# Patient Record
Sex: FEMALE | Race: WHITE | NOT HISPANIC OR LATINO | Employment: OTHER | URBAN - NONMETROPOLITAN AREA
[De-identification: names, ages, dates, MRNs, and addresses within clinical notes are randomized per-mention and may not be internally consistent; named-entity substitution may affect disease eponyms.]

---

## 2017-02-04 ENCOUNTER — HOSPITAL ENCOUNTER (EMERGENCY)
Facility: HOSPITAL | Age: 62
Discharge: HOME | End: 2017-02-04
Attending: EMERGENCY MEDICINE | Admitting: EMERGENCY MEDICINE
Payer: COMMERCIAL

## 2017-02-04 ENCOUNTER — APPOINTMENT (EMERGENCY)
Dept: CT IMAGING | Facility: HOSPITAL | Age: 62
End: 2017-02-04
Payer: COMMERCIAL

## 2017-02-04 ENCOUNTER — APPOINTMENT (EMERGENCY)
Dept: RADIOLOGY | Facility: HOSPITAL | Age: 62
End: 2017-02-04
Payer: COMMERCIAL

## 2017-02-04 VITALS
HEART RATE: 100 BPM | TEMPERATURE: 98.7 F | DIASTOLIC BLOOD PRESSURE: 77 MMHG | RESPIRATION RATE: 20 BRPM | SYSTOLIC BLOOD PRESSURE: 139 MMHG | OXYGEN SATURATION: 97 %

## 2017-02-04 DIAGNOSIS — R07.89 STERNAL PAIN: Primary | ICD-10-CM

## 2017-02-04 LAB
ANION GAP SERPL CALCULATED.3IONS-SCNC: 11 MMOL/L (ref 4–13)
BASOPHILS # BLD AUTO: 0.03 THOUSANDS/ΜL (ref 0–0.1)
BASOPHILS NFR BLD AUTO: 0 % (ref 0–1)
BILIRUB UR QL STRIP: NEGATIVE
BUN SERPL-MCNC: 17 MG/DL (ref 5–25)
CALCIUM SERPL-MCNC: 8.9 MG/DL (ref 8.3–10.1)
CHLORIDE SERPL-SCNC: 103 MMOL/L (ref 100–108)
CLARITY UR: CLEAR
CO2 SERPL-SCNC: 26 MMOL/L (ref 21–32)
COLOR UR: YELLOW
CREAT SERPL-MCNC: 0.91 MG/DL (ref 0.6–1.3)
DEPRECATED D DIMER PPP: 961 NG/ML (FEU) (ref 0–424)
EOSINOPHIL # BLD AUTO: 0.46 THOUSAND/ΜL (ref 0–0.61)
EOSINOPHIL NFR BLD AUTO: 6 % (ref 0–6)
ERYTHROCYTE [DISTWIDTH] IN BLOOD BY AUTOMATED COUNT: 13.5 % (ref 11.6–15.1)
GFR SERPL CREATININE-BSD FRML MDRD: >60 ML/MIN/1.73SQ M
GLUCOSE SERPL-MCNC: 116 MG/DL (ref 65–140)
GLUCOSE UR STRIP-MCNC: NEGATIVE MG/DL
HCT VFR BLD AUTO: 37.2 % (ref 34.8–46.1)
HGB BLD-MCNC: 12.1 G/DL (ref 11.5–15.4)
HGB UR QL STRIP.AUTO: NEGATIVE
KETONES UR STRIP-MCNC: NEGATIVE MG/DL
LACTATE SERPL-SCNC: 2.3 MMOL/L (ref 0.5–2)
LEUKOCYTE ESTERASE UR QL STRIP: NEGATIVE
LYMPHOCYTES # BLD AUTO: 1.49 THOUSANDS/ΜL (ref 0.6–4.47)
LYMPHOCYTES NFR BLD AUTO: 18 % (ref 14–44)
MAGNESIUM SERPL-MCNC: 1.6 MG/DL (ref 1.6–2.6)
MCH RBC QN AUTO: 29.4 PG (ref 26.8–34.3)
MCHC RBC AUTO-ENTMCNC: 32.5 G/DL (ref 31.4–37.4)
MCV RBC AUTO: 91 FL (ref 82–98)
MONOCYTES # BLD AUTO: 0.43 THOUSAND/ΜL (ref 0.17–1.22)
MONOCYTES NFR BLD AUTO: 5 % (ref 4–12)
NEUTROPHILS # BLD AUTO: 5.99 THOUSANDS/ΜL (ref 1.85–7.62)
NEUTS SEG NFR BLD AUTO: 71 % (ref 43–75)
NITRITE UR QL STRIP: NEGATIVE
PH UR STRIP.AUTO: 6 [PH] (ref 4.5–8)
PLATELET # BLD AUTO: 322 THOUSANDS/UL (ref 149–390)
PMV BLD AUTO: 9 FL (ref 8.9–12.7)
POTASSIUM SERPL-SCNC: 4.6 MMOL/L (ref 3.5–5.3)
PROT UR STRIP-MCNC: NEGATIVE MG/DL
RBC # BLD AUTO: 4.11 MILLION/UL (ref 3.81–5.12)
SODIUM SERPL-SCNC: 140 MMOL/L (ref 136–145)
SP GR UR STRIP.AUTO: 1.01 (ref 1–1.03)
TROPONIN I SERPL-MCNC: <0.02 NG/ML
UROBILINOGEN UR QL STRIP.AUTO: 0.2 E.U./DL
WBC # BLD AUTO: 8.4 THOUSAND/UL (ref 4.31–10.16)

## 2017-02-04 PROCEDURE — 85025 COMPLETE CBC W/AUTO DIFF WBC: CPT | Performed by: EMERGENCY MEDICINE

## 2017-02-04 PROCEDURE — 81003 URINALYSIS AUTO W/O SCOPE: CPT | Performed by: EMERGENCY MEDICINE

## 2017-02-04 PROCEDURE — 96361 HYDRATE IV INFUSION ADD-ON: CPT

## 2017-02-04 PROCEDURE — 83605 ASSAY OF LACTIC ACID: CPT | Performed by: EMERGENCY MEDICINE

## 2017-02-04 PROCEDURE — 96360 HYDRATION IV INFUSION INIT: CPT

## 2017-02-04 PROCEDURE — 71275 CT ANGIOGRAPHY CHEST: CPT

## 2017-02-04 PROCEDURE — 85379 FIBRIN DEGRADATION QUANT: CPT | Performed by: EMERGENCY MEDICINE

## 2017-02-04 PROCEDURE — 80048 BASIC METABOLIC PNL TOTAL CA: CPT | Performed by: EMERGENCY MEDICINE

## 2017-02-04 PROCEDURE — 84484 ASSAY OF TROPONIN QUANT: CPT | Performed by: EMERGENCY MEDICINE

## 2017-02-04 PROCEDURE — 71020 HB CHEST X-RAY 2VW FRONTAL&LATL: CPT

## 2017-02-04 PROCEDURE — 36415 COLL VENOUS BLD VENIPUNCTURE: CPT | Performed by: EMERGENCY MEDICINE

## 2017-02-04 PROCEDURE — 93005 ELECTROCARDIOGRAM TRACING: CPT | Performed by: EMERGENCY MEDICINE

## 2017-02-04 PROCEDURE — 83735 ASSAY OF MAGNESIUM: CPT | Performed by: EMERGENCY MEDICINE

## 2017-02-04 PROCEDURE — 73630 X-RAY EXAM OF FOOT: CPT

## 2017-02-04 PROCEDURE — 99285 EMERGENCY DEPT VISIT HI MDM: CPT

## 2017-02-04 RX ADMIN — SODIUM CHLORIDE 1000 ML: 0.9 INJECTION, SOLUTION INTRAVENOUS at 16:08

## 2017-02-04 RX ADMIN — IOHEXOL 85 ML: 350 INJECTION, SOLUTION INTRAVENOUS at 17:06

## 2017-02-06 LAB
ATRIAL RATE: 105 BPM
P AXIS: 52 DEGREES
PR INTERVAL: 144 MS
QRS AXIS: -5 DEGREES
QRSD INTERVAL: 72 MS
QT INTERVAL: 322 MS
QTC INTERVAL: 425 MS
T WAVE AXIS: 56 DEGREES
VENTRICULAR RATE: 105 BPM

## 2017-04-03 ENCOUNTER — APPOINTMENT (EMERGENCY)
Dept: RADIOLOGY | Facility: HOSPITAL | Age: 62
End: 2017-04-03
Payer: COMMERCIAL

## 2017-04-03 ENCOUNTER — HOSPITAL ENCOUNTER (EMERGENCY)
Facility: HOSPITAL | Age: 62
Discharge: HOME | End: 2017-04-03
Attending: EMERGENCY MEDICINE | Admitting: EMERGENCY MEDICINE
Payer: COMMERCIAL

## 2017-04-03 VITALS
WEIGHT: 250 LBS | TEMPERATURE: 97.8 F | HEART RATE: 84 BPM | SYSTOLIC BLOOD PRESSURE: 125 MMHG | RESPIRATION RATE: 17 BRPM | DIASTOLIC BLOOD PRESSURE: 71 MMHG | OXYGEN SATURATION: 99 % | BODY MASS INDEX: 39.16 KG/M2
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
## 2017-04-03 DIAGNOSIS — S50.12XA: ICD-10-CM

## 2017-04-03 DIAGNOSIS — S90.01XA CONTUSION OF ANKLE, RIGHT: ICD-10-CM

## 2017-04-03 DIAGNOSIS — M54.59 ARTHRALGIA OF LUMBAR SPINE: ICD-10-CM

## 2017-04-03 PROCEDURE — 73090 X-RAY EXAM OF FOREARM: CPT

## 2017-04-03 PROCEDURE — 73630 X-RAY EXAM OF FOOT: CPT

## 2017-04-03 PROCEDURE — 73610 X-RAY EXAM OF ANKLE: CPT

## 2017-04-03 PROCEDURE — 99283 EMERGENCY DEPT VISIT LOW MDM: CPT

## 2017-04-03 PROCEDURE — 72100 X-RAY EXAM L-S SPINE 2/3 VWS: CPT

## 2017-04-03 RX ORDER — ACETAMINOPHEN 325 MG/1
650 TABLET ORAL ONCE
Status: COMPLETED | OUTPATIENT
Start: 2017-04-03 | End: 2017-04-03

## 2017-04-03 RX ORDER — NITROFURANTOIN 25; 75 MG/1; MG/1
100 CAPSULE ORAL EVERY OTHER DAY
COMMUNITY
End: 2018-04-25 | Stop reason: DRUGHIGH

## 2017-04-03 RX ORDER — MORPHINE SULFATE 15 MG/1
15 TABLET ORAL EVERY 6 HOURS PRN
Qty: 11 TABLET | Refills: 0 | Status: SHIPPED | OUTPATIENT
Start: 2017-04-03 | End: 2017-08-07 | Stop reason: ALTCHOICE

## 2017-04-03 RX ADMIN — ACETAMINOPHEN 650 MG: 325 TABLET, FILM COATED ORAL at 18:16

## 2017-04-25 ENCOUNTER — HOSPITAL ENCOUNTER (EMERGENCY)
Facility: HOSPITAL | Age: 62
Discharge: HOME | End: 2017-04-25
Payer: COMMERCIAL

## 2017-04-25 ENCOUNTER — APPOINTMENT (EMERGENCY)
Dept: ULTRASOUND IMAGING | Facility: HOSPITAL | Age: 62
End: 2017-04-25
Payer: COMMERCIAL

## 2017-04-25 VITALS
OXYGEN SATURATION: 99 % | DIASTOLIC BLOOD PRESSURE: 55 MMHG | BODY MASS INDEX: 37.59 KG/M2 | WEIGHT: 240 LBS | SYSTOLIC BLOOD PRESSURE: 99 MMHG | HEART RATE: 64 BPM | RESPIRATION RATE: 15 BRPM | TEMPERATURE: 97.8 F

## 2017-04-25 DIAGNOSIS — R25.3 MUSCLE TWITCHING: Primary | ICD-10-CM

## 2017-04-25 DIAGNOSIS — R00.0 PULSE FAST: ICD-10-CM

## 2017-04-25 PROCEDURE — 93880 EXTRACRANIAL BILAT STUDY: CPT

## 2017-04-25 PROCEDURE — 99283 EMERGENCY DEPT VISIT LOW MDM: CPT

## 2017-04-25 RX ORDER — METHOCARBAMOL 500 MG/1
500 TABLET, FILM COATED ORAL 3 TIMES DAILY
Qty: 15 TABLET | Refills: 0 | Status: SHIPPED | OUTPATIENT
Start: 2017-04-25 | End: 2017-10-10

## 2017-04-25 RX ORDER — OXYBUTYNIN CHLORIDE 5 MG/1
5 TABLET ORAL 2 TIMES DAILY
COMMUNITY
End: 2018-07-27

## 2017-05-04 ENCOUNTER — HOSPITAL ENCOUNTER (EMERGENCY)
Facility: HOSPITAL | Age: 62
Discharge: HOME | End: 2017-05-04
Attending: EMERGENCY MEDICINE
Payer: COMMERCIAL

## 2017-05-04 VITALS
DIASTOLIC BLOOD PRESSURE: 80 MMHG | BODY MASS INDEX: 37.59 KG/M2 | RESPIRATION RATE: 18 BRPM | SYSTOLIC BLOOD PRESSURE: 138 MMHG | TEMPERATURE: 96.2 F | HEART RATE: 70 BPM | OXYGEN SATURATION: 99 % | WEIGHT: 240 LBS

## 2017-05-04 DIAGNOSIS — L30.9 DERMATITIS: Primary | ICD-10-CM

## 2017-05-04 PROCEDURE — 99283 EMERGENCY DEPT VISIT LOW MDM: CPT

## 2017-05-04 RX ORDER — DOXYCYCLINE HYCLATE 100 MG/1
100 CAPSULE ORAL ONCE
Status: COMPLETED | OUTPATIENT
Start: 2017-05-04 | End: 2017-05-04

## 2017-05-04 RX ORDER — DOXYCYCLINE HYCLATE 100 MG/1
100 CAPSULE ORAL 2 TIMES DAILY
Qty: 14 CAPSULE | Refills: 0 | Status: SHIPPED | OUTPATIENT
Start: 2017-05-04 | End: 2017-05-11

## 2017-05-04 RX ORDER — ACETAMINOPHEN 325 MG/1
650 TABLET ORAL ONCE
Status: COMPLETED | OUTPATIENT
Start: 2017-05-04 | End: 2017-05-04

## 2017-05-04 RX ADMIN — ACETAMINOPHEN 650 MG: 325 TABLET, FILM COATED ORAL at 16:45

## 2017-05-04 RX ADMIN — DOXYCYCLINE HYCLATE 100 MG: 100 CAPSULE ORAL at 16:45

## 2017-08-07 ENCOUNTER — HOSPITAL ENCOUNTER (EMERGENCY)
Facility: HOSPITAL | Age: 62
Discharge: HOME | End: 2017-08-07
Admitting: EMERGENCY MEDICINE
Payer: COMMERCIAL

## 2017-08-07 VITALS
BODY MASS INDEX: 40.72 KG/M2 | RESPIRATION RATE: 20 BRPM | HEART RATE: 98 BPM | TEMPERATURE: 97.3 F | WEIGHT: 260 LBS | OXYGEN SATURATION: 92 % | DIASTOLIC BLOOD PRESSURE: 55 MMHG | SYSTOLIC BLOOD PRESSURE: 96 MMHG

## 2017-08-07 DIAGNOSIS — N76.4 LEFT GENITAL LABIAL ABSCESS: Primary | ICD-10-CM

## 2017-08-07 PROCEDURE — 99282 EMERGENCY DEPT VISIT SF MDM: CPT

## 2017-08-07 RX ORDER — CLINDAMYCIN HYDROCHLORIDE 300 MG/1
300 CAPSULE ORAL 4 TIMES DAILY
Qty: 40 CAPSULE | Refills: 0 | Status: SHIPPED | OUTPATIENT
Start: 2017-08-07 | End: 2017-08-17

## 2017-09-04 ENCOUNTER — APPOINTMENT (EMERGENCY)
Dept: RADIOLOGY | Facility: HOSPITAL | Age: 62
End: 2017-09-04
Payer: COMMERCIAL

## 2017-09-04 ENCOUNTER — HOSPITAL ENCOUNTER (EMERGENCY)
Facility: HOSPITAL | Age: 62
Discharge: HOME | End: 2017-09-04
Attending: EMERGENCY MEDICINE
Payer: COMMERCIAL

## 2017-09-04 VITALS
SYSTOLIC BLOOD PRESSURE: 112 MMHG | TEMPERATURE: 99.2 F | HEIGHT: 66 IN | RESPIRATION RATE: 18 BRPM | DIASTOLIC BLOOD PRESSURE: 60 MMHG | OXYGEN SATURATION: 95 % | HEART RATE: 86 BPM | BODY MASS INDEX: 43.76 KG/M2 | WEIGHT: 272.3 LBS

## 2017-09-04 DIAGNOSIS — T14.8XXA CONTUSION: ICD-10-CM

## 2017-09-04 DIAGNOSIS — S80.00XA CONTUSION, KNEE: Primary | ICD-10-CM

## 2017-09-04 LAB
ALBUMIN SERPL BCP-MCNC: 3 G/DL (ref 3.5–5)
ALP SERPL-CCNC: 50 U/L (ref 46–116)
ALT SERPL W P-5'-P-CCNC: 28 U/L (ref 12–78)
AMPHETAMINES SERPL QL SCN: NEGATIVE
ANION GAP SERPL CALCULATED.3IONS-SCNC: 10 MMOL/L (ref 4–13)
APAP SERPL-MCNC: <2 UG/ML (ref 10–30)
AST SERPL W P-5'-P-CCNC: 17 U/L (ref 5–45)
BARBITURATES UR QL: NEGATIVE
BASOPHILS # BLD AUTO: 0.04 THOUSANDS/ΜL (ref 0–0.1)
BASOPHILS NFR BLD AUTO: 1 % (ref 0–1)
BENZODIAZ UR QL: NEGATIVE
BILIRUB SERPL-MCNC: 0.1 MG/DL (ref 0.2–1)
BUN SERPL-MCNC: 13 MG/DL (ref 5–25)
CALCIUM SERPL-MCNC: 8.5 MG/DL (ref 8.3–10.1)
CHLORIDE SERPL-SCNC: 101 MMOL/L (ref 100–108)
CK SERPL-CCNC: 111 U/L (ref 26–192)
CO2 SERPL-SCNC: 25 MMOL/L (ref 21–32)
COCAINE UR QL: NEGATIVE
CREAT SERPL-MCNC: 0.97 MG/DL (ref 0.6–1.3)
EOSINOPHIL # BLD AUTO: 0.64 THOUSAND/ΜL (ref 0–0.61)
EOSINOPHIL NFR BLD AUTO: 8 % (ref 0–6)
ERYTHROCYTE [DISTWIDTH] IN BLOOD BY AUTOMATED COUNT: 14.4 % (ref 11.6–15.1)
ETHANOL SERPL-MCNC: <3 MG/DL (ref 0–3)
GFR SERPL CREATININE-BSD FRML MDRD: 64 ML/MIN/1.73SQ M
GLUCOSE SERPL-MCNC: 178 MG/DL (ref 65–140)
HCT VFR BLD AUTO: 35 % (ref 34.8–46.1)
HGB BLD-MCNC: 11.5 G/DL (ref 11.5–15.4)
LYMPHOCYTES # BLD AUTO: 1.45 THOUSANDS/ΜL (ref 0.6–4.47)
LYMPHOCYTES NFR BLD AUTO: 17 % (ref 14–44)
MAGNESIUM SERPL-MCNC: 1.8 MG/DL (ref 1.6–2.6)
MCH RBC QN AUTO: 31.3 PG (ref 26.8–34.3)
MCHC RBC AUTO-ENTMCNC: 32.9 G/DL (ref 31.4–37.4)
MCV RBC AUTO: 95 FL (ref 82–98)
METHADONE UR QL: NEGATIVE
MONOCYTES # BLD AUTO: 0.55 THOUSAND/ΜL (ref 0.17–1.22)
MONOCYTES NFR BLD AUTO: 7 % (ref 4–12)
NEUTROPHILS # BLD AUTO: 5.71 THOUSANDS/ΜL (ref 1.85–7.62)
NEUTS SEG NFR BLD AUTO: 67 % (ref 43–75)
OPIATES UR QL SCN: NEGATIVE
PCP UR QL: NEGATIVE
PLATELET # BLD AUTO: 312 THOUSANDS/UL (ref 149–390)
PMV BLD AUTO: 8.6 FL (ref 8.9–12.7)
POTASSIUM SERPL-SCNC: 4.5 MMOL/L (ref 3.5–5.3)
PROT SERPL-MCNC: 6.4 G/DL (ref 6.4–8.2)
RBC # BLD AUTO: 3.68 MILLION/UL (ref 3.81–5.12)
SALICYLATES SERPL-MCNC: <3 MG/DL (ref 3–20)
SODIUM SERPL-SCNC: 136 MMOL/L (ref 136–145)
THC UR QL: NEGATIVE
WBC # BLD AUTO: 8.39 THOUSAND/UL (ref 4.31–10.16)

## 2017-09-04 PROCEDURE — 36415 COLL VENOUS BLD VENIPUNCTURE: CPT | Performed by: EMERGENCY MEDICINE

## 2017-09-04 PROCEDURE — 99284 EMERGENCY DEPT VISIT MOD MDM: CPT

## 2017-09-04 PROCEDURE — 85025 COMPLETE CBC W/AUTO DIFF WBC: CPT | Performed by: EMERGENCY MEDICINE

## 2017-09-04 PROCEDURE — 80053 COMPREHEN METABOLIC PANEL: CPT | Performed by: EMERGENCY MEDICINE

## 2017-09-04 PROCEDURE — 73610 X-RAY EXAM OF ANKLE: CPT

## 2017-09-04 PROCEDURE — 73564 X-RAY EXAM KNEE 4 OR MORE: CPT

## 2017-09-04 PROCEDURE — 80320 DRUG SCREEN QUANTALCOHOLS: CPT | Performed by: EMERGENCY MEDICINE

## 2017-09-04 PROCEDURE — 72100 X-RAY EXAM L-S SPINE 2/3 VWS: CPT

## 2017-09-04 PROCEDURE — 80307 DRUG TEST PRSMV CHEM ANLYZR: CPT | Performed by: EMERGENCY MEDICINE

## 2017-09-04 PROCEDURE — 80329 ANALGESICS NON-OPIOID 1 OR 2: CPT | Performed by: EMERGENCY MEDICINE

## 2017-09-04 PROCEDURE — 82550 ASSAY OF CK (CPK): CPT | Performed by: EMERGENCY MEDICINE

## 2017-09-04 PROCEDURE — 83735 ASSAY OF MAGNESIUM: CPT | Performed by: EMERGENCY MEDICINE

## 2017-09-04 PROCEDURE — 71010 HB CHEST X-RAY 1 VIEW FRONTAL (PORTABLE): CPT

## 2017-09-04 PROCEDURE — 73080 X-RAY EXAM OF ELBOW: CPT

## 2017-09-04 PROCEDURE — 96374 THER/PROPH/DIAG INJ IV PUSH: CPT

## 2017-09-04 RX ORDER — KETOROLAC TROMETHAMINE 30 MG/ML
15 INJECTION, SOLUTION INTRAMUSCULAR; INTRAVENOUS ONCE
Status: COMPLETED | OUTPATIENT
Start: 2017-09-04 | End: 2017-09-04

## 2017-09-04 RX ADMIN — KETOROLAC TROMETHAMINE 15 MG: 30 INJECTION, SOLUTION INTRAMUSCULAR; INTRAVENOUS at 08:20

## 2017-10-10 ENCOUNTER — HOSPITAL ENCOUNTER (EMERGENCY)
Facility: HOSPITAL | Age: 62
Discharge: HOME | End: 2017-10-10
Attending: EMERGENCY MEDICINE | Admitting: EMERGENCY MEDICINE
Payer: COMMERCIAL

## 2017-10-10 ENCOUNTER — APPOINTMENT (EMERGENCY)
Dept: RADIOLOGY | Facility: HOSPITAL | Age: 62
End: 2017-10-10
Payer: COMMERCIAL

## 2017-10-10 VITALS
WEIGHT: 269.9 LBS | OXYGEN SATURATION: 97 % | TEMPERATURE: 97.8 F | SYSTOLIC BLOOD PRESSURE: 120 MMHG | RESPIRATION RATE: 20 BRPM | DIASTOLIC BLOOD PRESSURE: 67 MMHG | HEIGHT: 66 IN | HEART RATE: 95 BPM | BODY MASS INDEX: 43.37 KG/M2

## 2017-10-10 DIAGNOSIS — R10.32 ACUTE BILATERAL LOWER ABDOMINAL PAIN: Primary | ICD-10-CM

## 2017-10-10 DIAGNOSIS — K59.00 CONSTIPATION: ICD-10-CM

## 2017-10-10 DIAGNOSIS — R10.31 ACUTE BILATERAL LOWER ABDOMINAL PAIN: Primary | ICD-10-CM

## 2017-10-10 PROCEDURE — 74022 RADEX COMPL AQT ABD SERIES: CPT

## 2017-10-10 PROCEDURE — 99284 EMERGENCY DEPT VISIT MOD MDM: CPT

## 2017-10-10 RX ORDER — MAGNESIUM CARB/ALUMINUM HYDROX 105-160MG
296 TABLET,CHEWABLE ORAL ONCE
Status: COMPLETED | OUTPATIENT
Start: 2017-10-10 | End: 2017-10-10

## 2017-10-10 RX ORDER — MAGNESIUM CARB/ALUMINUM HYDROX 105-160MG
296 TABLET,CHEWABLE ORAL ONCE
Qty: 296 ML | Refills: 0 | Status: SHIPPED | OUTPATIENT
Start: 2017-10-10 | End: 2018-04-25 | Stop reason: HOSPADM

## 2017-10-10 RX ADMIN — MAGNESIUM CITRATE 296 ML: 1.75 LIQUID ORAL at 10:32

## 2017-10-10 NOTE — ED NOTES
SSE given  Held same fair  Sitting on [facility]       Callum Guevara RN  10/10/17 7013

## 2017-10-10 NOTE — ED NOTES
Pt states that the pain in her abdomen is gone and she is ready to go home       Aliya Loco, RN  10/10/17 8732

## 2017-10-10 NOTE — DISCHARGE INSTRUCTIONS
Constipation   WHAT YOU NEED TO KNOW:   Constipation is when you have hard, dry bowel movements, or you go longer than usual between bowel movements  DISCHARGE INSTRUCTIONS:   Return to the emergency department if:   · You have blood in your bowel movements  · You have a fever and abdominal pain with the constipation  Contact your healthcare provider if:   · Your constipation gets worse  · You start to vomit  · You have questions or concerns about your condition or care  Medicines:   · Medicine or a fiber supplement  may help make your bowel movement softer  A laxative may help relax and loosen your intestines to help you have a bowel movement  You may also be given medicine to increase fluid in your intestines  The fluid may help move bowel movements through your intestines  · Take your medicine as directed  Contact your healthcare provider if you think your medicine is not helping or if you have side effects  Tell him of her if you are allergic to any medicine  Keep a list of the medicines, vitamins, and herbs you take  Include the amounts, and when and why you take them  Bring the list or the pill bottles to follow-up visits  Carry your medicine list with you in case of an emergency  Manage your constipation:   · Drink liquids as directed  You may need to drink extra liquids to help soften and move your bowels  Ask how much liquid to drink each day and which liquids are best for you  · Eat high-fiber foods  This may help decrease constipation by adding bulk to your bowel movements  High-fiber foods include fruit, vegetables, whole-grain breads and cereals, and beans  Your healthcare provider or dietitian can help you create a high-fiber meal plan  · Exercise regularly  Regular physical activity can help stimulate your intestines  Ask which exercises are best for you  · Schedule a time each day to have a bowel movement    This may help train your body to have regular bowel movements  Bend forward while you are on the toilet to help move the bowel movement out  Sit on the toilet for at least 10 minutes, even if you do not have a bowel movement  Follow up with your healthcare provider as directed:  Write down your questions so you remember to ask them during your visits  © 2017 [employer] Information is for End User's use only and may not be sold, redistributed or otherwise used for commercial purposes  All illustrations and images included in CareNotes® are the copyrighted property of [employer]  or Edgardo Chappell  The above information is an  only  It is not intended as medical advice for individual conditions or treatments  Talk to your doctor, nurse or pharmacist before following any medical regimen to see if it is safe and effective for you

## 2017-10-10 NOTE — ED PROVIDER NOTES
History  Chief Complaint   Patient presents with    Constipation     Patient complains of constipation for 4-5 days      Patient complains of 2 and half weeks of constipation in which she is only able to get out 1 inch of stool were small amounts of gas  She continues to eat and drink though states she has a decreased appetite and occasional mild nausea  She has had no vomiting  She has had episodes of constipation previously either similar to this and thought to be due to her psychiatric medications  She takes a fiber supplement on a daily basis but recently ran out and was told to take an over-the-counter version but she states this did not help  No hematochezia, melena or hematemesis  No change in symptoms w/voiding  No recent travel or similar sick contacts  Denies f/c, CP, SOB, v/d  12 system ROS o/w negative  History provided by:  Patient and medical records  Constipation   Severity:  Moderate  Time since last bowel movement:  17 days  Timing:  Constant  Progression:  Waxing and waning  Chronicity:  Recurrent  Context: medication    Context: not dehydration, not dietary changes, not narcotics and not stress    Stool description:  Small  Relieved by:  Nothing  Worsened by:  Prescription drugs  Ineffective treatments:  Fiber and defecation  Associated symptoms: abdominal pain (Described as burning in the lower abdomen) and nausea    Associated symptoms: no anorexia, no back pain, no diarrhea, no dysuria, no fever, no hematochezia, no urinary retention and no vomiting    Risk factors: obesity    Risk factors: no change in medication, no recent illness and no recent surgery        Prior to Admission Medications   Prescriptions Last Dose Informant Patient Reported? Taking? QUEtiapine (SEROquel) 400 MG tablet   Yes Yes   Sig: Take 400 mg by mouth 2 (two) times a day     albuterol (PROVENTIL HFA,VENTOLIN HFA) 90 mcg/act inhaler   Yes Yes   Sig: Inhale 2 puffs every 6 (six) hours as needed for wheezing  aspirin (ECOTRIN LOW STRENGTH) 81 mg EC tablet   Yes Yes   Sig: Take 81 mg by mouth daily  estradiol (ESTRACE) 1 mg tablet   Yes Yes   Sig: Take 1 mg by mouth daily  fluticasone (FLONASE) 50 mcg/act nasal spray   Yes Yes   Si spray into each nostril daily  gabapentin (NEURONTIN) 800 mg tablet   Yes Yes   Sig: Take 800 mg by mouth 4 (four) times a day  glipiZIDE (GLUCOTROL XL) 10 mg 24 hr tablet   Yes Yes   Sig: Take 10 mg by mouth 2 (two) times a day    hydrOXYzine pamoate (VISTARIL) 50 mg capsule   Yes Yes   Sig: Take 50 mg by mouth 3 (three) times a day  insulin glargine (LANTUS) 100 units/mL subcutaneous injection   Yes Yes   Sig: Inject 78 Units under the skin daily at bedtime  levETIRAcetam (KEPPRA) 750 mg tablet   Yes Yes   Sig: Take 1,500 mg by mouth 2 (two) times a day     levothyroxine 175 mcg tablet   Yes Yes   Sig: Take 175 mcg by mouth daily  lisinopril (ZESTRIL) 10 mg tablet   Yes Yes   Sig: Take 10 mg by mouth daily  loratadine (CLARITIN) 10 mg tablet   Yes Yes   Sig: Take 10 mg by mouth daily  meloxicam (MOBIC) 7 5 mg tablet   Yes Yes   Sig: Take 7 5 mg by mouth 2 (two) times a day  metFORMIN (GLUCOPHAGE) 1000 MG tablet   Yes Yes   Sig: Take 1,000 mg by mouth 2 (two) times a day with meals   montelukast (SINGULAIR) 10 mg tablet   Yes Yes   Sig: Take 10 mg by mouth daily at bedtime  nitrofurantoin (MACROBID) 100 mg capsule   Yes Yes   Sig: Take 100 mg by mouth every other day   norethindrone (AYGESTIN) 5 mg tablet   Yes Yes   Sig: Take 5 mg by mouth daily  oxybutynin (DITROPAN) 5 mg tablet   Yes Yes   Sig: Take 5 mg by mouth 2 (two) times a day   phenytoin (DILANTIN) 100 mg ER capsule   Yes Yes   Sig: Take by mouth 5 (five) times a day  polyethylene glycol (MIRALAX) 17 g packet   Yes Yes   Sig: Take 17 g by mouth daily     sitaGLIPtin (JANUVIA) 50 mg tablet   Yes Yes   Sig: Take 50 mg by mouth daily   zonisamide (ZONEGRAN) 100 mg capsule   Yes Yes   Sig: Take 100 mg by mouth 5 (five) times a day        Facility-Administered Medications: None       Past Medical History:   Diagnosis Date    Asthma     Chronic UTI (urinary tract infection)     COPD (chronic obstructive pulmonary disease) (HCC)     Diabetes mellitus (HCC)     Disease of thyroid gland     GERD (gastroesophageal reflux disease)     Psychiatric disorder     bi-polar    Se[facility] [address] [facility]        Past Surgical History:   Procedure Laterality Date    ANKLE FRACTURE SURGERY Right     TUBAL LIGATION      VEIN LIGATION AND STRIPPING         History reviewed  No pertinent family history  I have reviewed and agree with the history as documented  Social History   Substance Use Topics    Smoking status: Never Smoker    Smokeless tobacco: Never Used    Alcohol use No        Review of Systems   Constitutional: Negative for chills, diaphoresis, fatigue and fever  HENT: Negative for congestion, rhinorrhea and sore throat  Respiratory: Negative for cough, shortness of breath and wheezing  Cardiovascular: Negative for chest pain, palpitations and leg swelling  Gastrointestinal: Positive for abdominal pain (Described as burning in the lower abdomen), constipation and nausea  Negative for anorexia, diarrhea, hematochezia and vomiting  Endocrine: Negative for polydipsia, polyphagia and polyuria  Genitourinary: Negative for dysuria, flank pain, frequency, hematuria, urgency, vaginal discharge and vaginal pain  Musculoskeletal: Negative for arthralgias, back pain and myalgias  Skin: Negative for pallor and rash  Neurological: Negative for dizziness, syncope, weakness, light-headedness, numbness and headaches  Hematological: Negative for adenopathy  Psychiatric/Behavioral: Negative for confusion  All other systems reviewed and are negative        Physical Exam  ED Triage Vitals [10/10/17 0943]   Temperature Pulse Respirations Blood Pressure SpO2   97 8 °F (36 6 °C) (!) 107 18 114/57 94 % Temp Source Heart Rate Source Patient Position - Orthostatic VS BP Location FiO2 (%)   Temporal Monitor Lying Left arm --      Pain Score       8           Physical Exam   Constitutional: She is oriented to person, place, and time  She appears well-developed and well-nourished  No distress  HENT:   Head: Normocephalic and atraumatic  Eyes: EOM are normal  Pupils are equal, round, and reactive to light  Neck: Normal range of motion  Neck supple  Cardiovascular: Normal rate, regular rhythm and normal heart sounds  No murmur heard  Pulmonary/Chest: Effort normal and breath sounds normal  No respiratory distress  She has no wheezes  She has no rales  Abdominal: Soft  Bowel sounds are normal  She exhibits no distension  There is tenderness (Very mild in the lower abdomen)  There is no rebound and no guarding  Musculoskeletal: Normal range of motion  She exhibits no edema or tenderness  Neurological: She is alert and oriented to person, place, and time  She has normal reflexes  No cranial nerve deficit  Skin: Skin is warm and dry  Capillary refill takes less than 2 seconds  No rash noted  She is not diaphoretic  No pallor  Psychiatric: She has a normal mood and affect  Her behavior is normal  Thought content normal    Vitals reviewed  ED Medications  Medications   magnesium citrate (CITROMA) oral solution 296 mL (296 mL Oral Given 10/10/17 1032)       Diagnostic Studies  Labs Reviewed - No data to display    XR abdomen obstruction series   Final Result      Nonobstructive bowel gas pattern  Multilevel degenerative disc disease, lumbar spine  Workstation performed: [id number]             Procedures  Procedures      Phone Contacts  ED Phone Contact    ED Course  ED Course as of Oct 10 1441   Tue Oct 10, 2017   1134 Only small amount of watery stool produced so far     1222 Patient still attempting to pass stool      1229 Patient reports the burning discomfort has dissipated and she would like to go home  Will given prescription for Mag citrate for continued treatment and recommend follow-up with PCP  MDM  Number of Diagnoses or Management Options  Acute bilateral lower abdominal pain:   Constipation:   Diagnosis management comments:  DDx: Abdominal burning/distension - constipation, UTI, doubt pyelo, bowel obstruction or ischemia  A/P: Will check abdominal obstruction series, abdominal labs, urine, treat symptoms, reevaluate for further work up and disposition  Amount and/or Complexity of Data Reviewed  Clinical lab tests: ordered and reviewed  Tests in the radiology section of CPT®: reviewed and ordered  Review and summarize past medical records: yes      CritCare Time    Disposition  Final diagnoses:   Acute bilateral lower abdominal pain   Constipation     ED Disposition     ED Disposition Condition Comment    Discharge  Althea Pritchard discharge to home/self care  Condition at discharge: Stable        Follow-up Information     Follow up With Specialties Details Why [address], [address] [facility] Medicine Schedule an appointment as soon as possible for a visit If symptoms worsen [facility]  [address]   [phone]          Discharge Medication List as of 10/10/2017 12:31 PM      START taking these medications    Details   magnesium citrate (CITROMA) 1 745 g/30 mL oral solution Take 296 mL by mouth once for 1 dose, Starting Tue 10/10/2017, Print         CONTINUE these medications which have NOT CHANGED    Details   albuterol (PROVENTIL HFA,VENTOLIN HFA) 90 mcg/act inhaler Inhale 2 puffs every 6 (six) hours as needed for wheezing , Until Discontinued, Historical Med      aspirin (ECOTRIN LOW STRENGTH) 81 mg EC tablet Take 81 mg by mouth daily  , Until Discontinued, Historical Med      estradiol (ESTRACE) 1 mg tablet Take 1 mg by mouth daily  , Until Discontinued, Historical Med      fluticasone (FLONASE) 50 mcg/act nasal spray 1 spray into each nostril daily  , Until Discontinued, Historical Med      gabapentin (NEURONTIN) 800 mg tablet Take 800 mg by mouth 4 (four) times a day , Until Discontinued, Historical Med      glipiZIDE (GLUCOTROL XL) 10 mg 24 hr tablet Take 10 mg by mouth 2 (two) times a day , Until Discontinued, Historical Med      hydrOXYzine pamoate (VISTARIL) 50 mg capsule Take 50 mg by mouth 3 (three) times a day , Until Discontinued, Historical Med      insulin glargine (LANTUS) 100 units/mL subcutaneous injection Inject 78 Units under the skin daily at bedtime  , Until Discontinued, Historical Med      levETIRAcetam (KEPPRA) 750 mg tablet Take 1,500 mg by mouth 2 (two) times a day  , Until Discontinued, Historical Med      levothyroxine 175 mcg tablet Take 175 mcg by mouth daily  , Until Discontinued, Historical Med      lisinopril (ZESTRIL) 10 mg tablet Take 10 mg by mouth daily  , Until Discontinued, Historical Med      loratadine (CLARITIN) 10 mg tablet Take 10 mg by mouth daily  , Until Discontinued, Historical Med      meloxicam (MOBIC) 7 5 mg tablet Take 7 5 mg by mouth 2 (two) times a day , Until Discontinued, Historical Med      metFORMIN (GLUCOPHAGE) 1000 MG tablet Take 1,000 mg by mouth 2 (two) times a day with meals, Until Discontinued, Historical Med      montelukast (SINGULAIR) 10 mg tablet Take 10 mg by mouth daily at bedtime  , Until Discontinued, Historical Med      nitrofurantoin (MACROBID) 100 mg capsule Take 100 mg by mouth every other day, Until Discontinued, Historical Med      norethindrone (AYGESTIN) 5 mg tablet Take 5 mg by mouth daily  , Until Discontinued, Historical Med      oxybutynin (DITROPAN) 5 mg tablet Take 5 mg by mouth 2 (two) times a day, Until Discontinued, Historical Med      phenytoin (DILANTIN) 100 mg ER capsule Take by mouth 5 (five) times a day   , Until Discontinued, Historical Med      polyethylene glycol (MIRALAX) 17 g packet Take 17 g by mouth daily  , Until Discontinued, Historical Med      QUEtiapine (SEROquel) 400 MG tablet Take 400 mg by mouth 2 (two) times a day , Until Discontinued, Historical Med      sitaGLIPtin (JANUVIA) 50 mg tablet Take 50 mg by mouth daily, Until Discontinued, Historical Med      zonisamide (ZONEGRAN) 100 mg capsule Take 100 mg by mouth 5 (five) times a day  , Until Discontinued, Historical Med           No discharge procedures on file      ED Provider  Electronically Signed by       Robert Scott DO  10/10/17 1812

## 2017-10-11 ENCOUNTER — TELEPHONE (OUTPATIENT)
Dept: EMERGENCY DEPT | Facility: HOSPITAL | Age: 62
End: 2017-10-11

## 2017-11-14 ENCOUNTER — TRANSCRIBE ORDERS (OUTPATIENT)
Dept: LAB | Facility: MEDICAL CENTER | Age: 62
End: 2017-11-14

## 2017-11-14 ENCOUNTER — APPOINTMENT (OUTPATIENT)
Dept: LAB | Facility: MEDICAL CENTER | Age: 62
End: 2017-11-14
Payer: COMMERCIAL

## 2017-11-14 DIAGNOSIS — R53.83 FATIGUE, UNSPECIFIED TYPE: ICD-10-CM

## 2017-11-14 DIAGNOSIS — E03.9 HYPOTHYROIDISM, UNSPECIFIED TYPE: ICD-10-CM

## 2017-11-14 DIAGNOSIS — E78.5 DYSLIPIDEMIA: ICD-10-CM

## 2017-11-14 DIAGNOSIS — Z79.899 LONG TERM USE OF DRUG: ICD-10-CM

## 2017-11-14 DIAGNOSIS — Z86.69 HISTORY OF SEIZURE DISORDER: ICD-10-CM

## 2017-11-14 DIAGNOSIS — G40.909 SEIZURE DISORDER (HCC): ICD-10-CM

## 2017-11-14 DIAGNOSIS — G40.909 SEIZURE DISORDER (HCC): Primary | ICD-10-CM

## 2017-11-14 LAB
ALBUMIN SERPL BCP-MCNC: 3.3 G/DL (ref 3.5–5)
ALP SERPL-CCNC: 57 U/L (ref 46–116)
ALT SERPL W P-5'-P-CCNC: 46 U/L (ref 12–78)
ANION GAP SERPL CALCULATED.3IONS-SCNC: 8 MMOL/L (ref 4–13)
AST SERPL W P-5'-P-CCNC: 27 U/L (ref 5–45)
BASOPHILS # BLD AUTO: 0.05 THOUSANDS/ΜL (ref 0–0.1)
BASOPHILS NFR BLD AUTO: 1 % (ref 0–1)
BILIRUB SERPL-MCNC: 0.18 MG/DL (ref 0.2–1)
BUN SERPL-MCNC: 11 MG/DL (ref 5–25)
CALCIUM SERPL-MCNC: 9 MG/DL (ref 8.3–10.1)
CHLORIDE SERPL-SCNC: 102 MMOL/L (ref 100–108)
CHOLEST SERPL-MCNC: 168 MG/DL (ref 50–200)
CO2 SERPL-SCNC: 24 MMOL/L (ref 21–32)
CREAT SERPL-MCNC: 0.78 MG/DL (ref 0.6–1.3)
EOSINOPHIL # BLD AUTO: 0.6 THOUSAND/ΜL (ref 0–0.61)
EOSINOPHIL NFR BLD AUTO: 6 % (ref 0–6)
ERYTHROCYTE [DISTWIDTH] IN BLOOD BY AUTOMATED COUNT: 13.1 % (ref 11.6–15.1)
GFR SERPL CREATININE-BSD FRML MDRD: 83 ML/MIN/1.73SQ M
GLUCOSE P FAST SERPL-MCNC: 59 MG/DL (ref 65–99)
HCT VFR BLD AUTO: 37.5 % (ref 34.8–46.1)
HDLC SERPL-MCNC: 52 MG/DL (ref 40–60)
HGB BLD-MCNC: 12.6 G/DL (ref 11.5–15.4)
LDLC SERPL CALC-MCNC: 93 MG/DL (ref 0–100)
LYMPHOCYTES # BLD AUTO: 1.81 THOUSANDS/ΜL (ref 0.6–4.47)
LYMPHOCYTES NFR BLD AUTO: 19 % (ref 14–44)
MCH RBC QN AUTO: 31.2 PG (ref 26.8–34.3)
MCHC RBC AUTO-ENTMCNC: 33.6 G/DL (ref 31.4–37.4)
MCV RBC AUTO: 93 FL (ref 82–98)
MONOCYTES # BLD AUTO: 0.65 THOUSAND/ΜL (ref 0.17–1.22)
MONOCYTES NFR BLD AUTO: 7 % (ref 4–12)
NEUTROPHILS # BLD AUTO: 6.48 THOUSANDS/ΜL (ref 1.85–7.62)
NEUTS SEG NFR BLD AUTO: 67 % (ref 43–75)
NRBC BLD AUTO-RTO: 0 /100 WBCS
PHENYTOIN SERPL-MCNC: 5.5 UG/ML (ref 10–20)
PLATELET # BLD AUTO: 417 THOUSANDS/UL (ref 149–390)
PMV BLD AUTO: 9.2 FL (ref 8.9–12.7)
POTASSIUM SERPL-SCNC: 4.5 MMOL/L (ref 3.5–5.3)
PROT SERPL-MCNC: 7.5 G/DL (ref 6.4–8.2)
RBC # BLD AUTO: 4.04 MILLION/UL (ref 3.81–5.12)
SODIUM SERPL-SCNC: 134 MMOL/L (ref 136–145)
TRIGL SERPL-MCNC: 114 MG/DL
TSH SERPL DL<=0.05 MIU/L-ACNC: 1.98 UIU/ML (ref 0.36–3.74)
WBC # BLD AUTO: 9.64 THOUSAND/UL (ref 4.31–10.16)

## 2017-11-14 PROCEDURE — 80061 LIPID PANEL: CPT

## 2017-11-14 PROCEDURE — 80203 DRUG SCREEN QUANT ZONISAMIDE: CPT

## 2017-11-14 PROCEDURE — 36415 COLL VENOUS BLD VENIPUNCTURE: CPT

## 2017-11-14 PROCEDURE — 80185 ASSAY OF PHENYTOIN TOTAL: CPT

## 2017-11-14 PROCEDURE — 85025 COMPLETE CBC W/AUTO DIFF WBC: CPT

## 2017-11-14 PROCEDURE — 80053 COMPREHEN METABOLIC PANEL: CPT

## 2017-11-14 PROCEDURE — 84443 ASSAY THYROID STIM HORMONE: CPT

## 2017-11-14 PROCEDURE — 80177 DRUG SCRN QUAN LEVETIRACETAM: CPT

## 2017-11-16 LAB
LEVETIRACETAM SERPL-MCNC: 20.1 UG/ML (ref 10–40)
ZONISAMIDE SERPL-MCNC: 6.2 UG/ML (ref 10–40)

## 2018-04-10 ENCOUNTER — OFFICE VISIT (OUTPATIENT)
Dept: FAMILY MEDICINE CLINIC | Facility: CLINIC | Age: 63
End: 2018-04-10
Payer: COMMERCIAL

## 2018-04-10 VITALS
TEMPERATURE: 98.4 F | OXYGEN SATURATION: 95 % | BODY MASS INDEX: 42.91 KG/M2 | DIASTOLIC BLOOD PRESSURE: 76 MMHG | WEIGHT: 267 LBS | HEART RATE: 105 BPM | SYSTOLIC BLOOD PRESSURE: 120 MMHG | HEIGHT: 66 IN | RESPIRATION RATE: 18 BRPM

## 2018-04-10 DIAGNOSIS — Z79.4 TYPE 2 DIABETES MELLITUS WITH COMPLICATION, WITH LONG-TERM CURRENT USE OF INSULIN (HCC): Primary | ICD-10-CM

## 2018-04-10 DIAGNOSIS — E03.9 ACQUIRED HYPOTHYROIDISM: ICD-10-CM

## 2018-04-10 DIAGNOSIS — E11.65 TYPE 2 DIABETES MELLITUS WITH HYPERGLYCEMIA, WITH LONG-TERM CURRENT USE OF INSULIN (HCC): ICD-10-CM

## 2018-04-10 DIAGNOSIS — L30.9 DERMATITIS: ICD-10-CM

## 2018-04-10 DIAGNOSIS — R21 RASH: ICD-10-CM

## 2018-04-10 DIAGNOSIS — E11.8 TYPE 2 DIABETES MELLITUS WITH COMPLICATION, WITH LONG-TERM CURRENT USE OF INSULIN (HCC): ICD-10-CM

## 2018-04-10 DIAGNOSIS — R56.9 SEIZURE (HCC): ICD-10-CM

## 2018-04-10 DIAGNOSIS — F31.78 BIPOLAR DISORDER, IN FULL REMISSION, MOST RECENT EPISODE MIXED (HCC): ICD-10-CM

## 2018-04-10 DIAGNOSIS — Z79.4 TYPE 2 DIABETES MELLITUS WITH COMPLICATION, WITH LONG-TERM CURRENT USE OF INSULIN (HCC): ICD-10-CM

## 2018-04-10 DIAGNOSIS — E11.8 TYPE 2 DIABETES MELLITUS WITH COMPLICATION, WITH LONG-TERM CURRENT USE OF INSULIN (HCC): Primary | ICD-10-CM

## 2018-04-10 DIAGNOSIS — Z79.4 TYPE 2 DIABETES MELLITUS WITH HYPERGLYCEMIA, WITH LONG-TERM CURRENT USE OF INSULIN (HCC): ICD-10-CM

## 2018-04-10 LAB
ALBUMIN SERPL BCP-MCNC: 3.4 G/DL (ref 3.5–5)
ALP SERPL-CCNC: 56 U/L (ref 46–116)
ALT SERPL W P-5'-P-CCNC: 19 U/L (ref 12–78)
ANION GAP SERPL CALCULATED.3IONS-SCNC: 9 MMOL/L (ref 4–13)
AST SERPL W P-5'-P-CCNC: 12 U/L (ref 5–45)
BILIRUB SERPL-MCNC: 0.31 MG/DL (ref 0.2–1)
BUN SERPL-MCNC: 14 MG/DL (ref 5–25)
CALCIUM SERPL-MCNC: 9.1 MG/DL
CHLORIDE SERPL-SCNC: 102 MMOL/L (ref 100–108)
CHOLEST SERPL-MCNC: 192 MG/DL (ref 50–200)
CO2 SERPL-SCNC: 23 MMOL/L (ref 21–32)
CREAT SERPL-MCNC: 0.9 MG/DL (ref 0.6–1.3)
EST. AVERAGE GLUCOSE BLD GHB EST-MCNC: 123 MG/DL
GFR SERPL CREATININE-BSD FRML MDRD: 70 ML/MIN/1.73SQ M
GLUCOSE P FAST SERPL-MCNC: 50 MG/DL (ref 65–99)
HBA1C MFR BLD: 5.9 % (ref 4.2–6.3)
HDLC SERPL-MCNC: 57 MG/DL (ref 40–60)
LDLC SERPL CALC-MCNC: 100 MG/DL (ref 0–100)
PHENYTOIN SERPL-MCNC: 8 UG/ML (ref 10–20)
POTASSIUM SERPL-SCNC: 4.5 MMOL/L (ref 3.5–5.3)
PROT SERPL-MCNC: 7.2 G/DL (ref 6.4–8.2)
SODIUM SERPL-SCNC: 134 MMOL/L (ref 136–145)
TRIGL SERPL-MCNC: 173 MG/DL
TSH SERPL DL<=0.05 MIU/L-ACNC: 1.44 UIU/ML (ref 0.36–3.74)

## 2018-04-10 PROCEDURE — 80061 LIPID PANEL: CPT | Performed by: NURSE PRACTITIONER

## 2018-04-10 PROCEDURE — 83036 HEMOGLOBIN GLYCOSYLATED A1C: CPT | Performed by: NURSE PRACTITIONER

## 2018-04-10 PROCEDURE — 80177 DRUG SCRN QUAN LEVETIRACETAM: CPT | Performed by: NURSE PRACTITIONER

## 2018-04-10 PROCEDURE — T1015 CLINIC SERVICE: HCPCS | Performed by: FAMILY MEDICINE

## 2018-04-10 PROCEDURE — 80185 ASSAY OF PHENYTOIN TOTAL: CPT | Performed by: NURSE PRACTITIONER

## 2018-04-10 PROCEDURE — 84443 ASSAY THYROID STIM HORMONE: CPT | Performed by: NURSE PRACTITIONER

## 2018-04-10 PROCEDURE — 80053 COMPREHEN METABOLIC PANEL: CPT | Performed by: NURSE PRACTITIONER

## 2018-04-10 RX ORDER — LANCETS 33 GAUGE
EACH MISCELLANEOUS
Qty: 100 EACH | Refills: 3 | Status: SHIPPED | OUTPATIENT
Start: 2018-04-10 | End: 2019-08-12 | Stop reason: SDUPTHER

## 2018-04-10 RX ORDER — CLOTRIMAZOLE 1 %
CREAM (GRAM) TOPICAL 2 TIMES DAILY
Qty: 30 G | Refills: 0 | Status: SHIPPED | OUTPATIENT
Start: 2018-04-10 | End: 2018-04-25 | Stop reason: HOSPADM

## 2018-04-10 RX ORDER — DIPHENHYDRAMINE HYDROCHLORIDE 25 MG/1
CAPSULE, LIQUID FILLED ORAL
Qty: 1 EACH | Refills: 0 | Status: SHIPPED | OUTPATIENT
Start: 2018-04-10

## 2018-04-10 NOTE — PROGRESS NOTES
OFFICE VISIT  Herb Ly 63 y o  female MRN: [id number]      Assessment / Plan:  Diagnoses and all orders for this visit:    Type 2 diabetes mellitus with complication, with long-term current use of insulin ([facility])  -     glucose blood test strip; Test blood sugars 3 times a day  -     Blood Glucose Monitoring Suppl (BLOOD GLUCOSE MONITOR SYSTEM) w/Device KIT; Test blood sugars 3 times a day  -     ONETOUCH DELICA LANCETS 00A MISC; Test blood sugars 3 times a day  -     Hemoglobin A1c; Future  -     TSH, 3rd generation with T4 reflex; Future  -     Microalbumin / creatinine urine ratio  -     Lipid Panel with Direct LDL reflex; Future  -     Comprehensive metabolic panel; Future  -     Ambulatory referral to Ophthalmology; Future  -     Ambulatory referral to Podiatry; Future    B[facility] disorder, in full remission, [facility]    Acquired hypothyroidism    Dermatitis    Type 2 diabetes mellitus with hyperglycemia, with long-term current use of insulin ([facility])    Seizure ([facility])  -     Phenytoin level, total; Future  -     Levetiracetam level; Future    Rash  -     clotrimazole (LOTRIMIN) 1 % cream; Apply topically 2 (two) times a day  -     Ambulatory referral to Dermatology; Future    Follow up in 3 weeks      Reason For Visit / Chief Complaint  Chief Complaint   Patient presents with   [facility]      She would like bloodwork done  She states she fell this morning before coming in  HPI:  Herb Ly is a 63 y o  female presents today to establish care  Pt was previously seen by Radha Pantoja  She has not been seen by MD for months  She reports being off her Merribeth Dubonnet for over 2 weeks  She has multiple medical problems  She has not had any blood work done for over 6 months, she had lost her insurance  She has known type 2 diabetes, dx as older adult  Has known seizure disorder, last seizure yesterday  She is established with Dr Tauna Nyhan  She is on keppra and dilantin   She has bipolar, dx when going through couples therapy  She is no longer established with Dr Joe Preston (mental samuel)  She has chronic bladder infections, established with urology  Rash- rash is scattered to abd, arms, hands  Started weeks ago, gradually has worsened  Historical Information   Past Medical History:   Diagnosis Date    Asthma     Chronic UTI (urinary tract infection)     COPD (chronic obstructive pulmonary disease) (HCC)     Diabetes mellitus (HCC)     Disease of thyroid gland     GERD (gastroesophageal reflux disease)     Psychiatric disorder     bi-polar    Seizure ([facility] [address] )      Past Surgical History:   Procedure Laterality Date    ANKLE FRACTURE SURGERY Right     TUBAL LIGATION      VEIN LIGATION AND STRIPPING       Social History   History   Alcohol Use No     History   Drug Use No     History   Smoking Status    Never Smoker   Smokeless Tobacco    Never Used     No family history on file  Meds/Allergies   Allergies   Allergen Reactions    Keflex [Cephalexin] Anaphylaxis     Airway edema     Levaquin [Levofloxacin] Anaphylaxis     Other reaction(s): Other (See Comments)  Unknown    Penicillins Anaphylaxis     Other reaction(s): Other (See Comments)  Unknown    Bactrim [Sulfamethoxazole-Trimethoprim] GI Intolerance     Other reaction(s): Edema Other  LE edema and thrush    Ciprofloxacin      Other reaction(s): Edema Other  Edema and all extremities and thrush        Meds:    Current Outpatient Prescriptions:     albuterol (PROVENTIL HFA,VENTOLIN HFA) 90 mcg/act inhaler, Inhale 2 puffs every 6 (six) hours as needed for wheezing , Disp: , Rfl:     aspirin (ECOTRIN LOW STRENGTH) 81 mg EC tablet, Take 81 mg by mouth daily  , Disp: , Rfl:     Blood Glucose Monitoring Suppl (BLOOD GLUCOSE MONITOR SYSTEM) w/Device KIT, Test blood sugars 3 times a day, Disp: 1 each, Rfl: 0    clotrimazole (LOTRIMIN) 1 % cream, Apply topically 2 (two) times a day, Disp: 30 g, Rfl: 0    estradiol (ESTRACE) 1 mg tablet, Take 1 mg by mouth daily  , Disp: , Rfl:     fluticasone (FLONASE) 50 mcg/act nasal spray, 1 spray into each nostril daily  , Disp: , Rfl:     gabapentin (NEURONTIN) 800 mg tablet, Take 800 mg by mouth 4 (four) times a day , Disp: , Rfl:     glucose blood test strip, Test blood sugars 3 times a day , Disp: 100 each, Rfl: 3    hydrOXYzine pamoate (VISTARIL) 50 mg capsule, Take 50 mg by mouth 3 (three) times a day , Disp: , Rfl:     insulin glargine (LANTUS) 100 units/mL subcutaneous injection, Inject 78 Units under the skin daily at bedtime  , Disp: , Rfl:     levETIRAcetam (KEPPRA) 750 mg tablet, Take 1,500 mg by mouth 2 (two) times a day  , Disp: , Rfl:     levothyroxine 175 mcg tablet, Take 175 mcg by mouth daily  , Disp: , Rfl:     lisinopril (ZESTRIL) 10 mg tablet, Take 10 mg by mouth daily  , Disp: , Rfl:     loratadine (CLARITIN) 10 mg tablet, Take 10 mg by mouth daily  , Disp: , Rfl:     magnesium citrate (CITROMA) 1 745 g/30 mL oral solution, Take 296 mL by mouth once for 1 dose, Disp: 296 mL, Rfl: 0    meloxicam (MOBIC) 7 5 mg tablet, Take 7 5 mg by mouth 2 (two) times a day , Disp: , Rfl:     metFORMIN (GLUCOPHAGE) 1000 MG tablet, Take 1,000 mg by mouth 2 (two) times a day with meals, Disp: , Rfl:     montelukast (SINGULAIR) 10 mg tablet, Take 10 mg by mouth daily at bedtime  , Disp: , Rfl:     nitrofurantoin (MACROBID) 100 mg capsule, Take 100 mg by mouth every other day, Disp: , Rfl:     norethindrone (AYGESTIN) 5 mg tablet, Take 5 mg by mouth daily  , Disp: , Rfl:     ONETOUCH DELICA LANCETS 37Z MISC, Test blood sugars 3 times a day , Disp: 100 each, Rfl: 3    oxybutynin (DITROPAN) 5 mg tablet, Take 5 mg by mouth 2 (two) times a day, Disp: , Rfl:     phenytoin (DILANTIN) 100 mg ER capsule, Take by mouth 5 (five) times a day   , Disp: , Rfl:     polyethylene glycol (MIRALAX) 17 g packet, Take 17 g by mouth daily  , Disp: , Rfl:     QUEtiapine (SEROquel) 400 MG tablet, Take 400 mg by mouth 2 (two) times a day , Disp: , Rfl:     sitaGLIPtin (JANUVIA) 50 mg tablet, Take 50 mg by mouth daily, Disp: , Rfl:     zonisamide (ZONEGRAN) 100 mg capsule, Take 100 mg by mouth 5 (five) times a day  , Disp: , Rfl:       REVIEW OF SYSTEMS  A comprehensive review of systems was negative except for: Integument/breast: positive for rash      Current Vitals:   Blood Pressure: 120/76 (04/10/18 0734)  Pulse: 105 (04/10/18 0734)  Temperature: 98 4 °F (36 9 °C) (04/10/18 0734)  Respirations: 18 (04/10/18 0734)  Height: 5' 6" (167 6 cm) (04/10/18 0734)  Weight - Scale: 121 kg (267 lb) (04/10/18 0734)  SpO2: 95 % (04/10/18 0734)  [unfilled]    PHYSICAL EXAMS:  General appearance: alert and oriented, in no acute distress  Neck: no adenopathy, no carotid bruit, no JVD, supple, symmetrical, trachea midline and thyroid not enlarged, symmetric, no tenderness/mass/nodules  Lungs: clear to auscultation bilaterally  Heart: regular rate and rhythm, S1, S2 normal, no murmur, click, rub or gallop  Abdomen: abnormal findings:  obese  Skin: mulitple round lesions, dry, circular  Neurologic: Grossly normal{YES/NO:20        Follow up at this office in 3 weeks     Counseling / Coordination of Care  Total floor / unit time spent today 25 minutes  Greater than 50% of total time was spent with the patient and / or family counseling and / or coordination of care

## 2018-04-12 DIAGNOSIS — E11.65 TYPE 2 DIABETES MELLITUS WITH HYPERGLYCEMIA, WITH LONG-TERM CURRENT USE OF INSULIN (HCC): Primary | ICD-10-CM

## 2018-04-12 DIAGNOSIS — Z79.4 TYPE 2 DIABETES MELLITUS WITH HYPERGLYCEMIA, WITH LONG-TERM CURRENT USE OF INSULIN (HCC): Primary | ICD-10-CM

## 2018-04-12 DIAGNOSIS — J45.909 MODERATE ASTHMA WITHOUT COMPLICATION, UNSPECIFIED WHETHER PERSISTENT: ICD-10-CM

## 2018-04-12 LAB — LEVETIRACETAM SERPL-MCNC: 42.6 UG/ML (ref 10–40)

## 2018-04-12 RX ORDER — LEVOTHYROXINE SODIUM 175 UG/1
175 TABLET ORAL DAILY
Qty: 30 TABLET | Refills: 0 | Status: SHIPPED | OUTPATIENT
Start: 2018-04-12 | End: 2018-04-25 | Stop reason: SDUPTHER

## 2018-04-12 RX ORDER — MONTELUKAST SODIUM 10 MG/1
10 TABLET ORAL
Qty: 30 TABLET | Refills: 0 | Status: SHIPPED | OUTPATIENT
Start: 2018-04-12 | End: 2018-10-22 | Stop reason: SDUPTHER

## 2018-04-12 RX ORDER — LISINOPRIL 10 MG/1
10 TABLET ORAL DAILY
Qty: 30 TABLET | Refills: 0 | Status: SHIPPED | OUTPATIENT
Start: 2018-04-12 | End: 2018-05-07 | Stop reason: SDUPTHER

## 2018-04-12 RX ORDER — ALBUTEROL SULFATE 90 UG/1
2 AEROSOL, METERED RESPIRATORY (INHALATION) EVERY 6 HOURS PRN
Qty: 1 INHALER | Refills: 1 | Status: SHIPPED | OUTPATIENT
Start: 2018-04-12 | End: 2018-10-08 | Stop reason: SDUPTHER

## 2018-04-12 RX ORDER — ASPIRIN 81 MG/1
81 TABLET ORAL DAILY
Qty: 90 TABLET | Refills: 0 | Status: SHIPPED | OUTPATIENT
Start: 2018-04-12 | End: 2018-10-22 | Stop reason: SDUPTHER

## 2018-04-12 RX ORDER — INSULIN GLARGINE 100 [IU]/ML
78 INJECTION, SOLUTION SUBCUTANEOUS
Qty: 10 ML | Refills: 0 | Status: SHIPPED | OUTPATIENT
Start: 2018-04-12 | End: 2018-07-03 | Stop reason: SDUPTHER

## 2018-04-13 ENCOUNTER — TELEPHONE (OUTPATIENT)
Dept: FAMILY MEDICINE CLINIC | Facility: CLINIC | Age: 63
End: 2018-04-13

## 2018-04-13 NOTE — TELEPHONE ENCOUNTER
Qi, spoke with pharmacist  To fax current med list of patient  Pharmacy did make note to patient having multiple drs, on multiple medications, and changing providers very often  She reports pt calling pharmacy many times during a day

## 2018-04-25 ENCOUNTER — LAB REQUISITION (OUTPATIENT)
Dept: LAB | Facility: HOSPITAL | Age: 63
End: 2018-04-25
Payer: COMMERCIAL

## 2018-04-25 ENCOUNTER — OFFICE VISIT (OUTPATIENT)
Dept: FAMILY MEDICINE CLINIC | Facility: CLINIC | Age: 63
End: 2018-04-25
Payer: COMMERCIAL

## 2018-04-25 VITALS
HEIGHT: 66 IN | TEMPERATURE: 99.1 F | SYSTOLIC BLOOD PRESSURE: 112 MMHG | HEART RATE: 117 BPM | OXYGEN SATURATION: 93 % | BODY MASS INDEX: 43.39 KG/M2 | WEIGHT: 270 LBS | RESPIRATION RATE: 19 BRPM | DIASTOLIC BLOOD PRESSURE: 70 MMHG

## 2018-04-25 DIAGNOSIS — J30.89 NON-SEASONAL ALLERGIC RHINITIS, UNSPECIFIED TRIGGER: ICD-10-CM

## 2018-04-25 DIAGNOSIS — S99.921A INJURY OF RIGHT FOOT, INITIAL ENCOUNTER: Primary | ICD-10-CM

## 2018-04-25 DIAGNOSIS — G40.909 EPILEPSY WITHOUT STATUS EPILEPTICUS, NOT INTRACTABLE (HCC): ICD-10-CM

## 2018-04-25 DIAGNOSIS — M19.90 ARTHRITIS: ICD-10-CM

## 2018-04-25 DIAGNOSIS — Z11.59 NEED FOR HEPATITIS C SCREENING TEST: ICD-10-CM

## 2018-04-25 DIAGNOSIS — Z11.59 ENCOUNTER FOR SCREENING FOR OTHER VIRAL DISEASES: ICD-10-CM

## 2018-04-25 DIAGNOSIS — Z87.898: ICD-10-CM

## 2018-04-25 DIAGNOSIS — N30.00 ACUTE CYSTITIS WITHOUT HEMATURIA: Primary | ICD-10-CM

## 2018-04-25 DIAGNOSIS — Z79.4 TYPE 2 DIABETES MELLITUS WITH HYPERGLYCEMIA, WITH LONG-TERM CURRENT USE OF INSULIN (HCC): ICD-10-CM

## 2018-04-25 DIAGNOSIS — E11.65 TYPE 2 DIABETES MELLITUS WITH HYPERGLYCEMIA, WITH LONG-TERM CURRENT USE OF INSULIN (HCC): ICD-10-CM

## 2018-04-25 DIAGNOSIS — F31.78 BIPOLAR DISORDER, IN FULL REMISSION, MOST RECENT EPISODE MIXED (HCC): ICD-10-CM

## 2018-04-25 DIAGNOSIS — G40.909 SEIZURE DISORDER (HCC): ICD-10-CM

## 2018-04-25 LAB
ALBUMIN SERPL BCP-MCNC: 3.4 G/DL (ref 3.5–5)
ALP SERPL-CCNC: 52 U/L (ref 46–116)
ALT SERPL W P-5'-P-CCNC: 18 U/L (ref 12–78)
ANION GAP SERPL CALCULATED.3IONS-SCNC: 11 MMOL/L (ref 4–13)
AST SERPL W P-5'-P-CCNC: 13 U/L (ref 5–45)
BILIRUB SERPL-MCNC: 0.18 MG/DL (ref 0.2–1)
BUN SERPL-MCNC: 12 MG/DL (ref 5–25)
CALCIUM SERPL-MCNC: 9 MG/DL (ref 8.3–10.1)
CHLORIDE SERPL-SCNC: 99 MMOL/L (ref 100–108)
CO2 SERPL-SCNC: 24 MMOL/L (ref 21–32)
CREAT SERPL-MCNC: 0.87 MG/DL (ref 0.6–1.3)
CREAT UR-MCNC: 89.1 MG/DL
GFR SERPL CREATININE-BSD FRML MDRD: 73 ML/MIN/1.73SQ M
GLUCOSE SERPL-MCNC: 57 MG/DL (ref 65–140)
MICROALBUMIN UR-MCNC: 6.5 MG/L (ref 0–20)
MICROALBUMIN/CREAT 24H UR: 7 MG/G CREATININE (ref 0–30)
POTASSIUM SERPL-SCNC: 4.5 MMOL/L (ref 3.5–5.3)
PROT SERPL-MCNC: 7 G/DL (ref 6.4–8.2)
SL AMB  POCT GLUCOSE, UA: NEGATIVE
SL AMB LEUKOCYTE ESTERASE,UA: ABNORMAL
SL AMB POCT BILIRUBIN,UA: NEGATIVE
SL AMB POCT BLOOD,UA: ABNORMAL
SL AMB POCT CLARITY,UA: ABNORMAL
SL AMB POCT COLOR,UA: YELLOW
SL AMB POCT KETONES,UA: NEGATIVE
SL AMB POCT NITRITE,UA: POSITIVE
SL AMB POCT PH,UA: 6
SL AMB POCT SPECIFIC GRAVITY,UA: 1.02
SL AMB POCT URINE PROTEIN: NEGATIVE
SL AMB POCT UROBILINOGEN: 0.2
SODIUM SERPL-SCNC: 134 MMOL/L (ref 136–145)

## 2018-04-25 PROCEDURE — 80203 DRUG SCREEN QUANT ZONISAMIDE: CPT | Performed by: NURSE PRACTITIONER

## 2018-04-25 PROCEDURE — 82570 ASSAY OF URINE CREATININE: CPT | Performed by: NURSE PRACTITIONER

## 2018-04-25 PROCEDURE — 86803 HEPATITIS C AB TEST: CPT | Performed by: NURSE PRACTITIONER

## 2018-04-25 PROCEDURE — 80053 COMPREHEN METABOLIC PANEL: CPT | Performed by: NURSE PRACTITIONER

## 2018-04-25 PROCEDURE — T1015 CLINIC SERVICE: HCPCS | Performed by: FAMILY MEDICINE

## 2018-04-25 PROCEDURE — 82043 UR ALBUMIN QUANTITATIVE: CPT | Performed by: NURSE PRACTITIONER

## 2018-04-25 RX ORDER — OMEPRAZOLE 40 MG/1
40 CAPSULE, DELAYED RELEASE ORAL 2 TIMES DAILY
COMMUNITY
End: 2018-07-26 | Stop reason: SDUPTHER

## 2018-04-25 RX ORDER — NITROFURANTOIN 25; 75 MG/1; MG/1
100 CAPSULE ORAL 2 TIMES DAILY
Qty: 14 CAPSULE | Refills: 0 | Status: SHIPPED | OUTPATIENT
Start: 2018-04-25 | End: 2018-05-02

## 2018-04-25 RX ORDER — OXYBUTYNIN CHLORIDE 5 MG/1
5 TABLET ORAL 3 TIMES DAILY
COMMUNITY
Start: 2011-12-15 | End: 2018-11-15 | Stop reason: SDUPTHER

## 2018-04-25 RX ORDER — SIMVASTATIN 80 MG
80 TABLET ORAL
Qty: 30 TABLET | Refills: 3 | Status: SHIPPED | OUTPATIENT
Start: 2018-04-25 | End: 2018-10-22 | Stop reason: SDUPTHER

## 2018-04-25 RX ORDER — INSULIN GLARGINE 100 [IU]/ML
INJECTION, SOLUTION SUBCUTANEOUS
Refills: 0 | COMMUNITY
Start: 2018-04-12 | End: 2018-07-03 | Stop reason: SDUPTHER

## 2018-04-25 RX ORDER — QUETIAPINE FUMARATE 400 MG/1
400 TABLET, FILM COATED ORAL 2 TIMES DAILY
Qty: 60 TABLET | Refills: 1 | Status: SHIPPED | OUTPATIENT
Start: 2018-04-25 | End: 2018-07-11 | Stop reason: SDUPTHER

## 2018-04-25 RX ORDER — VALACYCLOVIR HYDROCHLORIDE 500 MG/1
1 TABLET, FILM COATED ORAL 2 TIMES DAILY
Refills: 2 | COMMUNITY
Start: 2018-03-22

## 2018-04-25 RX ORDER — GLIPIZIDE 10 MG/1
TABLET ORAL
Refills: 1 | COMMUNITY
Start: 2018-03-22 | End: 2018-06-23 | Stop reason: ALTCHOICE

## 2018-04-25 RX ORDER — LORATADINE 10 MG/1
10 TABLET ORAL DAILY
Qty: 30 TABLET | Refills: 3 | Status: SHIPPED | OUTPATIENT
Start: 2018-04-25 | End: 2018-08-03 | Stop reason: SDUPTHER

## 2018-04-25 RX ORDER — LEVOTHYROXINE SODIUM 175 UG/1
175 TABLET ORAL DAILY
Qty: 30 TABLET | Refills: 0 | Status: SHIPPED | OUTPATIENT
Start: 2018-04-25 | End: 2018-05-24 | Stop reason: SDUPTHER

## 2018-04-25 RX ORDER — MELOXICAM 7.5 MG/1
7.5 TABLET ORAL DAILY
Qty: 60 TABLET | Refills: 1 | Status: SHIPPED | OUTPATIENT
Start: 2018-04-25 | End: 2018-05-31 | Stop reason: SDUPTHER

## 2018-04-25 RX ORDER — ZONISAMIDE 100 MG/1
CAPSULE ORAL
COMMUNITY
Start: 2014-06-11 | End: 2018-06-19 | Stop reason: ALTCHOICE

## 2018-04-25 RX ORDER — POLYETHYLENE GLYCOL 3350 17 G/17G
POWDER, FOR SOLUTION ORAL
Refills: 2 | COMMUNITY
Start: 2018-03-22 | End: 2018-07-03 | Stop reason: SDUPTHER

## 2018-04-25 NOTE — PROGRESS NOTES
OFFICE VISIT  Kaylie Barriga 63 y o  female MRN: [id number]      Assessment / Plan:  Diagnoses and all orders for this visit:    Injury of right foot, initial encounter  -     XR foot 3+ vw right; Future    Type 2 diabetes mellitus with hyperglycemia, with long-term current use of insulin ([facility])  -     simvastatin (ZOCOR) 80 mg tablet; Take 1 tablet (80 mg total) by mouth daily at bedtime  -     levothyroxine 175 mcg tablet; Take 1 tablet (175 mcg total) by mouth daily  -     meloxicam (MOBIC) 7 5 mg tablet; Take 1 tablet (7 5 mg total) by mouth daily  -     Microalbumin / creatinine urine ratio  -     UA (URINE) with reflex to Microscopic    Arthritis    Non-seasonal allergic rhinitis, unspecified trigger  -     loratadine (CLARITIN) 10 mg tablet; Take 1 tablet (10 mg total) by mouth daily    Bipolar disorder, in full remission, most recent episode mixed ([facility])  -     QUEtiapine (SEROquel) 400 MG tablet; Take 1 tablet (400 mg total) by mouth 2 (two) times a day    Seizure disorder [facility])  -     Ambulatory referral to Neurology; Future      Dilantin and keppra level low, adjusted with neurology  Reason For Visit / Chief Complaint  Chief Complaint   Patient presents with    Follow-up     Wants to go over medications    Toe Pain     Right big toe        HPI:  Kaylie Barriga is a 63 y o  female presents today for follow up  She complains of right foot pain, she reports her foot caught in the pillow case  She reports tripping and falling, she has used ice, tylenol  She has past hx of ankle surgery with fusion  She is here to review labs       Historical Information   Past Medical History:   Diagnosis Date    Asthma     Chronic UTI (urinary tract infection)     COPD (chronic obstructive pulmonary disease) ([facility])     Diabetes mellitus ([facility])     Disease of thyroid gland     GERD (gastroesophageal reflux disease)     Psychiatric disorder     bi-polar    Seizure [facility])      Past Surgical History:   Procedure Laterality Date    ANKLE FRACTURE SURGERY Right     TUBAL LIGATION      VEIN LIGATION AND STRIPPING       Social History   History   Alcohol Use No     History   Drug Use No     History   Smoking Status    Never Smoker   Smokeless Tobacco    Never Used     No family history on file  Meds/Allergies   Allergies   Allergen Reactions    Keflex [Cephalexin] Anaphylaxis     Airway edema     Levaquin [Levofloxacin] Anaphylaxis     Other reaction(s): Other (See Comments)  Unknown    Penicillins Anaphylaxis     Other reaction(s): Other (See Comments)  Unknown    Bactrim [Sulfamethoxazole-Trimethoprim] GI Intolerance     Other reaction(s): Edema Other  LE edema and thrush    Ciprofloxacin      Other reaction(s): Edema Other  Edema and all extremities and thrush        Meds:    Current Outpatient Prescriptions:     albuterol (PROVENTIL HFA,VENTOLIN HFA) 90 mcg/act inhaler, Inhale 2 puffs every 6 (six) hours as needed for wheezing, Disp: 1 Inhaler, Rfl: 1    aspirin (ECOTRIN LOW STRENGTH) 81 mg EC tablet, Take 1 tablet (81 mg total) by mouth daily, Disp: 90 tablet, Rfl: 0    Blood Glucose Monitoring Suppl (BLOOD GLUCOSE MONITOR SYSTEM) w/Device KIT, Test blood sugars 3 times a day, Disp: 1 each, Rfl: 0    estradiol (ESTRACE) 1 mg tablet, Take 1 mg by mouth daily  , Disp: , Rfl:     fluticasone (FLONASE) 50 mcg/act nasal spray, 1 spray into each nostril daily  , Disp: , Rfl:     gabapentin (NEURONTIN) 800 mg tablet, Take 800 mg by mouth 4 (four) times a day , Disp: , Rfl:     glucose blood test strip, Test blood sugars 3 times a day , Disp: 100 each, Rfl: 3    hydrOXYzine pamoate (VISTARIL) 50 mg capsule, Take 50 mg by mouth 3 (three) times a day , Disp: , Rfl:     insulin glargine (LANTUS) 100 units/mL subcutaneous injection, Inject 78 Units under the skin daily at bedtime, Disp: 10 mL, Rfl: 0    levETIRAcetam (KEPPRA) 750 mg tablet, Take 1,500 mg by mouth 2 (two) times a day  , Disp: , Rfl:     levothyroxine 175 mcg tablet, Take 1 tablet (175 mcg total) by mouth daily, Disp: 30 tablet, Rfl: 0    lisinopril (ZESTRIL) 10 mg tablet, Take 1 tablet (10 mg total) by mouth daily, Disp: 30 tablet, Rfl: 0    loratadine (CLARITIN) 10 mg tablet, Take 1 tablet (10 mg total) by mouth daily, Disp: 30 tablet, Rfl: 3    meloxicam (MOBIC) 7 5 mg tablet, Take 1 tablet (7 5 mg total) by mouth daily, Disp: 60 tablet, Rfl: 1    metFORMIN (GLUCOPHAGE) 1000 MG tablet, Take 1 tablet (1,000 mg total) by mouth 2 (two) times a day with meals, Disp: 60 tablet, Rfl: 0    montelukast (SINGULAIR) 10 mg tablet, Take 1 tablet (10 mg total) by mouth daily at bedtime, Disp: 30 tablet, Rfl: 0    nitrofurantoin (MACROBID) 100 mg capsule, Take 100 mg by mouth every other day, Disp: , Rfl:     norethindrone (AYGESTIN) 5 mg tablet, Take 5 mg by mouth daily  , Disp: , Rfl:     ONETOUCH DELICA LANCETS 84U MISC, Test blood sugars 3 times a day , Disp: 100 each, Rfl: 3    oxybutynin (DITROPAN) 5 mg tablet, Take 5 mg by mouth 2 (two) times a day, Disp: , Rfl:     phenytoin (DILANTIN) 100 mg ER capsule, Take by mouth 5 (five) times a day   , Disp: , Rfl:     polyethylene glycol (MIRALAX) 17 g packet, Take 17 g by mouth daily  , Disp: , Rfl:     QUEtiapine (SEROquel) 400 MG tablet, Take 1 tablet (400 mg total) by mouth 2 (two) times a day, Disp: 60 tablet, Rfl: 1    simvastatin (ZOCOR) 80 mg tablet, Take 1 tablet (80 mg total) by mouth daily at bedtime, Disp: 30 tablet, Rfl: 3    zonisamide (ZONEGRAN) 100 mg capsule, Take 100 mg by mouth 5 (five) times a day  , Disp: , Rfl:       REVIEW OF SYSTEMS  A comprehensive review of systems was negative except for: Musculoskeletal: positive for  bone pain      Current Vitals:   Blood Pressure: 112/70 (04/25/18 0948)  Pulse: (!) 117 (04/25/18 0948)  Temperature: 99 1 °F (37 3 °C) (04/25/18 0948)  Respirations: 19 (04/25/18 0948)  Height: 5' 6" (167 6 cm) (04/25/18 0948)  Weight - Scale: 122 kg (270 lb) (04/25/18 9926)  SpO2: 93 % (04/25/18 0948)  [unfilled]    PHYSICAL EXAMS:  General appearance: alert and oriented, in no acute distress  Lungs: clear to auscultation bilaterally  Heart: regular rate and rhythm, S1, S2 normal, no murmur, click, rub or gallop  Abdomen: abnormal findings:  obese  Extremities: right foot, first three digiit, with bruising  ROM adequate  Pulses: 2+ and symmetric  Skin: one suture to left forearm, bruising to right foot, rash to abd, arms   Neurologic: Grossly normal{YES/NO:20        Follow up at this office in 3 months, will call if xray abnormal      Counseling / Coordination of Care  Total floor / unit time spent today 20 minutes  Greater than 50% of total time was spent with the patient and / or family counseling and / or coordination of care

## 2018-04-26 DIAGNOSIS — J30.2 SEASONAL ALLERGIC RHINITIS, UNSPECIFIED TRIGGER: Primary | ICD-10-CM

## 2018-04-26 LAB — HCV AB SER QL: NORMAL

## 2018-04-26 RX ORDER — FLUTICASONE PROPIONATE 50 MCG
1 SPRAY, SUSPENSION (ML) NASAL DAILY
Qty: 16 G | Refills: 0 | Status: SHIPPED | OUTPATIENT
Start: 2018-04-26 | End: 2018-07-03 | Stop reason: SDUPTHER

## 2018-04-27 LAB — ZONISAMIDE SERPL-MCNC: 8.1 UG/ML (ref 10–40)

## 2018-05-07 DIAGNOSIS — Z79.4 TYPE 2 DIABETES MELLITUS WITH HYPERGLYCEMIA, WITH LONG-TERM CURRENT USE OF INSULIN (HCC): ICD-10-CM

## 2018-05-07 DIAGNOSIS — E11.65 TYPE 2 DIABETES MELLITUS WITH HYPERGLYCEMIA, WITH LONG-TERM CURRENT USE OF INSULIN (HCC): ICD-10-CM

## 2018-05-07 RX ORDER — LISINOPRIL 10 MG/1
10 TABLET ORAL DAILY
Qty: 30 TABLET | Refills: 0 | Status: SHIPPED | OUTPATIENT
Start: 2018-05-07 | End: 2018-07-03 | Stop reason: SDUPTHER

## 2018-05-24 DIAGNOSIS — E11.65 TYPE 2 DIABETES MELLITUS WITH HYPERGLYCEMIA, WITH LONG-TERM CURRENT USE OF INSULIN (HCC): ICD-10-CM

## 2018-05-24 DIAGNOSIS — Z79.4 TYPE 2 DIABETES MELLITUS WITH HYPERGLYCEMIA, WITH LONG-TERM CURRENT USE OF INSULIN (HCC): ICD-10-CM

## 2018-05-24 DIAGNOSIS — K59.00 CONSTIPATION, UNSPECIFIED CONSTIPATION TYPE: Primary | ICD-10-CM

## 2018-05-24 RX ORDER — POLYETHYLENE GLYCOL 3350 17 G/17G
17 POWDER, FOR SOLUTION ORAL DAILY
Qty: 30 EACH | Refills: 0 | Status: SHIPPED | OUTPATIENT
Start: 2018-05-24 | End: 2018-10-26

## 2018-05-24 RX ORDER — LEVOTHYROXINE SODIUM 175 UG/1
175 TABLET ORAL DAILY
Qty: 30 TABLET | Refills: 0 | Status: SHIPPED | OUTPATIENT
Start: 2018-05-24 | End: 2018-07-03 | Stop reason: SDUPTHER

## 2018-05-30 DIAGNOSIS — E11.65 TYPE 2 DIABETES MELLITUS WITH HYPERGLYCEMIA, WITH LONG-TERM CURRENT USE OF INSULIN (HCC): ICD-10-CM

## 2018-05-30 DIAGNOSIS — Z79.4 TYPE 2 DIABETES MELLITUS WITH HYPERGLYCEMIA, WITH LONG-TERM CURRENT USE OF INSULIN (HCC): ICD-10-CM

## 2018-05-30 NOTE — TELEPHONE ENCOUNTER
Received a call from Reinier Garcia stating that her mobic 7 5 is supposed to be bid  Let her know that she had the incorrect office and to contact [address]  Pt verbalized understanding

## 2018-05-31 DIAGNOSIS — E11.65 TYPE 2 DIABETES MELLITUS WITH HYPERGLYCEMIA, WITH LONG-TERM CURRENT USE OF INSULIN (HCC): ICD-10-CM

## 2018-05-31 DIAGNOSIS — Z79.4 TYPE 2 DIABETES MELLITUS WITH HYPERGLYCEMIA, WITH LONG-TERM CURRENT USE OF INSULIN (HCC): ICD-10-CM

## 2018-05-31 RX ORDER — MELOXICAM 7.5 MG/1
7.5 TABLET ORAL 2 TIMES DAILY
Qty: 60 TABLET | Refills: 1 | Status: SHIPPED | OUTPATIENT
Start: 2018-05-31 | End: 2018-07-18 | Stop reason: SDUPTHER

## 2018-06-02 ENCOUNTER — APPOINTMENT (EMERGENCY)
Dept: RADIOLOGY | Facility: HOSPITAL | Age: 63
End: 2018-06-02
Payer: COMMERCIAL

## 2018-06-02 ENCOUNTER — HOSPITAL ENCOUNTER (EMERGENCY)
Facility: HOSPITAL | Age: 63
Discharge: HOME | End: 2018-06-02
Attending: EMERGENCY MEDICINE | Admitting: EMERGENCY MEDICINE
Payer: COMMERCIAL

## 2018-06-02 VITALS
HEART RATE: 94 BPM | SYSTOLIC BLOOD PRESSURE: 131 MMHG | DIASTOLIC BLOOD PRESSURE: 63 MMHG | TEMPERATURE: 98.1 F | BODY MASS INDEX: 42.09 KG/M2 | OXYGEN SATURATION: 97 % | WEIGHT: 260.8 LBS | RESPIRATION RATE: 16 BRPM

## 2018-06-02 DIAGNOSIS — M79.605 LEFT LEG PAIN: Primary | ICD-10-CM

## 2018-06-02 PROCEDURE — 73552 X-RAY EXAM OF FEMUR 2/>: CPT

## 2018-06-02 PROCEDURE — 99283 EMERGENCY DEPT VISIT LOW MDM: CPT

## 2018-06-02 RX ORDER — NAPROXEN 500 MG/1
500 TABLET ORAL 2 TIMES DAILY WITH MEALS
Qty: 20 TABLET | Refills: 0 | Status: SHIPPED | OUTPATIENT
Start: 2018-06-02 | End: 2018-08-04

## 2018-06-02 NOTE — ED PROVIDER NOTES
History  Chief Complaint   Patient presents with    Leg Pain     Paitient has had left upper leg pain for the past three days  Patient orly on her leg with pen where the pain is  Patient states pain has been for the past three days  Patient denies any trauma to the lag  Patient states she did fracture that leg about 13 years ago  HPI    Prior to Admission Medications   Prescriptions Last Dose Informant Patient Reported? Taking? Blood Glucose Monitoring Suppl (BLOOD GLUCOSE MONITOR SYSTEM) w/Device KIT   No No   Sig: Test blood sugars 3 times a day   LANTUS SOLOSTAR injection pen 100 units/mL   Yes No   MAG64 535 (64 Mg) MG   Yes No   NOVOFINE 32G X 6 MM MISC   Yes No   ONETOUCH DELICA LANCETS 44L MISC   No No   Sig: Test blood sugars 3 times a day  PROCTOZONE-HC 2 5 % rectal cream   Yes No   QUEtiapine (SEROquel) 400 MG tablet   No No   Sig: Take 1 tablet (400 mg total) by mouth 2 (two) times a day   albuterol (PROVENTIL HFA,VENTOLIN HFA) 90 mcg/act inhaler   No No   Sig: Inhale 2 puffs every 6 (six) hours as needed for wheezing   aspirin (ECOTRIN LOW STRENGTH) 81 mg EC tablet   No No   Sig: Take 1 tablet (81 mg total) by mouth daily   estradiol (ESTRACE) 1 mg tablet   Yes No   Sig: Take 1 mg by mouth daily  fluticasone (FLONASE) 50 mcg/act nasal spray   No No   Si spray into each nostril daily   gabapentin (NEURONTIN) 800 mg tablet   Yes No   Sig: Take 800 mg by mouth 4 (four) times a day  glipiZIDE (GLUCOTROL) 10 mg tablet   Yes No   glucose blood test strip   No No   Sig: Test blood sugars 3 times a day  glucose blood test strip   Yes No   Sig: by In Vitro route   hydrOXYzine pamoate (VISTARIL) 50 mg capsule   Yes No   Sig: Take 50 mg by mouth 3 (three) times a day     hydrocortisone 2 5 % cream   Yes No   insulin glargine (LANTUS) 100 units/mL subcutaneous injection   No No   Sig: Inject 78 Units under the skin daily at bedtime   levETIRAcetam (KEPPRA) 750 mg tablet   Yes No   Sig: Take 1,500 mg by mouth 2 (two) times a day     levothyroxine 175 mcg tablet   No No   Sig: Take 1 tablet (175 mcg total) by mouth daily   lisinopril (ZESTRIL) 10 mg tablet   No No   Sig: Take 1 tablet (10 mg total) by mouth daily   loratadine (CLARITIN) 10 mg tablet   No No   Sig: Take 1 tablet (10 mg total) by mouth daily   meloxicam (MOBIC) 7 5 mg tablet   No No   Sig: Take 1 tablet (7 5 mg total) by mouth 2 (two) times a day   metFORMIN (GLUCOPHAGE) 1000 MG tablet   No No   Sig: Take 1 tablet (1,000 mg total) by mouth 2 (two) times a day with meals   montelukast (SINGULAIR) 10 mg tablet   No No   Sig: Take 1 tablet (10 mg total) by mouth daily at bedtime   norethindrone (AYGESTIN) 5 mg tablet   Yes No   Sig: Take 5 mg by mouth daily  omeprazole (PriLOSEC) 40 MG capsule   Yes No   Sig: Take 40 mg by mouth 2 (two) times a day   oxybutynin (DITROPAN) 5 mg tablet   Yes No   Sig: Take 5 mg by mouth 2 (two) times a day   oxybutynin (DITROPAN) 5 mg tablet   Yes No   Sig: Take by mouth   phenytoin (DILANTIN) 100 mg ER capsule   Yes No   Sig: Take by mouth 5 (five) times a day       polyethylene glycol (GLYCOLAX) powder   Yes No   polyethylene glycol (MIRALAX) 17 g packet   No No   Sig: Take 17 g by mouth daily   simvastatin (ZOCOR) 80 mg tablet   No No   Sig: Take 1 tablet (80 mg total) by mouth daily at bedtime   valACYclovir (VALTREX) 500 mg tablet   Yes No   Sig: Take 1 tablet by mouth 2 (two) times a day   zonisamide (ZONEGRAN) 100 mg capsule   Yes No   Sig: Take 100 mg by mouth 5 (five) times a day     zonisamide (ZONEGRAN) 100 mg capsule   Yes No   Sig: Take by mouth      Facility-Administered Medications: None       Past Medical History:   Diagnosis Date    Asthma     Chronic UTI (urinary tract infection)     COPD (chronic obstructive pulmonary disease) ([facility])     Diabetes mellitus ([facility])     Disease of thyroid gland     GERD (gastroesophageal reflux disease)     Psychiatric disorder     bi-polar    Seizure ([address] ) Past Surgical History:   Procedure Laterality Date    ANKLE FRACTURE SURGERY Right     TUBAL LIGATION      VEIN LIGATION AND STRIPPING         History reviewed  No pertinent family history  I have reviewed and agree with the history as documented  Social History   Substance Use Topics    Smoking status: Never Smoker    Smokeless tobacco: Never Used    Alcohol use No        Review of Systems    Physical Exam  Physical Exam    Vital Signs  ED Triage Vitals [06/02/18 1511]   Temperature Pulse Respirations Blood Pressure SpO2   98 1 °F (36 7 °C) 94 16 131/63 97 %      Temp Source Heart Rate Source Patient Position - Orthostatic VS BP Location FiO2 (%)   Temporal Monitor Sitting Right arm --      Pain Score       8           Vitals:    06/02/18 1511   BP: 131/63   Pulse: 94   Patient Position - Orthostatic VS: Sitting       Visual Acuity      ED Medications  Medications - No data to display    Diagnostic Studies  Results Reviewed     None                 XR femur 2 views LEFT   ED Interpretation by Fiorella Shaikh PA-C (06/02 1546)   No evidence of acute osseous abnormalities noted  Final Result by Kya Eaton MD (06/02 1545)      No acute osseous abnormality  Workstation performed: [id number]                    Procedures  Procedures       Phone Contacts  ED Phone Contact    ED Course                               MDM  CritCare Time    Disposition  Final diagnoses:   Left leg pain     Time reflects when diagnosis was documented in both MDM as applicable and the Disposition within this note     Time User Action Codes Description Comment    6/2/2018  3:46 PM Slade BHATTI Add [M79 575] Left leg pain       ED Disposition     ED Disposition Condition Comment    Discharge  Dorothea Standing discharge to home/self care      Condition at discharge: Good        Follow-up Information    None         Patient's Medications   Discharge Prescriptions    No medications on file     No discharge procedures on file      ED Provider  Electronically Signed by           Austin Bustos DO  06/02/18 1553

## 2018-06-02 NOTE — ED ATTENDING ATTESTATION
Jung Laura DO, saw and evaluated the patient  I have discussed the patient with the resident/non-physician practitioner and agree with the resident's/non-physician practitioner's findings, Plan of Care, and MDM as documented in the resident's/non-physician practitioner's note, except where noted  All available labs and Radiology studies were reviewed  At this point I agree with the current assessment done in the Emergency Department  I have conducted an independent evaluation of this patient a history and physical is as follows:      Critical Care Time  CritCare Time     Patient seen examined  Patient with no trauma to the left lateral thigh  She states she has had pain there for 3 days  There is no swelling erythema or ecchymosis  She did right inner leg with a pen to demarcate the area that hurts her  Patient has intact distal pulses and sensation to her toes on the left feet      Procedures

## 2018-06-02 NOTE — DISCHARGE INSTRUCTIONS
Leg Pain   WHAT YOU NEED TO KNOW:   Leg pain may be caused by a variety of health conditions  Your tests did not show any broken bones or blood clots  DISCHARGE INSTRUCTIONS:   Return to the emergency department if:   · You have a fever  · Your leg starts to swell  · Your leg pain gets worse  · You have numbness or tingling in your leg or toes  · You cannot put any weight on or move your leg  Contact your healthcare provider if:   · Your pain does not decrease, even after treatment  · You have questions or concerns about your condition or care  Medicines:   · NSAIDs , such as ibuprofen, help decrease swelling, pain, and fever  This medicine is available with or without a doctor's order  NSAIDs can cause stomach bleeding or kidney problems in certain people  If you take blood thinner medicine, always ask your healthcare provider if NSAIDs are safe for you  Always read the medicine label and follow directions  · Take your medicine as directed  Contact your healthcare provider if you think your medicine is not helping or if you have side effects  Tell him of her if you are allergic to any medicine  Keep a list of the medicines, vitamins, and herbs you take  Include the amounts, and when and why you take them  Bring the list or the pill bottles to follow-up visits  Carry your medicine list with you in case of an emergency  Follow up with your healthcare provider as directed: You may need more tests to find the cause of your leg pain  You may need to see an orthopedic specialist or a physical therapist  Write down your questions so you remember to ask them during your visits  Manage your leg pain:   · Rest  your injured leg so that it can heal  You may need an immobilizer, brace, or splint to limit the movement of your leg  You may need to avoid putting any weight on your leg for at least 48 hours  Return to normal activities as directed      · Ice  the injury for 20 minutes every 4 hours for up to 24 hours, or as directed  Use an ice pack, or put crushed ice in a plastic bag  Cover it with a towel to protect your skin  Ice helps prevent tissue damage and decreases swelling and pain  · Elevate  your injured leg above the level of your heart as often as you can  This will help decrease swelling and pain  If possible, prop your leg on pillows or blankets to keep the area elevated comfortably  · Use assistive devices as directed  You may need to use a cane or crutches  Assistive devices help decrease pain and pressure on your leg when you walk  Ask your healthcare provider for more information about assistive devices and how to use them correctly  · Maintain a healthy weight  Extra body weight can cause pressure and pain in your hip, knee, and ankle joints  Ask your healthcare provider how much you should weigh  Ask him to help you create a weight loss plan if you are overweight  © 2017 [employer] Information is for End User's use only and may not be sold, redistributed or otherwise used for commercial purposes  All illustrations and images included in CareNotes® are the copyrighted property of [employer] , [employer]  or Edgardo Chappell  The above information is an  only  It is not intended as medical advice for individual conditions or treatments  Talk to your doctor, nurse or pharmacist before following any medical regimen to see if it is safe and effective for you

## 2018-06-02 NOTE — ED PROVIDER NOTES
History  Chief Complaint   Patient presents with    Leg Pain     Paitient has had left upper leg pain for the past three days  Patient orly on her leg with pen where the pain is  Patient states pain has been for the past three days  Patient denies any trauma to the lag  Patient states she did fracture that leg about 13 years ago  Pt presents to the ER today via private vehicle offering a 3 day history of left upper lateral leg pain  No history of fall/trauma  She denies ambulatory dysfunction  She has a 13 year hx of L femur fracture  She denies redness/swelling/warmth  Pain does not travel  No leg paraesthesias  Pt denies chest pain shortness of breath or dyspnea on exertion  Prior to Admission Medications   Prescriptions Last Dose Informant Patient Reported? Taking? Blood Glucose Monitoring Suppl (BLOOD GLUCOSE MONITOR SYSTEM) w/Device KIT   No No   Sig: Test blood sugars 3 times a day   LANTUS SOLOSTAR injection pen 100 units/mL   Yes No   MAG64 535 (64 Mg) MG   Yes No   NOVOFINE 32G X 6 MM MISC   Yes No   ONETOUCH DELICA LANCETS 45A MISC   No No   Sig: Test blood sugars 3 times a day  PROCTOZONE-HC 2 5 % rectal cream   Yes No   QUEtiapine (SEROquel) 400 MG tablet   No No   Sig: Take 1 tablet (400 mg total) by mouth 2 (two) times a day   albuterol (PROVENTIL HFA,VENTOLIN HFA) 90 mcg/act inhaler   No No   Sig: Inhale 2 puffs every 6 (six) hours as needed for wheezing   aspirin (ECOTRIN LOW STRENGTH) 81 mg EC tablet   No No   Sig: Take 1 tablet (81 mg total) by mouth daily   estradiol (ESTRACE) 1 mg tablet   Yes No   Sig: Take 1 mg by mouth daily  fluticasone (FLONASE) 50 mcg/act nasal spray   No No   Si spray into each nostril daily   gabapentin (NEURONTIN) 800 mg tablet   Yes No   Sig: Take 800 mg by mouth 4 (four) times a day  glipiZIDE (GLUCOTROL) 10 mg tablet   Yes No   glucose blood test strip   No No   Sig: Test blood sugars 3 times a day     glucose blood test strip   Yes No Sig: by In Vitro route   hydrOXYzine pamoate (VISTARIL) 50 mg capsule   Yes No   Sig: Take 50 mg by mouth 3 (three) times a day  hydrocortisone 2 5 % cream   Yes No   insulin glargine (LANTUS) 100 units/mL subcutaneous injection   No No   Sig: Inject 78 Units under the skin daily at bedtime   levETIRAcetam (KEPPRA) 750 mg tablet   Yes No   Sig: Take 1,500 mg by mouth 2 (two) times a day     levothyroxine 175 mcg tablet   No No   Sig: Take 1 tablet (175 mcg total) by mouth daily   lisinopril (ZESTRIL) 10 mg tablet   No No   Sig: Take 1 tablet (10 mg total) by mouth daily   loratadine (CLARITIN) 10 mg tablet   No No   Sig: Take 1 tablet (10 mg total) by mouth daily   meloxicam (MOBIC) 7 5 mg tablet   No No   Sig: Take 1 tablet (7 5 mg total) by mouth 2 (two) times a day   metFORMIN (GLUCOPHAGE) 1000 MG tablet   No No   Sig: Take 1 tablet (1,000 mg total) by mouth 2 (two) times a day with meals   montelukast (SINGULAIR) 10 mg tablet   No No   Sig: Take 1 tablet (10 mg total) by mouth daily at bedtime   norethindrone (AYGESTIN) 5 mg tablet   Yes No   Sig: Take 5 mg by mouth daily  omeprazole (PriLOSEC) 40 MG capsule   Yes No   Sig: Take 40 mg by mouth 2 (two) times a day   oxybutynin (DITROPAN) 5 mg tablet   Yes No   Sig: Take 5 mg by mouth 2 (two) times a day   oxybutynin (DITROPAN) 5 mg tablet   Yes No   Sig: Take by mouth   phenytoin (DILANTIN) 100 mg ER capsule   Yes No   Sig: Take by mouth 5 (five) times a day       polyethylene glycol (GLYCOLAX) powder   Yes No   polyethylene glycol (MIRALAX) 17 g packet   No No   Sig: Take 17 g by mouth daily   simvastatin (ZOCOR) 80 mg tablet   No No   Sig: Take 1 tablet (80 mg total) by mouth daily at bedtime   valACYclovir (VALTREX) 500 mg tablet   Yes No   Sig: Take 1 tablet by mouth 2 (two) times a day   zonisamide (ZONEGRAN) 100 mg capsule   Yes No   Sig: Take 100 mg by mouth 5 (five) times a day     zonisamide (ZONEGRAN) 100 mg capsule   Yes No   Sig: Take by mouth Facility-Administered Medications: None       Past Medical History:   Diagnosis Date    Asthma     Chronic UTI (urinary tract infection)     COPD (chronic obstructive pulmonary disease) (HCC)     Diabetes mellitus (HCC)     Disease of thyroid gland     GERD (gastroesophageal reflux disease)     Psychiatric disorder     bi-polar    Seizure ([facility] [address] )        Past Surgical History:   Procedure Laterality Date    ANKLE FRACTURE SURGERY Right     TUBAL LIGATION      VEIN LIGATION AND STRIPPING         History reviewed  No pertinent family history  I have reviewed and agree with the history as documented  Social History   Substance Use Topics    Smoking status: Never Smoker    Smokeless tobacco: Never Used    Alcohol use No        Review of Systems   Constitutional: Negative  HENT: Negative  Eyes: Negative  Respiratory: Negative  Cardiovascular: Negative  Gastrointestinal: Negative  Endocrine: Negative  Genitourinary: Negative  Musculoskeletal:        Left leg pain   Skin: Negative  Allergic/Immunologic: Negative  Neurological: Negative  Hematological: Negative  Psychiatric/Behavioral: Negative  All other systems reviewed and are negative  Physical Exam  Physical Exam   Constitutional: She is oriented to person, place, and time  She appears well-developed and well-nourished  No distress  HENT:   Head: Normocephalic and atraumatic  Eyes: Pupils are equal, round, and reactive to light  Neck: Normal range of motion  Cardiovascular: Normal rate, regular rhythm, normal heart sounds and intact distal pulses  Exam reveals no gallop and no friction rub  No murmur heard  Pulmonary/Chest: Effort normal and breath sounds normal  No respiratory distress  She has no wheezes  She has no rales  She exhibits no tenderness  Abdominal: Soft  Musculoskeletal: She exhibits tenderness  She exhibits no edema or deformity  Tenderness left superior lateral upper leg  No contusion/abrasion/laceration/swelling noted  No hip tenderness  Neurological: She is alert and oriented to person, place, and time  Skin: Skin is warm  Capillary refill takes less than 2 seconds  She is not diaphoretic  Psychiatric: She has a normal mood and affect  Vitals reviewed  Vital Signs  ED Triage Vitals [06/02/18 1511]   Temperature Pulse Respirations Blood Pressure SpO2   98 1 °F (36 7 °C) 94 16 131/63 97 %      Temp Source Heart Rate Source Patient Position - Orthostatic VS BP Location FiO2 (%)   Temporal Monitor Sitting Right arm --      Pain Score       8           Vitals:    06/02/18 1511   BP: 131/63   Pulse: 94   Patient Position - Orthostatic VS: Sitting       Visual Acuity      ED Medications  Medications - No data to display    Diagnostic Studies  Results Reviewed     None                 XR femur 2 views LEFT   ED Interpretation by Fredrick Lyons PA-C (06/02 1546)   No evidence of acute osseous abnormalities noted  Final Result by Tiki Fernando MD (06/02 1545)      No acute osseous abnormality  Workstation performed: [id number]                    Procedures  Procedures       Phone Contacts  ED Phone Contact    ED Course                               MDM  CritCare Time    Disposition  Final diagnoses:   Left leg pain     Time reflects when diagnosis was documented in both MDM as applicable and the Disposition within this note     Time User Action Codes Description Comment    6/2/2018  3:46 PM Diana BHATTI Add [M79 605] Left leg pain       ED Disposition     ED Disposition Condition Comment    Discharge  Ashwin Kraus discharge to home/self care      Condition at discharge: Good        Follow-up Information     Follow up With Specialties Details Why [email or web address], [address] [facility], Nurse Practitioner Schedule an appointment as soon as possible for a visit  Ko Michel  [address] [place] [address]  [phone] Patient's Medications   Discharge Prescriptions    NAPROXEN (EC NAPROSYN) 500 MG EC TABLET    Take 1 tablet (500 mg total) by mouth 2 (two) times a day with meals for 10 days       Start Date: 6/2/2018  End Date: 6/12/2018       Order Dose: 500 mg       Quantity: 20 tablet    Refills: 0     No discharge procedures on file      ED Provider  Electronically Signed by           Skip Orozco PA-C  06/02/18 9407

## 2018-06-19 ENCOUNTER — OFFICE VISIT (OUTPATIENT)
Dept: FAMILY MEDICINE CLINIC | Facility: CLINIC | Age: 63
End: 2018-06-19
Payer: COMMERCIAL

## 2018-06-19 VITALS
HEIGHT: 66 IN | BODY MASS INDEX: 41.14 KG/M2 | OXYGEN SATURATION: 94 % | SYSTOLIC BLOOD PRESSURE: 122 MMHG | HEART RATE: 110 BPM | TEMPERATURE: 99.1 F | DIASTOLIC BLOOD PRESSURE: 83 MMHG | WEIGHT: 256 LBS

## 2018-06-19 DIAGNOSIS — R35.0 URINARY FREQUENCY: ICD-10-CM

## 2018-06-19 DIAGNOSIS — M79.605 LEFT LEG PAIN: Primary | ICD-10-CM

## 2018-06-19 LAB
BACTERIA UR QL AUTO: ABNORMAL /HPF
BILIRUB UR QL STRIP: ABNORMAL
CLARITY UR: ABNORMAL
COLOR UR: ABNORMAL
GLUCOSE UR STRIP-MCNC: NEGATIVE MG/DL
HGB UR QL STRIP.AUTO: NEGATIVE
KETONES UR STRIP-MCNC: ABNORMAL MG/DL
LEUKOCYTE ESTERASE UR QL STRIP: ABNORMAL
NITRITE UR QL STRIP: POSITIVE
NON-SQ EPI CELLS URNS QL MICRO: ABNORMAL /HPF
OTHER STN SPEC: ABNORMAL
PH UR STRIP.AUTO: 5.5 [PH] (ref 4.5–8)
PROT UR STRIP-MCNC: NEGATIVE MG/DL
RBC #/AREA URNS AUTO: ABNORMAL /HPF
SP GR UR STRIP.AUTO: 1.02 (ref 1–1.03)
UROBILINOGEN UR QL STRIP.AUTO: 1 E.U./DL
WBC #/AREA URNS AUTO: ABNORMAL /HPF

## 2018-06-19 PROCEDURE — 81001 URINALYSIS AUTO W/SCOPE: CPT | Performed by: NURSE PRACTITIONER

## 2018-06-19 PROCEDURE — T1015 CLINIC SERVICE: HCPCS | Performed by: FAMILY MEDICINE

## 2018-06-19 RX ORDER — TRIAMCINOLONE ACETONIDE 1 MG/G
CREAM TOPICAL 2 TIMES DAILY
Qty: 30 G | Refills: 0 | Status: ON HOLD | OUTPATIENT
Start: 2018-06-19 | End: 2018-10-29 | Stop reason: ALTCHOICE

## 2018-06-19 NOTE — PROGRESS NOTES
OFFICE VISIT  Kaylie Barriga 63 y o  female MRN: [id number]      Assessment / Plan:  Diagnoses and all orders for this visit:    Left leg pain    Urinary frequency  -     UA (URINE) with reflex to Microscopic          Reason For Visit / Chief Complaint  Chief Complaint   Patient presents with    Cellulitis     left foot    Urinary Tract Infection     could not get urine sample, patient used restroom before getting roomed        HPI:  Kaylie Barriga is a 63 y o  female presents today for follow up from ED  Pt was seen on June 6th for left leg pain, xray completed in which was negative  She thought she at that time she had a skin infection, per ED note she orly pen on her leg, where the pain was, no redness  She reports having a UTI, she denies urine freq, burning  She is requesting antbx  She reports this was caused by air conditioning  She voided prior office visit, unable to obtain sample at this time  Historical Information   Past Medical History:   Diagnosis Date    Asthma     Chronic UTI (urinary tract infection)     COPD (chronic obstructive pulmonary disease) (HCC)     Diabetes mellitus (HCC)     Disease of thyroid gland     GERD (gastroesophageal reflux disease)     Psychiatric disorder     bi-polar    Seizure ([facility] [address] )      Past Surgical History:   Procedure Laterality Date    ANKLE FRACTURE SURGERY Right     TUBAL LIGATION      VEIN LIGATION AND STRIPPING       Social History   History   Alcohol Use No     History   Drug Use No     History   Smoking Status    Never Smoker   Smokeless Tobacco    Never Used     No family history on file  Meds/Allergies   Allergies   Allergen Reactions    Keflex [Cephalexin] Anaphylaxis     Airway edema     Levaquin [Levofloxacin] Anaphylaxis and Other (See Comments)     Unknown  Other reaction(s): Other (See Comments)  Unknown    Penicillins Anaphylaxis and Other (See Comments)     Unknown  Other reaction(s):  Other (See Comments)  Unknown    Bactrim [Sulfamethoxazole-Trimethoprim] GI Intolerance     Other reaction(s): Edema Other  LE edema and thrush    Ciprofloxacin      Other reaction(s): Edema Other  Edema and all extremities and thrush        Meds:    Current Outpatient Prescriptions:     albuterol (PROVENTIL HFA,VENTOLIN HFA) 90 mcg/act inhaler, Inhale 2 puffs every 6 (six) hours as needed for wheezing, Disp: 1 Inhaler, Rfl: 1    aspirin (ECOTRIN LOW STRENGTH) 81 mg EC tablet, Take 1 tablet (81 mg total) by mouth daily, Disp: 90 tablet, Rfl: 0    Blood Glucose Monitoring Suppl (BLOOD GLUCOSE MONITOR SYSTEM) w/Device KIT, Test blood sugars 3 times a day, Disp: 1 each, Rfl: 0    estradiol (ESTRACE) 1 mg tablet, Take 1 mg by mouth daily  , Disp: , Rfl:     fluticasone (FLONASE) 50 mcg/act nasal spray, 1 spray into each nostril daily, Disp: 16 g, Rfl: 0    gabapentin (NEURONTIN) 800 mg tablet, Take 800 mg by mouth 4 (four) times a day , Disp: , Rfl:     glucose blood test strip, Test blood sugars 3 times a day , Disp: 100 each, Rfl: 3    hydrocortisone 2 5 % cream, , Disp: , Rfl: 0    hydrOXYzine pamoate (VISTARIL) 50 mg capsule, Take 50 mg by mouth 3 (three) times a day , Disp: , Rfl:     insulin glargine (LANTUS) 100 units/mL subcutaneous injection, Inject 78 Units under the skin daily at bedtime, Disp: 10 mL, Rfl: 0    LANTUS SOLOSTAR injection pen 100 units/mL, , Disp: , Rfl: 0    levETIRAcetam (KEPPRA) 750 mg tablet, Take 1,500 mg by mouth 2 (two) times a day  , Disp: , Rfl:     levothyroxine 175 mcg tablet, Take 1 tablet (175 mcg total) by mouth daily, Disp: 30 tablet, Rfl: 0    lisinopril (ZESTRIL) 10 mg tablet, Take 1 tablet (10 mg total) by mouth daily, Disp: 30 tablet, Rfl: 0    loratadine (CLARITIN) 10 mg tablet, Take 1 tablet (10 mg total) by mouth daily, Disp: 30 tablet, Rfl: 3    MAG64 535 (64 Mg) MG, , Disp: , Rfl: 1    meloxicam (MOBIC) 7 5 mg tablet, Take 1 tablet (7 5 mg total) by mouth 2 (two) times a day, Disp: 60 tablet, Rfl: 1    metFORMIN (GLUCOPHAGE) 1000 MG tablet, Take 1 tablet (1,000 mg total) by mouth 2 (two) times a day with meals, Disp: 60 tablet, Rfl: 0    montelukast (SINGULAIR) 10 mg tablet, Take 1 tablet (10 mg total) by mouth daily at bedtime, Disp: 30 tablet, Rfl: 0    norethindrone (AYGESTIN) 5 mg tablet, Take 5 mg by mouth daily  , Disp: , Rfl:     NOVOFINE 32G X 6 MM MISC, , Disp: , Rfl: 2    omeprazole (PriLOSEC) 40 MG capsule, Take 40 mg by mouth 2 (two) times a day, Disp: , Rfl:     ONETOUCH DELICA LANCETS 00T MISC, Test blood sugars 3 times a day , Disp: 100 each, Rfl: 3    oxybutynin (DITROPAN) 5 mg tablet, Take 5 mg by mouth 2 (two) times a day, Disp: , Rfl:     oxybutynin (DITROPAN) 5 mg tablet, Take by mouth, Disp: , Rfl:     phenytoin (DILANTIN) 100 mg ER capsule, Take by mouth 5 (five) times a day   , Disp: , Rfl:     polyethylene glycol (GLYCOLAX) powder, , Disp: , Rfl: 2    polyethylene glycol (MIRALAX) 17 g packet, Take 17 g by mouth daily, Disp: 30 each, Rfl: 0    PROCTOZONE-HC 2 5 % rectal cream, , Disp: , Rfl: 1    QUEtiapine (SEROquel) 400 MG tablet, Take 1 tablet (400 mg total) by mouth 2 (two) times a day, Disp: 60 tablet, Rfl: 1    simvastatin (ZOCOR) 80 mg tablet, Take 1 tablet (80 mg total) by mouth daily at bedtime, Disp: 30 tablet, Rfl: 3    valACYclovir (VALTREX) 500 mg tablet, Take 1 tablet by mouth 2 (two) times a day, Disp: , Rfl: 2    zonisamide (ZONEGRAN) 100 mg capsule, Take 100 mg by mouth 5 (five) times a day  , Disp: , Rfl:     glipiZIDE (GLUCOTROL) 10 mg tablet, , Disp: , Rfl: 1    naproxen (EC NAPROSYN) 500 MG EC tablet, Take 1 tablet (500 mg total) by mouth 2 (two) times a day with meals for 10 days, Disp: 20 tablet, Rfl: 0      REVIEW OF SYSTEMS  A comprehensive review of systems was negative except for: Genitourinary: positive for frequency  Integument/breast: positive for rash      Current Vitals:   Blood Pressure: 122/83 (06/19/18 0923)  Pulse: (!) 110 (06/19/18 8356)  Temperature: 99 1 °F (37 3 °C) (06/19/18 0923)  Temp Source: Tympanic (06/19/18 0923)  Height: 5' 6" (167 6 cm) (06/19/18 4578)  Weight - Scale: 116 kg (256 lb) (06/19/18 0923)  SpO2: 94 % (06/19/18 0923)  [unfilled]    PHYSICAL EXAMS:  General appearance: alert and oriented, in no acute distress and moderately obese  Lungs: clear to auscultation bilaterally  Heart: regular rate and rhythm, S1, S2 normal, no murmur, click, rub or gallop  Abdomen: soft, non-tender; bowel sounds normal; no masses,  no organomegaly  Extremities: extremities normal, warm and well-perfused; no cyanosis, clubbing, or edema  Pulses: 2+ and symmetric  Skin: minimal rash, pinpoint  Neurologic: Grossly normal{YES/NO:20        Follow up at this office in has follow up    Counseling / Coordination of Care  Total floor / unit time spent today 20 minutes  Greater than 50% of total time was spent with the patient and / or family counseling and / or coordination of care

## 2018-06-20 DIAGNOSIS — Z79.4 TYPE 2 DIABETES MELLITUS WITH COMPLICATION, WITH LONG-TERM CURRENT USE OF INSULIN (HCC): ICD-10-CM

## 2018-06-20 DIAGNOSIS — R26.9 ABNORMAL GAIT: Primary | ICD-10-CM

## 2018-06-20 DIAGNOSIS — N30.00 ACUTE CYSTITIS WITHOUT HEMATURIA: Primary | ICD-10-CM

## 2018-06-20 DIAGNOSIS — E11.8 TYPE 2 DIABETES MELLITUS WITH COMPLICATION, WITH LONG-TERM CURRENT USE OF INSULIN (HCC): ICD-10-CM

## 2018-06-20 RX ORDER — NITROFURANTOIN 25; 75 MG/1; MG/1
100 CAPSULE ORAL 2 TIMES DAILY
Qty: 20 CAPSULE | Refills: 0 | Status: SHIPPED | OUTPATIENT
Start: 2018-06-20 | End: 2018-06-30

## 2018-06-23 ENCOUNTER — APPOINTMENT (EMERGENCY)
Dept: CT IMAGING | Facility: HOSPITAL | Age: 63
End: 2018-06-23
Payer: COMMERCIAL

## 2018-06-23 ENCOUNTER — APPOINTMENT (EMERGENCY)
Dept: RADIOLOGY | Facility: HOSPITAL | Age: 63
End: 2018-06-23
Payer: COMMERCIAL

## 2018-06-23 ENCOUNTER — HOSPITAL ENCOUNTER (EMERGENCY)
Facility: HOSPITAL | Age: 63
Discharge: HOME | End: 2018-06-24
Attending: EMERGENCY MEDICINE | Admitting: EMERGENCY MEDICINE
Payer: COMMERCIAL

## 2018-06-23 DIAGNOSIS — N39.0 URINARY TRACT INFECTION: ICD-10-CM

## 2018-06-23 DIAGNOSIS — S16.1XXA STRAIN OF NECK MUSCLE, INITIAL ENCOUNTER: ICD-10-CM

## 2018-06-23 DIAGNOSIS — R55 SYNCOPE: Primary | ICD-10-CM

## 2018-06-23 DIAGNOSIS — S00.03XA CONTUSION OF SCALP, INITIAL ENCOUNTER: ICD-10-CM

## 2018-06-23 DIAGNOSIS — E87.1 HYPONATREMIA: ICD-10-CM

## 2018-06-23 LAB
ALBUMIN SERPL BCP-MCNC: 3.2 G/DL (ref 3.5–5)
ALP SERPL-CCNC: 50 U/L (ref 46–116)
ALT SERPL W P-5'-P-CCNC: 31 U/L (ref 12–78)
ANION GAP SERPL CALCULATED.3IONS-SCNC: 12 MMOL/L (ref 4–13)
AST SERPL W P-5'-P-CCNC: 27 U/L (ref 5–45)
BACTERIA UR QL AUTO: ABNORMAL /HPF
BASOPHILS # BLD AUTO: 0.05 THOUSANDS/ΜL (ref 0–0.1)
BASOPHILS NFR BLD AUTO: 1 % (ref 0–1)
BILIRUB SERPL-MCNC: 0.3 MG/DL (ref 0.2–1)
BILIRUB UR QL STRIP: ABNORMAL
BUN SERPL-MCNC: 8 MG/DL (ref 5–25)
CALCIUM SERPL-MCNC: 8.4 MG/DL (ref 8.3–10.1)
CHLORIDE SERPL-SCNC: 93 MMOL/L (ref 100–108)
CLARITY UR: ABNORMAL
CO2 SERPL-SCNC: 21 MMOL/L (ref 21–32)
COLOR UR: ABNORMAL
CREAT SERPL-MCNC: 1.03 MG/DL (ref 0.6–1.3)
EOSINOPHIL # BLD AUTO: 0.54 THOUSAND/ΜL (ref 0–0.61)
EOSINOPHIL NFR BLD AUTO: 6 % (ref 0–6)
ERYTHROCYTE [DISTWIDTH] IN BLOOD BY AUTOMATED COUNT: 14.4 % (ref 11.6–15.1)
GFR SERPL CREATININE-BSD FRML MDRD: 59 ML/MIN/1.73SQ M
GLUCOSE SERPL-MCNC: 63 MG/DL (ref 65–140)
GLUCOSE SERPL-MCNC: 64 MG/DL (ref 65–140)
GLUCOSE UR STRIP-MCNC: ABNORMAL MG/DL
HCT VFR BLD AUTO: 36 % (ref 34.8–46.1)
HGB BLD-MCNC: 12.2 G/DL (ref 11.5–15.4)
HGB UR QL STRIP.AUTO: ABNORMAL
KETONES UR STRIP-MCNC: ABNORMAL MG/DL
LEUKOCYTE ESTERASE UR QL STRIP: ABNORMAL
LYMPHOCYTES # BLD AUTO: 1.8 THOUSANDS/ΜL (ref 0.6–4.47)
LYMPHOCYTES NFR BLD AUTO: 19 % (ref 14–44)
MCH RBC QN AUTO: 31 PG (ref 26.8–34.3)
MCHC RBC AUTO-ENTMCNC: 33.9 G/DL (ref 31.4–37.4)
MCV RBC AUTO: 92 FL (ref 82–98)
MONOCYTES # BLD AUTO: 0.92 THOUSAND/ΜL (ref 0.17–1.22)
MONOCYTES NFR BLD AUTO: 10 % (ref 4–12)
NEUTROPHILS # BLD AUTO: 6.18 THOUSANDS/ΜL (ref 1.85–7.62)
NEUTS SEG NFR BLD AUTO: 65 % (ref 43–75)
NITRITE UR QL STRIP: ABNORMAL
NON-SQ EPI CELLS URNS QL MICRO: ABNORMAL /HPF
PH UR STRIP.AUTO: 5 [PH] (ref 4.5–8)
PHENYTOIN SERPL-MCNC: 5.4 UG/ML (ref 10–20)
PLATELET # BLD AUTO: 422 THOUSANDS/UL (ref 149–390)
PMV BLD AUTO: 8.2 FL (ref 8.9–12.7)
POTASSIUM SERPL-SCNC: 4.4 MMOL/L (ref 3.5–5.3)
PROT SERPL-MCNC: 6.5 G/DL (ref 6.4–8.2)
PROT UR STRIP-MCNC: ABNORMAL MG/DL
RBC # BLD AUTO: 3.93 MILLION/UL (ref 3.81–5.12)
RBC #/AREA URNS AUTO: ABNORMAL /HPF
SODIUM SERPL-SCNC: 126 MMOL/L (ref 136–145)
SP GR UR STRIP.AUTO: 1.02 (ref 1–1.03)
TROPONIN I SERPL-MCNC: <0.02 NG/ML
UROBILINOGEN UR QL STRIP.AUTO: ABNORMAL E.U./DL
WBC # BLD AUTO: 9.49 THOUSAND/UL (ref 4.31–10.16)
WBC #/AREA URNS AUTO: ABNORMAL /HPF

## 2018-06-23 PROCEDURE — 81001 URINALYSIS AUTO W/SCOPE: CPT | Performed by: EMERGENCY MEDICINE

## 2018-06-23 PROCEDURE — 87086 URINE CULTURE/COLONY COUNT: CPT | Performed by: EMERGENCY MEDICINE

## 2018-06-23 PROCEDURE — 87077 CULTURE AEROBIC IDENTIFY: CPT | Performed by: EMERGENCY MEDICINE

## 2018-06-23 PROCEDURE — 85025 COMPLETE CBC W/AUTO DIFF WBC: CPT | Performed by: EMERGENCY MEDICINE

## 2018-06-23 PROCEDURE — 87186 SC STD MICRODIL/AGAR DIL: CPT | Performed by: EMERGENCY MEDICINE

## 2018-06-23 PROCEDURE — 96361 HYDRATE IV INFUSION ADD-ON: CPT

## 2018-06-23 PROCEDURE — 84484 ASSAY OF TROPONIN QUANT: CPT | Performed by: EMERGENCY MEDICINE

## 2018-06-23 PROCEDURE — 72125 CT NECK SPINE W/O DYE: CPT

## 2018-06-23 PROCEDURE — 71045 X-RAY EXAM CHEST 1 VIEW: CPT

## 2018-06-23 PROCEDURE — 80053 COMPREHEN METABOLIC PANEL: CPT | Performed by: EMERGENCY MEDICINE

## 2018-06-23 PROCEDURE — 36415 COLL VENOUS BLD VENIPUNCTURE: CPT | Performed by: EMERGENCY MEDICINE

## 2018-06-23 PROCEDURE — 93005 ELECTROCARDIOGRAM TRACING: CPT

## 2018-06-23 PROCEDURE — 80185 ASSAY OF PHENYTOIN TOTAL: CPT | Performed by: EMERGENCY MEDICINE

## 2018-06-23 PROCEDURE — 73610 X-RAY EXAM OF ANKLE: CPT

## 2018-06-23 PROCEDURE — 96360 HYDRATION IV INFUSION INIT: CPT

## 2018-06-23 PROCEDURE — 70450 CT HEAD/BRAIN W/O DYE: CPT

## 2018-06-23 PROCEDURE — 82948 REAGENT STRIP/BLOOD GLUCOSE: CPT

## 2018-06-23 RX ORDER — NITROFURANTOIN 25; 75 MG/1; MG/1
100 CAPSULE ORAL 2 TIMES DAILY WITH MEALS
Status: DISCONTINUED | OUTPATIENT
Start: 2018-06-24 | End: 2018-06-24

## 2018-06-23 RX ORDER — OXYBUTYNIN CHLORIDE 5 MG/1
5 TABLET ORAL 3 TIMES DAILY
Status: DISCONTINUED | OUTPATIENT
Start: 2018-06-24 | End: 2018-06-24 | Stop reason: HOSPADM

## 2018-06-23 RX ADMIN — SODIUM CHLORIDE 1000 ML: 0.9 INJECTION, SOLUTION INTRAVENOUS at 22:24

## 2018-06-24 VITALS
WEIGHT: 269 LBS | SYSTOLIC BLOOD PRESSURE: 88 MMHG | DIASTOLIC BLOOD PRESSURE: 59 MMHG | RESPIRATION RATE: 18 BRPM | TEMPERATURE: 98.9 F | BODY MASS INDEX: 43.42 KG/M2 | OXYGEN SATURATION: 91 % | HEART RATE: 98 BPM

## 2018-06-24 PROCEDURE — 99285 EMERGENCY DEPT VISIT HI MDM: CPT

## 2018-06-24 RX ORDER — NITROFURANTOIN 25; 75 MG/1; MG/1
100 CAPSULE ORAL 2 TIMES DAILY
Qty: 14 CAPSULE | Refills: 0 | Status: SHIPPED | OUTPATIENT
Start: 2018-06-24 | End: 2018-07-01

## 2018-06-24 RX ORDER — NITROFURANTOIN 25; 75 MG/1; MG/1
100 CAPSULE ORAL ONCE
Status: COMPLETED | OUTPATIENT
Start: 2018-06-24 | End: 2018-06-24

## 2018-06-24 RX ADMIN — NITROFURANTOIN (MONOHYDRATE/MACROCRYSTALS) 100 MG: 75; 25 CAPSULE ORAL at 01:05

## 2018-06-24 RX ADMIN — OXYBUTYNIN CHLORIDE 5 MG: 5 TABLET ORAL at 01:05

## 2018-06-24 NOTE — ED PROVIDER NOTES
History  Chief Complaint   Patient presents with    Syncope     Patient had a syncopal episode tonight in her bathroom  Patient fell to the floor, patient has no complaints at this time  Patient is a 30-year-old female  She is a diabetic  She is currently on Macrobid since Tuesday for urinary tract infection  Tonight she was in the bathroom when she began to feel lightheaded  She ended up falling and passing out  She did strike her head  She does have neck pain  She scraped the lateral aspect of her right ankle  Otherwise she denies any other injuries  Her tetanus vaccination is up-to-date  It took her about 2 hours to get off the floor  Blood sugar by EMS was 69  Patient reports feeling lightheaded prior to falling, but no chest pain or palpitations  There was no shortness of breath  No severe headache  No abdominal or back pain  The injury was sudden  No aggravating or relieving factors  Prior to Admission Medications   Prescriptions Last Dose Informant Patient Reported? Taking? Blood Glucose Monitoring Suppl (BLOOD GLUCOSE MONITOR SYSTEM) w/Device KIT   No Yes   Sig: Test blood sugars 3 times a day   LANTUS SOLOSTAR injection pen 100 units/mL   Yes Yes   MAG64 535 (64 Mg) MG   Yes Yes   NOVOFINE 32G X 6 MM MISC   Yes Yes   ONETOUCH DELICA LANCETS 72V MISC   No Yes   Sig: Test blood sugars 3 times a day  PROCTOZONE-HC 2 5 % rectal cream   Yes Yes   QUEtiapine (SEROquel) 400 MG tablet   No Yes   Sig: Take 1 tablet (400 mg total) by mouth 2 (two) times a day   albuterol (PROVENTIL HFA,VENTOLIN HFA) 90 mcg/act inhaler   No Yes   Sig: Inhale 2 puffs every 6 (six) hours as needed for wheezing   aspirin (ECOTRIN LOW STRENGTH) 81 mg EC tablet   No Yes   Sig: Take 1 tablet (81 mg total) by mouth daily   estradiol (ESTRACE) 1 mg tablet   Yes Yes   Sig: Take 1 mg by mouth daily     fluticasone (FLONASE) 50 mcg/act nasal spray   No Yes   Si spray into each nostril daily   gabapentin (NEURONTIN) 800 mg tablet   Yes Yes   Sig: Take 800 mg by mouth 4 (four) times a day  glucose blood test strip   No Yes   Sig: Test blood sugars 3 times a day    hydrOXYzine pamoate (VISTARIL) 50 mg capsule   Yes Yes   Sig: Take 50 mg by mouth 3 (three) times a day  hydrocortisone 2 5 % cream   Yes Yes   insulin glargine (LANTUS) 100 units/mL subcutaneous injection   No Yes   Sig: Inject 78 Units under the skin daily at bedtime   levETIRAcetam (KEPPRA) 750 mg tablet   Yes Yes   Sig: Take 1,500 mg by mouth 2 (two) times a day     levothyroxine 175 mcg tablet   No Yes   Sig: Take 1 tablet (175 mcg total) by mouth daily   lisinopril (ZESTRIL) 10 mg tablet   No Yes   Sig: Take 1 tablet (10 mg total) by mouth daily   loratadine (CLARITIN) 10 mg tablet   No Yes   Sig: Take 1 tablet (10 mg total) by mouth daily   meloxicam (MOBIC) 7 5 mg tablet   No Yes   Sig: Take 1 tablet (7 5 mg total) by mouth 2 (two) times a day   metFORMIN (GLUCOPHAGE) 1000 MG tablet   No Yes   Sig: Take 1 tablet (1,000 mg total) by mouth 2 (two) times a day with meals   montelukast (SINGULAIR) 10 mg tablet   No Yes   Sig: Take 1 tablet (10 mg total) by mouth daily at bedtime   naproxen (EC NAPROSYN) 500 MG EC tablet   No No   Sig: Take 1 tablet (500 mg total) by mouth 2 (two) times a day with meals for 10 days   nitrofurantoin (MACROBID) 100 mg capsule   No Yes   Sig: Take 1 capsule (100 mg total) by mouth 2 (two) times a day for 10 days   norethindrone (AYGESTIN) 5 mg tablet   Yes Yes   Sig: Take 5 mg by mouth daily  omeprazole (PriLOSEC) 40 MG capsule   Yes Yes   Sig: Take 40 mg by mouth 2 (two) times a day   oxybutynin (DITROPAN) 5 mg tablet   Yes Yes   Sig: Take 5 mg by mouth 2 (two) times a day   oxybutynin (DITROPAN) 5 mg tablet   Yes Yes   Sig: Take by mouth   phenytoin (DILANTIN) 100 mg ER capsule   Yes Yes   Sig: Take by mouth 5 (five) times a day       polyethylene glycol (GLYCOLAX) powder   Yes Yes   polyethylene glycol (MIRALAX) 17 g packet   No Yes   Sig: Take 17 g by mouth daily   simvastatin (ZOCOR) 80 mg tablet   No Yes   Sig: Take 1 tablet (80 mg total) by mouth daily at bedtime   triamcinolone (KENALOG) 0 1 % cream   No Yes   Sig: Apply topically 2 (two) times a day   valACYclovir (VALTREX) 500 mg tablet   Yes Yes   Sig: Take 1 tablet by mouth 2 (two) times a day   zonisamide (ZONEGRAN) 100 mg capsule   Yes Yes   Sig: Take 100 mg by mouth 5 (five) times a day        Facility-Administered Medications: None       Past Medical History:   Diagnosis Date    Asthma     Chronic UTI (urinary tract infection)     [facility]     Diabetes mellitus (HCC)     Disease of thyroid gland     GERD (gastroesophageal reflux disease)     Psychiatric disorder     bi-polar    Seizure ([facility] [address] )        Past Surgical History:   Procedure Laterality Date    ANKLE FRACTURE SURGERY Right     TUBAL LIGATION      VEIN LIGATION AND STRIPPING         History reviewed  No pertinent family history  I have reviewed and agree with the history as documented  Social History   Substance Use Topics    Smoking status: Never Smoker    Smokeless tobacco: Never Used    Alcohol use No        Review of Systems   Constitutional: Negative for chills and fever  HENT: Negative for rhinorrhea and sore throat  Eyes: Negative for pain, redness and visual disturbance  Respiratory: Negative for cough and shortness of breath  Cardiovascular: Negative for chest pain and leg swelling  Gastrointestinal: Negative for abdominal pain, diarrhea and vomiting  Endocrine: Negative for polydipsia and polyuria  Genitourinary: Negative for dysuria, frequency, hematuria, vaginal bleeding and vaginal discharge  Musculoskeletal: Positive for neck pain  Negative for back pain  Skin: Negative for rash and wound  Allergic/Immunologic: Negative for immunocompromised state     Neurological: Positive for dizziness, syncope and light-headedness  Negative for weakness, numbness and headaches  Hematological: Does not bruise/bleed easily  Psychiatric/Behavioral: Negative for hallucinations and suicidal ideas  All other systems reviewed and are negative  Physical Exam  Physical Exam   Constitutional: She is oriented to person, place, and time  She appears well-developed and well-nourished  No distress  Patient is morbidly obese  HENT:   Head: Normocephalic  There is a small amount of swelling to the occiput  No step-off or laceration  There is no facial bone tenderness  No swelling or step-offs  No crepitus  There is no LeFort fracture  No otorrhea  No rhinorrhea  No nasal deformity or epistaxis  There is no raccoon's or Painter's sign  Eyes: EOM are normal  Pupils are equal, round, and reactive to light  Globes are intact  No hyphema  Orbits are atraumatic  Neck:   There is midline C-spine tenderness  Trachea is midline  There is no step-offs or swelling  No crepitus  No JVD  Cardiovascular: Normal rate, regular rhythm, normal heart sounds and intact distal pulses  Exam reveals no gallop and no friction rub  No murmur heard  Pulmonary/Chest: Effort normal and breath sounds normal  No stridor  No respiratory distress  She exhibits no tenderness  There is no bruising  No crepitus  Abdominal: Soft  Bowel sounds are normal  She exhibits no distension  There is no tenderness  There is no rebound and no guarding  Musculoskeletal: Normal range of motion  She exhibits tenderness  She exhibits no deformity  No thoracic or lumbar spine tenderness  Pelvis is stable  No palpable step-offs or swelling  No crepitus  No CVA tenderness  There is an abrasion and tenderness to the lateral malleolus of the right ankle  Otherwise extremities are nontender  All extremities are neurovascularly intact  Neurological: She is alert and oriented to person, place, and time  She has normal strength  No sensory deficit   GCS eye subscore is 4  GCS verbal subscore is 5  GCS motor subscore is 6  Skin: Skin is warm and dry  She is not diaphoretic  No pallor  Psychiatric: She has a normal mood and affect  Her behavior is normal    Vitals reviewed  Vital Signs  ED Triage Vitals   Temperature Pulse Respirations Blood Pressure SpO2   06/23/18 2206 06/23/18 2206 06/23/18 2206 06/23/18 2206 06/23/18 2206   98 9 °F (37 2 °C) 95 18 139/97 95 %      Temp Source Heart Rate Source Patient Position - Orthostatic VS BP Location FiO2 (%)   06/23/18 2206 06/23/18 2206 06/23/18 2230 06/23/18 2230 --   Temporal Monitor Lying Left arm       Pain Score       06/23/18 2206       9           Vitals:    06/23/18 2206 06/23/18 2230 06/23/18 2330 06/24/18 0130   BP: 139/97 100/57 91/56 (!) 88/59   Pulse: 95 104 105 98   Patient Position - Orthostatic VS:  Lying Lying Lying       Visual Acuity      ED Medications  Medications   oxybutynin (DITROPAN) tablet 5 mg (5 mg Oral Given 6/24/18 0105)   sodium chloride 0 9 % bolus 1,000 mL (0 mL Intravenous Stopped 6/24/18 0025)   nitrofurantoin (MACROBID) extended-release capsule 100 mg (100 mg Oral Given 6/24/18 0105)       Diagnostic Studies  Results Reviewed     Procedure Component Value Units Date/Time    Urine Microscopic [[id number]]  (Abnormal) Collected:  06/23/18 2324    Lab Status:  Final result Specimen:  Urine from Urine, Clean Catch Updated:  06/23/18 2344     RBC, UA None Seen /hpf      WBC, UA 20-30 (A) /hpf      Epithelial Cells Moderate (A) /hpf      Bacteria, UA Innumerable (A) /hpf     Urine culture [[id number]] Collected:  06/23/18 2324    Lab Status:   In process Specimen:  Urine from Urine, Clean Catch Updated:  06/23/18 2343    UA w Reflex to Microscopic w Reflex to Culture [[id number]]  (Abnormal) Collected:  06/23/18 2324    Lab Status:  Final result Specimen:  Urine from Urine, Clean Catch Updated:  06/23/18 2343     Color, UA Orange     Clarity, UA Slightly Cloudy     Specific Gravity, UA 1 020 pH, UA 5 0     Leukocytes, UA Interference- unable to analyze (A)     Nitrite, UA Interference- unable to analyze     Protein, UA       Interference- unable to analyze (A)     mg/dl     Glucose, UA       Interference- unable to analyze     mg/dl     Ketones, UA       Interference- unable to analyze (A)     mg/dl     Urobilinogen, UA       Interference-unable to analyze     E U /dl     Bilirubin, UA Interference- unable to analyze (A)     Blood, UA Interference- unable to analyze (A)    Phenytoin level, total [[id number]]  (Abnormal) Collected:  06/23/18 2222    Lab Status:  Final result Specimen:  Blood from Arm, Right Updated:  06/23/18 2310     Phenytoin Lvl 5 4 (L) ug/mL     Troponin I [[id number]]  (Normal) Collected:  06/23/18 2222    Lab Status:  Final result Specimen:  Blood from Arm, Right Updated:  06/23/18 2248     Troponin I <0 02 ng/mL     Comprehensive metabolic panel [[id number]]  (Abnormal) Collected:  06/23/18 2222    Lab Status:  Final result Specimen:  Blood from Arm, Right Updated:  06/23/18 2243     Sodium 126 (L) mmol/L      Potassium 4 4 mmol/L      Chloride 93 (L) mmol/L      CO2 21 mmol/L      Anion Gap 12 mmol/L      BUN 8 mg/dL      Creatinine 1 03 mg/dL      Glucose 63 (L) mg/dL      Calcium 8 4 mg/dL      AST 27 U/L      ALT 31 U/L      Alkaline Phosphatase 50 U/L      Total Protein 6 5 g/dL      Albumin 3 2 (L) g/dL      Total Bilirubin 0 30 mg/dL      eGFR 59 ml/min/1 73sq m     Narrative:         National Kidney Disease Education Program recommendations are as follows:  GFR calculation is accurate only with a steady state creatinine  Chronic Kidney disease less than 60 ml/min/1 73 sq  meters  Kidney failure less than 15 ml/min/1 73 sq  meters      CBC and differential [[id number]]  (Abnormal) Collected:  06/23/18 2222    Lab Status:  Final result Specimen:  Blood from Arm, Right Updated:  06/23/18 2228     WBC 9 49 Thousand/uL      RBC 3 93 Million/uL      Hemoglobin 12 2 g/dL      Hematocrit 36 0 %      MCV 92 fL      MCH 31 0 pg      MCHC 33 9 g/dL      RDW 14 4 %      MPV 8 2 (L) fL      Platelets 454 (H) Thousands/uL      Neutrophils Relative 65 %      Lymphocytes Relative 19 %      Monocytes Relative 10 %      Eosinophils Relative 6 %      Basophils Relative 1 %      Neutrophils Absolute 6 18 Thousands/µL      Lymphocytes Absolute 1 80 Thousands/µL      Monocytes Absolute 0 92 Thousand/µL      Eosinophils Absolute 0 54 Thousand/µL      Basophils Absolute 0 05 Thousands/µL     Fingerstick Glucose (POCT) [[id number]]  (Abnormal) Collected:  06/23/18 2208    Lab Status:  Final result Updated:  06/23/18 2214     POC Glucose 64 (L) mg/dl                  CT cervical spine without contrast    (Results Pending)   CT head without contrast    (Results Pending)   XR chest 1 view portable    (Results Pending)   XR ankle 3+ views RIGHT    (Results Pending)              Procedures  ECG 12 Lead Documentation  Date/Time: 6/23/2018 10:23 PM  Performed by: Sarah Quigley by: Brit Garcia     ECG reviewed by me, the ED Provider: yes    Patient location:  ED  Interpretation:     Interpretation: non-specific    Rate:     ECG rate assessment: tachycardic    Rhythm:     Rhythm: sinus rhythm    Ectopy:     Ectopy: none    QRS:     QRS axis:  Normal  Conduction:     Conduction: normal    ST segments:     ST segments:  Normal  T waves:     T waves: normal             Phone Contacts  ED Phone Contact    ED Course                               MDM  Number of Diagnoses or Management Options  Diagnosis management comments: EKG was without arrhythmia  ED workup was negative for significant traumatic injury  Head CT was negative for skull fracture or intracranial hemorrhage  C-spine CT was negative for fracture or subluxation  There is no ankle fracture  Chest x-ray was negative for pneumothorax or rib fractures  Otherwise patient continues to have a UTI  She has multiple allergies    For now going to continue the Macrobid and refer her to Urology  She is not septic  She does not have pyelonephritis  A urine culture was sent  Otherwise workup was significant for moderate hyponatremia  I suspect this may be medication related  Patient does not appear to be hypo or hypervolemic  She is on Keppra  This can cause hyponatremia  Otherwise her low-sodium might be due to SIADH or water drinking  It is not low enough to warrant admission  She can follow this up with her primary MD   I do not believe it caused and arrhythmia  Amount and/or Complexity of Data Reviewed  Clinical lab tests: ordered and reviewed  Tests in the radiology section of CPT®: ordered and reviewed  Independent visualization of images, tracings, or specimens: yes      CritCare Time    Disposition  Final diagnoses:   Syncope   Urinary tract infection   Hyponatremia   Contusion of scalp, initial encounter   Strain of neck muscle, initial encounter     Time reflects when diagnosis was documented in both MDM as applicable and the Disposition within this note     Time User Action Codes Description Comment    6/24/2018  1:29 AM Mabeline Dread Add [R55] Syncope     6/24/2018  1:29 AM Mabeline Dread Add [N39 0] Urinary tract infection     6/24/2018  1:29 AM Mabeline Dread Add [E87 1] Hyponatremia     6/24/2018  1:29 AM Mabeline Dread Add [S00 03XA] Contusion of scalp, initial encounter     6/24/2018  1:29 AM Mabeline Dread Add Martinus [place]  1XXA] Strain of neck muscle, initial encounter       ED Disposition     ED Disposition Condition Comment    Discharge  Linard Saver discharge to home/self care      Condition at discharge: Good        Follow-up Information     Follow up With Specialties Details Why [address], [address], Nurse Practitioner In 2 days  [facility] Stephanie Raya MD Urology In 1 week  [address] [address]  [phone]            Patient's Medications   Discharge Prescriptions    NITROFURANTOIN (MACROBID) 100 MG CAPSULE    Take 1 capsule (100 mg total) by mouth 2 (two) times a day for 7 days       Start Date: 6/24/2018 End Date: 7/1/2018       Order Dose: 100 mg       Quantity: 14 capsule    Refills: 0     No discharge procedures on file      ED Provider  Electronically Signed by           Noah Salamanca MD  06/24/18 4709

## 2018-06-24 NOTE — DISCHARGE INSTRUCTIONS
Tylenol or Motrin for pain         Cervical Strain   WHAT YOU NEED TO KNOW:   A cervical strain is a stretched or torn muscle or tendon in your neck  Tendons are strong tissues that connect muscles to bones  Common causes of cervical strains include a car accident, a fall, or a sports injury  DISCHARGE INSTRUCTIONS:   Return to the emergency department if:   · You have pain or numbness from your shoulder down to your hand  · You have problems with your vision, hearing, or balance  · You feel confused or cannot concentrate  · You have problems with movement and strength  Contact your healthcare provider if:   · You have increased swelling or pain in your neck  · You have questions or concerns about your condition or care  Medicines: You may need any of the following:  · Acetaminophen  decreases pain and fever  It is available without a doctor's order  Ask how much to take and how often to take it  Follow directions  Read the labels of all other medicines you are using to see if they also contain acetaminophen, or ask your doctor or pharmacist  Acetaminophen can cause liver damage if not taken correctly  Do not use more than 4 grams (4,000 milligrams) total of acetaminophen in one day  · NSAIDs , such as ibuprofen, help decrease swelling, pain, and fever  This medicine is available with or without a doctor's order  NSAIDs can cause stomach bleeding or kidney problems in certain people  If you take blood thinner medicine, always ask your healthcare provider if NSAIDs are safe for you  Always read the medicine label and follow directions  · Muscle relaxers  help decrease pain and muscle spasms  · Prescription pain medicine  may be given  Ask your healthcare provider how to take this medicine safely  Some prescription pain medicines contain acetaminophen  Do not take other medicines that contain acetaminophen without talking to your healthcare provider   Too much acetaminophen may cause liver damage  Prescription pain medicine may cause constipation  Ask your healthcare provider how to prevent or treat constipation  · Take your medicine as directed  Contact your healthcare provider if you think your medicine is not helping or if you have side effects  Tell him or her if you are allergic to any medicine  Keep a list of the medicines, vitamins, and herbs you take  Include the amounts, and when and why you take them  Bring the list or the pill bottles to follow-up visits  Carry your medicine list with you in case of an emergency  Manage your symptoms:   · Apply heat  on your neck for 15 to 20 minutes, 4 to 6 times a day or as directed  Heat helps decrease pain, stiffness, and muscle spasms  · Begin gentle neck exercises  as soon as you can move your neck without pain  Exercises will help decrease stiffness and improve the strength and movement of your neck  Ask your healthcare provider what kind of exercises you should do  · Gradually return to your usual activities as directed  Stop if you have pain  Avoid activities that can cause more damage to your neck, such as heavy lifting or strenuous exercise  · Sleep without a pillow  to help decrease pain  Instead, roll a small towel tightly and place it under your neck  · Go to physical therapy as directed  A physical therapist teaches you exercises to help improve movement and strength, and to decrease pain  Prevent neck injury:   · Drive safely  Make sure everyone in your car wears a seatbelt  A seatbelt can save your life if you are in an accident  Do not use your cell phone when you are driving  This could distract you and cause an accident  Pull over if you need to make a call or send a text message  · Wear helmets, lifejackets, and protective gear  Always wear a helmet when you ride a bike or motorcycle, go skiing, or play sports that could cause a head injury  Wear protective equipment when you play sports   Wear a lifejacket when you are on a boat or doing water sports  Follow up with your healthcare provider as directed: You may be referred to an orthopedist or physical therapies  Write down your questions so you remember to ask them during your visits  © 2017 [address] [employer] Information is for End User's use only and may not be sold, redistributed or otherwise used for commercial purposes  All illustrations and images included in CareNotes® are the copyrighted property of [employer]  or [employer]  The above information is an  only  It is not intended as medical advice for individual conditions or treatments  Talk to your doctor, nurse or pharmacist before following any medical regimen to see if it is safe and effective for you  Hyponatremia   WHAT YOU NEED TO KNOW:   Hyponatremia occurs when the amount of sodium (salt) in your blood is lower than normal  Sodium is an electrolyte (mineral) that helps your muscles, heart, and digestive system work properly  It helps control blood pressure and fluid balance  DISCHARGE INSTRUCTIONS:   Follow up with your healthcare provider as directed: You may need to return for more tests  Write down your questions so you remember to ask them during your visits  Nutrition:  You may need to increase your intake of sodium  Foods that are high in sodium include milk, packaged snacks such as pretzels, or processed meats (valera, sausage, and ham)  Ask your dietitian to help you create a meal plan that is right for you  Liquids: Follow your healthcare provider's advice if you need to limit the amount of liquid you drink  Ask how much liquid to drink each day and which liquids are best for you  You may be asked to drink liquids that have water, sugar, and salt, such as juices, milk, or sports drinks  These liquids help your body hold in fluid and prevent dehydration     Contact your healthcare provider if:   · You have muscle cramps or twitching  · You feel very weak or tired  · You have nausea or are vomiting  · You have questions or concerns about your condition or care  Return to the emergency department if:   · You have a seizure  · You have an irregular heartbeat  · You have trouble breathing  · You cannot move your arms and legs  · You are confused or cannot think clearly  © 2017 [address] [employer] Information is for End User's use only and may not be sold, redistributed or otherwise used for commercial purposes  All illustrations and images included in CareNotes® are the copyrighted property of [employer]  or [employer] Chapp[employer]  The above information is an  only  It is not intended as medical advice for individual conditions or treatments  Talk to your doctor, nurse or pharmacist before following any medical regimen to see if it is safe and effective for you  Syncope   WHAT YOU NEED TO KNOW:   Syncope is also called fainting or passing out  Syncope is a sudden, temporary loss of consciousness, followed by a fall from a standing or sitting position  Syncope ranges from not serious to a sign of a more serious condition that needs to be treated  You can control some health conditions that cause syncope  Your healthcare providers can help you create a plan to manage syncope and prevent episodes  DISCHARGE INSTRUCTIONS:   Seek care immediately if:   · You are bleeding because you hit your head when you fainted  · You suddenly have double vision, difficulty speaking, numbness, and cannot move your arms or legs  · You have chest pain and trouble breathing  · You vomit blood or material that looks like coffee grounds  · You see blood in your bowel movement  Contact your healthcare provider if:   · You have new or worsening symptoms  · You have another syncope episode  · You have a headache, fast heartbeat, or feel too dizzy to stand up      · You have questions or concerns about your condition or care  Follow up with your healthcare provider as directed:  Write down your questions so you remember to ask them during your visits  Manage syncope:   · Keep a record of your syncope episodes  Include your symptoms and your activity before and after the episode  The record can help your healthcare provider find the cause of your syncope and help you manage episodes  · Sit or lie down when needed  This includes when you feel dizzy, your throat is getting tight, and your vision changes  Raise your legs above the level of your heart  · Take slow, deep breaths if you start to breathe faster with anxiety or fear  This can help decrease dizziness and the feeling that you might faint  · Check your blood pressure often  This is important if you take medicine to lower your blood pressure  Check your blood pressure when you are lying down and when you are standing  Ask how often to check during the day  Keep a record of your blood pressure numbers  Your healthcare provider may use the record to help plan your treatment  Prevent a syncope episode:   · Move slowly and let yourself get used to one position before you move to another position  This is very important when you change from a lying or sitting position to a standing position  Take some deep breaths before you stand up from a lying position  Stand up slowly  Sudden movements may cause a fainting spell  Sit on the side of the bed or couch for a few minutes before you stand up  If you are on bedrest, try to be upright for about 2 hours each day, or as directed  Do not lock your legs if you are standing for a long period of time  Move your legs and bend your knees to keep blood flowing  · Follow your healthcare provider's recommendations  Your provider may  recommend that you drink more liquids to prevent dehydration   You may also need to have more salt to keep your blood pressure from dropping too low and causing syncope  Your provider will tell you how much liquid and sodium to have each day  · Watch for signs of low blood sugar  These include hunger, nervousness, sweating, and fast or fluttery heartbeats  Talk with your healthcare provider about ways to keep your blood sugar level steady  · Do not strain if you are constipated  You may faint if you strain to have a bowel movement  Walking is the best way to get your bowels moving  Eat foods high in fiber to make it easier to have a bowel movement  Good examples are high-fiber cereals, beans, vegetables, and whole-grain breads  Prune juice may help make bowel movements softer  · Be careful in hot weather  Heat can cause a syncope episode  Limit activity done outside on hot days  Physical activity in hot weather can lead to dehydration  This can cause an episode  © 2017 [address]  Information is for End User's use only and may not be sold, redistributed or otherwise used for commercial purposes  All illustrations and images included in CareNotes® are the copyrighted property of [employer]  or [address]  The above information is an  only  It is not intended as medical advice for individual conditions or treatments  Talk to your doctor, nurse or pharmacist before following any medical regimen to see if it is safe and effective for you  Scalp Contusion in Adults   WHAT YOU NEED TO KNOW:   A scalp contusion is a bruise on your scalp  There is bleeding under the scalp, but the skin is not broken  You may have swelling at the site of the bruise  DISCHARGE INSTRUCTIONS:   Home care:   · Have someone stay with you for 24 to 48 hours after the injury  Give him the signs of serious injury listed below, such as a seizure or trouble moving  You will need immediate care if you develop signs of a serious injury  · Apply ice to your bruise  Ice helps decrease swelling and pain   Ice may also help prevent tissue damage  Use an ice pack, or put crushed ice in a plastic bag  Cover it with a towel and place it on your bruise for 15 to 20 minutes every hour or as directed  Follow up with your healthcare provider as directed:  Write down your questions so you remember to ask them during your visits  Contact your healthcare provider if:   · You have a headache or neck pain that is getting worse  · You are drowsy and confused  · You have trouble staying balanced or walking  · You are irritable for no reason  · You have problems with your vision  · You cannot stop vomiting  Return to the emergency department or have someone call 911 if:   · You have a seizure  · You cannot be awakened  · You are not able to move part of your body  · Your pupils are different sizes  · You have blood or clear fluid coming out of your nose, ears, or mouth  © 2017 [address]  Information is for End User's use only and may not be sold, redistributed or otherwise used for commercial purposes  All illustrations and images included in CareNotes® are the copyrighted property of [employer]  or [employer] Chappell  The above information is an  only  It is not intended as medical advice for individual conditions or treatments  Talk to your doctor, nurse or pharmacist before following any medical regimen to see if it is safe and effective for you  Urinary Tract Infection in Women   WHAT YOU NEED TO KNOW:   A urinary tract infection (UTI) is caused by bacteria that get inside your urinary tract  Most bacteria that enter your urinary tract come out when you urinate  If the bacteria stay in your urinary tract, you may get an infection  Your urinary tract includes your kidneys, ureters, bladder, and urethra  Urine is made in your kidneys, and it flows from the ureters to the bladder  Urine leaves the bladder through the urethra   A UTI is more common in your lower urinary tract, which includes your bladder and urethra  DISCHARGE INSTRUCTIONS:   Return to the emergency department if:   · You are urinating very little or not at all  · You have a high fever with shaking chills  · You have side or back pain that gets worse  Contact your healthcare provider if:   · You have a fever  · You do not feel better after 2 days of taking antibiotics  · You are vomiting  · You have questions or concerns about your condition or care  Medicines:   · Antibiotics  help fight a bacterial infection  · Medicines  may be given to decrease pain and burning when you urinate  They will also help decrease the feeling that you need to urinate often  These medicines will make your urine orange or red  · Take your medicine as directed  Contact your healthcare provider if you think your medicine is not helping or if you have side effects  Tell him or her if you are allergic to any medicine  Keep a list of the medicines, vitamins, and herbs you take  Include the amounts, and when and why you take them  Bring the list or the pill bottles to follow-up visits  Carry your medicine list with you in case of an emergency  Follow up with your healthcare provider as directed:  Write down your questions so you remember to ask them during your visits  Prevent another UTI:   · Empty your bladder often  Urinate and empty your bladder as soon as you feel the need  Do not hold your urine for long periods of time  · Wipe from front to back after you urinate or have a bowel movement  This will help prevent germs from getting into your urinary tract through your urethra  · Drink liquids as directed  Ask how much liquid to drink each day and which liquids are best for you  You may need to drink more liquids than usual to help flush out the bacteria  Do not drink alcohol, caffeine, or citrus juices  These can irritate your bladder and increase your symptoms   Your healthcare provider may recommend cranberry juice to help prevent a UTI  · Urinate after you have sex  This can help flush out bacteria passed during sex  · Do not douche or use feminine deodorants  These can change the chemical balance in your vagina  · Change sanitary pads or tampons often  This will help prevent germs from getting into your urinary tract  · Do pelvic muscle exercises often  Pelvic muscle exercises may help you start and stop urinating  Strong pelvic muscles may help you empty your bladder easier  Squeeze these muscles tightly for 5 seconds like you are trying to hold back urine  Then relax for 5 seconds  Gradually work up to squeezing for 10 seconds  Do 3 sets of 15 repetitions a day, or as directed  © 2017 [address] Information is for End User's use only and may not be sold, redistributed or otherwise used for commercial purposes  All illustrations and images included in CareNotes® are the copyrighted property of [employer]  or [employer]  The above information is an  only  It is not intended as medical advice for individual conditions or treatments  Talk to your doctor, nurse or pharmacist before following any medical regimen to see if it is safe and effective for you  Head Injury   WHAT YOU NEED TO KNOW:   A head injury is most often caused by a blow to the head  This may occur from a fall, bicycle injury, sports injury, being struck in the head, or a motor vehicle accident  DISCHARGE INSTRUCTIONS:   Call 911 or have someone else call for any of the following:   · You cannot be woken  · You have a seizure  · You stop responding to others or you faint  · You have blurry or double vision  · Your speech becomes slurred or confused  · You have arm or leg weakness, loss of feeling, or new problems with coordination  · Your pupils are larger than usual or one pupil is a different size than the other       · You have blood or clear fluid coming out of your ears or nose  Return to the emergency department if:   · You have repeated or forceful vomiting  · You feel confused  · Your headache gets worse or becomes severe  · You or someone caring for you notices that you are harder to wake than usual   Contact your healthcare provider if:   · Your symptoms last longer than 6 weeks after the injury  · You have questions or concerns about your condition or care  Medicines:   · Acetaminophen  decreases pain  Acetaminophen is available without a doctor's order  Ask how much to take and how often to take it  Follow directions  Acetaminophen can cause liver damage if not taken correctly  · Take your medicine as directed  Contact your healthcare provider if you think your medicine is not helping or if you have side effects  Tell him or her if you are allergic to any medicine  Keep a list of the medicines, vitamins, and herbs you take  Include the amounts, and when and why you take them  Bring the list or the pill bottles to follow-up visits  Carry your medicine list with you in case of an emergency  Self-care:   · Rest  or do quiet activities for 24 to 48 hours  Limit your time watching TV, using the computer, or doing tasks that require a lot of thinking  Slowly return to your normal activities as directed  Do not play sports or do activities that may cause you to get hit in the head  Ask your healthcare provider when you can return to sports  · Apply ice  on your head for 15 to 20 minutes every hour or as directed  Use an ice pack, or put crushed ice in a plastic bag  Cover it with a towel before you apply it to your skin  Ice helps prevent tissue damage and decreases swelling and pain  · Have someone stay with you for 24 hours  or as directed  This person can monitor you for complications and call [phone]  When you are awake the person should ask you a few questions to see if you are thinking clearly   An example would be to ask your name or your address  Prevent another head injury:   · Wear a helmet that fits properly  Do this when you play sports, or ride a bike, scooter, or skateboard  Helmets help decrease your risk of a serious head injury  Talk to your healthcare provider about other ways you can protect yourself if you play sports  · Wear your seat belt every time you are in a car  This helps to decrease your risk for a head injury if you are in a car accident  Follow up with your healthcare provider as directed:  Write down your questions so you remember to ask them during your visits  © 2017 [facility] Information is for End User's use only and may not be sold, redistributed or otherwise used for commercial purposes  All illustrations and images included in CareNotes® are the copyrighted property of [employer]  or [employer]  The above information is an  only  It is not intended as medical advice for individual conditions or treatments  Talk to your doctor, nurse or pharmacist before following any medical regimen to see if it is safe and effective for you

## 2018-06-25 LAB
ATRIAL RATE: 112 BPM
P AXIS: 32 DEGREES
PR INTERVAL: 142 MS
QRS AXIS: 1 DEGREES
QRSD INTERVAL: 68 MS
QT INTERVAL: 324 MS
QTC INTERVAL: 442 MS
T WAVE AXIS: 62 DEGREES
VENTRICULAR RATE: 112 BPM

## 2018-06-25 PROCEDURE — 93010 ELECTROCARDIOGRAM REPORT: CPT | Performed by: INTERNAL MEDICINE

## 2018-06-26 ENCOUNTER — TELEPHONE (OUTPATIENT)
Dept: EMERGENCY DEPT | Facility: HOSPITAL | Age: 63
End: 2018-06-26

## 2018-06-26 DIAGNOSIS — N39.0 UTI (URINARY TRACT INFECTION): Primary | ICD-10-CM

## 2018-06-26 LAB — BACTERIA UR CULT: ABNORMAL

## 2018-06-26 RX ORDER — DOXYCYCLINE 100 MG/1
100 CAPSULE ORAL 2 TIMES DAILY
Qty: 14 CAPSULE | Refills: 0 | Status: SHIPPED | OUTPATIENT
Start: 2018-06-26 | End: 2018-07-03

## 2018-07-03 ENCOUNTER — OFFICE VISIT (OUTPATIENT)
Dept: FAMILY MEDICINE CLINIC | Facility: CLINIC | Age: 63
End: 2018-07-03
Payer: COMMERCIAL

## 2018-07-03 VITALS
OXYGEN SATURATION: 94 % | HEIGHT: 66 IN | HEART RATE: 123 BPM | SYSTOLIC BLOOD PRESSURE: 117 MMHG | DIASTOLIC BLOOD PRESSURE: 78 MMHG | TEMPERATURE: 100.1 F | RESPIRATION RATE: 20 BRPM

## 2018-07-03 DIAGNOSIS — R55 SYNCOPE, UNSPECIFIED SYNCOPE TYPE: ICD-10-CM

## 2018-07-03 DIAGNOSIS — E11.65 TYPE 2 DIABETES MELLITUS WITH HYPERGLYCEMIA, WITH LONG-TERM CURRENT USE OF INSULIN (HCC): ICD-10-CM

## 2018-07-03 DIAGNOSIS — E87.1 HYPONATREMIA: ICD-10-CM

## 2018-07-03 DIAGNOSIS — J30.2 SEASONAL ALLERGIC RHINITIS, UNSPECIFIED TRIGGER: ICD-10-CM

## 2018-07-03 DIAGNOSIS — E87.1 HYPONATREMIA: Primary | ICD-10-CM

## 2018-07-03 DIAGNOSIS — Z79.4 TYPE 2 DIABETES MELLITUS WITH HYPERGLYCEMIA, WITH LONG-TERM CURRENT USE OF INSULIN (HCC): ICD-10-CM

## 2018-07-03 LAB
ALBUMIN SERPL BCP-MCNC: 3.6 G/DL (ref 3.5–5)
ALP SERPL-CCNC: 40 U/L (ref 46–116)
ALT SERPL W P-5'-P-CCNC: 56 U/L (ref 12–78)
ANION GAP SERPL CALCULATED.3IONS-SCNC: 14 MMOL/L (ref 4–13)
AST SERPL W P-5'-P-CCNC: 43 U/L (ref 5–45)
BILIRUB SERPL-MCNC: 0.42 MG/DL (ref 0.2–1)
BUN SERPL-MCNC: 7 MG/DL (ref 5–25)
CALCIUM SERPL-MCNC: 8.2 MG/DL (ref 8.3–10.1)
CHLORIDE SERPL-SCNC: 93 MMOL/L (ref 100–108)
CO2 SERPL-SCNC: 16 MMOL/L (ref 21–32)
CREAT SERPL-MCNC: 0.97 MG/DL (ref 0.6–1.3)
GFR SERPL CREATININE-BSD FRML MDRD: 64 ML/MIN/1.73SQ M
GLUCOSE P FAST SERPL-MCNC: 136 MG/DL (ref 65–99)
OSMOLALITY UR: 254 MMOL/KG
POTASSIUM SERPL-SCNC: 4.1 MMOL/L (ref 3.5–5.3)
PROT SERPL-MCNC: 6.7 G/DL (ref 6.4–8.2)
SODIUM 24H UR-SCNC: 14 MOL/L
SODIUM SERPL-SCNC: 123 MMOL/L (ref 136–145)

## 2018-07-03 PROCEDURE — 83935 ASSAY OF URINE OSMOLALITY: CPT | Performed by: NURSE PRACTITIONER

## 2018-07-03 PROCEDURE — T1015 CLINIC SERVICE: HCPCS | Performed by: FAMILY MEDICINE

## 2018-07-03 PROCEDURE — 83930 ASSAY OF BLOOD OSMOLALITY: CPT | Performed by: NURSE PRACTITIONER

## 2018-07-03 PROCEDURE — 84588 ASSAY OF VASOPRESSIN: CPT | Performed by: NURSE PRACTITIONER

## 2018-07-03 PROCEDURE — 80053 COMPREHEN METABOLIC PANEL: CPT | Performed by: NURSE PRACTITIONER

## 2018-07-03 PROCEDURE — 84300 ASSAY OF URINE SODIUM: CPT | Performed by: NURSE PRACTITIONER

## 2018-07-03 RX ORDER — INSULIN GLARGINE 100 [IU]/ML
10 INJECTION, SOLUTION SUBCUTANEOUS
Qty: 10 ML | Refills: 2 | Status: SHIPPED | OUTPATIENT
Start: 2018-07-03 | End: 2019-04-30 | Stop reason: SDUPTHER

## 2018-07-03 RX ORDER — POLYETHYLENE GLYCOL 3350 17 G/17G
17 POWDER, FOR SOLUTION ORAL DAILY
Qty: 850 G | Refills: 3 | Status: SHIPPED | OUTPATIENT
Start: 2018-07-03 | End: 2018-07-18 | Stop reason: SDUPTHER

## 2018-07-03 RX ORDER — LISINOPRIL 10 MG/1
10 TABLET ORAL DAILY
Qty: 30 TABLET | Refills: 0 | Status: SHIPPED | OUTPATIENT
Start: 2018-07-03 | End: 2018-07-05 | Stop reason: SDUPTHER

## 2018-07-03 RX ORDER — FLUTICASONE PROPIONATE 50 MCG
1 SPRAY, SUSPENSION (ML) NASAL DAILY
Qty: 16 G | Refills: 0 | Status: SHIPPED | OUTPATIENT
Start: 2018-07-03 | End: 2018-10-05 | Stop reason: SDUPTHER

## 2018-07-03 RX ORDER — LEVOTHYROXINE SODIUM 175 UG/1
175 TABLET ORAL DAILY
Qty: 30 TABLET | Refills: 0 | Status: SHIPPED | OUTPATIENT
Start: 2018-07-03 | End: 2018-09-14 | Stop reason: SDUPTHER

## 2018-07-03 NOTE — PROGRESS NOTES
OFFICE VISIT  Aggie Roque 63 y o  female MRN: [id number]      Assessment / Plan:  Diagnoses and all orders for this visit:    Hyponatremia  -     Comprehensive metabolic panel; Future  -     Osmolality, urine  -     Sodium, urine, random  -     Osmolality; Future  -     Arginine vasopressin hormone; Future    Type 2 diabetes mellitus with hyperglycemia, with long-term current use of insulin ([facility])  -     insulin glargine (LANTUS) 100 units/mL subcutaneous injection; Inject 10 Units under the skin daily at bedtime  -     lisinopril (ZESTRIL) 10 mg tablet; Take 1 tablet (10 mg total) by mouth daily  -     levothyroxine 175 mcg tablet; Take 1 tablet (175 mcg total) by mouth daily  -     polyethylene glycol (GLYCOLAX) powder; Take 17 g by mouth daily    Seasonal allergic rhinitis, unspecified trigger  -     fluticasone (FLONASE) 50 mcg/act nasal spray; 1 spray into each nostril daily  -     hydrocortisone 2 5 % cream; Apply topically 2 (two) times a day    Syncope, unspecified syncope type      Recommended home care, pt declined  Office on aging number given  Pt to follow up in 2 weeks  Record sugars, reduced insulin  Labs drawn in office  Reason For Visit / Chief Complaint  Chief Complaint   Patient presents with   [facility] follow up    Fall    Medication Refill        HPI:  Aggie Roque is a 63 y o  female presents today for ED follow up  She was seen in the ED on 6/23 for a syncopal episode  She was evaluated in ED, she was found to have hyponatremia, 126, and glucose 69, remains with uti, on macrobid     Historical Information   Past Medical History:   Diagnosis Date    Asthma     Chronic UTI (urinary tract infection)     COPD (chronic obstructive pulmonary disease) ([facility])     Diabetes mellitus ([facility])     Disease of thyroid gland     GERD (gastroesophageal reflux disease)     Psychiatric disorder     bi-polar    Seizure [facility])      Past Surgical History:   Procedure Laterality Date    ANKLE FRACTURE SURGERY Right     TUBAL LIGATION      VEIN LIGATION AND STRIPPING       Social History   History   Alcohol Use No     History   Drug Use No     History   Smoking Status    Never Smoker   Smokeless Tobacco    Never Used     No family history on file  Meds/Allergies   Allergies   Allergen Reactions    Keflex [Cephalexin] Anaphylaxis     Airway edema     Levaquin [Levofloxacin] Anaphylaxis and Other (See Comments)     Unknown  Other reaction(s): Other (See Comments)  Unknown    Penicillins Anaphylaxis and Other (See Comments)     Unknown  Other reaction(s): Other (See Comments)  Unknown    Bactrim [Sulfamethoxazole-Trimethoprim] GI Intolerance and Swelling     LE edema and thrush  Other reaction(s): Edema Other  LE edema and thrush    Ciprofloxacin Swelling     Edema and all extremities and thrush   Other reaction(s): Edema Other  Edema and all extremities and thrush        Meds:    Current Outpatient Prescriptions:     albuterol (PROVENTIL HFA,VENTOLIN HFA) 90 mcg/act inhaler, Inhale 2 puffs every 6 (six) hours as needed for wheezing, Disp: 1 Inhaler, Rfl: 1    aspirin (ECOTRIN LOW STRENGTH) 81 mg EC tablet, Take 1 tablet (81 mg total) by mouth daily, Disp: 90 tablet, Rfl: 0    Blood Glucose Monitoring Suppl (BLOOD GLUCOSE MONITOR SYSTEM) w/Device KIT, Test blood sugars 3 times a day, Disp: 1 each, Rfl: 0    doxycycline monohydrate (MONODOX) 100 mg capsule, Take 1 capsule (100 mg total) by mouth 2 (two) times a day for 7 days, Disp: 14 capsule, Rfl: 0    estradiol (ESTRACE) 1 mg tablet, Take 1 mg by mouth daily  , Disp: , Rfl:     fluticasone (FLONASE) 50 mcg/act nasal spray, 1 spray into each nostril daily, Disp: 16 g, Rfl: 0    gabapentin (NEURONTIN) 800 mg tablet, Take 800 mg by mouth 4 (four) times a day , Disp: , Rfl:     glucose blood test strip, Test blood sugars 3 times a day , Disp: 100 each, Rfl: 3    hydrocortisone 2 5 % cream, Apply topically 2 (two) times a day, Disp: 30 g, Rfl: 3    hydrOXYzine pamoate (VISTARIL) 50 mg capsule, Take 50 mg by mouth 3 (three) times a day , Disp: , Rfl:     insulin glargine (LANTUS) 100 units/mL subcutaneous injection, Inject 10 Units under the skin daily at bedtime, Disp: 10 mL, Rfl: 2    levETIRAcetam (KEPPRA) 750 mg tablet, Take 1,500 mg by mouth 2 (two) times a day  , Disp: , Rfl:     levothyroxine 175 mcg tablet, Take 1 tablet (175 mcg total) by mouth daily, Disp: 30 tablet, Rfl: 0    lisinopril (ZESTRIL) 10 mg tablet, Take 1 tablet (10 mg total) by mouth daily, Disp: 30 tablet, Rfl: 0    loratadine (CLARITIN) 10 mg tablet, Take 1 tablet (10 mg total) by mouth daily, Disp: 30 tablet, Rfl: 3    MAG64 535 (64 Mg) MG, , Disp: , Rfl: 1    meloxicam (MOBIC) 7 5 mg tablet, Take 1 tablet (7 5 mg total) by mouth 2 (two) times a day, Disp: 60 tablet, Rfl: 1    metFORMIN (GLUCOPHAGE) 1000 MG tablet, Take 1 tablet (1,000 mg total) by mouth 2 (two) times a day with meals, Disp: 60 tablet, Rfl: 0    montelukast (SINGULAIR) 10 mg tablet, Take 1 tablet (10 mg total) by mouth daily at bedtime, Disp: 30 tablet, Rfl: 0    naproxen (EC NAPROSYN) 500 MG EC tablet, Take 1 tablet (500 mg total) by mouth 2 (two) times a day with meals for 10 days, Disp: 20 tablet, Rfl: 0    norethindrone (AYGESTIN) 5 mg tablet, Take 5 mg by mouth daily  , Disp: , Rfl:     NOVOFINE 32G X 6 MM MISC, , Disp: , Rfl: 2    omeprazole (PriLOSEC) 40 MG capsule, Take 40 mg by mouth 2 (two) times a day, Disp: , Rfl:     ONETOUCH DELICA LANCETS 14I MISC, Test blood sugars 3 times a day , Disp: 100 each, Rfl: 3    oxybutynin (DITROPAN) 5 mg tablet, Take 5 mg by mouth 2 (two) times a day, Disp: , Rfl:     oxybutynin (DITROPAN) 5 mg tablet, Take by mouth, Disp: , Rfl:     phenytoin (DILANTIN) 100 mg ER capsule, Take by mouth 5 (five) times a day   , Disp: , Rfl:     polyethylene glycol (GLYCOLAX) powder, Take 17 g by mouth daily, Disp: 850 g, Rfl: 3    polyethylene glycol (MIRALAX) 17 g packet, Take 17 g by mouth daily, Disp: 30 each, Rfl: 0    PROCTOZONE-HC 2 5 % rectal cream, , Disp: , Rfl: 1    QUEtiapine (SEROquel) 400 MG tablet, Take 1 tablet (400 mg total) by mouth 2 (two) times a day, Disp: 60 tablet, Rfl: 1    simvastatin (ZOCOR) 80 mg tablet, Take 1 tablet (80 mg total) by mouth daily at bedtime, Disp: 30 tablet, Rfl: 3    triamcinolone (KENALOG) 0 1 % cream, Apply topically 2 (two) times a day, Disp: 30 g, Rfl: 0    valACYclovir (VALTREX) 500 mg tablet, Take 1 tablet by mouth 2 (two) times a day, Disp: , Rfl: 2    zonisamide (ZONEGRAN) 100 mg capsule, Take 100 mg by mouth 5 (five) times a day  , Disp: , Rfl:       REVIEW OF SYSTEMS  A comprehensive review of systems was negative  Current Vitals:   Blood Pressure: 117/78 (07/03/18 0818)  Pulse: (!) 123 (07/03/18 0818)  Temperature: 100 1 °F (37 8 °C) (07/03/18 0818)  Temp Source: Tympanic (07/03/18 0818)  Respirations: 20 (07/03/18 0818)  Height: 5' 6" (167 6 cm) (07/03/18 0818)  SpO2: 94 % (07/03/18 0818)  [unfilled]    PHYSICAL EXAMS:  General appearance: alert and oriented, in no acute distress and moderately obese  Lungs: clear to auscultation bilaterally  Heart: regular rate and rhythm, S1, S2 normal, no murmur, click, rub or gallop  Abdomen: soft, non-tender; bowel sounds normal; no masses,  no organomegaly  Extremities: extremities normal, warm and well-perfused; no cyanosis, clubbing, or edema  Pulses: 2+ and symmetric  Skin: Skin color, texture, turgor normal  No rashes or lesions  Neurologic: Grossly normal{YES/NO:20        Follow up at this office in 2 weeks     Counseling / Coordination of Care  Total floor / unit time spent today 20 minutes  Greater than 50% of total time was spent with the patient and / or family counseling and / or coordination of care

## 2018-07-05 DIAGNOSIS — E11.9 DIABETES MELLITUS WITHOUT COMPLICATION (HCC): Primary | ICD-10-CM

## 2018-07-05 DIAGNOSIS — Z79.4 TYPE 2 DIABETES MELLITUS WITH HYPERGLYCEMIA, WITH LONG-TERM CURRENT USE OF INSULIN (HCC): ICD-10-CM

## 2018-07-05 DIAGNOSIS — E11.65 TYPE 2 DIABETES MELLITUS WITH HYPERGLYCEMIA, WITH LONG-TERM CURRENT USE OF INSULIN (HCC): ICD-10-CM

## 2018-07-05 RX ORDER — LISINOPRIL 10 MG/1
10 TABLET ORAL DAILY
Qty: 30 TABLET | Refills: 0 | Status: SHIPPED | OUTPATIENT
Start: 2018-07-05 | End: 2018-07-26 | Stop reason: SDUPTHER

## 2018-07-11 DIAGNOSIS — F31.78 BIPOLAR DISORDER, IN FULL REMISSION, MOST RECENT EPISODE MIXED (HCC): ICD-10-CM

## 2018-07-12 LAB
OSMOLALITY SERPL: NORMAL MOSMOL/KG
VASOPRESSIN SERPL-MCNC: <0.8 PG/ML (ref 0–4.7)

## 2018-07-12 RX ORDER — QUETIAPINE FUMARATE 400 MG/1
TABLET, FILM COATED ORAL
Qty: 60 TABLET | Refills: 0 | Status: SHIPPED | OUTPATIENT
Start: 2018-07-12 | End: 2018-09-14 | Stop reason: SDUPTHER

## 2018-07-18 DIAGNOSIS — Z79.4 TYPE 2 DIABETES MELLITUS WITH HYPERGLYCEMIA, WITH LONG-TERM CURRENT USE OF INSULIN (HCC): ICD-10-CM

## 2018-07-18 DIAGNOSIS — E11.65 TYPE 2 DIABETES MELLITUS WITH HYPERGLYCEMIA, WITH LONG-TERM CURRENT USE OF INSULIN (HCC): ICD-10-CM

## 2018-07-18 RX ORDER — MELOXICAM 7.5 MG/1
7.5 TABLET ORAL 2 TIMES DAILY
Qty: 60 TABLET | Refills: 3 | Status: SHIPPED | OUTPATIENT
Start: 2018-07-18 | End: 2018-10-22 | Stop reason: SDUPTHER

## 2018-07-18 RX ORDER — POLYETHYLENE GLYCOL 3350 17 G/17G
17 POWDER, FOR SOLUTION ORAL DAILY
Qty: 850 G | Refills: 3 | Status: SHIPPED | OUTPATIENT
Start: 2018-07-18 | End: 2019-05-14 | Stop reason: SDUPTHER

## 2018-07-21 ENCOUNTER — HOSPITAL ENCOUNTER (EMERGENCY)
Facility: HOSPITAL | Age: 63
Discharge: HOME | End: 2018-07-21
Attending: EMERGENCY MEDICINE | Admitting: EMERGENCY MEDICINE
Payer: COMMERCIAL

## 2018-07-21 ENCOUNTER — APPOINTMENT (EMERGENCY)
Dept: CT IMAGING | Facility: HOSPITAL | Age: 63
End: 2018-07-21
Payer: COMMERCIAL

## 2018-07-21 VITALS
RESPIRATION RATE: 16 BRPM | WEIGHT: 249.56 LBS | DIASTOLIC BLOOD PRESSURE: 62 MMHG | HEART RATE: 93 BPM | OXYGEN SATURATION: 95 % | TEMPERATURE: 98.5 F | BODY MASS INDEX: 40.28 KG/M2 | SYSTOLIC BLOOD PRESSURE: 119 MMHG

## 2018-07-21 DIAGNOSIS — E83.42 HYPOMAGNESEMIA: ICD-10-CM

## 2018-07-21 DIAGNOSIS — B37.9 YEAST INFECTION: ICD-10-CM

## 2018-07-21 DIAGNOSIS — E86.0 DEHYDRATION: ICD-10-CM

## 2018-07-21 DIAGNOSIS — R06.00 DYSPNEA: Primary | ICD-10-CM

## 2018-07-21 DIAGNOSIS — E87.1 HYPONATREMIA: ICD-10-CM

## 2018-07-21 LAB
ALBUMIN SERPL BCP-MCNC: 3.2 G/DL (ref 3.5–5)
ALP SERPL-CCNC: 43 U/L (ref 46–116)
ALT SERPL W P-5'-P-CCNC: 70 U/L (ref 12–78)
ANION GAP SERPL CALCULATED.3IONS-SCNC: 10 MMOL/L (ref 4–13)
ANION GAP SERPL CALCULATED.3IONS-SCNC: 12 MMOL/L (ref 4–13)
AST SERPL W P-5'-P-CCNC: 38 U/L (ref 5–45)
ATRIAL RATE: 98 BPM
BACTERIA UR QL AUTO: ABNORMAL /HPF
BASOPHILS # BLD AUTO: 0.04 THOUSANDS/ΜL (ref 0–0.1)
BASOPHILS NFR BLD AUTO: 1 % (ref 0–1)
BILIRUB SERPL-MCNC: 0.2 MG/DL (ref 0.2–1)
BILIRUB UR QL STRIP: NEGATIVE
BUN SERPL-MCNC: 5 MG/DL (ref 5–25)
BUN SERPL-MCNC: 5 MG/DL (ref 5–25)
CALCIUM SERPL-MCNC: 8.1 MG/DL (ref 8.3–10.1)
CALCIUM SERPL-MCNC: 8.8 MG/DL (ref 8.3–10.1)
CHLORIDE SERPL-SCNC: 90 MMOL/L (ref 100–108)
CHLORIDE SERPL-SCNC: 93 MMOL/L (ref 100–108)
CK SERPL-CCNC: 80 U/L (ref 26–192)
CLARITY UR: CLEAR
CO2 SERPL-SCNC: 22 MMOL/L (ref 21–32)
CO2 SERPL-SCNC: 23 MMOL/L (ref 21–32)
COLOR UR: YELLOW
CREAT SERPL-MCNC: 0.6 MG/DL (ref 0.6–1.3)
CREAT SERPL-MCNC: 0.67 MG/DL (ref 0.6–1.3)
EOSINOPHIL # BLD AUTO: 0.17 THOUSAND/ΜL (ref 0–0.61)
EOSINOPHIL NFR BLD AUTO: 3 % (ref 0–6)
ERYTHROCYTE [DISTWIDTH] IN BLOOD BY AUTOMATED COUNT: 12.9 % (ref 11.6–15.1)
GFR SERPL CREATININE-BSD FRML MDRD: 96 ML/MIN/1.73SQ M
GFR SERPL CREATININE-BSD FRML MDRD: 99 ML/MIN/1.73SQ M
GLUCOSE SERPL-MCNC: 127 MG/DL (ref 65–140)
GLUCOSE SERPL-MCNC: 156 MG/DL (ref 65–140)
GLUCOSE UR STRIP-MCNC: NEGATIVE MG/DL
HCT VFR BLD AUTO: 32.7 % (ref 34.8–46.1)
HGB BLD-MCNC: 11.8 G/DL (ref 11.5–15.4)
HGB UR QL STRIP.AUTO: NEGATIVE
KETONES UR STRIP-MCNC: ABNORMAL MG/DL
LEUKOCYTE ESTERASE UR QL STRIP: ABNORMAL
LYMPHOCYTES # BLD AUTO: 1.39 THOUSANDS/ΜL (ref 0.6–4.47)
LYMPHOCYTES NFR BLD AUTO: 25 % (ref 14–44)
MAGNESIUM SERPL-MCNC: 1.3 MG/DL (ref 1.6–2.6)
MCH RBC QN AUTO: 33.8 PG (ref 26.8–34.3)
MCHC RBC AUTO-ENTMCNC: 36.1 G/DL (ref 31.4–37.4)
MCV RBC AUTO: 94 FL (ref 82–98)
MONOCYTES # BLD AUTO: 0.51 THOUSAND/ΜL (ref 0.17–1.22)
MONOCYTES NFR BLD AUTO: 9 % (ref 4–12)
NEUTROPHILS # BLD AUTO: 3.36 THOUSANDS/ΜL (ref 1.85–7.62)
NEUTS SEG NFR BLD AUTO: 62 % (ref 43–75)
NITRITE UR QL STRIP: NEGATIVE
NON-SQ EPI CELLS URNS QL MICRO: ABNORMAL /HPF
OTHER STN SPEC: ABNORMAL
P AXIS: 33 DEGREES
PH UR STRIP.AUTO: 6.5 [PH] (ref 4.5–8)
PHENYTOIN SERPL-MCNC: 7.2 UG/ML (ref 10–20)
PLATELET # BLD AUTO: 364 THOUSANDS/UL (ref 149–390)
PMV BLD AUTO: 8.1 FL (ref 8.9–12.7)
POTASSIUM SERPL-SCNC: 3.6 MMOL/L (ref 3.5–5.3)
POTASSIUM SERPL-SCNC: 3.8 MMOL/L (ref 3.5–5.3)
PR INTERVAL: 156 MS
PROT SERPL-MCNC: 6.7 G/DL (ref 6.4–8.2)
PROT UR STRIP-MCNC: NEGATIVE MG/DL
QRS AXIS: -4 DEGREES
QRSD INTERVAL: 74 MS
QT INTERVAL: 344 MS
QTC INTERVAL: 439 MS
RBC # BLD AUTO: 3.49 MILLION/UL (ref 3.81–5.12)
RBC #/AREA URNS AUTO: ABNORMAL /HPF
SODIUM SERPL-SCNC: 124 MMOL/L (ref 136–145)
SODIUM SERPL-SCNC: 126 MMOL/L (ref 136–145)
SP GR UR STRIP.AUTO: <=1.005 (ref 1–1.03)
T WAVE AXIS: 42 DEGREES
TROPONIN I SERPL-MCNC: <0.02 NG/ML
UROBILINOGEN UR QL STRIP.AUTO: 0.2 E.U./DL
VENTRICULAR RATE: 98 BPM
WBC # BLD AUTO: 5.47 THOUSAND/UL (ref 4.31–10.16)
WBC #/AREA URNS AUTO: ABNORMAL /HPF

## 2018-07-21 PROCEDURE — 81001 URINALYSIS AUTO W/SCOPE: CPT | Performed by: EMERGENCY MEDICINE

## 2018-07-21 PROCEDURE — 84484 ASSAY OF TROPONIN QUANT: CPT | Performed by: EMERGENCY MEDICINE

## 2018-07-21 PROCEDURE — 71275 CT ANGIOGRAPHY CHEST: CPT

## 2018-07-21 PROCEDURE — 83735 ASSAY OF MAGNESIUM: CPT | Performed by: EMERGENCY MEDICINE

## 2018-07-21 PROCEDURE — 80053 COMPREHEN METABOLIC PANEL: CPT | Performed by: EMERGENCY MEDICINE

## 2018-07-21 PROCEDURE — 80048 BASIC METABOLIC PNL TOTAL CA: CPT | Performed by: EMERGENCY MEDICINE

## 2018-07-21 PROCEDURE — 36415 COLL VENOUS BLD VENIPUNCTURE: CPT | Performed by: EMERGENCY MEDICINE

## 2018-07-21 PROCEDURE — 93010 ELECTROCARDIOGRAM REPORT: CPT | Performed by: INTERNAL MEDICINE

## 2018-07-21 PROCEDURE — 80185 ASSAY OF PHENYTOIN TOTAL: CPT | Performed by: EMERGENCY MEDICINE

## 2018-07-21 PROCEDURE — 85025 COMPLETE CBC W/AUTO DIFF WBC: CPT | Performed by: EMERGENCY MEDICINE

## 2018-07-21 PROCEDURE — 82550 ASSAY OF CK (CPK): CPT | Performed by: EMERGENCY MEDICINE

## 2018-07-21 PROCEDURE — 96365 THER/PROPH/DIAG IV INF INIT: CPT

## 2018-07-21 PROCEDURE — 99284 EMERGENCY DEPT VISIT MOD MDM: CPT

## 2018-07-21 PROCEDURE — 93005 ELECTROCARDIOGRAM TRACING: CPT

## 2018-07-21 RX ORDER — FLUCONAZOLE 100 MG/1
200 TABLET ORAL DAILY
Status: DISCONTINUED | OUTPATIENT
Start: 2018-07-21 | End: 2018-07-21 | Stop reason: HOSPADM

## 2018-07-21 RX ORDER — MAGNESIUM SULFATE HEPTAHYDRATE 40 MG/ML
2 INJECTION, SOLUTION INTRAVENOUS ONCE
Status: COMPLETED | OUTPATIENT
Start: 2018-07-21 | End: 2018-07-21

## 2018-07-21 RX ADMIN — FLUCONAZOLE 200 MG: 100 TABLET ORAL at 10:52

## 2018-07-21 RX ADMIN — SODIUM CHLORIDE 500 ML: 0.9 INJECTION, SOLUTION INTRAVENOUS at 10:27

## 2018-07-21 RX ADMIN — SODIUM CHLORIDE 500 ML: 0.9 INJECTION, SOLUTION INTRAVENOUS at 10:03

## 2018-07-21 RX ADMIN — IOHEXOL 85 ML: 350 INJECTION, SOLUTION INTRAVENOUS at 10:42

## 2018-07-21 RX ADMIN — MAGNESIUM SULFATE HEPTAHYDRATE 2 G: 40 INJECTION, SOLUTION INTRAVENOUS at 10:27

## 2018-07-21 NOTE — ED PROVIDER NOTES
History  Chief Complaint   Patient presents with    Urinary Frequency     Urinary frequency with burning every 45 minutes  States she was short of breath while urinating  Patient is a 12year-old female with history of asthma, COPD, chronic urine infections, diabetes, and seizures coming in today with increasing shortness of breath and dysuria  Patient states that she noticed yesterday during an interview "with the state because they are trying to get me into a nursing home because I cant take care myself at home  While I was there yesterday and talking, I started choking  I started getting more short of breath over the past day  Every time I go to the bathroom which is every 45 minutes or an hour I get short of breath " Patient denies any chest pain, chest pressure, fevers, syncope, abdominal pain  She has been tolerating p o  well  She has not fallen  Patient states that she noticed her urinary symptoms the past 24 hours  There is no hematuria but does noticed burning, discomfort, and more frequency  She states that she feels short of breath with walking  She has not traveled outside the [place] , recent surgeries, hemoptysis  History provided by:  Patient   used: No    Urinary Frequency   Severity:  Moderate  Onset quality:  Gradual  Timing:  Constant  Progression:  Worsening  Chronicity:  New  Associated symptoms: cough, fatigue, shortness of breath and wheezing    Associated symptoms: no abdominal pain, no chest pain, no congestion, no ear pain, no fever, no myalgias, no nausea, no rash, no sore throat and no vomiting        Prior to Admission Medications   Prescriptions Last Dose Informant Patient Reported? Taking?    Blood Glucose Monitoring Suppl (BLOOD GLUCOSE MONITOR SYSTEM) w/Device KIT   No No   Sig: Test blood sugars 3 times a day   MAG64 535 (64 Mg) MG   Yes No   NOVOFINE 32G X 6 MM MISC   Yes No   ONETOUCH DELICA LANCETS 23O MISC   No No   Sig: Test blood sugars 3 times a day  PROCTOZONE-HC 2 5 % rectal cream   Yes No   QUEtiapine (SEROquel) 400 MG tablet   No No   Sig: TAKE ONE TABLET BY MOUTH TWICE A DAY   albuterol (PROVENTIL HFA,VENTOLIN HFA) 90 mcg/act inhaler   No No   Sig: Inhale 2 puffs every 6 (six) hours as needed for wheezing   aspirin (ECOTRIN LOW STRENGTH) 81 mg EC tablet   No No   Sig: Take 1 tablet (81 mg total) by mouth daily   estradiol (ESTRACE) 1 mg tablet   Yes No   Sig: Take 1 mg by mouth daily  fluticasone (FLONASE) 50 mcg/act nasal spray   No No   Si spray into each nostril daily   gabapentin (NEURONTIN) 800 mg tablet   Yes No   Sig: Take 800 mg by mouth 4 (four) times a day  glucose blood test strip   No No   Sig: Test blood sugars 3 times a day    hydrOXYzine pamoate (VISTARIL) 50 mg capsule   Yes No   Sig: Take 50 mg by mouth 3 (three) times a day     hydrocortisone 2 5 % cream   No No   Sig: Apply topically 2 (two) times a day   insulin glargine (LANTUS) 100 units/mL subcutaneous injection   No No   Sig: Inject 10 Units under the skin daily at bedtime   levETIRAcetam (KEPPRA) 750 mg tablet   Yes No   Sig: Take 1,500 mg by mouth 2 (two) times a day     levothyroxine 175 mcg tablet   No No   Sig: Take 1 tablet (175 mcg total) by mouth daily   lisinopril (ZESTRIL) 10 mg tablet   No No   Sig: Take 1 tablet (10 mg total) by mouth daily   loratadine (CLARITIN) 10 mg tablet   No No   Sig: Take 1 tablet (10 mg total) by mouth daily   meloxicam (MOBIC) 7 5 mg tablet   No No   Sig: Take 1 tablet (7 5 mg total) by mouth 2 (two) times a day   metFORMIN (GLUCOPHAGE) 1000 MG tablet   No No   Sig: Take 1 tablet (1,000 mg total) by mouth 2 (two) times a day with meals   montelukast (SINGULAIR) 10 mg tablet   No No   Sig: Take 1 tablet (10 mg total) by mouth daily at bedtime   naproxen (EC NAPROSYN) 500 MG EC tablet   No No   Sig: Take 1 tablet (500 mg total) by mouth 2 (two) times a day with meals for 10 days   norethindrone (AYGESTIN) 5 mg tablet   Yes No Sig: Take 5 mg by mouth daily  omeprazole (PriLOSEC) 40 MG capsule   Yes No   Sig: Take 40 mg by mouth 2 (two) times a day   oxybutynin (DITROPAN) 5 mg tablet   Yes No   Sig: Take 5 mg by mouth 2 (two) times a day   oxybutynin (DITROPAN) 5 mg tablet   Yes No   Sig: Take by mouth   phenytoin (DILANTIN) 100 mg ER capsule   Yes No   Sig: Take by mouth 5 (five) times a day  polyethylene glycol (GLYCOLAX) powder   No No   Sig: Take 17 g by mouth daily   polyethylene glycol (MIRALAX) 17 g packet   No No   Sig: Take 17 g by mouth daily   simvastatin (ZOCOR) 80 mg tablet   No No   Sig: Take 1 tablet (80 mg total) by mouth daily at bedtime   triamcinolone (KENALOG) 0 1 % cream   No No   Sig: Apply topically 2 (two) times a day   valACYclovir (VALTREX) 500 mg tablet   Yes No   Sig: Take 1 tablet by mouth 2 (two) times a day   zonisamide (ZONEGRAN) 100 mg capsule   Yes No   Sig: Take 100 mg by mouth 5 (five) times a day        Facility-Administered Medications: None       Past Medical History:   Diagnosis Date    Asthma     Chronic UTI (urinary tract infection)     COPD (chronic obstructive pulmonary disease) (HCC)     Diabetes mellitus (HCC)     Disease of thyroid gland     GERD (gastroesophageal reflux disease)     Psychiatric disorder     bi-polar    Seizure ([address] )        Past Surgical History:   Procedure Laterality Date    ANKLE FRACTURE SURGERY Right     TUBAL LIGATION      VEIN LIGATION AND STRIPPING         History reviewed  No pertinent family history  I have reviewed and agree with the history as documented  Social History   Substance Use Topics    Smoking status: Never Smoker    Smokeless tobacco: Never Used    Alcohol use No        Review of Systems   Constitutional: Positive for fatigue  Negative for diaphoresis and fever  HENT: Negative for congestion, ear pain and sore throat  Eyes: Negative for visual disturbance  Respiratory: Positive for cough, shortness of breath and wheezing  Negative for chest tightness  Cardiovascular: Negative for chest pain and palpitations  Gastrointestinal: Negative for abdominal pain, nausea and vomiting  Genitourinary: Positive for frequency and urgency  Negative for difficulty urinating, dysuria, vaginal bleeding, vaginal discharge and vaginal pain  Musculoskeletal: Negative for back pain, myalgias and neck pain  Skin: Negative for rash  Neurological: Negative for weakness  Psychiatric/Behavioral: Negative for confusion  All other systems reviewed and are negative  Physical Exam  Physical Exam   Constitutional: She is oriented to person, place, and time  She appears well-developed and well-nourished  No distress  HENT:   Head: Normocephalic and atraumatic  Mouth/Throat: Oropharynx is clear and moist    Maintaining airway and secretions  Uvula midline without any edema   Eyes: Conjunctivae and EOM are normal  Pupils are equal, round, and reactive to light  Neck: Normal range of motion  Neck supple  Cardiovascular: Normal rate, regular rhythm, normal heart sounds and intact distal pulses  No murmur heard  Pulmonary/Chest: Effort normal and breath sounds normal  No stridor  No respiratory distress  Patient has no conversational dyspnea   Abdominal: Soft  Bowel sounds are normal  She exhibits no distension  There is no tenderness  Musculoskeletal: Normal range of motion  She exhibits no edema  Neurological: She is alert and oriented to person, place, and time  No cranial nerve deficit  She exhibits normal muscle tone  Coordination normal    Skin: Skin is warm  Capillary refill takes less than 2 seconds  She is not diaphoretic  Psychiatric: She has a normal mood and affect  Her behavior is normal  Judgment and thought content normal    Nursing note and vitals reviewed        Vital Signs  ED Triage Vitals [07/21/18 0944]   Temperature Pulse Respirations Blood Pressure SpO2   98 5 °F (36 9 °C) 98 18 112/85 96 %      Temp Source Heart Rate Source Patient Position - Orthostatic VS BP Location FiO2 (%)   Temporal Monitor Lying Left arm --      Pain Score       Worst Possible Pain           Vitals:    07/21/18 1000 07/21/18 1050 07/21/18 1100 07/21/18 1130   BP: 122/60 112/71 112/60 119/62   Pulse: 94 96 85 93   Patient Position - Orthostatic VS: Lying Lying         Visual Acuity      ED Medications  Medications   fluconazole (DIFLUCAN) tablet 200 mg (200 mg Oral Given 7/21/18 1052)   sodium chloride 0 9 % bolus 500 mL (0 mL Intravenous Stopped 7/21/18 1133)   magnesium sulfate 2 g/50 mL IVPB (premix) 2 g (0 g Intravenous Stopped 7/21/18 1133)   sodium chloride 0 9 % bolus 500 mL (0 mL Intravenous Stopped 7/21/18 1133)   iohexol (OMNIPAQUE) 350 MG/ML injection (MULTI-DOSE) 85 mL (85 mL Intravenous Given 7/21/18 1042)       Diagnostic Studies  Results Reviewed     Procedure Component Value Units Date/Time    Basic metabolic panel [[id number]]  (Abnormal) Collected:  07/21/18 1148    Lab Status:  Final result Specimen:  Blood from Arm, Left Updated:  07/21/18 1203     Sodium 126 (L) mmol/L      Potassium 3 6 mmol/L      Chloride 93 (L) mmol/L      CO2 23 mmol/L      Anion Gap 10 mmol/L      BUN 5 mg/dL      Creatinine 0 60 mg/dL      Glucose 127 mg/dL      Calcium 8 1 (L) mg/dL      eGFR 99 ml/min/1 73sq m     Narrative:         National Kidney Disease Education Program recommendations are as follows:  GFR calculation is accurate only with a steady state creatinine  Chronic Kidney disease less than 60 ml/min/1 73 sq  meters  Kidney failure less than 15 ml/min/1 73 sq  meters  [place] draw [[id number]] Collected:  07/21/18 0955    Lab Status:  Final result Specimen:  Blood Updated:  07/21/18 1101    Narrative: The following orders were created for panel order [place] draw    Procedure                               Abnormality         Status                     ---------                               -----------         ------ Light Blue Top on [id number]                            Final result                 Please view results for these tests on the individual orders      Urine Microscopic [[id number]]  (Abnormal) Collected:  07/21/18 1017    Lab Status:  Final result Specimen:  Urine from Urine, Other Updated:  07/21/18 1045     RBC, UA None Seen /hpf      WBC, UA 2-4 (A) /hpf      Epithelial Cells Moderate (A) /hpf      Bacteria, UA Occasional /hpf      OTHER OBSERVATIONS Yeast Cells Present    Phenytoin level, total [[id number]]  (Abnormal) Resulted:  07/21/18 1040    Lab Status:  Final result Specimen:  Blood Updated:  07/21/18 1040     Phenytoin Lvl 7 2 (L) ug/mL     UA w Reflex to Microscopic w Reflex to Culture [[id number]]  (Abnormal) Collected:  07/21/18 1017    Lab Status:  Final result Specimen:  Urine from Urine, Other Updated:  07/21/18 1033     Color, UA Yellow     Clarity, UA Clear     Specific Gravity, UA <=1 005     pH, UA 6 5     Leukocytes, UA Trace (A)     Nitrite, UA Negative     Protein, UA Negative mg/dl      Glucose, UA Negative mg/dl      Ketones, UA Trace (A) mg/dl      Urobilinogen, UA 0 2 E U /dl      Bilirubin, UA Negative     Blood, UA Negative    Troponin I [[id number]]  (Normal) Collected:  07/21/18 0955    Lab Status:  Final result Specimen:  Blood from Arm, Right Updated:  07/21/18 1019     Troponin I <0 02 ng/mL     Comprehensive metabolic panel [[id number]]  (Abnormal) Collected:  07/21/18 0955    Lab Status:  Final result Specimen:  Blood from Arm, Right Updated:  07/21/18 1017     Sodium 124 (L) mmol/L      Potassium 3 8 mmol/L      Chloride 90 (L) mmol/L      CO2 22 mmol/L      Anion Gap 12 mmol/L      BUN 5 mg/dL      Creatinine 0 67 mg/dL      Glucose 156 (H) mg/dL      Calcium 8 8 mg/dL      AST 38 U/L      ALT 70 U/L      Alkaline Phosphatase 43 (L) U/L      Total Protein 6 7 g/dL      Albumin 3 2 (L) g/dL      Total Bilirubin 0 20 mg/dL      eGFR 96 ml/min/1 73sq m     Narrative:         [employer] [employer] Kidney Disease Education Program recommendations are as follows:  GFR calculation is accurate only with a steady state creatinine  Chronic Kidney disease less than 60 ml/min/1 73 sq  meters  Kidney failure less than 15 ml/min/1 73 sq  meters  Magnesium [[id number]]  (Abnormal) Collected:  07/21/18 0955    Lab Status:  Final result Specimen:  Blood from Arm, Right Updated:  07/21/18 1017     Magnesium 1 3 (L) mg/dL     CK (with reflex to MB) [[id number]]  (Normal) Collected:  07/21/18 0955    Lab Status:  Final result Specimen:  Blood from Arm, Right Updated:  07/21/18 1017     Total CK 80 U/L     CBC and differential [[id number]]  (Abnormal) Collected:  07/21/18 0955    Lab Status:  Final result Specimen:  Blood from Arm, Right Updated:  07/21/18 1002     WBC 5 47 Thousand/uL      RBC 3 49 (L) Million/uL      Hemoglobin 11 8 g/dL      Hematocrit 32 7 (L) %      MCV 94 fL      MCH 33 8 pg      MCHC 36 1 g/dL      RDW 12 9 %      MPV 8 1 (L) fL      Platelets 676 Thousands/uL      Neutrophils Relative 62 %      Lymphocytes Relative 25 %      Monocytes Relative 9 %      Eosinophils Relative 3 %      Basophils Relative 1 %      Neutrophils Absolute 3 36 Thousands/µL      Lymphocytes Absolute 1 39 Thousands/µL      Monocytes Absolute 0 51 Thousand/µL      Eosinophils Absolute 0 17 Thousand/µL      Basophils Absolute 0 04 Thousands/µL                  CTA ED chest PE study   Final Result by Kristian Posadas MD (07/21 1131)   1  Essentially unremarkable chest CTA  Negative for acute or chronic pulmonary thromboembolism within the limitations described above        Workstation performed: [id number]                    Procedures  Procedures       Phone Contacts  ED Phone Contact    ED Course         HEART Risk Score      Most Recent Value   History  0 Filed at: 07/21/2018 1020   ECG  1 Filed at: 07/21/2018 1020   Age  1 Filed at: 07/21/2018 1020   Risk Factors  1 Filed at: 07/21/2018 1020   Troponin  0 Filed at: 07/21/2018 1020   Heart Score Risk Calculator   History  0 Filed at: 07/21/2018 1020   ECG  1 Filed at: 07/21/2018 1020   Age  1 Filed at: 07/21/2018 1020   Risk Factors  1 Filed at: 07/21/2018 1020   Troponin  0 Filed at: 07/21/2018 1020   HEART Score  3 Filed at: 07/21/2018 1020   HEART Score  3 Filed at: 07/21/2018 1020            PERC Rule for PE      Most Recent Value   PERC Rule for PE   Age >=50  1 Filed at: 07/21/2018 0956   HR >=100  1 Filed at: 07/21/2018 0956   O2 Sat on room air < 95%  0 Filed at: 07/21/2018 0956   History of PE or DVT  0 Filed at: 07/21/2018 3381   Recent trauma or surgery  0 Filed at: 07/21/2018 0956   Hemoptysis  0 Filed at: 07/21/2018 7349   Exogenous estrogen  0 Filed at: 07/21/2018 0956   Unilateral leg swelling  0 Filed at: 07/21/2018 0956   PERC Rule for PE Results  2 Filed at: 07/21/2018 2623                Wells' Criteria for PE      Most Recent Value   Wells' Criteria for PE   Clinical signs and symptoms of DVT  0 Filed at: 07/21/2018 3698   PE is primary diagnosis or equally likely  0 Filed at: 07/21/2018 0956   HR >100  1 5 Filed at: 07/21/2018 0956   Immobilization at least 3 days or Surgery in the previous 4 weeks  0 Filed at: 07/21/2018 0956   Previous, objectively diagnosed PE or DVT  0 Filed at: 07/21/2018 0956   Hemoptysis  0 Filed at: 07/21/2018 6768   Malignancy with treatment within 6 months or palliative  0 Filed at: 07/21/2018 5812   Wells' Criteria Total  1 5 Filed at: 07/21/2018 2097            MDM  Number of Diagnoses or Management Options  Dehydration:   Dyspnea:   Hypomagnesemia:   Hyponatremia:   Yeast infection:   Diagnosis management comments: Patient is a 12year-old female with multiple comorbidities coming in with complaints of dyspnea  At rest patient is nontoxic appearing in no respiratory distress  Given patient's age and heart rate will CT rule out PE as well as check basic labs and troponin  EKG INTERPRETATION at 9:47 a m    RHYTHM:  Sinus tachycardia 100 beats per minute  AXIS:  Normal axis  INTERVALS:  ND measured at 156 milliseconds  QRS COMPLEX:  Measured at 74 milliseconds  ST SEGMENT:  Nonspecific ST segment changes  Artifact present  QT INTERVAL:  QTC measured 439 milliseconds  COMPARED WITH PRIOR   Marcine Grow Interpretation by Ana Crowley DO    10:24 AM  Will replace Mag and sodium for several weeks 120's  Patient was seen by PCP for this and to have outpatient labs  No seizure activity  Will give 1L IVF and after mag/IVF recheck  Urine shows trace Leuk's however patient was just treated for UTI with macrobid  However, sensitivity shows resistance to Macrobid  10:48 AM  Patient updated on labs and urine  Also noted large E cell in urine  This could be mimicking her urine infection symptoms  Patient states she was called the not to take Macrobid anymore changed over to doxycycline which she finished  Patient resting in bed in no apparent distress with no respiratory distress  11:34 AM  CT without acute PE for other pathology  Will repeat BMP  Also trial ambulation with O2 sats  Patient states she feels better overall  11:51 AM  Patient ambulating throughout the ER without O2, no respiratory distress  Sats 94% and greater  No BURGOS  12:04 PM  Sodium improved and above 125  No seizure activity here  Will discharge home with follow-up with PCP  Patient remains alert oriented x3, cranial nerves 2-12 grossly intact with no respiratory distress  Lungs are clear  Labs, EKG, CT negative for acute pathology    Will discharge home       Amount and/or Complexity of Data Reviewed  Clinical lab tests: ordered and reviewed  Tests in the radiology section of CPT®: ordered and reviewed  Tests in the medicine section of CPT®: ordered and reviewed  Independent visualization of images, tracings, or specimens: yes      CritCare Time    Disposition  Final diagnoses:   Dyspnea   Hypomagnesemia   Hyponatremia   Yeast infection   Dehydration     Time reflects when diagnosis was documented in both MDM as applicable and the Disposition within this note     Time User Action Codes Description Comment    7/21/2018 10:18 AM Bendock, Therese Freshwater L Add [R30 0] Dysuria     7/21/2018 10:18 AM Bendock, Therese Freshwater L Remove [R30 0] Dysuria     7/21/2018 10:19 AM Bendock, Therese Freshwater L Add [R06 00] Dyspnea     7/21/2018 10:19 AM Bendock, Gabrielle Bark Add [E83 42] Hypomagnesemia     7/21/2018 10:49 AM Bendock, Therese Freshwater L Add [E87 1] Hyponatremia     7/21/2018 10:49 AM Bendock, Gabrielle Bark Add [B37 9] Yeast infection     7/21/2018 12:04 PM Bendock, Gabrielle Bark Add [E86 0] Dehydration       ED Disposition     ED Disposition Condition Comment    Discharge  Reyes Calloway discharge to home/self care  Condition at discharge: Stable        Follow-up Information     Follow up With Specialties Details Why [address], [address], Nurse Practitioner Schedule an appointment as soon as possible for a visit in 3 days  [facility]  [address]  [phone]            Patient's Medications   Discharge Prescriptions    No medications on file     No discharge procedures on file      ED Provider  Electronically Signed by           Alma Davis DO  07/21/18 7790

## 2018-07-21 NOTE — ED NOTES
Pulse ox 94% while ambulating in hallway on r/a   Dr Bonnie Bermeo made aware     Ana Cristina Al RN  07/21/18 2765

## 2018-07-21 NOTE — DISCHARGE INSTRUCTIONS
Dyspnea   WHAT YOU NEED TO KNOW:   Dyspnea is breathing difficulty or discomfort  You may have labored, painful, or shallow breathing  You may feel breathless or short of breath  Dyspnea can occur during rest or with activity  You may have dyspnea for a short time, or it might become chronic  Dyspnea is often a symptom of a disease or condition  DISCHARGE INSTRUCTIONS:   Seek care immediately if:   · Your signs and symptoms are the same or worse within 24 hours of treatment  · You have shaking chills or a fever over 102°F      · You have new pain, pressure, or tightness in your chest      · You have a new or worse cough or wheezing, or you cough up blood  · You feel like you cannot get enough air  · The skin over your ribs or on your neck sinks in when you breathe  · You have a severe headache with vomiting and abdominal pain  · You feel confused or dizzy  Contact your healthcare provider or specialist if:   · You have questions or concerns about your condition or care  Medicines:   · Medicines  may be used to treat the cause of your dyspnea  Medicines may reduce swelling in your airway or decrease extra fluid from around your heart or lungs  Other medicines may be used to decrease anxiety and help you feel calm and relaxed  · Take your medicine as directed  Contact your healthcare provider if you think your medicine is not helping or if you have side effects  Tell him or her if you are allergic to any medicine  Keep a list of the medicines, vitamins, and herbs you take  Include the amounts, and when and why you take them  Bring the list or the pill bottles to follow-up visits  Carry your medicine list with you in case of an emergency  Manage long-term dyspnea:   · Create an action plan  You and your healthcare provider can work together to create a plan for how to handle episodes of dyspnea   The plan can include daily activities, treatment changes, and what to do if you have severe breathing problems  · Lean forward on your elbows when you sit  This helps your lungs expand and may make it easier to breathe  · Use pursed-lip breathing any time you feel short of breath  Breathe in through your nose and then slowly breathe out through your mouth with your lips slightly puckered  It should take you twice as long to breathe out as it did to breathe in  · Do not smoke  Nicotine and other chemicals in cigarettes and cigars can cause lung damage and make it harder to breathe  Ask your healthcare provider for information if you currently smoke and need help to quit  E-cigarettes or smokeless tobacco still contain nicotine  Talk to your healthcare provider before you use these products  · Reach or maintain a healthy weight  Your healthcare provider can help you create a safe weight loss plan if you are overweight  · Exercise as directed  Exercise can help your lungs work more easily  Exercise can also help you lose weight if needed  Try to get at least 30 minutes of exercise most days of the week  Your healthcare provider can help you create an exercise plan that is safe for you  Follow up with your healthcare provider or specialist as directed:  Write down your questions so you remember to ask them during your visits  © 2017 [employer] [employer] Information is for End User's use only and may not be sold, redistributed or otherwise used for commercial purposes  All illustrations and images included in CareNotes® are the copyrighted property of [employer]  or Edgardo Chappell  The above information is an  only  It is not intended as medical advice for individual conditions or treatments  Talk to your doctor, nurse or pharmacist before following any medical regimen to see if it is safe and effective for you      Hypomagnesemia   WHAT YOU NEED TO KNOW:   Hypomagnesemia is a condition that develops when the amount of magnesium in your body is too low  Magnesium is a mineral that helps your heart, muscles, and nerves work normally  It also helps strengthen your bones  DISCHARGE INSTRUCTIONS:   Seek care immediately if:   · You have numbness and tingling in your arms or legs  · You have painful muscle spasms and tremors in your arms or legs  · You are not able to move your muscles, and you have trouble thinking clearly  · Your heartbeat is faster than usual, or is irregular  · You have a seizure  Contact your healthcare provider if:   · You have fatigue and muscle tremors or twitching  · You become irritable and have trouble sleeping  · You have questions or concerns about your condition or care  Prevent hypomagnesemia:   · Manage health conditions  by following your treatment plan  Health conditions such as congestive heart failure, diabetes, and chronic diarrhea can put you at risk for hypomagnesemia  · Eat foods that contain magnesium every day  Ask your dietitian or healthcare provider how much magnesium you need each day  · Limit or do not drink alcohol  Alcohol can prevent your body from absorbing magnesium  Alcohol also makes your body release large amounts of magnesium through your urine  · You may need to take a magnesium supplement  Ask your healthcare provider which supplement to take and how often to take it  Foods that contain magnesium:   · Almonds, cashews, peanuts, and peanut butter    · Dark green leafy vegetables, such as spinach    · Raisins, bananas, apples, broccoli, and carrots    · Soy milk and soy beans    · Black beans and kidney beans    · Whole-wheat bread and brown rice    · Shredded-wheat cereal, oatmeal, and other breakfast cereals fortified with magnesium    · Plain low-fat yogurt and milk    · Cooked halibut  Follow up with your healthcare provider as directed: You may need more tests to monitor your condition  Write down your questions so you remember to ask them during your visits  © 2017 [address] [facility] [employer] [facility] [employer] Information is for End User's use only and may not be sold, redistributed or otherwise used for commercial purposes  All illustrations and images included in CareNotes® are the copyrighted property of A [employer] M [employer] Inc  or Edg[employer] Chappell  The above information is an  only  It is not intended as medical advice for individual conditions or treatments  Talk to your doctor, nurse or pharmacist before following any medical regimen to see if it is safe and effective for you  Hyponatremia   WHAT YOU NEED TO KNOW:   Hyponatremia occurs when the amount of sodium (salt) in your blood is lower than normal  Sodium is an electrolyte (mineral) that helps your muscles, heart, and digestive system work properly  It helps control blood pressure and fluid balance  DISCHARGE INSTRUCTIONS:   Follow up with your healthcare provider as directed: You may need to return for more tests  Write down your questions so you remember to ask them during your visits  Nutrition:  You may need to increase your intake of sodium  Foods that are high in sodium include milk, packaged snacks such as pretzels, or processed meats (valera, sausage, and ham)  Ask your dietitian to help you create a meal plan that is right for you  Liquids: Follow your healthcare provider's advice if you need to limit the amount of liquid you drink  Ask how much liquid to drink each day and which liquids are best for you  You may be asked to drink liquids that have water, sugar, and salt, such as juices, milk, or sports drinks  These liquids help your body hold in fluid and prevent dehydration  Contact your healthcare provider if:   · You have muscle cramps or twitching  · You feel very weak or tired  · You have nausea or are vomiting  · You have questions or concerns about your condition or care  Seek care immediately or call 911 if:   · You have a seizure      · You have an irregular heartbeat  · You have trouble breathing  · You cannot move your arms and legs  · You are confused or cannot think clearly  © 2017 [address] [employer] Information is for End User's use only and may not be sold, redistributed or otherwise used for commercial purposes  All illustrations and images included in CareNotes® are the copyrighted property of A D [employer] M [employer] Inc  or Edgardo Chappell  The above information is an  only  It is not intended as medical advice for individual conditions or treatments  Talk to your doctor, nurse or pharmacist before following any medical regimen to see if it is safe and effective for you

## 2018-07-26 DIAGNOSIS — E11.65 TYPE 2 DIABETES MELLITUS WITH HYPERGLYCEMIA, WITH LONG-TERM CURRENT USE OF INSULIN (HCC): ICD-10-CM

## 2018-07-26 DIAGNOSIS — K21.9 GERD WITHOUT ESOPHAGITIS: Primary | ICD-10-CM

## 2018-07-26 DIAGNOSIS — Z79.4 TYPE 2 DIABETES MELLITUS WITH HYPERGLYCEMIA, WITH LONG-TERM CURRENT USE OF INSULIN (HCC): ICD-10-CM

## 2018-07-26 RX ORDER — OMEPRAZOLE 20 MG/1
20 CAPSULE, DELAYED RELEASE ORAL DAILY
Qty: 90 CAPSULE | Refills: 0 | Status: SHIPPED | OUTPATIENT
Start: 2018-07-26 | End: 2018-08-15 | Stop reason: SDUPTHER

## 2018-07-26 RX ORDER — LISINOPRIL 10 MG/1
10 TABLET ORAL DAILY
Qty: 30 TABLET | Refills: 3 | Status: SHIPPED | OUTPATIENT
Start: 2018-07-26 | End: 2018-08-03 | Stop reason: SDUPTHER

## 2018-07-27 ENCOUNTER — TELEPHONE (OUTPATIENT)
Dept: FAMILY MEDICINE CLINIC | Facility: CLINIC | Age: 63
End: 2018-07-27

## 2018-08-03 DIAGNOSIS — Z79.4 TYPE 2 DIABETES MELLITUS WITH HYPERGLYCEMIA, WITH LONG-TERM CURRENT USE OF INSULIN (HCC): ICD-10-CM

## 2018-08-03 DIAGNOSIS — R56.9 SEIZURE (HCC): Primary | ICD-10-CM

## 2018-08-03 DIAGNOSIS — E11.65 TYPE 2 DIABETES MELLITUS WITH HYPERGLYCEMIA, WITH LONG-TERM CURRENT USE OF INSULIN (HCC): ICD-10-CM

## 2018-08-03 DIAGNOSIS — J30.89 NON-SEASONAL ALLERGIC RHINITIS, UNSPECIFIED TRIGGER: ICD-10-CM

## 2018-08-03 RX ORDER — LEVETIRACETAM 750 MG/1
1500 TABLET ORAL 2 TIMES DAILY
Qty: 60 TABLET | Refills: 3 | OUTPATIENT
Start: 2018-08-03

## 2018-08-03 RX ORDER — LORATADINE 10 MG/1
10 TABLET ORAL DAILY
Qty: 30 TABLET | Refills: 0 | OUTPATIENT
Start: 2018-08-03

## 2018-08-03 RX ORDER — LISINOPRIL 10 MG/1
10 TABLET ORAL DAILY
Qty: 30 TABLET | Refills: 0 | OUTPATIENT
Start: 2018-08-03

## 2018-08-03 RX ORDER — LISINOPRIL 10 MG/1
10 TABLET ORAL DAILY
Qty: 30 TABLET | Refills: 0 | Status: SHIPPED | OUTPATIENT
Start: 2018-08-03 | End: 2018-11-01 | Stop reason: HOSPADM

## 2018-08-03 RX ORDER — LORATADINE 10 MG/1
10 TABLET ORAL DAILY
Qty: 30 TABLET | Refills: 0 | Status: SHIPPED | OUTPATIENT
Start: 2018-08-03 | End: 2018-10-22 | Stop reason: SDUPTHER

## 2018-08-04 ENCOUNTER — HOSPITAL ENCOUNTER (EMERGENCY)
Facility: HOSPITAL | Age: 63
Discharge: HOME | End: 2018-08-04
Attending: EMERGENCY MEDICINE | Admitting: EMERGENCY MEDICINE
Payer: COMMERCIAL

## 2018-08-04 VITALS
HEIGHT: 66 IN | HEART RATE: 96 BPM | TEMPERATURE: 98 F | WEIGHT: 247.14 LBS | OXYGEN SATURATION: 93 % | SYSTOLIC BLOOD PRESSURE: 122 MMHG | DIASTOLIC BLOOD PRESSURE: 65 MMHG | RESPIRATION RATE: 18 BRPM | BODY MASS INDEX: 39.72 KG/M2

## 2018-08-04 DIAGNOSIS — R35.0 URINARY FREQUENCY: ICD-10-CM

## 2018-08-04 DIAGNOSIS — N39.0 UTI (URINARY TRACT INFECTION): Primary | ICD-10-CM

## 2018-08-04 LAB
BACTERIA UR QL AUTO: ABNORMAL /HPF
BILIRUB UR QL STRIP: ABNORMAL
CLARITY UR: ABNORMAL
COLOR UR: ABNORMAL
GLUCOSE UR STRIP-MCNC: ABNORMAL MG/DL
HGB UR QL STRIP.AUTO: NEGATIVE
KETONES UR STRIP-MCNC: ABNORMAL MG/DL
LEUKOCYTE ESTERASE UR QL STRIP: ABNORMAL
NITRITE UR QL STRIP: POSITIVE
NON-SQ EPI CELLS URNS QL MICRO: ABNORMAL /HPF
OTHER STN SPEC: ABNORMAL
PH UR STRIP.AUTO: 6.5 [PH] (ref 4.5–8)
PROT UR STRIP-MCNC: ABNORMAL MG/DL
RBC #/AREA URNS AUTO: ABNORMAL /HPF
SP GR UR STRIP.AUTO: 1.01 (ref 1–1.03)
UROBILINOGEN UR QL STRIP.AUTO: 4 E.U./DL
WBC #/AREA URNS AUTO: ABNORMAL /HPF

## 2018-08-04 PROCEDURE — 99283 EMERGENCY DEPT VISIT LOW MDM: CPT

## 2018-08-04 PROCEDURE — 81001 URINALYSIS AUTO W/SCOPE: CPT | Performed by: PHYSICIAN ASSISTANT

## 2018-08-04 RX ORDER — DOXYCYCLINE 100 MG/1
100 CAPSULE ORAL 2 TIMES DAILY
Qty: 14 CAPSULE | Refills: 0 | Status: SHIPPED | OUTPATIENT
Start: 2018-08-04 | End: 2018-08-11

## 2018-08-04 RX ORDER — DOXYCYCLINE HYCLATE 100 MG/1
200 CAPSULE ORAL ONCE
Status: COMPLETED | OUTPATIENT
Start: 2018-08-04 | End: 2018-08-04

## 2018-08-04 RX ORDER — FLUCONAZOLE 100 MG/1
200 TABLET ORAL ONCE
Status: COMPLETED | OUTPATIENT
Start: 2018-08-04 | End: 2018-08-04

## 2018-08-04 RX ADMIN — DOXYCYCLINE HYCLATE 200 MG: 100 CAPSULE ORAL at 13:42

## 2018-08-04 RX ADMIN — FLUCONAZOLE 200 MG: 100 TABLET ORAL at 13:42

## 2018-08-04 NOTE — ED PROVIDER NOTES
History  Chief Complaint   Patient presents with    Urinary Frequency     Urinary frequency, states she still has yeast infection       History provided by:  Patient  Urinary Frequency   Location:  Urinary  Quality:  Frequency, urgency  Severity:  Moderate  Onset quality:  Gradual  Duration:  2 weeks  Timing:  Constant  Progression:  Worsening  Chronicity:  Recurrent  Context:  Feels like prior UTIs  worse for past 2 days  was treated for vaginal yeast infection 2 weeks ago but urine test was otherwise negative so no abx  Relieved by:  None  Worsened by:  None  Ineffective treatments:  Azo x 2 today  Associated symptoms: fever    Associated symptoms: no abdominal pain, no congestion, no cough, no diarrhea, no fatigue, no nausea, no rhinorrhea, no shortness of breath, no sore throat and no vomiting    Fever:     Duration:  1 hour    Max temp PTA:  Unmeasured    Temp source:  Subjective    Progression:  Unchanged  Risk factors:  Pt with chronic/recurring UTIs  has multiple abx allergies and intolerances  has done well with doxycyline in the past  pt was released by her [facility] urologist and is unable to see [facility] urologist due to insurance issue  unable to see pcp for a few weeks      Prior to Admission Medications   Prescriptions Last Dose Informant Patient Reported? Taking? Blood Glucose Monitoring Suppl (BLOOD GLUCOSE MONITOR SYSTEM) w/Device KIT   No Yes   Sig: Test blood sugars 3 times a day   MAG64 535 (64 Mg) MG   Yes Yes   NOVOFINE 32G X 6 MM MISC   Yes Yes   ONETOUCH DELICA LANCETS 21F MISC   No Yes   Sig: Test blood sugars 3 times a day     PROCTOZONE-HC 2 5 % rectal cream   Yes Yes   QUEtiapine (SEROquel) 400 MG tablet   No Yes   Sig: TAKE ONE TABLET BY MOUTH TWICE A DAY   albuterol (PROVENTIL HFA,VENTOLIN HFA) 90 mcg/act inhaler   No Yes   Sig: Inhale 2 puffs every 6 (six) hours as needed for wheezing   aspirin (ECOTRIN LOW STRENGTH) 81 mg EC tablet   No Yes   Sig: Take 1 tablet (81 mg total) by mouth daily   estradiol (ESTRACE) 1 mg tablet   Yes Yes   Sig: Take 1 mg by mouth daily  fluticasone (FLONASE) 50 mcg/act nasal spray   No Yes   Si spray into each nostril daily   gabapentin (NEURONTIN) 800 mg tablet   Yes Yes   Sig: Take 800 mg by mouth 4 (four) times a day  glucose blood test strip   No Yes   Sig: Test blood sugars 3 times a day    hydrOXYzine pamoate (VISTARIL) 50 mg capsule   Yes Yes   Sig: Take 50 mg by mouth 3 (three) times a day  hydrocortisone 2 5 % cream   No Yes   Sig: Apply topically 2 (two) times a day   insulin glargine (LANTUS) 100 units/mL subcutaneous injection   No Yes   Sig: Inject 10 Units under the skin daily at bedtime   levETIRAcetam (KEPPRA) 750 mg tablet   Yes Yes   Sig: Take 1,500 mg by mouth 2 (two) times a day     levothyroxine 175 mcg tablet   No Yes   Sig: Take 1 tablet (175 mcg total) by mouth daily   lisinopril (ZESTRIL) 10 mg tablet   No Yes   Sig: Take 1 tablet (10 mg total) by mouth daily   loratadine (CLARITIN) 10 mg tablet   No Yes   Sig: Take 1 tablet (10 mg total) by mouth daily   meloxicam (MOBIC) 7 5 mg tablet   No Yes   Sig: Take 1 tablet (7 5 mg total) by mouth 2 (two) times a day   metFORMIN (GLUCOPHAGE) 1000 MG tablet   No Yes   Sig: Take 1 tablet (1,000 mg total) by mouth 2 (two) times a day with meals   montelukast (SINGULAIR) 10 mg tablet   No Yes   Sig: Take 1 tablet (10 mg total) by mouth daily at bedtime   norethindrone (AYGESTIN) 5 mg tablet   Yes Yes   Sig: Take 5 mg by mouth daily  omeprazole (PriLOSEC) 20 mg delayed release capsule   No Yes   Sig: Take 1 capsule (20 mg total) by mouth daily   oxybutynin (DITROPAN) 5 mg tablet   Yes Yes   Sig: Take by mouth   phenytoin (DILANTIN) 100 mg ER capsule   Yes Yes   Sig: Take by mouth 5 (five) times a day       polyethylene glycol (GLYCOLAX) powder   No Yes   Sig: Take 17 g by mouth daily   polyethylene glycol (MIRALAX) 17 g packet   No Yes   Sig: Take 17 g by mouth daily   simvastatin (ZOCOR) 80 mg tablet   No Yes   Sig: Take 1 tablet (80 mg total) by mouth daily at bedtime   triamcinolone (KENALOG) 0 1 % cream   No Yes   Sig: Apply topically 2 (two) times a day   valACYclovir (VALTREX) 500 mg tablet   Yes Yes   Sig: Take 1 tablet by mouth 2 (two) times a day   zonisamide (ZONEGRAN) 100 mg capsule   Yes Yes   Sig: Take 100 mg by mouth 5 (five) times a day        Facility-Administered Medications: None       Past Medical History:   Diagnosis Date    Asthma     Chronic UTI (urinary tract infection)     COPD (chronic obstructive pulmonary disease) (HCC)     Diabetes mellitus (HCC)     Disease of thyroid gland     GERD (gastroesophageal reflux disease)     Psychiatric disorder     bi-polar    Seizure ([facility] [address] )        Past Surgical History:   Procedure Laterality Date    ANKLE FRACTURE SURGERY Right     TUBAL LIGATION      VEIN LIGATION AND STRIPPING         History reviewed  No pertinent family history  I have reviewed and agree with the history as documented  Social History   Substance Use Topics    Smoking status: Never Smoker    Smokeless tobacco: Never Used    Alcohol use No        Review of Systems   Constitutional: Positive for fever  Negative for activity change, appetite change, chills and fatigue  HENT: Negative for congestion, rhinorrhea, sneezing and sore throat  Respiratory: Negative for cough, chest tightness and shortness of breath  Gastrointestinal: Negative for abdominal pain, diarrhea, nausea and vomiting  Genitourinary: Positive for frequency, urgency and vaginal discharge  Negative for decreased urine volume, difficulty urinating, dysuria, flank pain, genital sores, pelvic pain, vaginal bleeding and vaginal pain  Physical Exam  Physical Exam   Constitutional: She is oriented to person, place, and time  She appears well-developed and well-nourished  No distress     obese   HENT:   Mouth/Throat: Oropharynx is clear and moist    Eyes: Pupils are equal, round, and reactive to light  Cardiovascular: Normal rate, regular rhythm, normal heart sounds and intact distal pulses  No murmur heard  Pulmonary/Chest: Effort normal and breath sounds normal  She has no wheezes  She has no rales  Abdominal: Soft  Bowel sounds are normal  She exhibits no distension  There is no tenderness  There is no rebound and no guarding  Negative for CVAT bilat  Musculoskeletal: She exhibits edema (symmetric, lower LEs)  Neurological: She is alert and oriented to person, place, and time  Skin: Skin is warm and dry  Capillary refill takes less than 2 seconds  No rash noted  She is not diaphoretic  No erythema  Nursing note and vitals reviewed        Vital Signs  ED Triage Vitals [08/04/18 1223]   Temperature Pulse Respirations Blood Pressure SpO2   98 °F (36 7 °C) 101 18 131/78 93 %      Temp Source Heart Rate Source Patient Position - Orthostatic VS BP Location FiO2 (%)   Temporal Monitor Lying Left arm --      Pain Score       --           Vitals:    08/04/18 1245 08/04/18 1300 08/04/18 1330 08/04/18 1345   BP: 137/84 125/60 125/70 122/65   Pulse: (!) 109 (!) 107 97 96   Patient Position - Orthostatic VS: Lying Lying Lying Lying       Visual Acuity      ED Medications  Medications   doxycycline hyclate (VIBRAMYCIN) capsule 200 mg (200 mg Oral Given 8/4/18 1342)   fluconazole (DIFLUCAN) tablet 200 mg (200 mg Oral Given 8/4/18 1342)       Diagnostic Studies  Results Reviewed     Procedure Component Value Units Date/Time    Urine Microscopic [[id number]]  (Abnormal) Collected:  08/04/18 1304    Lab Status:  Final result Specimen:  Urine from Urine, Clean Catch Updated:  08/04/18 1320     RBC, UA None Seen /hpf      WBC, UA 4-10 (A) /hpf      Epithelial Cells Occasional /hpf      Bacteria, UA Occasional /hpf      OTHER OBSERVATIONS Yeast Cells Present    UA w Reflex to Microscopic w Reflex to Culture [[id number]]  (Abnormal) Collected:  08/04/18 1304    Lab Status:  Final result Specimen: Urine from Urine, Clean Catch Updated:  08/04/18 1311     Color, UA Orange     Clarity, UA Slightly Cloudy     Specific Gravity, UA 1 015     pH, UA 6 5     Leukocytes, UA Small (A)     Nitrite, UA Positive (A)     Protein, UA 30 (1+) (A) mg/dl      Glucose,  (1/10%) (A) mg/dl      Ketones, UA Trace (A) mg/dl      Urobilinogen, UA 4 0 (A) E U /dl      Bilirubin, UA Interference- unable to analyze (A)     Blood, UA Negative                 No orders to display              Procedures  Procedures       Phone Contacts  ED Phone Contact    ED Course  ED Course as of Aug 04 1848   Sat Aug 04, 2018   1321 Color, UA: Orange   1321 Leukocytes, UA: (!) Small   1321 Nitrite, UA: (!) Positive   1321 Blood, UA: Negative   1321 WBC, UA: (!) 4-10   1321 OTHER OBSERVATIONS: Yeast Cells Present                               MDM  Number of Diagnoses or Management Options  Urinary frequency: new and does not require workup  UTI (urinary tract infection): new and does not require workup     Amount and/or Complexity of Data Reviewed  Clinical lab tests: ordered and reviewed  Review and summarize past medical records: yes (Reviewed prior urine cultures  Klebsiella in past  Sensitive to tetracyclines  Resistant to macrobid   Pt allergy to pcn/keflex/bactrim/fluoroquinolones )    Risk of Complications, Morbidity, and/or Mortality  Presenting problems: low  Diagnostic procedures: low  Management options: low    Patient Progress  Patient progress: stable    CritCare Time    Disposition  Final diagnoses:   UTI (urinary tract infection)   Urinary frequency     Time reflects when diagnosis was documented in both MDM as applicable and the Disposition within this note     Time User Action Codes Description Comment    8/4/2018  1:30 PM Mitch Amaya Add [N39 0] UTI (urinary tract infection)     8/4/2018  1:30 PM Mitch Amaya Add [R35 0] Urinary frequency       ED Disposition     ED Disposition Condition Comment    Discharge  Ashwin Kraus discharge to home/self care  Condition at discharge: Good        Follow-up Information     Follow up With Specialties Details Why [address], [address] [facility], Nurse Practitioner Schedule an appointment as soon as possible for a visit ER followup [place] [address]  [phone]      [facility] Call urology followup [address] [id number]  [phone]          Discharge Medication List as of 8/4/2018  1:37 PM      START taking these medications    Details   doxycycline monohydrate (MONODOX) 100 mg capsule Take 1 capsule (100 mg total) by mouth 2 (two) times a day for 7 days, Starting Sat 8/4/2018, Until Sat 8/11/2018, Normal         CONTINUE these medications which have NOT CHANGED    Details   albuterol (PROVENTIL HFA,VENTOLIN HFA) 90 mcg/act inhaler Inhale 2 puffs every 6 (six) hours as needed for wheezing, Starting Thu 4/12/2018, Normal      aspirin (ECOTRIN LOW STRENGTH) 81 mg EC tablet Take 1 tablet (81 mg total) by mouth daily, Starting Thu 4/12/2018, Normal      Blood Glucose Monitoring Suppl (BLOOD GLUCOSE MONITOR SYSTEM) w/Device KIT Test blood sugars 3 times a day, Normal      estradiol (ESTRACE) 1 mg tablet Take 1 mg by mouth daily  , Until Discontinued, Historical Med      fluticasone (FLONASE) 50 mcg/act nasal spray 1 spray into each nostril daily, Starting Tue 7/3/2018, Normal      gabapentin (NEURONTIN) 800 mg tablet Take 800 mg by mouth 4 (four) times a day , Until Discontinued, Historical Med      glucose blood test strip Test blood sugars 3 times a day , Normal      hydrocortisone 2 5 % cream Apply topically 2 (two) times a day, Starting Tue 7/3/2018, Normal      hydrOXYzine pamoate (VISTARIL) 50 mg capsule Take 50 mg by mouth 3 (three) times a day , Until Discontinued, Historical Med      insulin glargine (LANTUS) 100 units/mL subcutaneous injection Inject 10 Units under the skin daily at bedtime, Starting Tue 7/3/2018, Normal      levETIRAcetam (KEPPRA) 750 mg tablet Take 1,500 mg by mouth 2 (two) times a day  , Until Discontinued, Historical Med      levothyroxine 175 mcg tablet Take 1 tablet (175 mcg total) by mouth daily, Starting Tue 7/3/2018, Normal      lisinopril (ZESTRIL) 10 mg tablet Take 1 tablet (10 mg total) by mouth daily, Starting Fri 8/3/2018, Normal      loratadine (CLARITIN) 10 mg tablet Take 1 tablet (10 mg total) by mouth daily, Starting Fri 8/3/2018, Normal      MAG64 535 (64 Mg) MG Starting Thu 3/22/2018, Historical Med      meloxicam (MOBIC) 7 5 mg tablet Take 1 tablet (7 5 mg total) by mouth 2 (two) times a day, Starting Wed 7/18/2018, Normal      metFORMIN (GLUCOPHAGE) 1000 MG tablet Take 1 tablet (1,000 mg total) by mouth 2 (two) times a day with meals, Starting Thu 7/26/2018, Normal      montelukast (SINGULAIR) 10 mg tablet Take 1 tablet (10 mg total) by mouth daily at bedtime, Starting Thu 4/12/2018, Normal      norethindrone (AYGESTIN) 5 mg tablet Take 5 mg by mouth daily  , Until Discontinued, Historical Med      NOVOFINE 32G X 6 MM MISC Starting Tue 3/20/2018, Historical Med      omeprazole (PriLOSEC) 20 mg delayed release capsule Take 1 capsule (20 mg total) by mouth daily, Starting Thu 7/26/2018, Normal      ONETOUCH DELICA LANCETS 56F MISC Test blood sugars 3 times a day , Normal      oxybutynin (DITROPAN) 5 mg tablet Take by mouth, Starting Thu 12/15/2011, Historical Med      phenytoin (DILANTIN) 100 mg ER capsule Take by mouth 5 (five) times a day   , Until Discontinued, Historical Med      polyethylene glycol (GLYCOLAX) powder Take 17 g by mouth daily, Starting Wed 7/18/2018, Normal      polyethylene glycol (MIRALAX) 17 g packet Take 17 g by mouth daily, Starting Thu 5/24/2018, Normal      PROCTOZONE-HC 2 5 % rectal cream Starting Tue 3/6/2018, Historical Med      QUEtiapine (SEROquel) 400 MG tablet TAKE ONE TABLET BY MOUTH TWICE A DAY, Normal simvastatin (ZOCOR) 80 mg tablet Take 1 tablet (80 mg total) by mouth daily at bedtime, Starting Wed 4/25/2018, Normal      triamcinolone (KENALOG) 0 1 % cream Apply topically 2 (two) times a day, Starting Tue 6/19/2018, Normal      valACYclovir (VALTREX) 500 mg tablet Take 1 tablet by mouth 2 (two) times a day, Starting Thu 3/22/2018, Historical Med      zonisamide (ZONEGRAN) 100 mg capsule Take 100 mg by mouth 5 (five) times a day  , Until Discontinued, Historical Med           No discharge procedures on file      ED Provider  Electronically Signed by           Hi Roach PA-C  08/04/18 4146

## 2018-08-04 NOTE — DISCHARGE INSTRUCTIONS

## 2018-08-06 DIAGNOSIS — F41.9 ANXIETY: Primary | ICD-10-CM

## 2018-08-06 RX ORDER — HYDROXYZINE PAMOATE 50 MG/1
50 CAPSULE ORAL 3 TIMES DAILY
Qty: 90 CAPSULE | Refills: 0 | Status: SHIPPED | OUTPATIENT
Start: 2018-08-06 | End: 2018-09-14 | Stop reason: SDUPTHER

## 2018-08-15 DIAGNOSIS — K21.9 GERD WITHOUT ESOPHAGITIS: ICD-10-CM

## 2018-08-15 RX ORDER — OMEPRAZOLE 20 MG/1
40 CAPSULE, DELAYED RELEASE ORAL DAILY
Qty: 60 CAPSULE | Refills: 3 | Status: SHIPPED | OUTPATIENT
Start: 2018-08-15 | End: 2018-09-14 | Stop reason: SDUPTHER

## 2018-08-24 ENCOUNTER — HOSPITAL ENCOUNTER (EMERGENCY)
Facility: HOSPITAL | Age: 63
Discharge: HOME | End: 2018-08-25
Attending: EMERGENCY MEDICINE | Admitting: EMERGENCY MEDICINE
Payer: COMMERCIAL

## 2018-08-24 ENCOUNTER — APPOINTMENT (EMERGENCY)
Dept: RADIOLOGY | Facility: HOSPITAL | Age: 63
End: 2018-08-24
Payer: COMMERCIAL

## 2018-08-24 ENCOUNTER — APPOINTMENT (EMERGENCY)
Dept: CT IMAGING | Facility: HOSPITAL | Age: 63
End: 2018-08-24
Payer: COMMERCIAL

## 2018-08-24 DIAGNOSIS — N39.0 URINARY TRACT INFECTION: ICD-10-CM

## 2018-08-24 DIAGNOSIS — E83.42 HYPOMAGNESEMIA: ICD-10-CM

## 2018-08-24 DIAGNOSIS — E87.1 HYPONATREMIA: ICD-10-CM

## 2018-08-24 DIAGNOSIS — F31.9 BIPOLAR DISORDER (HCC): Primary | ICD-10-CM

## 2018-08-24 LAB
ACETONE SERPL-MCNC: NEGATIVE MG/DL
ALBUMIN SERPL BCP-MCNC: 3.2 G/DL (ref 3.5–5)
ALP SERPL-CCNC: 43 U/L (ref 46–116)
ALT SERPL W P-5'-P-CCNC: 44 U/L (ref 12–78)
AMMONIA PLAS-SCNC: 10 UMOL/L (ref 11–35)
AMPHETAMINES SERPL QL SCN: NEGATIVE
ANION GAP SERPL CALCULATED.3IONS-SCNC: 11 MMOL/L (ref 4–13)
APAP SERPL-MCNC: <2 UG/ML (ref 10–30)
APTT PPP: 26 SECONDS (ref 24–36)
AST SERPL W P-5'-P-CCNC: 28 U/L (ref 5–45)
ATRIAL RATE: 103 BPM
BACTERIA UR QL AUTO: ABNORMAL /HPF
BARBITURATES UR QL: NEGATIVE
BASOPHILS # BLD AUTO: 0.05 THOUSANDS/ΜL (ref 0–0.1)
BASOPHILS NFR BLD AUTO: 1 % (ref 0–1)
BENZODIAZ UR QL: NEGATIVE
BILIRUB SERPL-MCNC: 0.3 MG/DL (ref 0.2–1)
BILIRUB UR QL STRIP: NEGATIVE
BUN SERPL-MCNC: 5 MG/DL (ref 5–25)
CALCIUM SERPL-MCNC: 8.4 MG/DL (ref 8.3–10.1)
CHLORIDE SERPL-SCNC: 92 MMOL/L (ref 100–108)
CLARITY UR: ABNORMAL
CO2 SERPL-SCNC: 24 MMOL/L (ref 21–32)
COCAINE UR QL: NEGATIVE
COLOR UR: YELLOW
CREAT SERPL-MCNC: 0.82 MG/DL (ref 0.6–1.3)
EOSINOPHIL # BLD AUTO: 0.31 THOUSAND/ΜL (ref 0–0.61)
EOSINOPHIL NFR BLD AUTO: 6 % (ref 0–6)
ERYTHROCYTE [DISTWIDTH] IN BLOOD BY AUTOMATED COUNT: 12.5 % (ref 11.6–15.1)
ETHANOL SERPL-MCNC: <3 MG/DL (ref 0–3)
GFR SERPL CREATININE-BSD FRML MDRD: 77 ML/MIN/1.73SQ M
GLUCOSE SERPL-MCNC: 186 MG/DL (ref 65–140)
GLUCOSE SERPL-MCNC: 201 MG/DL (ref 65–140)
GLUCOSE UR STRIP-MCNC: ABNORMAL MG/DL
HCT VFR BLD AUTO: 33.4 % (ref 34.8–46.1)
HGB BLD-MCNC: 11.4 G/DL (ref 11.5–15.4)
HGB UR QL STRIP.AUTO: NEGATIVE
IMM GRANULOCYTES # BLD AUTO: 0.07 THOUSAND/UL (ref 0–0.2)
IMM GRANULOCYTES NFR BLD AUTO: 1 % (ref 0–2)
INR PPP: 1.2 (ref 0.86–1.17)
KETONES UR STRIP-MCNC: NEGATIVE MG/DL
LEUKOCYTE ESTERASE UR QL STRIP: ABNORMAL
LYMPHOCYTES # BLD AUTO: 1.58 THOUSANDS/ΜL (ref 0.6–4.47)
LYMPHOCYTES NFR BLD AUTO: 28 % (ref 14–44)
MAGNESIUM SERPL-MCNC: 1.3 MG/DL (ref 1.6–2.6)
MCH RBC QN AUTO: 32.7 PG (ref 26.8–34.3)
MCHC RBC AUTO-ENTMCNC: 34.1 G/DL (ref 31.4–37.4)
MCV RBC AUTO: 96 FL (ref 82–98)
METHADONE UR QL: NEGATIVE
MONOCYTES # BLD AUTO: 0.55 THOUSAND/ΜL (ref 0.17–1.22)
MONOCYTES NFR BLD AUTO: 10 % (ref 4–12)
NEUTROPHILS # BLD AUTO: 3.1 THOUSANDS/ΜL (ref 1.85–7.62)
NEUTS SEG NFR BLD AUTO: 54 % (ref 43–75)
NITRITE UR QL STRIP: POSITIVE
NON-SQ EPI CELLS URNS QL MICRO: ABNORMAL /HPF
NRBC BLD AUTO-RTO: 0 /100 WBCS
NT-PROBNP SERPL-MCNC: 76 PG/ML
OPIATES UR QL SCN: NEGATIVE
P AXIS: 27 DEGREES
PCP UR QL: NEGATIVE
PH UR STRIP.AUTO: 6.5 [PH] (ref 4.5–8)
PHENYTOIN SERPL-MCNC: 7.7 UG/ML (ref 10–20)
PLATELET # BLD AUTO: 383 THOUSANDS/UL (ref 149–390)
PMV BLD AUTO: 7.9 FL (ref 8.9–12.7)
POTASSIUM SERPL-SCNC: 3.6 MMOL/L (ref 3.5–5.3)
PR INTERVAL: 136 MS
PROT SERPL-MCNC: 6 G/DL (ref 6.4–8.2)
PROT UR STRIP-MCNC: NEGATIVE MG/DL
PROTHROMBIN TIME: 14.6 SECONDS (ref 11.8–14.2)
QRS AXIS: 0 DEGREES
QRSD INTERVAL: 72 MS
QT INTERVAL: 344 MS
QTC INTERVAL: 450 MS
RBC # BLD AUTO: 3.49 MILLION/UL (ref 3.81–5.12)
RBC #/AREA URNS AUTO: ABNORMAL /HPF
SALICYLATES SERPL-MCNC: <3 MG/DL (ref 3–20)
SODIUM SERPL-SCNC: 127 MMOL/L (ref 136–145)
SP GR UR STRIP.AUTO: <=1.005 (ref 1–1.03)
T WAVE AXIS: 48 DEGREES
THC UR QL: NEGATIVE
TROPONIN I SERPL-MCNC: <0.02 NG/ML
TSH SERPL DL<=0.05 MIU/L-ACNC: 0.16 UIU/ML (ref 0.36–3.74)
UROBILINOGEN UR QL STRIP.AUTO: 1 E.U./DL
VENTRICULAR RATE: 103 BPM
WBC # BLD AUTO: 5.66 THOUSAND/UL (ref 4.31–10.16)
WBC #/AREA URNS AUTO: ABNORMAL /HPF

## 2018-08-24 PROCEDURE — 80329 ANALGESICS NON-OPIOID 1 OR 2: CPT | Performed by: EMERGENCY MEDICINE

## 2018-08-24 PROCEDURE — 93005 ELECTROCARDIOGRAM TRACING: CPT

## 2018-08-24 PROCEDURE — 87086 URINE CULTURE/COLONY COUNT: CPT | Performed by: EMERGENCY MEDICINE

## 2018-08-24 PROCEDURE — 93010 ELECTROCARDIOGRAM REPORT: CPT | Performed by: INTERNAL MEDICINE

## 2018-08-24 PROCEDURE — 85025 COMPLETE CBC W/AUTO DIFF WBC: CPT | Performed by: EMERGENCY MEDICINE

## 2018-08-24 PROCEDURE — 81001 URINALYSIS AUTO W/SCOPE: CPT | Performed by: EMERGENCY MEDICINE

## 2018-08-24 PROCEDURE — 80320 DRUG SCREEN QUANTALCOHOLS: CPT | Performed by: EMERGENCY MEDICINE

## 2018-08-24 PROCEDURE — 70450 CT HEAD/BRAIN W/O DYE: CPT

## 2018-08-24 PROCEDURE — 82948 REAGENT STRIP/BLOOD GLUCOSE: CPT

## 2018-08-24 PROCEDURE — 83735 ASSAY OF MAGNESIUM: CPT | Performed by: EMERGENCY MEDICINE

## 2018-08-24 PROCEDURE — 80177 DRUG SCRN QUAN LEVETIRACETAM: CPT | Performed by: EMERGENCY MEDICINE

## 2018-08-24 PROCEDURE — 84443 ASSAY THYROID STIM HORMONE: CPT | Performed by: EMERGENCY MEDICINE

## 2018-08-24 PROCEDURE — 85610 PROTHROMBIN TIME: CPT | Performed by: EMERGENCY MEDICINE

## 2018-08-24 PROCEDURE — 71046 X-RAY EXAM CHEST 2 VIEWS: CPT

## 2018-08-24 PROCEDURE — 36415 COLL VENOUS BLD VENIPUNCTURE: CPT | Performed by: EMERGENCY MEDICINE

## 2018-08-24 PROCEDURE — 83880 ASSAY OF NATRIURETIC PEPTIDE: CPT | Performed by: EMERGENCY MEDICINE

## 2018-08-24 PROCEDURE — 82140 ASSAY OF AMMONIA: CPT | Performed by: EMERGENCY MEDICINE

## 2018-08-24 PROCEDURE — 82009 KETONE BODYS QUAL: CPT | Performed by: EMERGENCY MEDICINE

## 2018-08-24 PROCEDURE — 80307 DRUG TEST PRSMV CHEM ANLYZR: CPT | Performed by: EMERGENCY MEDICINE

## 2018-08-24 PROCEDURE — 80185 ASSAY OF PHENYTOIN TOTAL: CPT | Performed by: EMERGENCY MEDICINE

## 2018-08-24 PROCEDURE — 80203 DRUG SCREEN QUANT ZONISAMIDE: CPT | Performed by: EMERGENCY MEDICINE

## 2018-08-24 PROCEDURE — 85730 THROMBOPLASTIN TIME PARTIAL: CPT | Performed by: EMERGENCY MEDICINE

## 2018-08-24 PROCEDURE — 84484 ASSAY OF TROPONIN QUANT: CPT | Performed by: EMERGENCY MEDICINE

## 2018-08-24 PROCEDURE — 80053 COMPREHEN METABOLIC PANEL: CPT | Performed by: EMERGENCY MEDICINE

## 2018-08-24 RX ORDER — LEVOTHYROXINE SODIUM 175 UG/1
175 TABLET ORAL
Status: DISCONTINUED | OUTPATIENT
Start: 2018-08-25 | End: 2018-08-25 | Stop reason: HOSPADM

## 2018-08-24 RX ORDER — LISINOPRIL 5 MG/1
10 TABLET ORAL DAILY
Status: DISCONTINUED | OUTPATIENT
Start: 2018-08-24 | End: 2018-08-25 | Stop reason: HOSPADM

## 2018-08-24 RX ORDER — MELOXICAM 7.5 MG/1
7.5 TABLET ORAL 2 TIMES DAILY
Status: DISCONTINUED | OUTPATIENT
Start: 2018-08-24 | End: 2018-08-25 | Stop reason: HOSPADM

## 2018-08-24 RX ORDER — QUETIAPINE FUMARATE 100 MG/1
400 TABLET, FILM COATED ORAL 2 TIMES DAILY
Status: DISCONTINUED | OUTPATIENT
Start: 2018-08-24 | End: 2018-08-25 | Stop reason: HOSPADM

## 2018-08-24 RX ORDER — ESTRADIOL 1 MG/1
1 TABLET ORAL DAILY
Status: DISCONTINUED | OUTPATIENT
Start: 2018-08-24 | End: 2018-08-25 | Stop reason: HOSPADM

## 2018-08-24 RX ORDER — PHENYTOIN SODIUM 100 MG/1
100 CAPSULE, EXTENDED RELEASE ORAL
Status: DISCONTINUED | OUTPATIENT
Start: 2018-08-24 | End: 2018-08-25 | Stop reason: HOSPADM

## 2018-08-24 RX ORDER — LORATADINE 10 MG/1
10 TABLET ORAL DAILY
Status: DISCONTINUED | OUTPATIENT
Start: 2018-08-24 | End: 2018-08-25 | Stop reason: HOSPADM

## 2018-08-24 RX ORDER — ASPIRIN 81 MG/1
81 TABLET, CHEWABLE ORAL DAILY
Status: DISCONTINUED | OUTPATIENT
Start: 2018-08-24 | End: 2018-08-25 | Stop reason: HOSPADM

## 2018-08-24 RX ORDER — PRAVASTATIN SODIUM 40 MG
80 TABLET ORAL
Status: DISCONTINUED | OUTPATIENT
Start: 2018-08-24 | End: 2018-08-25 | Stop reason: HOSPADM

## 2018-08-24 RX ORDER — MONTELUKAST SODIUM 10 MG/1
10 TABLET ORAL
Status: DISCONTINUED | OUTPATIENT
Start: 2018-08-24 | End: 2018-08-25 | Stop reason: HOSPADM

## 2018-08-24 RX ORDER — OXYBUTYNIN CHLORIDE 5 MG/1
5 TABLET ORAL 3 TIMES DAILY
Status: DISCONTINUED | OUTPATIENT
Start: 2018-08-24 | End: 2018-08-25 | Stop reason: HOSPADM

## 2018-08-24 RX ORDER — INSULIN GLARGINE 100 [IU]/ML
10 INJECTION, SOLUTION SUBCUTANEOUS
Status: DISCONTINUED | OUTPATIENT
Start: 2018-08-24 | End: 2018-08-24

## 2018-08-24 RX ORDER — DOXYCYCLINE HYCLATE 100 MG/1
100 CAPSULE ORAL 2 TIMES DAILY
Qty: 20 CAPSULE | Refills: 0 | Status: SHIPPED | OUTPATIENT
Start: 2018-08-24 | End: 2018-09-03

## 2018-08-24 RX ORDER — VALACYCLOVIR HYDROCHLORIDE 500 MG/1
500 TABLET, FILM COATED ORAL 2 TIMES DAILY
Status: DISCONTINUED | OUTPATIENT
Start: 2018-08-24 | End: 2018-08-25 | Stop reason: HOSPADM

## 2018-08-24 RX ORDER — ALBUTEROL SULFATE 90 UG/1
2 AEROSOL, METERED RESPIRATORY (INHALATION) EVERY 4 HOURS PRN
Status: DISCONTINUED | OUTPATIENT
Start: 2018-08-24 | End: 2018-08-25 | Stop reason: HOSPADM

## 2018-08-24 RX ORDER — LEVETIRACETAM 500 MG/1
1500 TABLET ORAL 2 TIMES DAILY
Status: DISCONTINUED | OUTPATIENT
Start: 2018-08-24 | End: 2018-08-25 | Stop reason: HOSPADM

## 2018-08-24 RX ORDER — FLUTICASONE PROPIONATE 50 MCG
1 SPRAY, SUSPENSION (ML) NASAL 2 TIMES DAILY
Status: DISCONTINUED | OUTPATIENT
Start: 2018-08-24 | End: 2018-08-25 | Stop reason: HOSPADM

## 2018-08-24 RX ORDER — DOXYCYCLINE HYCLATE 100 MG/1
100 CAPSULE ORAL EVERY 12 HOURS SCHEDULED
Status: DISCONTINUED | OUTPATIENT
Start: 2018-08-24 | End: 2018-08-25 | Stop reason: HOSPADM

## 2018-08-24 RX ORDER — PANTOPRAZOLE SODIUM 20 MG/1
20 TABLET, DELAYED RELEASE ORAL
Status: DISCONTINUED | OUTPATIENT
Start: 2018-08-25 | End: 2018-08-25 | Stop reason: HOSPADM

## 2018-08-24 RX ORDER — ZONISAMIDE 100 MG/1
100 CAPSULE ORAL 2 TIMES DAILY
Status: DISCONTINUED | OUTPATIENT
Start: 2018-08-24 | End: 2018-08-25 | Stop reason: HOSPADM

## 2018-08-24 RX ORDER — HYDROXYZINE HYDROCHLORIDE 25 MG/1
50 TABLET, FILM COATED ORAL 3 TIMES DAILY
Status: DISCONTINUED | OUTPATIENT
Start: 2018-08-24 | End: 2018-08-25 | Stop reason: HOSPADM

## 2018-08-24 RX ADMIN — MONTELUKAST SODIUM 10 MG: 10 TABLET, FILM COATED ORAL at 23:07

## 2018-08-24 RX ADMIN — LORATADINE 10 MG: 10 TABLET ORAL at 17:27

## 2018-08-24 RX ADMIN — MELOXICAM 7.5 MG: 7.5 TABLET ORAL at 17:29

## 2018-08-24 RX ADMIN — LEVETIRACETAM 1500 MG: 500 TABLET ORAL at 17:30

## 2018-08-24 RX ADMIN — ZONISAMIDE 100 MG: 100 CAPSULE ORAL at 17:29

## 2018-08-24 RX ADMIN — DOXYCYCLINE HYCLATE 100 MG: 100 CAPSULE ORAL at 23:07

## 2018-08-24 RX ADMIN — OXYBUTYNIN CHLORIDE 5 MG: 5 TABLET ORAL at 17:31

## 2018-08-24 RX ADMIN — HYDROXYZINE HYDROCHLORIDE 50 MG: 25 TABLET, FILM COATED ORAL at 23:07

## 2018-08-24 RX ADMIN — ASPIRIN 81 MG 81 MG: 81 TABLET ORAL at 17:31

## 2018-08-24 RX ADMIN — METFORMIN HYDROCHLORIDE 1000 MG: 500 TABLET, FILM COATED ORAL at 17:30

## 2018-08-24 RX ADMIN — PHENYTOIN SODIUM 100 MG: 100 CAPSULE ORAL at 23:07

## 2018-08-24 RX ADMIN — ESTRADIOL 1 MG: 1 TABLET ORAL at 17:27

## 2018-08-24 RX ADMIN — QUETIAPINE FUMARATE 400 MG: 100 TABLET ORAL at 17:26

## 2018-08-24 RX ADMIN — GABAPENTIN 400 MG: 100 CAPSULE ORAL at 23:06

## 2018-08-24 RX ADMIN — HYDROXYZINE HYDROCHLORIDE 50 MG: 25 TABLET, FILM COATED ORAL at 17:27

## 2018-08-24 RX ADMIN — PHENYTOIN SODIUM 100 MG: 100 CAPSULE ORAL at 17:31

## 2018-08-24 RX ADMIN — VALACYCLOVIR HYDROCHLORIDE 500 MG: 500 TABLET, FILM COATED ORAL at 17:29

## 2018-08-24 RX ADMIN — DOXYCYCLINE HYCLATE 100 MG: 100 CAPSULE ORAL at 10:38

## 2018-08-24 RX ADMIN — MAGNESIUM OXIDE TAB 400 MG (241.3 MG ELEMENTAL MG) 800 MG: 400 (241.3 MG) TAB at 09:21

## 2018-08-24 RX ADMIN — PRAVASTATIN SODIUM 80 MG: 40 TABLET ORAL at 17:29

## 2018-08-24 RX ADMIN — FLUTICASONE PROPIONATE 1 SPRAY: 50 SPRAY, METERED NASAL at 17:35

## 2018-08-24 RX ADMIN — GABAPENTIN 400 MG: 100 CAPSULE ORAL at 17:30

## 2018-08-24 RX ADMIN — MAGNESIUM OXIDE TAB 400 MG (241.3 MG ELEMENTAL MG) 800 MG: 400 (241.3 MG) TAB at 17:26

## 2018-08-24 NOTE — ED NOTES
[facility] called and had a call back from [facility] (outside of [place]) and will discuss case with  and may possibly accept the patient        Neisha Hawthorne RN  08/24/18 4159

## 2018-08-24 NOTE — ED NOTES
jose d from [address] [place] [address] crisis called and stated crisis worker zacarias will be here in 1 hour to see pt       Jamal Nunez, BARB  08/24/18 1146

## 2018-08-24 NOTE — ED NOTES
Pt unable to give urine specimen at this time   Given water to drink     Erika Armenta RN  08/24/18 1604

## 2018-08-24 NOTE — ED PROCEDURE NOTE
PROCEDURE  ECG 12 Lead Documentation  Date/Time: 8/24/2018 6:59 AM  Performed by: Belén Carrillo  Authorized by: Belén Carrillo     Indications / Diagnosis:  Ams  ECG reviewed by me, the ED Provider: yes    Patient location:  ED  Previous ECG:     Previous ECG:  Compared to current    Comparison ECG info:  Similar incl   low voltage    Comparison to cardiac monitor: Yes    Interpretation:     Interpretation: abnormal    Rate:     ECG rate:  103    ECG rate assessment: tachycardic    Rhythm:     Rhythm: sinus tachycardia    Ectopy:     Ectopy: none    QRS:     QRS axis:  Normal    QRS intervals:  Normal  Conduction:     Conduction: normal    ST segments:     ST segments:  Normal  T waves:     T waves: normal           Delon Arriaga MD  08/24/18 1329

## 2018-08-24 NOTE — ED PROVIDER NOTES
History  Chief Complaint   Patient presents with    Possible UTI     Patient states that she think that she has a UTI  patient also states that she is dealing with anxiety and depression  HPI    Prior to Admission Medications   Prescriptions Last Dose Informant Patient Reported? Taking? Blood Glucose Monitoring Suppl (BLOOD GLUCOSE MONITOR SYSTEM) w/Device KIT   No No   Sig: Test blood sugars 3 times a day   MAG64 535 (64 Mg) MG   Yes Yes   NOVOFINE 32G X 6 MM MISC   Yes No   ONETOUCH DELICA LANCETS 18W MISC   No No   Sig: Test blood sugars 3 times a day  PROCTOZONE-HC 2 5 % rectal cream   Yes No   QUEtiapine (SEROquel) 400 MG tablet   No Yes   Sig: TAKE ONE TABLET BY MOUTH TWICE A DAY   albuterol (PROVENTIL HFA,VENTOLIN HFA) 90 mcg/act inhaler   No Yes   Sig: Inhale 2 puffs every 6 (six) hours as needed for wheezing   aspirin (ECOTRIN LOW STRENGTH) 81 mg EC tablet   No Yes   Sig: Take 1 tablet (81 mg total) by mouth daily   estradiol (ESTRACE) 1 mg tablet   Yes Yes   Sig: Take 1 mg by mouth daily  fluticasone (FLONASE) 50 mcg/act nasal spray   No Yes   Si spray into each nostril daily   Patient taking differently: 1 spray into each nostril 2 (two) times a day     gabapentin (NEURONTIN) 800 mg tablet   Yes Yes   Sig: Take 800 mg by mouth 4 (four) times a day     glucose blood test strip   No No   Sig: Test blood sugars 3 times a day    hydrOXYzine pamoate (VISTARIL) 50 mg capsule   No Yes   Sig: Take 1 capsule (50 mg total) by mouth 3 (three) times a day   hydrocortisone 2 5 % cream   No No   Sig: Apply topically 2 (two) times a day   insulin glargine (LANTUS) 100 units/mL subcutaneous injection   No Yes   Sig: Inject 10 Units under the skin daily at bedtime   levETIRAcetam (KEPPRA) 750 mg tablet   Yes Yes   Sig: Take 1,500 mg by mouth 2 (two) times a day     levothyroxine 175 mcg tablet   No Yes   Sig: Take 1 tablet (175 mcg total) by mouth daily   lisinopril (ZESTRIL) 10 mg tablet   No Yes   Sig: Take 1 tablet (10 mg total) by mouth daily   loratadine (CLARITIN) 10 mg tablet   No Yes   Sig: Take 1 tablet (10 mg total) by mouth daily   meloxicam (MOBIC) 7 5 mg tablet   No Yes   Sig: Take 1 tablet (7 5 mg total) by mouth 2 (two) times a day   metFORMIN (GLUCOPHAGE) 1000 MG tablet   No Yes   Sig: Take 1 tablet (1,000 mg total) by mouth 2 (two) times a day with meals   montelukast (SINGULAIR) 10 mg tablet   No Yes   Sig: Take 1 tablet (10 mg total) by mouth daily at bedtime   norethindrone (AYGESTIN) 5 mg tablet   Yes Yes   Sig: Take 5 mg by mouth daily  omeprazole (PriLOSEC) 20 mg delayed release capsule   No Yes   Sig: Take 2 capsules (40 mg total) by mouth daily   oxybutynin (DITROPAN) 5 mg tablet   Yes Yes   Sig: Take 5 mg by mouth 3 (three) times a day     phenytoin (DILANTIN) 100 mg ER capsule   Yes Yes   Sig: Take by mouth 5 (five) times a day       polyethylene glycol (GLYCOLAX) powder   No Yes   Sig: Take 17 g by mouth daily   polyethylene glycol (MIRALAX) 17 g packet   No No   Sig: Take 17 g by mouth daily   simvastatin (ZOCOR) 80 mg tablet   No Yes   Sig: Take 1 tablet (80 mg total) by mouth daily at bedtime   triamcinolone (KENALOG) 0 1 % cream   No No   Sig: Apply topically 2 (two) times a day   valACYclovir (VALTREX) 500 mg tablet   Yes Yes   Sig: Take 1 tablet by mouth 2 (two) times a day   zonisamide (ZONEGRAN) 100 mg capsule   Yes Yes   Sig: Take 100 mg by mouth 5 (five) times a day        Facility-Administered Medications: None       Past Medical History:   Diagnosis Date    Asthma     Bipolar disorder ([facility] [address] )     Chronic UTI (urinary tract infection)     COPD (chronic obstructive pulmonary disease) (HCC)     Diabetes mellitus (HCC)     Disease of thyroid gland     GERD (gastroesophageal reflux disease)     Seizure (HCC)        Past Surgical History:   Procedure Laterality Date    ANKLE FRACTURE SURGERY Right     TUBAL LIGATION      VEIN LIGATION AND STRIPPING         History reviewed  No pertinent family history  I have reviewed and agree with the history as documented      Social History   Substance Use Topics    Smoking status: Never Smoker    Smokeless tobacco: Never Used    Alcohol use No        Review of Systems    Physical Exam  Physical Exam    Vital Signs  ED Triage Vitals   Temperature Pulse Respirations Blood Pressure SpO2   08/24/18 0649 08/24/18 0649 08/24/18 0649 08/24/18 0649 08/24/18 0649   98 9 °F (37 2 °C) (!) 115 18 98/65 90 %      Temp Source Heart Rate Source Patient Position - Orthostatic VS BP Location FiO2 (%)   08/24/18 0649 08/24/18 0649 08/24/18 0649 08/24/18 0649 --   Temporal Monitor Lying Left arm       Pain Score       08/24/18 0851       Worst Possible Pain           Vitals:    08/24/18 0930 08/24/18 1030 08/24/18 1130 08/24/18 1643   BP: 98/54 107/74 100/67 104/68   Pulse: 91 90 88 86   Patient Position - Orthostatic VS: Sitting Lying Lying Lying       Visual Acuity      ED Medications  Medications   magnesium oxide (MAG-OX) tablet 800 mg (800 mg Oral Given 8/24/18 0921)   doxycycline hyclate (VIBRAMYCIN) capsule 100 mg (100 mg Oral Given 8/24/18 1038)   albuterol (PROVENTIL HFA,VENTOLIN HFA) inhaler 2 puff (not administered)   aspirin chewable tablet 81 mg (81 mg Oral Given 8/24/18 1731)   estradiol (ESTRACE) tablet 1 mg (1 mg Oral Given 8/24/18 1727)   fluticasone (FLONASE) 50 mcg/act nasal spray 1 spray (1 spray Each Nare Given 8/24/18 1735)   hydrOXYzine HCL (ATARAX) tablet 50 mg (50 mg Oral Given 8/24/18 1727)   levothyroxine tablet 175 mcg (not administered)   loratadine (CLARITIN) tablet 10 mg (10 mg Oral Given 8/24/18 1727)   magnesium oxide (MAG-OX) tablet 800 mg (800 mg Oral Given 8/24/18 1726)   meloxicam (MOBIC) tablet 7 5 mg (7 5 mg Oral Given 8/24/18 1729)   metFORMIN (GLUCOPHAGE) tablet 1,000 mg (1,000 mg Oral Given 8/24/18 6823)   montelukast (SINGULAIR) tablet 10 mg (not administered)   pantoprazole (PROTONIX) EC tablet 20 mg (not administered)   oxybutynin (DITROPAN) tablet 5 mg (5 mg Oral Given 8/24/18 1731)   QUEtiapine (SEROquel) tablet 400 mg (400 mg Oral Given 8/24/18 1726)   pravastatin (PRAVACHOL) tablet 80 mg (80 mg Oral Given 8/24/18 1729)   lisinopril (ZESTRIL) tablet 10 mg (10 mg Oral Not Given 8/24/18 1726)   valACYclovir (VALTREX) tablet 500 mg (500 mg Oral Given 8/24/18 1729)   levETIRAcetam (KEPPRA) tablet 1,500 mg (1,500 mg Oral Given 8/24/18 1730)   gabapentin (NEURONTIN) capsule 400 mg (400 mg Oral Given 8/24/18 1730)   zonisamide (ZONEGRAN) capsule 100 mg (100 mg Oral Given 8/24/18 1729)   norethindrone (AYGESTIN) tablet 5 mg (not administered)   phenytoin (DILANTIN) ER capsule 100 mg (100 mg Oral Given 8/24/18 1731)       Diagnostic Studies  Results Reviewed     Procedure Component Value Units Date/Time    Ethanol [[id number]]  (Normal) Collected:  08/24/18 0718    Lab Status:  Final result Specimen:  Blood from Arm, Right Updated:  08/24/18 0940     Ethanol Lvl <3 0 mg/dL     Rapid drug screen, urine [[id number]]  (Normal) Collected:  08/24/18 0802    Lab Status:  Final result Specimen:  Urine from Urine, Clean Catch Updated:  08/24/18 0825     Amph/Meth UR Negative     Barbiturate Ur Negative     Benzodiazepine Urine Negative     Cocaine Urine Negative     Methadone Urine Negative     Opiate Urine Negative     PCP Ur Negative     THC Urine Negative    Narrative:         FOR MEDICAL PURPOSES ONLY  IF CONFIRMATION NEEDED PLEASE CONTACT THE LAB WITHIN 5 DAYS      Drug Screen Cutoff Levels:  AMPHETAMINE/METHAMPHETAMINES  1000 ng/mL  BARBITURATES     200 ng/mL  BENZODIAZEPINES     200 ng/mL  COCAINE      300 ng/mL  METHADONE      300 ng/mL  OPIATES      300 ng/mL  PHENCYCLIDINE     25 ng/mL  THC       50 ng/mL    Urine Microscopic [[id number]]  (Abnormal) Collected:  08/24/18 0802    Lab Status:  Final result Specimen:  Urine from Urine, Clean Catch Updated:  08/24/18 0823     RBC, UA None Seen /hpf      WBC, UA 10-20 (A) /hpf Epithelial Cells Moderate (A) /hpf      Bacteria, UA Occasional /hpf     Urine culture [[id number]] Collected:  08/24/18 0802    Lab Status: In process Specimen:  Urine from Urine, Clean Catch Updated:  08/24/18 0823    NT-BNP PRO [[id number]]  (Normal) Collected:  08/24/18 0718    Lab Status:  Final result Specimen:  Blood from Arm, Right Updated:  08/24/18 0821     NT-proBNP 76 pg/mL     UA w Reflex to Microscopic w Reflex to Culture [[id number]]  (Abnormal) Collected:  08/24/18 0802    Lab Status:  Final result Specimen:  Urine from Urine, Clean Catch Updated:  08/24/18 0816     Color, UA Yellow     Clarity, UA Slightly Cloudy     Specific Gravity, UA <=1 005     pH, UA 6 5     Leukocytes, UA Trace (A)     Nitrite, UA Positive (A)     Protein, UA Negative mg/dl      Glucose,  (1/10%) (A) mg/dl      Ketones, UA Negative mg/dl      Urobilinogen, UA 1 0 E U /dl      Bilirubin, UA Negative     Blood, UA Negative    TSH, 3rd generation [[id number]]  (Abnormal) Collected:  08/24/18 0718    Lab Status:  Final result Specimen:  Blood from Arm, Right Updated:  08/24/18 0756     TSH 3RD GENERATON 0 159 (L) uIU/mL     Narrative:         Patients undergoing fluorescein dye angiography may retain small amounts of fluorescein in the body for 48-72 hours post procedure  Samples containing fluorescein can produce falsely depressed TSH values  If the patient had this procedure,a specimen should be resubmitted post fluorescein clearance            The recommended reference ranges for TSH during pregnancy are as follows:  First trimester 0 1 to 2 5 uIU/mL  Second trimester  0 2 to 3 0 uIU/mL  Third trimester 0 3 to 3 0 uIU/m      Phenytoin level, total [[id number]]  (Abnormal) Collected:  08/24/18 0718    Lab Status:  Final result Specimen:  Blood from Arm, Right Updated:  08/24/18 0756     Phenytoin Lvl 7 7 (L) ug/mL     Acetone [[id number]]  (Normal) Collected:  08/24/18 0718    Lab Status:  Final result Specimen:  Blood from Arm, Right Updated:  08/24/18 0751     Acetone, Bld Negative    Troponin I [[id number]]  (Normal) Collected:  08/24/18 0718    Lab Status:  Final result Specimen:  Blood from Arm, Right Updated:  08/24/18 0750     Troponin I <0 02 ng/mL     Acetaminophen level [[id number]]  (Abnormal) Collected:  08/24/18 0718    Lab Status:  Final result Specimen:  Blood from Arm, Right Updated:  08/24/18 0750     Acetaminophen Level <2 0 (L) ug/mL     Comprehensive metabolic panel [[id number]]  (Abnormal) Collected:  08/24/18 0718    Lab Status:  Final result Specimen:  Blood from Arm, Right Updated:  08/24/18 0749     Sodium 127 (L) mmol/L      Potassium 3 6 mmol/L      Chloride 92 (L) mmol/L      CO2 24 mmol/L      Anion Gap 11 mmol/L      BUN 5 mg/dL      Creatinine 0 82 mg/dL      Glucose 186 (H) mg/dL      Calcium 8 4 mg/dL      AST 28 U/L      ALT 44 U/L      Alkaline Phosphatase 43 (L) U/L      Total Protein 6 0 (L) g/dL      Albumin 3 2 (L) g/dL      Total Bilirubin 0 30 mg/dL      eGFR 77 ml/min/1 73sq m     Narrative:         National Kidney Disease Education Program recommendations are as follows:  GFR calculation is accurate only with a steady state creatinine  Chronic Kidney disease less than 60 ml/min/1 73 sq  meters  Kidney failure less than 15 ml/min/1 73 sq  meters      Magnesium [[id number]]  (Abnormal) Collected:  08/24/18 0718    Lab Status:  Final result Specimen:  Blood from Arm, Right Updated:  08/24/18 0749     Magnesium 1 3 (L) mg/dL     Salicylate level [[id number]]  (Abnormal) Collected:  08/24/18 0718    Lab Status:  Final result Specimen:  Blood from Arm, Right Updated:  37/54/68 3972     Salicylate Lvl <3 (L) mg/dL     Ammonia [[id number]]  (Abnormal) Collected:  08/24/18 0718    Lab Status:  Final result Specimen:  Blood from Arm, Right Updated:  08/24/18 0740     Ammonia 10 (L) umol/L     Protime-INR [[id number]]  (Abnormal) Collected:  08/24/18 0718    Lab Status:  Final result Specimen:  Blood from Arm, Right Updated:  08/24/18 0736     Protime 14 6 (H) seconds      INR 1 20 (H)    APTT [[id number]]  (Normal) Collected:  08/24/18 0718    Lab Status:  Final result Specimen:  Blood from Arm, Right Updated:  08/24/18 0736     PTT 26 seconds     CBC and differential [[id number]]  (Abnormal) Collected:  08/24/18 0718    Lab Status:  Final result Specimen:  Blood from Arm, Right Updated:  08/24/18 0730     WBC 5 66 Thousand/uL      RBC 3 49 (L) Million/uL      Hemoglobin 11 4 (L) g/dL      Hematocrit 33 4 (L) %      MCV 96 fL      MCH 32 7 pg      MCHC 34 1 g/dL      RDW 12 5 %      MPV 7 9 (L) fL      Platelets 198 Thousands/uL      nRBC 0 /100 WBCs      Neutrophils Relative 54 %      Immat GRANS % 1 %      Lymphocytes Relative 28 %      Monocytes Relative 10 %      Eosinophils Relative 6 %      Basophils Relative 1 %      Neutrophils Absolute 3 10 Thousands/µL      Immature Grans Absolute 0 07 Thousand/uL      Lymphocytes Absolute 1 58 Thousands/µL      Monocytes Absolute 0 55 Thousand/µL      Eosinophils Absolute 0 31 Thousand/µL      Basophils Absolute 0 05 Thousands/µL     Levetiracetam level [[id number]] Collected:  08/24/18 0718    Lab Status: In process Specimen:  Blood from Arm, Right Updated:  08/24/18 0723    Zonisamide level [[id number]] Collected:  08/24/18 0718    Lab Status: In process Specimen:  Blood from Arm, Right Updated:  08/24/18 0723    Fingerstick Glucose (POCT) [[id number]]  (Abnormal) Collected:  08/24/18 0705    Lab Status:  Final result Updated:  08/24/18 0706     POC Glucose 201 (H) mg/dl                  CT head without contrast   Final Result by Terrence Sanabria MD (08/24 0915)      No acute intracranial abnormality  Workstation performed: [id number]         XR chest 2 views   Final Result by Terrence Sanabria MD (08/24 6226)      Plate like atelectasis right middle lobe    No other findings            Workstation performed: [id number]                    Procedures  Procedures Phone Contacts  ED Phone Contact    ED Course  ED Course as of Aug 24 1854   [facility] Aug 24, 2018   2921 Accepted sign-out from Dr Peri Cook at this time  Patient case reviewed including hx/exam and diagnostic workup thus far (labs/ua)  Undergoing CXR/CT head at this time  Plan: Assuming negative cxr/ct head, crisis evaluation with plan for inpatient psychiatric evaluation  9166 CT completed and awaiting interpretation  CXR resulted and reviewed  4567 CT head results noted  Will plan to reevaluate for disposition--preferentially inpatient psychiatric treatment  1034 Updated patient on results of workup; given patient's expressed suicidal ideation, I feel she would benefit from crisis evaluation and inpatient psychiatric treatment  She does have multiple comorbid medical conditions but none are to such an extent that she cannot undergo inpatient treatment  She was agreeable to this  Will call [facility] to evaluate patient  Rachid langley ETA 60 min  Rachid crisis worker in ED to see patient  1313 Per crisis worker: voluntary inpatient placement with 12  Anticipates admission will be difficult d/t multiple medical comorbidities but will start bedsearch now  1407 201 completed by patient  1441 Daily meds ordered  1842 Possible placement available as per crisis worker; patient declines Lantus as she states that she no longer takes it  [address] transferred to Dr Consuelo Kim at this time  Patient case discussed including hx/exam features and diagnostic workup thus far  Pending bed search and inpatient psych placement            HEART Risk Score      Most Recent Value   History  0 Filed at: 08/24/2018 0823   ECG  0 Filed at: 08/24/2018 9931   Age  1 Filed at: 08/24/2018 0823   Risk Factors  1 Filed at: 08/24/2018 0823   Troponin  0 Filed at: 08/24/2018 7545   Heart Score Risk Calculator   History  0 Filed at: 08/24/2018 0823   ECG  0 Filed at: 08/24/2018 0823   Age  1 Filed at: 08/24/2018 4905   Risk Factors  1 Filed at: 08/24/2018 0823   Troponin  0 Filed at: 08/24/2018 2456   HEART Score  2 Filed at: 08/24/2018 9033   HEART Score  2 Filed at: 08/24/2018 7189          MDM  CritCare Time    Disposition  Final diagnoses:   Suicidal ideation   Hypomagnesemia   Hyponatremia   Urinary tract infection     Time reflects when diagnosis was documented in both MDM as applicable and the Disposition within this note     Time User Action Codes Description Comment    8/24/2018 10:36 AM Dilip Fabens Add [D36 815] Suicidal ideation     8/24/2018 10:36 AM Dilip Jolly Add [E83 42] Hypomagnesemia     8/24/2018 10:36 AM Dilip Jolly Add [E87 1] Hyponatremia     8/24/2018 10:36 AM Dilip Fabens Add [N39 0] Urinary tract infection       ED Disposition     None      Follow-up Information    None         Patient's Medications   Discharge Prescriptions    No medications on file     No discharge procedures on file      ED Provider  Electronically Signed by           Kelly Roy DO  08/24/18 3737

## 2018-08-24 NOTE — ED NOTES
Genie from [address] [place] [address] called and stated their Dr wanted labs redrawn in the AM and the patient to be re-referred for possible acceptance        Caterina Barnhart RN  08/24/18 1920

## 2018-08-24 NOTE — ED NOTES
Patient transported to CT   velasquez Keenan accompany for continuous observation     Jamal Nunez RN  08/24/18 6831

## 2018-08-24 NOTE — ED NOTES
Bed search suspended at this time    Will continue bed search Saturday morning     Temi Carroll RN  08/24/18 4817

## 2018-08-24 NOTE — ED PROVIDER NOTES
History  Chief Complaint   Patient presents with    Possible UTI     Patient states that she think that she has a UTI  patient also states that she is dealing with anxiety and depression  Patient: Anastacio Hogan  63 y o /female  YOB: 1955  MRN: [id number]  PCP: Aj Sarmiento  Date of evaluation: 8/24/2018    (N B   Voice-recognition software may have been used in the preparation of this document  Occasional wrong word or "sound-alike" substitutions may have occurred due to the inherent limitations of voice recognition software  Interpretation should be guided by context )    Pt states that she has a UTI that she wants treated, and then she wants to go to "the [facility]"  Evaluation is limited by her inability to stay on topic - she is very tangential   Instead of describing her UTI symptoms, she goes off on a description of how her PCP screamed at her for not going to a urologist   She then complained that the urologist office won't see her any more and she doesn't know why  She says that she feels anxious and depressed  When asked about this, she gave a long story about trying to get into somewhere (nursing home?) because she doesn't feel safe at home because her neighbor smokes a lot and stays up all night  She says she calls people and sends in forms but then people deny that she called and say they never got the forms  She says that this "news" got her so upset that she thought about taking 12 of the Seroquel in the bottle beside her  She denies, however, that she would ever try to harm herself because she "is Christian and she knows what happens"  She says she can't put her pills in the organizer because it confuses her  Instead she is taking her meds out of a cereal bowl and a paper towel  History provided by:  Patient, medical records and EMS personnel      Prior to Admission Medications   Prescriptions Last Dose Informant Patient Reported? Taking?    Blood Glucose Monitoring Suppl (BLOOD GLUCOSE MONITOR SYSTEM) w/Device KIT   No No   Sig: Test blood sugars 3 times a day   MAG64 535 (64 Mg) MG   Yes Yes   NOVOFINE 32G X 6 MM MISC   Yes No   ONETOUCH DELICA LANCETS 00Y MISC   No No   Sig: Test blood sugars 3 times a day  PROCTOZONE-HC 2 5 % rectal cream   Yes No   QUEtiapine (SEROquel) 400 MG tablet   No Yes   Sig: TAKE ONE TABLET BY MOUTH TWICE A DAY   albuterol (PROVENTIL HFA,VENTOLIN HFA) 90 mcg/act inhaler   No Yes   Sig: Inhale 2 puffs every 6 (six) hours as needed for wheezing   aspirin (ECOTRIN LOW STRENGTH) 81 mg EC tablet   No Yes   Sig: Take 1 tablet (81 mg total) by mouth daily   estradiol (ESTRACE) 1 mg tablet   Yes Yes   Sig: Take 1 mg by mouth daily  fluticasone (FLONASE) 50 mcg/act nasal spray   No Yes   Si spray into each nostril daily   Patient taking differently: 1 spray into each nostril 2 (two) times a day     gabapentin (NEURONTIN) 800 mg tablet   Yes Yes   Sig: Take 800 mg by mouth 4 (four) times a day     glucose blood test strip   No No   Sig: Test blood sugars 3 times a day    hydrOXYzine pamoate (VISTARIL) 50 mg capsule   No Yes   Sig: Take 1 capsule (50 mg total) by mouth 3 (three) times a day   hydrocortisone 2 5 % cream   No No   Sig: Apply topically 2 (two) times a day   insulin glargine (LANTUS) 100 units/mL subcutaneous injection   No Yes   Sig: Inject 10 Units under the skin daily at bedtime   levETIRAcetam (KEPPRA) 750 mg tablet   Yes Yes   Sig: Take 1,500 mg by mouth 2 (two) times a day     levothyroxine 175 mcg tablet   No Yes   Sig: Take 1 tablet (175 mcg total) by mouth daily   lisinopril (ZESTRIL) 10 mg tablet   No Yes   Sig: Take 1 tablet (10 mg total) by mouth daily   loratadine (CLARITIN) 10 mg tablet   No Yes   Sig: Take 1 tablet (10 mg total) by mouth daily   meloxicam (MOBIC) 7 5 mg tablet   No Yes   Sig: Take 1 tablet (7 5 mg total) by mouth 2 (two) times a day   metFORMIN (GLUCOPHAGE) 1000 MG tablet   No Yes   Sig: Take 1 tablet (1,000 mg total) by mouth 2 (two) times a day with meals   montelukast (SINGULAIR) 10 mg tablet   No Yes   Sig: Take 1 tablet (10 mg total) by mouth daily at bedtime   norethindrone (AYGESTIN) 5 mg tablet   Yes Yes   Sig: Take 5 mg by mouth daily  omeprazole (PriLOSEC) 20 mg delayed release capsule   No Yes   Sig: Take 2 capsules (40 mg total) by mouth daily   oxybutynin (DITROPAN) 5 mg tablet   Yes Yes   Sig: Take 5 mg by mouth 3 (three) times a day     phenytoin (DILANTIN) 100 mg ER capsule   Yes Yes   Sig: Take by mouth 5 (five) times a day  polyethylene glycol (GLYCOLAX) powder   No Yes   Sig: Take 17 g by mouth daily   polyethylene glycol (MIRALAX) 17 g packet   No No   Sig: Take 17 g by mouth daily   simvastatin (ZOCOR) 80 mg tablet   No Yes   Sig: Take 1 tablet (80 mg total) by mouth daily at bedtime   triamcinolone (KENALOG) 0 1 % cream   No No   Sig: Apply topically 2 (two) times a day   valACYclovir (VALTREX) 500 mg tablet   Yes Yes   Sig: Take 1 tablet by mouth 2 (two) times a day   zonisamide (ZONEGRAN) 100 mg capsule   Yes Yes   Sig: Take 100 mg by mouth 5 (five) times a day        Facility-Administered Medications: None       Past Medical History:   Diagnosis Date    Asthma     Bipolar disorder ([facility] )     Chronic UTI (urinary tract infection)     COPD (chronic obstructive pulmonary disease) (HCC)     Diabetes mellitus (HCC)     Disease of thyroid gland     GERD (gastroesophageal reflux disease)     Seizure ([facility] )        Past Surgical History:   Procedure Laterality Date    ANKLE FRACTURE SURGERY Right     TUBAL LIGATION      VEIN LIGATION AND STRIPPING         History reviewed  No pertinent family history  I have reviewed and agree with the history as documented      Social History   Substance Use Topics    Smoking status: Never Smoker    Smokeless tobacco: Never Used    Alcohol use No        Review of Systems    Physical Exam  Physical Exam   Constitutional: She is oriented to person, place, and time  She appears well-developed and well-nourished  HENT:   Head: Normocephalic and atraumatic  Right Ear: Tympanic membrane and ear canal normal    Left Ear: Tympanic membrane and ear canal normal    Mouth/Throat: Oropharynx is clear and moist and mucous membranes are normal    Voice normal   Eyes: EOM are normal  Pupils are equal, round, and reactive to light  Cardiovascular: Normal rate and regular rhythm  Pulmonary/Chest: Effort normal    Abdominal: Soft  Bowel sounds are normal    Neurological: She is alert and oriented to person, place, and time  GCS eye subscore is 4  GCS verbal subscore is 5  GCS motor subscore is 6  Skin: Skin is warm and dry  Psychiatric: She has a normal mood and affect  Her speech is normal and behavior is normal  She is not agitated, not slowed, not withdrawn and not actively hallucinating  Speech is pressured and tangential but not rapid  Thoughts are disorganized  She is attentive  Nursing note and vitals reviewed        Vital Signs  ED Triage Vitals   Temperature Pulse Respirations Blood Pressure SpO2   08/24/18 0649 08/24/18 0649 08/24/18 0649 08/24/18 0649 08/24/18 0649   98 9 °F (37 2 °C) (!) 115 18 98/65 90 %      Temp Source Heart Rate Source Patient Position - Orthostatic VS BP Location FiO2 (%)   08/24/18 0649 08/24/18 0649 08/24/18 0649 08/24/18 0649 --   Temporal Monitor Lying Left arm       Pain Score       08/24/18 0851       Worst Possible Pain           Vitals:    08/24/18 0930 08/24/18 1030 08/24/18 1130 08/24/18 1643   BP: 98/54 107/74 100/67 104/68   Pulse: 91 90 88 86   Patient Position - Orthostatic VS: Sitting Lying Lying Lying       Visual Acuity      ED Medications  Medications   doxycycline hyclate (VIBRAMYCIN) capsule 100 mg (100 mg Oral Given 8/24/18 1038)   albuterol (PROVENTIL HFA,VENTOLIN HFA) inhaler 2 puff (not administered)   aspirin chewable tablet 81 mg (81 mg Oral Given 8/24/18 1731)   estradiol (ESTRACE) tablet 1 mg (1 mg Oral Given 8/24/18 1727)   fluticasone (FLONASE) 50 mcg/act nasal spray 1 spray (1 spray Each Nare Given 8/24/18 1735)   hydrOXYzine HCL (ATARAX) tablet 50 mg (50 mg Oral Given 8/24/18 1727)   levothyroxine tablet 175 mcg (not administered)   loratadine (CLARITIN) tablet 10 mg (10 mg Oral Given 8/24/18 1727)   magnesium oxide (MAG-OX) tablet 800 mg (800 mg Oral Given 8/24/18 1726)   meloxicam (MOBIC) tablet 7 5 mg (7 5 mg Oral Given 8/24/18 1729)   metFORMIN (GLUCOPHAGE) tablet 1,000 mg (1,000 mg Oral Given 8/24/18 1730)   montelukast (SINGULAIR) tablet 10 mg (not administered)   pantoprazole (PROTONIX) EC tablet 20 mg (not administered)   oxybutynin (DITROPAN) tablet 5 mg (5 mg Oral Given 8/24/18 1731)   QUEtiapine (SEROquel) tablet 400 mg (400 mg Oral Given 8/24/18 1726)   pravastatin (PRAVACHOL) tablet 80 mg (80 mg Oral Given 8/24/18 1729)   lisinopril (ZESTRIL) tablet 10 mg (10 mg Oral Not Given 8/24/18 1726)   valACYclovir (VALTREX) tablet 500 mg (500 mg Oral Given 8/24/18 1729)   levETIRAcetam (KEPPRA) tablet 1,500 mg (1,500 mg Oral Given 8/24/18 1730)   gabapentin (NEURONTIN) capsule 400 mg (400 mg Oral Given 8/24/18 1730)   zonisamide (ZONEGRAN) capsule 100 mg (100 mg Oral Given 8/24/18 1729)   norethindrone (AYGESTIN) tablet 5 mg (not administered)   phenytoin (DILANTIN) ER capsule 100 mg (100 mg Oral Given 8/24/18 1731)       Diagnostic Studies  Results Reviewed     Procedure Component Value Units Date/Time    Ethanol [[id number]]  (Normal) Collected:  08/24/18 0718    Lab Status:  Final result Specimen:  Blood from Arm, Right Updated:  08/24/18 0940     Ethanol Lvl <3 0 mg/dL     Rapid drug screen, urine [[id number]]  (Normal) Collected:  08/24/18 0802    Lab Status:  Final result Specimen:  Urine from Urine, Clean Catch Updated:  08/24/18 0825     Amph/Meth UR Negative     Barbiturate Ur Negative     Benzodiazepine Urine Negative     Cocaine Urine Negative     Methadone Urine Negative     Opiate Urine Negative     PCP Ur Negative     THC Urine Negative    Narrative:         FOR MEDICAL PURPOSES ONLY  IF CONFIRMATION NEEDED PLEASE CONTACT THE LAB WITHIN 5 DAYS  Drug Screen Cutoff Levels:  AMPHETAMINE/METHAMPHETAMINES  1000 ng/mL  BARBITURATES     200 ng/mL  BENZODIAZEPINES     200 ng/mL  COCAINE      300 ng/mL  METHADONE      300 ng/mL  OPIATES      300 ng/mL  PHENCYCLIDINE     25 ng/mL  THC       50 ng/mL    Urine Microscopic [[id number]]  (Abnormal) Collected:  08/24/18 0802    Lab Status:  Final result Specimen:  Urine from Urine, Clean Catch Updated:  08/24/18 0823     RBC, UA None Seen /hpf      WBC, UA 10-20 (A) /hpf      Epithelial Cells Moderate (A) /hpf      Bacteria, UA Occasional /hpf     Urine culture [[id number]] Collected:  08/24/18 0802    Lab Status: In process Specimen:  Urine from Urine, Clean Catch Updated:  08/24/18 0823    NT-BNP PRO [[id number]]  (Normal) Collected:  08/24/18 0718    Lab Status:  Final result Specimen:  Blood from Arm, Right Updated:  08/24/18 0821     NT-proBNP 76 pg/mL     UA w Reflex to Microscopic w Reflex to Culture [[id number]]  (Abnormal) Collected:  08/24/18 0802    Lab Status:  Final result Specimen:  Urine from Urine, Clean Catch Updated:  08/24/18 0816     Color, UA Yellow     Clarity, UA Slightly Cloudy     Specific Gravity, UA <=1 005     pH, UA 6 5     Leukocytes, UA Trace (A)     Nitrite, UA Positive (A)     Protein, UA Negative mg/dl      Glucose,  (1/10%) (A) mg/dl      Ketones, UA Negative mg/dl      Urobilinogen, UA 1 0 E U /dl      Bilirubin, UA Negative     Blood, UA Negative    TSH, 3rd generation [[id number]]  (Abnormal) Collected:  08/24/18 0718    Lab Status:  Final result Specimen:  Blood from Arm, Right Updated:  08/24/18 0756     TSH 3RD GENERATON 0 159 (L) uIU/mL     Narrative:         Patients undergoing fluorescein dye angiography may retain small amounts of fluorescein in the body for 48-72 hours post procedure   Samples containing fluorescein can produce falsely depressed TSH values  If the patient had this procedure,a specimen should be resubmitted post fluorescein clearance  The recommended reference ranges for TSH during pregnancy are as follows:  First trimester 0 1 to 2 5 uIU/mL  Second trimester  0 2 to 3 0 uIU/mL  Third trimester 0 3 to 3 0 uIU/m      Phenytoin level, total [[id number]]  (Abnormal) Collected:  08/24/18 0718    Lab Status:  Final result Specimen:  Blood from Arm, Right Updated:  08/24/18 0756     Phenytoin Lvl 7 7 (L) ug/mL     Acetone [[id number]]  (Normal) Collected:  08/24/18 0718    Lab Status:  Final result Specimen:  Blood from Arm, Right Updated:  08/24/18 0751     Acetone, Bld Negative    Troponin I [[id number]]  (Normal) Collected:  08/24/18 0718    Lab Status:  Final result Specimen:  Blood from Arm, Right Updated:  08/24/18 0750     Troponin I <0 02 ng/mL     Acetaminophen level [[id number]]  (Abnormal) Collected:  08/24/18 0718    Lab Status:  Final result Specimen:  Blood from Arm, Right Updated:  08/24/18 0750     Acetaminophen Level <2 0 (L) ug/mL     Comprehensive metabolic panel [[id number]]  (Abnormal) Collected:  08/24/18 0718    Lab Status:  Final result Specimen:  Blood from Arm, Right Updated:  08/24/18 0749     Sodium 127 (L) mmol/L      Potassium 3 6 mmol/L      Chloride 92 (L) mmol/L      CO2 24 mmol/L      Anion Gap 11 mmol/L      BUN 5 mg/dL      Creatinine 0 82 mg/dL      Glucose 186 (H) mg/dL      Calcium 8 4 mg/dL      AST 28 U/L      ALT 44 U/L      Alkaline Phosphatase 43 (L) U/L      Total Protein 6 0 (L) g/dL      Albumin 3 2 (L) g/dL      Total Bilirubin 0 30 mg/dL      eGFR 77 ml/min/1 73sq m     Narrative:         National Kidney Disease Education Program recommendations are as follows:  GFR calculation is accurate only with a steady state creatinine  Chronic Kidney disease less than 60 ml/min/1 73 sq  meters  Kidney failure less than 15 ml/min/1 73 sq  meters      Magnesium [[id number]] (Abnormal) Collected:  08/24/18 0718    Lab Status:  Final result Specimen:  Blood from Arm, Right Updated:  08/24/18 0749     Magnesium 1 3 (L) mg/dL     Salicylate level [[id number]]  (Abnormal) Collected:  08/24/18 0718    Lab Status:  Final result Specimen:  Blood from Arm, Right Updated:  14/94/82 5915     Salicylate Lvl <3 (L) mg/dL     Ammonia [[id number]]  (Abnormal) Collected:  08/24/18 0718    Lab Status:  Final result Specimen:  Blood from Arm, Right Updated:  08/24/18 0740     Ammonia 10 (L) umol/L     Protime-INR [[id number]]  (Abnormal) Collected:  08/24/18 0718    Lab Status:  Final result Specimen:  Blood from Arm, Right Updated:  08/24/18 0736     Protime 14 6 (H) seconds      INR 1 20 (H)    APTT [[id number]]  (Normal) Collected:  08/24/18 0718    Lab Status:  Final result Specimen:  Blood from Arm, Right Updated:  08/24/18 0736     PTT 26 seconds     CBC and differential [[id number]]  (Abnormal) Collected:  08/24/18 0718    Lab Status:  Final result Specimen:  Blood from Arm, Right Updated:  08/24/18 0730     WBC 5 66 Thousand/uL      RBC 3 49 (L) Million/uL      Hemoglobin 11 4 (L) g/dL      Hematocrit 33 4 (L) %      MCV 96 fL      MCH 32 7 pg      MCHC 34 1 g/dL      RDW 12 5 %      MPV 7 9 (L) fL      Platelets 095 Thousands/uL      nRBC 0 /100 WBCs      Neutrophils Relative 54 %      Immat GRANS % 1 %      Lymphocytes Relative 28 %      Monocytes Relative 10 %      Eosinophils Relative 6 %      Basophils Relative 1 %      Neutrophils Absolute 3 10 Thousands/µL      Immature Grans Absolute 0 07 Thousand/uL      Lymphocytes Absolute 1 58 Thousands/µL      Monocytes Absolute 0 55 Thousand/µL      Eosinophils Absolute 0 31 Thousand/µL      Basophils Absolute 0 05 Thousands/µL     Levetiracetam level [[id number]] Collected:  08/24/18 0718    Lab Status: In process Specimen:  Blood from Arm, Right Updated:  08/24/18 0723    Zonisamide level [[id number]] Collected:  08/24/18 0718    Lab Status:   In process Specimen:  Blood from Arm, Right Updated:  08/24/18 0723    Fingerstick Glucose (POCT) [[id number]]  (Abnormal) Collected:  08/24/18 0705    Lab Status:  Final result Updated:  08/24/18 0706     POC Glucose 201 (H) mg/dl                  CT head without contrast   Final Result by Patrick Henderson MD (08/24 0915)      No acute intracranial abnormality  Workstation performed: [id number]         XR chest 2 views   Final Result by Patrick Henderson MD (08/24 1665)      Plate like atelectasis right middle lobe  No other findings            Workstation performed: [id number]                    Procedures  Procedures       Phone Contacts  ED Phone Contact    ED Course  ED Course as of Aug 24 2036   Fri Aug 24, 2018   2271 Care transferred to Dr Anne Tena  [facility]  at this time  Case reviewed, including history, exam, and workup thus far    The following are pending:  CT head, CXR, BNP, UDS     0820 POC Glucose: (!) 201   0821 Ammonia: (!) 10   0821 Low but consistent PHENYTOIN LEVEL (DILANTIN): (!) 7 7   0821 Nml 4 months ago TSH 3RD GENERATON: (!) 0 159   0821 Persistently hyponatremic Sodium: (!) 127   0821 Nitrite, UA: (!) Positive   0822 Chronic or recurrent UTIs by history - micro pending Leukocytes, UA: (!) Trace   [id number] Plan to replete Magnesium: (!) 1 3         HEART Risk Score      Most Recent Value   History  0 Filed at: 08/24/2018 0823   ECG  0 Filed at: 08/24/2018 7875   Age  1 Filed at: 08/24/2018 6994   Risk Factors  1 Filed at: 08/24/2018 0823   Troponin  0 Filed at: 08/24/2018 0823   Heart Score Risk Calculator   History  0 Filed at: 08/24/2018 0823   ECG  0 Filed at: 08/24/2018 5780   Age  1 Filed at: 08/24/2018 4195   Risk Factors  1 Filed at: 08/24/2018 0823   Troponin  0 Filed at: 08/24/2018 5051   HEART Score  2 Filed at: 08/24/2018 6129   HEART Score  2 Filed at: 08/24/2018 8380                            [facility]  CritCare Time    Disposition  Final diagnoses:   Suicidal ideation Hypomagnesemia   Hyponatremia   Urinary tract infection     Time reflects when diagnosis was documented in both MDM as applicable and the Disposition within this note     Time User Action Codes Description Comment    8/24/2018 10:36 AM Ashu Motto Add [H47 087] Suicidal ideation     8/24/2018 10:36 AM Ashu Motto Add [E83 42] Hypomagnesemia     8/24/2018 10:36 AM Ashu Motto Add [E87 1] Hyponatremia     8/24/2018 10:36 AM Ashu Motto Add [N39 0] Urinary tract infection       ED Disposition     None      Follow-up Information    None         Patient's Medications   Discharge Prescriptions    DOXYCYCLINE HYCLATE (VIBRAMYCIN) 100 MG CAPSULE    Take 1 capsule (100 mg total) by mouth 2 (two) times a day for 10 days       Start Date: 8/24/2018 End Date: 9/3/2018       Order Dose: 100 mg       Quantity: 20 capsule    Refills: 0     No discharge procedures on file      ED Provider  Electronically Signed by           Nisha Murillo MD  08/24/18 2143

## 2018-08-25 VITALS
OXYGEN SATURATION: 96 % | SYSTOLIC BLOOD PRESSURE: 107 MMHG | HEART RATE: 90 BPM | BODY MASS INDEX: 39.68 KG/M2 | WEIGHT: 246.91 LBS | HEIGHT: 66 IN | TEMPERATURE: 98.2 F | RESPIRATION RATE: 20 BRPM | DIASTOLIC BLOOD PRESSURE: 78 MMHG

## 2018-08-25 LAB
ALBUMIN SERPL BCP-MCNC: 2.7 G/DL (ref 3.5–5)
ALP SERPL-CCNC: 47 U/L (ref 46–116)
ALT SERPL W P-5'-P-CCNC: 41 U/L (ref 12–78)
ANION GAP SERPL CALCULATED.3IONS-SCNC: 9 MMOL/L (ref 4–13)
AST SERPL W P-5'-P-CCNC: 24 U/L (ref 5–45)
BACTERIA UR CULT: NORMAL
BASOPHILS # BLD AUTO: 0.04 THOUSANDS/ΜL (ref 0–0.1)
BASOPHILS NFR BLD AUTO: 1 % (ref 0–1)
BILIRUB SERPL-MCNC: 0.3 MG/DL (ref 0.2–1)
BUN SERPL-MCNC: 8 MG/DL (ref 5–25)
CALCIUM SERPL-MCNC: 8.1 MG/DL (ref 8.3–10.1)
CHLORIDE SERPL-SCNC: 97 MMOL/L (ref 100–108)
CO2 SERPL-SCNC: 24 MMOL/L (ref 21–32)
CREAT SERPL-MCNC: 0.8 MG/DL (ref 0.6–1.3)
EOSINOPHIL # BLD AUTO: 0.55 THOUSAND/ΜL (ref 0–0.61)
EOSINOPHIL NFR BLD AUTO: 7 % (ref 0–6)
ERYTHROCYTE [DISTWIDTH] IN BLOOD BY AUTOMATED COUNT: 12.5 % (ref 11.6–15.1)
GFR SERPL CREATININE-BSD FRML MDRD: 80 ML/MIN/1.73SQ M
GLUCOSE SERPL-MCNC: 145 MG/DL (ref 65–140)
HCT VFR BLD AUTO: 33.1 % (ref 34.8–46.1)
HGB BLD-MCNC: 11.1 G/DL (ref 11.5–15.4)
IMM GRANULOCYTES # BLD AUTO: 0.06 THOUSAND/UL (ref 0–0.2)
IMM GRANULOCYTES NFR BLD AUTO: 1 % (ref 0–2)
LYMPHOCYTES # BLD AUTO: 1.91 THOUSANDS/ΜL (ref 0.6–4.47)
LYMPHOCYTES NFR BLD AUTO: 24 % (ref 14–44)
MAGNESIUM SERPL-MCNC: 1.6 MG/DL (ref 1.6–2.6)
MCH RBC QN AUTO: 32.3 PG (ref 26.8–34.3)
MCHC RBC AUTO-ENTMCNC: 33.5 G/DL (ref 31.4–37.4)
MCV RBC AUTO: 96 FL (ref 82–98)
MONOCYTES # BLD AUTO: 0.63 THOUSAND/ΜL (ref 0.17–1.22)
MONOCYTES NFR BLD AUTO: 8 % (ref 4–12)
NEUTROPHILS # BLD AUTO: 4.76 THOUSANDS/ΜL (ref 1.85–7.62)
NEUTS SEG NFR BLD AUTO: 59 % (ref 43–75)
NRBC BLD AUTO-RTO: 0 /100 WBCS
PLATELET # BLD AUTO: 343 THOUSANDS/UL (ref 149–390)
PMV BLD AUTO: 8 FL (ref 8.9–12.7)
POTASSIUM SERPL-SCNC: 3.9 MMOL/L (ref 3.5–5.3)
PROT SERPL-MCNC: 5.8 G/DL (ref 6.4–8.2)
RBC # BLD AUTO: 3.44 MILLION/UL (ref 3.81–5.12)
SODIUM SERPL-SCNC: 130 MMOL/L (ref 136–145)
WBC # BLD AUTO: 7.95 THOUSAND/UL (ref 4.31–10.16)

## 2018-08-25 PROCEDURE — 80053 COMPREHEN METABOLIC PANEL: CPT | Performed by: EMERGENCY MEDICINE

## 2018-08-25 PROCEDURE — 83735 ASSAY OF MAGNESIUM: CPT | Performed by: EMERGENCY MEDICINE

## 2018-08-25 PROCEDURE — 85025 COMPLETE CBC W/AUTO DIFF WBC: CPT | Performed by: EMERGENCY MEDICINE

## 2018-08-25 PROCEDURE — 36415 COLL VENOUS BLD VENIPUNCTURE: CPT | Performed by: EMERGENCY MEDICINE

## 2018-08-25 PROCEDURE — 99285 EMERGENCY DEPT VISIT HI MDM: CPT

## 2018-08-25 RX ORDER — LACTOBACILLUS RHAMNOSUS GG 10B CELL
1 CAPSULE ORAL DAILY
Status: DISCONTINUED | OUTPATIENT
Start: 2018-08-25 | End: 2018-08-25 | Stop reason: HOSPADM

## 2018-08-25 RX ADMIN — ALBUTEROL SULFATE 2 PUFF: 90 AEROSOL, METERED RESPIRATORY (INHALATION) at 09:12

## 2018-08-25 RX ADMIN — LEVOTHYROXINE SODIUM 175 MCG: 175 TABLET ORAL at 07:09

## 2018-08-25 RX ADMIN — OXYBUTYNIN CHLORIDE 5 MG: 5 TABLET ORAL at 09:39

## 2018-08-25 RX ADMIN — DOXYCYCLINE HYCLATE 100 MG: 100 CAPSULE ORAL at 10:16

## 2018-08-25 RX ADMIN — VALACYCLOVIR HYDROCHLORIDE 500 MG: 500 TABLET, FILM COATED ORAL at 09:38

## 2018-08-25 RX ADMIN — ESTRADIOL 1 MG: 1 TABLET ORAL at 09:37

## 2018-08-25 RX ADMIN — GABAPENTIN 400 MG: 100 CAPSULE ORAL at 09:15

## 2018-08-25 RX ADMIN — MELOXICAM 7.5 MG: 7.5 TABLET ORAL at 09:39

## 2018-08-25 RX ADMIN — PHENYTOIN SODIUM 100 MG: 100 CAPSULE ORAL at 07:27

## 2018-08-25 RX ADMIN — MAGNESIUM OXIDE TAB 400 MG (241.3 MG ELEMENTAL MG) 800 MG: 400 (241.3 MG) TAB at 09:13

## 2018-08-25 RX ADMIN — PANTOPRAZOLE SODIUM 20 MG: 20 TABLET, DELAYED RELEASE ORAL at 07:08

## 2018-08-25 RX ADMIN — LEVETIRACETAM 1500 MG: 500 TABLET ORAL at 09:38

## 2018-08-25 RX ADMIN — LORATADINE 10 MG: 10 TABLET ORAL at 09:14

## 2018-08-25 RX ADMIN — ASPIRIN 81 MG 81 MG: 81 TABLET ORAL at 09:14

## 2018-08-25 RX ADMIN — METFORMIN HYDROCHLORIDE 1000 MG: 500 TABLET, FILM COATED ORAL at 07:27

## 2018-08-25 RX ADMIN — Medication 1 CAPSULE: at 10:17

## 2018-08-25 RX ADMIN — ZONISAMIDE 100 MG: 100 CAPSULE ORAL at 09:39

## 2018-08-25 RX ADMIN — HYDROXYZINE HYDROCHLORIDE 50 MG: 25 TABLET, FILM COATED ORAL at 09:14

## 2018-08-25 RX ADMIN — QUETIAPINE FUMARATE 400 MG: 100 TABLET ORAL at 09:16

## 2018-08-25 RX ADMIN — FLUTICASONE PROPIONATE 1 SPRAY: 50 SPRAY, METERED NASAL at 09:41

## 2018-08-25 NOTE — ED PROVIDER NOTES
History  Chief Complaint   Patient presents with    Possible UTI     Patient states that she think that she has a UTI  patient also states that she is dealing with anxiety and depression  HPI    Prior to Admission Medications   Prescriptions Last Dose Informant Patient Reported? Taking? Blood Glucose Monitoring Suppl (BLOOD GLUCOSE MONITOR SYSTEM) w/Device KIT   No No   Sig: Test blood sugars 3 times a day   MAG64 535 (64 Mg) MG   Yes Yes   NOVOFINE 32G X 6 MM MISC   Yes No   ONETOUCH DELICA LANCETS 46E MISC   No No   Sig: Test blood sugars 3 times a day  PROCTOZONE-HC 2 5 % rectal cream   Yes No   QUEtiapine (SEROquel) 400 MG tablet   No Yes   Sig: TAKE ONE TABLET BY MOUTH TWICE A DAY   albuterol (PROVENTIL HFA,VENTOLIN HFA) 90 mcg/act inhaler   No Yes   Sig: Inhale 2 puffs every 6 (six) hours as needed for wheezing   aspirin (ECOTRIN LOW STRENGTH) 81 mg EC tablet   No Yes   Sig: Take 1 tablet (81 mg total) by mouth daily   estradiol (ESTRACE) 1 mg tablet   Yes Yes   Sig: Take 1 mg by mouth daily  fluticasone (FLONASE) 50 mcg/act nasal spray   No Yes   Si spray into each nostril daily   Patient taking differently: 1 spray into each nostril 2 (two) times a day     gabapentin (NEURONTIN) 800 mg tablet   Yes Yes   Sig: Take 800 mg by mouth 4 (four) times a day     glucose blood test strip   No No   Sig: Test blood sugars 3 times a day    hydrOXYzine pamoate (VISTARIL) 50 mg capsule   No Yes   Sig: Take 1 capsule (50 mg total) by mouth 3 (three) times a day   hydrocortisone 2 5 % cream   No No   Sig: Apply topically 2 (two) times a day   insulin glargine (LANTUS) 100 units/mL subcutaneous injection   No Yes   Sig: Inject 10 Units under the skin daily at bedtime   levETIRAcetam (KEPPRA) 750 mg tablet   Yes Yes   Sig: Take 1,500 mg by mouth 2 (two) times a day     levothyroxine 175 mcg tablet   No Yes   Sig: Take 1 tablet (175 mcg total) by mouth daily   lisinopril (ZESTRIL) 10 mg tablet   No Yes   Sig: Take 1 tablet (10 mg total) by mouth daily   loratadine (CLARITIN) 10 mg tablet   No Yes   Sig: Take 1 tablet (10 mg total) by mouth daily   meloxicam (MOBIC) 7 5 mg tablet   No Yes   Sig: Take 1 tablet (7 5 mg total) by mouth 2 (two) times a day   metFORMIN (GLUCOPHAGE) 1000 MG tablet   No Yes   Sig: Take 1 tablet (1,000 mg total) by mouth 2 (two) times a day with meals   montelukast (SINGULAIR) 10 mg tablet   No Yes   Sig: Take 1 tablet (10 mg total) by mouth daily at bedtime   norethindrone (AYGESTIN) 5 mg tablet   Yes Yes   Sig: Take 5 mg by mouth daily  omeprazole (PriLOSEC) 20 mg delayed release capsule   No Yes   Sig: Take 2 capsules (40 mg total) by mouth daily   oxybutynin (DITROPAN) 5 mg tablet   Yes Yes   Sig: Take 5 mg by mouth 3 (three) times a day     phenytoin (DILANTIN) 100 mg ER capsule   Yes Yes   Sig: Take by mouth 5 (five) times a day       polyethylene glycol (GLYCOLAX) powder   No Yes   Sig: Take 17 g by mouth daily   polyethylene glycol (MIRALAX) 17 g packet   No No   Sig: Take 17 g by mouth daily   simvastatin (ZOCOR) 80 mg tablet   No Yes   Sig: Take 1 tablet (80 mg total) by mouth daily at bedtime   triamcinolone (KENALOG) 0 1 % cream   No No   Sig: Apply topically 2 (two) times a day   valACYclovir (VALTREX) 500 mg tablet   Yes Yes   Sig: Take 1 tablet by mouth 2 (two) times a day   zonisamide (ZONEGRAN) 100 mg capsule   Yes Yes   Sig: Take 100 mg by mouth 5 (five) times a day        Facility-Administered Medications: None       Past Medical History:   Diagnosis Date    Asthma     Bipolar disorder ([facility] [address]     Chronic UTI (urinary tract infection)     COPD (chronic obstructive pulmonary disease) (HCC)     Diabetes mellitus (HCC)     Disease of thyroid gland     GERD (gastroesophageal reflux disease)     Seizure (HCC)        Past Surgical History:   Procedure Laterality Date    ANKLE FRACTURE SURGERY Right     TUBAL LIGATION      VEIN LIGATION AND STRIPPING         History reviewed  No pertinent family history  I have reviewed and agree with the history as documented      Social History   Substance Use Topics    Smoking status: Never Smoker    Smokeless tobacco: Never Used    Alcohol use No        Review of Systems    Physical Exam  Physical Exam    Vital Signs  ED Triage Vitals   Temperature Pulse Respirations Blood Pressure SpO2   08/24/18 0649 08/24/18 0649 08/24/18 0649 08/24/18 0649 08/24/18 0649   98 9 °F (37 2 °C) (!) 115 18 98/65 90 %      Temp Source Heart Rate Source Patient Position - Orthostatic VS BP Location FiO2 (%)   08/24/18 0649 08/24/18 0649 08/24/18 0649 08/24/18 0649 --   Temporal Monitor Lying Left arm       Pain Score       08/24/18 0851       Worst Possible Pain           Vitals:    08/24/18 1643 08/24/18 2300 08/25/18 0440 08/25/18 0856   BP: 104/68 99/59 126/95 107/78   Pulse: 86 96 80 90   Patient Position - Orthostatic VS: Lying Sitting Lying Lying       Visual Acuity      ED Medications  Medications   doxycycline hyclate (VIBRAMYCIN) capsule 100 mg (100 mg Oral Given 8/25/18 1016)   albuterol (PROVENTIL HFA,VENTOLIN HFA) inhaler 2 puff (2 puffs Inhalation Given 8/25/18 0912)   aspirin chewable tablet 81 mg (81 mg Oral Given 8/25/18 0914)   estradiol (ESTRACE) tablet 1 mg (1 mg Oral Given 8/25/18 0937)   fluticasone (FLONASE) 50 mcg/act nasal spray 1 spray (1 spray Each Nare Given 8/25/18 0941)   hydrOXYzine HCL (ATARAX) tablet 50 mg (50 mg Oral Given 8/25/18 0914)   levothyroxine tablet 175 mcg (175 mcg Oral Given 8/25/18 0709)   loratadine (CLARITIN) tablet 10 mg (10 mg Oral Given 8/25/18 0914)   magnesium oxide (MAG-OX) tablet 800 mg (800 mg Oral Given 8/25/18 0913)   meloxicam (MOBIC) tablet 7 5 mg (7 5 mg Oral Given 8/25/18 0939)   metFORMIN (GLUCOPHAGE) tablet 1,000 mg (1,000 mg Oral Given 8/25/18 0727)   montelukast (SINGULAIR) tablet 10 mg (10 mg Oral Given 8/24/18 8518)   pantoprazole (PROTONIX) EC tablet 20 mg (20 mg Oral Given 8/25/18 6061) oxybutynin (DITROPAN) tablet 5 mg (5 mg Oral Given 8/25/18 0939)   QUEtiapine (SEROquel) tablet 400 mg (400 mg Oral Given 8/25/18 0916)   pravastatin (PRAVACHOL) tablet 80 mg (80 mg Oral Given 8/24/18 1729)   lisinopril (ZESTRIL) tablet 10 mg (10 mg Oral Not Given 8/25/18 0941)   valACYclovir (VALTREX) tablet 500 mg (500 mg Oral Given 8/25/18 0938)   levETIRAcetam (KEPPRA) tablet 1,500 mg (1,500 mg Oral Given 8/25/18 0938)   gabapentin (NEURONTIN) capsule 400 mg (400 mg Oral Given 8/25/18 0915)   zonisamide (ZONEGRAN) capsule 100 mg (100 mg Oral Given 8/25/18 0939)   norethindrone (AYGESTIN) tablet 5 mg (not administered)   phenytoin (DILANTIN) ER capsule 100 mg (100 mg Oral Given 8/25/18 0727)   [facility] 1 capsule (1 capsule Oral Given 8/25/18 1017)       Diagnostic Studies  Results Reviewed     Procedure Component Value Units Date/Time    Comprehensive metabolic panel [[id number]]  (Abnormal) Collected:  08/25/18 0736    Lab Status:  Final result Specimen:  Blood from Arm, Left Updated:  08/25/18 0810     Sodium 130 (L) mmol/L      Potassium 3 9 mmol/L      Chloride 97 (L) mmol/L      CO2 24 mmol/L      Anion Gap 9 mmol/L      BUN 8 mg/dL      Creatinine 0 80 mg/dL      Glucose 145 (H) mg/dL      Calcium 8 1 (L) mg/dL      AST 24 U/L      ALT 41 U/L      Alkaline Phosphatase 47 U/L      Total Protein 5 8 (L) g/dL      Albumin 2 7 (L) g/dL      Total Bilirubin 0 30 mg/dL      eGFR 80 ml/min/1 73sq m     Narrative:         National Kidney Disease Education Program recommendations are as follows:  GFR calculation is accurate only with a steady state creatinine  Chronic Kidney disease less than 60 ml/min/1 73 sq  meters  Kidney failure less than 15 ml/min/1 73 sq  meters      Magnesium [[id number]]  (Normal) Collected:  08/25/18 0736    Lab Status:  Final result Specimen:  Blood from Arm, Left Updated:  08/25/18 0810     Magnesium 1 6 mg/dL     Urine culture [[id number]] Collected:  08/24/18 0802 Lab Status:  Final result Specimen:  Urine from Urine, Clean Catch Updated:  08/25/18 0806     Urine Culture 80,000-89,000 cfu/ml     CBC and differential [[id number]]  (Abnormal) Collected:  08/25/18 0736    Lab Status:  Final result Specimen:  Blood from Arm, Left Updated:  08/25/18 0742     WBC 7 95 Thousand/uL      RBC 3 44 (L) Million/uL      Hemoglobin 11 1 (L) g/dL      Hematocrit 33 1 (L) %      MCV 96 fL      MCH 32 3 pg      MCHC 33 5 g/dL      RDW 12 5 %      MPV 8 0 (L) fL      Platelets 761 Thousands/uL      nRBC 0 /100 WBCs      Neutrophils Relative 59 %      Immat GRANS % 1 %      Lymphocytes Relative 24 %      Monocytes Relative 8 %      Eosinophils Relative 7 (H) %      Basophils Relative 1 %      Neutrophils Absolute 4 76 Thousands/µL      Immature Grans Absolute 0 06 Thousand/uL      Lymphocytes Absolute 1 91 Thousands/µL      Monocytes Absolute 0 63 Thousand/µL      Eosinophils Absolute 0 55 Thousand/µL      Basophils Absolute 0 04 Thousands/µL     Ethanol [[id number]]  (Normal) Collected:  08/24/18 0718    Lab Status:  Final result Specimen:  Blood from Arm, Right Updated:  08/24/18 0940     Ethanol Lvl <3 0 mg/dL     Rapid drug screen, urine [[id number]]  (Normal) Collected:  08/24/18 0802    Lab Status:  Final result Specimen:  Urine from Urine, Clean Catch Updated:  08/24/18 0825     Amph/Meth UR Negative     Barbiturate Ur Negative     Benzodiazepine Urine Negative     Cocaine Urine Negative     Methadone Urine Negative     Opiate Urine Negative     PCP Ur Negative     THC Urine Negative    Narrative:         FOR MEDICAL PURPOSES ONLY  IF CONFIRMATION NEEDED PLEASE CONTACT THE LAB WITHIN 5 DAYS      Drug Screen Cutoff Levels:  AMPHETAMINE/METHAMPHETAMINES  1000 ng/mL  BARBITURATES     200 ng/mL  BENZODIAZEPINES     200 ng/mL  COCAINE      300 ng/mL  METHADONE      300 ng/mL  OPIATES      300 ng/mL  PHENCYCLIDINE     25 ng/mL  THC       50 ng/mL    Urine Microscopic [[id number]]  (Abnormal) Collected:  08/24/18 0802    Lab Status:  Final result Specimen:  Urine from Urine, Clean Catch Updated:  08/24/18 0823     RBC, UA None Seen /hpf      WBC, UA 10-20 (A) /hpf      Epithelial Cells Moderate (A) /hpf      Bacteria, UA Occasional /hpf     NT-BNP PRO [[id number]]  (Normal) Collected:  08/24/18 0718    Lab Status:  Final result Specimen:  Blood from Arm, Right Updated:  08/24/18 0821     NT-proBNP 76 pg/mL     UA w Reflex to Microscopic w Reflex to Culture [[id number]]  (Abnormal) Collected:  08/24/18 0802    Lab Status:  Final result Specimen:  Urine from Urine, Clean Catch Updated:  08/24/18 0816     Color, UA Yellow     Clarity, UA Slightly Cloudy     Specific Gravity, UA <=1 005     pH, UA 6 5     Leukocytes, UA Trace (A)     Nitrite, UA Positive (A)     Protein, UA Negative mg/dl      Glucose,  (1/10%) (A) mg/dl      Ketones, UA Negative mg/dl      Urobilinogen, UA 1 0 E U /dl      Bilirubin, UA Negative     Blood, UA Negative    TSH, 3rd generation [[id number]]  (Abnormal) Collected:  08/24/18 0718    Lab Status:  Final result Specimen:  Blood from Arm, Right Updated:  08/24/18 0756     TSH 3RD GENERATON 0 159 (L) uIU/mL     Narrative:         Patients undergoing fluorescein dye angiography may retain small amounts of fluorescein in the body for 48-72 hours post procedure  Samples containing fluorescein can produce falsely depressed TSH values  If the patient had this procedure,a specimen should be resubmitted post fluorescein clearance            The recommended reference ranges for TSH during pregnancy are as follows:  First trimester 0 1 to 2 5 uIU/mL  Second trimester  0 2 to 3 0 uIU/mL  Third trimester 0 3 to 3 0 uIU/m      Phenytoin level, total [[id number]]  (Abnormal) Collected:  08/24/18 0718    Lab Status:  Final result Specimen:  Blood from Arm, Right Updated:  08/24/18 0756     Phenytoin Lvl 7 7 (L) ug/mL     Acetone [[id number]]  (Normal) Collected:  08/24/18 0718    Lab Status:  Final result Specimen:  Blood from Arm, Right Updated:  08/24/18 0751     Acetone, Bld Negative    Troponin I [[id number]]  (Normal) Collected:  08/24/18 0718    Lab Status:  Final result Specimen:  Blood from Arm, Right Updated:  08/24/18 0750     Troponin I <0 02 ng/mL     Acetaminophen level [[id number]]  (Abnormal) Collected:  08/24/18 0718    Lab Status:  Final result Specimen:  Blood from Arm, Right Updated:  08/24/18 0750     Acetaminophen Level <2 0 (L) ug/mL     Comprehensive metabolic panel [[id number]]  (Abnormal) Collected:  08/24/18 0718    Lab Status:  Final result Specimen:  Blood from Arm, Right Updated:  08/24/18 0749     Sodium 127 (L) mmol/L      Potassium 3 6 mmol/L      Chloride 92 (L) mmol/L      CO2 24 mmol/L      Anion Gap 11 mmol/L      BUN 5 mg/dL      Creatinine 0 82 mg/dL      Glucose 186 (H) mg/dL      Calcium 8 4 mg/dL      AST 28 U/L      ALT 44 U/L      Alkaline Phosphatase 43 (L) U/L      Total Protein 6 0 (L) g/dL      Albumin 3 2 (L) g/dL      Total Bilirubin 0 30 mg/dL      eGFR 77 ml/min/1 73sq m     Narrative:         National Kidney Disease Education Program recommendations are as follows:  GFR calculation is accurate only with a steady state creatinine  Chronic Kidney disease less than 60 ml/min/1 73 sq  meters  Kidney failure less than 15 ml/min/1 73 sq  meters      Magnesium [[id number]]  (Abnormal) Collected:  08/24/18 0718    Lab Status:  Final result Specimen:  Blood from Arm, Right Updated:  08/24/18 0749     Magnesium 1 3 (L) mg/dL     Salicylate level [[id number]]  (Abnormal) Collected:  08/24/18 0718    Lab Status:  Final result Specimen:  Blood from Arm, Right Updated:  97/76/03 1478     Salicylate Lvl <3 (L) mg/dL     Ammonia [[id number]]  (Abnormal) Collected:  08/24/18 0718    Lab Status:  Final result Specimen:  Blood from Arm, Right Updated:  08/24/18 0740     Ammonia 10 (L) umol/L     Protime-INR [[id number]]  (Abnormal) Collected:  08/24/18 0718    Lab Status:  Final result Specimen:  Blood from Arm, Right Updated:  08/24/18 0736     Protime 14 6 (H) seconds      INR 1 20 (H)    APTT [[id number]]  (Normal) Collected:  08/24/18 0718    Lab Status:  Final result Specimen:  Blood from Arm, Right Updated:  08/24/18 0736     PTT 26 seconds     CBC and differential [[id number]]  (Abnormal) Collected:  08/24/18 0718    Lab Status:  Final result Specimen:  Blood from Arm, Right Updated:  08/24/18 0730     WBC 5 66 Thousand/uL      RBC 3 49 (L) Million/uL      Hemoglobin 11 4 (L) g/dL      Hematocrit 33 4 (L) %      MCV 96 fL      MCH 32 7 pg      MCHC 34 1 g/dL      RDW 12 5 %      MPV 7 9 (L) fL      Platelets 478 Thousands/uL      nRBC 0 /100 WBCs      Neutrophils Relative 54 %      Immat GRANS % 1 %      Lymphocytes Relative 28 %      Monocytes Relative 10 %      Eosinophils Relative 6 %      Basophils Relative 1 %      Neutrophils Absolute 3 10 Thousands/µL      Immature Grans Absolute 0 07 Thousand/uL      Lymphocytes Absolute 1 58 Thousands/µL      Monocytes Absolute 0 55 Thousand/µL      Eosinophils Absolute 0 31 Thousand/µL      Basophils Absolute 0 05 Thousands/µL     Levetiracetam level [[id number]] Collected:  08/24/18 0718    Lab Status: In process Specimen:  Blood from Arm, Right Updated:  08/24/18 0723    Zonisamide level [[id number]] Collected:  08/24/18 0718    Lab Status: In process Specimen:  Blood from Arm, Right Updated:  08/24/18 0723    Fingerstick Glucose (POCT) [[id number]]  (Abnormal) Collected:  08/24/18 0705    Lab Status:  Final result Updated:  08/24/18 0706     POC Glucose 201 (H) mg/dl                  CT head without contrast   Final Result by Jeff Gonzales MD (08/24 8248)      No acute intracranial abnormality  Workstation performed: [id number]         XR chest 2 views   Final Result by Jeff Gonzales MD (08/24 9948)      Plate like atelectasis right middle lobe    No other findings            Workstation performed: [id number] Procedures  Procedures       Phone Contacts  ED Phone Contact    ED Course         HEART Risk Score      Most Recent Value   History  0 Filed at: 08/24/2018 0823   ECG  0 Filed at: 08/24/2018 3449   Age  1 Filed at: 08/24/2018 2443   Risk Factors  1 Filed at: 08/24/2018 0823   Troponin  0 Filed at: 08/24/2018 0823   Heart Score Risk Calculator   History  0 Filed at: 08/24/2018 0823   ECG  0 Filed at: 08/24/2018 7243   Age  1 Filed at: 08/24/2018 3185   Risk Factors  1 Filed at: 08/24/2018 0823   Troponin  0 Filed at: 08/24/2018 0332   HEART Score  2 Filed at: 08/24/2018 2967   HEART Score  2 Filed at: 08/24/2018 5899                            MDM  Number of Diagnoses or Management Options  Hypomagnesemia:   Hyponatremia:   Suicidal ideation:   Urinary tract infection:   Diagnosis management comments: Tele psychiatry saw patient  They do not feel she needs admission at this time  She is not voicing any suicidal ideation at this time  They feel her voices are chronic  They feel she is appropriate for discharge and outpatient management         Amount and/or Complexity of Data Reviewed  Clinical lab tests: ordered and reviewed  Discuss the patient with other providers: yes      CritCare Time    Disposition  Final diagnoses:   Hypomagnesemia   Hyponatremia   Urinary tract infection   B[facility] disorder ([facility]     Time reflects when diagnosis was documented in both MDM as applicable and the Disposition within this note     Time User Action Codes Description Comment    8/24/2018 10:36 AM Robyn Grise Add [H97 667] Suicidal ideation     8/24/2018 10:36 AM Robyn Grise Add [E83 42] Hypomagnesemia     8/24/2018 10:36 AM Robyn Grise Add [E87 1] Hyponatremia     8/24/2018 10:36 AM Robyn Grise Add [N39 0] Urinary tract infection     8/25/2018 10:28 AM [place] Modify [E83 42] Hypomagnesemia     8/25/2018 10:28 AM [place] Remove [R45 851] Suicidal ideation     8/25/2018 10:28 AM Shagufta Smaller Add [F31 9] Bipolar disorder ([facility] [address] )     8/25/2018 10:28 AM Shagufta Smaller Modify [E83 42] Hypomagnesemia     8/25/2018 10:28 AM Shagufta Smaller Modify [F31 9] Bipolar disorder [facility])       ED Disposition     ED Disposition Condition Comment    Discharge  Kassi Cerna discharge to home/self care  Condition at discharge: Good        Follow-up Information     Follow up With Specialties Details Why [address], [address] [facility], Nurse Practitioner In 2 days  [facility] [address]      [address]   Call for urology referral  [phone]            Patient's Medications   Discharge Prescriptions    DOXYCYCLINE HYCLATE (VIBRAMYCIN) 100 MG CAPSULE    Take 1 capsule (100 mg total) by mouth 2 (two) times a day for 10 days       Start Date: 8/24/2018 End Date: 9/3/2018       Order Dose: 100 mg       Quantity: 20 capsule    Refills: 0     No discharge procedures on file      ED Provider  Electronically Signed by           Najma Elizalde MD  08/25/18 7572

## 2018-08-25 NOTE — ED NOTES
1:1 discontinued  Assisted  To dress  Valuables returned to patient by Security    Patient called [employer] for a ride home       Tre Mohan RN  08/25/18 5765

## 2018-08-25 NOTE — DISCHARGE INSTRUCTIONS
Bipolar Disorder   WHAT YOU NEED TO KNOW:   Bipolar disorder is a long-term chemical imbalance that causes rapid changes in mood and behavior  High moods are called vicky  Low moods are called depression  Sometimes you will feel manic and sometimes you will feel depressed  You can have alternating episodes of vicky and depression  This is called a mixed bipolar state  DISCHARGE INSTRUCTIONS:   Contact your healthcare provider or psychiatrist if:   · You are having trouble managing your bipolar disorder  · You cannot sleep, or are sleeping all the time  · You cannot eat, or are eating more than usual     · You feel dizzy or your stomach is upset  · You cannot make it to your next meeting with your healthcare provider  · You have questions or concerns about your condition or care  Medicines:   · Medicines  may be given to help keep your mood stable, or to help you sleep  Changes in medicine are often needed as your bipolar disorder changes  · Take your medicine as directed  Contact your healthcare provider if you think your medicine is not helping or if you have side effects  Tell him or her if you are allergic to any medicine  Keep a list of the medicines, vitamins, and herbs you take  Include the amounts, and when and why you take them  Bring the list or the pill bottles to follow-up visits  Carry your medicine list with you in case of an emergency  Follow up with your healthcare provider or psychiatrist as directed: You may need to return for blood tests to monitor the levels of bipolar medicine in your blood  Manage bipolar disorder:  Watch for triggers of bipolar disorder symptoms, such as stress  Learn new ways to relax, such as deep breathing, to manage your stress  Tell someone if you feel a manic or depressive period might be coming on  Ask a friend or family member to help watch you for bipolar symptoms  Work to develop skills that will help you manage bipolar disorder   You may need to make lifestyle changes  Ask your healthcare provider or psychiatrist for resources  For support and more information:   · [address] [facility], [address] [facility], [address], [address]  [place] [address]   Phone: [phone]  Phone: [phone]  Web Address: Skyler schaefer  · Depression and [address] ([place])  [address] [facility], [facility] [place] , [address]  Phone: [phone]  Web Address: [email or web address]  org   © 2017 [facility] Information is for End User's use only and may not be sold, redistributed or otherwise used for commercial purposes  All illustrations and images included in CareNotes® are the copyrighted property of A D [employer] M [employer]  or Edgardo Chappell  The above information is an  only  It is not intended as medical advice for individual conditions or treatments  Talk to your doctor, nurse or pharmacist before following any medical regimen to see if it is safe and effective for you  Urinary Traction Infection in Older Adults   WHAT YOU NEED TO KNOW:   A urinary tract infection (UTI) is caused by bacteria that get inside your urinary tract  Your urinary tract includes your kidneys, ureters, bladder, and urethra  Urine is made in your kidneys, and it flows from the ureters to the bladder  Urine leaves the bladder through the urethra  A UTI is more common in your lower urinary tract, which includes your bladder and urethra  DISCHARGE INSTRUCTIONS:   Return to the emergency department if:   · You are urinating very little or not at all  · You are vomiting  · You have a high fever with shaking chills  · You have side or back pain that gets worse  Contact your healthcare provider if:   · You have a fever  · You are a woman and you have increased white or yellow discharge from your vagina      · You do not feel better after 2 days of taking antibiotics  · You have questions or concerns about your condition or care  Medicines:   · Medicines  help treat the bacterial infection or decrease pain and burning when you urinate  You may also need medicines to decrease the urge to urinate often  Your healthcare provider may recommend cranberry juice or cranberry supplements to help decrease your symptoms  · Take your medicine as directed  Contact your healthcare provider if you think your medicine is not helping or if you have side effects  Tell him or her if you are allergic to any medicine  Keep a list of the medicines, vitamins, and herbs you take  Include the amounts, and when and why you take them  Bring the list or the pill bottles to follow-up visits  Carry your medicine list with you in case of an emergency  Self-care:   · Urinate when you feel the urge  Do not hold your urine because bacteria can grow in the bladder if urine stays in the bladder too long  It may be helpful to urinate at least every 3 to 4 hours  · Drink liquids as directed  Liquids can help flush bacteria from your urinary tract  Ask how much liquid to drink each day and which liquids are best for you  You may need to drink more liquids than usual to help flush out the bacteria  Do not drink alcohol, caffeine, and citrus juices  These can irritate your bladder and increase your symptoms  · Apply heat  on your abdomen for 20 to 30 minutes every 2 hours for as many days as directed  Heat helps decrease discomfort and pressure in your bladder  Prevent a UTI:   · Women should wipe front to back  after urinating or having a bowel movement  This may prevent germs from getting into the urinary tract  · Urinate after you have sex  to flush away bacteria that can enter your urinary tract during sex  · Wear cotton underwear and clothes that fit loose  Tight pants and nylon underwear can trap moisture and cause bacteria to grow    Follow up with your healthcare provider as directed:  Write down your questions so you remember to ask them during your visits  © 2017 [address] [employer] Information is for End User's use only and may not be sold, redistributed or otherwise used for commercial purposes  All illustrations and images included in CareNotes® are the copyrighted property of A D A M , Inc  or Edgardo Chappell  The above information is an  only  It is not intended as medical advice for individual conditions or treatments  Talk to your doctor, nurse or pharmacist before following any medical regimen to see if it is safe and effective for you

## 2018-08-25 NOTE — ED NOTES
Dr Henderson does not advise inpatient care  Stated patient not verbalizing suicidal ideations        David Domínguez RN  08/25/18 8985

## 2018-08-25 NOTE — ED NOTES
Patient incontinent of urine  Clean briefs and pants provided  Patient washing up and brushing teeth  Patient allowed to keep walker at bedside per Dr Nitin Parker  Patient is allowed to take Culturelle from personal stock per Dr Nitin Hardin RN  08/24/18 7848

## 2018-08-25 NOTE — ED NOTES
jose d at [employer] we help notified of bed search ending due to pt being discharged     Maria Eugenia Alva RN  08/25/18 8752

## 2018-08-25 NOTE — ED NOTES
Patient spoke with Dr Santiago Vines called back and spoke with Dr Srinath Pete       [facility]  08/25/18 3556

## 2018-08-25 NOTE — ED NOTES
Patient stated at this time when asked if she wishes to be dead patient said "yes I feel like it would just be easier, I have no life at home anyway " Patient also stated she had thoughts in the past of slitting her throat   Patients Suicide Risk Assessment now - [address] [facility], BARB  08/24/18 595 [facility], RN  08/24/18 5123

## 2018-08-25 NOTE — ED NOTES
Crisis called  Requested morning lab work results to  Continue bed search       Derrick Vega RN  08/25/18 3070

## 2018-08-27 LAB
LEVETIRACETAM SERPL-MCNC: 26.7 UG/ML (ref 10–40)
ZONISAMIDE SERPL-MCNC: 9.9 UG/ML (ref 10–40)

## 2018-08-29 DIAGNOSIS — N39.0 URINARY TRACT INFECTION WITHOUT HEMATURIA, SITE UNSPECIFIED: Primary | ICD-10-CM

## 2018-08-29 RX ORDER — DOXYCYCLINE HYCLATE 100 MG/1
100 CAPSULE ORAL 2 TIMES DAILY
Qty: 10 CAPSULE | Refills: 0 | OUTPATIENT
Start: 2018-08-29 | End: 2018-09-03

## 2018-09-14 DIAGNOSIS — K21.9 GERD WITHOUT ESOPHAGITIS: ICD-10-CM

## 2018-09-14 DIAGNOSIS — Z79.4 TYPE 2 DIABETES MELLITUS WITH HYPERGLYCEMIA, WITH LONG-TERM CURRENT USE OF INSULIN (HCC): ICD-10-CM

## 2018-09-14 DIAGNOSIS — F41.9 ANXIETY: ICD-10-CM

## 2018-09-14 DIAGNOSIS — F31.78 BIPOLAR DISORDER, IN FULL REMISSION, MOST RECENT EPISODE MIXED (HCC): ICD-10-CM

## 2018-09-14 DIAGNOSIS — E11.65 TYPE 2 DIABETES MELLITUS WITH HYPERGLYCEMIA, WITH LONG-TERM CURRENT USE OF INSULIN (HCC): ICD-10-CM

## 2018-09-14 RX ORDER — OMEPRAZOLE 20 MG/1
20 CAPSULE, DELAYED RELEASE ORAL DAILY
Qty: 120 CAPSULE | Refills: 3 | Status: SHIPPED | OUTPATIENT
Start: 2018-09-14 | End: 2018-11-09 | Stop reason: SDUPTHER

## 2018-09-14 RX ORDER — HYDROXYZINE PAMOATE 50 MG/1
50 CAPSULE ORAL 3 TIMES DAILY
Qty: 90 CAPSULE | Refills: 3 | Status: SHIPPED | OUTPATIENT
Start: 2018-09-14 | End: 2019-02-14 | Stop reason: SDUPTHER

## 2018-09-14 RX ORDER — LEVOTHYROXINE SODIUM 175 UG/1
175 TABLET ORAL DAILY
Qty: 30 TABLET | Refills: 3 | Status: SHIPPED | OUTPATIENT
Start: 2018-09-14 | End: 2018-10-22 | Stop reason: SDUPTHER

## 2018-09-14 RX ORDER — QUETIAPINE FUMARATE 400 MG/1
400 TABLET, FILM COATED ORAL 2 TIMES DAILY
Qty: 60 TABLET | Refills: 3 | Status: SHIPPED | OUTPATIENT
Start: 2018-09-14 | End: 2019-02-13 | Stop reason: SDUPTHER

## 2018-10-05 DIAGNOSIS — J30.2 SEASONAL ALLERGIC RHINITIS, UNSPECIFIED TRIGGER: ICD-10-CM

## 2018-10-05 RX ORDER — FLUTICASONE PROPIONATE 50 MCG
SPRAY, SUSPENSION (ML) NASAL
Qty: 16 G | Refills: 0 | Status: SHIPPED | OUTPATIENT
Start: 2018-10-05 | End: 2019-06-28 | Stop reason: SDUPTHER

## 2018-10-07 ENCOUNTER — APPOINTMENT (EMERGENCY)
Dept: CT IMAGING | Facility: HOSPITAL | Age: 63
End: 2018-10-07
Payer: COMMERCIAL

## 2018-10-07 ENCOUNTER — APPOINTMENT (EMERGENCY)
Dept: ULTRASOUND IMAGING | Facility: HOSPITAL | Age: 63
End: 2018-10-07
Payer: COMMERCIAL

## 2018-10-07 ENCOUNTER — HOSPITAL ENCOUNTER (EMERGENCY)
Facility: HOSPITAL | Age: 63
Discharge: HOME | End: 2018-10-07
Attending: EMERGENCY MEDICINE | Admitting: EMERGENCY MEDICINE
Payer: COMMERCIAL

## 2018-10-07 VITALS
HEART RATE: 94 BPM | RESPIRATION RATE: 20 BRPM | WEIGHT: 258.6 LBS | DIASTOLIC BLOOD PRESSURE: 64 MMHG | SYSTOLIC BLOOD PRESSURE: 124 MMHG | BODY MASS INDEX: 41.74 KG/M2 | TEMPERATURE: 98.9 F | OXYGEN SATURATION: 97 %

## 2018-10-07 DIAGNOSIS — L30.9 DERMATITIS: ICD-10-CM

## 2018-10-07 DIAGNOSIS — R60.9 PERIPHERAL EDEMA: Primary | ICD-10-CM

## 2018-10-07 DIAGNOSIS — D64.9 ANEMIA: ICD-10-CM

## 2018-10-07 LAB
ALBUMIN SERPL BCP-MCNC: 3 G/DL (ref 3.5–5)
ALP SERPL-CCNC: 56 U/L (ref 46–116)
ALT SERPL W P-5'-P-CCNC: 22 U/L (ref 12–78)
ANION GAP SERPL CALCULATED.3IONS-SCNC: 9 MMOL/L (ref 4–13)
APTT PPP: 20 SECONDS (ref 24–36)
AST SERPL W P-5'-P-CCNC: 18 U/L (ref 5–45)
ATRIAL RATE: 109 BPM
BASOPHILS # BLD AUTO: 0.05 THOUSANDS/ΜL (ref 0–0.1)
BASOPHILS NFR BLD AUTO: 1 % (ref 0–1)
BILIRUB SERPL-MCNC: 0.2 MG/DL (ref 0.2–1)
BUN SERPL-MCNC: 9 MG/DL (ref 5–25)
CALCIUM SERPL-MCNC: 8.2 MG/DL (ref 8.3–10.1)
CHLORIDE SERPL-SCNC: 100 MMOL/L (ref 100–108)
CO2 SERPL-SCNC: 26 MMOL/L (ref 21–32)
CREAT SERPL-MCNC: 0.79 MG/DL (ref 0.6–1.3)
DEPRECATED D DIMER PPP: 1403 NG/ML (FEU) (ref 0–424)
EOSINOPHIL # BLD AUTO: 0.45 THOUSAND/ΜL (ref 0–0.61)
EOSINOPHIL NFR BLD AUTO: 4 % (ref 0–6)
ERYTHROCYTE [DISTWIDTH] IN BLOOD BY AUTOMATED COUNT: 15.3 % (ref 11.6–15.1)
GFR SERPL CREATININE-BSD FRML MDRD: 81 ML/MIN/1.73SQ M
GLUCOSE SERPL-MCNC: 179 MG/DL (ref 65–140)
HCT VFR BLD AUTO: 26 % (ref 34.8–46.1)
HGB BLD-MCNC: 8.8 G/DL (ref 11.5–15.4)
IMM GRANULOCYTES # BLD AUTO: 0.05 THOUSAND/UL (ref 0–0.2)
IMM GRANULOCYTES NFR BLD AUTO: 1 % (ref 0–2)
INR PPP: 1.03 (ref 0.86–1.17)
LACTATE SERPL-SCNC: 1.5 MMOL/L (ref 0.5–2)
LYMPHOCYTES # BLD AUTO: 1.07 THOUSANDS/ΜL (ref 0.6–4.47)
LYMPHOCYTES NFR BLD AUTO: 10 % (ref 14–44)
MCH RBC QN AUTO: 35.6 PG (ref 26.8–34.3)
MCHC RBC AUTO-ENTMCNC: 33.8 G/DL (ref 31.4–37.4)
MCV RBC AUTO: 105 FL (ref 82–98)
MONOCYTES # BLD AUTO: 0.69 THOUSAND/ΜL (ref 0.17–1.22)
MONOCYTES NFR BLD AUTO: 6 % (ref 4–12)
NEUTROPHILS # BLD AUTO: 8.64 THOUSANDS/ΜL (ref 1.85–7.62)
NEUTS SEG NFR BLD AUTO: 78 % (ref 43–75)
NRBC BLD AUTO-RTO: 0 /100 WBCS
P AXIS: 27 DEGREES
PLATELET # BLD AUTO: 297 THOUSANDS/UL (ref 149–390)
PMV BLD AUTO: 8.2 FL (ref 8.9–12.7)
POTASSIUM SERPL-SCNC: 3.4 MMOL/L (ref 3.5–5.3)
PR INTERVAL: 148 MS
PROT SERPL-MCNC: 6 G/DL (ref 6.4–8.2)
PROTHROMBIN TIME: 13 SECONDS (ref 11.8–14.2)
QRS AXIS: -1 DEGREES
QRSD INTERVAL: 70 MS
QT INTERVAL: 298 MS
QTC INTERVAL: 401 MS
RBC # BLD AUTO: 2.47 MILLION/UL (ref 3.81–5.12)
SODIUM SERPL-SCNC: 135 MMOL/L (ref 136–145)
T WAVE AXIS: 97 DEGREES
TROPONIN I SERPL-MCNC: <0.02 NG/ML
VENTRICULAR RATE: 109 BPM
WBC # BLD AUTO: 10.95 THOUSAND/UL (ref 4.31–10.16)

## 2018-10-07 PROCEDURE — 74177 CT ABD & PELVIS W/CONTRAST: CPT

## 2018-10-07 PROCEDURE — 96361 HYDRATE IV INFUSION ADD-ON: CPT

## 2018-10-07 PROCEDURE — 36415 COLL VENOUS BLD VENIPUNCTURE: CPT | Performed by: EMERGENCY MEDICINE

## 2018-10-07 PROCEDURE — 85379 FIBRIN DEGRADATION QUANT: CPT | Performed by: EMERGENCY MEDICINE

## 2018-10-07 PROCEDURE — 85610 PROTHROMBIN TIME: CPT | Performed by: EMERGENCY MEDICINE

## 2018-10-07 PROCEDURE — 96360 HYDRATION IV INFUSION INIT: CPT

## 2018-10-07 PROCEDURE — 85025 COMPLETE CBC W/AUTO DIFF WBC: CPT | Performed by: EMERGENCY MEDICINE

## 2018-10-07 PROCEDURE — 93010 ELECTROCARDIOGRAM REPORT: CPT | Performed by: INTERNAL MEDICINE

## 2018-10-07 PROCEDURE — 84484 ASSAY OF TROPONIN QUANT: CPT | Performed by: EMERGENCY MEDICINE

## 2018-10-07 PROCEDURE — 80053 COMPREHEN METABOLIC PANEL: CPT | Performed by: EMERGENCY MEDICINE

## 2018-10-07 PROCEDURE — 93970 EXTREMITY STUDY: CPT

## 2018-10-07 PROCEDURE — 83605 ASSAY OF LACTIC ACID: CPT | Performed by: EMERGENCY MEDICINE

## 2018-10-07 PROCEDURE — 85730 THROMBOPLASTIN TIME PARTIAL: CPT | Performed by: EMERGENCY MEDICINE

## 2018-10-07 PROCEDURE — 71275 CT ANGIOGRAPHY CHEST: CPT

## 2018-10-07 PROCEDURE — 99284 EMERGENCY DEPT VISIT MOD MDM: CPT

## 2018-10-07 PROCEDURE — 93970 EXTREMITY STUDY: CPT | Performed by: SURGERY

## 2018-10-07 PROCEDURE — 93005 ELECTROCARDIOGRAM TRACING: CPT

## 2018-10-07 RX ORDER — DOXYCYCLINE HYCLATE 100 MG/1
100 CAPSULE ORAL 2 TIMES DAILY
Qty: 14 CAPSULE | Refills: 0 | Status: SHIPPED | OUTPATIENT
Start: 2018-10-07 | End: 2018-10-14

## 2018-10-07 RX ORDER — DOXYCYCLINE HYCLATE 100 MG/1
100 CAPSULE ORAL ONCE
Status: COMPLETED | OUTPATIENT
Start: 2018-10-07 | End: 2018-10-07

## 2018-10-07 RX ORDER — SACCHAROMYCES BOULARDII 250 MG
250 CAPSULE ORAL DAILY
Status: DISCONTINUED | OUTPATIENT
Start: 2018-10-07 | End: 2018-10-07 | Stop reason: HOSPADM

## 2018-10-07 RX ORDER — DOXYCYCLINE HYCLATE 100 MG/1
CAPSULE ORAL
Status: COMPLETED
Start: 2018-10-07 | End: 2018-10-07

## 2018-10-07 RX ADMIN — DOXYCYCLINE HYCLATE 100 MG: 100 CAPSULE ORAL at 12:21

## 2018-10-07 RX ADMIN — DOXYCYCLINE HYCLATE 100 MG: 100 CAPSULE ORAL at 12:36

## 2018-10-07 RX ADMIN — IOHEXOL 100 ML: 350 INJECTION, SOLUTION INTRAVENOUS at 10:33

## 2018-10-07 RX ADMIN — SODIUM CHLORIDE 500 ML: 0.9 INJECTION, SOLUTION INTRAVENOUS at 09:41

## 2018-10-07 RX ADMIN — Medication 250 MG: at 12:36

## 2018-10-07 NOTE — ED PROCEDURE NOTE
PROCEDURE  ECG 12 Lead Documentation  Date/Time: 10/7/2018 9:14 AM  Performed by: Hannah Salguero  Authorized by: Hannah Salguero     Indications / Diagnosis:  Leg swelling   ECG reviewed by me, the ED Provider: yes    Patient location:  ED  Previous ECG:     Previous ECG:  Unavailable  Interpretation:     Interpretation: non-specific    Rate:     ECG rate:  109    ECG rate assessment: tachycardic    Rhythm:     Rhythm: sinus tachycardia    ST segments:     ST segments:  Non-specific         Bill Baxter DO  10/07/18 0915

## 2018-10-07 NOTE — DISCHARGE INSTRUCTIONS
Anemia   WHAT YOU NEED TO KNOW:   Anemia is a low number of red blood cells or a low amount of hemoglobin in your red blood cells  Hemoglobin is a protein that helps carry oxygen throughout your body  Red blood cells use iron to create hemoglobin  Anemia may develop if your body does not have enough iron  It may also develop if your body does not make enough red blood cells or they die faster than your body can make them  DISCHARGE INSTRUCTIONS:   Call 911 or have someone call 911 for any of the following:   · You lose consciousness  · You have severe chest pain  Return to the emergency department if:   · You have dark or bloody bowel movements  Contact your healthcare provider if:   · Your symptoms are worse, even after treatment  · You have questions or concerns about your condition or care  Medicines:   · Iron or folic acid supplements  help increase your red blood cell and hemoglobin levels  · Vitamin B12 injections  may help boost your red blood cell level and decrease your symptoms  Ask your healthcare provider how to inject B12  · Take your medicine as directed  Contact your healthcare provider if you think your medicine is not helping or if you have side effects  Tell him of her if you are allergic to any medicine  Keep a list of the medicines, vitamins, and herbs you take  Include the amounts, and when and why you take them  Bring the list or the pill bottles to follow-up visits  Carry your medicine list with you in case of an emergency  Prevent anemia:  Eat healthy foods rich in iron and vitamin C  Nuts, meat, dark leafy green vegetables, and beans are high in iron and protein  Vitamin C helps your body absorb iron  Foods rich in vitamin C include oranges and other citrus fruits  Ask your healthcare provider for a list of other foods that are high in iron or vitamin C  Ask if you need to be on a special diet     Follow up with your healthcare provider as directed:  Write down your questions so you remember to ask them during your visits  © 2017 [address] [employer] Information is for End User's use only and may not be sold, redistributed or otherwise used for commercial purposes  All illustrations and images included in CareNotes® are the copyrighted property of A D A M , Inc  or Edgardo Chappell  The above information is an  only  It is not intended as medical advice for individual conditions or treatments  Talk to your doctor, nurse or pharmacist before following any medical regimen to see if it is safe and effective for you  Dermatitis   WHAT YOU NEED TO KNOW:   Dermatitis is skin inflammation  You may have an itchy rash, redness, or swelling  You may also have bumps or blisters that crust over or ooze clear fluid  Dermatitis can be caused by allergens such as dust mites, pet dander, pollen, and certain foods  It can also develop when something touches your skin and irritates it or causes an allergic reaction  Examples include soaps, chemicals, latex, and poison ivy  DISCHARGE INSTRUCTIONS:   Call 911 if you have any of the following symptoms of anaphylaxis:   · Sudden trouble breathing    · Throat swelling and tightness    · Dizziness, lightheadedness, fainting, or confusion  Return to the emergency department if:   · You develop a fever or have red streaks going up your arm or leg  · Your rash gets more swollen, red, or hot  Contact your healthcare provider if:   · Your skin blisters, oozes white or yellow pus, or has a foul-smelling discharge  · Your rash spreads or does not get better, even after treatment  · You have questions or concerns about your condition or care  Medicines:   · Medicines  help decrease itching and inflammation, or treat a bacterial infection  They may be given as a topical cream, shot, or a pill  · Take your medicine as directed    Contact your healthcare provider if you think your medicine is not helping or if you have side effects  Tell him of her if you are allergic to any medicine  Keep a list of the medicines, vitamins, and herbs you take  Include the amounts, and when and why you take them  Bring the list or the pill bottles to follow-up visits  Carry your medicine list with you in case of an emergency  Apply a cool compress to your rash: This will help soothe your skin  Keep your skin moist:  Rub unscented cream or lotion on your skin to prevent dryness and itching  Do this right after a lukewarm bath or shower when your skin is still damp  Avoid skin irritants:  Do not use skin irritants, such as makeup, hair products, soaps, and cleansers  Use products that do not contain fragrances or dye  Follow up with your healthcare provider as directed:  Write down your questions so you remember to ask them during your visits  © 2017 [facility] Information is for End User's use only and may not be sold, redistributed or otherwise used for commercial purposes  All illustrations and images included in CareNotes® are the copyrighted property of [employer] M [employer]  or Edgardo Chappell  The above information is an  only  It is not intended as medical advice for individual conditions or treatments  Talk to your doctor, nurse or pharmacist before following any medical regimen to see if it is safe and effective for you

## 2018-10-07 NOTE — ED PROVIDER NOTES
History  Chief Complaint   Patient presents with    Leg Injury     pt scratched left leg on wheelchair 2 days ago and said has pain and red streak going up leg     72-year-old female presents complaining of bilateral leg swelling with scratches"  Patient has a longstanding history of peripheral edema but states she was scratching her legs and is concerned they are becoming infected  Patient is known to be diabetic  History provided by:  Patient  Leg Pain   Location:  Leg  Time since incident:  3 days  Leg location:  L leg and R leg  Pain details:     Quality:  Aching and cramping    Radiates to:  Does not radiate    Severity:  Mild    Onset quality:  Gradual    Duration:  3 days    Timing:  Intermittent    Progression:  Waxing and waning  Chronicity:  Chronic  Tetanus status:  Up to date  Prior injury to area:  Yes  Relieved by:  Nothing  Worsened by:  Nothing  Ineffective treatments:  None tried  Associated symptoms: no back pain and no decreased ROM    Risk factors: concern for non-accidental trauma and obesity    Risk factors: no frequent fractures        Prior to Admission Medications   Prescriptions Last Dose Informant Patient Reported? Taking? Blood Glucose Monitoring Suppl (BLOOD GLUCOSE MONITOR SYSTEM) w/Device KIT   No No   Sig: Test blood sugars 3 times a day   MAG64 535 (64 Mg) MG   Yes Yes   NOVOFINE 32G X 6 MM MISC   Yes No   ONETOUCH DELICA LANCETS 94Q MISC   No No   Sig: Test blood sugars 3 times a day  PROCTOZONE-HC 2 5 % rectal cream   Yes Yes   QUEtiapine (SEROquel) 400 MG tablet   No Yes   Sig: Take 1 tablet (400 mg total) by mouth 2 (two) times a day   albuterol (PROVENTIL HFA,VENTOLIN HFA) 90 mcg/act inhaler   No Yes   Sig: Inhale 2 puffs every 6 (six) hours as needed for wheezing   aspirin (ECOTRIN LOW STRENGTH) 81 mg EC tablet   No Yes   Sig: Take 1 tablet (81 mg total) by mouth daily   estradiol (ESTRACE) 1 mg tablet   Yes Yes   Sig: Take 1 mg by mouth daily     fluticasone (FLONASE) 50 mcg/act nasal spray   No Yes   Sig: SPRAY ONE (1) SPRAY INTO EACH NOSTRIL ONCE DAILY      **FOR THE NOSE**   gabapentin (NEURONTIN) 800 mg tablet   Yes Yes   Sig: Take 800 mg by mouth 4 (four) times a day  glucose blood test strip   No No   Sig: Test blood sugars 3 times a day    hydrOXYzine pamoate (VISTARIL) 50 mg capsule   No Yes   Sig: Take 1 capsule (50 mg total) by mouth 3 (three) times a day   hydrocortisone 2 5 % cream   No Yes   Sig: Apply topically 2 (two) times a day   insulin glargine (LANTUS) 100 units/mL subcutaneous injection   No Yes   Sig: Inject 10 Units under the skin daily at bedtime   levETIRAcetam (KEPPRA) 750 mg tablet   Yes Yes   Sig: Take 1,500 mg by mouth 2 (two) times a day     levothyroxine 175 mcg tablet   No Yes   Sig: Take 1 tablet (175 mcg total) by mouth daily   lisinopril (ZESTRIL) 10 mg tablet   No Yes   Sig: Take 1 tablet (10 mg total) by mouth daily   loratadine (CLARITIN) 10 mg tablet   No Yes   Sig: Take 1 tablet (10 mg total) by mouth daily   meloxicam (MOBIC) 7 5 mg tablet   No Yes   Sig: Take 1 tablet (7 5 mg total) by mouth 2 (two) times a day   metFORMIN (GLUCOPHAGE) 1000 MG tablet   No Yes   Sig: Take 1 tablet (1,000 mg total) by mouth 2 (two) times a day with meals   montelukast (SINGULAIR) 10 mg tablet   No Yes   Sig: Take 1 tablet (10 mg total) by mouth daily at bedtime   norethindrone (AYGESTIN) 5 mg tablet   Yes Yes   Sig: Take 5 mg by mouth daily  omeprazole (PriLOSEC) 20 mg delayed release capsule   No Yes   Sig: Take 1 capsule (20 mg total) by mouth daily TAKE ONE ( 40 MG) TABLET IN THE MORNING AND TAKE ONE (40 MG) TABLET IN THE EVENING BY MOUTH DAILY  oxybutynin (DITROPAN) 5 mg tablet   Yes Yes   Sig: Take 5 mg by mouth 3 (three) times a day     phenytoin (DILANTIN) 100 mg ER capsule   Yes Yes   Sig: Take by mouth 5 (five) times a day       polyethylene glycol (GLYCOLAX) powder   No Yes   Sig: Take 17 g by mouth daily   polyethylene glycol (MIRALAX) 17 g packet   No No   Sig: Take 17 g by mouth daily   simvastatin (ZOCOR) 80 mg tablet   No Yes   Sig: Take 1 tablet (80 mg total) by mouth daily at bedtime   triamcinolone (KENALOG) 0 1 % cream   No Yes   Sig: Apply topically 2 (two) times a day   valACYclovir (VALTREX) 500 mg tablet   Yes Yes   Sig: Take 1 tablet by mouth 2 (two) times a day   zonisamide (ZONEGRAN) 100 mg capsule   Yes Yes   Sig: Take 100 mg by mouth 5 (five) times a day        Facility-Administered Medications: None       Past Medical History:   Diagnosis Date    Asthma     Bipolar disorder (HCC)     Chronic UTI (urinary tract infection)     COPD (chronic obstructive pulmonary disease) (HCC)     Diabetes mellitus (HCC)     Disease of thyroid gland     GERD (gastroesophageal reflux disease)     Seizure ([facility] [address] )        Past Surgical History:   Procedure Laterality Date    ANKLE FRACTURE SURGERY Right     TUBAL LIGATION      VEIN LIGATION AND STRIPPING         History reviewed  No pertinent family history  I have reviewed and agree with the history as documented  Social History   Substance Use Topics    Smoking status: Never Smoker    Smokeless tobacco: Never Used    Alcohol use No        Review of Systems   Constitutional: Negative for activity change, appetite change and chills  HENT: Negative for congestion, dental problem and drooling  Eyes: Negative for pain, discharge and itching  Respiratory: Negative for apnea, choking and chest tightness  Cardiovascular: Negative for chest pain and leg swelling  Gastrointestinal: Negative for abdominal distention, abdominal pain and anal bleeding  Endocrine: Negative for cold intolerance, heat intolerance and polydipsia  Genitourinary: Negative for difficulty urinating, dyspareunia and dysuria  Musculoskeletal: Negative for back pain  Skin: Positive for rash  Negative for color change and pallor     Allergic/Immunologic: Negative for environmental allergies and food allergies  Neurological: Negative for dizziness, facial asymmetry and headaches  Hematological: Negative for adenopathy  Psychiatric/Behavioral: Negative for agitation and behavioral problems  All other systems reviewed and are negative  Physical Exam  Physical Exam   Constitutional: She appears well-developed and well-nourished  HENT:   Head: Normocephalic  Right Ear: External ear normal    Left Ear: External ear normal    Eyes: Pupils are equal, round, and reactive to light  Right eye exhibits no discharge  Left eye exhibits no discharge  Neck: Normal range of motion  No tracheal deviation present  No thyromegaly present  Cardiovascular: Exam reveals no gallop and no friction rub  No murmur heard  Pulmonary/Chest: Effort normal  No respiratory distress  She has no wheezes  She has no rales  Abdominal: Soft  She exhibits no distension and no mass  There is no tenderness  There is no guarding  Genitourinary: Rectal exam shows guaiac negative stool  Musculoskeletal: She exhibits edema  +2 edema bilateral lower extremities, patient with several areas of excoriation of bilateral lower extremities  There is no redness or streaking  The legs are not weeping at this time   Neurological: She is alert  She displays normal reflexes  No cranial nerve deficit  Coordination normal    Skin: Skin is warm  Capillary refill takes less than 2 seconds  Psychiatric: She has a normal mood and affect  Her behavior is normal    Vitals reviewed        Vital Signs  ED Triage Vitals [10/07/18 0833]   Temperature Pulse Respirations Blood Pressure SpO2   98 9 °F (37 2 °C) (!) 116 20 119/62 96 %      Temp Source Heart Rate Source Patient Position - Orthostatic VS BP Location FiO2 (%)   Temporal -- Sitting Left arm --      Pain Score       Worst Possible Pain           Vitals:    10/07/18 0833   BP: 119/62   Pulse: (!) 116   Patient Position - Orthostatic VS: Sitting       Visual Acuity      ED Medications  Medications   doxycycline hyclate (VIBRAMYCIN) capsule 100 mg (not administered)   sodium chloride 0 9 % bolus 500 mL (500 mL Intravenous New Bag 10/7/18 0941)   iohexol (OMNIPAQUE) 350 MG/ML injection (MULTI-DOSE) 100 mL (100 mL Intravenous Given 10/7/18 1033)       Diagnostic Studies  Results Reviewed     Procedure Component Value Units Date/Time    Lactic acid, plasma [[id number]]  (Normal) Collected:  10/07/18 0919    Lab Status:  Final result Specimen:  Blood from Arm, Right Updated:  10/07/18 0940     LACTIC ACID 1 5 mmol/L     Narrative:         Result may be elevated if tourniquet was used during collection      Protime-INR [[id number]]  (Normal) Collected:  10/07/18 0852    Lab Status:  Final result Specimen:  Blood from Arm, Right Updated:  10/07/18 0937     Protime 13 0 seconds      INR 1 03    APTT [[id number]]  (Abnormal) Collected:  10/07/18 0852    Lab Status:  Final result Specimen:  Blood from Arm, Right Updated:  10/07/18 0937     PTT 20 (L) seconds     D-Dimer [[id number]]  (Abnormal) Collected:  10/07/18 0852    Lab Status:  Final result Specimen:  Blood from Arm, Right Updated:  10/07/18 0937     D-Dimer, Quant 1,403 (H) ng/ml (FEU)     Troponin I [[id number]]  (Normal) Collected:  10/07/18 0852    Lab Status:  Final result Specimen:  Blood from Arm, Right Updated:  10/07/18 0922     Troponin I <0 02 ng/mL     Comprehensive metabolic panel [[id number]]  (Abnormal) Collected:  10/07/18 0852    Lab Status:  Final result Specimen:  Blood from Arm, Right Updated:  10/07/18 0917     Sodium 135 (L) mmol/L      Potassium 3 4 (L) mmol/L      Chloride 100 mmol/L      CO2 26 mmol/L      ANION GAP 9 mmol/L      BUN 9 mg/dL      Creatinine 0 79 mg/dL      Glucose 179 (H) mg/dL      Calcium 8 2 (L) mg/dL      AST 18 U/L      ALT 22 U/L      Alkaline Phosphatase 56 U/L      Total Protein 6 0 (L) g/dL      Albumin 3 0 (L) g/dL      Total Bilirubin 0 20 mg/dL      eGFR 81 ml/min/1 73sq m     Narrative: National Kidney Disease Education Program recommendations are as follows:  GFR calculation is accurate only with a steady state creatinine  Chronic Kidney disease less than 60 ml/min/1 73 sq  meters  Kidney failure less than 15 ml/min/1 73 sq  meters  CBC and differential [[id number]]  (Abnormal) Collected:  10/07/18 0852    Lab Status:  Final result Specimen:  Blood from Arm, Right Updated:  10/07/18 0859     WBC 10 95 (H) Thousand/uL      RBC 2 47 (L) Million/uL      Hemoglobin 8 8 (L) g/dL      Hematocrit 26 0 (L) %       (H) fL      MCH 35 6 (H) pg      MCHC 33 8 g/dL      RDW 15 3 (H) %      MPV 8 2 (L) fL      Platelets 621 Thousands/uL      nRBC 0 /100 WBCs      Neutrophils Relative 78 (H) %      Immat GRANS % 1 %      Lymphocytes Relative 10 (L) %      Monocytes Relative 6 %      Eosinophils Relative 4 %      Basophils Relative 1 %      Neutrophils Absolute 8 64 (H) Thousands/µL      Immature Grans Absolute 0 05 Thousand/uL      Lymphocytes Absolute 1 07 Thousands/µL      Monocytes Absolute 0 69 Thousand/µL      Eosinophils Absolute 0 45 Thousand/µL      Basophils Absolute 0 05 Thousands/µL                  PE Study with CT Abdomen and Pelvis with contrast   Final Result by Bibi Moore DO (10/07 1100)      1  Significantly limited CT pulmonary angiogram secondary to respiratory motion and suboptimal contrast bolus  No large emboli in the main pulmonary arteries  Embolic disease at the segmental or subsegmental level cannot be reliably excluded on the    basis of this study  If relevant, repeat study with Visipaque may be considered  2  Dilated main pulmonary artery suggesting pulmonary artery hypertension  3  No acute intra-abdominal abnormality  4   Mild hepatomegaly           Workstation performed: [id number]         VAS lower limb venous duplex study, complete bilateral    (Results Pending)              Procedures  Procedures       Phone Contacts  ED Phone Contact    ED Course         HEART Risk Score      Most Recent Value   History  1 Filed at: 10/07/2018 0843   ECG  1 Filed at: 10/07/2018 0843   Age  1 Filed at: 10/07/2018 0843   Risk Factors  1 Filed at: 10/07/2018 0843   Troponin  0 Filed at: 10/07/2018 0843   Heart Score Risk Calculator   History  1 Filed at: 10/07/2018 0843   ECG  1 Filed at: 10/07/2018 0843   Age  1 Filed at: 10/07/2018 0843   Risk Factors  1 Filed at: 10/07/2018 0843   Troponin  0 Filed at: 10/07/2018 0843   HEART Score  4 Filed at: 10/07/2018 0843   HEART Score  4 Filed at: 10/07/2018 6754                            MDM  Number of Diagnoses or Management Options  Diagnosis management comments: Differential diagnosis 1  Cellulitis 2  DVT 3  Peripheral edema  I will check labs EKG and perform venous duplex  10:29  Venous duplex negative for DVT       Amount and/or Complexity of Data Reviewed  Clinical lab tests: reviewed  Tests in the radiology section of CPT®: reviewed and ordered      CritCare Time    Disposition  Final diagnoses:   Peripheral edema   Dermatitis   Anemia     Time reflects when diagnosis was documented in both MDM as applicable and the Disposition within this note     Time User Action Codes Description Comment    10/7/2018 11:16 AM Hosmer Chowan Add [R60 9] Peripheral edema     10/7/2018 11:16 AM Hosmer Chowan Add [L30 9] Dermatitis     10/7/2018 11:16 AM Hosmer Chowan Add [D64 9] Anemia       ED Disposition     ED Disposition Condition Comment    Discharge  Darell Smolder discharge to home/self care  Condition at discharge: Good        Follow-up Information    None         Patient's Medications   Discharge Prescriptions    DOXYCYCLINE HYCLATE (VIBRAMYCIN) 100 MG CAPSULE    Take 1 capsule (100 mg total) by mouth 2 (two) times a day for 7 days       Start Date: 10/7/2018 End Date: 10/14/2018       Order Dose: 100 mg       Quantity: 14 capsule    Refills: 0     No discharge procedures on file      ED Provider  Electronically Signed by           Patrizia Reyes DO  10/07/18 1117

## 2018-10-08 ENCOUNTER — APPOINTMENT (EMERGENCY)
Dept: CT IMAGING | Facility: HOSPITAL | Age: 63
End: 2018-10-08
Payer: COMMERCIAL

## 2018-10-08 ENCOUNTER — HOSPITAL ENCOUNTER (EMERGENCY)
Facility: HOSPITAL | Age: 63
Discharge: HOME | End: 2018-10-08
Attending: EMERGENCY MEDICINE
Payer: COMMERCIAL

## 2018-10-08 ENCOUNTER — APPOINTMENT (EMERGENCY)
Dept: RADIOLOGY | Facility: HOSPITAL | Age: 63
End: 2018-10-08
Payer: COMMERCIAL

## 2018-10-08 VITALS
BODY MASS INDEX: 41.21 KG/M2 | WEIGHT: 255.29 LBS | DIASTOLIC BLOOD PRESSURE: 75 MMHG | TEMPERATURE: 98 F | RESPIRATION RATE: 18 BRPM | HEART RATE: 95 BPM | OXYGEN SATURATION: 96 % | SYSTOLIC BLOOD PRESSURE: 116 MMHG

## 2018-10-08 DIAGNOSIS — R60.0 BILATERAL LOWER EXTREMITY EDEMA: ICD-10-CM

## 2018-10-08 DIAGNOSIS — J45.909 MODERATE ASTHMA WITHOUT COMPLICATION, UNSPECIFIED WHETHER PERSISTENT: ICD-10-CM

## 2018-10-08 DIAGNOSIS — M25.572 ANKLE PAIN, LEFT: ICD-10-CM
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
## 2018-10-08 LAB
ANION GAP SERPL CALCULATED.3IONS-SCNC: 5 MMOL/L (ref 4–13)
BASOPHILS # BLD AUTO: 0.04 THOUSANDS/ΜL (ref 0–0.1)
BASOPHILS NFR BLD AUTO: 1 % (ref 0–1)
BILIRUB UR QL STRIP: NEGATIVE
BUN SERPL-MCNC: 8 MG/DL (ref 5–25)
CALCIUM SERPL-MCNC: 8.3 MG/DL (ref 8.3–10.1)
CHLORIDE SERPL-SCNC: 102 MMOL/L (ref 100–108)
CK SERPL-CCNC: 54 U/L (ref 26–192)
CLARITY UR: CLEAR
CO2 SERPL-SCNC: 27 MMOL/L (ref 21–32)
COLOR UR: NORMAL
CREAT SERPL-MCNC: 0.74 MG/DL (ref 0.6–1.3)
EOSINOPHIL # BLD AUTO: 0.31 THOUSAND/ΜL (ref 0–0.61)
EOSINOPHIL NFR BLD AUTO: 4 % (ref 0–6)
ERYTHROCYTE [DISTWIDTH] IN BLOOD BY AUTOMATED COUNT: 15.4 % (ref 11.6–15.1)
GFR SERPL CREATININE-BSD FRML MDRD: 88 ML/MIN/1.73SQ M
GLUCOSE SERPL-MCNC: 171 MG/DL (ref 65–140)
GLUCOSE UR STRIP-MCNC: NEGATIVE MG/DL
HCT VFR BLD AUTO: 26 % (ref 34.8–46.1)
HGB BLD-MCNC: 8.9 G/DL (ref 11.5–15.4)
HGB UR QL STRIP.AUTO: NEGATIVE
IMM GRANULOCYTES # BLD AUTO: 0.03 THOUSAND/UL (ref 0–0.2)
IMM GRANULOCYTES NFR BLD AUTO: 0 % (ref 0–2)
KETONES UR STRIP-MCNC: NEGATIVE MG/DL
LEUKOCYTE ESTERASE UR QL STRIP: NEGATIVE
LYMPHOCYTES # BLD AUTO: 1.07 THOUSANDS/ΜL (ref 0.6–4.47)
LYMPHOCYTES NFR BLD AUTO: 14 % (ref 14–44)
MCH RBC QN AUTO: 35.7 PG (ref 26.8–34.3)
MCHC RBC AUTO-ENTMCNC: 34.2 G/DL (ref 31.4–37.4)
MCV RBC AUTO: 104 FL (ref 82–98)
MONOCYTES # BLD AUTO: 0.72 THOUSAND/ΜL (ref 0.17–1.22)
MONOCYTES NFR BLD AUTO: 9 % (ref 4–12)
NEUTROPHILS # BLD AUTO: 5.64 THOUSANDS/ΜL (ref 1.85–7.62)
NEUTS SEG NFR BLD AUTO: 72 % (ref 43–75)
NITRITE UR QL STRIP: NEGATIVE
NRBC BLD AUTO-RTO: 0 /100 WBCS
PH UR STRIP.AUTO: 7.5 [PH] (ref 4.5–8)
PHENYTOIN SERPL-MCNC: 12.2 UG/ML (ref 10–20)
PLATELET # BLD AUTO: 291 THOUSANDS/UL (ref 149–390)
PMV BLD AUTO: 8.3 FL (ref 8.9–12.7)
POTASSIUM SERPL-SCNC: 3.5 MMOL/L (ref 3.5–5.3)
PROT UR STRIP-MCNC: NEGATIVE MG/DL
RBC # BLD AUTO: 2.49 MILLION/UL (ref 3.81–5.12)
SODIUM SERPL-SCNC: 134 MMOL/L (ref 136–145)
SP GR UR STRIP.AUTO: 1.01 (ref 1–1.03)
TROPONIN I SERPL-MCNC: <0.02 NG/ML
TSH SERPL DL<=0.05 MIU/L-ACNC: 1.41 UIU/ML (ref 0.36–3.74)
UROBILINOGEN UR QL STRIP.AUTO: 0.2 E.U./DL
WBC # BLD AUTO: 7.81 THOUSAND/UL (ref 4.31–10.16)

## 2018-10-08 PROCEDURE — 73110 X-RAY EXAM OF WRIST: CPT

## 2018-10-08 PROCEDURE — 72125 CT NECK SPINE W/O DYE: CPT

## 2018-10-08 PROCEDURE — 73080 X-RAY EXAM OF ELBOW: CPT

## 2018-10-08 PROCEDURE — G8978 MOBILITY CURRENT STATUS: HCPCS | Performed by: PHYSICAL THERAPIST

## 2018-10-08 PROCEDURE — 99285 EMERGENCY DEPT VISIT HI MDM: CPT

## 2018-10-08 PROCEDURE — 73630 X-RAY EXAM OF FOOT: CPT

## 2018-10-08 PROCEDURE — 73610 X-RAY EXAM OF ANKLE: CPT

## 2018-10-08 PROCEDURE — 80048 BASIC METABOLIC PNL TOTAL CA: CPT | Performed by: EMERGENCY MEDICINE

## 2018-10-08 PROCEDURE — G8987 SELF CARE CURRENT STATUS: HCPCS

## 2018-10-08 PROCEDURE — 82550 ASSAY OF CK (CPK): CPT | Performed by: EMERGENCY MEDICINE

## 2018-10-08 PROCEDURE — 81003 URINALYSIS AUTO W/O SCOPE: CPT | Performed by: EMERGENCY MEDICINE

## 2018-10-08 PROCEDURE — 71045 X-RAY EXAM CHEST 1 VIEW: CPT

## 2018-10-08 PROCEDURE — 73564 X-RAY EXAM KNEE 4 OR MORE: CPT

## 2018-10-08 PROCEDURE — 85025 COMPLETE CBC W/AUTO DIFF WBC: CPT | Performed by: EMERGENCY MEDICINE

## 2018-10-08 PROCEDURE — 84443 ASSAY THYROID STIM HORMONE: CPT | Performed by: EMERGENCY MEDICINE

## 2018-10-08 PROCEDURE — 73502 X-RAY EXAM HIP UNI 2-3 VIEWS: CPT

## 2018-10-08 PROCEDURE — 84484 ASSAY OF TROPONIN QUANT: CPT | Performed by: EMERGENCY MEDICINE

## 2018-10-08 PROCEDURE — 80185 ASSAY OF PHENYTOIN TOTAL: CPT | Performed by: EMERGENCY MEDICINE

## 2018-10-08 PROCEDURE — 97116 GAIT TRAINING THERAPY: CPT | Performed by: PHYSICAL THERAPIST

## 2018-10-08 PROCEDURE — 97535 SELF CARE MNGMENT TRAINING: CPT

## 2018-10-08 PROCEDURE — G8988 SELF CARE GOAL STATUS: HCPCS

## 2018-10-08 PROCEDURE — 97162 PT EVAL MOD COMPLEX 30 MIN: CPT | Performed by: PHYSICAL THERAPIST

## 2018-10-08 PROCEDURE — 36415 COLL VENOUS BLD VENIPUNCTURE: CPT | Performed by: EMERGENCY MEDICINE

## 2018-10-08 PROCEDURE — 93005 ELECTROCARDIOGRAM TRACING: CPT

## 2018-10-08 PROCEDURE — 70450 CT HEAD/BRAIN W/O DYE: CPT

## 2018-10-08 PROCEDURE — 97166 OT EVAL MOD COMPLEX 45 MIN: CPT

## 2018-10-08 PROCEDURE — G8980 MOBILITY D/C STATUS: HCPCS | Performed by: PHYSICAL THERAPIST

## 2018-10-08 PROCEDURE — G8979 MOBILITY GOAL STATUS: HCPCS | Performed by: PHYSICAL THERAPIST

## 2018-10-08 RX ORDER — MELOXICAM 7.5 MG/1
7.5 TABLET ORAL ONCE
Status: COMPLETED | OUTPATIENT
Start: 2018-10-08 | End: 2018-10-08

## 2018-10-08 RX ADMIN — MELOXICAM 7.5 MG: 7.5 TABLET ORAL at 10:12

## 2018-10-08 NOTE — DISCHARGE INSTRUCTIONS
Ankle Sprain   WHAT YOU NEED TO KNOW:   An ankle sprain happens when 1 or more ligaments in your ankle joint stretch or tear  Ligaments are tough tissues that connect bones  Ligaments support your joints and keep your bones in place  DISCHARGE INSTRUCTIONS:   Return to the emergency department if:   · You have severe pain in your ankle  · Your foot or toes are cold or numb  · Your ankle becomes more weak or unstable (wobbly)  · You are unable to put any weight on your ankle or foot  · Your swelling has increased or returned  Contact your healthcare provider if:   · Your pain does not go away, even after treatment  · You have questions or concerns about your condition or care  Medicines: You may need any of the following    · Acetaminophen  decreases pain  It is available without a doctor's order  Ask how much to take and how often to take it  Follow directions  Acetaminophen can cause liver damage if not taken correctly  · Prescription pain medicine  may be given  Ask how to take this medicine safely  · Take your medicine as directed  Contact your healthcare provider if you think your medicine is not helping or if you have side effects  Tell him or her if you are allergic to any medicine  Keep a list of the medicines, vitamins, and herbs you take  Include the amounts, and when and why you take them  Bring the list or the pill bottles to follow-up visits  Carry your medicine list with you in case of an emergency  Self care:   · Use support devices,  such as a brace, cast, or splint, may be needed to limit your movement and protect your joint  You may need to use crutches to decrease your pain as you move around  · Go to physical therapy as directed  A physical therapist teaches you exercises to help improve movement and strength, and to decrease pain  · Rest  your ankle so that it can heal  Return to normal activities as directed      · Apply ice on your ankle for 15 to 20 minutes every hour or as directed  Use an ice pack, or put crushed ice in a plastic bag  Cover it with a towel  Ice helps prevent tissue damage and decreases swelling and pain  · Compress  your ankle  Ask if you should wrap an elastic bandage around your injured ligament  An elastic bandage provides support and helps decrease swelling and movement so your joint can heal  Wear as long as directed  · Elevate  your ankle above the level of your heart as often as you can  This will help decrease swelling and pain  Prop your ankle on pillows or blankets to keep it elevated comfortably  Prevent another ankle sprain:   · Let your ankle heal   Find out how long your ligament needs to heal  Do not do any physical activity until your healthcare provider says it is okay  If you start activity too soon, you may develop a more serious injury  · Always warm up and stretch  before you exercise or play sports  · Use the right equipment  Always wear shoes that fit well and are made for the activity that you are doing  You may also need ankle supports, elbow and knee pads, or braces  Follow up with your healthcare provider as directed:  Write down your questions so you remember to ask them during your visits  © 2017 [facility] Information is for End User's use only and may not be sold, redistributed or otherwise used for commercial purposes  All illustrations and images included in CareNotes® are the copyrighted property of [employer]  or Edgardo Chappell  The above information is an  only  It is not intended as medical advice for individual conditions or treatments  Talk to your doctor, nurse or pharmacist before following any medical regimen to see if it is safe and effective for you  Leg Edema   WHAT YOU NEED TO KNOW:   Leg edema is swelling caused by fluid buildup  Your legs may swell if you sit or stand for long periods of time, are pregnant, or are injured  Swelling may also occur if you have heart failure or circulation problems  This means that your heart does not pump blood through your body as it should  DISCHARGE INSTRUCTIONS:   Self-care:   · Elevate your legs:  Raise your legs above the level of your heart as often as you can  This will help decrease swelling and pain  Prop your legs on pillows or blankets to keep them elevated comfortably  · Wear pressure stockings: These tight stockings put pressure on your legs to promote blood flow and prevent blood clots  Wear the stockings during the day  Do not wear them while you sleep  · Apply heat:  Heat helps decrease pain and swelling  Apply heat on the area for 20 to 30 minutes every 2 hours for as many days as directed  · Stay active:  Do not stand or sit for long periods of time  Ask your healthcare provider about the best exercise plan for you  · Eat healthy foods:  Healthy foods include fruits, vegetables, whole-grain breads, low-fat dairy products, beans, lean meats, and fish  Ask if you need to be on a special diet  Limit salt  Salt will make your body hold even more fluid  Follow up with your healthcare provider as directed:  Write down your questions so you remember to ask them during your visits  Contact your healthcare provider if:   · You have a fever or feel more tired than usual     · The veins in your legs look larger than usual  They may look full or bulging  · Your legs itch or feel heavy  · You have red or white areas or sores on your legs  The skin may also appear dimpled or have indentations  · You are gaining weight  · You have trouble moving your ankles  · The swelling does not go away, or other parts of your body swell  · You have questions or concerns about your condition or care  Return to the emergency department if:   · You cannot walk  · You feel faint or confused  · Your skin turns blue or gray  · Your leg feels warm, tender, and painful   It may be swollen and red  · You have chest pain or trouble breathing that is worse when you lie down  · You suddenly feel lightheaded and have trouble breathing  · You have new and sudden chest pain  You may have more pain when you take deep breaths or cough  You may also cough up blood  © 2017 [employer] Information is for End User's use only and may not be sold, redistributed or otherwise used for commercial purposes  All illustrations and images included in CareNotes® are the copyrighted property of [employer]  or Edgardo Chappell  The above information is an  only  It is not intended as medical advice for individual conditions or treatments  Talk to your doctor, nurse or pharmacist before following any medical regimen to see if it is safe and effective for you    Resume mobic daily no other NSAIDS  Use walker as directed by PT  Fill script for doxyccline  Follow up with family doctor this week

## 2018-10-08 NOTE — SOCIAL WORK
Pt is being discharged from the ED  Pt will also need PT and OT from Homecare  Called Shahram Robins and she is agreeable to ordering home care  For pt  I faxed face to face to [address] office   She will sign and fax them to [facility] care  Pt does not have a ride home  Pt does not have anybody that can come and pick her up   Pt can not afford to pay for a wheel chair van  Pt said I do not have any money  Med Stat wheel chair Aline Haynes will  the pt at 4:30  Confirmed with Ellen Hernandez  Case Management that hospital will cover the cost of the bill  Called [facility] to let them know that pt is being discharged and should be followed by [facility]

## 2018-10-08 NOTE — SOCIAL WORK
Received call from nurse in ED that pt might more help at home   Pt lives in an apartment in [place] [place]  Pt has a walker and wheel chair at home  Pt was using the walker but was falling at home  She fell at home today and was unable to get up  Pt said her walker is in the kitchen but she is unable to get it out of there because it is too narrow  Pt is able to bathe independently  Pt does her own cooking when she can  Asked pt if she would be interested in [employer] Whe[employer]  Pt does not want that because she heard their food was terrible  Pt did have [employer] before and the food was terrible  Pt was in for Short term rehab at [facility] from 9/20/18 - 10/4/18   Pt left [place] because it was so cold there they never used their heat and she had a terrible room mate  Spoke to Cali at [facility] she left there last Thursday 10/4/18 because pt insisted on leaving there  Pt had home care from [facility]- care  Pt just had nursing coming in  Pt said she was just starting process with [facility]  Called [employer] and Tex Duckworth  ([phone]) is handling her case  As per Tex Duckworth They said they had a referral on her and were planning to go to see her and start to process to get her help at home  Will continue to follow pt to see what she needs at home

## 2018-10-08 NOTE — PLAN OF CARE
Problem: OCCUPATIONAL THERAPY ADULT  Goal: Performs self-care activities at highest level of function for planned discharge setting  See evaluation for individualized goals  See flowsheet documentation for full assessment, interventions and recommendations  Outcome: Completed Date Met: 10/08/18  Limitation: Decreased ADL status, Decreased endurance, Decreased self-care trans, Decreased high-level ADLs     Assessment: Pt is a 66 y o  female seen for OT evaluation s/p admit to [facility] on 10/8/2018 w/ <principal problem not specified>  Comorbidities affecting pt's functional performance at time of assessment include: obesity  Personal factors affecting pt at time of IE include:difficulty performing ADLS, difficulty performing IADLS , limited insight into deficits, decreased initiation and engagement  and health management   Prior to admission, pt was ADLs/IADLs at (I) level  Upon evaluation: Pt requires (S) with use of RW during functional mobility 2* the following deficits impacting occupational performance: weakness, decreased strength, decreased tolerance, impaired initiation and decreased safety awareness  Pt to benefit from continued skilled OT tx while in the hospital to address deficits as defined above and maximize level of functional independence w ADL's and functional mobility  Occupational Performance areas to address include: grooming, bathing/shower, toilet hygiene, dressing, functional mobility, community mobility and clothing management  From OT standpoint, recommendation at time of d/c would be home health with the above interventions as recommended       OT Discharge Recommendation: [address]

## 2018-10-08 NOTE — PHYSICAL THERAPY NOTE
PT Evaluation     10/08/18 7415   Note Type   Note type Eval/Treat   Pain Assessment   Pain Assessment No/denies pain   Home Living   Type of Home Apartment  ([facility])   Home Layout Elevator;Able to live on main level with bedroom/bathroom; Performs ADLs on one level   Bathroom Shower/Tub Tub/shower unit   Bathroom Toilet Standard   Bathroom Equipment Shower chair;Grab bars in shower;Commode   Bathroom Accessibility Accessible   Home Equipment Walker;Cane   Prior Function   Level of [place] Independent with ADLs and functional mobility  ((I) ambulation with RW in apartment or with w/c)   Lives With Alone   Receives Help From Friend(s)   ADL Assistance Independent   IADLs Independent   Falls in the last 6 months 1 to 4   Comments neighbor drives   Restrictions/Precautions   Other Precautions Fall Risk   General   Family/Caregiver Present No   Cognition   Arousal/Participation Alert   Orientation Level Oriented X4   Following Commands Follows all commands and directions without difficulty   RLE Assessment   RLE Assessment WFL  (4+/5)   LLE Assessment   LLE Assessment WFL  (4/5)   Coordination   Sensation WFL   Light Touch   RLE Light Touch Grossly intact   LLE Light Touch Grossly intact   Bed Mobility   Supine to Sit 6  Modified independent   Additional items Bedrails   Sit to Supine 4  Minimal assistance   Additional items Assist x 1;LE management; Bedrails   Additional Comments pt only requiring assistance with lifting LE's onto stretcher after ambulation  Transfers   Sit to Stand 6  Modified independent   Additional items Verbal cues  (with RW)   Stand to Sit 6  Modified independent   Additional items Verbal cues  (with RW)   Stand pivot 6  Modified independent   Additional items (with RW)   Additional Comments no LOB or instability with use of RW for ambulation  pt offering no complaint of L LE or ankle pain and demonstrated full active ankle ROM   Increased time required to complete evaluation due to pt toileting and talking throughout evaluation  Ambulation/Elevation   Gait pattern Short stride;Narrow CHARIS   Gait Assistance 5  Supervision   Assistive Device Rolling walker   Distance 200ft with RW (S) no LOB or instability   Balance   Static Sitting Good   Dynamic Sitting Good   Static Standing Good   Dynamic Standing Fair +  (with RW)   Ambulatory Fair +  (with RW)   Endurance Deficit   Endurance Deficit Yes   Endurance Deficit Description limited ambulation tolerance due to fatigue   Activity Tolerance   Activity Tolerance Patient limited by fatigue   Assessment   Prognosis Good   Problem List Decreased strength;Decreased endurance; Impaired balance;Decreased mobility   Assessment Pt is a 66year old female presenting with ankle pain knee pain and L arm pain after fall at home  Pt with PMH including seizure bipolar asthma and dermatitis contributing to current condition  Pt presents with fair to good strength balance endurance and functional mobility only requiring assistance to lift LE's onto bed mainly L LE  Pt with mild weakness L LE however this did not limit transfers and ambulation  Pt able to ambulate with use of RW without LOB household distances and will be safe to return home with home health care services  Will d/c PT as pt is being discharged home  Goals   Patient Goals To return home   Plan   Treatment/Interventions Functional transfer training; Endurance training;Patient/family training;Bed mobility;Gait training   PT Frequency One time visit   Recommendation   Recommendation Home PT   PT - OK to Discharge Yes   Seen in ED no SCD's  Pt returned to supine on stretcher with call bell in reach

## 2018-10-08 NOTE — ED PROVIDER NOTES
History  Chief Complaint   Patient presents with    Elbow Pain     Patient complains of recent fall and pain in left wrist, left ankle, and left elbow pain   Ankle Pain    Wrist Pain     80-year-old female arrives by ambulance after sustaining a same level fall in her apartment this morning  She was turning to empty a pot of water when she fell and landed on a box with her buttocks she then fell from the box onto her left side on the floor  She denies hitting her head there was no loss of consciousness her only anticoagulant is aspirin 81 mg; tetanus is UTD was evaluated yesterday for lower ext edema and dermatitis of lower extremities  Patient denies any headache there is no neck pain no shoulder pain she does have prompt pain in her left elbow she denies any numbness paresthesias no upper or lower back pain no abdominal pain no chest pain no shortness of breath no rib pain she denies any lightheadedness or dizziness she has no hip pain but does complain of left ankle pain  She also has diffuse pain to the lower extremities due to swelling  She ambulates with a walker and uses a wheelchair as well in her apartment  Lower extremity edema is the same as yesterday  Prior to Admission Medications   Prescriptions Last Dose Informant Patient Reported? Taking? Blood Glucose Monitoring Suppl (BLOOD GLUCOSE MONITOR SYSTEM) w/Device KIT   No No   Sig: Test blood sugars 3 times a day   MAG64 535 (64 Mg) MG   Yes Yes   NOVOFINE 32G X 6 MM MISC   Yes No   ONETOUCH DELICA LANCETS 22F MISC   No Yes   Sig: Test blood sugars 3 times a day     PROCTOZONE-HC 2 5 % rectal cream   Yes No   QUEtiapine (SEROquel) 400 MG tablet   No Yes   Sig: Take 1 tablet (400 mg total) by mouth 2 (two) times a day   aspirin (ECOTRIN LOW STRENGTH) 81 mg EC tablet   No Yes   Sig: Take 1 tablet (81 mg total) by mouth daily   doxycycline hyclate (VIBRAMYCIN) 100 mg capsule   No Yes   Sig: Take 1 capsule (100 mg total) by mouth 2 (two) times a day for 7 days   estradiol (ESTRACE) 1 mg tablet   Yes Yes   Sig: Take 1 mg by mouth daily  fluticasone (FLONASE) 50 mcg/act nasal spray   No Yes   Sig: SPRAY ONE (1) SPRAY INTO EACH NOSTRIL ONCE DAILY      **FOR THE NOSE**   gabapentin (NEURONTIN) 800 mg tablet   Yes No   Sig: Take 800 mg by mouth 4 (four) times a day  glucose blood test strip   No No   Sig: Test blood sugars 3 times a day    hydrOXYzine pamoate (VISTARIL) 50 mg capsule   No Yes   Sig: Take 1 capsule (50 mg total) by mouth 3 (three) times a day   hydrocortisone 2 5 % cream   No No   Sig: Apply topically 2 (two) times a day   insulin glargine (LANTUS) 100 units/mL subcutaneous injection   No Yes   Sig: Inject 10 Units under the skin daily at bedtime   levETIRAcetam (KEPPRA) 750 mg tablet   Yes Yes   Sig: Take 1,500 mg by mouth 2 (two) times a day     levothyroxine 175 mcg tablet   No Yes   Sig: Take 1 tablet (175 mcg total) by mouth daily   lisinopril (ZESTRIL) 10 mg tablet   No Yes   Sig: Take 1 tablet (10 mg total) by mouth daily   loratadine (CLARITIN) 10 mg tablet   No Yes   Sig: Take 1 tablet (10 mg total) by mouth daily   meloxicam (MOBIC) 7 5 mg tablet   No Yes   Sig: Take 1 tablet (7 5 mg total) by mouth 2 (two) times a day   metFORMIN (GLUCOPHAGE) 1000 MG tablet   No Yes   Sig: Take 1 tablet (1,000 mg total) by mouth 2 (two) times a day with meals   montelukast (SINGULAIR) 10 mg tablet   No Yes   Sig: Take 1 tablet (10 mg total) by mouth daily at bedtime   norethindrone (AYGESTIN) 5 mg tablet   Yes Yes   Sig: Take 5 mg by mouth daily  omeprazole (PriLOSEC) 20 mg delayed release capsule   No Yes   Sig: Take 1 capsule (20 mg total) by mouth daily TAKE ONE ( 40 MG) TABLET IN THE MORNING AND TAKE ONE (40 MG) TABLET IN THE EVENING BY MOUTH DAILY     oxybutynin (DITROPAN) 5 mg tablet   Yes Yes   Sig: Take 5 mg by mouth 3 (three) times a day     phenytoin (DILANTIN) 100 mg ER capsule   Yes No   Sig: Take by mouth 5 (five) times a day      polyethylene glycol (GLYCOLAX) powder   No Yes   Sig: Take 17 g by mouth daily   polyethylene glycol (MIRALAX) 17 g packet   No No   Sig: Take 17 g by mouth daily   simvastatin (ZOCOR) 80 mg tablet   No Yes   Sig: Take 1 tablet (80 mg total) by mouth daily at bedtime   triamcinolone (KENALOG) 0 1 % cream   No Yes   Sig: Apply topically 2 (two) times a day   valACYclovir (VALTREX) 500 mg tablet   Yes Yes   Sig: Take 1 tablet by mouth 2 (two) times a day   zonisamide (ZONEGRAN) 100 mg capsule   Yes Yes   Sig: Take 100 mg by mouth 5 (five) times a day        Facility-Administered Medications: None       Past Medical History:   Diagnosis Date    Asthma     [facility]ipolar disorder ([facility] )     Chronic UTI (urinary tract infection)     [facility]     Diabetes mellitus (HCC)     Disease of thyroid gland     GERD (gastroesophageal reflux disease)     Seizure ([facility] )        Past Surgical History:   Procedure Laterality Date    ANKLE FRACTURE SURGERY Right     TUBAL LIGATION      VEIN LIGATION AND STRIPPING         History reviewed  No pertinent family history  I have reviewed and agree with the history as documented  Social History   Substance Use Topics    Smoking status: Never Smoker    Smokeless tobacco: Never Used    Alcohol use No        Review of Systems   Constitutional: Positive for activity change and appetite change  Negative for chills and fever  HENT: Negative for congestion, facial swelling, sinus pain, sinus pressure, trouble swallowing and voice change  Eyes: Negative for discharge and visual disturbance  Respiratory: Positive for cough  Negative for chest tightness and shortness of breath  Cardiovascular: Positive for leg swelling (chronic)  Negative for chest pain  Gastrointestinal: Negative for abdominal pain, diarrhea, nausea and vomiting  Genitourinary: Negative for difficulty urinating     Musculoskeletal: Positive for arthralgias (left elbow left wrist left ankle) and gait problem (chronic)  Negative for back pain, neck pain and neck stiffness  Skin: Positive for rash (lower extremities excoriations)  Negative for wound  Neurological: Positive for weakness (generalized)  Negative for dizziness, speech difficulty and light-headedness  Psychiatric/Behavioral: Negative for confusion  All other systems reviewed and are negative  Physical Exam  Physical Exam   Constitutional: She is oriented to person, place, and time  She appears well-developed and well-nourished  No distress  HENT:   Head: Normocephalic and atraumatic  Right Ear: External ear normal    Left Ear: External ear normal    Nose: Nose normal    Mouth/Throat: Oropharynx is clear and moist  No oropharyngeal exudate  Rt TM obscured by wax Left TM    Eyes: Pupils are equal, round, and reactive to light  Conjunctivae and EOM are normal  Right eye exhibits no discharge  Left eye exhibits no discharge  No scleral icterus  Neck: Normal range of motion  Neck supple  No midline or paraspinous tenderness   Cardiovascular: Regular rhythm and normal heart sounds  Pulmonary/Chest: Effort normal and breath sounds normal  No respiratory distress  She has no wheezes  She has no rales  She exhibits no tenderness  Abdominal: Soft  Bowel sounds are normal  She exhibits no distension and no mass  There is no tenderness  There is no rebound and no guarding  Musculoskeletal: She exhibits edema (2+ pretibial edema symmetric)  She exhibits no deformity  Left shoulder: Normal         Left elbow: She exhibits normal range of motion, no swelling, no effusion, no deformity and no laceration  Tenderness found  Olecranon process tenderness noted  No radial head, no medial epicondyle and no lateral epicondyle tenderness noted          Left wrist: She exhibits normal range of motion, no tenderness, no bony tenderness, no swelling, no effusion, no crepitus, no deformity and no laceration  Left knee: She exhibits normal range of motion, no swelling, no effusion, no ecchymosis, no deformity, no laceration, no erythema, normal alignment, no LCL laxity, normal patellar mobility, no bony tenderness and no MCL laxity  Tenderness found  Medial joint line and lateral joint line tenderness noted  No MCL, no LCL and no patellar tendon tenderness noted  Left ankle: She exhibits decreased range of motion and swelling  She exhibits no ecchymosis, no deformity, no laceration and normal pulse  Tenderness  Lateral malleolus tenderness found  No medial malleolus, no AITFL, no CF ligament, no posterior TFL, no head of 5th metatarsal and no proximal fibula tenderness found  Achilles tendon normal  Achilles tendon exhibits no pain, no defect and normal Godfrey's test results  Left hand: Normal         Left foot: There is swelling  There is normal range of motion, no tenderness, no bony tenderness, normal capillary refill, no crepitus, no deformity and no laceration  Pelvis stable to rock no greater troch tenderness   Lymphadenopathy:     She has no cervical adenopathy  Neurological: She is alert and oriented to person, place, and time  She displays normal reflexes  No cranial nerve deficit or sensory deficit  She exhibits normal muscle tone  Coordination normal    Skin: Skin is warm and dry  Rash noted  Excoriations to bilateral lower ext   Psychiatric: She has a normal mood and affect  Vitals reviewed        Vital Signs  ED Triage Vitals [10/08/18 0759]   Temperature Pulse Respirations Blood Pressure SpO2   98 °F (36 7 °C) (!) 107 18 121/79 94 %      Temp Source Heart Rate Source Patient Position - Orthostatic VS BP Location FiO2 (%)   Temporal Monitor Sitting Left arm --      Pain Score       9           Vitals:    10/08/18 0759 10/08/18 1415   BP: 121/79 116/75   Pulse: (!) 107 95   Patient Position - Orthostatic VS: Sitting        Visual Acuity      ED Medications  Medications   meloxicam (MOBIC) tablet 7 5 mg (7 5 mg Oral Given 10/8/18 1012)       Diagnostic Studies  Results Reviewed     Procedure Component Value Units Date/Time    UA w Reflex to Microscopic w Reflex to Culture [[id number]] Collected:  10/08/18 1008    Lab Status:  Final result Specimen:  Urine from Urine, Clean Catch Updated:  10/08/18 1014     Color, UA Light Yellow     Clarity, UA Clear     Specific Gravity, UA 1 010     pH, UA 7 5     Leukocytes, UA Negative     Nitrite, UA Negative     Protein, UA Negative mg/dl      Glucose, UA Negative mg/dl      Ketones, UA Negative mg/dl      Urobilinogen, UA 0 2 E U /dl      Bilirubin, UA Negative     Blood, UA Negative    TSH [[id number]]  (Normal) Collected:  10/08/18 0845    Lab Status:  Final result Specimen:  Blood from Hand, Right Updated:  10/08/18 0914     TSH 3RD GENERATON 1 410 uIU/mL     Narrative:         Patients undergoing fluorescein dye angiography may retain small amounts of fluorescein in the body for 48-72 hours post procedure  Samples containing fluorescein can produce falsely depressed TSH values  If the patient had this procedure,a specimen should be resubmitted post fluorescein clearance            The recommended reference ranges for TSH during pregnancy are as follows:  First trimester 0 1 to 2 5 uIU/mL  Second trimester  0 2 to 3 0 uIU/mL  Third trimester 0 3 to 3 0 uIU/m      Basic metabolic panel [[id number]]  (Abnormal) Collected:  10/08/18 0845    Lab Status:  Final result Specimen:  Blood from Hand, Right Updated:  10/08/18 0914     Sodium 134 (L) mmol/L      Potassium 3 5 mmol/L      Chloride 102 mmol/L      CO2 27 mmol/L      ANION GAP 5 mmol/L      BUN 8 mg/dL      Creatinine 0 74 mg/dL      Glucose 171 (H) mg/dL      Calcium 8 3 mg/dL      eGFR 88 ml/min/1 73sq m     Narrative:         National Kidney Disease Education Program recommendations are as follows:  GFR calculation is accurate only with a steady state creatinine  Chronic Kidney disease less than 60 ml/min/1 73 sq  meters  Kidney failure less than 15 ml/min/1 73 sq  meters  Phenytoin level, total [[id number]]  (Normal) Collected:  10/08/18 0845    Lab Status:  Final result Specimen:  Blood from Hand, Right Updated:  10/08/18 0914     Phenytoin Lvl 12 2 ug/mL     CK (with reflex to MB) [[id number]]  (Normal) Collected:  10/08/18 0845    Lab Status:  Final result Specimen:  Blood from Hand, Right Updated:  10/08/18 0914     Total CK 54 U/L     Troponin I [[id number]]  (Normal) Collected:  10/08/18 0845    Lab Status:  Final result Specimen:  Blood from Hand, Right Updated:  10/08/18 0907     Troponin I <0 02 ng/mL     CBC and differential [[id number]]  (Abnormal) Collected:  10/08/18 0845    Lab Status:  Final result Specimen:  Blood from Hand, Right Updated:  10/08/18 0851     WBC 7 81 Thousand/uL      RBC 2 49 (L) Million/uL      Hemoglobin 8 9 (L) g/dL      Hematocrit 26 0 (L) %       (H) fL      MCH 35 7 (H) pg      MCHC 34 2 g/dL      RDW 15 4 (H) %      MPV 8 3 (L) fL      Platelets 113 Thousands/uL      nRBC 0 /100 WBCs      Neutrophils Relative 72 %      Immat GRANS % 0 %      Lymphocytes Relative 14 %      Monocytes Relative 9 %      Eosinophils Relative 4 %      Basophils Relative 1 %      Neutrophils Absolute 5 64 Thousands/µL      Immature Grans Absolute 0 03 Thousand/uL      Lymphocytes Absolute 1 07 Thousands/µL      Monocytes Absolute 0 72 Thousand/µL      Eosinophils Absolute 0 31 Thousand/µL      Basophils Absolute 0 04 Thousands/µL                  XR knee 4+ views left injury   Final Result by J Cloria Felty, MD (10/08 1107)      No acute osseous abnormality  Degenerative changes, medial compartment and patellofemoral joint  Workstation performed: [id number]         XR wrist 3+ views LEFT   Final Result by J Cloria Felty, MD (10/08 1007)      No acute osseous abnormality              Workstation performed: [id number]         XR elbow 3+ views LEFT   Final Result by DARIEL Garcia MD (10/08 1006)      No acute osseous abnormality  Workstation performed: [id number]         XR foot 3+ views LEFT   Final Result by DARIEL Garcia MD (10/08 1005)      No acute osseous abnormality  Plantar calcaneal spur  Dorsal soft tissue swelling  Workstation performed: [id number]         XR ankle 3+ views LEFT   Final Result by DARIEL Garcia MD (10/08 1003)      No acute osseous abnormality  Small plantar calcaneal spur  Workstation performed: [id number]         XR hip/pelv 2-3 vws left   Final Result by DARIEL Garcia MD (10/08 1002)      No acute osseous abnormality, left hip  Workstation performed: [id number]         XR chest 1 view portable   Final Result by DARIEL Garcia MD (10/08 1000)      No acute cardiopulmonary disease  Workstation performed: [id number]         CT cervical spine without contrast   Final Result by Malika Seals MD (10/08 2841)      No cervical spine fracture or traumatic malalignment  Mild multilevel cervical degenerative changes  Workstation performed: [id number]         CT head without contrast   Final Result by Malika Seals MD (10/08 4305)      Trace right parietal scalp soft tissue contusion  Otherwise, no acute intracranial process or significant interval change  No skull fracture  Chronic microangiopathy                    Workstation performed: [id number]                    Procedures  ECG 12 Lead Documentation  Date/Time: 10/8/2018 10:24 AM  Performed by: Elia Carrillo  Authorized by: Elia Carrillo     Indications / Diagnosis:  Fall  ECG reviewed by me, the ED Provider: yes    Patient location:  ED  Previous ECG:     Previous ECG:  Compared to current    Comparison ECG info:  10/07/2018    Similarity:  Changes noted  Rate:     ECG rate:  100    ECG rate assessment: tachycardic    Rhythm: Rhythm: sinus tachycardia    QRS:     QRS axis:  Normal  Comments:      Low voltage nonspecific twave changes           Phone Contacts  ED Phone Contact    ED Course  ED Course as of Oct 08 2138   Mon Oct 08, 2018   1014 Anemia is at baseline  1251 PT/OT here to evaluate    1306 Per PT did well with walker has one at home  Requesting discharge home  Will notify Maura     [phone] Updated Donis Rued of care services will work on upgrading PT/OT for home and make arrangements for ride  [facility]  Number of Diagnoses or Management Options  Ankle pain, left:   Bilateral lower extremity edema:   Fall:   Diagnosis management comments: Mdm: yesterdays note labs/radiographic studies reviewed bilater LE dopplers neg for DVT, CTA chest neg PE ; will proceed with CT head and neck secondary to h/o frequent falls  And plain film eval of msk injuries  Will recheck hb, ekg trop lytes, kidney fx and for evidece of rhabdo  Will request assistance from care services and PT/OT eval if radiographs neg  CritCare Time    Disposition  Final diagnoses:   Fall - same level   Ankle pain, left   Bilateral lower extremity edema     Time reflects when diagnosis was documented in both MDM as applicable and the Disposition within this note     Time User Action Codes Description Comment    10/8/2018  1:15 PM Haroldine Amen Add [O29  XXXA] Fall     10/8/2018  1:15 PM Haroldine Amen Modify [F64  USPW] Fall same level    10/8/2018  1:15 PM Haroldine Amen Add [M25 572] Ankle pain, left     10/8/2018  1:16 PM Marisela Almendarezor Add [R60 0] Lower extremity edema     10/8/2018  1:16 PM Marisela Almendarez Remove [R60 0] Lower extremity edema     10/8/2018  1:16 PM Haroldine Amen Add [R60 0] Bilateral lower extremity edema       ED Disposition     ED Disposition Condition Comment    Discharge  Lorie Saxena discharge to home/self care      Condition at discharge: Stable        Follow-up Information     Follow up With Specialties Details Why [address], [address] [facility], Nurse Practitioner Call today follow up this week Ko Smith  [address]  [phone]            Discharge Medication List as of 10/8/2018  1:19 PM      CONTINUE these medications which have NOT CHANGED    Details   aspirin (ECOTRIN LOW STRENGTH) 81 mg EC tablet Take 1 tablet (81 mg total) by mouth daily, Starting Thu 4/12/2018, Normal      doxycycline hyclate (VIBRAMYCIN) 100 mg capsule Take 1 capsule (100 mg total) by mouth 2 (two) times a day for 7 days, Starting Sun 10/7/2018, Until Sun 10/14/2018, Print      estradiol (ESTRACE) 1 mg tablet Take 1 mg by mouth daily  , Until Discontinued, Historical Med      fluticasone (FLONASE) 50 mcg/act nasal spray SPRAY ONE (1) SPRAY INTO EACH NOSTRIL ONCE DAILY      **FOR THE NOSE**, Normal      hydrOXYzine pamoate (VISTARIL) 50 mg capsule Take 1 capsule (50 mg total) by mouth 3 (three) times a day, Starting Fri 9/14/2018, Normal      insulin glargine (LANTUS) 100 units/mL subcutaneous injection Inject 10 Units under the skin daily at bedtime, Starting Tue 7/3/2018, Normal      levETIRAcetam (KEPPRA) 750 mg tablet Take 1,500 mg by mouth 2 (two) times a day  , Until Discontinued, Historical Med      levothyroxine 175 mcg tablet Take 1 tablet (175 mcg total) by mouth daily, Starting Fri 9/14/2018, Normal      lisinopril (ZESTRIL) 10 mg tablet Take 1 tablet (10 mg total) by mouth daily, Starting Fri 8/3/2018, Normal      loratadine (CLARITIN) 10 mg tablet Take 1 tablet (10 mg total) by mouth daily, Starting Fri 8/3/2018, Normal      MAG64 535 (64 Mg) MG Starting Thu 3/22/2018, Historical Med      meloxicam (MOBIC) 7 5 mg tablet Take 1 tablet (7 5 mg total) by mouth 2 (two) times a day, Starting Wed 7/18/2018, Normal      metFORMIN (GLUCOPHAGE) 1000 MG tablet Take 1 tablet (1,000 mg total) by mouth 2 (two) times a day with meals, Starting Thu 7/26/2018, Normal montelukast (SINGULAIR) 10 mg tablet Take 1 tablet (10 mg total) by mouth daily at bedtime, Starting Thu 4/12/2018, Normal      norethindrone (AYGESTIN) 5 mg tablet Take 5 mg by mouth daily  , Until Discontinued, Historical Med      omeprazole (PriLOSEC) 20 mg delayed release capsule Take 1 capsule (20 mg total) by mouth daily TAKE ONE ( 40 MG) TABLET IN THE MORNING AND TAKE ONE (40 MG) TABLET IN THE EVENING BY MOUTH DAILY  , Starting Fri 9/14/2018, Print      ONETOUCH DELICA LANCETS 85A MISC Test blood sugars 3 times a day , Normal      oxybutynin (DITROPAN) 5 mg tablet Take 5 mg by mouth 3 (three) times a day  , Starting Thu 12/15/2011, Historical Med      polyethylene glycol (GLYCOLAX) powder Take 17 g by mouth daily, Starting Wed 7/18/2018, Normal      QUEtiapine (SEROquel) 400 MG tablet Take 1 tablet (400 mg total) by mouth 2 (two) times a day, Starting Fri 9/14/2018, Normal      simvastatin (ZOCOR) 80 mg tablet Take 1 tablet (80 mg total) by mouth daily at bedtime, Starting Wed 4/25/2018, Normal      triamcinolone (KENALOG) 0 1 % cream Apply topically 2 (two) times a day, Starting Tue 6/19/2018, Normal      valACYclovir (VALTREX) 500 mg tablet Take 1 tablet by mouth 2 (two) times a day, Starting Thu 3/22/2018, Historical Med      zonisamide (ZONEGRAN) 100 mg capsule Take 100 mg by mouth 5 (five) times a day  , Until Discontinued, Historical Med      albuterol (PROVENTIL HFA,VENTOLIN HFA) 90 mcg/act inhaler Inhale 2 puffs every 6 (six) hours as needed for wheezing, Starting Thu 4/12/2018, Normal      Blood Glucose Monitoring Suppl (BLOOD GLUCOSE MONITOR SYSTEM) w/Device KIT Test blood sugars 3 times a day, Normal      gabapentin (NEURONTIN) 800 mg tablet Take 800 mg by mouth 4 (four) times a day , Until Discontinued, Historical Med      glucose blood test strip Test blood sugars 3 times a day , Normal      hydrocortisone 2 5 % cream Apply topically 2 (two) times a day, Starting Tue 7/3/2018, Normal NOVOFINE 32G X 6 MM MISC Starting Tue 3/20/2018, Historical Med      phenytoin (DILANTIN) 100 mg ER capsule Take by mouth 5 (five) times a day   , Until Discontinued, Historical Med      polyethylene glycol (MIRALAX) 17 g packet Take 17 g by mouth daily, Starting Thu 5/24/2018, Normal      PROCTOZONE-HC 2 5 % rectal cream Starting Tue 3/6/2018, Historical Med           No discharge procedures on file      ED Provider  Electronically Signed by           Melvin Kelly MD  10/08/18 4152

## 2018-10-08 NOTE — OCCUPATIONAL THERAPY NOTE
Occupational Therapy Evaluation     Patient Name: Alice MARCUM Date: 10/8/2018  Problem List  Patient Active Problem List   Diagnosis    Asthma    Bipolar disorder ([place] [address] )    Diabetes mellitus ([place] [address] )    Dermatitis    Hypothyroidism    Obesity, morbid (more than 100 lbs over ideal weight or BMI > 40) ([facility])    Seizures ([place] [address] )     Past Medical History  Past Medical History:   Diagnosis Date    Asthma     Bipolar disorder ([place] [address] )     Chronic UTI (urinary tract infection)     COPD (chronic obstructive pulmonary disease) ([place] [address] )     Diabetes mellitus ([place] [address] )     Disease of thyroid gland     GERD (gastroesophageal reflux disease)     Seizure ([facility])      Past Surgical History  Past Surgical History:   Procedure Laterality Date    ANKLE FRACTURE SURGERY Right     TUBAL LIGATION      VEIN LIGATION AND STRIPPING             10/08/18 1311   Note Type   Note type Eval/Treat   Restrictions/Precautions   Weight Bearing Precautions Per Order No   Other Precautions Fall Risk   Pain Assessment   Pain Assessment No/denies pain   Home Living   Type of Home Apartment   Home Layout One level;Performs ADLs on one level; Able to live on main level with bedroom/bathroom; Ramped entrance;Elevator   Bathroom Shower/Tub Tub/shower unit   Bathroom Toilet Standard   Bathroom Equipment Shower chair;Grab bars in shower;Commode   Bathroom Accessibility Accessible   Home Equipment Walker;Cane;Wheelchair-manual;Grab bars; Hospital bed   Additional Comments pt reports use of w/c and RW in the home    Prior Function   Level of [place] Independent with ADLs and functional mobility   Lives With Alone   Receives Help From Friend(s)   ADL Assistance Independent   IADLs Independent   Falls in the last 6 months 1 to 4   Comments pt has neighbor that drives    Psychosocial   Psychosocial (WDL) WDL   Subjective   Subjective "I just should have kept my mouth shut and put ice on it"   ADL   Where Assessed Chair  (and bathroom)   LB Dressing Assistance 7  Independent   Toileting Assistance  7  Independent   Bed Mobility   Supine to Sit 5  Supervision   Additional items Bedrails;Verbal cues   Sit to Supine 4  Minimal assistance   Additional items LE management;Verbal cues; Increased time required   Additional Comments pt required (A) to bring LEs into bed with min (A)   Transfers   Sit to Stand 5  Supervision   Additional items Verbal cues  (RW)   Stand to Sit 5  Supervision   Additional items Verbal cues  (RW)   Toilet transfer 5  Supervision   Additional items Standard toilet;Verbal cues; Increased time required  (RW)   Functional Mobility   Functional Mobility 5  Supervision   Additional Comments pt performed functional mobility with use of RW in hallway 200ft with no LOB and no SOB   Additional items Rolling walker   Balance   Static Sitting Good   Dynamic Sitting Good   Static Standing Good   Dynamic Standing Fair +   Ambulatory Fair +   Activity Tolerance   Activity Tolerance Patient limited by fatigue   RUE Assessment   RUE Assessment WFL   LUE Assessment   LUE Assessment WFL   Hand Function   Gross Motor Coordination Functional   Fine Motor Coordination Functional   Sensation   Light Touch No apparent deficits   Sharp/Dull No apparent deficits   Cognition   Overall Cognitive Status WFL   Arousal/Participation Alert   Attention Within functional limits   Orientation Level Oriented X4   Memory Within functional limits   Following Commands Follows all commands and directions without difficulty   Assessment   Limitation Decreased ADL status; Decreased endurance;Decreased self-care trans;Decreased high-level ADLs   Assessment Pt is a 66 y o  female seen for OT evaluation s/p admit to [facility] on 10/8/2018 w/ <principal problem not specified>  Comorbidities affecting pt's functional performance at time of assessment include: obesity   Personal factors affecting pt at time of IE include:difficulty performing ADLS, difficulty performing IADLS , limited insight into deficits, decreased initiation and engagement  and health management   Prior to admission, pt was ADLs/IADLs at (I) level  Upon evaluation: Pt requires (S) with use of RW during functional mobility 2* the following deficits impacting occupational performance: weakness, decreased strength, decreased tolerance, impaired initiation and decreased safety awareness  Pt to benefit from continued skilled OT tx while in the hospital to address deficits as defined above and maximize level of functional independence w ADL's and functional mobility  Occupational Performance areas to address include: grooming, bathing/shower, toilet hygiene, dressing, functional mobility, community mobility and clothing management  From OT standpoint, recommendation at time of d/c would be home health with the above interventions as recommended  Recommendation   OT Discharge Recommendation Home Health Agency   Barthel Index   Feeding 5   Bathing 0   Grooming Score 0   Dressing Score 5   Bladder Score 10   Bowels Score 10   Toilet Use Score 5   Transfers (Bed/Chair) Score 10   Mobility (Level Surface) Score 10   Stairs Score 0   Barthel Index Score 55       Pt left seatedEOB at end of session; all needs within reach; all lines intact

## 2018-10-10 ENCOUNTER — TELEPHONE (OUTPATIENT)
Dept: FAMILY MEDICINE CLINIC | Facility: CLINIC | Age: 63
End: 2018-10-10

## 2018-10-10 LAB
ATRIAL RATE: 100 BPM
P AXIS: 47 DEGREES
PR INTERVAL: 154 MS
QRS AXIS: 32 DEGREES
QRSD INTERVAL: 58 MS
QT INTERVAL: 344 MS
QTC INTERVAL: 443 MS
T WAVE AXIS: 63 DEGREES
VENTRICULAR RATE: 100 BPM

## 2018-10-10 PROCEDURE — 93010 ELECTROCARDIOGRAM REPORT: CPT | Performed by: INTERNAL MEDICINE

## 2018-10-10 NOTE — TELEPHONE ENCOUNTER
Home health called to say that it seems like she has a lot of psych needs that their agency doesn't cover and is suggesting us to find a health agency that can help her better  The nurse states that she is making up stories and was accused of stealing from her home  She thinks maybe her medications need to be adjusted

## 2018-10-10 NOTE — TELEPHONE ENCOUNTER
They may be able to recommend someone, I haven't received any documentation from them at this point  She should have a follow up visit, last seen by me was in July, she has been utilizing the ED  We had previously completed forms for reccommendation for nursing home placement

## 2018-10-11 ENCOUNTER — HOSPITAL ENCOUNTER (EMERGENCY)
Facility: HOSPITAL | Age: 63
Discharge: HOME | End: 2018-10-11
Attending: EMERGENCY MEDICINE | Admitting: EMERGENCY MEDICINE
Payer: COMMERCIAL

## 2018-10-11 ENCOUNTER — APPOINTMENT (EMERGENCY)
Dept: RADIOLOGY | Facility: HOSPITAL | Age: 63
End: 2018-10-11
Payer: COMMERCIAL

## 2018-10-11 VITALS
BODY MASS INDEX: 40.85 KG/M2 | DIASTOLIC BLOOD PRESSURE: 73 MMHG | TEMPERATURE: 98 F | HEIGHT: 66 IN | RESPIRATION RATE: 18 BRPM | SYSTOLIC BLOOD PRESSURE: 129 MMHG | OXYGEN SATURATION: 97 % | WEIGHT: 254.19 LBS

## 2018-10-11 DIAGNOSIS — R60.9 PERIPHERAL EDEMA: Primary | ICD-10-CM

## 2018-10-11 LAB
ALBUMIN SERPL BCP-MCNC: 2.9 G/DL (ref 3.5–5)
ALP SERPL-CCNC: 58 U/L (ref 46–116)
ALT SERPL W P-5'-P-CCNC: 23 U/L (ref 12–78)
ANION GAP SERPL CALCULATED.3IONS-SCNC: 8 MMOL/L (ref 4–13)
AST SERPL W P-5'-P-CCNC: 21 U/L (ref 5–45)
BASOPHILS # BLD AUTO: 0.04 THOUSANDS/ΜL (ref 0–0.1)
BASOPHILS NFR BLD AUTO: 1 % (ref 0–1)
BILIRUB SERPL-MCNC: 0.2 MG/DL (ref 0.2–1)
BUN SERPL-MCNC: 10 MG/DL (ref 5–25)
CALCIUM SERPL-MCNC: 8.4 MG/DL (ref 8.3–10.1)
CHLORIDE SERPL-SCNC: 102 MMOL/L (ref 100–108)
CO2 SERPL-SCNC: 24 MMOL/L (ref 21–32)
CREAT SERPL-MCNC: 0.74 MG/DL (ref 0.6–1.3)
EOSINOPHIL # BLD AUTO: 0.45 THOUSAND/ΜL (ref 0–0.61)
EOSINOPHIL NFR BLD AUTO: 9 % (ref 0–6)
ERYTHROCYTE [DISTWIDTH] IN BLOOD BY AUTOMATED COUNT: 14.5 % (ref 11.6–15.1)
GFR SERPL CREATININE-BSD FRML MDRD: 88 ML/MIN/1.73SQ M
GLUCOSE SERPL-MCNC: 106 MG/DL (ref 65–140)
HCT VFR BLD AUTO: 27.9 % (ref 34.8–46.1)
HGB BLD-MCNC: 9.1 G/DL (ref 11.5–15.4)
IMM GRANULOCYTES # BLD AUTO: 0.01 THOUSAND/UL (ref 0–0.2)
IMM GRANULOCYTES NFR BLD AUTO: 0 % (ref 0–2)
LYMPHOCYTES # BLD AUTO: 1.35 THOUSANDS/ΜL (ref 0.6–4.47)
LYMPHOCYTES NFR BLD AUTO: 26 % (ref 14–44)
MCH RBC QN AUTO: 34.2 PG (ref 26.8–34.3)
MCHC RBC AUTO-ENTMCNC: 32.6 G/DL (ref 31.4–37.4)
MCV RBC AUTO: 105 FL (ref 82–98)
MONOCYTES # BLD AUTO: 0.52 THOUSAND/ΜL (ref 0.17–1.22)
MONOCYTES NFR BLD AUTO: 10 % (ref 4–12)
NEUTROPHILS # BLD AUTO: 2.77 THOUSANDS/ΜL (ref 1.85–7.62)
NEUTS SEG NFR BLD AUTO: 54 % (ref 43–75)
NRBC BLD AUTO-RTO: 0 /100 WBCS
PLATELET # BLD AUTO: 361 THOUSANDS/UL (ref 149–390)
PMV BLD AUTO: 8.5 FL (ref 8.9–12.7)
POTASSIUM SERPL-SCNC: 3.8 MMOL/L (ref 3.5–5.3)
PROT SERPL-MCNC: 6.2 G/DL (ref 6.4–8.2)
RBC # BLD AUTO: 2.66 MILLION/UL (ref 3.81–5.12)
SODIUM SERPL-SCNC: 134 MMOL/L (ref 136–145)
TROPONIN I SERPL-MCNC: <0.02 NG/ML
WBC # BLD AUTO: 5.14 THOUSAND/UL (ref 4.31–10.16)

## 2018-10-11 PROCEDURE — 80053 COMPREHEN METABOLIC PANEL: CPT | Performed by: EMERGENCY MEDICINE

## 2018-10-11 PROCEDURE — 99284 EMERGENCY DEPT VISIT MOD MDM: CPT

## 2018-10-11 PROCEDURE — 71046 X-RAY EXAM CHEST 2 VIEWS: CPT

## 2018-10-11 PROCEDURE — 85025 COMPLETE CBC W/AUTO DIFF WBC: CPT | Performed by: EMERGENCY MEDICINE

## 2018-10-11 PROCEDURE — 93005 ELECTROCARDIOGRAM TRACING: CPT

## 2018-10-11 PROCEDURE — 84484 ASSAY OF TROPONIN QUANT: CPT | Performed by: EMERGENCY MEDICINE

## 2018-10-11 RX ORDER — ACETAMINOPHEN 325 MG/1
650 TABLET ORAL ONCE
Status: COMPLETED | OUTPATIENT
Start: 2018-10-11 | End: 2018-10-11

## 2018-10-11 RX ADMIN — ACETAMINOPHEN 650 MG: 325 TABLET, FILM COATED ORAL at 08:17

## 2018-10-11 NOTE — DISCHARGE INSTRUCTIONS
Leg Edema   WHAT YOU NEED TO KNOW:   Leg edema is swelling caused by fluid buildup  Your legs may swell if you sit or stand for long periods of time, are pregnant, or are injured  Swelling may also occur if you have heart failure or circulation problems  This means that your heart does not pump blood through your body as it should  DISCHARGE INSTRUCTIONS:   Self-care:   · Elevate your legs:  Raise your legs above the level of your heart as often as you can  This will help decrease swelling and pain  Prop your legs on pillows or blankets to keep them elevated comfortably  · Wear pressure stockings: These tight stockings put pressure on your legs to promote blood flow and prevent blood clots  Wear the stockings during the day  Do not wear them while you sleep  · Apply heat:  Heat helps decrease pain and swelling  Apply heat on the area for 20 to 30 minutes every 2 hours for as many days as directed  · Stay active:  Do not stand or sit for long periods of time  Ask your healthcare provider about the best exercise plan for you  · Eat healthy foods:  Healthy foods include fruits, vegetables, whole-grain breads, low-fat dairy products, beans, lean meats, and fish  Ask if you need to be on a special diet  Limit salt  Salt will make your body hold even more fluid  Follow up with your healthcare provider as directed:  Write down your questions so you remember to ask them during your visits  Contact your healthcare provider if:   · You have a fever or feel more tired than usual     · The veins in your legs look larger than usual  They may look full or bulging  · Your legs itch or feel heavy  · You have red or white areas or sores on your legs  The skin may also appear dimpled or have indentations  · You are gaining weight  · You have trouble moving your ankles  · The swelling does not go away, or other parts of your body swell      · You have questions or concerns about your condition or care   Return to the emergency department if:   · You cannot walk  · You feel faint or confused  · Your skin turns blue or gray  · Your leg feels warm, tender, and painful  It may be swollen and red  · You have chest pain or trouble breathing that is worse when you lie down  · You suddenly feel lightheaded and have trouble breathing  · You have new and sudden chest pain  You may have more pain when you take deep breaths or cough  You may also cough up blood  © 2017 [employer]  Information is for End User's use only and may not be sold, redistributed or otherwise used for commercial purposes  All illustrations and images included in CareNotes® are the copyrighted property of A D [employer] M [employer]  or Edgardo Chappell  The above information is an  only  It is not intended as medical advice for individual conditions or treatments  Talk to your doctor, nurse or pharmacist before following any medical regimen to see if it is safe and effective for you

## 2018-10-11 NOTE — ED PROCEDURE NOTE
PROCEDURE  ECG 12 Lead Documentation  Date/Time: 10/11/2018 8:10 AM  Performed by: Torsten Rowland  Authorized by: Torsten Rowland     Indications / Diagnosis:  Cough   ECG reviewed by me, the ED Provider: yes    Patient location:  ED  Previous ECG:     Previous ECG:  Unavailable  Interpretation:     Interpretation: non-specific    Rate:     ECG rate:  99    ECG rate assessment: normal    Rhythm:     Rhythm: sinus rhythm           Justine Lam DO  10/11/18 1628

## 2018-10-11 NOTE — ED PROVIDER NOTES
History  Chief Complaint   Patient presents with    Leg Swelling     Patient complains of leg redness and swelling not improving,  47-year-old female presents by EMS complaining of bilateral lower extremity pain  Patient was seen evaluated for the same on October 7, 2018  At that time laboratory evaluation was negative and venous duplex bilateral extremities was negative as well  Patient also had CTA of her chest which was negative  The patient stated that the next day on 10/08 she had a fall and had extensive workup at that time portable chest that time was also negative  Patient has chronic lower extremity edema  She states that the pain is what brought her in today  She she says it is not red now but it will get red if I get up and walk around   She denies chest pain or shortness of breath  She states that she did not take any Tylenol or Motrin for this discomfort because nobody told me I could   Patient is already taking doxycycline for lower extremity redness which has improved significantly        History provided by:  Patient  Leg Pain   Lower extremity pain location: Bilateral lower extremities  Pain details:     Quality:  Aching    Radiates to:  L leg and R leg    Severity:  Mild    Onset quality:  Gradual    Timing:  Intermittent    Progression:  Waxing and waning  Chronicity:  Recurrent  Dislocation: no    Relieved by:  Nothing  Worsened by:  Nothing  Ineffective treatments:  None tried  Associated symptoms: no back pain, no decreased ROM and no fatigue    Risk factors: obesity        Prior to Admission Medications   Prescriptions Last Dose Informant Patient Reported? Taking? Blood Glucose Monitoring Suppl (BLOOD GLUCOSE MONITOR SYSTEM) w/Device KIT   No No   Sig: Test blood sugars 3 times a day   MAG64 535 (64 Mg) MG   Yes No   NOVOFINE 32G X 6 MM MISC   Yes No   ONETOUCH DELICA LANCETS 78Y MISC   No No   Sig: Test blood sugars 3 times a day     PROCTOZONE-HC 2 5 % rectal cream Yes No   QUEtiapine (SEROquel) 400 MG tablet   No No   Sig: Take 1 tablet (400 mg total) by mouth 2 (two) times a day   VENTOLIN  (90 Base) MCG/ACT inhaler   No No   Sig: Inhale 2 puffs every 6 (six) hours as needed for wheezing   aspirin (ECOTRIN LOW STRENGTH) 81 mg EC tablet   No No   Sig: Take 1 tablet (81 mg total) by mouth daily   doxycycline hyclate (VIBRAMYCIN) 100 mg capsule   No No   Sig: Take 1 capsule (100 mg total) by mouth 2 (two) times a day for 7 days   estradiol (ESTRACE) 1 mg tablet   Yes No   Sig: Take 1 mg by mouth daily  fluticasone (FLONASE) 50 mcg/act nasal spray   No No   Sig: SPRAY ONE (1) SPRAY INTO EACH NOSTRIL ONCE DAILY      **FOR THE NOSE**   gabapentin (NEURONTIN) 800 mg tablet   Yes No   Sig: Take 800 mg by mouth 4 (four) times a day     glucose blood test strip   No No   Sig: Test blood sugars 3 times a day    hydrOXYzine pamoate (VISTARIL) 50 mg capsule   No No   Sig: Take 1 capsule (50 mg total) by mouth 3 (three) times a day   hydrocortisone 2 5 % cream   No No   Sig: Apply topically 2 (two) times a day   insulin glargine (LANTUS) 100 units/mL subcutaneous injection   No No   Sig: Inject 10 Units under the skin daily at bedtime   levETIRAcetam (KEPPRA) 750 mg tablet   Yes No   Sig: Take 1,500 mg by mouth 2 (two) times a day     levothyroxine 175 mcg tablet   No No   Sig: Take 1 tablet (175 mcg total) by mouth daily   lisinopril (ZESTRIL) 10 mg tablet   No No   Sig: Take 1 tablet (10 mg total) by mouth daily   loratadine (CLARITIN) 10 mg tablet   No No   Sig: Take 1 tablet (10 mg total) by mouth daily   meloxicam (MOBIC) 7 5 mg tablet   No No   Sig: Take 1 tablet (7 5 mg total) by mouth 2 (two) times a day   metFORMIN (GLUCOPHAGE) 1000 MG tablet   No No   Sig: Take 1 tablet (1,000 mg total) by mouth 2 (two) times a day with meals   montelukast (SINGULAIR) 10 mg tablet   No No   Sig: Take 1 tablet (10 mg total) by mouth daily at bedtime   norethindrone (AYGESTIN) 5 mg tablet Yes No   Sig: Take 5 mg by mouth daily  omeprazole (PriLOSEC) 20 mg delayed release capsule   No No   Sig: Take 1 capsule (20 mg total) by mouth daily TAKE ONE ( 40 MG) TABLET IN THE MORNING AND TAKE ONE (40 MG) TABLET IN THE EVENING BY MOUTH DAILY  oxybutynin (DITROPAN) 5 mg tablet   Yes No   Sig: Take 5 mg by mouth 3 (three) times a day     phenytoin (DILANTIN) 100 mg ER capsule   Yes No   Sig: Take by mouth 5 (five) times a day  polyethylene glycol (GLYCOLAX) powder   No No   Sig: Take 17 g by mouth daily   polyethylene glycol (MIRALAX) 17 g packet   No No   Sig: Take 17 g by mouth daily   simvastatin (ZOCOR) 80 mg tablet   No No   Sig: Take 1 tablet (80 mg total) by mouth daily at bedtime   triamcinolone (KENALOG) 0 1 % cream   No No   Sig: Apply topically 2 (two) times a day   valACYclovir (VALTREX) 500 mg tablet   Yes No   Sig: Take 1 tablet by mouth 2 (two) times a day   zonisamide (ZONEGRAN) 100 mg capsule   Yes No   Sig: Take 100 mg by mouth 5 (five) times a day        Facility-Administered Medications: None       Past Medical History:   Diagnosis Date    Asthma     [facility]ipolar disorder ([facility] )     Chronic UTI (urinary tract infection)     COPD (chronic obstructive pulmonary disease) ([facility])     Diabetes mellitus ([facility])     Disease of thyroid gland     GERD (gastroesophageal reflux disease)     Seizure ([facility] )        Past Surgical History:   Procedure Laterality Date    ANKLE FRACTURE SURGERY Right     TUBAL LIGATION      VEIN LIGATION AND STRIPPING         History reviewed  No pertinent family history  I have reviewed and agree with the history as documented  Social History   Substance Use Topics    Smoking status: Never Smoker    Smokeless tobacco: Never Used    Alcohol use No        Review of Systems   Constitutional: Negative for activity change, appetite change and fatigue  HENT: Negative for congestion, dental problem and drooling  Eyes: Negative for pain, discharge and itching  Respiratory: Negative for apnea, choking and chest tightness  Cardiovascular: Negative for chest pain  Gastrointestinal: Negative for abdominal distention, abdominal pain and anal bleeding  Endocrine: Negative for cold intolerance, heat intolerance and polydipsia  Genitourinary: Negative for difficulty urinating, dyspareunia and dysuria  Musculoskeletal: Negative for back pain  Skin: Negative for color change and pallor  Peripheral edema   Allergic/Immunologic: Negative for environmental allergies and food allergies  Neurological: Negative for dizziness, facial asymmetry and headaches  Hematological: Negative for adenopathy  Psychiatric/Behavioral: Negative for agitation, behavioral problems and confusion  All other systems reviewed and are negative  Physical Exam  Physical Exam   Constitutional: She appears well-developed and well-nourished  HENT:   Head: Normocephalic  Right Ear: External ear normal    Left Ear: External ear normal    Eyes: Pupils are equal, round, and reactive to light  Right eye exhibits no discharge  Left eye exhibits no discharge  Neck: Normal range of motion  No JVD present  No tracheal deviation present  No thyromegaly present  Cardiovascular: Normal rate, regular rhythm and normal heart sounds  Pulmonary/Chest: Effort normal  No respiratory distress  She has no wheezes  She has no rales  Abdominal: Soft  She exhibits no distension  There is no tenderness  There is no guarding  Musculoskeletal: Normal range of motion  She exhibits no edema or deformity  Neurological: She is alert  She displays normal reflexes  No cranial nerve deficit  She exhibits normal muscle tone  Coordination normal    Skin: Capillary refill takes less than 2 seconds  No rash noted  Improved peripheral edema and no red streaks since Sunday   Psychiatric: She has a normal mood and affect  Her behavior is normal  Thought content normal    Vitals reviewed        Vital Signs  ED Triage Vitals [10/11/18 0755]   Temperature Pulse Respirations Blood Pressure SpO2   98 °F (36 7 °C) -- 18 129/73 97 %      Temp Source Heart Rate Source Patient Position - Orthostatic VS BP Location FiO2 (%)   Temporal Monitor Lying Right arm --      Pain Score       9           Vitals:    10/11/18 0755   BP: 129/73   Patient Position - Orthostatic VS: Lying       Visual Acuity      ED Medications  Medications   acetaminophen (TYLENOL) tablet 650 mg (650 mg Oral Given 10/11/18 0817)       Diagnostic Studies  Results Reviewed     Procedure Component Value Units Date/Time    Troponin I [[id number]]  (Normal) Collected:  10/11/18 0818    Lab Status:  Final result Specimen:  Blood from Arm, Left Updated:  10/11/18 0846     Troponin I <0 02 ng/mL     Comprehensive metabolic panel [[id number]]  (Abnormal) Collected:  10/11/18 0818    Lab Status:  Final result Specimen:  Blood from Arm, Left Updated:  10/11/18 0845     Sodium 134 (L) mmol/L      Potassium 3 8 mmol/L      Chloride 102 mmol/L      CO2 24 mmol/L      ANION GAP 8 mmol/L      BUN 10 mg/dL      Creatinine 0 74 mg/dL      Glucose 106 mg/dL      Calcium 8 4 mg/dL      AST 21 U/L      ALT 23 U/L      Alkaline Phosphatase 58 U/L      Total Protein 6 2 (L) g/dL      Albumin 2 9 (L) g/dL      Total Bilirubin 0 20 mg/dL      eGFR 88 ml/min/1 73sq m     Narrative:         National Kidney Disease Education Program recommendations are as follows:  GFR calculation is accurate only with a steady state creatinine  Chronic Kidney disease less than 60 ml/min/1 73 sq  meters  Kidney failure less than 15 ml/min/1 73 sq  meters      CBC and differential [[id number]]  (Abnormal) Collected:  10/11/18 0818    Lab Status:  Final result Specimen:  Blood from Arm, Left Updated:  10/11/18 0827     WBC 5 14 Thousand/uL      RBC 2 66 (L) Million/uL      Hemoglobin 9 1 (L) g/dL      Hematocrit 27 9 (L) %       (H) fL      MCH 34 2 pg      MCHC 32 6 g/dL      RDW 14 5 %      MPV 8 5 (L) fL      Platelets 629 Thousands/uL      nRBC 0 /100 WBCs      Neutrophils Relative 54 %      Immat GRANS % 0 %      Lymphocytes Relative 26 %      Monocytes Relative 10 %      Eosinophils Relative 9 (H) %      Basophils Relative 1 %      Neutrophils Absolute 2 77 Thousands/µL      Immature Grans Absolute 0 01 Thousand/uL      Lymphocytes Absolute 1 35 Thousands/µL      Monocytes Absolute 0 52 Thousand/µL      Eosinophils Absolute 0 45 Thousand/µL      Basophils Absolute 0 04 Thousands/µL                  XR chest 2 views   ED Interpretation by Rogelio Estevez DO (10/11 0858)   No infiltrate                 Procedures  Procedures       Phone Contacts  ED Phone Contact    ED Course         HEART Risk Score      Most Recent Value   History  1 Filed at: 10/11/2018 0809   ECG  1 Filed at: 10/11/2018 0809   Age  1 Filed at: 10/11/2018 0809   Risk Factors  1 Filed at: 10/11/2018 0809   Troponin  0 Filed at: 10/11/2018 0809   Heart Score Risk Calculator   History  1 Filed at: 10/11/2018 0809   ECG  1 Filed at: 10/11/2018 0809   Age  1 Filed at: 10/11/2018 0809   Risk Factors  1 Filed at: 10/11/2018 0809   Troponin  0 Filed at: 10/11/2018 0809   HEART Score  4 Filed at: 10/11/2018 0809   HEART Score  4 Filed at: 10/11/2018 0636                            MDM  Number of Diagnoses or Management Options  Diagnosis management comments: 59-year-old female presents to the emerged department for the 3rd time in 4 days complaining today of lower extremity pain  She also states she has a cough  She states she has had this cough since she got out [facility] and her roommate left the window open  Differential diagnosis 1  Peripheral edema due to incompetent veins 2  Morbid obesity 3   Pneumonia I will check labs and chest x-ray          Amount and/or Complexity of Data Reviewed  Clinical lab tests: reviewed  Tests in the radiology section of CPT®: reviewed  Tests in the medicine section of CPT®: reviewed    Risk of Complications, Morbidity, and/or Mortality  Presenting problems: high  Diagnostic procedures: high  Management options: high      CritCare Time    Disposition  Final diagnoses:   Peripheral edema     Time reflects when diagnosis was documented in both MDM as applicable and the Disposition within this note     Time User Action Codes Description Comment    10/11/2018  9:11 AM Josue Garcia Add [R60 9] Peripheral edema       ED Disposition     ED Disposition Condition Comment    Discharge  Veronika Purdue discharge to home/self care  Condition at discharge: Good        Follow-up Information    None         Patient's Medications   Discharge Prescriptions    No medications on file     No discharge procedures on file      ED Provider  Electronically Signed by           Robi Thornton DO  10/11/18 8579

## 2018-10-14 NOTE — ED NOTES
SSE effectual for colored water and fecal flecks       Gaurav Magdaleno RN  10/10/17 1310 ambulatory

## 2018-10-15 LAB
ATRIAL RATE: 99 BPM
P AXIS: 16 DEGREES
PR INTERVAL: 142 MS
QRS AXIS: 11 DEGREES
QRSD INTERVAL: 70 MS
QT INTERVAL: 358 MS
QTC INTERVAL: 459 MS
T WAVE AXIS: 58 DEGREES
VENTRICULAR RATE: 99 BPM

## 2018-10-15 PROCEDURE — 93010 ELECTROCARDIOGRAM REPORT: CPT | Performed by: INTERNAL MEDICINE

## 2018-10-17 ENCOUNTER — OFFICE VISIT (OUTPATIENT)
Dept: FAMILY MEDICINE CLINIC | Facility: CLINIC | Age: 63
End: 2018-10-17
Payer: COMMERCIAL

## 2018-10-17 VITALS
SYSTOLIC BLOOD PRESSURE: 118 MMHG | DIASTOLIC BLOOD PRESSURE: 76 MMHG | TEMPERATURE: 97.9 F | OXYGEN SATURATION: 96 % | WEIGHT: 253 LBS | RESPIRATION RATE: 18 BRPM | HEIGHT: 66 IN | BODY MASS INDEX: 40.66 KG/M2 | HEART RATE: 103 BPM

## 2018-10-17 DIAGNOSIS — R23.8 VESICULAR ERUPTION OF SKIN: ICD-10-CM

## 2018-10-17 DIAGNOSIS — R60.9 PERIPHERAL EDEMA: Primary | ICD-10-CM

## 2018-10-17 DIAGNOSIS — K62.89 PERIRECTAL SKIN IRRITATION: ICD-10-CM

## 2018-10-17 DIAGNOSIS — E66.01 MORBID OBESITY (HCC): ICD-10-CM

## 2018-10-17 DIAGNOSIS — R60.9 EDEMA, PERIPHERAL: Primary | ICD-10-CM

## 2018-10-17 PROCEDURE — T1015 CLINIC SERVICE: HCPCS | Performed by: FAMILY MEDICINE

## 2018-10-17 RX ORDER — FUROSEMIDE 20 MG/1
20 TABLET ORAL DAILY
Qty: 7 TABLET | Refills: 0 | Status: SHIPPED | OUTPATIENT
Start: 2018-10-17 | End: 2018-11-15

## 2018-10-17 RX ORDER — CALAMINE, MENTHOL, WHITE PETROLATUM, ZINC OXIDE .5; .2; 69; 19.5 G/100G; G/100G; G/100G; G/100G
113 PASTE TOPICAL 2 TIMES DAILY
Qty: 1 TUBE | Refills: 0 | Status: SHIPPED | OUTPATIENT
Start: 2018-10-17

## 2018-10-17 NOTE — PROGRESS NOTES
OFFICE VISIT  May Calles 66 y o  female MRN: [id number]      Assessment / Plan:  Diagnoses and all orders for this visit:    Peripheral edema  -     TEDS Stockings  -     furosemide (LASIX) 20 mg tablet; Take 1 tablet (20 mg total) by mouth daily    Perirectal skin irritation  -     Skin Protectants, Misc  (CALAZIME SKIN PROTECTANT) PSTE; Apply 113 g topically 2 (two) times a day    Morbid obesity ([place] [address] )  -     Bed          Reason For Visit / Chief Complaint  Chief Complaint   Patient presents with    Follow-up     She states she has a sore on her behind  HPI:  May Calles is a 66 y o  female who presents today for follow up from ED  Pt was seen in 10/11  She was seen in ED several times for lower ext pain, edema  She has had extensive workup including labs, dopplers, CTA  She reports several falls at SNF, not satisfied with care  She now has returned for the last approx 2 weeks  She has arrangements made for home care  Historical Information   Past Medical History:   Diagnosis Date    Asthma     Bipolar disorder ([place] [address] )     Chronic UTI (urinary tract infection)     COPD (chronic obstructive pulmonary disease) ([place] [address] )     Diabetes mellitus ([place] [address] )     Disease of thyroid gland     GERD (gastroesophageal reflux disease)     [facility]      Past Surgical History:   Procedure Laterality Date    ANKLE FRACTURE SURGERY Right     TUBAL LIGATION      VEIN LIGATION AND STRIPPING       Social History   History   Alcohol Use No     History   Drug Use No     History   Smoking Status    Never Smoker   Smokeless Tobacco    Never Used     No family history on file      Meds/Allergies   Allergies   Allergen Reactions    Keflex [Cephalexin] Anaphylaxis     Airway edema     Levaquin [Levofloxacin] Anaphylaxis    Penicillins Anaphylaxis    Bactrim [Sulfamethoxazole-Trimethoprim] Swelling and GI Intolerance     LE edema and thrush    Ciprofloxacin Swelling     Edema in all extremities and thrush     Noroxin [Norfloxacin]        Meds:    Current Outpatient Prescriptions:     aspirin (ECOTRIN LOW STRENGTH) 81 mg EC tablet, Take 1 tablet (81 mg total) by mouth daily, Disp: 90 tablet, Rfl: 0    Blood Glucose Monitoring Suppl (BLOOD GLUCOSE MONITOR SYSTEM) w/Device KIT, Test blood sugars 3 times a day, Disp: 1 each, Rfl: 0    estradiol (ESTRACE) 1 mg tablet, Take 1 mg by mouth daily  , Disp: , Rfl:     fluticasone (FLONASE) 50 mcg/act nasal spray, SPRAY ONE (1) SPRAY INTO EACH NOSTRIL ONCE DAILY      **FOR THE NOSE**, Disp: 16 g, Rfl: 0    furosemide (LASIX) 20 mg tablet, Take 1 tablet (20 mg total) by mouth daily, Disp: 7 tablet, Rfl: 0    gabapentin (NEURONTIN) 800 mg tablet, Take 800 mg by mouth 4 (four) times a day , Disp: , Rfl:     glucose blood test strip, Test blood sugars 3 times a day , Disp: 100 each, Rfl: 3    hydrocortisone 2 5 % cream, Apply topically 2 (two) times a day, Disp: 30 g, Rfl: 3    hydrOXYzine pamoate (VISTARIL) 50 mg capsule, Take 1 capsule (50 mg total) by mouth 3 (three) times a day, Disp: 90 capsule, Rfl: 3    insulin glargine (LANTUS) 100 units/mL subcutaneous injection, Inject 10 Units under the skin daily at bedtime, Disp: 10 mL, Rfl: 2    levETIRAcetam (KEPPRA) 750 mg tablet, Take 1,500 mg by mouth 2 (two) times a day  , Disp: , Rfl:     levothyroxine 175 mcg tablet, Take 1 tablet (175 mcg total) by mouth daily, Disp: 30 tablet, Rfl: 3    lisinopril (ZESTRIL) 10 mg tablet, Take 1 tablet (10 mg total) by mouth daily, Disp: 30 tablet, Rfl: 0    loratadine (CLARITIN) 10 mg tablet, Take 1 tablet (10 mg total) by mouth daily, Disp: 30 tablet, Rfl: 0    MAG64 535 (64 Mg) MG, , Disp: , Rfl: 1    meloxicam (MOBIC) 7 5 mg tablet, Take 1 tablet (7 5 mg total) by mouth 2 (two) times a day, Disp: 60 tablet, Rfl: 3    metFORMIN (GLUCOPHAGE) 1000 MG tablet, Take 1 tablet (1,000 mg total) by mouth 2 (two) times a day with meals, Disp: 60 tablet, Rfl: 3    montelukast (SINGULAIR) 10 mg tablet, Take 1 tablet (10 mg total) by mouth daily at bedtime, Disp: 30 tablet, Rfl: 0    norethindrone (AYGESTIN) 5 mg tablet, Take 5 mg by mouth daily  , Disp: , Rfl:     NOVOFINE 32G X 6 MM MISC, , Disp: , Rfl: 2    omeprazole (PriLOSEC) 20 mg delayed release capsule, Take 1 capsule (20 mg total) by mouth daily TAKE ONE ( 40 MG) TABLET IN THE MORNING AND TAKE ONE (40 MG) TABLET IN THE EVENING BY MOUTH DAILY  , Disp: 120 capsule, Rfl: 3    ONETOUCH DELICA LANCETS 47U MISC, Test blood sugars 3 times a day , Disp: 100 each, Rfl: 3    oxybutynin (DITROPAN) 5 mg tablet, Take 5 mg by mouth 3 (three) times a day  , Disp: , Rfl:     phenytoin (DILANTIN) 100 mg ER capsule, Take by mouth 5 (five) times a day   , Disp: , Rfl:     polyethylene glycol (GLYCOLAX) powder, Take 17 g by mouth daily, Disp: 850 g, Rfl: 3    polyethylene glycol (MIRALAX) 17 g packet, Take 17 g by mouth daily, Disp: 30 each, Rfl: 0    PROCTOZONE-HC 2 5 % rectal cream, , Disp: , Rfl: 1    QUEtiapine (SEROquel) 400 MG tablet, Take 1 tablet (400 mg total) by mouth 2 (two) times a day, Disp: 60 tablet, Rfl: 3    simvastatin (ZOCOR) 80 mg tablet, Take 1 tablet (80 mg total) by mouth daily at bedtime, Disp: 30 tablet, Rfl: 3    Skin Protectants, Misc  (CALAZIME SKIN PROTECTANT) PSTE, Apply 113 g topically 2 (two) times a day, Disp: 1 Tube, Rfl: 0    triamcinolone (KENALOG) 0 1 % cream, Apply topically 2 (two) times a day, Disp: 30 g, Rfl: 0    valACYclovir (VALTREX) 500 mg tablet, Take 1 tablet by mouth 2 (two) times a day, Disp: , Rfl: 2    VENTOLIN  (90 Base) MCG/ACT inhaler, Inhale 2 puffs every 6 (six) hours as needed for wheezing, Disp: 18 g, Rfl: 0    zonisamide (ZONEGRAN) 100 mg capsule, Take 100 mg by mouth 5 (five) times a day  , Disp: , Rfl:       REVIEW OF SYSTEMS  Review of Systems   Constitutional: Negative for activity change, chills, fatigue and fever     HENT: Negative for congestion, ear discharge, ear pain, sinus pain, sinus pressure, sore throat, tinnitus and trouble swallowing  Eyes: Negative for photophobia, pain, discharge, itching and visual disturbance  Respiratory: Negative for cough, chest tightness, shortness of breath and wheezing  Cardiovascular: Positive for leg swelling  Negative for chest pain  Gastrointestinal: Negative for abdominal distention, abdominal pain, constipation, diarrhea, nausea and vomiting  Endocrine: Negative for polydipsia, polyphagia and polyuria  Genitourinary: Negative for dysuria and frequency  Musculoskeletal: Negative for arthralgias, myalgias, neck pain and neck stiffness  Skin: Positive for rash  Negative for color change  Neurological: Negative for dizziness, syncope, weakness, numbness and headaches  Hematological: Does not bruise/bleed easily  Psychiatric/Behavioral: Negative for behavioral problems, confusion, self-injury, sleep disturbance and suicidal ideas  The patient is hyperactive  The patient is not nervous/anxious  Current Vitals:   Blood Pressure: 118/76 (10/17/18 0913)  Pulse: 103 (10/17/18 0913)  Temperature: 97 9 °F (36 6 °C) (10/17/18 0913)  Respirations: 18 (10/17/18 0913)  Height: 5' 6" (167 6 cm) (10/17/18 0913)  Weight - Scale: 115 kg (253 lb) (10/17/18 0913)  SpO2: 96 % (10/17/18 0913)  [unfilled]    PHYSICAL EXAMS:  Physical Exam   Constitutional: She is oriented to person, place, and time  She appears well-developed and well-nourished  HENT:   Head: Normocephalic  Right Ear: External ear normal    Left Ear: External ear normal    Mouth/Throat: Oropharynx is clear and moist    Eyes: Pupils are equal, round, and reactive to light  Conjunctivae are normal    Neck: Neck supple  Cardiovascular: Normal rate and regular rhythm  Pulmonary/Chest: Effort normal and breath sounds normal    Abdominal: Soft  Bowel sounds are normal  She exhibits no distension  There is no tenderness  Musculoskeletal: Normal range of motion  Neurological: She is alert and oriented to person, place, and time  Skin: Skin is warm and dry  Rash noted  Vaginal, rectal redness   Psychiatric: She has a normal mood and affect  Follow up at this office in 2 weeks    Counseling / Coordination of Care  Total floor / unit time spent today 20 minutes  Greater than 50% of total time was spent with the patient and / or family counseling and / or coordination of care

## 2018-10-18 DIAGNOSIS — K21.9 GERD WITHOUT ESOPHAGITIS: ICD-10-CM

## 2018-10-18 RX ORDER — OMEPRAZOLE 20 MG/1
40 CAPSULE, DELAYED RELEASE ORAL 2 TIMES DAILY
Qty: 120 CAPSULE | Refills: 1 | OUTPATIENT
Start: 2018-10-18

## 2018-10-19 DIAGNOSIS — J45.909 MODERATE ASTHMA WITHOUT COMPLICATION, UNSPECIFIED WHETHER PERSISTENT: ICD-10-CM

## 2018-10-22 DIAGNOSIS — E11.65 TYPE 2 DIABETES MELLITUS WITH HYPERGLYCEMIA, WITH LONG-TERM CURRENT USE OF INSULIN (HCC): ICD-10-CM

## 2018-10-22 DIAGNOSIS — J45.909 MODERATE ASTHMA WITHOUT COMPLICATION, UNSPECIFIED WHETHER PERSISTENT: ICD-10-CM

## 2018-10-22 DIAGNOSIS — Z79.4 TYPE 2 DIABETES MELLITUS WITH HYPERGLYCEMIA, WITH LONG-TERM CURRENT USE OF INSULIN (HCC): ICD-10-CM

## 2018-10-22 DIAGNOSIS — J30.89 NON-SEASONAL ALLERGIC RHINITIS, UNSPECIFIED TRIGGER: ICD-10-CM

## 2018-10-22 RX ORDER — MELOXICAM 7.5 MG/1
7.5 TABLET ORAL 2 TIMES DAILY
Qty: 60 TABLET | Refills: 3 | Status: SHIPPED | OUTPATIENT
Start: 2018-10-22 | End: 2019-02-13 | Stop reason: SDUPTHER

## 2018-10-22 RX ORDER — MONTELUKAST SODIUM 10 MG/1
10 TABLET ORAL
Qty: 90 TABLET | Refills: 0 | Status: SHIPPED | OUTPATIENT
Start: 2018-10-22 | End: 2019-04-30 | Stop reason: SDUPTHER

## 2018-10-22 RX ORDER — LEVOTHYROXINE SODIUM 175 UG/1
175 TABLET ORAL DAILY
Qty: 30 TABLET | Refills: 3 | Status: SHIPPED | OUTPATIENT
Start: 2018-10-22 | End: 2019-03-01 | Stop reason: SDUPTHER

## 2018-10-22 RX ORDER — SIMVASTATIN 80 MG
80 TABLET ORAL
Qty: 30 TABLET | Refills: 3 | Status: SHIPPED | OUTPATIENT
Start: 2018-10-22 | End: 2018-11-15 | Stop reason: SDUPTHER

## 2018-10-22 RX ORDER — ASPIRIN 81 MG/1
81 TABLET ORAL DAILY
Qty: 90 TABLET | Refills: 1 | Status: SHIPPED | OUTPATIENT
Start: 2018-10-22 | End: 2019-04-30 | Stop reason: SDUPTHER

## 2018-10-22 RX ORDER — LORATADINE 10 MG/1
10 TABLET ORAL DAILY
Qty: 30 TABLET | Refills: 3 | Status: SHIPPED | OUTPATIENT
Start: 2018-10-22 | End: 2019-04-30 | Stop reason: SDUPTHER

## 2018-10-26 ENCOUNTER — OFFICE VISIT (OUTPATIENT)
Dept: FAMILY MEDICINE CLINIC | Facility: CLINIC | Age: 63
End: 2018-10-26
Payer: COMMERCIAL

## 2018-10-26 VITALS
OXYGEN SATURATION: 98 % | DIASTOLIC BLOOD PRESSURE: 72 MMHG | TEMPERATURE: 97.6 F | HEIGHT: 66 IN | SYSTOLIC BLOOD PRESSURE: 114 MMHG | WEIGHT: 251 LBS | BODY MASS INDEX: 40.34 KG/M2 | HEART RATE: 106 BPM | RESPIRATION RATE: 18 BRPM

## 2018-10-26 DIAGNOSIS — L03.115 CELLULITIS OF RIGHT LOWER EXTREMITY: ICD-10-CM

## 2018-10-26 DIAGNOSIS — N39.0 URINARY TRACT INFECTION WITHOUT HEMATURIA, SITE UNSPECIFIED: Primary | ICD-10-CM

## 2018-10-26 DIAGNOSIS — J06.9 VIRAL URI WITH COUGH: ICD-10-CM

## 2018-10-26 LAB
BACTERIA UR QL AUTO: ABNORMAL /HPF
BILIRUB UR QL STRIP: NEGATIVE
CLARITY UR: ABNORMAL
COLOR UR: YELLOW
GLUCOSE UR STRIP-MCNC: NEGATIVE MG/DL
HGB UR QL STRIP.AUTO: NEGATIVE
KETONES UR STRIP-MCNC: NEGATIVE MG/DL
LEUKOCYTE ESTERASE UR QL STRIP: ABNORMAL
NITRITE UR QL STRIP: NEGATIVE
NON-SQ EPI CELLS URNS QL MICRO: ABNORMAL /HPF
OTHER STN SPEC: ABNORMAL
PH UR STRIP.AUTO: 6.5 [PH] (ref 4.5–8)
PROT UR STRIP-MCNC: NEGATIVE MG/DL
RBC #/AREA URNS AUTO: ABNORMAL /HPF
SL AMB  POCT GLUCOSE, UA: NEGATIVE
SL AMB LEUKOCYTE ESTERASE,UA: ABNORMAL
SL AMB POCT BILIRUBIN,UA: NEGATIVE
SL AMB POCT BLOOD,UA: ABNORMAL
SL AMB POCT CLARITY,UA: ABNORMAL
SL AMB POCT COLOR,UA: YELLOW
SL AMB POCT KETONES,UA: NEGATIVE
SL AMB POCT NITRITE,UA: NEGATIVE
SL AMB POCT PH,UA: 7
SL AMB POCT SPECIFIC GRAVITY,UA: 1.01
SL AMB POCT URINE PROTEIN: ABNORMAL
SL AMB POCT UROBILINOGEN: 0.2
SP GR UR STRIP.AUTO: 1.02 (ref 1–1.03)
UROBILINOGEN UR QL STRIP.AUTO: 0.2 E.U./DL
WBC #/AREA URNS AUTO: ABNORMAL /HPF

## 2018-10-26 PROCEDURE — 87077 CULTURE AEROBIC IDENTIFY: CPT | Performed by: NURSE PRACTITIONER

## 2018-10-26 PROCEDURE — 87086 URINE CULTURE/COLONY COUNT: CPT | Performed by: NURSE PRACTITIONER

## 2018-10-26 PROCEDURE — 81001 URINALYSIS AUTO W/SCOPE: CPT | Performed by: NURSE PRACTITIONER

## 2018-10-26 PROCEDURE — 81002 URINALYSIS NONAUTO W/O SCOPE: CPT | Performed by: FAMILY MEDICINE

## 2018-10-26 PROCEDURE — T1015 CLINIC SERVICE: HCPCS | Performed by: FAMILY MEDICINE

## 2018-10-26 PROCEDURE — 87186 SC STD MICRODIL/AGAR DIL: CPT | Performed by: NURSE PRACTITIONER

## 2018-10-26 RX ORDER — INSULIN ASPART 100 [IU]/ML
INJECTION, SOLUTION INTRAVENOUS; SUBCUTANEOUS
COMMUNITY
Start: 2018-10-03 | End: 2019-09-06

## 2018-10-26 RX ORDER — DOXYCYCLINE HYCLATE 100 MG/1
100 CAPSULE ORAL EVERY 12 HOURS SCHEDULED
Qty: 14 CAPSULE | Refills: 0 | Status: SHIPPED | OUTPATIENT
Start: 2018-10-26 | End: 2018-11-02

## 2018-10-26 RX ORDER — INSULIN GLARGINE 100 [IU]/ML
INJECTION, SOLUTION SUBCUTANEOUS
COMMUNITY
Start: 2018-10-03 | End: 2019-04-17 | Stop reason: HOSPADM

## 2018-10-26 RX ORDER — CLOTRIMAZOLE AND BETAMETHASONE DIPROPIONATE 10; .64 MG/G; MG/G
CREAM TOPICAL 2 TIMES DAILY
COMMUNITY
Start: 2018-10-23 | End: 2019-09-06 | Stop reason: ALTCHOICE

## 2018-10-26 RX ORDER — ALBUTEROL SULFATE 2.5 MG/3ML
SOLUTION RESPIRATORY (INHALATION)
Status: ON HOLD | COMMUNITY
Start: 2018-09-25 | End: 2018-10-29 | Stop reason: ALTCHOICE

## 2018-10-26 NOTE — PROGRESS NOTES
[address]       NAME: Trent Carroll is a 66 y o  female  : 1955    MRN: [id number]  DATE: 2018  TIME: 10:05 AM    Assessment and Plan   Diagnoses and all orders for this visit:    Urinary tract infection without hematuria, site unspecified  -     doxycycline hyclate (VIBRAMYCIN) 100 mg capsule; Take 1 capsule (100 mg total) by mouth every 12 (twelve) hours for 7 days  -     POCT urine dip  -     UA w Reflex to Microscopic w Reflex to Culture - Clinic Collect    Cellulitis of right lower extremity  -     doxycycline hyclate (VIBRAMYCIN) 100 mg capsule; Take 1 capsule (100 mg total) by mouth every 12 (twelve) hours for 7 days    Viral URI with cough        No problem-specific Assessment & Plan notes found for this encounter  Patient Instructions     For viral URI, instructed to use OTC decongestants saline nasal rinses, OTC cough suppressants throat lonzages and tylenol and motrin as needed, for hospital bed comfort issues instructed to call [address] [place] [address] provider    for cellulitis will treat with doxycycline if no improvement call for a recheck or proceed to ED   for UTI will treat with doxycycline       Chief Complaint     Chief Complaint   Patient presents with    Follow-up     She states she got a new hospital bed but the mattress is uncomfortable   Rash     She states its very painful         History of Present Illness       66year old female at office with chief complaints of urinary frequency urgency and foul smelling urine for the past few days  She also had complaints of cough nasal congestion for the past day  She has not used any OTC medications  She is also having complaints of redness warmth and swelling to right foot for the past 2 days  Denies nay fevers chills or SOB  Urinary Tract Infection    This is a new problem  The current episode started in the past 7 days  The problem occurs every urination  The problem has been unchanged   The quality of the pain is described as burning  The pain is at a severity of 2/10  The pain is mild  There has been no fever  She is not sexually active  There is no history of pyelonephritis  Associated symptoms include frequency and urgency  Pertinent negatives include no chills, discharge, flank pain, hematuria, hesitancy, nausea, possible pregnancy, sweats or vomiting  She has tried nothing for the symptoms  The treatment provided no relief  URI    This is a new problem  The current episode started yesterday  The problem has been unchanged  There has been no fever  Associated symptoms include congestion, coughing and rhinorrhea  Pertinent negatives include no abdominal pain, chest pain, diarrhea, dysuria, ear pain, headaches, joint pain, joint swelling, nausea, neck pain, plugged ear sensation, rash, sinus pain, sneezing, sore throat, swollen glands, vomiting or wheezing  She has tried nothing for the symptoms  The treatment provided no relief  Review of Systems   Review of Systems   Constitutional: Negative  Negative for chills  HENT: Positive for congestion, postnasal drip and rhinorrhea  Negative for ear pain, sinus pain, sneezing and sore throat  Eyes: Negative  Respiratory: Positive for cough  Negative for wheezing  Cardiovascular: Negative  Negative for chest pain  Gastrointestinal: Negative  Negative for abdominal pain, diarrhea, nausea and vomiting  Endocrine: Negative  Genitourinary: Positive for frequency and urgency  Negative for decreased urine volume, difficulty urinating, dyspareunia, dysuria, enuresis, flank pain, genital sores, hematuria, hesitancy, menstrual problem, pelvic pain, vaginal bleeding, vaginal discharge and vaginal pain  Musculoskeletal: Negative  Negative for joint pain and neck pain  Skin: Positive for color change  Negative for rash  Allergic/Immunologic: Negative  Neurological: Negative  Negative for headaches  Hematological: Negative  Psychiatric/Behavioral: Negative  All other systems reviewed and are negative  Current Medications       Current Outpatient Prescriptions:     aspirin (ECOTRIN LOW STRENGTH) 81 mg EC tablet, Take 1 tablet (81 mg total) by mouth daily, Disp: 90 tablet, Rfl: 1    Blood Glucose Monitoring Suppl (BLOOD GLUCOSE MONITOR SYSTEM) w/Device KIT, Test blood sugars 3 times a day, Disp: 1 each, Rfl: 0    doxycycline hyclate (VIBRAMYCIN) 100 mg capsule, Take 1 capsule (100 mg total) by mouth every 12 (twelve) hours for 7 days, Disp: 14 capsule, Rfl: 0    estradiol (ESTRACE) 1 mg tablet, Take 1 mg by mouth daily  , Disp: , Rfl:     fluticasone (FLONASE) 50 mcg/act nasal spray, SPRAY ONE (1) SPRAY INTO EACH NOSTRIL ONCE DAILY      **FOR THE NOSE**, Disp: 16 g, Rfl: 0    furosemide (LASIX) 20 mg tablet, Take 1 tablet (20 mg total) by mouth daily, Disp: 7 tablet, Rfl: 0    gabapentin (NEURONTIN) 800 mg tablet, Take 800 mg by mouth 4 (four) times a day , Disp: , Rfl:     glucose blood test strip, Test blood sugars 3 times a day , Disp: 100 each, Rfl: 3    hydrocortisone 2 5 % cream, Apply topically 2 (two) times a day, Disp: 30 g, Rfl: 3    hydrOXYzine pamoate (VISTARIL) 50 mg capsule, Take 1 capsule (50 mg total) by mouth 3 (three) times a day, Disp: 90 capsule, Rfl: 3    insulin glargine (LANTUS) 100 units/mL subcutaneous injection, Inject 10 Units under the skin daily at bedtime, Disp: 10 mL, Rfl: 2    levETIRAcetam (KEPPRA) 750 mg tablet, Take 1,500 mg by mouth 2 (two) times a day  , Disp: , Rfl:     levothyroxine 175 mcg tablet, Take 1 tablet (175 mcg total) by mouth daily, Disp: 30 tablet, Rfl: 3    lisinopril (ZESTRIL) 10 mg tablet, Take 1 tablet (10 mg total) by mouth daily, Disp: 30 tablet, Rfl: 0    loratadine (CLARITIN) 10 mg tablet, Take 1 tablet (10 mg total) by mouth daily, Disp: 30 tablet, Rfl: 3    MAG64 535 (64 Mg) MG, , Disp: , Rfl: 1    meloxicam (MOBIC) 7 5 mg tablet, Take 1 tablet (7 5 mg total) by mouth 2 (two) times a day, Disp: 60 tablet, Rfl: 3    metFORMIN (GLUCOPHAGE) 1000 MG tablet, Take 1 tablet (1,000 mg total) by mouth 2 (two) times a day with meals, Disp: 60 tablet, Rfl: 3    montelukast (SINGULAIR) 10 mg tablet, Take 1 tablet (10 mg total) by mouth daily at bedtime, Disp: 90 tablet, Rfl: 0    norethindrone (AYGESTIN) 5 mg tablet, Take 5 mg by mouth daily  , Disp: , Rfl:     NOVOFINE 32G X 6 MM MISC, , Disp: , Rfl: 2    omeprazole (PriLOSEC) 20 mg delayed release capsule, Take 1 capsule (20 mg total) by mouth daily TAKE ONE ( 40 MG) TABLET IN THE MORNING AND TAKE ONE (40 MG) TABLET IN THE EVENING BY MOUTH DAILY  , Disp: 120 capsule, Rfl: 3    ONETOUCH DELICA LANCETS 37I MISC, Test blood sugars 3 times a day , Disp: 100 each, Rfl: 3    oxybutynin (DITROPAN) 5 mg tablet, Take 5 mg by mouth 3 (three) times a day  , Disp: , Rfl:     phenytoin (DILANTIN) 100 mg ER capsule, Take by mouth 5 (five) times a day   , Disp: , Rfl:     polyethylene glycol (GLYCOLAX) powder, Take 17 g by mouth daily, Disp: 850 g, Rfl: 3    polyethylene glycol (MIRALAX) 17 g packet, Take 17 g by mouth daily, Disp: 30 each, Rfl: 0    PROCTOZONE-HC 2 5 % rectal cream, , Disp: , Rfl: 1    QUEtiapine (SEROquel) 400 MG tablet, Take 1 tablet (400 mg total) by mouth 2 (two) times a day, Disp: 60 tablet, Rfl: 3    simvastatin (ZOCOR) 80 mg tablet, Take 1 tablet (80 mg total) by mouth daily at bedtime, Disp: 30 tablet, Rfl: 3    Skin Protectants, Misc   (CALAZIME SKIN PROTECTANT) PSTE, Apply 113 g topically 2 (two) times a day, Disp: 1 Tube, Rfl: 0    triamcinolone (KENALOG) 0 1 % cream, Apply topically 2 (two) times a day, Disp: 30 g, Rfl: 0    valACYclovir (VALTREX) 500 mg tablet, Take 1 tablet by mouth 2 (two) times a day, Disp: , Rfl: 2    VENTOLIN  (90 Base) MCG/ACT inhaler, Inhale 2 puffs every 6 (six) hours as needed for wheezing, Disp: 18 g, Rfl: 0    VENTOLIN  (90 Base) MCG/ACT inhaler, Inhale 2 puffs every 6 (six) hours as needed for wheezing, Disp: 18 g, Rfl: 0    zonisamide (ZONEGRAN) 100 mg capsule, Take 100 mg by mouth 5 (five) times a day  , Disp: , Rfl:     Current Allergies     Allergies as of 10/26/2018 - Reviewed 10/17/2018   Allergen Reaction Noted    Keflex [cephalexin] Anaphylaxis 01/03/2011    Levaquin [levofloxacin] Anaphylaxis 11/08/2007    Penicillins Anaphylaxis 11/08/2007    Bactrim [sulfamethoxazole-trimethoprim] Swelling and GI Intolerance 07/01/2015    Ciprofloxacin Swelling 07/01/2015    Noroxin [norfloxacin]  08/24/2018            The following portions of the patient's history were reviewed and updated as appropriate: allergies, current medications, past family history, past medical history, past social history, past surgical history and problem list      Past Medical History:   Diagnosis Date    Asthma     [facility]ipolar disorder ([facility] )     Chronic UTI (urinary tract infection)     COPD (chronic obstructive pulmonary disease) ([facility] )     Diabetes mellitus ([facility] )     Disease of thyroid gland     GERD (gastroesophageal reflux disease)     Seizure ([facility] )        Past Surgical History:   Procedure Laterality Date    ANKLE FRACTURE SURGERY Right     TUBAL LIGATION      VEIN LIGATION AND STRIPPING         No family history on file  Medications have been verified  Objective   /72   Pulse (!) 106   Temp 97 6 °F (36 4 °C)   Resp 18   Ht 5' 6" (1 676 m)   Wt 114 kg (251 lb)   SpO2 98%   BMI 40 51 kg/m²        Physical Exam     Physical Exam   Constitutional: She is oriented to person, place, and time  Vital signs are normal  She appears well-developed and well-nourished  HENT:   Head: Normocephalic and atraumatic  Right Ear: Hearing, tympanic membrane, external ear and ear canal normal    Left Ear: Hearing, tympanic membrane, external ear and ear canal normal    Nose: Rhinorrhea and sinus tenderness present   Right sinus exhibits no maxillary sinus tenderness and no frontal sinus tenderness  Left sinus exhibits no maxillary sinus tenderness and no frontal sinus tenderness  Mouth/Throat: Uvula is midline and mucous membranes are normal  Posterior oropharyngeal erythema present  No oropharyngeal exudate, posterior oropharyngeal edema or tonsillar abscesses  Eyes: Pupils are equal, round, and reactive to light  Conjunctivae and EOM are normal    Neck: Trachea normal, normal range of motion and full passive range of motion without pain  Cardiovascular: Normal rate and regular rhythm  Pulmonary/Chest: Effort normal and breath sounds normal    Abdominal: Soft  Normal appearance and bowel sounds are normal  There is no tenderness  There is no CVA tenderness  Musculoskeletal: Normal range of motion  Lymphadenopathy:     She has cervical adenopathy  Right cervical: Superficial cervical adenopathy present  Left cervical: Superficial cervical adenopathy present  Neurological: She is alert and oriented to person, place, and time  Skin: Skin is warm and intact  There is erythema  Nursing note and vitals reviewed

## 2018-10-26 NOTE — LETTER
Pleas allow to miss therapy appt   As needed due to pain cellulits RLE for the next 7 days may return 11/2/2018

## 2018-10-29 ENCOUNTER — APPOINTMENT (EMERGENCY)
Dept: CT IMAGING | Facility: HOSPITAL | Age: 63
DRG: 190 | End: 2018-10-29
Payer: COMMERCIAL

## 2018-10-29 ENCOUNTER — HOSPITAL ENCOUNTER (INPATIENT)
Facility: HOSPITAL | Age: 63
LOS: 2 days | Discharge: HOME HEALTH | DRG: 190 | End: 2018-11-01
Attending: EMERGENCY MEDICINE | Admitting: FAMILY MEDICINE
Payer: COMMERCIAL

## 2018-10-29 ENCOUNTER — APPOINTMENT (OUTPATIENT)
Dept: NON INVASIVE DIAGNOSTICS | Facility: HOSPITAL | Age: 63
DRG: 190 | End: 2018-10-29
Payer: COMMERCIAL

## 2018-10-29 ENCOUNTER — APPOINTMENT (EMERGENCY)
Dept: RADIOLOGY | Facility: HOSPITAL | Age: 63
DRG: 190 | End: 2018-10-29
Payer: COMMERCIAL

## 2018-10-29 DIAGNOSIS — L30.9 DERMATITIS: ICD-10-CM

## 2018-10-29 DIAGNOSIS — I21.4 NSTEMI (NON-ST ELEVATED MYOCARDIAL INFARCTION) (HCC): Primary | ICD-10-CM

## 2018-10-29 DIAGNOSIS — R23.8 SKIN BREAKDOWN: ICD-10-CM

## 2018-10-29 PROBLEM — E87.70 VOLUME OVERLOAD: Status: ACTIVE | Noted: 2018-10-29

## 2018-10-29 LAB
ALBUMIN SERPL BCP-MCNC: 2.2 G/DL (ref 3.5–5)
ALP SERPL-CCNC: 54 U/L (ref 46–116)
ALT SERPL W P-5'-P-CCNC: 20 U/L (ref 12–78)
AMPHETAMINES SERPL QL SCN: NEGATIVE
ANION GAP SERPL CALCULATED.3IONS-SCNC: 11 MMOL/L (ref 4–13)
APAP SERPL-MCNC: 2 UG/ML (ref 10–30)
APTT PPP: 31 SECONDS (ref 24–36)
APTT PPP: 39 SECONDS (ref 24–36)
AST SERPL W P-5'-P-CCNC: 28 U/L (ref 5–45)
ATRIAL RATE: 91 BPM
ATRIAL RATE: 98 BPM
BACTERIA UR CULT: ABNORMAL
BARBITURATES UR QL: NEGATIVE
BASOPHILS # BLD AUTO: 0.03 THOUSANDS/ΜL (ref 0–0.1)
BASOPHILS NFR BLD AUTO: 0 % (ref 0–1)
BENZODIAZ UR QL: NEGATIVE
BILIRUB SERPL-MCNC: 0.2 MG/DL (ref 0.2–1)
BUN SERPL-MCNC: 10 MG/DL (ref 5–25)
CALCIUM SERPL-MCNC: 7.9 MG/DL (ref 8.3–10.1)
CHLORIDE SERPL-SCNC: 98 MMOL/L (ref 100–108)
CK SERPL-CCNC: 124 U/L (ref 26–192)
CO2 SERPL-SCNC: 25 MMOL/L (ref 21–32)
COCAINE UR QL: NEGATIVE
CREAT SERPL-MCNC: 0.7 MG/DL (ref 0.6–1.3)
EOSINOPHIL # BLD AUTO: 0.01 THOUSAND/ΜL (ref 0–0.61)
EOSINOPHIL NFR BLD AUTO: 0 % (ref 0–6)
ERYTHROCYTE [DISTWIDTH] IN BLOOD BY AUTOMATED COUNT: 13.2 % (ref 11.6–15.1)
ETHANOL SERPL-MCNC: <3 MG/DL (ref 0–3)
GFR SERPL CREATININE-BSD FRML MDRD: 94 ML/MIN/1.73SQ M
GLUCOSE SERPL-MCNC: 165 MG/DL (ref 65–140)
GLUCOSE SERPL-MCNC: 48 MG/DL (ref 65–140)
GLUCOSE SERPL-MCNC: 50 MG/DL (ref 65–140)
GLUCOSE SERPL-MCNC: 72 MG/DL (ref 65–140)
GLUCOSE SERPL-MCNC: 73 MG/DL (ref 65–140)
GLUCOSE SERPL-MCNC: 91 MG/DL (ref 65–140)
HCT VFR BLD AUTO: 35.5 % (ref 34.8–46.1)
HGB BLD-MCNC: 12.3 G/DL (ref 11.5–15.4)
IMM GRANULOCYTES # BLD AUTO: 0.09 THOUSAND/UL (ref 0–0.2)
IMM GRANULOCYTES NFR BLD AUTO: 1 % (ref 0–2)
INR PPP: 1.04 (ref 0.86–1.17)
LYMPHOCYTES # BLD AUTO: 1.06 THOUSANDS/ΜL (ref 0.6–4.47)
LYMPHOCYTES NFR BLD AUTO: 9 % (ref 14–44)
MAGNESIUM SERPL-MCNC: 1.3 MG/DL (ref 1.6–2.6)
MCH RBC QN AUTO: 33.4 PG (ref 26.8–34.3)
MCHC RBC AUTO-ENTMCNC: 34.6 G/DL (ref 31.4–37.4)
MCV RBC AUTO: 97 FL (ref 82–98)
METHADONE UR QL: NEGATIVE
MONOCYTES # BLD AUTO: 0.71 THOUSAND/ΜL (ref 0.17–1.22)
MONOCYTES NFR BLD AUTO: 6 % (ref 4–12)
NEUTROPHILS # BLD AUTO: 10.58 THOUSANDS/ΜL (ref 1.85–7.62)
NEUTS SEG NFR BLD AUTO: 84 % (ref 43–75)
NRBC BLD AUTO-RTO: 0 /100 WBCS
OPIATES UR QL SCN: NEGATIVE
P AXIS: 37 DEGREES
P AXIS: 54 DEGREES
PCP UR QL: NEGATIVE
PLATELET # BLD AUTO: 562 THOUSANDS/UL (ref 149–390)
PMV BLD AUTO: 9.1 FL (ref 8.9–12.7)
POTASSIUM SERPL-SCNC: 3.7 MMOL/L (ref 3.5–5.3)
PR INTERVAL: 148 MS
PR INTERVAL: 152 MS
PROT SERPL-MCNC: 5.6 G/DL (ref 6.4–8.2)
PROTHROMBIN TIME: 13.1 SECONDS (ref 11.8–14.2)
QRS AXIS: -41 DEGREES
QRS AXIS: -41 DEGREES
QRSD INTERVAL: 78 MS
QRSD INTERVAL: 86 MS
QT INTERVAL: 384 MS
QT INTERVAL: 396 MS
QTC INTERVAL: 472 MS
QTC INTERVAL: 505 MS
RBC # BLD AUTO: 3.68 MILLION/UL (ref 3.81–5.12)
SALICYLATES SERPL-MCNC: <3 MG/DL (ref 3–20)
SODIUM SERPL-SCNC: 134 MMOL/L (ref 136–145)
T WAVE AXIS: 78 DEGREES
T WAVE AXIS: 92 DEGREES
THC UR QL: NEGATIVE
TROPONIN I SERPL-MCNC: 1.18 NG/ML
TROPONIN I SERPL-MCNC: 1.25 NG/ML
TROPONIN I SERPL-MCNC: 1.53 NG/ML
TROPONIN I SERPL-MCNC: 2.6 NG/ML
TSH SERPL DL<=0.05 MIU/L-ACNC: 0.68 UIU/ML (ref 0.36–3.74)
TSH SERPL DL<=0.05 MIU/L-ACNC: 2.27 UIU/ML (ref 0.36–3.74)
VENTRICULAR RATE: 91 BPM
VENTRICULAR RATE: 98 BPM
WBC # BLD AUTO: 12.48 THOUSAND/UL (ref 4.31–10.16)

## 2018-10-29 PROCEDURE — 85610 PROTHROMBIN TIME: CPT | Performed by: EMERGENCY MEDICINE

## 2018-10-29 PROCEDURE — 97167 OT EVAL HIGH COMPLEX 60 MIN: CPT

## 2018-10-29 PROCEDURE — G8988 SELF CARE GOAL STATUS: HCPCS

## 2018-10-29 PROCEDURE — 71045 X-RAY EXAM CHEST 1 VIEW: CPT

## 2018-10-29 PROCEDURE — 99214 OFFICE O/P EST MOD 30 MIN: CPT | Performed by: FAMILY MEDICINE

## 2018-10-29 PROCEDURE — 82550 ASSAY OF CK (CPK): CPT | Performed by: EMERGENCY MEDICINE

## 2018-10-29 PROCEDURE — G8987 SELF CARE CURRENT STATUS: HCPCS

## 2018-10-29 PROCEDURE — 84443 ASSAY THYROID STIM HORMONE: CPT | Performed by: FAMILY MEDICINE

## 2018-10-29 PROCEDURE — 93010 ELECTROCARDIOGRAM REPORT: CPT | Performed by: INTERNAL MEDICINE

## 2018-10-29 PROCEDURE — 96365 THER/PROPH/DIAG IV INF INIT: CPT

## 2018-10-29 PROCEDURE — 80053 COMPREHEN METABOLIC PANEL: CPT | Performed by: EMERGENCY MEDICINE

## 2018-10-29 PROCEDURE — 99232 SBSQ HOSP IP/OBS MODERATE 35: CPT | Performed by: REGISTERED NURSE

## 2018-10-29 PROCEDURE — 93005 ELECTROCARDIOGRAM TRACING: CPT

## 2018-10-29 PROCEDURE — 84443 ASSAY THYROID STIM HORMONE: CPT | Performed by: EMERGENCY MEDICINE

## 2018-10-29 PROCEDURE — 80329 ANALGESICS NON-OPIOID 1 OR 2: CPT | Performed by: EMERGENCY MEDICINE

## 2018-10-29 PROCEDURE — 96366 THER/PROPH/DIAG IV INF ADDON: CPT

## 2018-10-29 PROCEDURE — 84484 ASSAY OF TROPONIN QUANT: CPT | Performed by: FAMILY MEDICINE

## 2018-10-29 PROCEDURE — G8978 MOBILITY CURRENT STATUS: HCPCS | Performed by: PHYSICAL THERAPIST

## 2018-10-29 PROCEDURE — 70450 CT HEAD/BRAIN W/O DYE: CPT

## 2018-10-29 PROCEDURE — 80320 DRUG SCREEN QUANTALCOHOLS: CPT | Performed by: EMERGENCY MEDICINE

## 2018-10-29 PROCEDURE — 97163 PT EVAL HIGH COMPLEX 45 MIN: CPT | Performed by: PHYSICAL THERAPIST

## 2018-10-29 PROCEDURE — 93306 TTE W/DOPPLER COMPLETE: CPT

## 2018-10-29 PROCEDURE — 82948 REAGENT STRIP/BLOOD GLUCOSE: CPT

## 2018-10-29 PROCEDURE — 84484 ASSAY OF TROPONIN QUANT: CPT | Performed by: EMERGENCY MEDICINE

## 2018-10-29 PROCEDURE — 36415 COLL VENOUS BLD VENIPUNCTURE: CPT | Performed by: EMERGENCY MEDICINE

## 2018-10-29 PROCEDURE — 83735 ASSAY OF MAGNESIUM: CPT | Performed by: EMERGENCY MEDICINE

## 2018-10-29 PROCEDURE — 97116 GAIT TRAINING THERAPY: CPT | Performed by: PHYSICAL THERAPIST

## 2018-10-29 PROCEDURE — 96375 TX/PRO/DX INJ NEW DRUG ADDON: CPT

## 2018-10-29 PROCEDURE — G8979 MOBILITY GOAL STATUS: HCPCS | Performed by: PHYSICAL THERAPIST

## 2018-10-29 PROCEDURE — 99285 EMERGENCY DEPT VISIT HI MDM: CPT

## 2018-10-29 PROCEDURE — 93306 TTE W/DOPPLER COMPLETE: CPT | Performed by: INTERNAL MEDICINE

## 2018-10-29 PROCEDURE — 85025 COMPLETE CBC W/AUTO DIFF WBC: CPT | Performed by: EMERGENCY MEDICINE

## 2018-10-29 PROCEDURE — 85730 THROMBOPLASTIN TIME PARTIAL: CPT | Performed by: FAMILY MEDICINE

## 2018-10-29 PROCEDURE — 80307 DRUG TEST PRSMV CHEM ANLYZR: CPT | Performed by: EMERGENCY MEDICINE

## 2018-10-29 PROCEDURE — 97535 SELF CARE MNGMENT TRAINING: CPT

## 2018-10-29 PROCEDURE — 96376 TX/PRO/DX INJ SAME DRUG ADON: CPT

## 2018-10-29 PROCEDURE — 85730 THROMBOPLASTIN TIME PARTIAL: CPT | Performed by: EMERGENCY MEDICINE

## 2018-10-29 RX ORDER — VALACYCLOVIR HYDROCHLORIDE 500 MG/1
500 TABLET, FILM COATED ORAL 2 TIMES DAILY
Status: DISCONTINUED | OUTPATIENT
Start: 2018-10-29 | End: 2018-11-01 | Stop reason: HOSPADM

## 2018-10-29 RX ORDER — PHENYTOIN SODIUM 100 MG/1
300 CAPSULE, EXTENDED RELEASE ORAL EVERY MORNING
Status: DISCONTINUED | OUTPATIENT
Start: 2018-10-30 | End: 2018-11-01 | Stop reason: HOSPADM

## 2018-10-29 RX ORDER — DEXTROSE MONOHYDRATE 25 G/50ML
25 INJECTION, SOLUTION INTRAVENOUS ONCE
Status: COMPLETED | OUTPATIENT
Start: 2018-10-29 | End: 2018-10-29

## 2018-10-29 RX ORDER — HEPARIN SODIUM 1000 [USP'U]/ML
4000 INJECTION, SOLUTION INTRAVENOUS; SUBCUTANEOUS AS NEEDED
Status: DISCONTINUED | OUTPATIENT
Start: 2018-10-29 | End: 2018-10-30

## 2018-10-29 RX ORDER — PANTOPRAZOLE SODIUM 40 MG/1
40 TABLET, DELAYED RELEASE ORAL
Status: DISCONTINUED | OUTPATIENT
Start: 2018-10-30 | End: 2018-11-01 | Stop reason: HOSPADM

## 2018-10-29 RX ORDER — OXYBUTYNIN CHLORIDE 5 MG/1
5 TABLET ORAL 3 TIMES DAILY
Status: DISCONTINUED | OUTPATIENT
Start: 2018-10-29 | End: 2018-11-01 | Stop reason: HOSPADM

## 2018-10-29 RX ORDER — MAGNESIUM SULFATE HEPTAHYDRATE 40 MG/ML
2 INJECTION, SOLUTION INTRAVENOUS ONCE
Status: COMPLETED | OUTPATIENT
Start: 2018-10-29 | End: 2018-10-29

## 2018-10-29 RX ORDER — ASPIRIN 81 MG/1
81 TABLET ORAL DAILY
Status: DISCONTINUED | OUTPATIENT
Start: 2018-10-30 | End: 2018-11-01 | Stop reason: HOSPADM

## 2018-10-29 RX ORDER — QUETIAPINE FUMARATE 100 MG/1
400 TABLET, FILM COATED ORAL 2 TIMES DAILY
Status: DISCONTINUED | OUTPATIENT
Start: 2018-10-29 | End: 2018-11-01 | Stop reason: HOSPADM

## 2018-10-29 RX ORDER — LEVETIRACETAM 500 MG/1
1500 TABLET ORAL 2 TIMES DAILY
Status: DISCONTINUED | OUTPATIENT
Start: 2018-10-29 | End: 2018-10-29

## 2018-10-29 RX ORDER — OXYCODONE HYDROCHLORIDE 5 MG/1
5 TABLET ORAL EVERY 4 HOURS PRN
Status: DISCONTINUED | OUTPATIENT
Start: 2018-10-29 | End: 2018-11-01 | Stop reason: HOSPADM

## 2018-10-29 RX ORDER — LISINOPRIL 5 MG/1
10 TABLET ORAL DAILY
Status: DISCONTINUED | OUTPATIENT
Start: 2018-10-29 | End: 2018-10-31

## 2018-10-29 RX ORDER — HEPARIN SODIUM 1000 [USP'U]/ML
4000 INJECTION, SOLUTION INTRAVENOUS; SUBCUTANEOUS ONCE
Status: COMPLETED | OUTPATIENT
Start: 2018-10-29 | End: 2018-10-29

## 2018-10-29 RX ORDER — PHENYTOIN SODIUM 100 MG/1
200 CAPSULE, EXTENDED RELEASE ORAL
Status: DISCONTINUED | OUTPATIENT
Start: 2018-10-29 | End: 2018-11-01 | Stop reason: HOSPADM

## 2018-10-29 RX ORDER — DEXTROSE MONOHYDRATE 25 G/50ML
50 INJECTION, SOLUTION INTRAVENOUS ONCE
Status: COMPLETED | OUTPATIENT
Start: 2018-10-29 | End: 2018-10-29

## 2018-10-29 RX ORDER — GABAPENTIN 400 MG/1
800 CAPSULE ORAL 4 TIMES DAILY
Status: DISCONTINUED | OUTPATIENT
Start: 2018-10-29 | End: 2018-11-01 | Stop reason: HOSPADM

## 2018-10-29 RX ORDER — ZONISAMIDE 100 MG/1
100 CAPSULE ORAL
Status: DISCONTINUED | OUTPATIENT
Start: 2018-10-29 | End: 2018-10-29

## 2018-10-29 RX ORDER — INSULIN GLARGINE 100 [IU]/ML
10 INJECTION, SOLUTION SUBCUTANEOUS
Status: DISCONTINUED | OUTPATIENT
Start: 2018-10-29 | End: 2018-11-01 | Stop reason: HOSPADM

## 2018-10-29 RX ORDER — HEPARIN SODIUM 10000 [USP'U]/100ML
3-20 INJECTION, SOLUTION INTRAVENOUS
Status: DISCONTINUED | OUTPATIENT
Start: 2018-10-29 | End: 2018-10-30

## 2018-10-29 RX ORDER — PHENYTOIN SODIUM 100 MG/1
100 CAPSULE, EXTENDED RELEASE ORAL EVERY 12 HOURS SCHEDULED
Status: DISCONTINUED | OUTPATIENT
Start: 2018-10-29 | End: 2018-10-29

## 2018-10-29 RX ORDER — ASPIRIN 81 MG/1
324 TABLET, CHEWABLE ORAL ONCE
Status: COMPLETED | OUTPATIENT
Start: 2018-10-29 | End: 2018-10-29

## 2018-10-29 RX ORDER — HEPARIN SODIUM 1000 [USP'U]/ML
2000 INJECTION, SOLUTION INTRAVENOUS; SUBCUTANEOUS AS NEEDED
Status: DISCONTINUED | OUTPATIENT
Start: 2018-10-29 | End: 2018-10-30

## 2018-10-29 RX ORDER — ACETAMINOPHEN 325 MG/1
650 TABLET ORAL EVERY 6 HOURS PRN
Status: DISCONTINUED | OUTPATIENT
Start: 2018-10-29 | End: 2018-11-01 | Stop reason: HOSPADM

## 2018-10-29 RX ORDER — ZONISAMIDE 100 MG/1
300 CAPSULE ORAL EVERY MORNING
Status: DISCONTINUED | OUTPATIENT
Start: 2018-10-30 | End: 2018-11-01 | Stop reason: HOSPADM

## 2018-10-29 RX ORDER — HYDROXYZINE HYDROCHLORIDE 25 MG/1
50 TABLET, FILM COATED ORAL 3 TIMES DAILY
Status: DISCONTINUED | OUTPATIENT
Start: 2018-10-29 | End: 2018-11-01 | Stop reason: HOSPADM

## 2018-10-29 RX ORDER — LEVOTHYROXINE SODIUM 175 UG/1
175 TABLET ORAL
Status: DISCONTINUED | OUTPATIENT
Start: 2018-10-30 | End: 2018-11-01 | Stop reason: HOSPADM

## 2018-10-29 RX ORDER — DEXTROSE MONOHYDRATE 25 G/50ML
25 INJECTION, SOLUTION INTRAVENOUS ONCE
Status: DISCONTINUED | OUTPATIENT
Start: 2018-10-29 | End: 2018-10-29

## 2018-10-29 RX ORDER — ATORVASTATIN CALCIUM 40 MG/1
40 TABLET, FILM COATED ORAL
Status: DISCONTINUED | OUTPATIENT
Start: 2018-10-29 | End: 2018-11-01 | Stop reason: HOSPADM

## 2018-10-29 RX ORDER — DOXYCYCLINE HYCLATE 100 MG/1
100 CAPSULE ORAL EVERY 12 HOURS SCHEDULED
Status: DISCONTINUED | OUTPATIENT
Start: 2018-10-29 | End: 2018-11-01 | Stop reason: HOSPADM

## 2018-10-29 RX ORDER — ZONISAMIDE 100 MG/1
200 CAPSULE ORAL
Status: DISCONTINUED | OUTPATIENT
Start: 2018-10-29 | End: 2018-11-01 | Stop reason: HOSPADM

## 2018-10-29 RX ORDER — OXYCODONE HYDROCHLORIDE 10 MG/1
10 TABLET ORAL EVERY 4 HOURS PRN
Status: DISCONTINUED | OUTPATIENT
Start: 2018-10-29 | End: 2018-11-01 | Stop reason: HOSPADM

## 2018-10-29 RX ORDER — FUROSEMIDE 10 MG/ML
40 INJECTION INTRAMUSCULAR; INTRAVENOUS DAILY
Status: COMPLETED | OUTPATIENT
Start: 2018-10-29 | End: 2018-10-30

## 2018-10-29 RX ADMIN — VALACYCLOVIR HYDROCHLORIDE 500 MG: 500 TABLET, FILM COATED ORAL at 17:36

## 2018-10-29 RX ADMIN — DEXTROSE MONOHYDRATE 50 ML: 25 INJECTION, SOLUTION INTRAVENOUS at 09:28

## 2018-10-29 RX ADMIN — HYDROXYZINE HYDROCHLORIDE 50 MG: 25 TABLET, FILM COATED ORAL at 20:08

## 2018-10-29 RX ADMIN — PHENYTOIN SODIUM 200 MG: 100 CAPSULE ORAL at 14:25

## 2018-10-29 RX ADMIN — ANORECTAL OINTMENT: 15.7; .44; 24; 20.6 OINTMENT TOPICAL at 20:10

## 2018-10-29 RX ADMIN — LISINOPRIL 10 MG: 5 TABLET ORAL at 14:26

## 2018-10-29 RX ADMIN — ASPIRIN 81 MG 324 MG: 81 TABLET ORAL at 06:32

## 2018-10-29 RX ADMIN — QUETIAPINE FUMARATE 400 MG: 100 TABLET ORAL at 17:36

## 2018-10-29 RX ADMIN — DEXTROSE MONOHYDRATE 25 ML: 500 INJECTION PARENTERAL at 06:39

## 2018-10-29 RX ADMIN — HEPARIN SODIUM 4000 UNITS: 1000 INJECTION, SOLUTION INTRAVENOUS; SUBCUTANEOUS at 14:08

## 2018-10-29 RX ADMIN — FUROSEMIDE 40 MG: 10 INJECTION, SOLUTION INTRAVENOUS at 14:23

## 2018-10-29 RX ADMIN — GABAPENTIN 800 MG: 400 CAPSULE ORAL at 17:36

## 2018-10-29 RX ADMIN — HYDROXYZINE HYDROCHLORIDE 50 MG: 25 TABLET, FILM COATED ORAL at 16:43

## 2018-10-29 RX ADMIN — LEVETIRACETAM 750 MG: 500 TABLET ORAL at 17:36

## 2018-10-29 RX ADMIN — INSULIN GLARGINE 10 UNITS: 100 INJECTION, SOLUTION SUBCUTANEOUS at 22:07

## 2018-10-29 RX ADMIN — GABAPENTIN 800 MG: 400 CAPSULE ORAL at 14:26

## 2018-10-29 RX ADMIN — GABAPENTIN 800 MG: 400 CAPSULE ORAL at 22:02

## 2018-10-29 RX ADMIN — VALACYCLOVIR HYDROCHLORIDE 500 MG: 500 TABLET, FILM COATED ORAL at 14:26

## 2018-10-29 RX ADMIN — MAGNESIUM SULFATE HEPTAHYDRATE 2 G: 40 INJECTION, SOLUTION INTRAVENOUS at 07:58

## 2018-10-29 RX ADMIN — ATORVASTATIN CALCIUM 40 MG: 40 TABLET, FILM COATED ORAL at 16:43

## 2018-10-29 RX ADMIN — DOXYCYCLINE HYCLATE 100 MG: 100 CAPSULE ORAL at 14:26

## 2018-10-29 RX ADMIN — OXYBUTYNIN CHLORIDE 5 MG: 5 TABLET ORAL at 20:10

## 2018-10-29 RX ADMIN — METOPROLOL TARTRATE 25 MG: 25 TABLET ORAL at 06:32

## 2018-10-29 RX ADMIN — HEPARIN SODIUM AND DEXTROSE 11.1 UNITS/KG/HR: 10000; 5 INJECTION INTRAVENOUS at 14:10

## 2018-10-29 RX ADMIN — OXYBUTYNIN CHLORIDE 5 MG: 5 TABLET ORAL at 14:43

## 2018-10-29 RX ADMIN — DOXYCYCLINE HYCLATE 100 MG: 100 CAPSULE ORAL at 20:10

## 2018-10-29 RX ADMIN — HEPARIN SODIUM 4000 UNITS: 1000 INJECTION, SOLUTION INTRAVENOUS; SUBCUTANEOUS at 22:09

## 2018-10-29 NOTE — PLAN OF CARE
Problem: OCCUPATIONAL THERAPY ADULT  Goal: Performs self-care activities at highest level of function for planned discharge setting  See evaluation for individualized goals  Treatment Interventions: ADL retraining, Functional transfer training, UE strengthening/ROM, Endurance training, Patient/family training, Activityengagement          See flowsheet documentation for full assessment, interventions and recommendations  Limitation: Decreased ADL status, Decreased UE strength, Decreased Safe judgement during ADL, Decreased endurance, Decreased self-care trans, Decreased high-level ADLs     Assessment: Pt is a 66 y o  female seen for OT evaluation s/p admit to [facility] on 10/29/2018 w/ NSTEMI (non-ST elevated myocardial infarction) ([facility] )  Comorbidities affecting pt's functional performance at time of assessment include: obesity and underlying neurological disorder  Personal factors affecting pt at time of IE include:difficulty performing ADLS, difficulty performing IADLS , limited insight into deficits, compliance, flat affect, decreased initiation and engagement  and health management   Prior to admission, pt was (I) with ADLs/IADLs with use of RW and w/c during mobility  Upon evaluation: Pt requires min-mod (A) with use of RW during functional mobility 2* the following deficits impacting occupational performance: weakness, decreased strength, decreased balance, decreased tolerance, impaired initiation, impaired problem solving, impulsivity, decreased safety awareness, impaired interpersonal skills and decreased coping skills  Pt to benefit from continued skilled OT tx while in the hospital to address deficits as defined above and maximize level of functional independence w ADL's and functional mobility  Occupational Performance areas to address include: grooming, bathing/shower, toilet hygiene, dressing, functional mobility, community mobility and clothing management   From OT standpoint, recommendation at time of d/c would be short term rehab or home health pending progress in therapy services         OT Discharge Recommendation: Short Term Rehab

## 2018-10-29 NOTE — ED PROCEDURE NOTE
PROCEDURE  ECG 12 Lead Documentation  Date/Time: 10/29/2018 6:20 AM  Performed by: Heide Cody  Authorized by: Heide Cody     Indications / Diagnosis:  Follow up abnl  ECG reviewed by me, the ED Provider: yes    Patient location:  ED  Previous ECG:     Previous ECG:  Compared to current    Comparison to cardiac monitor: Yes    Interpretation:     Interpretation: non-specific    Rate:     ECG rate:  91    ECG rate assessment: normal    Rhythm:     Rhythm: sinus rhythm    Ectopy:     Ectopy: none    QRS:     QRS axis:  Left    QRS intervals:  Normal  Conduction:     Conduction: normal    ST segments:     ST segments:  Normal  T waves:     T waves: normal           Dion Daniel MD  10/29/18 1055

## 2018-10-29 NOTE — ASSESSMENT & PLAN NOTE
Continue Dilantin   Dilantin level slightly low  Continue to monitor encourage outpatient follow-up with primary neurologist

## 2018-10-29 NOTE — NURSING NOTE
Arrived via stretcher from ER to Room 405  Oriented to surroundings and call system  Call bell placed in reach

## 2018-10-29 NOTE — ED PROCEDURE NOTE
PROCEDURE  ECG 12 Lead Documentation  Date/Time: 10/29/2018 4:40 AM  Performed by: SLID  Authorized by: SLID     Indications / Diagnosis:  Dizzy  ECG reviewed by me, the ED Provider: yes    Patient location:  ED  Interpretation:     Interpretation: non-specific    Rate:     ECG rate:  98    ECG rate assessment: normal    Rhythm:     Rhythm: sinus rhythm    Ectopy:     Ectopy: none    QRS:     QRS axis:  Left    QRS intervals:  Normal  Conduction:     Conduction: normal    ST segments:     ST segments:  Normal  T waves:     T waves: normal           Patric Mcleod MD  10/29/18 1047

## 2018-10-29 NOTE — ED PROVIDER NOTES
History  Chief Complaint   Patient presents with    Dizziness     Pt c/o acute onset dizziness tonight     Patient: Veronika Young  63 y o /female  YOB: 1955  MRN: [id number]  PCP: Aj Sarmiento  Date of evaluation: 10/29/2018    (N B   Voice-recognition software may have been used in the preparation of this document  Occasional wrong word or "sound-alike" substitutions may have occurred due to the inherent limitations of voice recognition software  Interpretation should be guided by context )    Patient complains of I just do not feel right  This patient is brought to the department by EMS  She called them the complaint of needing help getting up  When they got there, she had various request for assistance including asking them to pack a severe case for her to come to the hospital   She complained of dizziness and not feeling right  History and evaluation are limited by patient's pressured speech and delusions          History provided by:  Patient, medical records and EMS personnel  Dizziness   Quality:  Unable to specify  Severity:  Unable to specify  Onset quality:  Unable to specify  Progression:  Unable to specify  Relieved by:  Nothing  Worsened by:  Nothing  Associated symptoms: no blood in stool, no chest pain, no diarrhea, no hearing loss, no nausea, no palpitations, no shortness of breath, no vision changes, no vomiting and no weakness        Prior to Admission Medications   Prescriptions Last Dose Informant Patient Reported? Taking? Blood Glucose Monitoring Suppl (BLOOD GLUCOSE MONITOR SYSTEM) w/Device KIT   No Yes   Sig: Test blood sugars 3 times a day   LANTUS SOLOSTAR 100 units/mL injection pen   Yes Yes   NOVOFINE 32G X 6 MM MISC   Yes Yes   NOVOLOG FLEXPEN 100 units/mL injection pen   Yes Yes   Sig: Uses sliding scale    ONETOUCH DELICA LANCETS 11J MISC   No Yes   Sig: Test blood sugars 3 times a day     QUEtiapine (SEROquel) 400 MG tablet   No Yes   Sig: Take 1 tablet (400 mg total) by mouth 2 (two) times a day   Skin Protectants, Misc  (CALAZIME SKIN PROTECTANT) PSTE   No Yes   Sig: Apply 113 g topically 2 (two) times a day   VENTOLIN  (90 Base) MCG/ACT inhaler   No Yes   Sig: Inhale 2 puffs every 6 (six) hours as needed for wheezing   aspirin (ECOTRIN LOW STRENGTH) 81 mg EC tablet   No Yes   Sig: Take 1 tablet (81 mg total) by mouth daily   clotrimazole-betamethasone (LOTRISONE) 1-0 05 % cream   Yes Yes   Si (two) times a day As needed    doxycycline hyclate (VIBRAMYCIN) 100 mg capsule   No Yes   Sig: Take 1 capsule (100 mg total) by mouth every 12 (twelve) hours for 7 days   estradiol (ESTRACE) 1 mg tablet   Yes Yes   Sig: Take 1 mg by mouth daily at bedtime     fluticasone (FLONASE) 50 mcg/act nasal spray   No Yes   Sig: SPRAY ONE (1) SPRAY INTO EACH NOSTRIL ONCE DAILY      **FOR THE NOSE**   furosemide (LASIX) 20 mg tablet   No Yes   Sig: Take 1 tablet (20 mg total) by mouth daily   Patient taking differently: Take 20 mg by mouth daily Lasix was only ordered for 7 days    gabapentin (NEURONTIN) 800 mg tablet   Yes Yes   Sig: Take 800 mg by mouth 4 (four) times a day     glucose blood test strip   No Yes   Sig: Test blood sugars 3 times a day    hydrOXYzine pamoate (VISTARIL) 50 mg capsule   No Yes   Sig: Take 1 capsule (50 mg total) by mouth 3 (three) times a day   insulin glargine (LANTUS) 100 units/mL subcutaneous injection   No Yes   Sig: Inject 10 Units under the skin daily at bedtime   levETIRAcetam (KEPPRA) 750 mg tablet   Yes Yes   Sig: Take 750 mg by mouth 2 (two) times a day     levothyroxine 175 mcg tablet   No Yes   Sig: Take 1 tablet (175 mcg total) by mouth daily   lisinopril (ZESTRIL) 10 mg tablet   No Yes   Sig: Take 1 tablet (10 mg total) by mouth daily   loratadine (CLARITIN) 10 mg tablet   No Yes   Sig: Take 1 tablet (10 mg total) by mouth daily   meloxicam (MOBIC) 7 5 mg tablet   No Yes   Sig: Take 1 tablet (7 5 mg total) by mouth 2 (two) times a day   metFORMIN (GLUCOPHAGE) 1000 MG tablet   No Yes   Sig: Take 1 tablet (1,000 mg total) by mouth 2 (two) times a day with meals   montelukast (SINGULAIR) 10 mg tablet   No Yes   Sig: Take 1 tablet (10 mg total) by mouth daily at bedtime   norethindrone (AYGESTIN) 5 mg tablet   Yes Yes   Sig: Take 5 mg by mouth daily  omeprazole (PriLOSEC) 20 mg delayed release capsule   No Yes   Sig: Take 1 capsule (20 mg total) by mouth daily TAKE ONE ( 40 MG) TABLET IN THE MORNING AND TAKE ONE (40 MG) TABLET IN THE EVENING BY MOUTH DAILY  oxybutynin (DITROPAN) 5 mg tablet   Yes Yes   Sig: Take 5 mg by mouth 3 (three) times a day     phenytoin (DILANTIN) 100 mg ER capsule   Yes Yes   Sig: Take by mouth 5 (five) times a day 3 tablets in morning and 2 at lunch daily    polyethylene glycol (GLYCOLAX) powder   No Yes   Sig: Take 17 g by mouth daily   simvastatin (ZOCOR) 80 mg tablet   No Yes   Sig: Take 1 tablet (80 mg total) by mouth daily at bedtime   valACYclovir (VALTREX) 500 mg tablet   Yes No   Sig: Take 1 tablet by mouth 2 (two) times a day   zonisamide (ZONEGRAN) 100 mg capsule   Yes Yes   Sig: Take 100 mg by mouth 5 (five) times a day        Facility-Administered Medications: None       Past Medical History:   Diagnosis Date    Asthma     [facility]     Chronic UTI (urinary tract infection)     COPD (chronic obstructive pulmonary disease) (HCC)     Diabetes mellitus (HCC)     Disease of thyroid gland     GERD (gastroesophageal reflux disease)     Seizure ([facility] )        Past Surgical History:   Procedure Laterality Date    ANKLE FRACTURE SURGERY Right     TUBAL LIGATION      VEIN LIGATION AND STRIPPING         History reviewed  No pertinent family history  I have reviewed and agree with the history as documented      Social History   Substance Use Topics    Smoking status: Never Smoker    Smokeless tobacco: Never Used    Alcohol use No        Review of Systems   Constitutional: Negative for chills and fever  HENT: Negative for hearing loss, trouble swallowing and voice change  Eyes: Negative for pain, redness and visual disturbance  Respiratory: Negative for cough and shortness of breath  Cardiovascular: Negative for chest pain and palpitations  Gastrointestinal: Negative for abdominal pain, blood in stool, constipation, diarrhea, nausea and vomiting  Genitourinary: Negative for dysuria, hematuria, vaginal bleeding and vaginal discharge  Musculoskeletal: Negative for back pain, gait problem and neck pain  Skin: Negative for color change and rash  Neurological: Positive for dizziness  Negative for weakness and light-headedness  Psychiatric/Behavioral: Negative for confusion, decreased concentration and self-injury  The patient is not nervous/anxious  All other systems reviewed and are negative  Physical Exam  Physical Exam   Constitutional: She is oriented to person, place, and time  She appears well-developed and well-nourished  HENT:   Mouth/Throat: Oropharynx is clear and moist and mucous membranes are normal    Voice normal   Eyes: Pupils are equal, round, and reactive to light  EOM are normal    Cardiovascular: Normal rate and regular rhythm  Pulmonary/Chest: Effort normal    Abdominal: Soft  Bowel sounds are normal    Neurological: She is alert and oriented to person, place, and time  She has normal strength  GCS eye subscore is 4  GCS verbal subscore is 5  GCS motor subscore is 6  Smiling asymmetry is chronic   Skin: Skin is warm and dry  Psychiatric: She has a normal mood and affect  Her behavior is normal  Her speech is rapid and/or pressured  She is not actively hallucinating  Thought content is paranoid and delusional    Patient states that her neighbor has stolen her nightgown and is now wearing it  Patient states that people are breaking in and vandalized in her apartment no she is not there  Nursing note and vitals reviewed        Vital Signs  ED Triage Vitals   Temperature Pulse Respirations Blood Pressure SpO2   10/29/18 0500 10/29/18 0431 10/29/18 0431 10/29/18 0431 10/29/18 0431   98 2 °F (36 8 °C) 90 20 113/72 92 %      Temp Source Heart Rate Source Patient Position - Orthostatic VS BP Location FiO2 (%)   10/29/18 0431 10/29/18 0431 10/29/18 0431 10/29/18 0431 --   Oral Monitor Lying Left arm       Pain Score       10/29/18 0431       No Pain           Vitals:    10/29/18 0751 10/29/18 0930 10/29/18 1148 10/29/18 1212   BP: 119/74 124/72 112/71 124/72   Pulse: 84 78 87 89   Patient Position - Orthostatic VS: Lying Lying Lying Lying       Visual Acuity      ED Medications  Medications   aspirin (ECOTRIN LOW STRENGTH) EC tablet 81 mg (not administered)   hydrOXYzine HCL (ATARAX) tablet 50 mg (not administered)   insulin glargine (LANTUS) subcutaneous injection 10 Units 0 1 mL (not administered)   levothyroxine tablet 175 mcg (not administered)   lisinopril (ZESTRIL) tablet 10 mg (10 mg Oral Given 10/29/18 1426)   pantoprazole (PROTONIX) EC tablet 40 mg (not administered)   QUEtiapine (SEROquel) tablet 400 mg (400 mg Oral Not Given 10/29/18 1443)   atorvastatin (LIPITOR) tablet 40 mg (not administered)   gabapentin (NEURONTIN) capsule 800 mg (800 mg Oral Given 10/29/18 1426)   oxybutynin (DITROPAN) tablet 5 mg (5 mg Oral Given 10/29/18 1443)   valACYclovir (VALTREX) tablet 500 mg (500 mg Oral Given 10/29/18 1426)   furosemide (LASIX) injection 40 mg (40 mg Intravenous Given 10/29/18 1423)   insulin lispro (HumaLOG) 100 units/mL subcutaneous injection 1-6 Units (1 Units Subcutaneous Not Given 10/29/18 1424)   doxycycline hyclate (VIBRAMYCIN) capsule 100 mg (100 mg Oral Given 10/29/18 1426)   heparin (porcine) 25,000 units in 250 mL infusion (premix) (11 1 Units/kg/hr × 90 kg (Order-Specific) Intravenous New Bag 10/29/18 1410)   heparin (porcine) injection 4,000 Units (not administered)   heparin (porcine) injection 2,000 Units (not administered) phenytoin (DILANTIN) ER capsule 300 mg (not administered)   phenytoin (DILANTIN) ER capsule 200 mg (200 mg Oral Given 10/29/18 1425)   zonisamide (ZONEGRAN) capsule 300 mg (not administered)   zonisamide (ZONEGRAN) capsule 200 mg (not administered)   levETIRAcetam (KEPPRA) tablet 750 mg (not administered)   aspirin chewable tablet 324 mg (324 mg Oral Given 10/29/18 0632)   metoprolol tartrate (LOPRESSOR) tablet 25 mg (25 mg Oral Given 10/29/18 0632)   dextrose 50 % IV solution 25 mL (25 mL Intravenous Given 10/29/18 0639)   magnesium sulfate 2 g/50 mL IVPB (premix) 2 g (0 g Intravenous Stopped 10/29/18 1003)   dextrose 50 % IV solution 50 mL (50 mL Intravenous Given 10/29/18 0928)   heparin (porcine) injection 4,000 Units (4,000 Units Intravenous Given 10/29/18 1408)       Diagnostic Studies  Results Reviewed     Procedure Component Value Units Date/Time    Troponin I [[id number]]  (Abnormal) Collected:  10/29/18 1420    Lab Status:  Final result Specimen:  Blood from Hand, Right Updated:  10/29/18 1509     Troponin I 1 18 (HH) ng/mL     TSH, 3rd generation [[id number]]  (Normal) Collected:  10/29/18 1420    Lab Status:  Final result Specimen:  Blood from Hand, Right Updated:  10/29/18 1455     TSH 3RD GENERATON 2 274 uIU/mL     Narrative:         Patients undergoing fluorescein dye angiography may retain small amounts of fluorescein in the body for 48-72 hours post procedure  Samples containing fluorescein can produce falsely depressed TSH values  If the patient had this procedure,a specimen should be resubmitted post fluorescein clearance  The recommended reference ranges for TSH during pregnancy are as follows:  First trimester 0 1 to 2 5 uIU/mL  Second trimester  0 2 to 3 0 uIU/mL  Third trimester 0 3 to 3 0 uIU/m      [place] draw [[id number]] Collected:  10/29/18 1146    Lab Status:  Final result Specimen:  Blood from Arm, Left Updated:  10/29/18 1301    Narrative:        The following orders were created for panel order [place] draw  Procedure                               Abnormality         Status                     ---------                               -----------         ------                     Conor Kayla top on ODXA[[id number]]                                  Final result                 Please view results for these tests on the individual orders  APTT six (6) hours after Heparin bolus/drip initiation or dosing change [[id number]]  (Normal) Collected:  10/29/18 1142    Lab Status:  Final result Specimen:  Blood from Arm, Left Updated:  10/29/18 1242     PTT 31 seconds     Protime-INR [[id number]]  (Normal) Collected:  10/29/18 1142    Lab Status:  Final result Specimen:  Blood from Arm, Left Updated:  10/29/18 1242     Protime 13 1 seconds      INR 1 04    Troponin I [[id number]]     Lab Status:  No result Specimen:  Blood     Fingerstick Glucose (POCT) [[id number]]  (Normal) Collected:  10/29/18 1052    Lab Status:  Final result Updated:  10/29/18 1052     POC Glucose 73 mg/dl     Troponin I [[id number]]  (Abnormal) Collected:  10/29/18 0909    Lab Status:  Final result Specimen:  Blood from Arm, Left Updated:  10/29/18 1001     Troponin I 1 53 (HH) ng/mL     Fingerstick Glucose (POCT) [[id number]]  (Abnormal) Collected:  10/29/18 0922    Lab Status:  Final result Updated:  10/29/18 0923     POC Glucose 48 (L) mg/dl     CK (with reflex to MB) [[id number]]  (Normal) Collected:  10/29/18 0525    Lab Status:  Final result Specimen:  Blood from Arm, Right Updated:  10/29/18 0809     Total  U/L     TSH [[id number]]  (Normal) Collected:  10/29/18 0525    Lab Status:  Final result Specimen:  Blood from Arm, Right Updated:  10/29/18 0634     TSH 3RD GENERATON 0 683 uIU/mL     Narrative:         Patients undergoing fluorescein dye angiography may retain small amounts of fluorescein in the body for 48-72 hours post procedure  Samples containing fluorescein can produce falsely depressed TSH values   If the patient had this procedure,a specimen should be resubmitted post fluorescein clearance  The recommended reference ranges for TSH during pregnancy are as follows:  First trimester 0 1 to 2 5 uIU/mL  Second trimester  0 2 to 3 0 uIU/mL  Third trimester 0 3 to 3 0 uIU/m      Magnesium [[id number]]  (Abnormal) Collected:  10/29/18 0525    Lab Status:  Final result Specimen:  Blood from Arm, Right Updated:  10/29/18 0634     Magnesium 1 3 (L) mg/dL     Salicylate level [[id number]]  (Abnormal) Collected:  10/29/18 0525    Lab Status:  Final result Specimen:  Blood from Arm, Right Updated:  96/63/13 9165     Salicylate Lvl <3 (L) mg/dL     Acetaminophen level [[id number]]  (Abnormal) Collected:  10/29/18 0525    Lab Status:  Final result Specimen:  Blood from Arm, Right Updated:  10/29/18 0437     Acetaminophen Level 2 0 (L) ug/mL     Comprehensive metabolic panel [[id number]]  (Abnormal) Collected:  10/29/18 0525    Lab Status:  Final result Specimen:  Blood from Arm, Right Updated:  10/29/18 8634     Sodium 134 (L) mmol/L      Potassium 3 7 mmol/L      Chloride 98 (L) mmol/L      CO2 25 mmol/L      ANION GAP 11 mmol/L      BUN 10 mg/dL      Creatinine 0 70 mg/dL      Glucose 50 (L) mg/dL      Calcium 7 9 (L) mg/dL      AST 28 U/L      ALT 20 U/L      Alkaline Phosphatase 54 U/L      Total Protein 5 6 (L) g/dL      Albumin 2 2 (L) g/dL      Total Bilirubin 0 20 mg/dL      eGFR 94 ml/min/1 73sq m     Narrative:         National Kidney Disease Education Program recommendations are as follows:  GFR calculation is accurate only with a steady state creatinine  Chronic Kidney disease less than 60 ml/min/1 73 sq  meters  Kidney failure less than 15 ml/min/1 73 sq  meters      Troponin I [[id number]]  (Abnormal) Collected:  10/29/18 0525    Lab Status:  Final result Specimen:  Blood from Arm, Right Updated:  10/29/18 0613     Troponin I 2 60 (HH) ng/mL     Rapid drug screen, urine [[id number]]  (Normal) Collected:  10/29/18 0531    Lab Status: Final result Specimen:  Urine from Urine, Clean Catch Updated:  10/29/18 0551     Amph/Meth UR Negative     Barbiturate Ur Negative     Benzodiazepine Urine Negative     Cocaine Urine Negative     Methadone Urine Negative     Opiate Urine Negative     PCP Ur Negative     THC Urine Negative    Narrative:         FOR MEDICAL PURPOSES ONLY  IF CONFIRMATION NEEDED PLEASE CONTACT THE LAB WITHIN 5 DAYS  Drug Screen Cutoff Levels:  AMPHETAMINE/METHAMPHETAMINES  1000 ng/mL  BARBITURATES     200 ng/mL  BENZODIAZEPINES     200 ng/mL  COCAINE      300 ng/mL  METHADONE      300 ng/mL  OPIATES      300 ng/mL  PHENCYCLIDINE     25 ng/mL  THC       50 ng/mL    Ethanol [[id number]]  (Normal) Collected:  10/29/18 0525    Lab Status:  Final result Specimen:  Blood from Arm, Right Updated:  10/29/18 0548     Ethanol Lvl <3 mg/dL     CBC and differential [[id number]]  (Abnormal) Collected:  10/29/18 0525    Lab Status:  Final result Specimen:  Blood from Arm, Right Updated:  10/29/18 0538     WBC 12 48 (H) Thousand/uL      RBC 3 68 (L) Million/uL      Hemoglobin 12 3 g/dL      Hematocrit 35 5 %      MCV 97 fL      MCH 33 4 pg      MCHC 34 6 g/dL      RDW 13 2 %      MPV 9 1 fL      Platelets 394 (H) Thousands/uL      nRBC 0 /100 WBCs      Neutrophils Relative 84 (H) %      Immat GRANS % 1 %      Lymphocytes Relative 9 (L) %      Monocytes Relative 6 %      Eosinophils Relative 0 %      Basophils Relative 0 %      Neutrophils Absolute 10 58 (H) Thousands/µL      Immature Grans Absolute 0 09 Thousand/uL      Lymphocytes Absolute 1 06 Thousands/µL      Monocytes Absolute 0 71 Thousand/µL      Eosinophils Absolute 0 01 Thousand/µL      Basophils Absolute 0 03 Thousands/µL                  CT head wo contrast   Final Result by Jung Butler MD (10/29 0818)      No acute intracranial abnormality  Microangiopathic changes                    Workstation performed: [id number]         XR chest portable   Final Result by Leonor Lara MD (10/29 0755)      No acute cardiopulmonary disease  Workstation performed: [id number]                    Procedures  Procedures       Phone Contacts  ED Phone Contact    ED Course  ED Course as of Oct 29 1521   Mon Oct 29, 2018   6560 Patient continues to deny chest pain, pressure, shortness of breath, nausea  0908 Second troponin drawn  VSS  Mental status stable      8086 Troponin I: (!!) 2 60   1014 Troponin I: (!!) 1 53   1015 EKGs and labs reviewed with Dr Quinten Sainz for SL[facility]  - Dr Nancie Espinoza saw her in the ED - pt still denies pain, SOB, nausea --           HEART Risk Score      Most Recent Value   History  0 Filed at: 10/29/2018 1050   ECG  0 Filed at: 10/29/2018 1050   Age  1 Filed at: 10/29/2018 1050   Risk Factors  1 Filed at: 10/29/2018 1050   Troponin  2 Filed at: 10/29/2018 1050   Heart Score Risk Calculator   History  0 Filed at: 10/29/2018 1050   ECG  0 Filed at: 10/29/2018 1050   Age  1 Filed at: 10/29/2018 1050   Risk Factors  1 Filed at: 10/29/2018 1050   Troponin  2 Filed at: 10/29/2018 1050   HEART Score  4 Filed at: 10/29/2018 1050   HEART Score  4 Filed at: 10/29/2018 1050                            MDM  Number of Diagnoses or Management Options     Amount and/or Complexity of Data Reviewed  Tests in the radiology section of CPT®: ordered and reviewed  Tests in the medicine section of CPT®: reviewed and ordered  Decide to obtain previous medical records or to obtain history from someone other than the patient: yes  Independent visualization of images, tracings, or specimens: yes    Risk of Complications, Morbidity, and/or Mortality  Presenting problems: high      CritCare Time    Disposition  Final diagnoses:   NSTEMI (non-ST elevated myocardial infarction) ([facility] )   Dermatitis     Time reflects when diagnosis was documented in both MDM as applicable and the Disposition within this note     Time User Action Codes Description Comment    10/29/2018 10:20 AM Mary Chin Add [I21 4] NSTEMI (non-ST elevated myocardial infarction) ([place] [address] )     10/29/2018 10:20 AM Mary Resendez Modify [I21 4] NSTEMI (non-ST elevated myocardial infarction) ([place] [address] )     10/29/2018 10:20 AM Mary Resendez Modify [I21 4] NSTEMI (non-ST elevated myocardial infarction) ([place] [address] )     10/29/2018 10:24 AM Mary Resendez Add [L30 9] Dermatitis     10/29/2018 10:24 AM Mary Pope Modify [L30 9] Dermatitis       ED Disposition     None      Follow-up Information    None         Current Discharge Medication List      CONTINUE these medications which have NOT CHANGED    Details   aspirin (ECOTRIN LOW STRENGTH) 81 mg EC tablet Take 1 tablet (81 mg total) by mouth daily  Qty: 90 tablet, Refills: 1    Associated Diagnoses: Type 2 diabetes mellitus with hyperglycemia, with long-term current use of insulin ([facility])      Blood Glucose Monitoring Suppl (BLOOD GLUCOSE MONITOR SYSTEM) w/Device KIT Test blood sugars 3 times a day  Qty: 1 each, Refills: 0    Comments: One Touch Ultra  Associated Diagnoses: Type 2 diabetes mellitus with complication, with long-term current use of insulin ([facility])      clotrimazole-betamethasone (LOTRISONE) 1-0 05 % cream 2 (two) times a day As needed       doxycycline hyclate (VIBRAMYCIN) 100 mg capsule Take 1 capsule (100 mg total) by mouth every 12 (twelve) hours for 7 days  Qty: 14 capsule, Refills: 0    Associated Diagnoses: Urinary tract infection without hematuria, site unspecified; Cellulitis of right lower extremity      estradiol (ESTRACE) 1 mg tablet Take 1 mg by mouth daily at bedtime        fluticasone (FLONASE) 50 mcg/act nasal spray SPRAY ONE (1) SPRAY INTO EACH NOSTRIL ONCE DAILY      **FOR THE NOSE**  Qty: 16 g, Refills: 0    Associated Diagnoses: Seasonal allergic rhinitis, unspecified trigger      furosemide (LASIX) 20 mg tablet Take 1 tablet (20 mg total) by mouth daily  Qty: 7 tablet, Refills: 0    Associated Diagnoses: Peripheral edema      gabapentin (NEURONTIN) 800 mg tablet Take 800 mg by mouth 4 (four) times a day  glucose blood test strip Test blood sugars 3 times a day    Qty: 100 each, Refills: 3    Associated Diagnoses: Type 2 diabetes mellitus with complication, with long-term current use of insulin ([facility])      hydrOXYzine pamoate (VISTARIL) 50 mg capsule Take 1 capsule (50 mg total) by mouth 3 (three) times a day  Qty: 90 capsule, Refills: 3    Associated Diagnoses: Anxiety      insulin glargine (LANTUS) 100 units/mL subcutaneous injection Inject 10 Units under the skin daily at bedtime  Qty: 10 mL, Refills: 2    Associated Diagnoses: Type 2 diabetes mellitus with hyperglycemia, with long-term current use of insulin ([facility])      LANTUS SOLOSTAR 100 units/mL injection pen       levETIRAcetam (KEPPRA) 750 mg tablet Take 750 mg by mouth 2 (two) times a day        levothyroxine 175 mcg tablet Take 1 tablet (175 mcg total) by mouth daily  Qty: 30 tablet, Refills: 3    Associated Diagnoses: Type 2 diabetes mellitus with hyperglycemia, with long-term current use of insulin ([facility])      lisinopril (ZESTRIL) 10 mg tablet Take 1 tablet (10 mg total) by mouth daily  Qty: 30 tablet, Refills: 0    Associated Diagnoses: Type 2 diabetes mellitus with hyperglycemia, with long-term current use of insulin ([facility])      loratadine (CLARITIN) 10 mg tablet Take 1 tablet (10 mg total) by mouth daily  Qty: 30 tablet, Refills: 3    Associated Diagnoses: Non-seasonal allergic rhinitis, unspecified trigger      meloxicam (MOBIC) 7 5 mg tablet Take 1 tablet (7 5 mg total) by mouth 2 (two) times a day  Qty: 60 tablet, Refills: 3    Associated Diagnoses: Type 2 diabetes mellitus with hyperglycemia, with long-term current use of insulin ([facility])      metFORMIN (GLUCOPHAGE) 1000 MG tablet Take 1 tablet (1,000 mg total) by mouth 2 (two) times a day with meals  Qty: 60 tablet, Refills: 3    Associated Diagnoses: Type 2 diabetes mellitus with hyperglycemia, with long-term current use of insulin ([facility])      montelukast (SINGULAIR) 10 mg tablet Take 1 tablet (10 mg total) by mouth daily at bedtime  Qty: 90 tablet, Refills: 0    Associated Diagnoses: Moderate asthma without complication, unspecified whether persistent      norethindrone (AYGESTIN) 5 mg tablet Take 5 mg by mouth daily  NOVOFINE 32G X 6 MM MISC Refills: 2      NOVOLOG FLEXPEN 100 units/mL injection pen Uses sliding scale       omeprazole (PriLOSEC) 20 mg delayed release capsule Take 1 capsule (20 mg total) by mouth daily TAKE ONE ( 40 MG) TABLET IN THE MORNING AND TAKE ONE (40 MG) TABLET IN THE EVENING BY MOUTH DAILY  Qty: 120 capsule, Refills: 3    Associated Diagnoses: GERD without esophagitis      ONETOUCH DELICA LANCETS 18O MISC Test blood sugars 3 times a day  Qty: 100 each, Refills: 3    Associated Diagnoses: Type 2 diabetes mellitus with complication, with long-term current use of insulin ([facility])      oxybutynin (DITROPAN) 5 mg tablet Take 5 mg by mouth 3 (three) times a day        phenytoin (DILANTIN) 100 mg ER capsule Take by mouth 5 (five) times a day 3 tablets in morning and 2 at lunch daily       polyethylene glycol (GLYCOLAX) powder Take 17 g by mouth daily  Qty: 850 g, Refills: 3    Associated Diagnoses: Type 2 diabetes mellitus with hyperglycemia, with long-term current use of insulin (HCC)      QUEtiapine (SEROquel) 400 MG tablet Take 1 tablet (400 mg total) by mouth 2 (two) times a day  Qty: 60 tablet, Refills: 3    Associated Diagnoses: Bipolar disorder, in full remission, most recent episode mixed (HCC)      simvastatin (ZOCOR) 80 mg tablet Take 1 tablet (80 mg total) by mouth daily at bedtime  Qty: 30 tablet, Refills: 3    Associated Diagnoses: Type 2 diabetes mellitus with hyperglycemia, with long-term current use of insulin (HCC)      Skin Protectants, Misc   (CALAZIME SKIN PROTECTANT) PSTE Apply 113 g topically 2 (two) times a day  Qty: 1 Tube, Refills: 0    Associated Diagnoses: Perirectal skin irritation      VENTOLIN  (90 Base) MCG/ACT inhaler Inhale 2 puffs every 6 (six) hours as needed for wheezing  Qty: 18 g, Refills: 0    Associated Diagnoses: Moderate asthma without complication, unspecified whether persistent      zonisamide (ZONEGRAN) 100 mg capsule Take 100 mg by mouth 5 (five) times a day        valACYclovir (VALTREX) 500 mg tablet Take 1 tablet by mouth 2 (two) times a day  Refills: 2           No discharge procedures on file      ED Provider  Electronically Signed by           Yobany Leo MD  10/29/18 0670

## 2018-10-29 NOTE — H&P
H&P Exam - Veronika Young 66 y o  female MRN: [id number]    Unit/Bed#: RM06 Encounter: [id number]    NSTEMI (non-ST elevated myocardial infarction) [facility])   Assessment & Plan    This might represent a type 2 in the setting of acute cystitis  However will continue heparin drip until evaluation by Cardiology  Obtain 2D echocardiogram  Continue to trend troponin  Patient received aspirin  Obtain fasting lipid panel and hemoglobin A1c     Volume overload   Assessment & Plan    Patient likely has underlying congestive heart failure  She has bilateral lower extremity edema and is on home Lasix  Will give Lasix 40 mg IV x1  Obtain 2D echocardiogram to assess if this is systolic versus diastolic  Seiz[facility] ([facility] )   Assessment & Plan    Continue Dilantin and check a Dilantin level     Hypothyroidism   Assessment & Plan    Continue Synthroid and check TSH     Type 2 diabetes mellitus with hyperglycemia, with long-term current use of insulin [facility])   Assessment & Plan    Lab Results   Component Value Date    HGBA1C 5 9 04/10/2018       Recent Labs      10/29/18   0922   POCGLU  48*       Blood Sugar Average: Last 72 hrs:  (P) 48   Hold metformin  Continue Lantus  Insulin sliding scale with Accu-Cheks AC and HS       Bipolar disorder ([facility] )   Assessment & Plan    Continue outpatient antipsychotic medication       Continue doxycycline to treat acute cystitis       History of Present Illness      Patient is a pleasant 72-year-old male who lives alone and [address] [facility] presents to the emergency room today with a chief complaint of dizziness  The patient has significant history of bipolar disorder and appears manic right now, she is denying any chest pain or shortness of breath  She states that over the past week she has been having urinary hesitancy and dysuria  She has had some urinary incontinence  She has a rash on her buttocks and excoriations  She states that after sitting on the commode and urinating she felt dizzy    She called EMS who brought her to the emergency room for evaluation  At which point the patient did not have any specific complaints however after laboratory evaluation the patient is found have a troponin of 2 4  EKG and repeat EKG were personally reviewed and showed no ischemic changes or conduction abnormalities  Review of Systems   Genitourinary: Positive for urgency  Skin: Positive for rash  Neurological: Positive for dizziness and weakness  Psychiatric/Behavioral: Negative for suicidal ideas  All other systems reviewed and are negative        Historical Information   Past Medical History:   Diagnosis Date    Asthma     Bipolar disorder ([place] [address] )     Chronic UTI (urinary tract infection)     COPD (chronic obstructive pulmonary disease) ([place] [address] )     Diabetes mellitus ([place] [address] )     Disease of thyroid gland     GERD (gastroesophageal reflux disease)     Seizure ([place] [address] )      Past Surgical History:   Procedure Laterality Date    ANKLE FRACTURE SURGERY Right     TUBAL LIGATION      VEIN LIGATION AND STRIPPING       Social History   History   Alcohol Use No     History   Drug Use No     History   Smoking Status    Never Smoker   Smokeless Tobacco    Never Used     Family History: non-contributory    Meds/Allergies   all medications and allergies reviewed  Allergies   Allergen Reactions    Keflex [Cephalexin] Anaphylaxis     Airway edema     Levaquin [Levofloxacin] Anaphylaxis    Penicillins Anaphylaxis    Bactrim [Sulfamethoxazole-Trimethoprim] Swelling and GI Intolerance     LE edema and thrush    Ciprofloxacin Swelling     Edema in all extremities and thrush     Noroxin [Norfloxacin]        Objective   First Vitals:   Blood Pressure: 113/72 (10/29/18 0431)  Pulse: 90 (10/29/18 0431)  Temperature: 98 2 °F (36 8 °C) (10/29/18 0500)  Temp Source: Oral (10/29/18 0431)  Respirations: 20 (10/29/18 0431)  Weight - Scale: 112 kg (247 lb 9 2 oz) (10/29/18 0432)  SpO2: 92 % (10/29/18 0431)    Current Vitals: Blood Pressure: 124/72 (10/29/18 0930)  Pulse: 78 (10/29/18 0930)  Temperature: 98 2 °F (36 8 °C) (10/29/18 0500)  Temp Source: Temporal (10/29/18 0500)  Respirations: 19 (10/29/18 0930)  Weight - Scale: 112 kg (247 lb 9 2 oz) (10/29/18 0432)  SpO2: 94 % (10/29/18 0930)      Intake/Output Summary (Last 24 hours) at 10/29/18 1035  Last data filed at 10/29/18 1003   Gross per 24 hour   Intake               50 ml   Output                0 ml   Net               50 ml       Invasive Devices          No matching active lines, drains, or airways          Physical Exam   Constitutional: She is oriented to person, place, and time  She appears well-developed  HENT:   Head: Normocephalic  Mouth/Throat: No oropharyngeal exudate  Eyes: No scleral icterus  Neck: Neck supple  No JVD present  Cardiovascular: Normal rate and regular rhythm  Pulmonary/Chest: Effort normal and breath sounds normal  No respiratory distress  Abdominal: Bowel sounds are normal  She exhibits no distension  There is no tenderness  There is no rebound and no guarding  Musculoskeletal: Normal range of motion  She exhibits edema  Neurological: She is alert and oriented to person, place, and time  No cranial nerve deficit  Coordination normal    Skin: She is not diaphoretic  Fungal rash noted on medial thigh and buttocks   Psychiatric:   Rapid pressured speech       Lab Results:   Lab Results   Component Value Date    WBC 12 48 (H) 10/29/2018    HGB 12 3 10/29/2018    HCT 35 5 10/29/2018    MCV 97 10/29/2018     (H) 10/29/2018       Imaging:   CT head wo contrast   Final Result      No acute intracranial abnormality  Microangiopathic changes  Workstation performed: [id number]         XR chest portable   Final Result      No acute cardiopulmonary disease              Workstation performed: [id number]             EKG, Pathology, and Other Studies:   NSR    Code Status: No Order  Advance Directive and Living Will: Power of :    POLST:      Counseling / Coordination of Care:

## 2018-10-29 NOTE — ASSESSMENT & PLAN NOTE
Type 2 non ST elevation MI secondary to acute cystitis  Troponin peak  Consultation with Cardiology was obtained and I appreciate the input  Patient will be requiring outpatient follow-up with Cardiology

## 2018-10-29 NOTE — PLAN OF CARE
Problem: PHYSICAL THERAPY ADULT  Goal: Performs mobility at highest level of function for planned discharge setting  See evaluation for individualized goals  Outcome: Progressing  Prognosis: Good  Problem List: Decreased strength, Decreased endurance, Impaired balance, Decreased mobility, Pain, Decreased safety awareness, Impaired judgement, Obesity  Assessment: Pt is a 66year old female presenting to [facility] from home due to complaint of dizziness, urinary hesitancy and dysuria over the past week  Pt with history of maniac bipolar seizures, DM, COPD, asthma, vein ligation and R ankle fx  Pt seen for high complexity PT evaluation presenting requiring verbal cues to redirect patient to task, decreased safety awareness decreased strength balance endurance and functional mobility requiring min(A)x1-2 for all transfers and ambulation short distance with Rw  Pt with lightheadedness limiting activity tolerance  Pt requiring assistance with ADL's and clothing management during session  Pt is in need of continued activity in PT to improve strength safety balance endurance mobility transfers and ambulation with RW to return to (S) to (I) level of function  Barriers to discharge: pt lives alone, is currently requiring assistance with clothing management and mobility  Barriers to Discharge: Decreased caregiver support     Recommendation: Home PT, Short-term skilled PT (based on progress in PT)     PT - OK to Discharge: Yes (when medically stable for discharge)    See flowsheet documentation for full assessment

## 2018-10-29 NOTE — PLAN OF CARE
CARDIOVASCULAR - ADULT     Maintains optimal cardiac output and hemodynamic stability Progressing     Absence of cardiac dysrhythmias or at baseline rhythm Progressing        DISCHARGE PLANNING     Discharge to home or other facility with appropriate resources Progressing        INFECTION - ADULT     Absence or prevention of progression during hospitalization Progressing        Knowledge Deficit     Patient/family/caregiver demonstrates understanding of disease process, treatment plan, medications, and discharge instructions Progressing        PAIN - ADULT     Verbalizes/displays adequate comfort level or baseline comfort level Progressing        Potential for Falls     Patient will remain free of falls Progressing        Prexisting or High Potential for Compromised Skin Integrity     Skin integrity is maintained or improved Progressing        SAFETY ADULT     Maintain or return to baseline ADL function Progressing     Maintain or return mobility status to optimal level Progressing     Patient will remain free of falls Progressing        SKIN/TISSUE INTEGRITY - ADULT     Skin integrity remains intact Progressing     Incision(s), wounds(s) or drain site(s) healing without S/S of infection Progressing

## 2018-10-29 NOTE — ASSESSMENT & PLAN NOTE
No evidence of systolic or diastolic dysfunction exam  No evidence of pulmonary hypertension on echocardiogram  Cardiology input noted  Patient placed back on her baseline Lasix  Continue monitor daily weights, intake and output

## 2018-10-29 NOTE — ASSESSMENT & PLAN NOTE
Lab Results   Component Value Date    HGBA1C 5 9 04/10/2018       Recent Labs      10/29/18   0922   POCGLU  48*       Blood Sugar Average: Last 72 hrs:  (P) 48   Hold metformin  Continue Lantus  Insulin sliding scale with Accu-Cheks AC and HS

## 2018-10-29 NOTE — OCCUPATIONAL THERAPY NOTE
Occupational Therapy Evaluation     Patient Name: Pop Araya  Today's Date: 10/29/2018  Problem List  Patient Active Problem List   Diagnosis    Asthma    Bipolar disorder ([place] [address] )    Type 2 diabetes mellitus with hyperglycemia, with long-term current use of insulin ([place] [address] )    Dermatitis    Hypothyroidism    Obesity, morbid (more than 100 lbs over ideal weight or BMI > 40) ([facility])    Seizures ([place] [address] )    NSTEMI (non-ST elevated myocardial infarction) ([place] [address] )    Volume overload     Past Medical History  Past Medical History:   Diagnosis Date    Asthma     Bipolar disorder ([place] [address] )     Chronic UTI (urinary tract infection)     COPD (chronic obstructive pulmonary disease) ([place] [address] )     Diabetes mellitus ([place] [address] )     Disease of thyroid gland     GERD (gastroesophageal reflux disease)     Seizure ([facility])      Past Surgical History  Past Surgical History:   Procedure Laterality Date    ANKLE FRACTURE SURGERY Right     TUBAL LIGATION      VEIN LIGATION AND STRIPPING             10/29/18 1326   Note Type   Note type Eval/Treat   Restrictions/Precautions   Weight Bearing Precautions Per Order No   Other Precautions Bed Alarm;Multiple lines;Telemetry; Fall Risk;Pain   Pain Assessment   Pain Assessment 0-10   Pain Score 7   Pain Location Leg;Knee   Home Living   Type of Home Apartment   Home Layout One level;Performs ADLs on one level; Able to live on main level with bedroom/bathroom; Elevator   Bathroom Shower/Tub Tub/shower unit   Bathroom Toilet Standard   Bathroom Equipment Grab bars in shower; Shower chair;Commode   [address] [facility]; Wheelchair-manual;Cane;Grab bars  (pt has lift chair as well)   Additional Comments pt reports utilizing RW and w/c in apartment and in community   Prior Function   Level of [place] Independent with ADLs and functional mobility; Needs assistance with IADLs   Lives With Alone   Receives Help From Friend(s); Home health   ADL Assistance Independent IADLs Needs assistance   Falls in the last 6 months 1 to 4   Comments pt's friends drive   Psychosocial   Psychosocial (WDL) X   Patient Behaviors/Mood Anxious; Wandering;Verbal   Subjective   Subjective "I have this rash all over my legs from laying in wet"   ADL   Where Assessed Other (Comment)  (bathroom)   LB Dressing Assistance 3  Moderate Assistance   LB Dressing Deficit Don/doff R sock; Don/doff L sock; Thread RLE into underwear; Thread LLE into underwear;Pull up over hips; Increased time to complete   Toileting Assistance  3  Moderate Assistance   Toileting Deficit Verbal cueing;Supervison/safety; Clothing management up;Clothing management down;Perineal hygiene   Additional Comments pt performed rosemarie hygiene with min (A) x1 for backside hygiene; pt reports "I don't feel like it"; (A) provided; pt requires min-mod (A) x1 for LB dressing; pt attempted to don socks while seated EOB; pt unable to complete requiring (A)   Bed Mobility   Supine to Sit 5  Supervision   Additional items Bedrails; Increased time required;Verbal cues   Sit to Supine 2  Maximal assistance   Additional items Assist x 2;Bedrails; Increased time required;Verbal cues;LE management   Additional Comments pt on RA throughout session; pt reports no SOB; requires significantly increased time to perform bed mobility; pt appears wandering and requires increased (A) to maintain attention   Transfers   Sit to Stand 4  Minimal assistance   Additional items Assist x 1;Bedrails; Increased time required;Verbal cues  (RW)   Stand to Sit 4  Minimal assistance   Additional items Assist x 1;Bedrails; Increased time required;Verbal cues  (RW)   Stand pivot 4  Minimal assistance   Additional items Assist x 1; Increased time required;Verbal cues  (RW)   Toilet transfer 4  Minimal assistance   Additional items Assist x 1;Standard toilet;Verbal cues; Increased time required   Additional Comments pt requires min-mod (A) x1-2; pt reported dizziness in bathroom; pt required increased (A) due to "dizziness" in bathroom requiring w/c to be brought to patient    Functional Mobility   Functional Mobility 5  Supervision   Additional Comments pt performed functional mobility with use of RW this session to and from bathroom with physical (A) at times due to unsteadiness   Additional items Rolling walker   Balance   Static Sitting Good   Dynamic Sitting Fair +   Static Standing Fair   Dynamic Standing Fair -   Ambulatory Fair -   Activity Tolerance   Activity Tolerance Patient limited by fatigue   RUE Assessment   RUE Assessment WFL   LUE Assessment   LUE Assessment WFL   Hand Function   Gross Motor Coordination Functional   Fine Motor Coordination Functional   Sensation   Light Touch No apparent deficits   Sharp/Dull No apparent deficits   Cognition   Overall Cognitive Status WFL   Arousal/Participation Alert   Attention Within functional limits   Orientation Level Oriented X4   Memory Within functional limits   Following Commands Follows all commands and directions without difficulty   Assessment   Limitation Decreased ADL status; Decreased UE strength;Decreased Safe judgement during ADL;Decreased endurance;Decreased self-care trans;Decreased high-level ADLs   Assessment Pt is a 66 y o  female seen for OT evaluation s/p admit [facility] on 10/29/2018 w/ NSTEMI (non-ST elevated myocardial infarction) ([facility] [address] )  Comorbidities affecting pt's functional performance at time of assessment include: obesity and underlying neurological disorder  Personal factors affecting pt at time of IE include:difficulty performing ADLS, difficulty performing IADLS , limited insight into deficits, compliance, flat affect, decreased initiation and engagement  and health management   Prior to admission, pt was (I) with ADLs/IADLs with use of RW and w/c during mobility   Upon evaluation: Pt requires min-mod (A) with use of RW during functional mobility 2* the following deficits impacting occupational performance: weakness, decreased strength, decreased balance, decreased tolerance, impaired initiation, impaired problem solving, impulsivity, decreased safety awareness, impaired interpersonal skills and decreased coping skills  Pt to benefit from continued skilled OT tx while in the hospital to address deficits as defined above and maximize level of functional independence w ADL's and functional mobility  Occupational Performance areas to address include: grooming, bathing/shower, toilet hygiene, dressing, functional mobility, community mobility and clothing management  From OT standpoint, recommendation at time of d/c would be short term rehab or home health pending progress in therapy services  Goals   Patient Goals to go home    Short Term Goal  pt will increase independence with LB dressing to (I) level while seated in chair or EOB with decreased time    Long Term Goal #1 pt will increase independence with functional mobility with use of RW at mod (I) level with decreased cues and increased distance   Long Term Goal #2 pt will increase independence with rosemarie hygiene and toilet transfers to mod (I) level with RW    Long Term Goal pt will demonstrate UB/LB bathing and grooming tasks while seated EOB or in chair at min (A) level with decreased time    Plan   Treatment Interventions ADL retraining;Functional transfer training;UE strengthening/ROM; Endurance training;Patient/family training; Activityengagement   Goal Expiration Date 11/12/18   OT Frequency 3-5x/wk   Recommendation   OT Discharge Recommendation Short Term Rehab   Barthel Index   Feeding 10   Bathing 0   Grooming Score 0   Dressing Score 5   Bladder Score 10   Bowels Score 10   Toilet Use Score 5   Transfers (Bed/Chair) Score 10   Mobility (Level Surface) Score 10   Stairs Score 0   Barthel Index Score 60       Pt will benefit from continued OT services in order to maximize (I) c ADL performance, FM c RW, and improve overall endurance/strength required to complete functional tasks in preparation for d/c  Pt left supine in bed at end of session; all needs within reach; all lines intact

## 2018-10-29 NOTE — PHYSICAL THERAPY NOTE
PT Evaluation   10/29/18 2987   Note Type   Note type Eval/Treat   Pain Assessment   Pain Score 7   Pain Location Leg;Knee   Home Living   Type of Home Apartment  [facility])   Home Layout Able to live on main level with bedroom/bathroom; Performs ADLs on one level;Elevator   Bathroom Shower/Tub Tub/shower unit   Bathroom Toilet Standard   Bathroom Equipment Shower chair;Commode;Grab bars in [address]; Wheelchair-manual;Cane;Grab bars  (pt has a lift chair)   Prior Function   Level of [place] Independent with ADLs and functional mobility;Modified independent with wheelchair;Needs assistance with IADLs   Lives With Alone   Receives Help From Friend(s); Home health   ADL Assistance Independent   IADLs Needs assistance   Falls in the last 6 months 1 to 4   Comments pt states friend or neighbor assists with laundry and driving    Restrictions/Precautions   Other Precautions Bed Alarm;Multiple lines;Telemetry; Fall Risk;Pain   General   Family/Caregiver Present No   Cognition   Arousal/Participation Alert   Orientation Level Oriented X4   Following Commands Follows all commands and directions without difficulty   RLE Assessment   RLE Assessment WFL  (3+ to 4-/5)   LLE Assessment   LLE Assessment WFL  (3+ to 4-/5)   Coordination   Sensation WFL   Light Touch   RLE Light Touch Grossly intact   LLE Light Touch Grossly intact   Bed Mobility   Supine to Sit 5  Supervision   Additional items Bedrails; Increased time required;Verbal cues  (pt requiring increased time to complete bed mobility)   Sit to Supine 2  Maximal assistance   Additional items Assist x 2;Verbal cues; Bedrails;LE management  (pt complaining of lightheadedness while in bathroom)   Additional Comments pt complaining of dizziness during ambulation and when in bathroom performing self care  pt required assistance with clothing management and self care after toileting     Transfers   Sit to Stand 4 Minimal assistance   Additional items Assist x 1;Bedrails;Verbal cues  (with RW)   Stand to Sit 4  Minimal assistance   Additional items Assist x 1;Verbal cues  (with RW)   Stand pivot 4  Minimal assistance   Additional items Assist x 1;Verbal cues  (with RW)   Toilet transfer 4  Minimal assistance   Additional items Assist x 1   Additional Comments pt requiring verbal cues on multiple occassions to redirect patient and stay on task  Pt talking throughout session about random topics including thinking her neighbor is stealing from her  Pt performed stand pivot transfer with RW from toilet to w/c and transported back to bed then performed stand pivot transfer no A D  min(A)x2 from w/c to bed  Pt was able to ambulate 10ft with RW from bed to bathroom CGA  Ambulation/Elevation   Gait pattern Wide CHARIS; Short stride   Gait Assistance (CGA)   Assistive Device Rolling walker   Distance 10ft with RW CGA with complaint of lightheadedness during ambulation but no LOB   Balance   Static Sitting Good   Dynamic Sitting Fair +   Static Standing Fair  (with RW)   Dynamic Standing Fair  (with RW)   Ambulatory Fair -  (with RW)   Endurance Deficit   Endurance Deficit Yes   Endurance Deficit Description limited activity tolerance due to lightheadedness and complaint of weakness   Activity Tolerance   Activity Tolerance Patient limited by fatigue   Assessment   Prognosis Good   Problem List Decreased strength;Decreased endurance; Impaired balance;Decreased mobility;Pain;Decreased safety awareness; Impaired judgement;Obesity   Assessment Pt is a 66year old female presenting to [facility] from home due to complaint of dizziness, urinary hesitancy and dysuria over the past week  Pt with history of maniac bipolar seizures, DM, COPD, asthma, vein ligation and R ankle fx   Pt seen for high complexity PT evaluation presenting requiring verbal cues to redirect patient to task, decreased safety awareness decreased strength balance endurance and functional mobility requiring min(A)x1-2 for all transfers and ambulation short distance with Rw  Pt with lightheadedness limiting activity tolerance  Pt requiring assistance with ADL's and clothing management during session  Pt is in need of continued activity in PT to improve strength safety balance endurance mobility transfers and ambulation with RW to return to (S) to (I) level of function  Barriers to discharge: pt lives alone, is currently requiring assistance with clothing management and mobility   Barriers to Discharge Decreased caregiver support   Goals   Patient Goals To feel better and return home   LTG Expiration Date 11/12/18   Long Term Goal #1 Improve bilateral LE strength by 1/2 muscle grade to improve all bed mobility and transfers to modified Independent with RW   Long Term Goal #2 Improve all standing and ambulatory balance to fair+ to improve ambulation to 200ft with RW (S) no LOB   Plan   Treatment/Interventions Functional transfer training;LE strengthening/ROM; Therapeutic exercise; Endurance training;Patient/family training;Bed mobility;Gait training   PT Frequency (3-5x/wk)   Recommendation   Recommendation Home PT; Short-term skilled PT  (based on progress in PT)   PT - OK to Discharge Yes  (when medically stable for discharge)

## 2018-10-29 NOTE — CONSULTS
Consulted RE: Dermatitis    67 yo female with a significant history of Bipolar, now manic, who presents with dizziness  Lives alone in the [facility]  C/O dysuria, frequency, and hesitancy for the past week  Relates to sitting in her urine soaked bed  Presented to [facility] with MASD/IAD related to urinary incontinency  Chaffed area between her legs  Mild IAD gluteal cleft and perineal areas  Very dry skin/eczema to B/L thighs  Plan:   1  Hydraguard barrier cream to buttocks/perineal areas Q shift/PRN  2  Skin nourishing cream to bilateral thighs Q shift    Wound management sign-off  Call with questions

## 2018-10-30 ENCOUNTER — APPOINTMENT (INPATIENT)
Dept: RADIOLOGY | Facility: HOSPITAL | Age: 63
DRG: 190 | End: 2018-10-30
Payer: COMMERCIAL

## 2018-10-30 PROBLEM — R26.2 AMBULATORY DYSFUNCTION: Status: ACTIVE | Noted: 2018-10-30

## 2018-10-30 LAB
ALBUMIN SERPL BCP-MCNC: 1.9 G/DL (ref 3.5–5)
ALP SERPL-CCNC: 45 U/L (ref 46–116)
ALT SERPL W P-5'-P-CCNC: 14 U/L (ref 12–78)
ANION GAP SERPL CALCULATED.3IONS-SCNC: 9 MMOL/L (ref 4–13)
APTT PPP: 83 SECONDS (ref 24–36)
AST SERPL W P-5'-P-CCNC: 21 U/L (ref 5–45)
BASOPHILS # BLD AUTO: 0.05 THOUSANDS/ΜL (ref 0–0.1)
BASOPHILS NFR BLD AUTO: 1 % (ref 0–1)
BILIRUB SERPL-MCNC: 0.1 MG/DL (ref 0.2–1)
BUN SERPL-MCNC: 11 MG/DL (ref 5–25)
CALCIUM SERPL-MCNC: 8 MG/DL (ref 8.3–10.1)
CHLORIDE SERPL-SCNC: 99 MMOL/L (ref 100–108)
CHOLEST SERPL-MCNC: 131 MG/DL (ref 50–200)
CO2 SERPL-SCNC: 27 MMOL/L (ref 21–32)
CREAT SERPL-MCNC: 0.77 MG/DL (ref 0.6–1.3)
EOSINOPHIL # BLD AUTO: 0.19 THOUSAND/ΜL (ref 0–0.61)
EOSINOPHIL NFR BLD AUTO: 3 % (ref 0–6)
ERYTHROCYTE [DISTWIDTH] IN BLOOD BY AUTOMATED COUNT: 13.6 % (ref 11.6–15.1)
EST. AVERAGE GLUCOSE BLD GHB EST-MCNC: 97 MG/DL
GFR SERPL CREATININE-BSD FRML MDRD: 84 ML/MIN/1.73SQ M
GLUCOSE P FAST SERPL-MCNC: 31 MG/DL (ref 65–99)
GLUCOSE SERPL-MCNC: 101 MG/DL (ref 65–140)
GLUCOSE SERPL-MCNC: 135 MG/DL (ref 65–140)
GLUCOSE SERPL-MCNC: 155 MG/DL (ref 65–140)
GLUCOSE SERPL-MCNC: 31 MG/DL (ref 65–140)
GLUCOSE SERPL-MCNC: 41 MG/DL (ref 65–140)
HBA1C MFR BLD: 5 % (ref 4.2–6.3)
HCT VFR BLD AUTO: 32.6 % (ref 34.8–46.1)
HDLC SERPL-MCNC: 73 MG/DL (ref 40–60)
HGB BLD-MCNC: 11.3 G/DL (ref 11.5–15.4)
IMM GRANULOCYTES # BLD AUTO: 0.04 THOUSAND/UL (ref 0–0.2)
IMM GRANULOCYTES NFR BLD AUTO: 1 % (ref 0–2)
LDLC SERPL CALC-MCNC: 41 MG/DL (ref 0–100)
LYMPHOCYTES # BLD AUTO: 3.07 THOUSANDS/ΜL (ref 0.6–4.47)
LYMPHOCYTES NFR BLD AUTO: 42 % (ref 14–44)
MAGNESIUM SERPL-MCNC: 1.6 MG/DL (ref 1.6–2.6)
MCH RBC QN AUTO: 32.9 PG (ref 26.8–34.3)
MCHC RBC AUTO-ENTMCNC: 34.7 G/DL (ref 31.4–37.4)
MCV RBC AUTO: 95 FL (ref 82–98)
MONOCYTES # BLD AUTO: 0.71 THOUSAND/ΜL (ref 0.17–1.22)
MONOCYTES NFR BLD AUTO: 10 % (ref 4–12)
NEUTROPHILS # BLD AUTO: 3.21 THOUSANDS/ΜL (ref 1.85–7.62)
NEUTS SEG NFR BLD AUTO: 43 % (ref 43–75)
NRBC BLD AUTO-RTO: 0 /100 WBCS
PHENYTOIN SERPL-MCNC: 9.2 UG/ML (ref 10–20)
PLATELET # BLD AUTO: 478 THOUSANDS/UL (ref 149–390)
PMV BLD AUTO: 9 FL (ref 8.9–12.7)
POTASSIUM SERPL-SCNC: 3.3 MMOL/L (ref 3.5–5.3)
PROT SERPL-MCNC: 5.2 G/DL (ref 6.4–8.2)
RBC # BLD AUTO: 3.43 MILLION/UL (ref 3.81–5.12)
SODIUM SERPL-SCNC: 135 MMOL/L (ref 136–145)
TRIGL SERPL-MCNC: 83 MG/DL
WBC # BLD AUTO: 7.27 THOUSAND/UL (ref 4.31–10.16)

## 2018-10-30 PROCEDURE — 80053 COMPREHEN METABOLIC PANEL: CPT | Performed by: FAMILY MEDICINE

## 2018-10-30 PROCEDURE — 99232 SBSQ HOSP IP/OBS MODERATE 35: CPT | Performed by: FAMILY MEDICINE

## 2018-10-30 PROCEDURE — 85025 COMPLETE CBC W/AUTO DIFF WBC: CPT | Performed by: FAMILY MEDICINE

## 2018-10-30 PROCEDURE — 73560 X-RAY EXAM OF KNEE 1 OR 2: CPT

## 2018-10-30 PROCEDURE — 83735 ASSAY OF MAGNESIUM: CPT | Performed by: FAMILY MEDICINE

## 2018-10-30 PROCEDURE — 80185 ASSAY OF PHENYTOIN TOTAL: CPT | Performed by: FAMILY MEDICINE

## 2018-10-30 PROCEDURE — 85730 THROMBOPLASTIN TIME PARTIAL: CPT | Performed by: FAMILY MEDICINE

## 2018-10-30 PROCEDURE — 97110 THERAPEUTIC EXERCISES: CPT

## 2018-10-30 PROCEDURE — 80061 LIPID PANEL: CPT | Performed by: FAMILY MEDICINE

## 2018-10-30 PROCEDURE — 99254 IP/OBS CNSLTJ NEW/EST MOD 60: CPT | Performed by: INTERNAL MEDICINE

## 2018-10-30 PROCEDURE — 83036 HEMOGLOBIN GLYCOSYLATED A1C: CPT | Performed by: FAMILY MEDICINE

## 2018-10-30 PROCEDURE — 82948 REAGENT STRIP/BLOOD GLUCOSE: CPT

## 2018-10-30 PROCEDURE — 97530 THERAPEUTIC ACTIVITIES: CPT

## 2018-10-30 RX ORDER — METOPROLOL SUCCINATE 25 MG/1
25 TABLET, EXTENDED RELEASE ORAL DAILY
Status: DISCONTINUED | OUTPATIENT
Start: 2018-10-30 | End: 2018-10-31

## 2018-10-30 RX ORDER — FUROSEMIDE 40 MG/1
40 TABLET ORAL DAILY
Status: DISCONTINUED | OUTPATIENT
Start: 2018-10-31 | End: 2018-10-31

## 2018-10-30 RX ORDER — DEXTROSE MONOHYDRATE 25 G/50ML
25 INJECTION, SOLUTION INTRAVENOUS ONCE
Status: COMPLETED | OUTPATIENT
Start: 2018-10-30 | End: 2018-10-30

## 2018-10-30 RX ADMIN — LEVETIRACETAM 750 MG: 500 TABLET ORAL at 18:25

## 2018-10-30 RX ADMIN — GABAPENTIN 800 MG: 400 CAPSULE ORAL at 12:10

## 2018-10-30 RX ADMIN — LEVOTHYROXINE SODIUM 175 MCG: 175 TABLET ORAL at 06:16

## 2018-10-30 RX ADMIN — ZONISAMIDE 200 MG: 100 CAPSULE ORAL at 21:22

## 2018-10-30 RX ADMIN — HYDROXYZINE HYDROCHLORIDE 50 MG: 25 TABLET, FILM COATED ORAL at 21:21

## 2018-10-30 RX ADMIN — INSULIN GLARGINE 10 UNITS: 100 INJECTION, SOLUTION SUBCUTANEOUS at 21:24

## 2018-10-30 RX ADMIN — DOXYCYCLINE HYCLATE 100 MG: 100 CAPSULE ORAL at 12:10

## 2018-10-30 RX ADMIN — PHENYTOIN SODIUM 300 MG: 100 CAPSULE ORAL at 12:05

## 2018-10-30 RX ADMIN — LEVETIRACETAM 750 MG: 500 TABLET ORAL at 12:10

## 2018-10-30 RX ADMIN — OXYBUTYNIN CHLORIDE 5 MG: 5 TABLET ORAL at 18:25

## 2018-10-30 RX ADMIN — VALACYCLOVIR HYDROCHLORIDE 500 MG: 500 TABLET, FILM COATED ORAL at 18:25

## 2018-10-30 RX ADMIN — ZONISAMIDE 300 MG: 100 CAPSULE ORAL at 12:04

## 2018-10-30 RX ADMIN — PANTOPRAZOLE SODIUM 40 MG: 40 TABLET, DELAYED RELEASE ORAL at 06:16

## 2018-10-30 RX ADMIN — DEXTROSE MONOHYDRATE 25 ML: 500 INJECTION PARENTERAL at 07:08

## 2018-10-30 RX ADMIN — QUETIAPINE FUMARATE 400 MG: 100 TABLET ORAL at 12:09

## 2018-10-30 RX ADMIN — FUROSEMIDE 40 MG: 10 INJECTION, SOLUTION INTRAVENOUS at 08:49

## 2018-10-30 RX ADMIN — OXYBUTYNIN CHLORIDE 5 MG: 5 TABLET ORAL at 21:22

## 2018-10-30 RX ADMIN — PHENYTOIN SODIUM 200 MG: 100 CAPSULE ORAL at 15:06

## 2018-10-30 RX ADMIN — HYDROXYZINE HYDROCHLORIDE 50 MG: 25 TABLET, FILM COATED ORAL at 18:24

## 2018-10-30 RX ADMIN — ASPIRIN 81 MG: 81 TABLET, COATED ORAL at 12:11

## 2018-10-30 RX ADMIN — VALACYCLOVIR HYDROCHLORIDE 500 MG: 500 TABLET, FILM COATED ORAL at 12:11

## 2018-10-30 RX ADMIN — QUETIAPINE FUMARATE 400 MG: 100 TABLET ORAL at 18:25

## 2018-10-30 RX ADMIN — INSULIN LISPRO 1 UNITS: 100 INJECTION, SOLUTION INTRAVENOUS; SUBCUTANEOUS at 12:02

## 2018-10-30 RX ADMIN — ATORVASTATIN CALCIUM 40 MG: 40 TABLET, FILM COATED ORAL at 18:25

## 2018-10-30 RX ADMIN — HYDROXYZINE HYDROCHLORIDE 50 MG: 25 TABLET, FILM COATED ORAL at 12:04

## 2018-10-30 RX ADMIN — OXYBUTYNIN CHLORIDE 5 MG: 5 TABLET ORAL at 12:11

## 2018-10-30 RX ADMIN — DOXYCYCLINE HYCLATE 100 MG: 100 CAPSULE ORAL at 21:22

## 2018-10-30 RX ADMIN — GABAPENTIN 800 MG: 400 CAPSULE ORAL at 21:24

## 2018-10-30 RX ADMIN — GABAPENTIN 800 MG: 400 CAPSULE ORAL at 18:24

## 2018-10-30 NOTE — CONSULTS
Please refer to note written on 10/29/18  Wound care sign-off  Call with questions      DEIDRE De Leon

## 2018-10-30 NOTE — UTILIZATION REVIEW
Thank you,  [address]  Utilization Review Department  Phone: [phone]; Fax [phone]  ATTENTION: Please call with any questions or concerns to [phone]  and carefully follow the prompts so that you are directed to the right person  Send all requests for admission clinical reviews, approved or denied determinations and any other requests to fax [phone]  All voicemails are confidential      Initial Clinical Review    Admission: Date/Time/Statement: 10/29/18 @ 1028 OBS  -- CONVERTED TO INPATIENT 10/30/18 @ 1006 FOR CONTINUED W/U OF ELEVATED TROPONIN & AMBULATORY DYSFUNCTION    Orders Placed This Encounter    Inpatient Admission     Standing Status:   Standing     Number of Occurrences:   1     Order Specific Question:   Admitting Physician     Answer:   Ev Barboza     Order Specific Question:   Level of Care     Answer:   Med Surg [16]     Order Specific Question:   Estimated length of stay     Answer:   More than 2 Midnights     Order Specific Question:   Certification     Answer:   I certify that inpatient services are medically necessary for this patient for a duration of greater than two midnights  See H&P and MD Progress Notes for additional information about the patient's course of treatment  ED: Date/Time/Mode of Arrival:   ED Arrival Information     Expected Arrival Acuity Means of Arrival Escorted By Service Admission Type    - 10/29/2018 04:30 Urgent Ambulance [facility] General Medicine Urgent    Arrival Complaint    Dizziness          Chief Complaint:   Chief Complaint   Patient presents with    Dizziness     Pt c/o acute onset dizziness tonight       History of Illness: 68-year-old male who lives alone and [address] [facility] presents to the emergency room today with a chief complaint of dizziness  The patient has significant history of bipolar disorder and appears manic right now, she is denying any chest pain or shortness of breath  She states that over the past week she has been having urinary hesitancy and dysuria  She has had some urinary incontinence  She has a rash on her buttocks and excoriations  She states that after sitting on the commode and urinating she felt dizzy  She called EMS who brought her to the emergency room for evaluation  At which point the patient did not have any specific complaints however after laboratory evaluation the patient is found have a troponin of 2 4  EKG and repeat EKG were personally reviewed and showed no ischemic changes or conduction abnormalities        ED Vital Signs:   ED Triage Vitals   Temperature Pulse Respirations Blood Pressure SpO2   10/29/18 0500 10/29/18 0431 10/29/18 0431 10/29/18 0431 10/29/18 0431   98 2 °F (36 8 °C) 90 20 113/72 92 %      Temp Source Heart Rate Source Patient Position - Orthostatic VS BP Location FiO2 (%)   10/29/18 0431 10/29/18 0431 10/29/18 0431 10/29/18 0431 --   Oral Monitor Lying Left arm       Pain Score       10/29/18 0431       No Pain        Wt Readings from Last 1 Encounters:   10/30/18 109 kg (240 lb 15 4 oz)       Vital Signs (abnormal): RR 24, BP 87/55    Abnormal Labs/Diagnostic Test Results:   Lab Units 10/30/18  0428 10/29/18  0525   WBC Thousand/uL 7 27 12 48*   HEMOGLOBIN g/dL 11 3* 12 3   HEMATOCRIT % 32 6* 35 5   PLATELETS Thousands/uL 478* 562*      Lab Units 10/30/18  0428 10/29/18  0525   SODIUM mmol/L 135* 134*   POTASSIUM mmol/L 3 3* 3 7   CHLORIDE mmol/L 99* 98*   CO2 mmol/L 27 25   BUN mg/dL 11 10   CREATININE mg/dL 0 77 0 70   CALCIUM mg/dL 8 0* 7 9*   ALK PHOS U/L 45* 54   ALT U/L 14 20   AST U/L 21 28      Ref Range & Units 10/29/18 0525   Troponin I <=0 04 ng/mL 2 60       CT head 10/29 -- No acute intracranial abnormality  Microangiopathic changes    CXR  10/30 -- No acute cardiopulmonary disease  Echo     ED Treatment:   Medication Administration from 10/29/2018 0430 to 10/29/2018 1159       Date/Time Order Dose Route Action 10/29/2018 3158 aspirin chewable tablet 324 mg 324 mg Oral Given     10/29/2018 0667 metoprolol tartrate (LOPRESSOR) tablet 25 mg 25 mg Oral Given     10/29/2018 0639 dextrose 50 % IV solution 25 mL 25 mL Intravenous Given     10/29/2018 0758 magnesium sulfate 2 g/50 mL IVPB (premix) 2 g 2 g Intravenous New Bag     10/29/2018 0928 dextrose 50 % IV solution 50 mL 50 mL Intravenous Given       Past Medical/Surgical History:    Active Ambulatory Problems     Diagnosis Date Noted    Asthma 04/07/2016    Bipolar disorder ([place] [address] ) 11/08/2007    Type 2 diabetes mellitus with hyperglycemia, with long-term current use of insulin ([place] [address] ) 10/21/2014    Dermatitis 11/21/2007    Hypothyroidism 11/08/2007    Obesity, morbid (more than 100 lbs over ideal weight or BMI > 40) ([place] [address] ) 06/15/2010    Seizures ([place] [address] ) 06/11/2014     Past Medical History:   Diagnosis Date    Asthma     Bipolar disorder ([place] [address] )     Chronic UTI (urinary tract infection)     [facility]     Diabetes mellitus ([facility])     Disease of thyroid gland     GERD (gastroesophageal reflux disease)     Seizure ([facility])        Admitting Diagnosis: Dizziness [R42]  Dermatitis [L30 9]  NSTEMI (non-ST elevated myocardial infarction) ([place] [address] ) [I21 4]    Age/Sex: 66 y o  female    Assessment/Plan:          NSTEMI (non-ST elevated myocardial infarction) ([place] [address] )   Assessment & Plan     This might represent a type 2 in the setting of acute cystitis  However will continue heparin drip until evaluation by Cardiology  Obtain 2D echocardiogram  Continue to trend troponin  Patient received aspirin  Obtain fasting lipid panel and hemoglobin A1c      Volume overload   Assessment & Plan     Patient likely has underlying congestive heart failure  She has bilateral lower extremity edema and is on home Lasix  Will give Lasix 40 mg IV x1  Obtain 2D echocardiogram to assess if this is systolic versus diastolic      Ambulatory dysfunction   Assessment & Plan   Patient may require short-term rehab  Will monitor times 24 hours  Encouraged to ambulate with PT and OT           Continue doxycycline to treat acute cystitis         Admission Orders:  Scheduled Meds:   acetaminophen 650 mg Oral Q6H PRN   aspirin 81 mg Oral Daily   atorvastatin 40 mg Oral Daily With Dinner   doxycycline hyclate 100 mg Oral Q12H [address]   [START ON 10/31/2018] furosemide 40 mg Oral Daily   gabapentin 800 mg Oral 4x Daily   glucagon 2 mg Intravenous Once   hydrOXYzine HCL 50 mg Oral TID   insulin glargine 10 Units Subcutaneous HS   insulin lispro 1-6 Units Subcutaneous TID AC   levETIRAcetam 750 mg Oral BID   levothyroxine 175 mcg Oral Early Morning   lisinopril 10 mg Oral Daily   menthol-zinc oxide  Topical BID   metoprolol succinate 25 mg Oral Daily   oxybutynin 5 mg Oral TID   oxyCODONE 10 mg Oral Q4H PRN   oxyCODONE 5 mg Oral Q4H PRN   pantoprazole 40 mg Oral Daily Before Breakfast   phenytoin 200 mg Oral After Lunch   phenytoin 300 mg Oral QAM   QUEtiapine 400 mg Oral BID   valACYclovir 500 mg Oral BID   zonisamide 200 mg Oral HS   zonisamide 300 mg Oral QAM     Telem  Serial trops -- 1 53-->1 18-->1 25  Echo  Cardiac diet  Fingerstick glucose checks ac & hs -- 48, 73, 72, 91, 165, 41, 101, 155  I&O's  SCD's  Up with assist  Cardiology consult  Wound care consult  PT/OT evals      Cardiology consult --  Assessment/ Plan: It is in the setting a troponin was elevated and a cardiology consultation has been sought    She denies any cardiac symptoms of chest pain or shortness of breath  Her echocardiogram was personally reviewed  She does appear to have akinesis of her inferior, inferoseptal and inferolateral walls with probable underlying coronary disease    At this point given she is asymptomatic no further inpatient testing will be pursued  This likely represents a type 2 non STEMI in the setting of acute cystitis  She is on an appropriate medication regimen of an aspirin and statin   Her lipids are acceptable on current therapy  I will discontinue her IV heparin and initiate a low-dose beta-blocker    She does not appear volume overloaded on exam and I will transition her back to an oral Lasix regimen  Consult wound care re perineal tx for dysruria, frequency & hesitancy with c/o sitting in urine soaked bed  Plan:    1  Hydraguard barrier cream to buttocks/perineal areas Q shift/PRN  2   Skin nourishing cream to bilateral thighs Q shift    PT/OT consult -- recommend IP short term rehab

## 2018-10-30 NOTE — PHYSICAL THERAPY NOTE
PT treatment note      10/30/18 0933   Restrictions/Precautions   Other Precautions (Bed Alarm;Multiple lines;Telemetry; Fall Risk;Pain)   Subjective   Subjective States her legs feel weak  Agreeable to OOB to chair  Feels her legs may give out with ambulation  Bed Mobility   Supine to Sit 4  Minimal assistance   Additional items Assist x 1;HOB elevated; Bedrails; Increased time required;Verbal cues   Transfers   Sit to Stand 4  Minimal assistance   Additional items Assist x 1;Bedrails; Increased time required;Verbal cues   Stand to Sit 4  Minimal assistance   Additional items Assist x 1;Verbal cues;Armrests   Stand pivot 4  Minimal assistance   Additional items Verbal cues   Ambulation/Elevation   Gait Assistance 4  Minimal assist   Additional items Assist x 1   Assistive Device Rolling walker   Distance 3' bed to chair   Balance   Static Sitting Good   Dynamic Sitting Fair +   Static Standing Fair   Dynamic Standing Fair   Ambulatory Fair -  (with RW)   Endurance Deficit   Endurance Deficit Yes   Endurance Deficit Description limited activity tolerance due to  complaint of weakness   Activity Tolerance   Activity Tolerance Patient limited by fatigue   Exercises   Hip Flexion 15 reps; Sitting   Hip Abduction Sitting;15 reps   Hip Adduction Sitting;15 reps   Knee AROM Long Arc Quad Sitting;15 reps   Ankle Pumps Sitting;15 reps   Assessment   Prognosis Good   Problem List Decreased strength;Decreased endurance; Impaired balance;Decreased mobility; Decreased safety awareness; Impaired judgement   Assessment performing bed mobility, tx/ambulation at (min) x 1 level o function  Verbal cues to stay on task  and safety  Limited ambulation 2* weakness LE's  Pt is in need of continued activity in PT to improve strength safety balance endurance mobility transfers and ambulation with RW to return to (S) to (I) level of function  Plan   Treatment/Interventions Functional transfer training;LE strengthening/ROM; Therapeutic exercise; Endurance training;Bed mobility;Gait training   Progress Slow progress, decreased activity tolerance   Recommendation   Recommendation (Home PT; Short-term skilled PT (based on progress in PT))   Pt  OOB in chair with call bell within reach and alarm on at end of PT session  Discussed with Pavithra Jones, PT today's treatment and patient's current level of function for care coordination

## 2018-10-30 NOTE — PLAN OF CARE
Problem: PHYSICAL THERAPY ADULT  Goal: Performs mobility at highest level of function for planned discharge setting  See evaluation for individualized goals  Outcome: Progressing  Prognosis: Good  Problem List: Decreased strength, Decreased endurance, Impaired balance, Decreased mobility, Decreased safety awareness, Impaired judgement  Assessment: performing bed mobility, tx/ambulation at (min) x 1 level o function  Verbal cues to stay on task  and safety  Limited ambulation 2* weakness LE's  Pt is in need of continued activity in PT to improve strength safety balance endurance mobility transfers and ambulation with RW to return to (S) to (I) level of function  Barriers to Discharge: Decreased caregiver support     Recommendation: Home PT, Short-term skilled PT (based on progress in PT)     PT - OK to Discharge: Yes (when medically stable for discharge)    See flowsheet documentation for full assessment

## 2018-10-30 NOTE — PROGRESS NOTES
Progress Note - Florentin Jules 1955, 66 y o  female MRN: [id number]    Unit/Bed#: 405-01 Encounter: [id number]    Primary Care Provider: DEIDRE Chavarria   Date and time admitted to hospital: 10/29/2018  4:30 AM        * NSTEMI (non-ST elevated myocardial infarction) [facility])   Assessment & Plan    Type 2 non ST elevation MI secondary to acute cystitis  Troponin peak  Consultation with Cardiology was obtained and I appreciate the input  Patient will be requiring outpatient follow-up with Cardiology     Ambulatory dysfunction   Assessment & Plan    Patient may require short-term rehab  Will monitor times 24 hours  Encouraged to ambulate with PT and OT     Volume overload   Assessment & Plan    No evidence of systolic or diastolic dysfunction exam  No evidence of pulmonary hypertension on echocardiogram  Cardiology input noted  Patient placed back on her baseline Lasix  Continue monitor daily weights, intake and output       Seizures ([facility] [address] )   Assessment & Plan    Continue Dilantin   Dilantin level slightly low  Continue to monitor encourage outpatient follow-up with primary neurologist     Hypothyroidism   Assessment & Plan    Continue Synthroid and check TSH     Type 2 diabetes mellitus with hyperglycemia, with long-term current use of insulin [facility])   Assessment & Plan    Lab Results   Component Value Date    HGBA1C 5 9 04/10/2018       Recent Labs      10/29/18   0922   POCGLU  48*       Blood Sugar Average: Last 72 hrs:  (P) 48   Hold metformin  Continue Lantus  Insulin sliding scale with Accu-Cheks AC and HS       Bipolar disorder ([facility] [address] )   Assessment & Plan    Continue outpatient antipsychotic medication         Progress Note - Florentin Jules 66 y o  female MRN: [id number]    Unit/Bed#: 405-01 Encounter: [id number]        Subjective:   Patient seen and examined  She feels weak today, no SOB or Cp    Objective:     Vitals:   Vitals:    10/30/18 0751   BP: 96/59   Pulse: 98   Resp: 18   Temp: 97 8 °F (36 6 °C) SpO2: 95%     Body mass index is 38 89 kg/m²      Intake/Output Summary (Last 24 hours) at 10/30/18 1004  Last data filed at 10/30/18 0530   Gross per 24 hour   Intake           414 91 ml   Output             2350 ml   Net         -1935 09 ml       Physical Exam:   BP 96/59 (BP Location: Left arm)   Pulse 98   Temp 97 8 °F (36 6 °C) (Temporal)   Resp 18   Ht 5' 6" (1 676 m)   Wt 109 kg (240 lb 15 4 oz)   LMP  (LMP Unknown)   SpO2 95%   BMI 38 89 kg/m²   General appearance: alert  Head: Normocephalic, without obvious abnormality, atraumatic  Lungs: clear to auscultation bilaterally  Heart: regular rate and rhythm, S1, S2 normal, no murmur, click, rub or gallop  Abdomen: soft, non-tender; bowel sounds normal; no masses,  no organomegaly  Extremities: extremities normal, warm and well-perfused; no cyanosis, clubbing, or edema  Pulses: 2+ and symmetric  Neurologic: Grossly normal     Invasive Devices     Peripheral Intravenous Line            Peripheral IV 10/30/18 Left Wrist less than 1 day                  Results from last 7 days  Lab Units 10/30/18  0428 10/29/18  0525   WBC Thousand/uL 7 27 12 48*   HEMOGLOBIN g/dL 11 3* 12 3   HEMATOCRIT % 32 6* 35 5   PLATELETS Thousands/uL 478* 562*         Results from last 7 days  Lab Units 10/30/18  0428 10/29/18  0525   SODIUM mmol/L 135* 134*   POTASSIUM mmol/L 3 3* 3 7   CHLORIDE mmol/L 99* 98*   CO2 mmol/L 27 25   BUN mg/dL 11 10   CREATININE mg/dL 0 77 0 70   CALCIUM mg/dL 8 0* 7 9*   ALK PHOS U/L 45* 54   ALT U/L 14 20   AST U/L 21 28       Medication Administration - last 24 hours from 10/29/2018 1004 to 10/30/2018 1004       Date/Time Order Dose Route Action Action by     10/29/2018 1656 heparin (ACS LOW)   Intravenous Not Given Susy Arce RN     10/29/2018 2008 hydrOXYzine HCL (ATARAX) tablet 50 mg 50 mg Oral Given Susy Arce RN     10/29/2018 1643 hydrOXYzine HCL (ATARAX) tablet 50 mg 50 mg Oral Given Susy Arce RN     10/29/2018 7221 insulin glargine (LANTUS) subcutaneous injection 10 Units 0 1 mL 10 Units Subcutaneous Given [facility]     10/30/2018 8192 levothyroxine tablet 175 mcg 175 mcg Oral Given Lavern Bloch, RN     10/29/2018 1426 lisinopril (ZESTRIL) tablet 10 mg 10 mg Oral Given Marlou Limb, RN     10/30/2018 2096 pantoprazole (PROTONIX) EC tablet 40 mg 40 mg Oral Given Lavern Bloch, RN     10/29/2018 1736 QUEtiapine (SEROquel) tablet 400 mg 400 mg Oral Given [facility]     10/29/2018 1443 QUEtiapine (SEROquel) tablet 400 mg 400 mg Oral Not Given Marlou Limb, RN     10/29/2018 1643 atorvastatin (LIPITOR) tablet 40 mg 40 mg Oral Given [facility], RN     10/29/2018 2202 gabapentin (NEURONTIN) capsule 800 mg 800 mg Oral Given [facility], RN     10/29/2018 1736 gabapentin (NEURONTIN) capsule 800 mg 800 mg Oral Given [facility], RN     10/29/2018 1426 gabapentin (NEURONTIN) capsule 800 mg 800 mg Oral Given Marlou Limb, RN     10/29/2018 1438 levETIRAcetam (KEPPRA) tablet 1,500 mg 1,500 mg Oral Not Given Marlou Limb, RN     10/29/2018 2010 oxybutynin (DITROPAN) tablet 5 mg 5 mg Oral Given [facility], RN     10/29/2018 1443 oxybutynin (DITROPAN) tablet 5 mg 5 mg Oral Given Marlou Limb, RN     10/29/2018 1614 phenytoin (DILANTIN) ER capsule 100 mg 100 mg Oral Not Given [facility], RN     10/29/2018 1736 valACYclovir (VALTREX) tablet 500 mg 500 mg Oral Given [facility]     10/29/2018 1426 valACYclovir (VALTREX) tablet 500 mg 500 mg Oral Given Marlou Limb, RN     10/30/2018 0849 furosemide (LASIX) injection 40 mg 40 mg Intravenous Given Idelia Severe, RN     10/29/2018 1423 furosemide (LASIX) injection 40 mg 40 mg Intravenous Given Marlou Limb, RN     10/30/2018 0815 insulin lispro (HumaLOG) 100 units/mL subcutaneous injection 1-6 Units 1 Units Subcutaneous Not Given Idelia Severe, RN     10/29/2018 1639 insulin lispro (HumaLOG) 100 units/mL subcutaneous injection 1-6 Units 1 Units Subcutaneous Not Given Cassandra Alfonso RN     10/29/2018 1424 insulin lispro (HumaLOG) 100 units/mL subcutaneous injection 1-6 Units 1 Units Subcutaneous Not Given Sadia Qiu RN     10/29/2018 2010 doxycycline hyclate (VIBRAMYCIN) capsule 100 mg 100 mg Oral Given Cassandra Alfonso RN     10/29/2018 1426 doxycycline hyclate (VIBRAMYCIN) capsule 100 mg 100 mg Oral Given Sadia Qiu RN     10/29/2018 1408 heparin (porcine) injection 4,000 Units 4,000 Units Intravenous Given [facility] BARB Qiu     10/30/2018 9532 heparin (porcine) 25,000 units in 250 mL infusion (premix) 0 Units/kg/hr Intravenous Stopped Gopi Phillips RN     10/29/2018 2300 heparin (porcine) 25,000 units in 250 mL infusion (premix) 15 1 Units/kg/hr Intravenous Rate/Dose Verify Melisa Solis RN     10/29/2018 2211 heparin (porcine) 25,000 units in 250 mL infusion (premix) 15 1 Units/kg/hr Intravenous Rate/Dose Change Cassandra Alfonso RN     10/29/2018 1657 heparin (porcine) 25,000 units in 250 mL infusion (premix)   Intravenous Canceled Entry Cassandra Alfonso RN     10/29/2018 1648 heparin (porcine) 25,000 units in 250 mL infusion (premix)   Intravenous Canceled Entry Cassandra Alfonso RN     10/29/2018 1539 heparin (porcine) 25,000 units in 250 mL infusion (premix) 11 1 Units/kg/hr Intravenous Rate/Dose Verify Cassandra Alfonso RN     10/29/2018 1410 heparin (porcine) 25,000 units in 250 mL infusion (premix) 11 1 Units/kg/hr Intravenous [address]     10/29/2018 2209 heparin (porcine) injection 4,000 Units 4,000 Units Intravenous Given Cassandra Alfonso RN     10/29/2018 1425 phenytoin (DILANTIN) ER capsule 200 mg 200 mg Oral Given Sadia Qiu RN     10/29/2018 2159 zonisamide (ZONEGRAN) capsule 200 mg 200 mg Oral Not Given Cassandra Alfonso RN     10/29/2018 1736 levETIRAcetam (KEPPRA) tablet 750 mg 750 mg Oral Given Cassandra Alfonso RN     10/29/2018 2010 menthol-zinc oxide (CALMOSEPTINE) 0 44-20 6 % ointment   Topical Given Cassandra Alfonso RN     10/30/2018 5215 dextrose 50 % IV solution 25 mL 25 mL Intravenous Given Annia Tobin RN     10/30/2018 3044 glucagon (GLUCAGEN) injection 2 mg 2 mg Intravenous Not Given Annia Tobin RN            Lab, Imaging and other studies: I have personally reviewed pertinent reports      VTE Pharmacologic Prophylaxis: Enoxaparin (Lovenox)  VTE Mechanical Prophylaxis: sequential compression device     Martin Van MD  10/30/2018,10:04 AM

## 2018-10-30 NOTE — NURSING NOTE
Notified by lab glucose -31  Patient checked & alert & oriented , no s/s distress  OJ, crackers/ peanut butter given  Repeat blood sugar 41, patient remains asymptomatic--MAE Gonzalez made aware--orders obtained to give either 1/2 amp glucose if the IV holds up or give glucagon  Patient was given the 1/2amp glucose &  Repeat blood sugar--101

## 2018-10-30 NOTE — PLAN OF CARE
Problem: DISCHARGE PLANNING - CARE MANAGEMENT  Goal: Discharge to post-acute care or home with appropriate resources  INTERVENTIONS:  - Conduct assessment to determine patient/family and health care team treatment goals, and need for post-acute services based on payer coverage, community resources, and patient preferences, and barriers to discharge  - Address psychosocial, clinical, and financial barriers to discharge as identified in assessment in conjunction with the patient/family and health care team  - Arrange appropriate level of post-acute services according to patient's   needs and preference and payer coverage in collaboration with the physician and health care team  - Communicate with and update the patient/family, physician, and health care team regarding progress on the discharge plan  - Arrange appropriate transportation to post-acute venues  Outcome: Progressing  -homecare/home PT on dc  -outpatient follow up

## 2018-10-30 NOTE — SOCIAL WORK
Cm met with the patient to evaluate the patients prior function and living situation and any barriers to d/c and form a safe d/c plan  Cm also evaluated the patient for any services in the home or needs for services  Pt resides at home alone in an apartment in the [facility]  Pt has a friend or neighbor who assist her as needed and do the driving  Re: DME has a walker, manual wc, cane and a lift chair at home  Pt has homeheah care coming in (CM trying to find out the agency)  PCP is Milka Mckeon and phalashay Daigle  Plans at this time are home on dc with homecare/home PT  Unable to refer pt to the outpatient care coordination dept as [facility]

## 2018-10-30 NOTE — CONSULTS
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 Visipaque may be considered  2  Dilated main pulmonary artery suggesting pulmonary artery hypertension  3  No acute intra-abdominal abnormality  4   Mild hepatomegaly  Workstation performed: [id number]     Vas Lower Limb Venous Duplex Study, Complete Bilateral    Result Date: 10/7/2018  Narrative:  [facility] REPORT [facility]INICAL: Indications: Patient presents with bilateral limb pain and swelling  She also has redness of the calves  She states a history of DVT many years ago and currently takes a daily aspirin  Risk Factors The patient has no history of Recent Trauma  FINDINGS:  Segment  Right            Left              Impression       Impression       CFV      Normal (Patent)  Normal (Patent)     CONCLUSION:  Impression: RIGHT LOWER LIMB: No evidence of acute or chronic deep vein thrombosis  No evidence of superficial thrombophlebitis noted  Doppler evaluation shows a normal response to augmentation maneuvers  Popliteal, posterior tibial and anterior tibial arterial Doppler waveforms are triphasic    LEFT LOWER LIMB: No evidence of acute or chronic deep vein thrombosis  No evidence of superficial thrombophlebitis noted  Doppler evaluation shows a normal response to augmentation maneuvers  Popliteal, posterior tibial and anterior tibial arterial Doppler waveforms are triphasic  Non- visualization of the peroneal veins due to edema  Technical findings were given to Dr Rashard Brown  SIGNATURE: Electronically Signed by: She Matthews on 2018-10-07 03:29:49 PM      EKG reviewed personally: Normal sinus rhythm, left axis deviation, low voltage, septal infarct     Counseling / Coordination of Care  Total floor / unit time spent today 60 minutes  Greater than 50% of total time was spent with the patient and / or family counseling and / or coordination of care         Code Status: Level 1 - Full Code

## 2018-10-30 NOTE — ASSESSMENT & PLAN NOTE
Patient may require short-term rehab  Will monitor times 24 hours  Encouraged to ambulate with PT and OT

## 2018-10-31 PROBLEM — N39.0 E. COLI UTI: Status: ACTIVE | Noted: 2018-10-31

## 2018-10-31 PROBLEM — E87.6 HYPOKALEMIA: Status: ACTIVE | Noted: 2018-10-31

## 2018-10-31 PROBLEM — I10 ESSENTIAL HYPERTENSION: Chronic | Status: ACTIVE | Noted: 2018-10-31

## 2018-10-31 PROBLEM — B96.20 E. COLI UTI: Status: ACTIVE | Noted: 2018-10-31

## 2018-10-31 PROBLEM — E78.5 DYSLIPIDEMIA: Chronic | Status: ACTIVE | Noted: 2018-10-31

## 2018-10-31 PROBLEM — E66.09 OBESITY DUE TO EXCESS CALORIES: Chronic | Status: ACTIVE | Noted: 2018-10-31

## 2018-10-31 LAB
ANION GAP SERPL CALCULATED.3IONS-SCNC: 9 MMOL/L (ref 4–13)
BASOPHILS # BLD AUTO: 0.04 THOUSANDS/ΜL (ref 0–0.1)
BASOPHILS NFR BLD AUTO: 1 % (ref 0–1)
BUN SERPL-MCNC: 17 MG/DL (ref 5–25)
CALCIUM SERPL-MCNC: 8 MG/DL (ref 8.3–10.1)
CHLORIDE SERPL-SCNC: 102 MMOL/L (ref 100–108)
CO2 SERPL-SCNC: 26 MMOL/L (ref 21–32)
CREAT SERPL-MCNC: 0.88 MG/DL (ref 0.6–1.3)
EOSINOPHIL # BLD AUTO: 0.23 THOUSAND/ΜL (ref 0–0.61)
EOSINOPHIL NFR BLD AUTO: 4 % (ref 0–6)
ERYTHROCYTE [DISTWIDTH] IN BLOOD BY AUTOMATED COUNT: 13.6 % (ref 11.6–15.1)
GFR SERPL CREATININE-BSD FRML MDRD: 71 ML/MIN/1.73SQ M
GLUCOSE SERPL-MCNC: 154 MG/DL (ref 65–140)
GLUCOSE SERPL-MCNC: 156 MG/DL (ref 65–140)
GLUCOSE SERPL-MCNC: 160 MG/DL (ref 65–140)
GLUCOSE SERPL-MCNC: 82 MG/DL (ref 65–140)
GLUCOSE SERPL-MCNC: 83 MG/DL (ref 65–140)
HCT VFR BLD AUTO: 31.1 % (ref 34.8–46.1)
HGB BLD-MCNC: 10.7 G/DL (ref 11.5–15.4)
IMM GRANULOCYTES # BLD AUTO: 0.04 THOUSAND/UL (ref 0–0.2)
IMM GRANULOCYTES NFR BLD AUTO: 1 % (ref 0–2)
LYMPHOCYTES # BLD AUTO: 2.3 THOUSANDS/ΜL (ref 0.6–4.47)
LYMPHOCYTES NFR BLD AUTO: 41 % (ref 14–44)
MAGNESIUM SERPL-MCNC: 1.6 MG/DL (ref 1.6–2.6)
MCH RBC QN AUTO: 33.3 PG (ref 26.8–34.3)
MCHC RBC AUTO-ENTMCNC: 34.4 G/DL (ref 31.4–37.4)
MCV RBC AUTO: 97 FL (ref 82–98)
MONOCYTES # BLD AUTO: 0.66 THOUSAND/ΜL (ref 0.17–1.22)
MONOCYTES NFR BLD AUTO: 12 % (ref 4–12)
NEUTROPHILS # BLD AUTO: 2.41 THOUSANDS/ΜL (ref 1.85–7.62)
NEUTS SEG NFR BLD AUTO: 41 % (ref 43–75)
NRBC BLD AUTO-RTO: 0 /100 WBCS
PLATELET # BLD AUTO: 365 THOUSANDS/UL (ref 149–390)
PMV BLD AUTO: 8.8 FL (ref 8.9–12.7)
POTASSIUM SERPL-SCNC: 3.4 MMOL/L (ref 3.5–5.3)
RBC # BLD AUTO: 3.21 MILLION/UL (ref 3.81–5.12)
SODIUM SERPL-SCNC: 137 MMOL/L (ref 136–145)
WBC # BLD AUTO: 5.68 THOUSAND/UL (ref 4.31–10.16)

## 2018-10-31 PROCEDURE — 99232 SBSQ HOSP IP/OBS MODERATE 35: CPT | Performed by: INTERNAL MEDICINE

## 2018-10-31 PROCEDURE — 82948 REAGENT STRIP/BLOOD GLUCOSE: CPT

## 2018-10-31 PROCEDURE — 80048 BASIC METABOLIC PNL TOTAL CA: CPT | Performed by: FAMILY MEDICINE

## 2018-10-31 PROCEDURE — 85025 COMPLETE CBC W/AUTO DIFF WBC: CPT | Performed by: FAMILY MEDICINE

## 2018-10-31 PROCEDURE — 97530 THERAPEUTIC ACTIVITIES: CPT

## 2018-10-31 PROCEDURE — 97535 SELF CARE MNGMENT TRAINING: CPT

## 2018-10-31 PROCEDURE — 83735 ASSAY OF MAGNESIUM: CPT | Performed by: FAMILY MEDICINE

## 2018-10-31 RX ORDER — LISINOPRIL 5 MG/1
5 TABLET ORAL DAILY
Status: DISCONTINUED | OUTPATIENT
Start: 2018-11-01 | End: 2018-11-01 | Stop reason: HOSPADM

## 2018-10-31 RX ORDER — METOPROLOL SUCCINATE 25 MG/1
25 TABLET, EXTENDED RELEASE ORAL DAILY
Status: DISCONTINUED | OUTPATIENT
Start: 2018-10-31 | End: 2018-11-01 | Stop reason: HOSPADM

## 2018-10-31 RX ORDER — POTASSIUM CHLORIDE 20MEQ/15ML
40 LIQUID (ML) ORAL ONCE
Status: COMPLETED | OUTPATIENT
Start: 2018-10-31 | End: 2018-10-31

## 2018-10-31 RX ORDER — MAGNESIUM SULFATE 1 G/100ML
1 INJECTION INTRAVENOUS ONCE
Status: COMPLETED | OUTPATIENT
Start: 2018-10-31 | End: 2018-10-31

## 2018-10-31 RX ORDER — LISINOPRIL 5 MG/1
5 TABLET ORAL DAILY
Status: DISCONTINUED | OUTPATIENT
Start: 2018-10-31 | End: 2018-10-31

## 2018-10-31 RX ORDER — FUROSEMIDE 20 MG/1
20 TABLET ORAL DAILY
Status: DISCONTINUED | OUTPATIENT
Start: 2018-11-01 | End: 2018-11-01 | Stop reason: HOSPADM

## 2018-10-31 RX ADMIN — VALACYCLOVIR HYDROCHLORIDE 500 MG: 500 TABLET, FILM COATED ORAL at 17:45

## 2018-10-31 RX ADMIN — DOXYCYCLINE HYCLATE 100 MG: 100 CAPSULE ORAL at 21:21

## 2018-10-31 RX ADMIN — GABAPENTIN 800 MG: 400 CAPSULE ORAL at 09:36

## 2018-10-31 RX ADMIN — FUROSEMIDE 40 MG: 40 TABLET ORAL at 09:37

## 2018-10-31 RX ADMIN — LISINOPRIL 5 MG: 5 TABLET ORAL at 09:36

## 2018-10-31 RX ADMIN — OXYBUTYNIN CHLORIDE 5 MG: 5 TABLET ORAL at 17:46

## 2018-10-31 RX ADMIN — MAGNESIUM SULFATE HEPTAHYDRATE 1 G: 1 INJECTION, SOLUTION INTRAVENOUS at 14:52

## 2018-10-31 RX ADMIN — DOXYCYCLINE HYCLATE 100 MG: 100 CAPSULE ORAL at 09:37

## 2018-10-31 RX ADMIN — METOPROLOL SUCCINATE 25 MG: 25 TABLET, EXTENDED RELEASE ORAL at 09:34

## 2018-10-31 RX ADMIN — INSULIN GLARGINE 10 UNITS: 100 INJECTION, SOLUTION SUBCUTANEOUS at 21:21

## 2018-10-31 RX ADMIN — OXYBUTYNIN CHLORIDE 5 MG: 5 TABLET ORAL at 09:35

## 2018-10-31 RX ADMIN — ZONISAMIDE 300 MG: 100 CAPSULE ORAL at 09:36

## 2018-10-31 RX ADMIN — ATORVASTATIN CALCIUM 40 MG: 40 TABLET, FILM COATED ORAL at 17:46

## 2018-10-31 RX ADMIN — OXYBUTYNIN CHLORIDE 5 MG: 5 TABLET ORAL at 21:21

## 2018-10-31 RX ADMIN — ASPIRIN 81 MG: 81 TABLET, COATED ORAL at 09:34

## 2018-10-31 RX ADMIN — POTASSIUM CHLORIDE 40 MEQ: 20 SOLUTION ORAL at 14:51

## 2018-10-31 RX ADMIN — GABAPENTIN 800 MG: 400 CAPSULE ORAL at 17:45

## 2018-10-31 RX ADMIN — LEVETIRACETAM 750 MG: 500 TABLET ORAL at 17:44

## 2018-10-31 RX ADMIN — LEVETIRACETAM 750 MG: 500 TABLET ORAL at 09:36

## 2018-10-31 RX ADMIN — VALACYCLOVIR HYDROCHLORIDE 500 MG: 500 TABLET, FILM COATED ORAL at 09:34

## 2018-10-31 RX ADMIN — INSULIN LISPRO 1 UNITS: 100 INJECTION, SOLUTION INTRAVENOUS; SUBCUTANEOUS at 11:47

## 2018-10-31 RX ADMIN — GABAPENTIN 800 MG: 400 CAPSULE ORAL at 12:37

## 2018-10-31 RX ADMIN — PHENYTOIN SODIUM 300 MG: 100 CAPSULE ORAL at 09:35

## 2018-10-31 RX ADMIN — OXYCODONE HYDROCHLORIDE 5 MG: 5 TABLET ORAL at 09:37

## 2018-10-31 RX ADMIN — HYDROXYZINE HYDROCHLORIDE 50 MG: 25 TABLET, FILM COATED ORAL at 09:37

## 2018-10-31 RX ADMIN — LEVOTHYROXINE SODIUM 175 MCG: 175 TABLET ORAL at 05:43

## 2018-10-31 RX ADMIN — ZONISAMIDE 200 MG: 100 CAPSULE ORAL at 21:22

## 2018-10-31 RX ADMIN — PHENYTOIN SODIUM 200 MG: 100 CAPSULE ORAL at 14:50

## 2018-10-31 RX ADMIN — QUETIAPINE FUMARATE 400 MG: 100 TABLET ORAL at 09:35

## 2018-10-31 RX ADMIN — QUETIAPINE FUMARATE 400 MG: 100 TABLET ORAL at 17:46

## 2018-10-31 RX ADMIN — HYDROXYZINE HYDROCHLORIDE 50 MG: 25 TABLET, FILM COATED ORAL at 17:45

## 2018-10-31 RX ADMIN — PANTOPRAZOLE SODIUM 40 MG: 40 TABLET, DELAYED RELEASE ORAL at 06:01

## 2018-10-31 NOTE — NURSING NOTE
Nursing supervisor aware of assisted fall  Patient stated she has no family to notify about the fall    At this time she has no c/o discomfort or pain

## 2018-10-31 NOTE — NURSING NOTE
At 2135 Patient had experienced an assisted fall while transferring from the bed side commode back to bed  PCA reports that patient stood from the bedside commode and was leaning on her walker facing the bed  The patient stated that she felt dizzy and started leaning forward  At that time, the PCA moved the bed toward the patient, the patient leaned her head against the bed and then slowly went to her knees  Patient was assisted to a sitting position an then laid on her back on a blanket on the floor with a pillow under her head  Tena Dinero was notified at this time and assessed her for injury  Patients knees were red and she complained of them being tender  BL knee xrays were ordered     Patient was transferred back to bed for further assessment by use of the hover hever  She has no further complaints  She is able to bend her knees and no swelling is noted  Bed is in low and locked position and patient states she is comfortable in bed    Will continue to monitor

## 2018-10-31 NOTE — PROGRESS NOTES
Patient called for the bathroom  Refused to attempt to ambulate to the bathroom  She stated that she fell the other day and is afraid to walk  Attempted to stand patient and assist to [facility] without success

## 2018-10-31 NOTE — PLAN OF CARE
Problem: OCCUPATIONAL THERAPY ADULT  Goal: Performs self-care activities at highest level of function for planned discharge setting  See evaluation for individualized goals  Treatment Interventions: ADL retraining, Functional transfer training, UE strengthening/ROM, Endurance training, Patient/family training, Activityengagement          See flowsheet documentation for full assessment, interventions and recommendations  Outcome: Progressing  Limitation: Decreased ADL status, Decreased UE strength, Decreased Safe judgement during ADL, Decreased endurance, Decreased self-care trans, Decreased high-level ADLs     Assessment: Pt  presented with multiple deficits affecting overal functional performance on initial assessment  Completes self care and bed mobility as per above, requires multiple cues to continue to focus on task at hand  Recommend continue active OT services to further address OT goals and facilitate return to highest LOF       OT Discharge Recommendation: Short Term Rehab

## 2018-10-31 NOTE — PROGRESS NOTES
[facility] Progress Note  Patient: May Calles 66 y o  female   MRN: [id number]  PCP: DEIDRE Serna  Unit/Bed#: 226-84 Encounter: [id number]  Date Of Visit: 10/31/18    Assessment:    Principal Problem:    NSTEMI (non-ST elevated myocardial infarction) [facility])  Active Problems:    Bipolar disorder ([place] [address] )    Type 2 diabetes mellitus with hyperglycemia, with long-term current use of insulin (formerly [facility])    Hypothyroidism    Seizures ([place] [address] )    Skin breakdown    Ambulatory dysfunction    Hypokalemia    Obesity due to excess calories    E  coli UTI    Essential hypertension    Dyslipidemia      Plan:    · Acute non STEMI - ? Type 2  she denies previous cardiac catheterization, last cardiac stress test has been at least 2 years or more  · Cardiac risk factors including IDDM2, HTN, Dyslipidemia, Obesity  · Pansensitive E coli UTI - receiving doxycycline in light of documented intolerance to quinolones, penicillins, cephalosporins & Bactrim  · Bipolar disorder - ?polypharmacy  · Seizure disorder - Controlled, on keppra & dilantin  · Hypokalemia - repleted  · Hypothyroid - adequately replaced with synthroid, TSN wnl  · Ambulatory dysfunction - for home PT at discharge    -cardiology input appreciated  -continue aspirin, beta-blocker, high intensity statin, ACE inhibitor as tolerated  -considering patient's cardiac risk factors, recent MI, abnormal TTE & in light of her strong concerns, will obtain a nuclear stress test tomorrow prior to anticipated discharge with home health if negative  -otherwise she would require transfer to [facility] for cardiac catheterization if stress test positive  -check free Dilantin& gabapentin levels  -patient advised to follow up with her usual neurologist and psychiatrist regarding medication adjustment due to suspected polypharmacy which may have contributed to her presenting complaints of generalized weakness and dizziness      VTE Pharmacologic Prophylaxis: Pharmacologic: Enoxaparin (Lovenox)  Mechanical VTE Prophylaxis in Place: Yes    Patient Centered Rounds: I have performed bedside rounds with nursing staff today  Current Length of Stay: 1 day(s)    Current Patient Status: Inpatient     Code Status: Level 1 - Full Code      Subjective:   Patient seen and examined this morning  Does not feel as weak today as she did yesterday  She is however quite concerned about recurrence of generalized weakness and dizziness if she returns home today  Denies today any chest pain, dyspnea, palpitations, lightheadedness or near-syncope  Objective:     Vitals:   Temp (24hrs), Av 9 °F (37 2 °C), Min:98 2 °F (36 8 °C), Max:100 °F (37 8 °C)    Temp:  [98 2 °F (36 8 °C)-100 °F (37 8 °C)] 98 2 °F (36 8 °C)  HR:  [] 104  Resp:  [18-20] 18  BP: ()/(51-99) 134/99  SpO2:  [92 %-96 %] 92 %  Body mass index is 38 89 kg/m²  Input and Output Summary (last 24 hours):        Intake/Output Summary (Last 24 hours) at 10/31/18 1454  Last data filed at 10/31/18 1446   Gross per 24 hour   Intake              880 ml   Output             1400 ml   Net             -520 ml       Physical Exam:   General: obese, in no overt resp distress  HEENT: oral mucosa moist, not pale, not jaundiced  Chest: clear lungs, no wheeze  Heart: S1 S2 audible, regular  Abd: soft, nontender, bowel sounds present  Psych: appropriately oriented in all spheres, blunt affect  Neuro: Awake, alert, CN 2-12 grossly intact, moves all extremities equally, essentially nonfocal      Additional Data:     Labs:      Results from last 7 days  Lab Units 10/31/18  0520   WBC Thousand/uL 5 68   HEMOGLOBIN g/dL 10 7*   HEMATOCRIT % 31 1*   PLATELETS Thousands/uL 365   NEUTROS PCT % 41*   LYMPHS PCT % 41   MONOS PCT % 12   EOS PCT % 4       Results from last 7 days  Lab Units 10/31/18  0519 10/30/18  0428   SODIUM mmol/L 137 135*   POTASSIUM mmol/L 3 4* 3 3*   CHLORIDE mmol/L 102 99*   CO2 mmol/L 26 27   BUN mg/dL 17 11   CREATININE mg/dL 0 88 0 77   CALCIUM mg/dL 8 0* 8 0*   ALK PHOS U/L  --  45*   ALT U/L  --  14   AST U/L  --  21       Results from last 7 days  Lab Units 10/29/18  1142   INR  1 04     Invalid input(s): TROIP    * I Have Reviewed All Lab Data Listed Above  * Additional Pertinent Lab Tests Reviewed:  Daniel 68 Admission Reviewed    Imaging:    Imaging Reports Reviewed Today Include: CT      Recent Cultures (last 7 days):       Results from last 7 days  Lab Units 10/26/18  1013   URINE CULTURE  >100,000 cfu/ml Escherichia coli*       Last 24 Hours Medication List:     Current Facility-Administered Medications:  acetaminophen 650 mg Oral Q6H PRN Zenaida Ochoa DO    aspirin 81 mg Oral Daily Mary Resendez MD    atorvastatin 40 mg Oral Daily With Paul Vargas MD    doxycycline hyclate 100 mg Oral Q12H [facility] Christa Sethi MD    furosemide 40 mg Oral Daily Delcia Born, DO    gabapentin 800 mg Oral 4x Daily Mary Resendez MD    glucagon 2 mg Intravenous Once Vito Zeng PA-C    hydrOXYzine HCL 50 mg Oral TID Christa Sethi MD    insulin glargine 10 Units Subcutaneous HS Christa Sethi MD    insulin lispro 1-6 Units Subcutaneous TID AC Mary Resendez MD    levETIRAcetam 750 mg Oral BID Christa Sethi MD    levothyroxine 175 mcg Oral Early Morning Mary Resendez MD    lisinopril 5 mg Oral Daily Delcia Born, DO    magnesium sulfate 1 g Intravenous Once Francis Steward MD Last Rate: 1 g (10/31/18 1452)   menthol-zinc oxide  Topical BID Zenaida Ochoa DO    metoprolol succinate 25 mg Oral Daily Delcia Born, DO    oxybutynin 5 mg Oral TID Christa Sethi MD    oxyCODONE 10 mg Oral Q4H PRN Zenaida Ochoa, DO    oxyCODONE 5 mg Oral Q4H PRN Zenaida Ochoa,     pantoprazole 40 mg Oral Daily Before Breakfast Mary Resendez MD    phenytoin 200 mg Oral After Brian Wei MD    phenytoin 300 mg Oral QAM Mary Resendez MD    QUEtiapine 400 mg Oral BID Mary Resendez MD    valACYclovir 500 mg Oral BID Mary Klaudia Mckeon MD    zonisamide 200 mg Oral HS Yordy Musa MD    zonisamide 300 mg Oral QAM Yordy Musa MD         Today, Patient Was Seen By: Murtaza Mendez MD    ** Please Note: Dragon 360 Dictation voice to text software may have been used in the creation of this document   **

## 2018-10-31 NOTE — PHYSICAL THERAPY NOTE
PT Treatment Note      10/31/18 4145   Restrictions/Precautions   Other Precautions (Bed Alarm;Multiple lines;Telemetry; Fall Risk)   Subjective   Subjective Needs to use BSC  c/o stomach rumbling   Bed Mobility   Additional Comments seated EOB at start of PT session   Transfers   Sit to Stand (CGA)   Additional items Assist x 2;Bedrails;Verbal cues   Stand to Sit (CGA)   Additional items Assist x 2;Armrests; Verbal cues   Toilet transfer (CGA)   Additional items Assist x 2;Armrests;Commode   Additional Comments incontinent standing at EOB for loose BM   Ambulation/Elevation   Gait pattern Short stride   Gait Assistance (CGA)   Additional items Assist x 2;Verbal cues   Assistive Device Rolling walker   Distance 2' to [facility] then 2' to chair  declined further ambulation 2* stomach bothering her, fearful of incontinence  Assessment   Prognosis Good   Problem List Decreased strength;Decreased endurance; Impaired balance;Decreased mobility; Decreased safety awareness; Impaired judgement;Obesity   Assessment Performing tx and short distance ambulation with CGA x2 for safety  Pt  limiting particpation 2* Bowel incontinence  No episodes of LOB  Stable gait with use of RW  No c/o feeling lightheaded during session  Pt is in need of continued activity in PT to improve strength safety balance endurance mobility transfers and ambulation with RW to return to (S) to (I) level of function  Plan   Treatment/Interventions Functional transfer training;LE strengthening/ROM; Therapeutic exercise;Cognitive reorientation; Bed mobility;Gait training   Progress Progressing toward goals   Recommendation   Recommendation Home PT   Pt  OOB in chair   with call bell within reach  and alarm on at end of PT session  Discussed with Ariel Arceo, PT today's treatment and patient's current level of function for care coordination

## 2018-10-31 NOTE — PROGRESS NOTES
Cardiology Progress Note - Veronika Young 66 y o  female MRN: [id number]    Unit/Bed#: 405-01 Encounter: [id number]      Assessment:  1  Type 2 NSTEMI  2  Acute cystitis  3  Probable underlying CAD with abnormal echo  4  Type 2 diabetes mellitus  5  Hypertension  6  Dyslipidemia  7  Obesity    Plan:  1  Asymptomatic cardiac wise  2  Continue aspirin, statin and beta-blocker  3  Decrease lisinopril to 5 mg daily as blood pressure on low side - not receiving medication, hold parameters changed  4  No further inpatient work up - outpatient follow-up arranged  5  [facility] will be available, please call with questions    Subjective:   Patient seen and examined  No significant events overnight  Objective:     Vitals: Blood pressure 103/57, pulse 96, temperature 98 2 °F (36 8 °C), temperature source Temporal, resp  rate 18, height 5' 6" (1 676 m), weight 109 kg (240 lb 15 4 oz), SpO2 92 %  , Body mass index is 38 89 kg/m² , Orthostatic Blood Pressures      Most Recent Value   Blood Pressure  103/57 filed at 10/31/2018 0740   Patient Position - Orthostatic VS  Lying filed at 10/31/2018 0740            Intake/Output Summary (Last 24 hours) at 10/31/18 0839  Last data filed at 10/31/18 5627   Gross per 24 hour   Intake             1060 ml   Output              400 ml   Net              660 ml         Physical Exam:    GEN: Veronika Young appears well, alert and oriented x 3, pleasant and cooperative   HEENT: pupils equal, round, and reactive to light; extraocular muscles intact  NECK: supple, no carotid bruits   HEART: regular rhythm, normal S1 and S2, no murmurs, clicks, gallops or rubs   LUNGS: clear to auscultation bilaterally; no wheezes, rales, or rhonchi   ABDOMEN: normal bowel sounds, soft, no tenderness, no distention  EXTREMITIES: peripheral pulses normal; no clubbing, cyanosis, + edema  NEURO: no focal findings   SKIN: normal without suspicious lesions on exposed skin    Medications:      Current Facility-Administered Medications:     acetaminophen (TYLENOL) tablet 650 mg, 650 mg, Oral, Q6H PRN, Yesenia Heard DO    aspirin (ECOTRIN LOW STRENGTH) EC tablet 81 mg, 81 mg, Oral, Daily, Quinten Sainz MD, 81 mg at 10/30/18 1211    atorvastatin (LIPITOR) tablet 40 mg, 40 mg, Oral, Daily With Grace Taylor MD, 40 mg at 10/30/18 1825    doxycycline hyclate (VIBRAMYCIN) capsule 100 mg, 100 mg, Oral, Q12H [address], Mary Resendez MD, 100 mg at 10/30/18 2122    furosemide (LASIX) tablet 40 mg, 40 mg, Oral, Daily, Dorothy Mendez DO    gabapentin (NEURONTIN) capsule 800 mg, 800 mg, Oral, 4x Daily, Quinten Sainz MD, 800 mg at 10/30/18 2124    glucagon (GLUCAGEN) injection 2 mg, 2 mg, Intravenous, Once, Laci George PA-C    hydrOXYzine HCL (ATARAX) tablet 50 mg, 50 mg, Oral, TID, Quinten Sainz MD, 50 mg at 10/30/18 2121    insulin glargine (LANTUS) subcutaneous injection 10 Units 0 1 mL, 10 Units, Subcutaneous, HS, Quinten Sainz MD, 10 Units at 10/30/18 2124    insulin lispro (HumaLOG) 100 units/mL subcutaneous injection 1-6 Units, 1-6 Units, Subcutaneous, TID AC, 1 Units at 10/30/18 1202 **AND** Fingerstick Glucose (POCT), , , TID AC, Mary Resendez MD    levETIRAcetam (KEPPRA) tablet 750 mg, 750 mg, Oral, BID, Mary Resendez MD, 750 mg at 10/30/18 1825    levothyroxine tablet 175 mcg, 175 mcg, Oral, Early Morning, Mary Resendez MD, 175 mcg at 10/31/18 0543    lisinopril (ZESTRIL) tablet 5 mg, 5 mg, Oral, Daily, Dorothy Mendez DO    menthol-zinc oxide (CALMOSEPTINE) 0 44-20 6 % ointment, , Topical, BID, Yesenia Heard DO    metoprolol succinate (TOPROL-XL) 24 hr tablet 25 mg, 25 mg, Oral, Daily, Dorothy Mendez DO    oxybutynin [facility]) tablet 5 mg, 5 mg, Oral, TID, Quinten Sainz MD, 5 mg at 10/30/18 2122    oxyCODONE (ROXICODONE) immediate release tablet 10 mg, 10 mg, Oral, Q4H PRN, Yesenia Heard DO    oxyCODONE (ROXICODONE) IR tablet 5 mg, 5 mg, Oral, Q4H PRN, Yesenia Heard DO    pantoprazole (PROTONIX) EC tablet 40 mg, 40 mg, Oral, Daily Before Breakfast, Karla Barakat MD, 40 mg at 10/31/18 0601    phenytoin (DILANTIN) ER capsule 200 mg, 200 mg, Oral, After Bacilio LenMD israel, 200 mg at 10/30/18 1506    phenytoin (DILANTIN) ER capsule 300 mg, 300 mg, Oral, QAM, Karla Barakat MD, 300 mg at 10/30/18 1205    QUEtiapine (SEROquel) tablet 400 mg, 400 mg, Oral, BID, Karla Barakat MD, 400 mg at 10/30/18 1825    valACYclovir (VALTREX) tablet 500 mg, 500 mg, Oral, BID, Karla Barakat MD, 500 mg at 10/30/18 1825    zonisamide (ZONEGRAN) capsule 200 mg, 200 mg, Oral, HS, Karla Barakat MD, 200 mg at 10/30/18 2122    zonisamide (ZONEGRAN) capsule 300 mg, 300 mg, Oral, QAM, Mary Resendez MD, 300 mg at 10/30/18 1204     Labs & Results:      Results from last 7 days  Lab Units 10/29/18  1745 10/29/18  1420 10/29/18  0909 10/29/18  0525   CK TOTAL U/L  --   --   --  124   TROPONIN I ng/mL 1 25* 1 18* 1 53* 2 60*       Results from last 7 days  Lab Units 10/31/18  0520 10/30/18  0428 10/29/18  0525   WBC Thousand/uL 5 68 7 27 12 48*   HEMOGLOBIN g/dL 10 7* 11 3* 12 3   HEMATOCRIT % 31 1* 32 6* 35 5   PLATELETS Thousands/uL 365 478* 562*       Results from last 7 days  Lab Units 10/30/18  0428   TRIGLYCERIDES mg/dL 83   HDL mg/dL 73*       Results from last 7 days  Lab Units 10/31/18  0519 10/30/18  0428 10/29/18  0525   SODIUM mmol/L 137 135* 134*   POTASSIUM mmol/L 3 4* 3 3* 3 7   CHLORIDE mmol/L 102 99* 98*   CO2 mmol/L 26 27 25   BUN mg/dL 17 11 10   CREATININE mg/dL 0 88 0 77 0 70   CALCIUM mg/dL 8 0* 8 0* 7 9*   ALK PHOS U/L  --  45* 54   ALT U/L  --  14 20   AST U/L  --  21 28       Results from last 7 days  Lab Units 10/30/18  0428 10/29/18  2030 10/29/18  1142   INR   --   --  1 04   PTT seconds 83* 39* 31       Results from last 7 days  Lab Units 10/31/18  0519 10/30/18  0428 10/29/18  0525   MAGNESIUM mg/dL 1 6 1 6 1 3*         Counseling / Coordination of Care  Total floor / unit time spent today 20 minutes  Greater than 50% of total time was spent with the patient and / or family counseling and / or coordination of care

## 2018-10-31 NOTE — PLAN OF CARE
Problem: Potential for Falls  Goal: Patient will remain free of falls  INTERVENTIONS:  - Assess patient frequently for physical needs  -  Identify cognitive and physical deficits and behaviors that affect risk of falls    -  [place] fall precautions as indicated by assessment   - Educate patient/family on patient safety including physical limitations  - Instruct patient to call for assistance with activity based on assessment  - Modify environment to reduce risk of injury  - Consider OT/PT consult to assist with strengthening/mobility    - do not leave patient unattended while on bedside commode or in BR   Outcome: Progressing      Problem: Prexisting or High Potential for Compromised Skin Integrity  Goal: Skin integrity is maintained or improved  INTERVENTIONS:  - Identify patients at risk for skin breakdown  - Assess and monitor skin integrity  - Assess and monitor nutrition and hydration status  - Monitor labs (i e  albumin)  - Assess for incontinence   - Turn and reposition patient  - Assist with mobility/ambulation  - Relieve pressure over bony prominences  - Avoid friction and shearing  - Provide appropriate hygiene as needed including keeping skin clean and dry  - Evaluate need for skin moisturizer/barrier cream  - Collaborate with interdisciplinary team (i e  Nutrition, Rehabilitation, etc )   - Patient/family teaching   Outcome: Progressing      Problem: PAIN - ADULT  Goal: Verbalizes/displays adequate comfort level or baseline comfort level  Interventions:  - Encourage patient to monitor pain and request assistance  - Assess pain using appropriate pain scale  - Administer analgesics based on type and severity of pain and evaluate response  - Implement non-pharmacological measures as appropriate and evaluate response  - Consider cultural and social influences on pain and pain management  - Notify physician/advanced practitioner if interventions unsuccessful or patient reports new pain   Outcome: Progressing      Problem: INFECTION - ADULT  Goal: Absence or prevention of progression during hospitalization  INTERVENTIONS:  - Assess and monitor for signs and symptoms of infection  - Monitor lab/diagnostic results  - Monitor all insertion sites, i e  indwelling lines, tubes, and drains      - Administer medications as ordered  - Instruct and encourage patient and family to use good hand hygiene technique  - Identify and instruct in appropriate isolation precautions for identified infection/condition    Outcome: Progressing      Problem: SAFETY ADULT  Goal: Maintain or return to baseline ADL function  INTERVENTIONS:  -  Assess patient's ability to carry out ADLs; assess patient's baseline for ADL function and identify physical deficits which impact ability to perform ADLs (bathing, care of mouth/teeth, toileting, grooming, dressing, etc )  - Assess/evaluate cause of self-care deficits   - Assess range of motion  - Assess patient's mobility; develop plan if impaired  - Assess patient's need for assistive devices and provide as appropriate  - Encourage maximum independence but intervene and supervise when necessary  ¯ Involve family in performance of ADLs  ¯ Assess for home care needs following discharge   ¯ Request OT consult to assist with ADL evaluation and planning for discharge  ¯ Provide patient education as appropriate   Outcome: Progressing    Goal: Maintain or return mobility status to optimal level  INTERVENTIONS:  - Assess patient's baseline mobility status (ambulation, transfers, stairs, etc )    - Identify cognitive and physical deficits and behaviors that affect mobility  - Identify mobility aids required to assist with transfers and/or ambulation (gait belt, sit-to-stand, lift, walker, cane, etc )  - [place] fall precautions as indicated by assessment  - Record patient progress and toleration of activity level on Mobility SBAR; progress patient to next Phase/Stage  - Instruct patient to call for assistance with activity based on assessment  - Request Rehabilitation consult to assist with strengthening/weightbearing, etc    Outcome: Progressing    Goal: Patient will remain free of falls  INTERVENTIONS:  - Assess patient frequently for physical needs  -  Identify cognitive and physical deficits and behaviors that affect risk of falls  -  [place] fall precautions as indicated by assessment   - Educate patient/family on patient safety including physical limitations  - Instruct patient to call for assistance with activity based on assessment  - Modify environment to reduce risk of injury  - Consider OT/PT consult to assist with strengthening/mobility    - do not leave patient unattended while on bedside commode or in BR   Outcome: Progressing      Problem: DISCHARGE PLANNING  Goal: Discharge to home or other facility with appropriate resources  INTERVENTIONS:  - Identify barriers to discharge w/patient and caregiver  - Arrange for needed discharge resources and transportation as appropriate  - Identify discharge learning needs (meds, wound care, etc )  - Arrange for interpretive services to assist at discharge as needed  - Refer to Case Management Department for coordinating discharge planning if the patient needs post-hospital services based on physician/advanced practitioner order or complex needs related to functional status, cognitive ability, or social support system   Outcome: Progressing      Problem: Knowledge Deficit  Goal: Patient/family/caregiver demonstrates understanding of disease process, treatment plan, medications, and discharge instructions  Complete learning assessment and assess knowledge base    Interventions:  - Provide teaching at level of understanding  - Provide teaching via preferred learning methods   Outcome: Progressing      Problem: CARDIOVASCULAR - ADULT  Goal: Maintains optimal cardiac output and hemodynamic stability  INTERVENTIONS:  - Monitor I/O, vital signs and rhythm  - Monitor for S/S and trends of decreased cardiac output i e  bleeding, hypotension  - Administer and titrate ordered vasoactive medications to optimize hemodynamic stability  - Assess quality of pulses, skin color and temperature  - Assess for signs of decreased coronary artery perfusion - ex   Angina  - Instruct patient to report change in severity of symptoms   Outcome: Progressing    Goal: Absence of cardiac dysrhythmias or at baseline rhythm  INTERVENTIONS:  - Continuous cardiac monitoring, monitor vital signs, obtain 12 lead EKG if indicated  - Administer antiarrhythmic and heart rate control medications as ordered  - Monitor electrolytes and administer replacement therapy as ordered   Outcome: Progressing      Problem: SKIN/TISSUE INTEGRITY - ADULT  Goal: Skin integrity remains intact  INTERVENTIONS  - Identify patients at risk for skin breakdown  - Assess and monitor skin integrity  - Assess and monitor nutrition and hydration status  - Monitor labs (i e  albumin)  - Assess for incontinence   - Turn and reposition patient  - Assist with mobility/ambulation  - Relieve pressure over bony prominences  - Avoid friction and shearing  - Provide appropriate hygiene as needed including keeping skin clean and dry  - Evaluate need for skin moisturizer/barrier cream  - Collaborate with interdisciplinary team (i e  Nutrition, Rehabilitation, etc )   - Patient/family teaching   Outcome: Progressing    Goal: Incision(s), wounds(s) or drain site(s) healing without S/S of infection  INTERVENTIONS  - Assess and document risk factors for skin impairment   - Assess and document dressing, incision, wound bed, drain sites and surrounding tissue  - Initiate Nutrition services consult and/or wound management as needed   Outcome: Progressing      Problem: DISCHARGE PLANNING - CARE MANAGEMENT  Goal: Discharge to post-acute care or home with appropriate resources  INTERVENTIONS:  - Conduct assessment to determine patient/family and health care team treatment goals, and need for post-acute services based on payer coverage, community resources, and patient preferences, and barriers to discharge  - Address psychosocial, clinical, and financial barriers to discharge as identified in assessment in conjunction with the patient/family and health care team  - Arrange appropriate level of post-acute services according to patient's   needs and preference and payer coverage in collaboration with the physician and health care team  - Communicate with and update the patient/family, physician, and health care team regarding progress on the discharge plan  - Arrange appropriate transportation to post-acute venues   Outcome: Progressing

## 2018-10-31 NOTE — PLAN OF CARE
Problem: PHYSICAL THERAPY ADULT  Goal: Performs mobility at highest level of function for planned discharge setting  See evaluation for individualized goals  Outcome: Progressing  Prognosis: Good  Problem List: Decreased strength, Decreased endurance, Impaired balance, Decreased mobility, Decreased safety awareness, Impaired judgement, Obesity  Assessment: Performing tx and short distance ambulation with CGA x2 for safety  Pt  limiting particpation 2* Bowel incontinence  No episodes of LOB  Stable gait with use of RW  No c/o feeling lightheaded during session  Pt is in need of continued activity in PT to improve strength safety balance endurance mobility transfers and ambulation with RW to return to (S) to (I) level of function  Barriers to Discharge: Decreased caregiver support     Recommendation: Home PT     PT - OK to Discharge: Yes (when medically stable for discharge)    See flowsheet documentation for full assessment

## 2018-10-31 NOTE — OCCUPATIONAL THERAPY NOTE
OT Note     10/31/18 0925   Restrictions/Precautions   Other Precautions (Bed Alarm;Multiple lines;Telemetry; Fall Risk)   Bed Mobility   Additional Comments seated EOB at start of PT session   Transfers   Sit to Stand (CGA)   Additional items Assist x 2;Bedrails;Verbal cues   Stand to Sit (CGA)   Additional items Assist x 2;Armrests; Verbal cues   Toilet transfer (CGA)   Additional items Assist x 2;Armrests;Commode   Additional Comments incontinent standing at EOB for loose BM   Activity Tolerance   Activity Tolerance (Tolerates tx session)   Assessment   Assessment Pt  presented with multiple deficits affecting overal functional performance on initial assessment  Completes self care and bed mobility as per above, requires multiple cues to continue to focus on task at hand  Recommend continue active OT services to further address OT goals and facilitate return to highest LOF  Presents supine, txfred EOB, completes a m  ricardo care seated same  Awaiting PT tx

## 2018-11-01 ENCOUNTER — APPOINTMENT (INPATIENT)
Dept: NUCLEAR MEDICINE | Facility: HOSPITAL | Age: 63
DRG: 190 | End: 2018-11-01
Payer: COMMERCIAL

## 2018-11-01 VITALS
BODY MASS INDEX: 37.88 KG/M2 | HEART RATE: 84 BPM | TEMPERATURE: 97.7 F | HEIGHT: 66 IN | SYSTOLIC BLOOD PRESSURE: 102 MMHG | OXYGEN SATURATION: 94 % | DIASTOLIC BLOOD PRESSURE: 63 MMHG | WEIGHT: 235.67 LBS | RESPIRATION RATE: 18 BRPM

## 2018-11-01 PROBLEM — E87.6 HYPOKALEMIA: Status: RESOLVED | Noted: 2018-10-31 | Resolved: 2018-11-01

## 2018-11-01 LAB
ANION GAP SERPL CALCULATED.3IONS-SCNC: 5 MMOL/L (ref 4–13)
BUN SERPL-MCNC: 18 MG/DL (ref 5–25)
CALCIUM SERPL-MCNC: 7.9 MG/DL (ref 8.3–10.1)
CHLORIDE SERPL-SCNC: 105 MMOL/L (ref 100–108)
CO2 SERPL-SCNC: 29 MMOL/L (ref 21–32)
CREAT SERPL-MCNC: 0.79 MG/DL (ref 0.6–1.3)
ERYTHROCYTE [DISTWIDTH] IN BLOOD BY AUTOMATED COUNT: 13.6 % (ref 11.6–15.1)
GFR SERPL CREATININE-BSD FRML MDRD: 81 ML/MIN/1.73SQ M
GLUCOSE SERPL-MCNC: 164 MG/DL (ref 65–140)
GLUCOSE SERPL-MCNC: 180 MG/DL (ref 65–140)
GLUCOSE SERPL-MCNC: 68 MG/DL (ref 65–140)
GLUCOSE SERPL-MCNC: 69 MG/DL (ref 65–140)
HCT VFR BLD AUTO: 31.2 % (ref 34.8–46.1)
HGB BLD-MCNC: 10.4 G/DL (ref 11.5–15.4)
MAGNESIUM SERPL-MCNC: 1.7 MG/DL (ref 1.6–2.6)
MCH RBC QN AUTO: 32.4 PG (ref 26.8–34.3)
MCHC RBC AUTO-ENTMCNC: 33.3 G/DL (ref 31.4–37.4)
MCV RBC AUTO: 97 FL (ref 82–98)
PHOSPHATE SERPL-MCNC: 3.2 MG/DL (ref 2.3–4.1)
PLATELET # BLD AUTO: 361 THOUSANDS/UL (ref 149–390)
PMV BLD AUTO: 8.5 FL (ref 8.9–12.7)
POTASSIUM SERPL-SCNC: 3.5 MMOL/L (ref 3.5–5.3)
RBC # BLD AUTO: 3.21 MILLION/UL (ref 3.81–5.12)
SODIUM SERPL-SCNC: 139 MMOL/L (ref 136–145)
WBC # BLD AUTO: 5.38 THOUSAND/UL (ref 4.31–10.16)

## 2018-11-01 PROCEDURE — 82948 REAGENT STRIP/BLOOD GLUCOSE: CPT

## 2018-11-01 PROCEDURE — 80171 DRUG SCREEN QUANT GABAPENTIN: CPT | Performed by: INTERNAL MEDICINE

## 2018-11-01 PROCEDURE — 78452 HT MUSCLE IMAGE SPECT MULT: CPT | Performed by: INTERNAL MEDICINE

## 2018-11-01 PROCEDURE — 83735 ASSAY OF MAGNESIUM: CPT | Performed by: INTERNAL MEDICINE

## 2018-11-01 PROCEDURE — 78452 HT MUSCLE IMAGE SPECT MULT: CPT

## 2018-11-01 PROCEDURE — 80048 BASIC METABOLIC PNL TOTAL CA: CPT | Performed by: INTERNAL MEDICINE

## 2018-11-01 PROCEDURE — A9502 TC99M TETROFOSMIN: HCPCS

## 2018-11-01 PROCEDURE — 80186 ASSAY OF PHENYTOIN FREE: CPT | Performed by: INTERNAL MEDICINE

## 2018-11-01 PROCEDURE — 85027 COMPLETE CBC AUTOMATED: CPT | Performed by: INTERNAL MEDICINE

## 2018-11-01 PROCEDURE — 93017 CV STRESS TEST TRACING ONLY: CPT

## 2018-11-01 PROCEDURE — 84100 ASSAY OF PHOSPHORUS: CPT | Performed by: INTERNAL MEDICINE

## 2018-11-01 PROCEDURE — 99239 HOSP IP/OBS DSCHRG MGMT >30: CPT | Performed by: INTERNAL MEDICINE

## 2018-11-01 PROCEDURE — 93018 CV STRESS TEST I&R ONLY: CPT | Performed by: INTERNAL MEDICINE

## 2018-11-01 PROCEDURE — 93016 CV STRESS TEST SUPVJ ONLY: CPT | Performed by: INTERNAL MEDICINE

## 2018-11-01 RX ORDER — LISINOPRIL 5 MG/1
5 TABLET ORAL DAILY
Qty: 30 TABLET | Refills: 0 | Status: SHIPPED | OUTPATIENT
Start: 2018-11-02 | End: 2018-11-30 | Stop reason: SDUPTHER

## 2018-11-01 RX ORDER — METOPROLOL SUCCINATE 25 MG/1
25 TABLET, EXTENDED RELEASE ORAL DAILY
Qty: 30 TABLET | Refills: 0 | Status: SHIPPED | OUTPATIENT
Start: 2018-11-02 | End: 2018-11-13 | Stop reason: SDUPTHER

## 2018-11-01 RX ADMIN — ASPIRIN 81 MG: 81 TABLET, COATED ORAL at 12:03

## 2018-11-01 RX ADMIN — DOXYCYCLINE HYCLATE 100 MG: 100 CAPSULE ORAL at 12:03

## 2018-11-01 RX ADMIN — METOPROLOL SUCCINATE 25 MG: 25 TABLET, EXTENDED RELEASE ORAL at 12:05

## 2018-11-01 RX ADMIN — FUROSEMIDE 20 MG: 20 TABLET ORAL at 12:04

## 2018-11-01 RX ADMIN — HYDROXYZINE HYDROCHLORIDE 50 MG: 25 TABLET, FILM COATED ORAL at 20:55

## 2018-11-01 RX ADMIN — VALACYCLOVIR HYDROCHLORIDE 500 MG: 500 TABLET, FILM COATED ORAL at 18:27

## 2018-11-01 RX ADMIN — LEVETIRACETAM 750 MG: 500 TABLET ORAL at 12:05

## 2018-11-01 RX ADMIN — OXYCODONE HYDROCHLORIDE 10 MG: 10 TABLET ORAL at 16:51

## 2018-11-01 RX ADMIN — ATORVASTATIN CALCIUM 40 MG: 40 TABLET, FILM COATED ORAL at 16:48

## 2018-11-01 RX ADMIN — LEVETIRACETAM 750 MG: 500 TABLET ORAL at 18:27

## 2018-11-01 RX ADMIN — DOXYCYCLINE HYCLATE 100 MG: 100 CAPSULE ORAL at 20:55

## 2018-11-01 RX ADMIN — OXYBUTYNIN CHLORIDE 5 MG: 5 TABLET ORAL at 20:56

## 2018-11-01 RX ADMIN — ZONISAMIDE 300 MG: 100 CAPSULE ORAL at 12:07

## 2018-11-01 RX ADMIN — HYDROXYZINE HYDROCHLORIDE 50 MG: 25 TABLET, FILM COATED ORAL at 16:48

## 2018-11-01 RX ADMIN — LISINOPRIL 5 MG: 5 TABLET ORAL at 12:05

## 2018-11-01 RX ADMIN — GABAPENTIN 800 MG: 400 CAPSULE ORAL at 00:14

## 2018-11-01 RX ADMIN — LEVOTHYROXINE SODIUM 175 MCG: 175 TABLET ORAL at 05:25

## 2018-11-01 RX ADMIN — PHENYTOIN SODIUM 300 MG: 100 CAPSULE ORAL at 12:06

## 2018-11-01 RX ADMIN — HYDROXYZINE HYDROCHLORIDE 50 MG: 25 TABLET, FILM COATED ORAL at 00:14

## 2018-11-01 RX ADMIN — QUETIAPINE FUMARATE 400 MG: 100 TABLET ORAL at 18:27

## 2018-11-01 RX ADMIN — REGADENOSON 0.4 MG: 0.08 INJECTION, SOLUTION INTRAVENOUS at 10:14

## 2018-11-01 RX ADMIN — INSULIN LISPRO 1 UNITS: 100 INJECTION, SOLUTION INTRAVENOUS; SUBCUTANEOUS at 12:13

## 2018-11-01 RX ADMIN — GABAPENTIN 800 MG: 400 CAPSULE ORAL at 18:26

## 2018-11-01 RX ADMIN — OXYBUTYNIN CHLORIDE 5 MG: 5 TABLET ORAL at 16:53

## 2018-11-01 RX ADMIN — QUETIAPINE FUMARATE 400 MG: 100 TABLET ORAL at 12:06

## 2018-11-01 RX ADMIN — GABAPENTIN 800 MG: 400 CAPSULE ORAL at 12:04

## 2018-11-01 RX ADMIN — OXYCODONE HYDROCHLORIDE 10 MG: 10 TABLET ORAL at 12:17

## 2018-11-01 RX ADMIN — VALACYCLOVIR HYDROCHLORIDE 500 MG: 500 TABLET, FILM COATED ORAL at 12:07

## 2018-11-01 RX ADMIN — INSULIN LISPRO 1 UNITS: 100 INJECTION, SOLUTION INTRAVENOUS; SUBCUTANEOUS at 16:43

## 2018-11-01 RX ADMIN — PHENYTOIN SODIUM 200 MG: 100 CAPSULE ORAL at 16:48

## 2018-11-01 NOTE — PLAN OF CARE
Problem: Potential for Falls  Goal: Patient will remain free of falls  INTERVENTIONS:  - Assess patient frequently for physical needs  -  Identify cognitive and physical deficits and behaviors that affect risk of falls  -  [place] fall precautions as indicated by assessment   High fall risk    - Educate patient/family on patient safety including physical limitations  - Instruct patient to call for assistance with activity based on assessment  - Modify environment to reduce risk of injury  - Consider OT/PT consult to assist with strengthening/mobility    - do not leave patient unattended while on bedside commode or in BR    Outcome: Progressing      Problem: Prexisting or High Potential for Compromised Skin Integrity  Goal: Skin integrity is maintained or improved  INTERVENTIONS:  - Identify patients at risk for skin breakdown  - Assess and monitor skin integrity  - Assess and monitor nutrition and hydration status  - Monitor labs (i e  albumin)  - Assess for incontinence   - Turn and reposition patient  - Assist with mobility/ambulation  - Relieve pressure over bony prominences  - Avoid friction and shearing  - Provide appropriate hygiene as needed including keeping skin clean and dry  - Evaluate need for skin moisturizer/barrier cream  - Collaborate with interdisciplinary team (i e  Nutrition, Rehabilitation, etc )   - Patient/family teaching   Outcome: Progressing      Problem: PAIN - ADULT  Goal: Verbalizes/displays adequate comfort level or baseline comfort level  Interventions:  - Encourage patient to monitor pain and request assistance  - Assess pain using appropriate pain scale  - Administer analgesics based on type and severity of pain and evaluate response  - Implement non-pharmacological measures as appropriate and evaluate response  - Consider cultural and social influences on pain and pain management  - Notify physician/advanced practitioner if interventions unsuccessful or patient reports new pain Outcome: Progressing      Problem: INFECTION - ADULT  Goal: Absence or prevention of progression during hospitalization  INTERVENTIONS:  - Assess and monitor for signs and symptoms of infection  - Monitor lab/diagnostic results  - Monitor all insertion sites, i e  indwelling lines, tubes, and drains      - Administer medications as ordered  - Instruct and encourage patient and family to use good hand hygiene technique  - Identify and instruct in appropriate isolation precautions for identified infection/condition    Outcome: Progressing      Problem: SAFETY ADULT  Goal: Maintain or return to baseline ADL function  INTERVENTIONS:  -  Assess patient's ability to carry out ADLs; assess patient's baseline for ADL function and identify physical deficits which impact ability to perform ADLs (bathing, care of mouth/teeth, toileting, grooming, dressing, etc )  - Assess/evaluate cause of self-care deficits   - Assess range of motion  - Assess patient's mobility; develop plan if impaired  - Assess patient's need for assistive devices and provide as appropriate  - Encourage maximum independence but intervene and supervise when necessary  ¯ Involve family in performance of ADLs  ¯ Assess for home care needs following discharge   ¯ Request OT consult to assist with ADL evaluation and planning for discharge  ¯ Provide patient education as appropriate   Outcome: Progressing    Goal: Maintain or return mobility status to optimal level  INTERVENTIONS:  - Assess patient's baseline mobility status (ambulation, transfers, stairs, etc )    - Identify cognitive and physical deficits and behaviors that affect mobility  - Identify mobility aids required to assist with transfers and/or ambulation (gait belt, sit-to-stand, lift, walker, cane, etc )  - [place] fall precautions as indicated by assessment  - Record patient progress and toleration of activity level on Mobility SBAR; progress patient to next Phase/Stage  - Instruct patient to call for assistance with activity based on assessment  - Request Rehabilitation consult to assist with strengthening/weightbearing, etc    Outcome: Progressing    Goal: Patient will remain free of falls  INTERVENTIONS:  - Assess patient frequently for physical needs  -  Identify cognitive and physical deficits and behaviors that affect risk of falls  -  [place] fall precautions as indicated by assessment  High fall risk    - Educate patient/family on patient safety including physical limitations  - Instruct patient to call for assistance with activity based on assessment  - Modify environment to reduce risk of injury  - Consider OT/PT consult to assist with strengthening/mobility    - do not leave patient unattended while on bedside commode or in BR    Outcome: Progressing      Problem: DISCHARGE PLANNING  Goal: Discharge to home or other facility with appropriate resources  INTERVENTIONS:  - Identify barriers to discharge w/patient and caregiver  - Arrange for needed discharge resources and transportation as appropriate  - Identify discharge learning needs (meds, wound care, etc )  - Arrange for interpretive services to assist at discharge as needed  - Refer to Case Management Department for coordinating discharge planning if the patient needs post-hospital services based on physician/advanced practitioner order or complex needs related to functional status, cognitive ability, or social support system   Outcome: Progressing      Problem: Knowledge Deficit  Goal: Patient/family/caregiver demonstrates understanding of disease process, treatment plan, medications, and discharge instructions  Complete learning assessment and assess knowledge base    Interventions:  - Provide teaching at level of understanding  - Provide teaching via preferred learning methods   Outcome: Progressing      Problem: CARDIOVASCULAR - ADULT  Goal: Maintains optimal cardiac output and hemodynamic stability  INTERVENTIONS:  - Monitor I/O, vital signs and rhythm  - Monitor for S/S and trends of decreased cardiac output i e  bleeding, hypotension  - Administer and titrate ordered vasoactive medications to optimize hemodynamic stability  - Assess quality of pulses, skin color and temperature  - Assess for signs of decreased coronary artery perfusion - ex   Angina  - Instruct patient to report change in severity of symptoms   Outcome: Progressing    Goal: Absence of cardiac dysrhythmias or at baseline rhythm  INTERVENTIONS:  - Continuous cardiac monitoring, monitor vital signs, obtain 12 lead EKG if indicated  - Administer antiarrhythmic and heart rate control medications as ordered  - Monitor electrolytes and administer replacement therapy as ordered   Outcome: Progressing      Problem: SKIN/TISSUE INTEGRITY - ADULT  Goal: Skin integrity remains intact  INTERVENTIONS  - Identify patients at risk for skin breakdown  - Assess and monitor skin integrity  - Assess and monitor nutrition and hydration status  - Monitor labs (i e  albumin)  - Assess for incontinence   - Turn and reposition patient  - Assist with mobility/ambulation  - Relieve pressure over bony prominences  - Avoid friction and shearing  - Provide appropriate hygiene as needed including keeping skin clean and dry  - Evaluate need for skin moisturizer/barrier cream  - Collaborate with interdisciplinary team (i e  Nutrition, Rehabilitation, etc )   - Patient/family teaching   Outcome: Progressing    Goal: Incision(s), wounds(s) or drain site(s) healing without S/S of infection  INTERVENTIONS  - Assess and document risk factors for skin impairment   - Assess and document dressing, incision, wound bed, drain sites and surrounding tissue  - Initiate Nutrition services consult and/or wound management as needed   Outcome: Progressing      Problem: DISCHARGE PLANNING - CARE MANAGEMENT  Goal: Discharge to post-acute care or home with appropriate resources  INTERVENTIONS:  - Conduct assessment to determine patient/family and health care team treatment goals, and need for post-acute services based on payer coverage, community resources, and patient preferences, and barriers to discharge  - Address psychosocial, clinical, and financial barriers to discharge as identified in assessment in conjunction with the patient/family and health care team  - Arrange appropriate level of post-acute services according to patient's   needs and preference and payer coverage in collaboration with the physician and health care team  - Communicate with and update the patient/family, physician, and health care team regarding progress on the discharge plan  - Arrange appropriate transportation to post-acute venues   Outcome: Progressing

## 2018-11-01 NOTE — DISCHARGE INSTRUCTIONS
Myocardial Infarction   WHAT YOU NEED TO KNOW:   A myocardial infarction (MI) is a heart attack  A heart attack happens when the blood vessels that supply blood to your heart (coronary arteries) are blocked  This can damage your heart  It can lead to an abnormal heart rhythm, heart failure, or may become life-threatening  DISCHARGE INSTRUCTIONS:   Medicines: You may  need any of the following:  · Heart medicines  help decrease blood pressure, control your heart rate, and help your heart function better  · Nitroglycerin  opens the arteries to your heart, increases oxygen levels, and can decrease chest pain  You may get your nitroglycerin as a pill, a patch, or a paste  Ask your healthcare provider or cardiologist how to safely take this medicine  · Aspirin  helps prevent clots from forming and causing blood flow problems  If healthcare providers want you to take aspirin daily, do not take acetaminophen or ibuprofen instead  Do not take more or less aspirin than healthcare providers say to take  If you are on another blood thinner medicine, ask your healthcare provider or cardiologist before you take aspirin for any reason  · Blood thinners    help prevent blood clots  Examples of blood thinners include heparin and warfarin  Clots can cause strokes, heart attacks, and death  The following are general safety guidelines to follow while you are taking a blood thinner:    ¨ Watch for bleeding and bruising while you take blood thinners  Watch for bleeding from your gums or nose  Watch for blood in your urine and bowel movements  Use a soft washcloth on your skin, and a soft toothbrush to brush your teeth  This can keep your skin and gums from bleeding  If you shave, use an electric shaver  Do not play contact sports  ¨ Tell your dentist and other healthcare providers that you take anticoagulants  Wear a bracelet or necklace that says you take this medicine       ¨ Do not start or stop any medicines unless your healthcare provider tells you to  Many medicines cannot be used with blood thinners  ¨ Tell your healthcare provider right away if you forget to take the medicine, or if you take too much  ¨ Warfarin  is a blood thinner that you may need to take  The following are things you should be aware of if you take warfarin  § Foods and medicines can affect the amount of warfarin in your blood  Do not make major changes to your diet while you take warfarin  Warfarin works best when you eat about the same amount of vitamin K every day  Vitamin K is found in green leafy vegetables and certain other foods  Ask for more information about what to eat when you are taking warfarin  § You will need to see your healthcare provider for follow-up visits when you are on warfarin  You will need regular blood tests  These tests are used to decide how much medicine you need  · Cholesterol medicine  decreases cholesterol and the amount of plaque in your blood  · Do not take certain medicines without asking your healthcare provider first   These include NSAIDs, herbal or vitamin supplements, or hormones (estrogen or progestin)  · Take your medicine as directed  Contact your healthcare provider if you think your medicine is not helping or if you have side effects  Tell him or her if you are allergic to any medicine  Keep a list of the medicines, vitamins, and herbs you take  Include the amounts, and when and why you take them  Bring the list or the pill bottles to follow-up visits  Carry your medicine list with you in case of an emergency  Cardiac rehabilitation (rehab)  is a program run by specialists who will help you safely strengthen your heart and prevent more heart disease  The plan includes exercise, relaxation, stress management, and heart-healthy nutrition  Healthcare providers will also check to make sure any medicines you take are working   The plan may also include instructions for when you can drive, return to work, and do other normal daily activities  Follow up with your healthcare provider or cardiologist within 14 days or as directed:  Ask for information about continuing care, treatments, and home services  Write down your questions so you remember to ask them during your visits  Lifestyle changes:   · Go to cardiac rehabilitation as directed  This is a program run by specialists who will help you safely strengthen your heart and prevent more heart disease  This plan includes exercise, relaxation, stress management, and heart-healthy nutrition  Healthcare providers will also check to make sure any medicines you are taking are working  The plan may also include instructions for when you can drive, return to work, and do other normal daily activities  · Eat a heart healthy diet  Get enough calories, protein, vitamins, and minerals to help prevent poor nutrition and promote muscle strength  You may be told to eat foods low in cholesterol or sodium (salt)  You also may be told to limit saturated and trans fats  Eat foods that contain healthy fats, such as walnuts, salmon, and canola and soybean oils  Eat foods that help protect the heart, including plenty of fruits and vegetables, nuts, and sources of fiber  · Do not smoke  If you smoke, it is never too late to quit  Smoking increases your risk of another MI      · Exercise  Ask your healthcare provider or cardiologist about the best exercise plan for you  Exercise makes your heart stronger, lowers blood pressure, and helps prevent an MI  The goal is 30 to 60 minutes a day, 5 to 7 days a week  Ask your healthcare provider or cardiologist how often and how long to exercise  · Maintain a healthy weight  Ask your healthcare provider or cardiologist how much you should weigh  Ask him to help you create a weight loss plan if you are overweight  · Manage your stress  Stress may slow healing and lead to illness   Learn ways to control stress, such as relaxation, deep breathing, and music  Talk to someone about things that upset you  · Get a flu vaccine  every year as soon as it is available  The vaccine will help prevent the flu  Ask about other vaccinations you may need  Contact your healthcare provider or cardiologist if:   · You have trouble taking your heart medicine  · You have questions or concerns about your condition or care  Seek care immediately or call 911 if:   · You have any of the following signs of a heart attack:      ¨ Squeezing, pressure, or pain in your chest that lasts longer than 5 minutes or returns    ¨ Discomfort or pain in your back, neck, jaw, stomach, or arm     ¨ Trouble breathing    ¨ Nausea or vomiting    ¨ Lightheadedness or a sudden cold sweat, especially with chest pain or trouble breathing    · You are tired and cannot think clearly  · Your heart is beating faster than usual      · You are bleeding from your gums or nose  · You see blood in your urine or bowel movements  · You urinate less than usual or not at all  · You have new or increased swelling in your feet or ankles  © 2017 [facility] Information is for End User's use only and may not be sold, redistributed or otherwise used for commercial purposes  All illustrations and images included in CareNotes® are the copyrighted property of A D A M , Inc  or Edgardo Chappell  The above information is an  only  It is not intended as medical advice for individual conditions or treatments  Talk to your doctor, nurse or pharmacist before following any medical regimen to see if it is safe and effective for you

## 2018-11-01 NOTE — DISCHARGE SUMMARY
Discharge Summary - [facility] Internal Medicine    Patient Information: Trent Carroll 66 y o  female MRN: [id number]  Unit/Bed#: 405-01 Encounter: [id number]    Discharging Physician / Practitioner: Maciel Galvez MD  PCP: Julee Stacy  Admission Date: 10/29/2018  Discharge Date: 11/01/18    Reason for Admission: generalized weakness & dizziness    Discharge Diagnoses:     Principal Problem:    NSTEMI (non-ST elevated myocardial infarction) [facility])  Active Problems:    Bipolar disorder ([facility] )    Type 2 diabetes mellitus with hyperglycemia, with long-term current use of insulin ([place] [address] )    Hypothyroidism    Seizures ([place] [address] )    Skin breakdown    Ambulatory dysfunction    Obesity due to excess calories    E  coli UTI    Essential hypertension    Dyslipidemia  Resolved Problems:    Hypokalemia      Consultations During Hospital Stay:  · Cardiology, Dr Venancio Ricardo    Procedures Performed:     · Nuclear myocardial perfusion stress test from today was normal per Cardiology interpretation  · TTE from 10/29/2018 per Cardiology interpretation revealed normal LV size and systolic function, with study inadequate to evaluate regional wall motion      Hospital Course:   Kindly refer to the admitting H+P as well as physician progress notes & consult notes for details  Trent Carroll is a 66 y o  female patient who originally presented to the hospital on 10/29/2018 due to the above symptoms  Initial troponin level at the ED was elevated at 2 60  As such she was admitted to telemetry for further management of acute NSTEMI  She was transiently anticoagulated with heparin drip  Aspirin, beta-blocker, ACE inhibitor, and high intensity statin were given  She was seen by Cardiology in consult  TTE & subsequent nuclear stress test were performed and are as stated above  Elevated troponin on admission was felt to be the result of a type 2 NSTEMI due to a pansensitive E coli urinary tract infection    This was treated with doxycycline in light of adverse reactions to multiple antibiotics including quinolones, penicillins and cephalosporins  She feels much improved currently with no cardiopulmonary complaints  She has remained afebrile and hemodynamically stable past 24 hours  As such, she is being discharged with home PT today in stable condition to follow up with her primary care provider as well as Cardiology as instructed  I also advised her to follow up with her usual neurologist and psychiatrist regarding medication adjustment due to suspected polypharmacy which may have contributed to her presenting complaints of generalized weakness and dizziness  Dilantin level during this hospitalization was not toxic  Condition at Discharge: good     Discharge Day Visit / Exam:     Subjective:  Patient seen and examined this afternoon  She feels well and is able to ambulate around her room with the aid of a rolling walker, which is her baseline  She is keen to return home today  Vitals: Blood Pressure: 102/63 (11/01/18 1549)  Pulse: 84 (11/01/18 1549)  Temperature: 97 7 °F (36 5 °C) (11/01/18 1549)  Temp Source: Temporal (11/01/18 1549)  Respirations: 18 (11/01/18 1549)  Height: 5' 6" (167 6 cm) (10/29/18 1212)  Weight - Scale: 107 kg (235 lb 10 8 oz) (11/01/18 0600)  SpO2: 94 % (11/01/18 1549)  Exam:   General: oral mucosa moist, in no overt distress  HEENT; not pale, not jaundiced  Chest: clear lungs, no wheeze  Heart: S1 S2 audible, regular  Abd: soft, nontender, bowel sounds present  Psych: appropriately oriented in all spheres  Neuro: Awake, alert, steady gait with rolling walker, essentially nonfocal      Discharge instructions/Information to patient and family:   See after visit summary for information provided to patient and family  Provisions for Follow-Up Care:  See after visit summary for information related to follow-up care and any pertinent home health orders        Disposition:     Home with VNA Services (Reminder: Complete face to face encounter)       Discharge Statement:  I spent 35 minutes discharging the patient  This time was spent on the day of discharge  I had direct contact with the patient on the day of discharge  Greater than 50% of the total time was spent examining patient, answering all patient questions, arranging and discussing plan of care with patient as well as directly providing post-discharge instructions  Additional time then spent on discharge activities  Discharge Medications:  See after visit summary for reconciled discharge medications provided to patient and family  ** Please Note: Dragon 360 Dictation voice to text software may have been used in the creation of this document   **

## 2018-11-02 LAB
CHEST PAIN STATEMENT: NORMAL
MAX DIASTOLIC BP: 72 MMHG
MAX HEART RATE: 121 BPM
MAX PREDICTED HEART RATE: 159 BPM
MAX. SYSTOLIC BP: 118 MMHG
PROTOCOL NAME: NORMAL
REASON FOR TERMINATION: NORMAL
TARGET HR FORMULA: NORMAL
TEST INDICATION: NORMAL
TIME IN EXERCISE PHASE: NORMAL

## 2018-11-02 NOTE — SOCIAL WORK
Late note 11/1/18 5:15 pm Received call from nursing supervisor that pt is discharged and does not have a ride home  [address] chair Jean-Pierre Malone will pick the pt up at 7:30 pm and transport the patient home  Pt is Active with [facility]  Discharge instructions were faxed to them  Pt will arrange her follow up appointment with PCP

## 2018-11-02 NOTE — NURSING NOTE
Patient discharged with belongings via [facility]al[facility] accompanied by wheelchair Otoniel Sale   Discharge teaching reinforced  Disposable undergarments and pants found for patient prior to arrival of wheelchair van and discharge  Doxycycline, atarax and ditropan given prior to discharge as patient stated she probably wouldn't get to the pharmacy toning

## 2018-11-03 LAB — GABAPENTIN SERPLBLD-MCNC: 24.9 UG/ML (ref 4–16)

## 2018-11-05 LAB — PHENYTOIN FREE SERPL-MCNC: 1.1 UG/ML (ref 1–2)

## 2018-11-07 DIAGNOSIS — N39.3 STRESS INCONTINENCE OF URINE: Primary | ICD-10-CM

## 2018-11-07 DIAGNOSIS — E66.01 MORBID OBESITY (HCC): ICD-10-CM

## 2018-11-08 DIAGNOSIS — N39.0 E-COLI UTI: Primary | ICD-10-CM

## 2018-11-08 DIAGNOSIS — B96.20 E-COLI UTI: Primary | ICD-10-CM

## 2018-11-09 ENCOUNTER — OFFICE VISIT (OUTPATIENT)
Dept: FAMILY MEDICINE CLINIC | Facility: CLINIC | Age: 63
End: 2018-11-09
Payer: COMMERCIAL

## 2018-11-09 VITALS
HEART RATE: 109 BPM | RESPIRATION RATE: 18 BRPM | HEIGHT: 66 IN | OXYGEN SATURATION: 97 % | WEIGHT: 239 LBS | TEMPERATURE: 97.6 F | SYSTOLIC BLOOD PRESSURE: 108 MMHG | DIASTOLIC BLOOD PRESSURE: 78 MMHG | BODY MASS INDEX: 38.41 KG/M2

## 2018-11-09 DIAGNOSIS — R32 URINARY INCONTINENCE, UNSPECIFIED TYPE: ICD-10-CM

## 2018-11-09 DIAGNOSIS — K21.9 GERD WITHOUT ESOPHAGITIS: Primary | ICD-10-CM

## 2018-11-09 PROCEDURE — T1015 CLINIC SERVICE: HCPCS | Performed by: FAMILY MEDICINE

## 2018-11-09 RX ORDER — OMEPRAZOLE 20 MG/1
20 CAPSULE, DELAYED RELEASE ORAL DAILY
Qty: 120 CAPSULE | Refills: 0 | Status: SHIPPED | OUTPATIENT
Start: 2018-11-09 | End: 2019-01-07 | Stop reason: SDUPTHER

## 2018-11-09 NOTE — PROGRESS NOTES
[address]       NAME: Cristiana Green is a 66 y o  female  : 1955    MRN: [id number]  DATE: 2018  TIME: 10:08 AM    Assessment and Plan   Diagnoses and all orders for this visit:    GERD without esophagitis  -     omeprazole (PriLOSEC) 20 mg delayed release capsule; Take 1 capsule (20 mg total) by mouth daily TAKE ONE TABLET IN THE MORNING AND TAKE ONE TABLET IN THE EVENING BY MOUTH DAILY  -     Ambulatory referral to Gastroenterology; Future        No problem-specific Assessment & Plan notes found for this encounter  Patient Instructions       Donn Hong states she has no improvement of GERD symptoms   on prilosec with her reflux did place amb referral to ENT to discuss GERD   has follow up with cardiology 2018  Has no fatigue or swelling or thickened discolored skin wants to be tested for scleraderma did explain to her she has no symptoms and to discuss this with her PCP follow up in 1 week    reports PMH of chronic UTI and is reporting urinary incontinence and will place referral to urology     Chief Complaint     Chief Complaint   Patient presents with   [facility] f/u, wasnt to be tested for sclereroderma         History of Present Illness       60-year-old female presents to office with follow-up status post hospitalization for NSTEMI have a follow-up appointment Cardiology   Denies any chest pain chest tightness or shortness of breath  She is reporting increased complaints of heartburn and reflux  on Prilosec,  Reports symptoms are worse when lying down reports only heartburn at times  will place referral to GI to discuss this issue further  Patient states she wants to be tested for scleraderma denies nay muscle aches fatigue changing in skin or no swelling noted   She is also noted PMH of recurent UTI and urinary incontinece at times       Heartburn   She reports no abdominal pain, no belching, no chest pain, no choking, no coughing, no dysphagia, no early satiety, no globus sensation, no heartburn, no hoarse voice, no nausea, no sore throat, no stridor, no tooth decay or no water brash  This is a chronic problem  The current episode started more than 1 year ago  The problem occurs constantly  The problem has been gradually worsening  The symptoms are aggravated by lying down  Pertinent negatives include no anemia, fatigue, melena, muscle weakness, orthopnea or weight loss  Risk factors include obesity  She has tried a PPI, a histamine-2 antagonist and head elevation for the symptoms  The treatment provided mild relief  Review of Systems   Review of Systems   Constitutional: Negative  Negative for fatigue and weight loss  HENT: Negative  Negative for hoarse voice and sore throat  Eyes: Negative  Respiratory: Negative  Negative for cough and choking  Cardiovascular: Negative  Negative for chest pain  Gastrointestinal: Negative for abdominal pain, dysphagia, heartburn, melena and nausea  Increased reflux   Endocrine: Negative  Genitourinary: Negative  Musculoskeletal: Negative  Negative for muscle weakness  Skin: Negative  Allergic/Immunologic: Negative  Neurological: Negative  Hematological: Negative  Psychiatric/Behavioral: Negative  All other systems reviewed and are negative          Current Medications       Current Outpatient Prescriptions:     aspirin (ECOTRIN LOW STRENGTH) 81 mg EC tablet, Take 1 tablet (81 mg total) by mouth daily, Disp: 90 tablet, Rfl: 1    Blood Glucose Monitoring Suppl (BLOOD GLUCOSE MONITOR SYSTEM) w/Device KIT, Test blood sugars 3 times a day, Disp: 1 each, Rfl: 0    clotrimazole-betamethasone (LOTRISONE) 1-0 05 % cream, 2 (two) times a day As needed , Disp: , Rfl:     estradiol (ESTRACE) 1 mg tablet, Take 1 mg by mouth daily at bedtime  , Disp: , Rfl:     fluticasone (FLONASE) 50 mcg/act nasal spray, SPRAY ONE (1) SPRAY INTO EACH NOSTRIL ONCE DAILY      **FOR THE NOSE**, Disp: 16 g, Rfl: 0    furosemide (LASIX) 20 mg tablet, Take 1 tablet (20 mg total) by mouth daily (Patient taking differently: Take 20 mg by mouth daily Lasix was only ordered for 7 days ), Disp: 7 tablet, Rfl: 0    gabapentin (NEURONTIN) 800 mg tablet, Take 800 mg by mouth 4 (four) times a day , Disp: , Rfl:     glucose blood test strip, Test blood sugars 3 times a day , Disp: 100 each, Rfl: 3    hydrOXYzine pamoate (VISTARIL) 50 mg capsule, Take 1 capsule (50 mg total) by mouth 3 (three) times a day, Disp: 90 capsule, Rfl: 3    Incontinence Supply Disposable (PADSORBER BED PAN LINERS) MISC, by Does not apply route as needed (incontinence), Disp: 50 each, Rfl: 3    Infant Care Products (CUTIES SENSITIVE WIPES) MISC, 120 Pieces by Does not apply route as needed (incontinence), Disp: 120 each, Rfl: 3    insulin glargine (LANTUS) 100 units/mL subcutaneous injection, Inject 10 Units under the skin daily at bedtime, Disp: 10 mL, Rfl: 2    LANTUS SOLOSTAR 100 units/mL injection pen, , Disp: , Rfl:     levETIRAcetam (KEPPRA) 750 mg tablet, Take 750 mg by mouth 2 (two) times a day  , Disp: , Rfl:     levothyroxine 175 mcg tablet, Take 1 tablet (175 mcg total) by mouth daily, Disp: 30 tablet, Rfl: 3    lisinopril (ZESTRIL) 5 mg tablet, Take 1 tablet (5 mg total) by mouth daily, Disp: 30 tablet, Rfl: 0    loratadine (CLARITIN) 10 mg tablet, Take 1 tablet (10 mg total) by mouth daily, Disp: 30 tablet, Rfl: 3    meloxicam (MOBIC) 7 5 mg tablet, Take 1 tablet (7 5 mg total) by mouth 2 (two) times a day, Disp: 60 tablet, Rfl: 3    metFORMIN (GLUCOPHAGE) 1000 MG tablet, Take 1 tablet (1,000 mg total) by mouth 2 (two) times a day with meals, Disp: 60 tablet, Rfl: 3    metoprolol succinate (TOPROL-XL) 25 mg 24 hr tablet, Take 1 tablet (25 mg total) by mouth daily, Disp: 30 tablet, Rfl: 0    montelukast (SINGULAIR) 10 mg tablet, Take 1 tablet (10 mg total) by mouth daily at bedtime, Disp: 90 tablet, Rfl: 0    norethindrone (AYGESTIN) 5 mg tablet, Take 5 mg by mouth daily  , Disp: , Rfl:     NOVOFINE 32G X 6 MM MISC, , Disp: , Rfl: 2    NOVOLOG FLEXPEN 100 units/mL injection pen, Uses sliding scale , Disp: , Rfl:     omeprazole (PriLOSEC) 20 mg delayed release capsule, Take 1 capsule (20 mg total) by mouth daily TAKE ONE TABLET IN THE MORNING AND TAKE ONE TABLET IN THE EVENING BY MOUTH DAILY  , Disp: 120 capsule, Rfl: 0    ONETOUCH DELICA LANCETS 15R MISC, Test blood sugars 3 times a day , Disp: 100 each, Rfl: 3    oxybutynin (DITROPAN) 5 mg tablet, Take 5 mg by mouth 3 (three) times a day  , Disp: , Rfl:     phenytoin (DILANTIN) 100 mg ER capsule, Take by mouth 5 (five) times a day 3 tablets in morning and 2 at lunch daily , Disp: , Rfl:     polyethylene glycol (GLYCOLAX) powder, Take 17 g by mouth daily, Disp: 850 g, Rfl: 3    QUEtiapine (SEROquel) 400 MG tablet, Take 1 tablet (400 mg total) by mouth 2 (two) times a day, Disp: 60 tablet, Rfl: 3    simvastatin (ZOCOR) 80 mg tablet, Take 1 tablet (80 mg total) by mouth daily at bedtime, Disp: 30 tablet, Rfl: 3    Skin Protectants, Misc   (CALAZIME SKIN PROTECTANT) PSTE, Apply 113 g topically 2 (two) times a day, Disp: 1 Tube, Rfl: 0    valACYclovir (VALTREX) 500 mg tablet, Take 1 tablet by mouth 2 (two) times a day, Disp: , Rfl: 2    VENTOLIN  (90 Base) MCG/ACT inhaler, Inhale 2 puffs every 6 (six) hours as needed for wheezing, Disp: 18 g, Rfl: 0    zonisamide (ZONEGRAN) 100 mg capsule, Take 100 mg by mouth 5 (five) times a day  , Disp: , Rfl:     Current Allergies     Allergies as of 11/09/2018 - Reviewed 11/09/2018   Allergen Reaction Noted    Keflex [cephalexin] Anaphylaxis 01/03/2011    Levaquin [levofloxacin] Anaphylaxis 11/08/2007    Penicillins Anaphylaxis 11/08/2007    Bactrim [sulfamethoxazole-trimethoprim] Swelling and GI Intolerance 07/01/2015    Ciprofloxacin Swelling 07/01/2015    Noroxin [norfloxacin]  08/24/2018    Tylenol [acetaminophen]  11/01/2018            The following portions of the patient's history were reviewed and updated as appropriate: allergies, current medications, past family history, past medical history, past social history, past surgical history and problem list      Past Medical History:   Diagnosis Date    Asthma     [facility] disorder ([facility] )     Chronic UTI (urinary tract infection)     COPD (chronic obstructive pulmonary disease) ([place] [address] )     Diabetes mellitus ([place] [address] )     Disease of thyroid gland     Dyslipidemia 10/31/2018    Essential hypertension 10/31/2018    GERD (gastroesophageal reflux disease)     Obesity due to excess calories 10/31/2018    Seizure ([place] [address] )        Past Surgical History:   Procedure Laterality Date    ANKLE FRACTURE SURGERY Right     TUBAL LIGATION      VEIN LIGATION AND STRIPPING         No family history on file  Medications have been verified  Objective   /78   Pulse (!) 109   Temp 97 6 °F (36 4 °C)   Resp 18   Ht 5' 6" (1 676 m)   Wt 108 kg (239 lb)   LMP  (LMP Unknown)   SpO2 97%   BMI 38 58 kg/m²        Physical Exam     Physical Exam   Constitutional: She is oriented to person, place, and time  Vital signs are normal  She appears well-developed and well-nourished  She is cooperative  HENT:   Head: Normocephalic and atraumatic  Right Ear: Hearing, tympanic membrane, external ear and ear canal normal    Left Ear: Hearing, tympanic membrane, external ear and ear canal normal    Nose: Nose normal    Mouth/Throat: Oropharynx is clear and moist    Eyes: Pupils are equal, round, and reactive to light  Conjunctivae and EOM are normal  Lids are everted and swept, no foreign bodies found  Neck: Trachea normal and normal range of motion  Neck supple  Cardiovascular: Normal rate, regular rhythm, normal heart sounds and intact distal pulses  Pulmonary/Chest: Effort normal and breath sounds normal    Abdominal: Soft   Normal appearance and bowel sounds are normal  There is no hepatosplenomegaly  There is no tenderness  There is no CVA tenderness  Musculoskeletal: Normal range of motion  Lymphadenopathy:     She has no cervical adenopathy  She has no axillary adenopathy  Neurological: She is alert and oriented to person, place, and time  She has normal reflexes  Skin: Skin is warm, dry and intact  No rash noted  Psychiatric: She has a normal mood and affect  Her speech is normal and behavior is normal  Judgment and thought content normal    Nursing note and vitals reviewed

## 2018-11-13 DIAGNOSIS — I21.4 NSTEMI (NON-ST ELEVATED MYOCARDIAL INFARCTION) (HCC): ICD-10-CM

## 2018-11-15 ENCOUNTER — OFFICE VISIT (OUTPATIENT)
Dept: FAMILY MEDICINE CLINIC | Facility: CLINIC | Age: 63
End: 2018-11-15
Payer: COMMERCIAL

## 2018-11-15 VITALS
OXYGEN SATURATION: 94 % | BODY MASS INDEX: 38.41 KG/M2 | SYSTOLIC BLOOD PRESSURE: 104 MMHG | RESPIRATION RATE: 18 BRPM | DIASTOLIC BLOOD PRESSURE: 72 MMHG | HEIGHT: 66 IN | WEIGHT: 239 LBS | HEART RATE: 113 BPM | TEMPERATURE: 99 F

## 2018-11-15 DIAGNOSIS — R29.6 FREQUENT FALLS: ICD-10-CM

## 2018-11-15 DIAGNOSIS — N32.81 OVERACTIVE BLADDER: Primary | ICD-10-CM

## 2018-11-15 DIAGNOSIS — R60.0 BILATERAL LEG EDEMA: ICD-10-CM

## 2018-11-15 DIAGNOSIS — E11.65 TYPE 2 DIABETES MELLITUS WITH HYPERGLYCEMIA, WITH LONG-TERM CURRENT USE OF INSULIN (HCC): ICD-10-CM

## 2018-11-15 DIAGNOSIS — N30.20 CHRONIC CYSTITIS: Primary | ICD-10-CM

## 2018-11-15 DIAGNOSIS — Z79.4 TYPE 2 DIABETES MELLITUS WITH HYPERGLYCEMIA, WITH LONG-TERM CURRENT USE OF INSULIN (HCC): ICD-10-CM

## 2018-11-15 PROCEDURE — 87086 URINE CULTURE/COLONY COUNT: CPT | Performed by: NURSE PRACTITIONER

## 2018-11-15 PROCEDURE — 87186 SC STD MICRODIL/AGAR DIL: CPT | Performed by: NURSE PRACTITIONER

## 2018-11-15 PROCEDURE — T1015 CLINIC SERVICE: HCPCS | Performed by: FAMILY MEDICINE

## 2018-11-15 PROCEDURE — 81001 URINALYSIS AUTO W/SCOPE: CPT | Performed by: NURSE PRACTITIONER

## 2018-11-15 PROCEDURE — 87077 CULTURE AEROBIC IDENTIFY: CPT | Performed by: NURSE PRACTITIONER

## 2018-11-15 PROCEDURE — 87147 CULTURE TYPE IMMUNOLOGIC: CPT | Performed by: NURSE PRACTITIONER

## 2018-11-15 PROCEDURE — 81002 URINALYSIS NONAUTO W/O SCOPE: CPT | Performed by: FAMILY MEDICINE

## 2018-11-15 RX ORDER — FUROSEMIDE 20 MG/1
20 TABLET ORAL DAILY
Qty: 30 TABLET | Refills: 0 | Status: SHIPPED | OUTPATIENT
Start: 2018-11-15 | End: 2018-11-15 | Stop reason: SDUPTHER

## 2018-11-15 RX ORDER — OXYBUTYNIN CHLORIDE 5 MG/1
5 TABLET ORAL 3 TIMES DAILY
Qty: 90 TABLET | Refills: 1 | Status: SHIPPED | OUTPATIENT
Start: 2018-11-15 | End: 2019-03-01 | Stop reason: SDUPTHER

## 2018-11-15 RX ORDER — FUROSEMIDE 20 MG/1
20 TABLET ORAL DAILY
Qty: 30 TABLET | Refills: 1 | Status: SHIPPED | OUTPATIENT
Start: 2018-11-15 | End: 2018-11-15 | Stop reason: SDUPTHER

## 2018-11-15 RX ORDER — SIMVASTATIN 80 MG
80 TABLET ORAL
Qty: 30 TABLET | Refills: 0 | Status: SHIPPED | OUTPATIENT
Start: 2018-11-15 | End: 2018-12-20 | Stop reason: SDUPTHER

## 2018-11-15 RX ORDER — FUROSEMIDE 20 MG/1
20 TABLET ORAL DAILY
Qty: 30 TABLET | Refills: 0 | Status: SHIPPED | OUTPATIENT
Start: 2018-11-15 | End: 2018-12-10 | Stop reason: SDUPTHER

## 2018-11-15 RX ORDER — METOPROLOL SUCCINATE 25 MG/1
25 TABLET, EXTENDED RELEASE ORAL DAILY
Qty: 30 TABLET | Refills: 1 | Status: SHIPPED | OUTPATIENT
Start: 2018-11-15 | End: 2018-11-27 | Stop reason: SDUPTHER

## 2018-11-15 RX ORDER — MOMETASONE FUROATE 50 UG/1
SPRAY, METERED NASAL
COMMUNITY
Start: 2018-11-07 | End: 2019-06-28 | Stop reason: SDUPTHER

## 2018-11-15 NOTE — PROGRESS NOTES
OFFICE VISIT  Nataly Clinton 66 y o  female MRN: [id number]      Assessment / Plan:  Diagnoses and all orders for this visit:    Chronic cystitis  -     UA w Reflex to Microscopic w Reflex to Culture -Lab Collect    Frequent falls  -     Ambulatory referral to Physical Therapy; Future    Bilateral leg edema  -     furosemide (LASIX) 20 mg tablet; Take 1 tablet (20 mg total) by mouth daily  -     TEDS Stockings    Other orders  -     mometasone (NASONEX) 50 mcg/act nasal spray;       Make appt with urology, will call with urine results  No antbx at this time  Resume lasix, 20mg daily  Monitor water intake  Refer to PT to increase lower ext strength  Reason For Visit / Chief Complaint  Chief Complaint   Patient presents with    Follow-up     Would like a water pill    Urinary Symptoms        HPI:  Nataly Clinton is a 66 y o  female who presents today for follow up  She was seen last week by another PCP  She reports having UTI symptoms, she reports having burning urine, incontinence  She has been on several antbx  She is requesting another antbx  She has been previously referred to urology, she has not made an appt, she will be making an appt [place] [address]  She has been taking left over amoxicillin at home, unsure who prescribed  She has had falls at home, she was admitted on 10/29  She reports having lower leg weakness, she reports her knees are weak  She is using a walker, has wheel chair  She denies tripping  She has a lift chair at home, using pillow for elevation  She has lower leg edema to both legs, pitting  She sits in a wheelchair most of the day, she has TEDS, although she does not wear them       Historical Information   Past Medical History:   Diagnosis Date    Asthma     Bipolar disorder ([facility] )     Chronic UTI (urinary tract infection)     COPD (chronic obstructive pulmonary disease) ([facility] )     Diabetes mellitus ([facility] )     Disease of thyroid gland     Dyslipidemia 10/31/2018    Essential hypertension 10/31/2018    GERD (gastroesophageal reflux disease)     Obesity due to excess calories 10/31/2018    Seizure [facility])      Past Surgical History:   Procedure Laterality Date    ANKLE FRACTURE SURGERY Right     TUBAL LIGATION      VEIN LIGATION AND STRIPPING       Social History   History   Alcohol Use No     History   Drug Use No     History   Smoking Status    Never Smoker   Smokeless Tobacco    Never Used     No family history on file      Meds/Allergies   Allergies   Allergen Reactions    Keflex [Cephalexin] Anaphylaxis     Airway edema     Levaquin [Levofloxacin] Anaphylaxis    Penicillins Anaphylaxis    Bactrim [Sulfamethoxazole-Trimethoprim] Swelling and GI Intolerance     LE edema and thrush    Ciprofloxacin Swelling     Edema and all extremities and thrush   Edema in all extremities and thrush     Noroxin [Norfloxacin]     Tylenol [Acetaminophen]        Meds:    Current Outpatient Prescriptions:     aspirin (ECOTRIN LOW STRENGTH) 81 mg EC tablet, Take 1 tablet (81 mg total) by mouth daily, Disp: 90 tablet, Rfl: 1    Blood Glucose Monitoring Suppl (BLOOD GLUCOSE MONITOR SYSTEM) w/Device KIT, Test blood sugars 3 times a day, Disp: 1 each, Rfl: 0    clotrimazole-betamethasone (LOTRISONE) 1-0 05 % cream, 2 (two) times a day As needed , Disp: , Rfl:     estradiol (ESTRACE) 1 mg tablet, Take 1 mg by mouth daily at bedtime  , Disp: , Rfl:     fluticasone (FLONASE) 50 mcg/act nasal spray, SPRAY ONE (1) SPRAY INTO EACH NOSTRIL ONCE DAILY      **FOR THE NOSE**, Disp: 16 g, Rfl: 0    furosemide (LASIX) 20 mg tablet, Take 1 tablet (20 mg total) by mouth daily, Disp: 30 tablet, Rfl: 1    gabapentin (NEURONTIN) 800 mg tablet, Take 800 mg by mouth 4 (four) times a day , Disp: , Rfl:     glucose blood test strip, Test blood sugars 3 times a day , Disp: 100 each, Rfl: 3    hydrOXYzine pamoate (VISTARIL) 50 mg capsule, Take 1 capsule (50 mg total) by mouth 3 (three) times a day, Disp: 90 capsule, Rfl: 3    Incontinence Supply Disposable (PADSORBER BED PAN LINERS) MISC, by Does not apply route as needed (incontinence), Disp: 50 each, Rfl: 3    Infant Care Products (CUTIES SENSITIVE WIPES) MISC, 120 Pieces by Does not apply route as needed (incontinence), Disp: 120 each, Rfl: 3    insulin glargine (LANTUS) 100 units/mL subcutaneous injection, Inject 10 Units under the skin daily at bedtime, Disp: 10 mL, Rfl: 2    LANTUS SOLOSTAR 100 units/mL injection pen, , Disp: , Rfl:     levETIRAcetam (KEPPRA) 750 mg tablet, Take 750 mg by mouth 2 (two) times a day  , Disp: , Rfl:     levothyroxine 175 mcg tablet, Take 1 tablet (175 mcg total) by mouth daily, Disp: 30 tablet, Rfl: 3    lisinopril (ZESTRIL) 5 mg tablet, Take 1 tablet (5 mg total) by mouth daily, Disp: 30 tablet, Rfl: 0    loratadine (CLARITIN) 10 mg tablet, Take 1 tablet (10 mg total) by mouth daily, Disp: 30 tablet, Rfl: 3    meloxicam (MOBIC) 7 5 mg tablet, Take 1 tablet (7 5 mg total) by mouth 2 (two) times a day, Disp: 60 tablet, Rfl: 3    metFORMIN (GLUCOPHAGE) 1000 MG tablet, Take 1 tablet (1,000 mg total) by mouth 2 (two) times a day with meals, Disp: 60 tablet, Rfl: 3    metoprolol succinate (TOPROL-XL) 25 mg 24 hr tablet, Take 1 tablet (25 mg total) by mouth daily, Disp: 30 tablet, Rfl: 1    mometasone (NASONEX) 50 mcg/act nasal spray, , Disp: , Rfl:     montelukast (SINGULAIR) 10 mg tablet, Take 1 tablet (10 mg total) by mouth daily at bedtime, Disp: 90 tablet, Rfl: 0    norethindrone (AYGESTIN) 5 mg tablet, Take 5 mg by mouth daily  , Disp: , Rfl:     NOVOFINE 32G X 6 MM MISC, , Disp: , Rfl: 2    NOVOLOG FLEXPEN 100 units/mL injection pen, Uses sliding scale , Disp: , Rfl:     omeprazole (PriLOSEC) 20 mg delayed release capsule, Take 1 capsule (20 mg total) by mouth daily TAKE ONE TABLET IN THE MORNING AND TAKE ONE TABLET IN THE EVENING BY MOUTH DAILY  , Disp: 120 capsule, Rfl: 0    ONETOUCH DELICA LANCETS 78J MISC, Test blood sugars 3 times a day , Disp: 100 each, Rfl: 3    oxybutynin (DITROPAN) 5 mg tablet, Take 1 tablet (5 mg total) by mouth 3 (three) times a day, Disp: 90 tablet, Rfl: 1    phenytoin (DILANTIN) 100 mg ER capsule, Take by mouth 5 (five) times a day 3 tablets in morning and 2 at lunch daily , Disp: , Rfl:     polyethylene glycol (GLYCOLAX) powder, Take 17 g by mouth daily, Disp: 850 g, Rfl: 3    QUEtiapine (SEROquel) 400 MG tablet, Take 1 tablet (400 mg total) by mouth 2 (two) times a day, Disp: 60 tablet, Rfl: 3    simvastatin (ZOCOR) 80 mg tablet, Take 1 tablet (80 mg total) by mouth daily at bedtime, Disp: 30 tablet, Rfl: 0    Skin Protectants, Misc  (CALAZIME SKIN PROTECTANT) PSTE, Apply 113 g topically 2 (two) times a day, Disp: 1 Tube, Rfl: 0    valACYclovir (VALTREX) 500 mg tablet, Take 1 tablet by mouth 2 (two) times a day, Disp: , Rfl: 2    VENTOLIN  (90 Base) MCG/ACT inhaler, Inhale 2 puffs every 6 (six) hours as needed for wheezing, Disp: 18 g, Rfl: 0    zonisamide (ZONEGRAN) 100 mg capsule, Take 100 mg by mouth 5 (five) times a day  , Disp: , Rfl:       REVIEW OF SYSTEMS  Review of Systems   Constitutional: Negative for chills, fatigue and fever  HENT: Negative for congestion, ear discharge, ear pain, sore throat, trouble swallowing and voice change  Eyes: Negative for pain and redness  Respiratory: Negative for cough, chest tightness, shortness of breath and wheezing  Cardiovascular: Positive for leg swelling  Gastrointestinal: Negative for abdominal pain, blood in stool, constipation, diarrhea, nausea and vomiting  Endocrine: Negative for cold intolerance, heat intolerance, polydipsia, polyphagia and polyuria  Genitourinary: Positive for frequency  Negative for decreased urine volume, dysuria and urgency  Musculoskeletal: Negative for arthralgias, back pain, myalgias and neck pain  Skin: Negative for color change and rash     Neurological: Negative for dizziness, syncope, weakness, light-headedness, numbness and headaches  Psychiatric/Behavioral: Negative for sleep disturbance and suicidal ideas  The patient is not nervous/anxious  Current Vitals:   Blood Pressure: 104/72 (11/15/18 0935)  Pulse: (!) 113 (11/15/18 0935)  Temperature: 99 °F (37 2 °C) (11/15/18 0935)  Respirations: 18 (11/15/18 0935)  Height: 5' 6" (167 6 cm) (11/15/18 0935)  Weight - Scale: 108 kg (239 lb) (11/15/18 0935)  SpO2: 94 % (11/15/18 0935)  [unfilled]    PHYSICAL EXAMS:  Physical Exam   Constitutional: She is oriented to person, place, and time  She appears well-developed and well-nourished  HENT:   Head: Normocephalic  Right Ear: External ear normal    Left Ear: External ear normal    Mouth/Throat: Oropharynx is clear and moist    Eyes: Pupils are equal, round, and reactive to light  Conjunctivae are normal    Neck: Neck supple  Cardiovascular: Normal rate and regular rhythm  Pulmonary/Chest: Effort normal and breath sounds normal    Abdominal: Soft  Bowel sounds are normal  She exhibits no distension  There is no tenderness  Musculoskeletal: Normal range of motion  She exhibits edema  Neurological: She is alert and oriented to person, place, and time  Skin: Skin is warm and dry  Psychiatric: She has a normal mood and affect  Follow up at this office in 1 month     Counseling / Coordination of Care  Total floor / unit time spent today 20 minutes  Greater than 50% of total time was spent with the patient and / or family counseling and / or coordination of care

## 2018-11-16 LAB
BACTERIA UR QL AUTO: ABNORMAL /HPF
BILIRUB UR QL STRIP: ABNORMAL
CAOX CRY URNS QL MICRO: ABNORMAL /HPF
CLARITY UR: CLEAR
COLOR UR: ABNORMAL
GLUCOSE UR STRIP-MCNC: ABNORMAL MG/DL
HGB UR QL STRIP.AUTO: ABNORMAL
KETONES UR STRIP-MCNC: ABNORMAL MG/DL
LEUKOCYTE ESTERASE UR QL STRIP: ABNORMAL
NITRITE UR QL STRIP: ABNORMAL
NON-SQ EPI CELLS URNS QL MICRO: ABNORMAL /HPF
PROT UR STRIP-MCNC: ABNORMAL MG/DL
RBC #/AREA URNS AUTO: ABNORMAL /HPF
SP GR UR STRIP.AUTO: 1.02 (ref 1–1.03)
UROBILINOGEN UR QL STRIP.AUTO: ABNORMAL E.U./DL
WBC #/AREA URNS AUTO: ABNORMAL /HPF

## 2018-11-18 LAB — BACTERIA UR CULT: ABNORMAL

## 2018-11-19 ENCOUNTER — TELEPHONE (OUTPATIENT)
Dept: FAMILY MEDICINE CLINIC | Facility: CLINIC | Age: 63
End: 2018-11-19

## 2018-11-19 DIAGNOSIS — N30.00 ACUTE CYSTITIS WITHOUT HEMATURIA: Primary | ICD-10-CM

## 2018-11-19 RX ORDER — NITROFURANTOIN 25; 75 MG/1; MG/1
100 CAPSULE ORAL 2 TIMES DAILY
Qty: 14 CAPSULE | Refills: 0 | Status: SHIPPED | OUTPATIENT
Start: 2018-11-19 | End: 2018-11-26

## 2018-11-19 NOTE — TELEPHONE ENCOUNTER
Her urine culture resulted yesterday on 11/18  She has a positive UTI  I will start her on macrobid  She will need to follow up with urology for chronic cystitis

## 2018-11-27 DIAGNOSIS — I21.4 NSTEMI (NON-ST ELEVATED MYOCARDIAL INFARCTION) (HCC): ICD-10-CM

## 2018-11-27 RX ORDER — METOPROLOL SUCCINATE 25 MG/1
25 TABLET, EXTENDED RELEASE ORAL DAILY
Qty: 30 TABLET | Refills: 0 | Status: SHIPPED | OUTPATIENT
Start: 2018-11-27 | End: 2018-12-21 | Stop reason: SDUPTHER

## 2018-11-30 DIAGNOSIS — I21.4 NSTEMI (NON-ST ELEVATED MYOCARDIAL INFARCTION) (HCC): ICD-10-CM

## 2018-12-03 RX ORDER — LISINOPRIL 5 MG/1
5 TABLET ORAL DAILY
Qty: 30 TABLET | Refills: 3 | Status: SHIPPED | OUTPATIENT
Start: 2018-12-03 | End: 2019-03-18 | Stop reason: SDUPTHER

## 2018-12-04 NOTE — TELEPHONE ENCOUNTER
I called [employer] [facility] Wheel[employer] on Friday November 30th @ 3:04 pm to set up an eval for patient to receive the product  The company will send additional paper to our office for the provider to complete  Once finished [facility] will [facility]  Patients account number for [employer] is [id number]    Phone number is [phone] ext [phone]  Patient - [facility]  Pulmonary, Critical Care, and Sleep Medicine      Name: Maribel Delgado MRN: [id number]   : 1952 Hospital: [address] [place] [address]   Date: 2017          Critical Care Initial Patient Consult    Requesting MD:   Dr. Bhargavi Montez                                               Reason for CC Consult: vent management    IMPRESSION:   · Status epilepticus-in setting of missed dose AED  · MV for airway protection due to above  · Possible aspiration  · HAGMA  · UDS +opioids, benzo, THC  · Elevated LFT  · PRISCILLA  · DM  · Seizure d/o    Others  · Hx HTN  · Medical noncompliance  · Polysubstance abuse  · Pulmonary HTN  · Tobacco use      RECOMMENDATIONS:   · Resp -  Vent bundle. No weaning sedation or vent until cleared by neuro/EEG. Daily CXR and ABG. · ID - No evidence infection. Trend temps, WBC. High risk aspiration-sputum cx  · CVS - Monitor HD, currently stable. Last ECHO 6/15: EF 45-50% with hypokinesis anteroseptal and inferoseptal walls. RVSP 60. Repeat cardiac enzymes. · Heme/Onc- hgb, plt and INR stable  · Metabolic - Replace lytes prn   · Renal - Trend BUN, Cr, CO2. Baker for I/O. Check LA, salicylate, etoh levels  · Endocrine - Check TSH. FSG with SSI prn, may need levemir added  · Neuro/ Pain/ Sedation - Neurology following. On keppra. Await EEG before weaning sedation-currently on versed and propofol. Seizure precautions. UDS+benzo, opiates, THC. · GI - NPO for now. PPI. Trend LFT's  · Prophylaxis - DVT-lovenox, GI-protonix     Subjective/History: This patient has been seen and evaluated at the request of Dr. Bhargavi Montez for vent management in Bed 2606. Patient is a 60 y.o. female with PMH DM, HTN, medical noncompliance, polysubstance abuse, etoh use, psychosis, pulmonary HTN, tobacco use, seizure disorder who presented to the ED via EMS with seizure at home. Pt was found unresponsive and diaphoretic by EMS and per report,  stated that seizure was approx 1 hour.  In the ED she remained unresponsive and was intubated for airway protection. She was loaded with keppra and tele neuro and neurology were consulted. CT head was negative for acute process. CXR following repositioning of ETT showed improved L sided atelectasis. Labs notable for CO2 9, Gap 26, glucose 279, Cr 1.34, ALT 89, AST 87, trop 0.04. She was started on versed and propofol. Pt was admitted to the ICU for further evaluation and management. Past Medical History:   Diagnosis Date    Diabetes     Dyslipidemia     Hypertension     Noncompliance     Polysubstance abuse     ETOH,  marijuana    Psychosis     Pulmonary hypertension     RVSP 60mmHg (ECHO 6/15)    Seizure disorder       No past surgical history on file. Prior to Admission medications    Medication Sig Start Date End Date Taking? Authorizing Provider   adalimumab (HUMIRA PEN) 40 mg/0.8 mL pnkt 40 mg by SubCUTAneous route. Indications: RHEUMATOID ARTHRITIS    Yaa Robles MD   oxyCODONE-acetaminophen (PERCOCET) 5-325 mg per tablet Take 1 tablet every 4-6 hours as needed for pain control. If you were instructed to try over the counter ibuprofen or tylenol, only take the percocet for pain not controlled with the over the counter medication. 7/23/15   MIGUEL Velazco   atorvastatin (LIPITOR) 20 mg tablet Take 1 Tab by mouth nightly. 6/19/15   Danny Crisostomo MD   ondansetron hcl (ZOFRAN, AS HYDROCHLORIDE,) 4 mg tablet Take 1 Tab by mouth every eight (8) hours as needed for Nausea. 6/19/15   Liss Damon MD   loratadine (CLARITIN) 10 mg tablet Take 10 mg by mouth daily. Historical Provider   Omega-3 Fatty Acids-Fish Oil (FISH OIL) 360-1,200 mg cpDR Take 1,200 mg by mouth. Yaa Robles MD   CALCIUM CARB/VIT D3/MINERALS (CALCIUM-VITAMIN D PO) Take 1,200 mg by mouth. Yaa Robles MD   famotidine (PEPCID) 20 mg tablet Take 20 mg by mouth two (2) times a day. Yaa Robles MD   OLANZapine (ZYPREXA) 5 mg tablet Take 5 mg by mouth nightly. Yaa Robles MD   sertraline (ZOLOFT) 100 mg tablet Take 100 mg by mouth daily. Yaa Robles MD   amLODIPine (NORVASC) 5 mg tablet Take 5 mg by mouth daily. Yaa Robles MD   folic acid (FOLVITE) 1 mg tablet Take 1 mg by mouth daily. Yaa Robles MD     Current Facility-Administered Medications   Medication Dose Route Frequency    levETIRAcetam (KEPPRA) 1500 mg in 100 ml IVPB  1,500 mg IntraVENous Q12H    0.9% sodium chloride infusion  150 mL/hr IntraVENous CONTINUOUS    propofol (DIPRIVAN) infusion  5-50 mcg/kg/min IntraVENous TITRATE    midazolam (VERSED) 100 mg in 0.9% sodium chloride 100 mL infusion  2 mg/hr IntraVENous TITRATE    dextrose 5% - 0.45% NaCl with KCl 20 mEq/L infusion  125 mL/hr IntraVENous CONTINUOUS    enoxaparin (LOVENOX) injection 40 mg  40 mg SubCUTAneous Q24H    [START ON 2017] pantoprazole (PROTONIX) 40 mg in sodium chloride 0.9 % 10 mL injection  40 mg IntraVENous DAILY    chlorhexidine (PERIDEX) 0.12 % mouthwash 10 mL  10 mL Oral Q12H    insulin lispro (HUMALOG) injection   SubCUTAneous Q6H     Allergies   Allergen Reactions    Aspirin Swelling    Penicillins Unknown (comments)      Social History   Substance Use Topics    Smoking status: Current Every Day Smoker     Packs/day: 0.25    Smokeless tobacco: Not on file    Alcohol use Yes      Comment: occasional beer      No family history on file. Review of Systems:  Review of systems not obtained due to patient factors.     Objective:   Vital Signs:    Visit Vitals    /59    Pulse 74    Temp 99.5 °F (37.5 °C)    Resp 16    Wt 79.8 kg (176 lb)    SpO2 100%    BMI 30.21 kg/m2       O2 Device: Ventilator   O2 Flow Rate (L/min): 15 l/min   Temp (24hrs), Av.1 °F (14.5 °C), Min:37.4 °F (3 °C), Max:99.5 °F (37.5 °C)       Intake/Output:   Last shift:         Last 3 shifts:    No intake or output data in the 24 hours ending 17 7064    Ventilator Settings:  Mode Rate Tidal Volume Pressure FiO2 PEEP Assist control, VC+   400 ml    40 % 7 cm H20     Peak airway pressure: 19 cm H2O    Minute ventilation: 7.04 l/min      ARDS network Guidelines: Lung protective strategy and Pl pressure goals      Physical Exam:    General:  Intubated and sedated on vent, PERRL   Head:  Normocephalic, without obvious abnormality, atraumatic. Eyes:  Conjunctivae/corneas clear. Nose: Nares normal.    Throat: Lips, mucosa, and tongue normal. ETT in place   Neck: Supple, symmetrical       Lungs:   Clear to auscultation bilaterally, good AE, no wheeze or rhonchi   Chest wall:  No tenderness or deformity. Heart:  Regular, no muurmur   Abdomen:   Soft, non-tender. +obese Bowel sounds normal. No masses. Extremities: Extremities normal, no edema  : meyer in place, clear yellow urine, small clot in tubing   Pulses: 2+ and symmetric all extremities. Skin: Skin color, texture, turgor normal. Excoriated area L knee               Data:     Recent Results (from the past 24 hour(s))   CBC WITH AUTOMATED DIFF    Collection Time: 05/01/17 11:00 AM   Result Value Ref Range    WBC 9.1 4.6 - 13.2 K/uL    RBC 4.87 4.20 - 5.30 M/uL    HGB 15.5 12.0 - 16.0 g/dL    HCT 47.2 (H) 35.0 - 45.0 %    MCV 96.9 74.0 - 97.0 FL    MCH 31.8 24.0 - 34.0 PG    MCHC 32.8 31.0 - 37.0 g/dL    RDW 14.2 11.6 - 14.5 %    PLATELET 847 427 - 962 K/uL    MPV 10.3 9.2 - 11.8 FL    NEUTROPHILS 62 40 - 73 %    LYMPHOCYTES 34 21 - 52 %    MONOCYTES 3 3 - 10 %    EOSINOPHILS 1 0 - 5 %    BASOPHILS 0 0 - 2 %    ABS. NEUTROPHILS 5.5 1.8 - 8.0 K/UL    ABS. LYMPHOCYTES 3.1 0.9 - 3.6 K/UL    ABS. MONOCYTES 0.3 0.05 - 1.2 K/UL    ABS. EOSINOPHILS 0.1 0.0 - 0.4 K/UL    ABS.  BASOPHILS 0.0 0.0 - 0.06 K/UL    DF AUTOMATED     METABOLIC PANEL, COMPREHENSIVE    Collection Time: 05/01/17 11:00 AM   Result Value Ref Range    Sodium 141 136 - 145 mmol/L    Potassium 3.9 3.5 - 5.5 mmol/L    Chloride 106 100 - 108 mmol/L    CO2 9 (LL) 21 - 32 mmol/L    Anion gap 26 (H) 3.0 - 18 mmol/L Glucose 279 (H) 74 - 99 mg/dL    BUN 6 (L) 7.0 - 18 MG/DL    Creatinine 1.34 (H) 0.6 - 1.3 MG/DL    BUN/Creatinine ratio 4 (L) 12 - 20      GFR est AA 49 (L) >60 ml/min/1.73m2    GFR est non-AA 40 (L) >60 ml/min/1.73m2    Calcium 9.4 8.5 - 10.1 MG/DL    Bilirubin, total 0.3 0.2 - 1.0 MG/DL    ALT (SGPT) 89 (H) 13 - 56 U/L    AST (SGOT) 87 (H) 15 - 37 U/L    Alk. phosphatase 115 45 - 117 U/L    Protein, total 9.0 (H) 6.4 - 8.2 g/dL    Albumin 3.5 3.4 - 5.0 g/dL    Globulin 5.5 (H) 2.0 - 4.0 g/dL    A-G Ratio 0.6 (L) 0.8 - 1.7     CARDIAC PANEL,(CK, CKMB & TROPONIN)    Collection Time: 05/01/17 11:00 AM   Result Value Ref Range     (H) 26 - 192 U/L    CK - MB 1.6 <3.6 ng/ml    CK-MB Index 0.7 0.0 - 4.0 %    Troponin-I, Qt. 0.04 0.0 - 0.045 NG/ML   MAGNESIUM    Collection Time: 05/01/17 11:00 AM   Result Value Ref Range    Magnesium 2.5 1.6 - 2.6 mg/dL   PROTHROMBIN TIME + INR    Collection Time: 05/01/17 11:00 AM   Result Value Ref Range    Prothrombin time 14.7 11.5 - 15.2 sec    INR 1.2 0.8 - 1.2     PTT    Collection Time: 05/01/17 11:00 AM   Result Value Ref Range    aPTT 23.5 23.0 - 36.4 SEC   POC G3    Collection Time: 05/01/17 11:08 AM   Result Value Ref Range    Device: Non rebreather      Flow rate (POC) 10 L/M    FIO2 (POC) 1.0 %    pH (POC) 7.078 (LL) 7.35 - 7.45      pCO2 (POC) 25.8 (L) 35.0 - 45.0 MMHG    pO2 (POC) 214 (H) 80 - 100 MMHG    HCO3 (POC) 7.6 (L) 22 - 26 MMOL/L    sO2 (POC) 99 (H) 92 - 97 %    Base deficit (POC) 22 mmol/L    Allens test (POC) YES      Total resp.  rate 21      Site RIGHT RADIAL      Patient temp. 99.1      Specimen type (POC) ARTERIAL      Performed by Aurelio Salomon    [facility]    Collection Time: 05/01/17 11:08 AM   Result Value Ref Range    CO2 (POC) 12 (L) 19 - 24 MMOL/L    Glucose (POC) 294 (H) 74 - 106 MG/DL    BUN (POC) 7 7 - 18 MG/DL    Creatinine (POC) 0.9 0.6 - 1.3 MG/DL    GFR-AA (POC) >60 >60 ml/min/1.73m2    GFR, non-AA (POC) >60 >60 ml/min/1.73m2 Sodium (POC) 140 136 - 145 MMOL/L    Potassium (POC) 3.9 3.5 - 5.5 MMOL/L    Calcium, ionized (POC) 1.09 (L) 1.12 - 1.32 MMOL/L    Chloride (POC) 106 100 - 108 MMOL/L    Anion gap (POC) 27 (H) 10 - 20      Hematocrit (POC) 52 (H) 36 - 49 %    Hemoglobin (POC) 17.7 (H) 12 - 16 G/DL   POC TROPONIN-I    Collection Time: 05/01/17 11:10 AM   Result Value Ref Range    Troponin-I (POC) <0.04 0.00 - 0.08 ng/mL   EKG, 12 LEAD, INITIAL    Collection Time: 05/01/17 11:51 AM   Result Value Ref Range    Ventricular Rate 115 BPM    Atrial Rate 115 BPM    P-R Interval 124 ms    QRS Duration 80 ms    Q-T Interval 354 ms    QTC Calculation (Bezet) 489 ms    Calculated P Axis 54 degrees    Calculated R Axis 2 degrees    Calculated T Axis 72 degrees    Diagnosis       Sinus tachycardia  Nonspecific ST and T wave abnormality  Abnormal ECG     URINALYSIS W/ RFLX MICROSCOPIC    Collection Time: 05/01/17  1:09 PM   Result Value Ref Range    Color YELLOW      Appearance CLEAR      Specific gravity 1.014 1.005 - 1.030      pH (UA) 5.5 5.0 - 8.0      Protein 30 (A) NEG mg/dL    Glucose 250 (A) NEG mg/dL    Ketone NEGATIVE  NEG mg/dL    Bilirubin NEGATIVE  NEG      Blood TRACE (A) NEG      Urobilinogen 0.2 0.2 - 1.0 EU/dL    Nitrites NEGATIVE  NEG      Leukocyte Esterase NEGATIVE  NEG     DRUG SCREEN, URINE    Collection Time: 05/01/17  1:09 PM   Result Value Ref Range    BENZODIAZEPINE POSITIVE (A) NEG      BARBITURATES NEGATIVE  NEG      THC (TH-CANNABINOL) POSITIVE (A) NEG      OPIATES POSITIVE (A) NEG      PCP(PHENCYCLIDINE) NEGATIVE  NEG      COCAINE NEGATIVE  NEG      AMPHETAMINE NEGATIVE  NEG      METHADONE NEGATIVE       HDSCOM (NOTE)    URINE MICROSCOPIC ONLY    Collection Time: 05/01/17  1:09 PM   Result Value Ref Range    WBC 0 to 1 0 - 4 /hpf    RBC 0 to 1 0 - 5 /hpf    Bacteria NEGATIVE  NEG /hpf    Mucus FEW (A) NEG /lpf    Hyaline cast 21 to 35 0 - 2 /lpf    Yeast FEW (A) NEG     POC G3    Collection Time: 05/01/17  2:29 PM Result Value Ref Range    Device: VENT      FIO2 (POC) 1.0 %    pH (POC) 7.380 7.35 - 7.45      pCO2 (POC) 41.2 35.0 - 45.0 MMHG    pO2 (POC) 451 (H) 80 - 100 MMHG    HCO3 (POC) 24.3 22 - 26 MMOL/L    sO2 (POC) 100 (H) 92 - 97 %    Base deficit (POC) 1 mmol/L    Mode ASSIST CONTROL      Tidal volume 400 ml    Set Rate 16 bpm    PEEP/CPAP (POC) 7 cmH2O    PIP (POC) 23      Allens test (POC) YES      Inspiratory Time 0.75 sec    Total resp. rate 16      Site RIGHT RADIAL      Patient temp. 37.4      Specimen type (POC) ARTERIAL      Performed by [email or web address]     Volume control plus YES               Telemetry:normal sinus rhythm    Imaging:  I have personally reviewed the patients radiographs and have reviewed the reports:  5/1/17 CXR    Endotracheal tube now in satisfactory position with considerable improvement of  left lung atelectasis, mild residual linear atelectasis left lung base  laterally.      CT head 5/1/17  No acute intracranial abnormalities       MIGUEL Miller

## 2018-12-10 DIAGNOSIS — R60.0 BILATERAL LEG EDEMA: ICD-10-CM

## 2018-12-10 RX ORDER — FUROSEMIDE 20 MG/1
20 TABLET ORAL DAILY
Qty: 30 TABLET | Refills: 3 | Status: SHIPPED | OUTPATIENT
Start: 2018-12-10 | End: 2019-04-30

## 2018-12-20 DIAGNOSIS — Z79.4 TYPE 2 DIABETES MELLITUS WITH HYPERGLYCEMIA, WITH LONG-TERM CURRENT USE OF INSULIN (HCC): ICD-10-CM

## 2018-12-20 DIAGNOSIS — E11.65 TYPE 2 DIABETES MELLITUS WITH HYPERGLYCEMIA, WITH LONG-TERM CURRENT USE OF INSULIN (HCC): ICD-10-CM

## 2018-12-20 RX ORDER — SIMVASTATIN 80 MG
80 TABLET ORAL
Qty: 30 TABLET | Refills: 0 | Status: SHIPPED | OUTPATIENT
Start: 2018-12-20 | End: 2019-03-01 | Stop reason: SDUPTHER

## 2018-12-21 DIAGNOSIS — I21.4 NSTEMI (NON-ST ELEVATED MYOCARDIAL INFARCTION) (HCC): ICD-10-CM

## 2018-12-23 RX ORDER — METOPROLOL SUCCINATE 25 MG/1
25 TABLET, EXTENDED RELEASE ORAL DAILY
Qty: 30 TABLET | Refills: 1 | Status: SHIPPED | OUTPATIENT
Start: 2018-12-23 | End: 2019-03-25 | Stop reason: SDUPTHER

## 2019-01-07 DIAGNOSIS — K21.9 GERD WITHOUT ESOPHAGITIS: ICD-10-CM

## 2019-01-07 RX ORDER — OMEPRAZOLE 20 MG/1
20 CAPSULE, DELAYED RELEASE ORAL DAILY
Qty: 120 CAPSULE | Refills: 0 | Status: SHIPPED | OUTPATIENT
Start: 2019-01-07 | End: 2019-01-07 | Stop reason: SDUPTHER

## 2019-01-07 RX ORDER — OMEPRAZOLE 20 MG/1
20 CAPSULE, DELAYED RELEASE ORAL DAILY
Qty: 30 CAPSULE | Refills: 0 | Status: SHIPPED | OUTPATIENT
Start: 2019-01-07 | End: 2019-01-23 | Stop reason: SDUPTHER

## 2019-01-23 ENCOUNTER — OFFICE VISIT (OUTPATIENT)
Dept: FAMILY MEDICINE CLINIC | Facility: CLINIC | Age: 64
End: 2019-01-23
Payer: COMMERCIAL

## 2019-01-23 VITALS
TEMPERATURE: 97.5 F | RESPIRATION RATE: 18 BRPM | OXYGEN SATURATION: 92 % | SYSTOLIC BLOOD PRESSURE: 100 MMHG | HEART RATE: 116 BPM | HEIGHT: 66 IN | DIASTOLIC BLOOD PRESSURE: 74 MMHG | BODY MASS INDEX: 33.59 KG/M2 | WEIGHT: 209 LBS

## 2019-01-23 DIAGNOSIS — K21.9 GERD WITHOUT ESOPHAGITIS: ICD-10-CM

## 2019-01-23 DIAGNOSIS — IMO0002 NON-HEALING STAGE 2 OR GREATER WOUND: ICD-10-CM

## 2019-01-23 DIAGNOSIS — R53.81 PHYSICAL DECONDITIONING: Primary | ICD-10-CM

## 2019-01-23 PROCEDURE — T1015 CLINIC SERVICE: HCPCS | Performed by: FAMILY MEDICINE

## 2019-01-23 RX ORDER — NITROFURANTOIN 25; 75 MG/1; MG/1
CAPSULE ORAL
Refills: 0 | COMMUNITY
Start: 2018-12-18 | End: 2019-03-07 | Stop reason: SDUPTHER

## 2019-01-23 NOTE — PROGRESS NOTES
OFFICE VISIT  Karla Holt 66 y o  female MRN: [id number]      Assessment / Plan:  Diagnoses and all orders for this visit:    Physical deconditioning  -     Ambulatory Referral to [address] [place] [address] Neil Villeda; Future    Non-healing stage 2 or greater wound    Other orders  -     nitrofurantoin (MACROBID) 100 mg capsule;         Enrollment  form previously completed for SNF, will complete again  Contact  at hospital to coordinate SNF placement  Spoke with Rodriguez Branham, to re-initiate home care for PT OT and  for documentation  Reason For Visit / Chief Complaint  Chief Complaint   Patient presents with    Follow-up     She would like to go to a nursing home  HPI:  Karla Holt is a 66 y o  female who presents today for routine follow up  She reports having minimal help at home  She reports having more falls at home  She has limited resources for food shopping  She was previously in SNF in which she was discharged  She has had frequent ED visits  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  She has no family  Historical Information   Past Medical History:   Diagnosis Date    Asthma     Bipolar disorder ([facility] )     Chronic UTI (urinary tract infection)     COPD (chronic obstructive pulmonary disease) ([facility] )     Diabetes mellitus ([facility] )     Disease of thyroid gland     Dyslipidemia 10/31/2018    Essential hypertension 10/31/2018    GERD (gastroesophageal reflux disease)     Obesity due to excess calories 10/31/2018    Seizure ([facility] )      Past Surgical History:   Procedure Laterality Date    ANKLE FRACTURE SURGERY Right     TUBAL LIGATION      VEIN LIGATION AND STRIPPING       Social History   History   Alcohol Use No     History   Drug Use No     History   Smoking Status    Never Smoker   Smokeless Tobacco    Never Used     No family history on file      Meds/Allergies   Allergies   Allergen Reactions    Keflex [Cephalexin] Anaphylaxis     Airway edema     Levaquin [Levofloxacin] Anaphylaxis  Penicillins Anaphylaxis    Bactrim [Sulfamethoxazole-Trimethoprim] Swelling and GI Intolerance     LE edema and thrush    Ciprofloxacin Swelling     Edema and all extremities and thrush   Edema in all extremities and thrush     Noroxin [Norfloxacin]     Tylenol [Acetaminophen]        Meds:    Current Outpatient Prescriptions:     aspirin (ECOTRIN LOW STRENGTH) 81 mg EC tablet, Take 1 tablet (81 mg total) by mouth daily, Disp: 90 tablet, Rfl: 1    Blood Glucose Monitoring Suppl (BLOOD GLUCOSE MONITOR SYSTEM) w/Device KIT, Test blood sugars 3 times a day, Disp: 1 each, Rfl: 0    clotrimazole-betamethasone (LOTRISONE) 1-0 05 % cream, 2 (two) times a day As needed , Disp: , Rfl:     estradiol (ESTRACE) 1 mg tablet, Take 1 mg by mouth daily at bedtime  , Disp: , Rfl:     fluticasone (FLONASE) 50 mcg/act nasal spray, SPRAY ONE (1) SPRAY INTO EACH NOSTRIL ONCE DAILY      **FOR THE NOSE**, Disp: 16 g, Rfl: 0    furosemide (LASIX) 20 mg tablet, Take 1 tablet (20 mg total) by mouth daily, Disp: 30 tablet, Rfl: 3    gabapentin (NEURONTIN) 800 mg tablet, Take 800 mg by mouth 4 (four) times a day , Disp: , Rfl:     glucose blood test strip, Test blood sugars 3 times a day , Disp: 100 each, Rfl: 3    hydrOXYzine pamoate (VISTARIL) 50 mg capsule, Take 1 capsule (50 mg total) by mouth 3 (three) times a day, Disp: 90 capsule, Rfl: 3    Incontinence Supply Disposable (PADSORBER BED PAN LINERS) MISC, by Does not apply route as needed (incontinence), Disp: 50 each, Rfl: 3    Infant Care Products (CUTIES SENSITIVE WIPES) MISC, 120 Pieces by Does not apply route as needed (incontinence), Disp: 120 each, Rfl: 3    insulin glargine (LANTUS) 100 units/mL subcutaneous injection, Inject 10 Units under the skin daily at bedtime, Disp: 10 mL, Rfl: 2    LANTUS SOLOSTAR 100 units/mL injection pen, , Disp: , Rfl:     levETIRAcetam (KEPPRA) 750 mg tablet, Take 750 mg by mouth 2 (two) times a day  , Disp: , Rfl:    levothyroxine 175 mcg tablet, Take 1 tablet (175 mcg total) by mouth daily, Disp: 30 tablet, Rfl: 3    lisinopril (ZESTRIL) 5 mg tablet, Take 1 tablet (5 mg total) by mouth daily, Disp: 30 tablet, Rfl: 3    loratadine (CLARITIN) 10 mg tablet, Take 1 tablet (10 mg total) by mouth daily, Disp: 30 tablet, Rfl: 3    meloxicam (MOBIC) 7 5 mg tablet, Take 1 tablet (7 5 mg total) by mouth 2 (two) times a day, Disp: 60 tablet, Rfl: 3    metFORMIN (GLUCOPHAGE) 1000 MG tablet, Take 1 tablet (1,000 mg total) by mouth 2 (two) times a day with meals, Disp: 60 tablet, Rfl: 3    metoprolol succinate (TOPROL-XL) 25 mg 24 hr tablet, Take 1 tablet (25 mg total) by mouth daily, Disp: 30 tablet, Rfl: 1    mometasone (NASONEX) 50 mcg/act nasal spray, , Disp: , Rfl:     montelukast (SINGULAIR) 10 mg tablet, Take 1 tablet (10 mg total) by mouth daily at bedtime, Disp: 90 tablet, Rfl: 0    nitrofurantoin (MACROBID) 100 mg capsule, , Disp: , Rfl: 0    norethindrone (AYGESTIN) 5 mg tablet, Take 5 mg by mouth daily  , Disp: , Rfl:     NOVOFINE 32G X 6 MM MISC, , Disp: , Rfl: 2    NOVOLOG FLEXPEN 100 units/mL injection pen, Uses sliding scale , Disp: , Rfl:     omeprazole (PriLOSEC) 20 mg delayed release capsule, Take 1 capsule (20 mg total) by mouth daily TAKE ONE TABLET IN THE MORNING AND TAKE ONE TABLET IN THE EVENING BY MOUTH DAILY  , Disp: 30 capsule, Rfl: 0    ONETOUCH DELICA LANCETS 51Y MISC, Test blood sugars 3 times a day , Disp: 100 each, Rfl: 3    oxybutynin (DITROPAN) 5 mg tablet, Take 1 tablet (5 mg total) by mouth 3 (three) times a day, Disp: 90 tablet, Rfl: 1    phenytoin (DILANTIN) 100 mg ER capsule, Take by mouth 5 (five) times a day 3 tablets in morning and 2 at lunch daily , Disp: , Rfl:     polyethylene glycol (GLYCOLAX) powder, Take 17 g by mouth daily, Disp: 850 g, Rfl: 3    QUEtiapine (SEROquel) 400 MG tablet, Take 1 tablet (400 mg total) by mouth 2 (two) times a day, Disp: 60 tablet, Rfl: 3   simvastatin (ZOCOR) 80 mg tablet, Take 1 tablet (80 mg total) by mouth daily at bedtime, Disp: 30 tablet, Rfl: 0    Skin Protectants, Misc  (CALAZIME SKIN PROTECTANT) PSTE, Apply 113 g topically 2 (two) times a day, Disp: 1 Tube, Rfl: 0    valACYclovir (VALTREX) 500 mg tablet, Take 1 tablet by mouth 2 (two) times a day, Disp: , Rfl: 2    VENTOLIN  (90 Base) MCG/ACT inhaler, Inhale 2 puffs every 6 (six) hours as needed for wheezing, Disp: 18 g, Rfl: 0    zonisamide (ZONEGRAN) 100 mg capsule, Take 100 mg by mouth 5 (five) times a day  , Disp: , Rfl:       REVIEW OF SYSTEMS  Review of Systems   Constitutional: Negative for chills, fatigue and fever  HENT: Negative for congestion, ear discharge, ear pain, sore throat, trouble swallowing and voice change  Eyes: Negative for pain and redness  Respiratory: Negative for cough, chest tightness, shortness of breath and wheezing  Gastrointestinal: Negative for abdominal pain, blood in stool, constipation, diarrhea, nausea and vomiting  Endocrine: Negative for cold intolerance, heat intolerance, polydipsia, polyphagia and polyuria  Genitourinary: Negative for decreased urine volume, dysuria, frequency and urgency  Musculoskeletal: Negative for arthralgias, back pain, myalgias and neck pain  Skin: Positive for wound  Negative for color change and rash  Neurological: Negative for dizziness, syncope, weakness, light-headedness, numbness and headaches  Psychiatric/Behavioral: Negative for sleep disturbance and suicidal ideas  The patient is not nervous/anxious              Current Vitals:   Blood Pressure: 100/74 (01/23/19 0841)  Pulse: (!) 116 (01/23/19 0841)  Temperature: 97 5 °F (36 4 °C) (01/23/19 0841)  Respirations: 18 (01/23/19 0841)  Height: 5' 6" (167 6 cm) (01/23/19 0841)  Weight - Scale: 94 8 kg (209 lb) (01/23/19 0841)  SpO2: 92 % (01/23/19 0841)  [unfilled]    PHYSICAL EXAMS:  Physical Exam   Constitutional: She is oriented to person, place, and time  She appears well-developed and well-nourished  HENT:   Head: Normocephalic  Right Ear: External ear normal    Left Ear: External ear normal    Mouth/Throat: Oropharynx is clear and moist    Eyes: Pupils are equal, round, and reactive to light  Conjunctivae are normal    Neck: Neck supple  Cardiovascular: Normal rate and regular rhythm  Pulmonary/Chest: Effort normal and breath sounds normal    Abdominal: Soft  Bowel sounds are normal  She exhibits no distension  There is no tenderness  Musculoskeletal: Normal range of motion  Neurological: She is alert and oriented to person, place, and time  Skin: Skin is warm and dry  Stage 2 to right buttocks, 0 5cm   Psychiatric: She has a normal mood and affect  Follow up at this office in admit to SNF    Counseling / Coordination of Care  Total floor / unit time spent today 20 minutes  Greater than 50% of total time was spent with the patient and / or family counseling and / or coordination of care

## 2019-01-24 RX ORDER — OMEPRAZOLE 20 MG/1
20 CAPSULE, DELAYED RELEASE ORAL DAILY
Qty: 30 CAPSULE | Refills: 3 | Status: SHIPPED | OUTPATIENT
Start: 2019-01-24 | End: 2019-04-30 | Stop reason: SDUPTHER

## 2019-02-11 RX ORDER — GABAPENTIN 800 MG/1
800 TABLET ORAL 4 TIMES DAILY
Qty: 120 TABLET | Refills: 0 | Status: CANCELLED | OUTPATIENT
Start: 2019-02-11

## 2019-02-13 DIAGNOSIS — F31.78 BIPOLAR DISORDER, IN FULL REMISSION, MOST RECENT EPISODE MIXED (HCC): ICD-10-CM

## 2019-02-13 DIAGNOSIS — Z79.4 TYPE 2 DIABETES MELLITUS WITH HYPERGLYCEMIA, WITH LONG-TERM CURRENT USE OF INSULIN (HCC): ICD-10-CM

## 2019-02-13 DIAGNOSIS — E11.65 TYPE 2 DIABETES MELLITUS WITH HYPERGLYCEMIA, WITH LONG-TERM CURRENT USE OF INSULIN (HCC): ICD-10-CM

## 2019-02-13 RX ORDER — QUETIAPINE FUMARATE 400 MG/1
400 TABLET, FILM COATED ORAL 2 TIMES DAILY
Qty: 60 TABLET | Refills: 3 | Status: SHIPPED | OUTPATIENT
Start: 2019-02-13 | End: 2019-04-30 | Stop reason: SDUPTHER

## 2019-02-13 RX ORDER — MELOXICAM 7.5 MG/1
7.5 TABLET ORAL 2 TIMES DAILY
Qty: 60 TABLET | Refills: 3 | Status: SHIPPED | OUTPATIENT
Start: 2019-02-13 | End: 2019-06-03 | Stop reason: SDUPTHER

## 2019-02-14 DIAGNOSIS — F41.9 ANXIETY: ICD-10-CM

## 2019-02-14 RX ORDER — HYDROXYZINE PAMOATE 50 MG/1
50 CAPSULE ORAL 3 TIMES DAILY
Qty: 90 CAPSULE | Refills: 3 | Status: SHIPPED | OUTPATIENT
Start: 2019-02-14 | End: 2019-04-30 | Stop reason: SDUPTHER

## 2019-02-15 DIAGNOSIS — N39.3 STRESS INCONTINENCE OF URINE: Primary | ICD-10-CM

## 2019-02-18 NOTE — PROGRESS NOTES
Received phone call from this patient on 02/15/2019, on call  She stated she had complaints of lower leg swelling increasing SOB and a cough  Patient was advised to go to [facility] ED for eval  She expressed concerns over transporation to ED explained to patient she should call 911 regarding the symptoms she is currently having  Patietn stated at the end of this phone calll she would call 911 and go to ED for further eval of these symptoms

## 2019-02-19 DIAGNOSIS — N39.3 STRESS INCONTINENCE OF URINE: ICD-10-CM

## 2019-03-01 ENCOUNTER — OFFICE VISIT (OUTPATIENT)
Dept: FAMILY MEDICINE CLINIC | Facility: HOME HEALTHCARE | Age: 64
End: 2019-03-01
Payer: COMMERCIAL

## 2019-03-01 VITALS
DIASTOLIC BLOOD PRESSURE: 72 MMHG | BODY MASS INDEX: 31.82 KG/M2 | SYSTOLIC BLOOD PRESSURE: 112 MMHG | OXYGEN SATURATION: 96 % | HEART RATE: 124 BPM | TEMPERATURE: 97.2 F | RESPIRATION RATE: 20 BRPM | HEIGHT: 66 IN | WEIGHT: 198 LBS

## 2019-03-01 DIAGNOSIS — L89.302 PRESSURE INJURY OF BUTTOCK, STAGE 2, UNSPECIFIED LATERALITY (HCC): Primary | ICD-10-CM

## 2019-03-01 DIAGNOSIS — E11.65 TYPE 2 DIABETES MELLITUS WITH HYPERGLYCEMIA, WITH LONG-TERM CURRENT USE OF INSULIN (HCC): ICD-10-CM

## 2019-03-01 DIAGNOSIS — Z79.4 TYPE 2 DIABETES MELLITUS WITH HYPERGLYCEMIA, WITH LONG-TERM CURRENT USE OF INSULIN (HCC): ICD-10-CM

## 2019-03-01 DIAGNOSIS — N32.81 OVERACTIVE BLADDER: ICD-10-CM

## 2019-03-01 DIAGNOSIS — R35.0 URINARY FREQUENCY: ICD-10-CM

## 2019-03-01 PROCEDURE — T1015 CLINIC SERVICE: HCPCS | Performed by: FAMILY MEDICINE

## 2019-03-01 PROCEDURE — 81001 URINALYSIS AUTO W/SCOPE: CPT | Performed by: NURSE PRACTITIONER

## 2019-03-01 RX ORDER — OXYBUTYNIN CHLORIDE 5 MG/1
5 TABLET ORAL 3 TIMES DAILY
Qty: 90 TABLET | Refills: 1 | Status: SHIPPED | OUTPATIENT
Start: 2019-03-01 | End: 2019-04-30 | Stop reason: SDUPTHER

## 2019-03-01 RX ORDER — SIMVASTATIN 80 MG
80 TABLET ORAL
Qty: 30 TABLET | Refills: 0 | Status: SHIPPED | OUTPATIENT
Start: 2019-03-01 | End: 2019-04-03 | Stop reason: SDUPTHER

## 2019-03-01 RX ORDER — OSTOMY SUPPLY 1"
1 EACH MISCELLANEOUS
Qty: 20 EACH | Refills: 0 | Status: SHIPPED | OUTPATIENT
Start: 2019-03-01 | End: 2019-09-06

## 2019-03-01 RX ORDER — LEVOTHYROXINE SODIUM 175 UG/1
175 TABLET ORAL DAILY
Qty: 30 TABLET | Refills: 3 | Status: SHIPPED | OUTPATIENT
Start: 2019-03-01 | End: 2019-03-07 | Stop reason: DRUGHIGH

## 2019-03-01 NOTE — LETTER
Pt has electric bed with alternating air flow with specailty mattress  Pt needs to continue use of this speciality bed due to current stage 2 pressure ulcer to sacral area  Pt will need frequent repositioning while in bed and offload buttocks throughout day

## 2019-03-01 NOTE — PROGRESS NOTES
OFFICE VISIT  Hansel Robertson 66 y o  female MRN: [id number]      Assessment / Plan:  Diagnoses and all orders for this visit:    Pressure injury of buttock, stage 2, unspecified laterality  -     Control Gel Formula Dressing (DUODERM CGF DRESSING) MISC; Apply 1 application topically every third day  -     Gel Cushions    Type 2 diabetes mellitus with hyperglycemia, with long-term current use of insulin (HCC)  -     HEMOGLOBIN A1C W/ EAG ESTIMATION; Future  -     Comprehensive metabolic panel; Future  -     Lipid Panel with Direct LDL reflex; Future  -     TSH, 3rd generation with Free T4 reflex; Future  -     CBC and differential; Future  -     levothyroxine 175 mcg tablet; Take 1 tablet (175 mcg total) by mouth daily  -     simvastatin (ZOCOR) 80 mg tablet; Take 1 tablet (80 mg total) by mouth daily at bedtime    Overactive bladder  -     oxybutynin (DITROPAN) 5 mg tablet; Take 1 tablet (5 mg total) by mouth 3 (three) times a day    Urinary frequency  -     UA (URINE) with reflex to Microscopic      Home care aide to continue to evaluate, may need wound care if no Improvement  Reason For Visit / Chief Complaint  Chief Complaint   Patient presents with    Rash    Medication Refill        HPI:  Hansel Robertson is a 66 y o  female who presents today for complaint for rash  She reports laying in bed long peroids of time  She has a hospital bed at home, needing medical necessity letter to keep bed at home  She reports applying cream, but has not helped       Historical Information   Past Medical History:   Diagnosis Date    Asthma     [facility]     Chronic UTI (urinary tract infection)     COPD (chronic obstructive pulmonary disease) ([facility]     Diabetes mellitus ([facility]     Disease of thyroid gland     Dyslipidemia 10/31/2018    Essential hypertension 10/31/2018    GERD (gastroesophageal reflux disease)     Obesity due to excess calories 10/31/2018    Seizure ([address] )      Past Surgical History: Procedure Laterality Date    ANKLE FRACTURE SURGERY Right     TUBAL LIGATION      VEIN LIGATION AND STRIPPING       Social History   Social History     Substance and Sexual Activity   Alcohol Use No     Social History     Substance and Sexual Activity   Drug Use No     Social History     Tobacco Use   Smoking Status Never Smoker   Smokeless Tobacco Never Used     No family history on file      Meds/Allergies   Allergies   Allergen Reactions    Keflex [Cephalexin] Anaphylaxis     Airway edema     Levaquin [Levofloxacin] Anaphylaxis    Penicillins Anaphylaxis    Bactrim [Sulfamethoxazole-Trimethoprim] Swelling and GI Intolerance     LE edema and thrush    Ciprofloxacin Swelling     Edema and all extremities and thrush   Edema in all extremities and thrush     Noroxin [Norfloxacin]     Tylenol [Acetaminophen]        Meds:    Current Outpatient Medications:     aspirin (ECOTRIN LOW STRENGTH) 81 mg EC tablet, Take 1 tablet (81 mg total) by mouth daily, Disp: 90 tablet, Rfl: 1    Blood Glucose Monitoring Suppl (BLOOD GLUCOSE MONITOR SYSTEM) w/Device KIT, Test blood sugars 3 times a day, Disp: 1 each, Rfl: 0    clotrimazole-betamethasone (LOTRISONE) 1-0 05 % cream, 2 (two) times a day As needed , Disp: , Rfl:     estradiol (ESTRACE) 1 mg tablet, Take 1 mg by mouth daily at bedtime  , Disp: , Rfl:     fluticasone (FLONASE) 50 mcg/act nasal spray, SPRAY ONE (1) SPRAY INTO EACH NOSTRIL ONCE DAILY      **FOR THE NOSE**, Disp: 16 g, Rfl: 0    furosemide (LASIX) 20 mg tablet, Take 1 tablet (20 mg total) by mouth daily, Disp: 30 tablet, Rfl: 3    gabapentin (NEURONTIN) 800 mg tablet, Take 800 mg by mouth 4 (four) times a day , Disp: , Rfl:     glucose blood test strip, Test blood sugars 3 times a day , Disp: 100 each, Rfl: 3    hydrOXYzine pamoate (VISTARIL) 50 mg capsule, Take 1 capsule (50 mg total) by mouth 3 (three) times a day, Disp: 90 capsule, Rfl: 3    Incontinence Supply Disposable (PADSORBER BED PAN LINERS) MISC, by Does not apply route as needed (incontinence), Disp: 50 each, Rfl: 3    Infant Care Products (CUTIES SENSITIVE WIPES) MISC, 120 Pieces by Does not apply route as needed (incontinence), Disp: 120 each, Rfl: 3    insulin glargine (LANTUS) 100 units/mL subcutaneous injection, Inject 10 Units under the skin daily at bedtime, Disp: 10 mL, Rfl: 2    LANTUS SOLOSTAR 100 units/mL injection pen, , Disp: , Rfl:     levETIRAcetam (KEPPRA) 750 mg tablet, Take 750 mg by mouth 2 (two) times a day  , Disp: , Rfl:     levothyroxine 175 mcg tablet, Take 1 tablet (175 mcg total) by mouth daily, Disp: 30 tablet, Rfl: 3    lisinopril (ZESTRIL) 5 mg tablet, Take 1 tablet (5 mg total) by mouth daily, Disp: 30 tablet, Rfl: 3    loratadine (CLARITIN) 10 mg tablet, Take 1 tablet (10 mg total) by mouth daily, Disp: 30 tablet, Rfl: 3    meloxicam (MOBIC) 7 5 mg tablet, Take 1 tablet (7 5 mg total) by mouth 2 (two) times a day, Disp: 60 tablet, Rfl: 3    metFORMIN (GLUCOPHAGE) 1000 MG tablet, Take 1 tablet (1,000 mg total) by mouth 2 (two) times a day with meals, Disp: 60 tablet, Rfl: 3    metoprolol succinate (TOPROL-XL) 25 mg 24 hr tablet, Take 1 tablet (25 mg total) by mouth daily, Disp: 30 tablet, Rfl: 1    mometasone (NASONEX) 50 mcg/act nasal spray, , Disp: , Rfl:     montelukast (SINGULAIR) 10 mg tablet, Take 1 tablet (10 mg total) by mouth daily at bedtime, Disp: 90 tablet, Rfl: 0    nitrofurantoin (MACROBID) 100 mg capsule, , Disp: , Rfl: 0    norethindrone (AYGESTIN) 5 mg tablet, Take 5 mg by mouth daily  , Disp: , Rfl:     NOVOFINE 32G X 6 MM MISC, , Disp: , Rfl: 2    NOVOLOG FLEXPEN 100 units/mL injection pen, Uses sliding scale , Disp: , Rfl:     omeprazole (PriLOSEC) 20 mg delayed release capsule, Take 1 capsule (20 mg total) by mouth daily TAKE ONE TABLET IN THE MORNING AND TAKE ONE TABLET IN THE EVENING BY MOUTH DAILY  , Disp: 30 capsule, Rfl: 3    ONETOUCH DELICA LANCETS 56H MISC, Test blood sugars 3 times a day , Disp: 100 each, Rfl: 3    oxybutynin (DITROPAN) 5 mg tablet, Take 1 tablet (5 mg total) by mouth 3 (three) times a day, Disp: 90 tablet, Rfl: 1    phenytoin (DILANTIN) 100 mg ER capsule, Take by mouth 5 (five) times a day 3 tablets in morning and 2 at lunch daily , Disp: , Rfl:     polyethylene glycol (GLYCOLAX) powder, Take 17 g by mouth daily, Disp: 850 g, Rfl: 3    QUEtiapine (SEROquel) 400 MG tablet, Take 1 tablet (400 mg total) by mouth 2 (two) times a day, Disp: 60 tablet, Rfl: 3    simvastatin (ZOCOR) 80 mg tablet, Take 1 tablet (80 mg total) by mouth daily at bedtime, Disp: 30 tablet, Rfl: 0    Skin Protectants, Misc  (CALAZIME SKIN PROTECTANT) PSTE, Apply 113 g topically 2 (two) times a day, Disp: 1 Tube, Rfl: 0    valACYclovir (VALTREX) 500 mg tablet, Take 1 tablet by mouth 2 (two) times a day, Disp: , Rfl: 2    VENTOLIN  (90 Base) MCG/ACT inhaler, Inhale 2 puffs every 6 (six) hours as needed for wheezing, Disp: 18 g, Rfl: 0    Zinc Oxide 10 % AERO, Apply 1 Can topically as needed (incontinence), Disp: 1 Can, Rfl: 3    zonisamide (ZONEGRAN) 100 mg capsule, Take 100 mg by mouth 5 (five) times a day  , Disp: , Rfl:     Control Gel Formula Dressing (DUODERM CGF DRESSING) MISC, Apply 1 application topically every third day, Disp: 20 each, Rfl: 0      REVIEW OF SYSTEMS  Review of Systems   Constitutional: Negative for chills, fatigue and fever  HENT: Negative for congestion, ear discharge, ear pain, sore throat, trouble swallowing and voice change  Eyes: Negative for pain and redness  Respiratory: Negative for cough, chest tightness, shortness of breath and wheezing  Gastrointestinal: Negative for abdominal pain, blood in stool, constipation, diarrhea, nausea and vomiting  Endocrine: Negative for cold intolerance, heat intolerance, polydipsia, polyphagia and polyuria  Genitourinary: Negative for decreased urine volume, dysuria, frequency and urgency  Musculoskeletal: Negative for arthralgias, back pain, myalgias and neck pain  Skin: Positive for rash and wound  Negative for color change  Neurological: Negative for dizziness, syncope, weakness, light-headedness, numbness and headaches  Psychiatric/Behavioral: Negative for sleep disturbance and suicidal ideas  The patient is not nervous/anxious  Current Vitals:   Blood Pressure: 112/72 (03/01/19 0905)  Pulse: (!) 124 (03/01/19 0905)  Temperature: (!) 97 2 °F (36 2 °C) (03/01/19 0905)  Temp Source: Temporal (03/01/19 0905)  Respirations: 20 (03/01/19 0905)  Height: 5' 6" (167 6 cm) (03/01/19 0905)  Weight - Scale: 89 8 kg (198 lb) (03/01/19 0905)  SpO2: 96 % (03/01/19 0905)  [unfilled]    PHYSICAL EXAMS:  Physical Exam   Constitutional: She is oriented to person, place, and time  She appears well-developed and well-nourished  HENT:   Head: Normocephalic  Right Ear: External ear normal    Left Ear: External ear normal    Mouth/Throat: Oropharynx is clear and moist    Eyes: Pupils are equal, round, and reactive to light  Conjunctivae are normal    Neck: Neck supple  Cardiovascular: Normal rate and regular rhythm  Pulmonary/Chest: Effort normal and breath sounds normal    Abdominal: Soft  Bowel sounds are normal  She exhibits no distension  There is no tenderness  Musculoskeletal: Normal range of motion  Neurological: She is alert and oriented to person, place, and time  Skin: Skin is warm and dry  Psychiatric: She has a normal mood and affect  Follow up at this office in 3 weeks     Counseling / Coordination of Care  Total floor / unit time spent today 20 minutes  Greater than 50% of total time was spent with the patient and / or family counseling and / or coordination of care

## 2019-03-06 ENCOUNTER — TRANSCRIBE ORDERS (OUTPATIENT)
Dept: ADMINISTRATIVE | Facility: HOSPITAL | Age: 64
End: 2019-03-06

## 2019-03-06 ENCOUNTER — APPOINTMENT (OUTPATIENT)
Dept: LAB | Facility: MEDICAL CENTER | Age: 64
End: 2019-03-06
Payer: COMMERCIAL

## 2019-03-06 DIAGNOSIS — R56.9 SEIZURE (HCC): ICD-10-CM

## 2019-03-06 DIAGNOSIS — Z79.4 TYPE 2 DIABETES MELLITUS WITH HYPERGLYCEMIA, WITH LONG-TERM CURRENT USE OF INSULIN (HCC): ICD-10-CM

## 2019-03-06 DIAGNOSIS — E11.65 TYPE 2 DIABETES MELLITUS WITH HYPERGLYCEMIA, WITH LONG-TERM CURRENT USE OF INSULIN (HCC): ICD-10-CM

## 2019-03-06 DIAGNOSIS — R56.9 SEIZURE (HCC): Primary | ICD-10-CM

## 2019-03-06 LAB
ALBUMIN SERPL BCP-MCNC: 3.6 G/DL (ref 3.5–5)
ALP SERPL-CCNC: 44 U/L (ref 46–116)
ALT SERPL W P-5'-P-CCNC: 40 U/L (ref 12–78)
ANION GAP SERPL CALCULATED.3IONS-SCNC: 10 MMOL/L (ref 4–13)
AST SERPL W P-5'-P-CCNC: 28 U/L (ref 5–45)
BACTERIA UR QL AUTO: ABNORMAL /HPF
BASOPHILS # BLD AUTO: 0.06 THOUSANDS/ΜL (ref 0–0.1)
BASOPHILS NFR BLD AUTO: 1 % (ref 0–1)
BILIRUB SERPL-MCNC: 0.34 MG/DL (ref 0.2–1)
BILIRUB UR QL STRIP: NEGATIVE
BUN SERPL-MCNC: 9 MG/DL (ref 5–25)
CALCIUM SERPL-MCNC: 8.4 MG/DL (ref 8.3–10.1)
CHLORIDE SERPL-SCNC: 94 MMOL/L (ref 100–108)
CHOLEST SERPL-MCNC: 172 MG/DL (ref 50–200)
CLARITY UR: ABNORMAL
CO2 SERPL-SCNC: 21 MMOL/L (ref 21–32)
COLOR UR: ABNORMAL
CREAT SERPL-MCNC: 0.84 MG/DL (ref 0.6–1.3)
EOSINOPHIL # BLD AUTO: 0.23 THOUSAND/ΜL (ref 0–0.61)
EOSINOPHIL NFR BLD AUTO: 4 % (ref 0–6)
ERYTHROCYTE [DISTWIDTH] IN BLOOD BY AUTOMATED COUNT: 13.5 % (ref 11.6–15.1)
EST. AVERAGE GLUCOSE BLD GHB EST-MCNC: 114 MG/DL
GFR SERPL CREATININE-BSD FRML MDRD: 75 ML/MIN/1.73SQ M
GLUCOSE P FAST SERPL-MCNC: 122 MG/DL (ref 65–99)
GLUCOSE UR STRIP-MCNC: NEGATIVE MG/DL
HBA1C MFR BLD: 5.6 % (ref 4.2–6.3)
HCT VFR BLD AUTO: 37.1 % (ref 34.8–46.1)
HDLC SERPL-MCNC: 45 MG/DL (ref 40–60)
HGB BLD-MCNC: 12.5 G/DL (ref 11.5–15.4)
HGB UR QL STRIP.AUTO: NEGATIVE
IMM GRANULOCYTES # BLD AUTO: 0.04 THOUSAND/UL (ref 0–0.2)
IMM GRANULOCYTES NFR BLD AUTO: 1 % (ref 0–2)
KETONES UR STRIP-MCNC: NEGATIVE MG/DL
LDLC SERPL CALC-MCNC: 85 MG/DL (ref 0–100)
LEUKOCYTE ESTERASE UR QL STRIP: ABNORMAL
LYMPHOCYTES # BLD AUTO: 1.31 THOUSANDS/ΜL (ref 0.6–4.47)
LYMPHOCYTES NFR BLD AUTO: 21 % (ref 14–44)
MCH RBC QN AUTO: 31.6 PG (ref 26.8–34.3)
MCHC RBC AUTO-ENTMCNC: 33.7 G/DL (ref 31.4–37.4)
MCV RBC AUTO: 94 FL (ref 82–98)
MONOCYTES # BLD AUTO: 0.51 THOUSAND/ΜL (ref 0.17–1.22)
MONOCYTES NFR BLD AUTO: 8 % (ref 4–12)
NEUTROPHILS # BLD AUTO: 4.22 THOUSANDS/ΜL (ref 1.85–7.62)
NEUTS SEG NFR BLD AUTO: 65 % (ref 43–75)
NITRITE UR QL STRIP: POSITIVE
NON-SQ EPI CELLS URNS QL MICRO: ABNORMAL /HPF
NRBC BLD AUTO-RTO: 0 /100 WBCS
PH UR STRIP.AUTO: 6 [PH]
PLATELET # BLD AUTO: 332 THOUSANDS/UL (ref 149–390)
PMV BLD AUTO: 9.5 FL (ref 8.9–12.7)
POTASSIUM SERPL-SCNC: 4 MMOL/L (ref 3.5–5.3)
PROT SERPL-MCNC: 7.3 G/DL (ref 6.4–8.2)
PROT UR STRIP-MCNC: NEGATIVE MG/DL
RBC # BLD AUTO: 3.96 MILLION/UL (ref 3.81–5.12)
RBC #/AREA URNS AUTO: ABNORMAL /HPF
SODIUM SERPL-SCNC: 125 MMOL/L (ref 136–145)
SP GR UR STRIP.AUTO: 1.01 (ref 1–1.03)
T4 FREE SERPL-MCNC: 0.97 NG/DL (ref 0.76–1.46)
TRIGL SERPL-MCNC: 209 MG/DL
TSH SERPL DL<=0.05 MIU/L-ACNC: 0.11 UIU/ML (ref 0.36–3.74)
UROBILINOGEN UR QL STRIP.AUTO: 1 E.U./DL
WBC # BLD AUTO: 6.37 THOUSAND/UL (ref 4.31–10.16)
WBC #/AREA URNS AUTO: ABNORMAL /HPF

## 2019-03-06 PROCEDURE — 80061 LIPID PANEL: CPT

## 2019-03-06 PROCEDURE — 83036 HEMOGLOBIN GLYCOSYLATED A1C: CPT

## 2019-03-06 PROCEDURE — 36415 COLL VENOUS BLD VENIPUNCTURE: CPT

## 2019-03-06 PROCEDURE — 84443 ASSAY THYROID STIM HORMONE: CPT

## 2019-03-06 PROCEDURE — 80186 ASSAY OF PHENYTOIN FREE: CPT

## 2019-03-06 PROCEDURE — 85025 COMPLETE CBC W/AUTO DIFF WBC: CPT

## 2019-03-06 PROCEDURE — 80053 COMPREHEN METABOLIC PANEL: CPT

## 2019-03-06 PROCEDURE — 84439 ASSAY OF FREE THYROXINE: CPT

## 2019-03-07 ENCOUNTER — TELEPHONE (OUTPATIENT)
Dept: FAMILY MEDICINE CLINIC | Facility: HOME HEALTHCARE | Age: 64
End: 2019-03-07

## 2019-03-07 DIAGNOSIS — N30.00 ACUTE CYSTITIS WITHOUT HEMATURIA: Primary | ICD-10-CM

## 2019-03-07 DIAGNOSIS — E03.9 ACQUIRED HYPOTHYROIDISM: Primary | ICD-10-CM

## 2019-03-07 RX ORDER — NITROFURANTOIN 25; 75 MG/1; MG/1
100 CAPSULE ORAL 2 TIMES DAILY
Qty: 20 CAPSULE | Refills: 0 | Status: SHIPPED | OUTPATIENT
Start: 2019-03-07 | End: 2019-04-17 | Stop reason: HOSPADM

## 2019-03-07 RX ORDER — LEVOTHYROXINE SODIUM 0.15 MG/1
150 TABLET ORAL DAILY
Qty: 30 TABLET | Refills: 0 | Status: SHIPPED | OUTPATIENT
Start: 2019-03-07 | End: 2019-04-01 | Stop reason: SDUPTHER

## 2019-03-07 NOTE — TELEPHONE ENCOUNTER
Patient is aware of medication change and of macrobid        ----- Message from [email or web address] sent at 3/7/2019  8:12 AM EST -----  im also changing her dose of synthroid to 150

## 2019-03-08 LAB — PHENYTOIN FREE SERPL-MCNC: 1.2 UG/ML (ref 1–2)

## 2019-03-12 DIAGNOSIS — L89.301 PRESSURE INJURY OF BUTTOCK, STAGE 1, UNSPECIFIED LATERALITY: Primary | ICD-10-CM

## 2019-03-13 RX ORDER — OSTOMY SUPPLY 1"
1 EACH MISCELLANEOUS
Qty: 20 EACH | Refills: 5 | Status: SHIPPED | OUTPATIENT
Start: 2019-03-13 | End: 2019-09-06

## 2019-03-18 DIAGNOSIS — I21.4 NSTEMI (NON-ST ELEVATED MYOCARDIAL INFARCTION) (HCC): ICD-10-CM

## 2019-03-18 DIAGNOSIS — E11.65 TYPE 2 DIABETES MELLITUS WITH HYPERGLYCEMIA, WITH LONG-TERM CURRENT USE OF INSULIN (HCC): ICD-10-CM

## 2019-03-18 DIAGNOSIS — Z79.4 TYPE 2 DIABETES MELLITUS WITH HYPERGLYCEMIA, WITH LONG-TERM CURRENT USE OF INSULIN (HCC): ICD-10-CM

## 2019-03-18 RX ORDER — LISINOPRIL 5 MG/1
5 TABLET ORAL DAILY
Qty: 30 TABLET | Refills: 3 | Status: SHIPPED | OUTPATIENT
Start: 2019-03-18 | End: 2019-04-30 | Stop reason: SDUPTHER

## 2019-03-21 ENCOUNTER — OFFICE VISIT (OUTPATIENT)
Dept: FAMILY MEDICINE CLINIC | Facility: HOME HEALTHCARE | Age: 64
End: 2019-03-21
Payer: COMMERCIAL

## 2019-03-21 VITALS
RESPIRATION RATE: 20 BRPM | DIASTOLIC BLOOD PRESSURE: 90 MMHG | SYSTOLIC BLOOD PRESSURE: 120 MMHG | OXYGEN SATURATION: 96 % | TEMPERATURE: 98.4 F | BODY MASS INDEX: 31.66 KG/M2 | HEART RATE: 111 BPM | HEIGHT: 66 IN | WEIGHT: 197 LBS

## 2019-03-21 DIAGNOSIS — J45.20 MILD INTERMITTENT ASTHMA WITHOUT COMPLICATION: Primary | ICD-10-CM

## 2019-03-21 PROCEDURE — T1015 CLINIC SERVICE: HCPCS | Performed by: FAMILY MEDICINE

## 2019-03-21 NOTE — PROGRESS NOTES
OFFICE VISIT  Lisette Valero 66 y o  female MRN: [id number]      Assessment / Plan:  Diagnoses and all orders for this visit:    Mild intermittent asthma without complication  -     Complete pulmonary function test; Future  -     XR chest pa & lateral; Future  -     Ambulatory referral to Pulmonology; Future  -     Home Oxygen Portability Evaluation Only          Reason For Visit / Chief Complaint  Chief Complaint   Patient presents with    Shortness of Breath        HPI:  Lisette Valero is a 66 y o  female who presents today for pulmnary function testing  She has a hospital bed at home in which is requiring specificn dx to ensure her hoipstial bed at home  Pt reports having COPD, no known evidence  She reports that she should have oxygen at home, she is 96% on RA  She has never had oxygen  Historical Information   Past Medical History:   Diagnosis Date    Asthma     Bipolar disorder ([facility] )     Chronic UTI (urinary tract infection)     COPD (chronic obstructive pulmonary disease) ([facility] )     Diabetes mellitus ([facility] )     Disease of thyroid gland     Dyslipidemia 10/31/2018    Essential hypertension 10/31/2018    GERD (gastroesophageal reflux disease)     Obesity due to excess calories 10/31/2018    Seizure ([facility] )      Past Surgical History:   Procedure Laterality Date    ANKLE FRACTURE SURGERY Right     TUBAL LIGATION      VEIN LIGATION AND STRIPPING       Social History   Social History     Substance and Sexual Activity   Alcohol Use No     Social History     Substance and Sexual Activity   Drug Use No     Social History     Tobacco Use   Smoking Status Never Smoker   Smokeless Tobacco Never Used     No family history on file      Meds/Allergies   Allergies   Allergen Reactions    Keflex [Cephalexin] Anaphylaxis     Airway edema     Levaquin [Levofloxacin] Anaphylaxis    Penicillins Anaphylaxis    Bactrim [Sulfamethoxazole-Trimethoprim] Swelling and GI Intolerance     LE edema and thrush    Ciprofloxacin Swelling     Edema and all extremities and thrush   Edema in all extremities and thrush     Noroxin [Norfloxacin]     Tylenol [Acetaminophen]        Meds:    Current Outpatient Medications:     aspirin (ECOTRIN LOW STRENGTH) 81 mg EC tablet, Take 1 tablet (81 mg total) by mouth daily, Disp: 90 tablet, Rfl: 1    Blood Glucose Monitoring Suppl (BLOOD GLUCOSE MONITOR SYSTEM) w/Device KIT, Test blood sugars 3 times a day, Disp: 1 each, Rfl: 0    clotrimazole-betamethasone (LOTRISONE) 1-0 05 % cream, 2 (two) times a day As needed , Disp: , Rfl:     Control Gel Formula Dressing (DUODERM CGF DRESSING) MISC, Apply 1 application topically every third day, Disp: 20 each, Rfl: 0    Control Gel Formula Dressing (DUODERM CGF DRESSING) MISC, Apply 1 application topically every 3 (three) days Please order 5X7, Disp: 20 each, Rfl: 5    estradiol (ESTRACE) 1 mg tablet, Take 1 mg by mouth daily at bedtime  , Disp: , Rfl:     fluticasone (FLONASE) 50 mcg/act nasal spray, SPRAY ONE (1) SPRAY INTO EACH NOSTRIL ONCE DAILY      **FOR THE NOSE**, Disp: 16 g, Rfl: 0    furosemide (LASIX) 20 mg tablet, Take 1 tablet (20 mg total) by mouth daily, Disp: 30 tablet, Rfl: 3    gabapentin (NEURONTIN) 800 mg tablet, Take 800 mg by mouth 4 (four) times a day , Disp: , Rfl:     glucose blood test strip, Test blood sugars 3 times a day , Disp: 100 each, Rfl: 3    hydrOXYzine pamoate (VISTARIL) 50 mg capsule, Take 1 capsule (50 mg total) by mouth 3 (three) times a day, Disp: 90 capsule, Rfl: 3    Incontinence Supply Disposable (PADSORBER BED PAN LINERS) MISC, by Does not apply route as needed (incontinence), Disp: 50 each, Rfl: 3    Infant Care Products (CUTIES SENSITIVE WIPES) MISC, 120 Pieces by Does not apply route as needed (incontinence), Disp: 120 each, Rfl: 3    insulin glargine (LANTUS) 100 units/mL subcutaneous injection, Inject 10 Units under the skin daily at bedtime, Disp: 10 mL, Rfl: 2    LANTUS SOLOSTAR 100 units/mL injection pen, , Disp: , Rfl:     levETIRAcetam (KEPPRA) 750 mg tablet, Take 750 mg by mouth 2 (two) times a day  , Disp: , Rfl:     levothyroxine 150 mcg tablet, Take 1 tablet (150 mcg total) by mouth daily, Disp: 30 tablet, Rfl: 0    lisinopril (ZESTRIL) 5 mg tablet, Take 1 tablet (5 mg total) by mouth daily, Disp: 30 tablet, Rfl: 3    loratadine (CLARITIN) 10 mg tablet, Take 1 tablet (10 mg total) by mouth daily, Disp: 30 tablet, Rfl: 3    meloxicam (MOBIC) 7 5 mg tablet, Take 1 tablet (7 5 mg total) by mouth 2 (two) times a day, Disp: 60 tablet, Rfl: 3    metFORMIN (GLUCOPHAGE) 1000 MG tablet, Take 1 tablet (1,000 mg total) by mouth 2 (two) times a day with meals, Disp: 60 tablet, Rfl: 3    metoprolol succinate (TOPROL-XL) 25 mg 24 hr tablet, Take 1 tablet (25 mg total) by mouth daily, Disp: 30 tablet, Rfl: 1    mometasone (NASONEX) 50 mcg/act nasal spray, , Disp: , Rfl:     montelukast (SINGULAIR) 10 mg tablet, Take 1 tablet (10 mg total) by mouth daily at bedtime, Disp: 90 tablet, Rfl: 0    nitrofurantoin (MACROBID) 100 mg capsule, Take 1 capsule (100 mg total) by mouth 2 (two) times a day, Disp: 20 capsule, Rfl: 0    norethindrone (AYGESTIN) 5 mg tablet, Take 5 mg by mouth daily  , Disp: , Rfl:     NOVOFINE 32G X 6 MM MISC, , Disp: , Rfl: 2    NOVOLOG FLEXPEN 100 units/mL injection pen, Uses sliding scale , Disp: , Rfl:     omeprazole (PriLOSEC) 20 mg delayed release capsule, Take 1 capsule (20 mg total) by mouth daily TAKE ONE TABLET IN THE MORNING AND TAKE ONE TABLET IN THE EVENING BY MOUTH DAILY  , Disp: 30 capsule, Rfl: 3    ONETOUCH DELICA LANCETS 50G MISC, Test blood sugars 3 times a day , Disp: 100 each, Rfl: 3    oxybutynin (DITROPAN) 5 mg tablet, Take 1 tablet (5 mg total) by mouth 3 (three) times a day, Disp: 90 tablet, Rfl: 1    phenytoin (DILANTIN) 100 mg ER capsule, Take by mouth 5 (five) times a day 3 tablets in morning and 2 at lunch daily , Disp: , Rfl:     polyethylene glycol (GLYCOLAX) powder, Take 17 g by mouth daily, Disp: 850 g, Rfl: 3    QUEtiapine (SEROquel) 400 MG tablet, Take 1 tablet (400 mg total) by mouth 2 (two) times a day, Disp: 60 tablet, Rfl: 3    simvastatin (ZOCOR) 80 mg tablet, Take 1 tablet (80 mg total) by mouth daily at bedtime, Disp: 30 tablet, Rfl: 0    Skin Protectants, Misc  (CALAZIME SKIN PROTECTANT) PSTE, Apply 113 g topically 2 (two) times a day, Disp: 1 Tube, Rfl: 0    valACYclovir (VALTREX) 500 mg tablet, Take 1 tablet by mouth 2 (two) times a day, Disp: , Rfl: 2    VENTOLIN  (90 Base) MCG/ACT inhaler, Inhale 2 puffs every 6 (six) hours as needed for wheezing, Disp: 18 g, Rfl: 0    Zinc Oxide 10 % AERO, Apply 1 Can topically as needed (incontinence), Disp: 1 Can, Rfl: 3    zonisamide (ZONEGRAN) 100 mg capsule, Take 100 mg by mouth 5 (five) times a day  , Disp: , Rfl:       REVIEW OF SYSTEMS  Review of Systems   Constitutional: Negative for chills, fatigue and fever  HENT: Negative for congestion, ear discharge, ear pain, sore throat, trouble swallowing and voice change  Eyes: Negative for pain and redness  Respiratory: Positive for shortness of breath  Negative for cough, chest tightness and wheezing  Gastrointestinal: Negative for abdominal pain, blood in stool, constipation, diarrhea, nausea and vomiting  Endocrine: Negative for cold intolerance, heat intolerance, polydipsia, polyphagia and polyuria  Genitourinary: Negative for decreased urine volume, dysuria, frequency and urgency  Musculoskeletal: Negative for arthralgias, back pain, myalgias and neck pain  Skin: Negative for color change and rash  Neurological: Negative for dizziness, syncope, weakness, light-headedness, numbness and headaches  Psychiatric/Behavioral: Negative for sleep disturbance and suicidal ideas  The patient is not nervous/anxious              Current Vitals:   Blood Pressure: 120/90 (03/21/19 0940)  Pulse: (!) 111 (03/21/19 0940)  Temperature: 98 4 °F (36 9 °C) (03/21/19 0940)  Respirations: 20 (03/21/19 0940)  Height: 5' 6" (167 6 cm) (03/21/19 0940)  Weight - Scale: 89 4 kg (197 lb) (03/21/19 0940)  SpO2: 96 % (03/21/19 0940)  [unfilled]    PHYSICAL EXAMS:  Physical Exam   Constitutional: She is oriented to person, place, and time  She appears well-developed and well-nourished  HENT:   Head: Normocephalic  Right Ear: External ear normal    Left Ear: External ear normal    Mouth/Throat: Oropharynx is clear and moist    Eyes: Pupils are equal, round, and reactive to light  Conjunctivae are normal    Neck: Neck supple  Cardiovascular: Normal rate and regular rhythm  Pulmonary/Chest: Effort normal and breath sounds normal    Abdominal: Soft  Bowel sounds are normal  She exhibits no distension  There is no tenderness  Musculoskeletal: Normal range of motion  Neurological: She is alert and oriented to person, place, and time  Skin: Skin is warm and dry  Psychiatric: She has a normal mood and affect  Follow up at this office in one month     Counseling / Coordination of Care  Total floor / unit time spent today 20 minutes  Greater than 50% of total time was spent with the patient and / or family counseling and / or coordination of care

## 2019-03-25 DIAGNOSIS — I21.4 NSTEMI (NON-ST ELEVATED MYOCARDIAL INFARCTION) (HCC): ICD-10-CM

## 2019-03-25 RX ORDER — METOPROLOL SUCCINATE 25 MG/1
25 TABLET, EXTENDED RELEASE ORAL DAILY
Qty: 30 TABLET | Refills: 0 | Status: SHIPPED | OUTPATIENT
Start: 2019-03-25 | End: 2019-04-30 | Stop reason: SDUPTHER

## 2019-04-01 ENCOUNTER — HOSPITAL ENCOUNTER (OUTPATIENT)
Dept: PULMONOLOGY | Facility: HOSPITAL | Age: 64
Discharge: HOME | End: 2019-04-01

## 2019-04-01 DIAGNOSIS — E03.9 ACQUIRED HYPOTHYROIDISM: ICD-10-CM

## 2019-04-01 RX ORDER — LEVOTHYROXINE SODIUM 0.15 MG/1
150 TABLET ORAL DAILY
Qty: 90 TABLET | Refills: 0 | Status: SHIPPED | OUTPATIENT
Start: 2019-04-01 | End: 2019-04-30 | Stop reason: SDUPTHER

## 2019-04-01 RX ORDER — ALBUTEROL SULFATE 2.5 MG/3ML
2.5 SOLUTION RESPIRATORY (INHALATION) ONCE AS NEEDED
Status: DISCONTINUED | OUTPATIENT
Start: 2019-04-01 | End: 2019-04-05 | Stop reason: HOSPADM

## 2019-04-03 DIAGNOSIS — Z79.4 TYPE 2 DIABETES MELLITUS WITH HYPERGLYCEMIA, WITH LONG-TERM CURRENT USE OF INSULIN (HCC): ICD-10-CM

## 2019-04-03 DIAGNOSIS — E11.65 TYPE 2 DIABETES MELLITUS WITH HYPERGLYCEMIA, WITH LONG-TERM CURRENT USE OF INSULIN (HCC): ICD-10-CM

## 2019-04-04 RX ORDER — SIMVASTATIN 80 MG
80 TABLET ORAL
Qty: 30 TABLET | Refills: 0 | Status: SHIPPED | OUTPATIENT
Start: 2019-04-04 | End: 2019-04-30 | Stop reason: SDUPTHER

## 2019-04-13 ENCOUNTER — APPOINTMENT (EMERGENCY)
Dept: RADIOLOGY | Facility: HOSPITAL | Age: 64
DRG: 720 | End: 2019-04-13
Payer: COMMERCIAL

## 2019-04-13 ENCOUNTER — APPOINTMENT (EMERGENCY)
Dept: CT IMAGING | Facility: HOSPITAL | Age: 64
DRG: 720 | End: 2019-04-13
Payer: COMMERCIAL

## 2019-04-13 ENCOUNTER — HOSPITAL ENCOUNTER (INPATIENT)
Facility: HOSPITAL | Age: 64
LOS: 4 days | Discharge: NURSING, REHAB OR LONG-TERM CARE | DRG: 720 | End: 2019-04-17
Attending: EMERGENCY MEDICINE | Admitting: INTERNAL MEDICINE
Payer: COMMERCIAL

## 2019-04-13 DIAGNOSIS — R26.2 AMBULATORY DYSFUNCTION: ICD-10-CM

## 2019-04-13 DIAGNOSIS — L89.323 PRESSURE ULCER OF LEFT BUTTOCK, STAGE 3 (HCC): ICD-10-CM

## 2019-04-13 DIAGNOSIS — L89.312 PRESSURE ULCER OF RIGHT BUTTOCK, STAGE 2 (HCC): ICD-10-CM

## 2019-04-13 DIAGNOSIS — R53.1 GENERALIZED WEAKNESS: ICD-10-CM

## 2019-04-13 DIAGNOSIS — R65.20 SEVERE SEPSIS (HCC): ICD-10-CM

## 2019-04-13 DIAGNOSIS — R33.9 URINARY RETENTION: ICD-10-CM

## 2019-04-13 DIAGNOSIS — L89.153 PRESSURE INJURY OF SACRAL REGION, STAGE 3 (HCC): ICD-10-CM

## 2019-04-13 DIAGNOSIS — E66.01 OBESITY, MORBID (MORE THAN 100 LBS OVER IDEAL WEIGHT OR BMI > 40) (HCC): ICD-10-CM

## 2019-04-13 DIAGNOSIS — R23.8 SKIN BREAKDOWN: Primary | ICD-10-CM

## 2019-04-13 DIAGNOSIS — A41.9 SEVERE SEPSIS (HCC): ICD-10-CM

## 2019-04-13 PROBLEM — J44.9 COPD (CHRONIC OBSTRUCTIVE PULMONARY DISEASE) (HCC): Status: ACTIVE | Noted: 2019-04-13

## 2019-04-13 PROBLEM — B37.31 VAGINAL CANDIDIASIS: Status: ACTIVE | Noted: 2019-04-13

## 2019-04-13 PROBLEM — E87.1 HYPONATREMIA: Status: ACTIVE | Noted: 2019-04-13

## 2019-04-13 PROBLEM — E83.42 HYPOMAGNESEMIA: Status: ACTIVE | Noted: 2019-04-13

## 2019-04-13 PROBLEM — I95.9 HYPOTENSION: Status: ACTIVE | Noted: 2019-04-13

## 2019-04-13 PROBLEM — E78.1 HYPERTRIGLYCERIDEMIA: Status: ACTIVE | Noted: 2019-04-13

## 2019-04-13 PROBLEM — E87.2 LACTIC ACIDOSIS: Status: ACTIVE | Noted: 2019-04-13

## 2019-04-13 PROBLEM — E78.00 HYPERCHOLESTEROLEMIA: Status: ACTIVE | Noted: 2018-10-31
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
## 2019-04-13 PROBLEM — E87.20 LACTIC ACIDOSIS: Status: ACTIVE | Noted: 2019-04-13

## 2019-04-13 PROBLEM — B37.3 VAGINAL CANDIDIASIS: Status: ACTIVE | Noted: 2019-04-13
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
## 2019-04-13 LAB
ALBUMIN SERPL BCP-MCNC: 2.9 G/DL (ref 3.5–5)
ALP SERPL-CCNC: 47 U/L (ref 46–116)
ALT SERPL W P-5'-P-CCNC: 24 U/L (ref 12–78)
AMPHETAMINES SERPL QL SCN: NEGATIVE
ANION GAP SERPL CALCULATED.3IONS-SCNC: 12 MMOL/L (ref 4–13)
APAP SERPL-MCNC: <2 UG/ML (ref 10–20)
APTT PPP: 36 SECONDS (ref 26–38)
AST SERPL W P-5'-P-CCNC: 21 U/L (ref 5–45)
ATRIAL RATE: 99 BPM
BARBITURATES UR QL: NEGATIVE
BASOPHILS # BLD AUTO: 0.05 THOUSANDS/ΜL (ref 0–0.1)
BASOPHILS NFR BLD AUTO: 1 % (ref 0–1)
BENZODIAZ UR QL: NEGATIVE
BILIRUB SERPL-MCNC: 0.3 MG/DL (ref 0.2–1)
BILIRUB UR QL STRIP: NEGATIVE
BUN SERPL-MCNC: 6 MG/DL (ref 5–25)
CALCIUM SERPL-MCNC: 8.6 MG/DL (ref 8.3–10.1)
CHLORIDE SERPL-SCNC: 91 MMOL/L (ref 100–108)
CLARITY UR: CLEAR
CO2 SERPL-SCNC: 24 MMOL/L (ref 21–32)
COCAINE UR QL: NEGATIVE
COLOR UR: YELLOW
CREAT SERPL-MCNC: 0.76 MG/DL (ref 0.6–1.3)
EOSINOPHIL # BLD AUTO: 0.16 THOUSAND/ΜL (ref 0–0.61)
EOSINOPHIL NFR BLD AUTO: 4 % (ref 0–6)
ERYTHROCYTE [DISTWIDTH] IN BLOOD BY AUTOMATED COUNT: 14.2 % (ref 11.6–15.1)
GFR SERPL CREATININE-BSD FRML MDRD: 85 ML/MIN/1.73SQ M
GLUCOSE SERPL-MCNC: 126 MG/DL (ref 65–140)
GLUCOSE SERPL-MCNC: 143 MG/DL (ref 65–140)
GLUCOSE SERPL-MCNC: 156 MG/DL (ref 65–140)
GLUCOSE SERPL-MCNC: 166 MG/DL (ref 65–140)
GLUCOSE SERPL-MCNC: 97 MG/DL (ref 65–140)
GLUCOSE UR STRIP-MCNC: NEGATIVE MG/DL
HCT VFR BLD AUTO: 35 % (ref 34.8–46.1)
HGB BLD-MCNC: 12 G/DL (ref 11.5–15.4)
HGB UR QL STRIP.AUTO: NEGATIVE
IMM GRANULOCYTES # BLD AUTO: 0.02 THOUSAND/UL (ref 0–0.2)
IMM GRANULOCYTES NFR BLD AUTO: 1 % (ref 0–2)
INR PPP: 1.47 (ref 0.86–1.17)
KETONES UR STRIP-MCNC: NEGATIVE MG/DL
LACTATE SERPL-SCNC: 2.8 MMOL/L (ref 0.5–2)
LACTATE SERPL-SCNC: 3.5 MMOL/L (ref 0.5–2)
LEUKOCYTE ESTERASE UR QL STRIP: NEGATIVE
LYMPHOCYTES # BLD AUTO: 1.02 THOUSANDS/ΜL (ref 0.6–4.47)
LYMPHOCYTES NFR BLD AUTO: 24 % (ref 14–44)
MAGNESIUM SERPL-MCNC: 1.4 MG/DL (ref 1.6–2.6)
MCH RBC QN AUTO: 32.1 PG (ref 26.8–34.3)
MCHC RBC AUTO-ENTMCNC: 34.3 G/DL (ref 31.4–37.4)
MCV RBC AUTO: 94 FL (ref 82–98)
METHADONE UR QL: NEGATIVE
MONOCYTES # BLD AUTO: 0.53 THOUSAND/ΜL (ref 0.17–1.22)
MONOCYTES NFR BLD AUTO: 12 % (ref 4–12)
NEUTROPHILS # BLD AUTO: 2.55 THOUSANDS/ΜL (ref 1.85–7.62)
NEUTS SEG NFR BLD AUTO: 58 % (ref 43–75)
NITRITE UR QL STRIP: NEGATIVE
NRBC BLD AUTO-RTO: 0 /100 WBCS
OPIATES UR QL SCN: NEGATIVE
P AXIS: 56 DEGREES
PCP UR QL: NEGATIVE
PH UR STRIP.AUTO: 6.5 [PH]
PHENYTOIN SERPL-MCNC: 11.2 UG/ML (ref 10–20)
PLATELET # BLD AUTO: 377 THOUSANDS/UL (ref 149–390)
PMV BLD AUTO: 8.8 FL (ref 8.9–12.7)
POTASSIUM SERPL-SCNC: 4 MMOL/L (ref 3.5–5.3)
PR INTERVAL: 152 MS
PROCALCITONIN SERPL-MCNC: <0.05 NG/ML
PROT SERPL-MCNC: 6.6 G/DL (ref 6.4–8.2)
PROT UR STRIP-MCNC: NEGATIVE MG/DL
PROTHROMBIN TIME: 17.1 SECONDS (ref 11.8–14.2)
QRS AXIS: 33 DEGREES
QRSD INTERVAL: 66 MS
QT INTERVAL: 350 MS
QTC INTERVAL: 449 MS
RBC # BLD AUTO: 3.74 MILLION/UL (ref 3.81–5.12)
RV AG STL QL: NEGATIVE
SODIUM SERPL-SCNC: 127 MMOL/L (ref 136–145)
SP GR UR STRIP.AUTO: <=1.005 (ref 1–1.03)
T WAVE AXIS: 58 DEGREES
THC UR QL: NEGATIVE
TROPONIN I SERPL-MCNC: <0.02 NG/ML
TROPONIN I SERPL-MCNC: <0.02 NG/ML
UROBILINOGEN UR QL STRIP.AUTO: 0.2 E.U./DL
VENTRICULAR RATE: 99 BPM
WBC # BLD AUTO: 4.33 THOUSAND/UL (ref 4.31–10.16)

## 2019-04-13 PROCEDURE — 93005 ELECTROCARDIOGRAM TRACING: CPT

## 2019-04-13 PROCEDURE — 85610 PROTHROMBIN TIME: CPT

## 2019-04-13 PROCEDURE — 81003 URINALYSIS AUTO W/O SCOPE: CPT | Performed by: EMERGENCY MEDICINE

## 2019-04-13 PROCEDURE — 82948 REAGENT STRIP/BLOOD GLUCOSE: CPT

## 2019-04-13 PROCEDURE — 84484 ASSAY OF TROPONIN QUANT: CPT | Performed by: INTERNAL MEDICINE

## 2019-04-13 PROCEDURE — 36415 COLL VENOUS BLD VENIPUNCTURE: CPT

## 2019-04-13 PROCEDURE — 87505 NFCT AGENT DETECTION GI: CPT | Performed by: INTERNAL MEDICINE

## 2019-04-13 PROCEDURE — 83605 ASSAY OF LACTIC ACID: CPT

## 2019-04-13 PROCEDURE — 85730 THROMBOPLASTIN TIME PARTIAL: CPT

## 2019-04-13 PROCEDURE — 85025 COMPLETE CBC W/AUTO DIFF WBC: CPT

## 2019-04-13 PROCEDURE — 84145 PROCALCITONIN (PCT): CPT | Performed by: INTERNAL MEDICINE

## 2019-04-13 PROCEDURE — 80203 DRUG SCREEN QUANT ZONISAMIDE: CPT | Performed by: INTERNAL MEDICINE

## 2019-04-13 PROCEDURE — 96361 HYDRATE IV INFUSION ADD-ON: CPT

## 2019-04-13 PROCEDURE — 80186 ASSAY OF PHENYTOIN FREE: CPT | Performed by: INTERNAL MEDICINE

## 2019-04-13 PROCEDURE — 74176 CT ABD & PELVIS W/O CONTRAST: CPT

## 2019-04-13 PROCEDURE — 87177 OVA AND PARASITES SMEARS: CPT | Performed by: INTERNAL MEDICINE

## 2019-04-13 PROCEDURE — 83605 ASSAY OF LACTIC ACID: CPT | Performed by: INTERNAL MEDICINE

## 2019-04-13 PROCEDURE — 80053 COMPREHEN METABOLIC PANEL: CPT

## 2019-04-13 PROCEDURE — 87040 BLOOD CULTURE FOR BACTERIA: CPT

## 2019-04-13 PROCEDURE — 87081 CULTURE SCREEN ONLY: CPT | Performed by: INTERNAL MEDICINE

## 2019-04-13 PROCEDURE — 99223 1ST HOSP IP/OBS HIGH 75: CPT | Performed by: INTERNAL MEDICINE

## 2019-04-13 PROCEDURE — 84484 ASSAY OF TROPONIN QUANT: CPT | Performed by: EMERGENCY MEDICINE

## 2019-04-13 PROCEDURE — 99285 EMERGENCY DEPT VISIT HI MDM: CPT | Performed by: EMERGENCY MEDICINE

## 2019-04-13 PROCEDURE — 87493 C DIFF AMPLIFIED PROBE: CPT | Performed by: INTERNAL MEDICINE

## 2019-04-13 PROCEDURE — 83735 ASSAY OF MAGNESIUM: CPT | Performed by: EMERGENCY MEDICINE

## 2019-04-13 PROCEDURE — 96360 HYDRATION IV INFUSION INIT: CPT

## 2019-04-13 PROCEDURE — 99285 EMERGENCY DEPT VISIT HI MDM: CPT

## 2019-04-13 PROCEDURE — 87631 RESP VIRUS 3-5 TARGETS: CPT | Performed by: INTERNAL MEDICINE

## 2019-04-13 PROCEDURE — 89055 LEUKOCYTE ASSESSMENT FECAL: CPT | Performed by: INTERNAL MEDICINE

## 2019-04-13 PROCEDURE — 87086 URINE CULTURE/COLONY COUNT: CPT | Performed by: INTERNAL MEDICINE

## 2019-04-13 PROCEDURE — 80185 ASSAY OF PHENYTOIN TOTAL: CPT | Performed by: INTERNAL MEDICINE

## 2019-04-13 PROCEDURE — 0T9B70Z DRAINAGE OF BLADDER WITH DRAINAGE DEVICE, VIA NATURAL OR ARTIFICIAL OPENING: ICD-10-PCS | Performed by: INTERNAL MEDICINE

## 2019-04-13 PROCEDURE — 80307 DRUG TEST PRSMV CHEM ANLYZR: CPT | Performed by: EMERGENCY MEDICINE

## 2019-04-13 PROCEDURE — 87425 ROTAVIRUS AG IA: CPT | Performed by: INTERNAL MEDICINE

## 2019-04-13 PROCEDURE — 87209 SMEAR COMPLEX STAIN: CPT | Performed by: INTERNAL MEDICINE

## 2019-04-13 PROCEDURE — 71045 X-RAY EXAM CHEST 1 VIEW: CPT

## 2019-04-13 PROCEDURE — 80329 ANALGESICS NON-OPIOID 1 OR 2: CPT | Performed by: EMERGENCY MEDICINE

## 2019-04-13 PROCEDURE — 93010 ELECTROCARDIOGRAM REPORT: CPT | Performed by: INTERNAL MEDICINE

## 2019-04-13 PROCEDURE — 80177 DRUG SCRN QUAN LEVETIRACETAM: CPT | Performed by: INTERNAL MEDICINE

## 2019-04-13 PROCEDURE — 70450 CT HEAD/BRAIN W/O DYE: CPT

## 2019-04-13 RX ORDER — ZONISAMIDE 100 MG/1
300 CAPSULE ORAL DAILY
Status: DISCONTINUED | OUTPATIENT
Start: 2019-04-14 | End: 2019-04-17 | Stop reason: HOSPADM

## 2019-04-13 RX ORDER — PANTOPRAZOLE SODIUM 20 MG/1
20 TABLET, DELAYED RELEASE ORAL
Status: DISCONTINUED | OUTPATIENT
Start: 2019-04-13 | End: 2019-04-17 | Stop reason: HOSPADM

## 2019-04-13 RX ORDER — ZONISAMIDE 100 MG/1
200 CAPSULE ORAL
Status: DISCONTINUED | OUTPATIENT
Start: 2019-04-13 | End: 2019-04-17 | Stop reason: HOSPADM

## 2019-04-13 RX ORDER — ONDANSETRON 2 MG/ML
4 INJECTION INTRAMUSCULAR; INTRAVENOUS EVERY 6 HOURS PRN
Status: DISCONTINUED | OUTPATIENT
Start: 2019-04-13 | End: 2019-04-17 | Stop reason: HOSPADM

## 2019-04-13 RX ORDER — ASPIRIN 81 MG/1
81 TABLET ORAL DAILY
Status: DISCONTINUED | OUTPATIENT
Start: 2019-04-13 | End: 2019-04-17 | Stop reason: HOSPADM

## 2019-04-13 RX ORDER — ALBUTEROL SULFATE 90 UG/1
2 AEROSOL, METERED RESPIRATORY (INHALATION) EVERY 6 HOURS PRN
Status: DISCONTINUED | OUTPATIENT
Start: 2019-04-13 | End: 2019-04-14

## 2019-04-13 RX ORDER — SODIUM CHLORIDE 9 MG/ML
75 INJECTION, SOLUTION INTRAVENOUS CONTINUOUS
Status: DISCONTINUED | OUTPATIENT
Start: 2019-04-13 | End: 2019-04-16

## 2019-04-13 RX ORDER — ATORVASTATIN CALCIUM 40 MG/1
40 TABLET, FILM COATED ORAL
Status: DISCONTINUED | OUTPATIENT
Start: 2019-04-13 | End: 2019-04-17 | Stop reason: HOSPADM

## 2019-04-13 RX ORDER — ESTRADIOL 1 MG/1
1 TABLET ORAL
Status: DISCONTINUED | OUTPATIENT
Start: 2019-04-13 | End: 2019-04-13

## 2019-04-13 RX ORDER — PHENYTOIN SODIUM 100 MG/1
100 CAPSULE, EXTENDED RELEASE ORAL
Status: DISCONTINUED | OUTPATIENT
Start: 2019-04-13 | End: 2019-04-13

## 2019-04-13 RX ORDER — MONTELUKAST SODIUM 10 MG/1
10 TABLET ORAL
Status: DISCONTINUED | OUTPATIENT
Start: 2019-04-13 | End: 2019-04-17 | Stop reason: HOSPADM

## 2019-04-13 RX ORDER — INSULIN GLARGINE 100 [IU]/ML
10 INJECTION, SOLUTION SUBCUTANEOUS
Status: DISCONTINUED | OUTPATIENT
Start: 2019-04-13 | End: 2019-04-17 | Stop reason: HOSPADM

## 2019-04-13 RX ORDER — GABAPENTIN 400 MG/1
400 CAPSULE ORAL 3 TIMES DAILY
Status: DISCONTINUED | OUTPATIENT
Start: 2019-04-13 | End: 2019-04-17 | Stop reason: HOSPADM

## 2019-04-13 RX ORDER — GABAPENTIN 400 MG/1
400 CAPSULE ORAL 3 TIMES DAILY
Status: DISCONTINUED | OUTPATIENT
Start: 2019-04-13 | End: 2019-04-13

## 2019-04-13 RX ORDER — NYSTATIN 100000 [USP'U]/G
POWDER TOPICAL 2 TIMES DAILY
Status: DISCONTINUED | OUTPATIENT
Start: 2019-04-13 | End: 2019-04-17 | Stop reason: HOSPADM

## 2019-04-13 RX ORDER — PHENYTOIN SODIUM 100 MG/1
200 CAPSULE, EXTENDED RELEASE ORAL
Status: DISCONTINUED | OUTPATIENT
Start: 2019-04-13 | End: 2019-04-13

## 2019-04-13 RX ORDER — FLUTICASONE PROPIONATE 50 MCG
1 SPRAY, SUSPENSION (ML) NASAL DAILY
Status: DISCONTINUED | OUTPATIENT
Start: 2019-04-13 | End: 2019-04-17 | Stop reason: HOSPADM

## 2019-04-13 RX ORDER — QUETIAPINE FUMARATE 100 MG/1
400 TABLET, FILM COATED ORAL 2 TIMES DAILY
Status: DISCONTINUED | OUTPATIENT
Start: 2019-04-13 | End: 2019-04-17 | Stop reason: HOSPADM

## 2019-04-13 RX ORDER — PHENYTOIN SODIUM 100 MG/1
300 CAPSULE, EXTENDED RELEASE ORAL DAILY
Status: DISCONTINUED | OUTPATIENT
Start: 2019-04-13 | End: 2019-04-17 | Stop reason: HOSPADM

## 2019-04-13 RX ORDER — FLUCONAZOLE 100 MG/1
150 TABLET ORAL ONCE
Status: COMPLETED | OUTPATIENT
Start: 2019-04-13 | End: 2019-04-13

## 2019-04-13 RX ORDER — MAGNESIUM SULFATE HEPTAHYDRATE 40 MG/ML
2 INJECTION, SOLUTION INTRAVENOUS ONCE
Status: COMPLETED | OUTPATIENT
Start: 2019-04-13 | End: 2019-04-13

## 2019-04-13 RX ORDER — LEVOTHYROXINE SODIUM 0.15 MG/1
150 TABLET ORAL
Status: DISCONTINUED | OUTPATIENT
Start: 2019-04-13 | End: 2019-04-17 | Stop reason: HOSPADM

## 2019-04-13 RX ORDER — LEVOTHYROXINE SODIUM 0.15 MG/1
150 TABLET ORAL DAILY
Status: DISCONTINUED | OUTPATIENT
Start: 2019-04-13 | End: 2019-04-13

## 2019-04-13 RX ORDER — AZTREONAM 1 G/1
1000 INJECTION, POWDER, LYOPHILIZED, FOR SOLUTION INTRAMUSCULAR; INTRAVENOUS EVERY 8 HOURS
Status: DISCONTINUED | OUTPATIENT
Start: 2019-04-13 | End: 2019-04-13

## 2019-04-13 RX ORDER — PHENYTOIN SODIUM 100 MG/1
200 CAPSULE, EXTENDED RELEASE ORAL
Status: DISCONTINUED | OUTPATIENT
Start: 2019-04-13 | End: 2019-04-17 | Stop reason: HOSPADM

## 2019-04-13 RX ORDER — DEXTROSE MONOHYDRATE 25 G/50ML
25 INJECTION, SOLUTION INTRAVENOUS AS NEEDED
Status: DISCONTINUED | OUTPATIENT
Start: 2019-04-13 | End: 2019-04-17 | Stop reason: HOSPADM

## 2019-04-13 RX ADMIN — ASPIRIN 81 MG: 81 TABLET, COATED ORAL at 12:37

## 2019-04-13 RX ADMIN — FLUTICASONE PROPIONATE 1 SPRAY: 50 SPRAY, METERED NASAL at 13:08

## 2019-04-13 RX ADMIN — MAGNESIUM SULFATE HEPTAHYDRATE 2 G: 40 INJECTION, SOLUTION INTRAVENOUS at 05:59

## 2019-04-13 RX ADMIN — SODIUM CHLORIDE 1000 ML: 0.9 INJECTION, SOLUTION INTRAVENOUS at 01:55

## 2019-04-13 RX ADMIN — VANCOMYCIN HYDROCHLORIDE 1500 MG: 1 INJECTION, POWDER, LYOPHILIZED, FOR SOLUTION INTRAVENOUS at 12:36

## 2019-04-13 RX ADMIN — INSULIN GLARGINE 10 UNITS: 100 INJECTION, SOLUTION SUBCUTANEOUS at 21:58

## 2019-04-13 RX ADMIN — ASCORBIC ACID 1500 MG: 500 INJECTION, SOLUTION INTRAMUSCULAR; INTRAVENOUS; SUBCUTANEOUS at 16:11

## 2019-04-13 RX ADMIN — ENOXAPARIN SODIUM 40 MG: 40 INJECTION SUBCUTANEOUS at 12:36

## 2019-04-13 RX ADMIN — HYDROCORTISONE SODIUM SUCCINATE 50 MG: 100 INJECTION, POWDER, FOR SOLUTION INTRAMUSCULAR; INTRAVENOUS at 21:59

## 2019-04-13 RX ADMIN — GABAPENTIN 400 MG: 400 CAPSULE ORAL at 21:49

## 2019-04-13 RX ADMIN — SODIUM CHLORIDE 100 ML/HR: 0.9 INJECTION, SOLUTION INTRAVENOUS at 13:13

## 2019-04-13 RX ADMIN — PHENYTOIN SODIUM 200 MG: 100 CAPSULE ORAL at 18:59

## 2019-04-13 RX ADMIN — SODIUM CHLORIDE 1000 ML: 0.9 INJECTION, SOLUTION INTRAVENOUS at 03:11

## 2019-04-13 RX ADMIN — AZTREONAM 1000 MG: 1 INJECTION, POWDER, LYOPHILIZED, FOR SOLUTION INTRAMUSCULAR; INTRAVENOUS at 14:26

## 2019-04-13 RX ADMIN — THIAMINE HYDROCHLORIDE 200 MG: 100 INJECTION, SOLUTION INTRAMUSCULAR; INTRAVENOUS at 14:00

## 2019-04-13 RX ADMIN — VANCOMYCIN HYDROCHLORIDE 1500 MG: 1 INJECTION, POWDER, LYOPHILIZED, FOR SOLUTION INTRAVENOUS at 23:23

## 2019-04-13 RX ADMIN — SODIUM CHLORIDE 1000 ML: 0.9 INJECTION, SOLUTION INTRAVENOUS at 09:04

## 2019-04-13 RX ADMIN — AZTREONAM 1000 MG: 1 INJECTION, POWDER, LYOPHILIZED, FOR SOLUTION INTRAMUSCULAR; INTRAVENOUS at 21:53

## 2019-04-13 RX ADMIN — HYDROCORTISONE SODIUM SUCCINATE 50 MG: 100 INJECTION, POWDER, FOR SOLUTION INTRAMUSCULAR; INTRAVENOUS at 16:06

## 2019-04-13 RX ADMIN — INSULIN LISPRO 1 UNITS: 100 INJECTION, SOLUTION INTRAVENOUS; SUBCUTANEOUS at 16:23

## 2019-04-13 RX ADMIN — FLUCONAZOLE 150 MG: 100 TABLET ORAL at 12:37

## 2019-04-13 RX ADMIN — LEVETIRACETAM 750 MG: 250 TABLET, FILM COATED ORAL at 18:59

## 2019-04-13 RX ADMIN — INSULIN LISPRO 1 UNITS: 100 INJECTION, SOLUTION INTRAVENOUS; SUBCUTANEOUS at 21:47

## 2019-04-13 RX ADMIN — PANTOPRAZOLE SODIUM 20 MG: 20 TABLET, DELAYED RELEASE ORAL at 13:04

## 2019-04-13 RX ADMIN — NYSTATIN 1 APPLICATION: 100000 POWDER TOPICAL at 16:02

## 2019-04-13 RX ADMIN — LEVETIRACETAM 750 MG: 250 TABLET, FILM COATED ORAL at 12:37

## 2019-04-13 RX ADMIN — PHENYTOIN SODIUM 300 MG: 100 CAPSULE ORAL at 12:36

## 2019-04-13 RX ADMIN — LEVOTHYROXINE SODIUM 150 MCG: 150 TABLET ORAL at 13:04

## 2019-04-13 RX ADMIN — MONTELUKAST SODIUM 10 MG: 10 TABLET, COATED ORAL at 22:03

## 2019-04-13 RX ADMIN — GABAPENTIN 400 MG: 400 CAPSULE ORAL at 14:23

## 2019-04-13 RX ADMIN — ATORVASTATIN CALCIUM 40 MG: 40 TABLET, FILM COATED ORAL at 16:04

## 2019-04-13 RX ADMIN — QUETIAPINE FUMARATE 400 MG: 100 TABLET ORAL at 18:58

## 2019-04-13 RX ADMIN — ZONISAMIDE 200 MG: 100 CAPSULE ORAL at 19:00

## 2019-04-13 RX ADMIN — ASCORBIC ACID 1500 MG: 500 INJECTION, SOLUTION INTRAMUSCULAR; INTRAVENOUS; SUBCUTANEOUS at 21:53

## 2019-04-14 PROBLEM — E87.6 HYPOKALEMIA: Status: ACTIVE | Noted: 2019-04-14

## 2019-04-14 PROBLEM — I49.8 SINUS ARRHYTHMIA: Status: ACTIVE | Noted: 2019-04-14

## 2019-04-14 PROBLEM — R79.89 LOW TSH LEVEL: Status: ACTIVE | Noted: 2019-04-14

## 2019-04-14 PROBLEM — D72.819 LEUKOPENIA: Status: ACTIVE | Noted: 2019-04-14

## 2019-04-14 PROBLEM — D70.9 NEUTROPENIA (HCC): Status: ACTIVE | Noted: 2019-04-14

## 2019-04-14 LAB
ALBUMIN SERPL BCP-MCNC: 2.6 G/DL (ref 3.5–5)
ALP SERPL-CCNC: 49 U/L (ref 46–116)
ALT SERPL W P-5'-P-CCNC: 22 U/L (ref 12–78)
ANION GAP SERPL CALCULATED.3IONS-SCNC: 11 MMOL/L (ref 4–13)
APTT PPP: 37 SECONDS (ref 26–38)
AST SERPL W P-5'-P-CCNC: 16 U/L (ref 5–45)
BACTERIA UR CULT: NORMAL
BASOPHILS # BLD AUTO: 0.02 THOUSANDS/ΜL (ref 0–0.1)
BASOPHILS NFR BLD AUTO: 1 % (ref 0–1)
BILIRUB SERPL-MCNC: 0.3 MG/DL (ref 0.2–1)
BUN SERPL-MCNC: 4 MG/DL (ref 5–25)
C DIFF TOX GENS STL QL NAA+PROBE: NORMAL
CALCIUM SERPL-MCNC: 8.1 MG/DL (ref 8.3–10.1)
CAMPYLOBACTER DNA SPEC NAA+PROBE: NORMAL
CHLORIDE SERPL-SCNC: 102 MMOL/L (ref 100–108)
CK SERPL-CCNC: 33 U/L (ref 26–192)
CO2 SERPL-SCNC: 24 MMOL/L (ref 21–32)
CREAT SERPL-MCNC: 0.48 MG/DL (ref 0.6–1.3)
EOSINOPHIL # BLD AUTO: 0.03 THOUSAND/ΜL (ref 0–0.61)
EOSINOPHIL NFR BLD AUTO: 1 % (ref 0–6)
ERYTHROCYTE [DISTWIDTH] IN BLOOD BY AUTOMATED COUNT: 14.7 % (ref 11.6–15.1)
EST. AVERAGE GLUCOSE BLD GHB EST-MCNC: 97 MG/DL
FLUAV AG SPEC QL: NOT DETECTED
FLUBV AG SPEC QL: NOT DETECTED
GFR SERPL CREATININE-BSD FRML MDRD: 106 ML/MIN/1.73SQ M
GLUCOSE SERPL-MCNC: 141 MG/DL (ref 65–140)
GLUCOSE SERPL-MCNC: 144 MG/DL (ref 65–140)
GLUCOSE SERPL-MCNC: 236 MG/DL (ref 65–140)
GLUCOSE SERPL-MCNC: 245 MG/DL (ref 65–140)
GLUCOSE SERPL-MCNC: 259 MG/DL (ref 65–140)
HBA1C MFR BLD: 5 % (ref 4.2–6.3)
HCT VFR BLD AUTO: 33.9 % (ref 34.8–46.1)
HGB BLD-MCNC: 11.8 G/DL (ref 11.5–15.4)
IMM GRANULOCYTES # BLD AUTO: 0.02 THOUSAND/UL (ref 0–0.2)
IMM GRANULOCYTES NFR BLD AUTO: 1 % (ref 0–2)
INR PPP: 1.16 (ref 0.86–1.17)
LACTATE SERPL-SCNC: 1.1 MMOL/L (ref 0.5–2)
LYMPHOCYTES # BLD AUTO: 0.49 THOUSANDS/ΜL (ref 0.6–4.47)
LYMPHOCYTES NFR BLD AUTO: 19 % (ref 14–44)
MAGNESIUM SERPL-MCNC: 1.7 MG/DL (ref 1.6–2.6)
MCH RBC QN AUTO: 32.9 PG (ref 26.8–34.3)
MCHC RBC AUTO-ENTMCNC: 34.8 G/DL (ref 31.4–37.4)
MCV RBC AUTO: 94 FL (ref 82–98)
MONOCYTES # BLD AUTO: 0.19 THOUSAND/ΜL (ref 0.17–1.22)
MONOCYTES NFR BLD AUTO: 7 % (ref 4–12)
MRSA NOSE QL CULT: NORMAL
NEUTROPHILS # BLD AUTO: 1.84 THOUSANDS/ΜL (ref 1.85–7.62)
NEUTS SEG NFR BLD AUTO: 71 % (ref 43–75)
NRBC BLD AUTO-RTO: 0 /100 WBCS
PHOSPHATE SERPL-MCNC: 2.7 MG/DL (ref 2.3–4.1)
PLATELET # BLD AUTO: 349 THOUSANDS/UL (ref 149–390)
PMV BLD AUTO: 8.5 FL (ref 8.9–12.7)
POTASSIUM SERPL-SCNC: 3.2 MMOL/L (ref 3.5–5.3)
PROCALCITONIN SERPL-MCNC: <0.05 NG/ML
PROT SERPL-MCNC: 5.8 G/DL (ref 6.4–8.2)
PROTHROMBIN TIME: 14.2 SECONDS (ref 11.8–14.2)
RBC # BLD AUTO: 3.59 MILLION/UL (ref 3.81–5.12)
RSV B RNA SPEC QL NAA+PROBE: NOT DETECTED
SALMONELLA DNA SPEC QL NAA+PROBE: NORMAL
SHIGA TOXIN STX GENE SPEC NAA+PROBE: NORMAL
SHIGELLA DNA SPEC QL NAA+PROBE: NORMAL
SODIUM SERPL-SCNC: 137 MMOL/L (ref 136–145)
TROPONIN I SERPL-MCNC: <0.02 NG/ML
TSH SERPL DL<=0.05 MIU/L-ACNC: 0.09 UIU/ML (ref 0.36–3.74)
WBC # BLD AUTO: 2.59 THOUSAND/UL (ref 4.31–10.16)

## 2019-04-14 PROCEDURE — 97116 GAIT TRAINING THERAPY: CPT | Performed by: PHYSICAL THERAPIST

## 2019-04-14 PROCEDURE — 97162 PT EVAL MOD COMPLEX 30 MIN: CPT | Performed by: PHYSICAL THERAPIST

## 2019-04-14 PROCEDURE — 82948 REAGENT STRIP/BLOOD GLUCOSE: CPT

## 2019-04-14 PROCEDURE — 80074 ACUTE HEPATITIS PANEL: CPT | Performed by: INTERNAL MEDICINE

## 2019-04-14 PROCEDURE — 85025 COMPLETE CBC W/AUTO DIFF WBC: CPT | Performed by: INTERNAL MEDICINE

## 2019-04-14 PROCEDURE — 84484 ASSAY OF TROPONIN QUANT: CPT | Performed by: INTERNAL MEDICINE

## 2019-04-14 PROCEDURE — G8979 MOBILITY GOAL STATUS: HCPCS | Performed by: PHYSICAL THERAPIST

## 2019-04-14 PROCEDURE — 97167 OT EVAL HIGH COMPLEX 60 MIN: CPT | Performed by: DEVELOPMENTAL THERAPIST

## 2019-04-14 PROCEDURE — 84443 ASSAY THYROID STIM HORMONE: CPT | Performed by: INTERNAL MEDICINE

## 2019-04-14 PROCEDURE — 83605 ASSAY OF LACTIC ACID: CPT | Performed by: INTERNAL MEDICINE

## 2019-04-14 PROCEDURE — 80053 COMPREHEN METABOLIC PANEL: CPT | Performed by: INTERNAL MEDICINE

## 2019-04-14 PROCEDURE — 84145 PROCALCITONIN (PCT): CPT | Performed by: INTERNAL MEDICINE

## 2019-04-14 PROCEDURE — 99232 SBSQ HOSP IP/OBS MODERATE 35: CPT | Performed by: INTERNAL MEDICINE

## 2019-04-14 PROCEDURE — G8978 MOBILITY CURRENT STATUS: HCPCS | Performed by: PHYSICAL THERAPIST

## 2019-04-14 PROCEDURE — 83735 ASSAY OF MAGNESIUM: CPT | Performed by: INTERNAL MEDICINE

## 2019-04-14 PROCEDURE — G8987 SELF CARE CURRENT STATUS: HCPCS | Performed by: DEVELOPMENTAL THERAPIST

## 2019-04-14 PROCEDURE — G8988 SELF CARE GOAL STATUS: HCPCS | Performed by: DEVELOPMENTAL THERAPIST

## 2019-04-14 PROCEDURE — 85730 THROMBOPLASTIN TIME PARTIAL: CPT | Performed by: INTERNAL MEDICINE

## 2019-04-14 PROCEDURE — 82550 ASSAY OF CK (CPK): CPT | Performed by: INTERNAL MEDICINE

## 2019-04-14 PROCEDURE — 84100 ASSAY OF PHOSPHORUS: CPT | Performed by: INTERNAL MEDICINE

## 2019-04-14 PROCEDURE — 85610 PROTHROMBIN TIME: CPT | Performed by: INTERNAL MEDICINE

## 2019-04-14 PROCEDURE — 83036 HEMOGLOBIN GLYCOSYLATED A1C: CPT | Performed by: INTERNAL MEDICINE

## 2019-04-14 RX ORDER — ALBUTEROL SULFATE 90 UG/1
2 AEROSOL, METERED RESPIRATORY (INHALATION) ONCE
Status: COMPLETED | OUTPATIENT
Start: 2019-04-14 | End: 2019-04-14

## 2019-04-14 RX ORDER — LEVETIRACETAM 500 MG/1
1500 TABLET ORAL EVERY 12 HOURS SCHEDULED
Status: DISCONTINUED | OUTPATIENT
Start: 2019-04-14 | End: 2019-04-17 | Stop reason: HOSPADM

## 2019-04-14 RX ORDER — LEVETIRACETAM 500 MG/1
1500 TABLET ORAL 2 TIMES DAILY
Status: DISCONTINUED | OUTPATIENT
Start: 2019-04-14 | End: 2019-04-14

## 2019-04-14 RX ORDER — POTASSIUM CHLORIDE 20 MEQ/1
40 TABLET, EXTENDED RELEASE ORAL 2 TIMES DAILY WITH MEALS
Status: COMPLETED | OUTPATIENT
Start: 2019-04-14 | End: 2019-04-14

## 2019-04-14 RX ORDER — ALBUTEROL SULFATE 90 UG/1
2 AEROSOL, METERED RESPIRATORY (INHALATION) EVERY 4 HOURS PRN
Status: DISCONTINUED | OUTPATIENT
Start: 2019-04-14 | End: 2019-04-17 | Stop reason: HOSPADM

## 2019-04-14 RX ADMIN — ENOXAPARIN SODIUM 40 MG: 40 INJECTION SUBCUTANEOUS at 08:12

## 2019-04-14 RX ADMIN — PANTOPRAZOLE SODIUM 20 MG: 20 TABLET, DELAYED RELEASE ORAL at 05:16

## 2019-04-14 RX ADMIN — ZONISAMIDE 200 MG: 100 CAPSULE ORAL at 13:26

## 2019-04-14 RX ADMIN — HYDROCORTISONE SODIUM SUCCINATE 50 MG: 100 INJECTION, POWDER, FOR SOLUTION INTRAMUSCULAR; INTRAVENOUS at 03:18

## 2019-04-14 RX ADMIN — INSULIN LISPRO 3 UNITS: 100 INJECTION, SOLUTION INTRAVENOUS; SUBCUTANEOUS at 21:47

## 2019-04-14 RX ADMIN — ZONISAMIDE 300 MG: 100 CAPSULE ORAL at 08:19

## 2019-04-14 RX ADMIN — MAGNESIUM OXIDE TAB 400 MG (241.3 MG ELEMENTAL MG) 400 MG: 400 (241.3 MG) TAB at 08:18

## 2019-04-14 RX ADMIN — VANCOMYCIN HYDROCHLORIDE 1500 MG: 1 INJECTION, POWDER, LYOPHILIZED, FOR SOLUTION INTRAVENOUS at 10:47

## 2019-04-14 RX ADMIN — MONTELUKAST SODIUM 10 MG: 10 TABLET, COATED ORAL at 21:43

## 2019-04-14 RX ADMIN — ASCORBIC ACID 1500 MG: 500 INJECTION, SOLUTION INTRAMUSCULAR; INTRAVENOUS; SUBCUTANEOUS at 21:45

## 2019-04-14 RX ADMIN — GABAPENTIN 400 MG: 400 CAPSULE ORAL at 08:17

## 2019-04-14 RX ADMIN — LEVETIRACETAM 750 MG: 250 TABLET, FILM COATED ORAL at 09:14

## 2019-04-14 RX ADMIN — ASCORBIC ACID 1500 MG: 500 INJECTION, SOLUTION INTRAMUSCULAR; INTRAVENOUS; SUBCUTANEOUS at 16:18

## 2019-04-14 RX ADMIN — LEVETIRACETAM 1500 MG: 500 TABLET, FILM COATED ORAL at 21:43

## 2019-04-14 RX ADMIN — GABAPENTIN 400 MG: 400 CAPSULE ORAL at 16:18

## 2019-04-14 RX ADMIN — PHENYTOIN SODIUM 300 MG: 100 CAPSULE ORAL at 08:17

## 2019-04-14 RX ADMIN — HYDROCORTISONE SODIUM SUCCINATE 50 MG: 100 INJECTION, POWDER, FOR SOLUTION INTRAMUSCULAR; INTRAVENOUS at 09:42

## 2019-04-14 RX ADMIN — AZTREONAM 1000 MG: 1 INJECTION, POWDER, LYOPHILIZED, FOR SOLUTION INTRAMUSCULAR; INTRAVENOUS at 05:16

## 2019-04-14 RX ADMIN — ATORVASTATIN CALCIUM 40 MG: 40 TABLET, FILM COATED ORAL at 16:17

## 2019-04-14 RX ADMIN — PHENYTOIN SODIUM 200 MG: 100 CAPSULE ORAL at 13:26

## 2019-04-14 RX ADMIN — ALBUTEROL SULFATE 2 PUFF: 90 AEROSOL, METERED RESPIRATORY (INHALATION) at 08:16

## 2019-04-14 RX ADMIN — ASPIRIN 81 MG: 81 TABLET, COATED ORAL at 08:18

## 2019-04-14 RX ADMIN — INSULIN LISPRO 3 UNITS: 100 INJECTION, SOLUTION INTRAVENOUS; SUBCUTANEOUS at 11:33

## 2019-04-14 RX ADMIN — SODIUM CHLORIDE 100 ML/HR: 0.9 INJECTION, SOLUTION INTRAVENOUS at 05:17

## 2019-04-14 RX ADMIN — SODIUM CHLORIDE 100 ML/HR: 0.9 INJECTION, SOLUTION INTRAVENOUS at 22:26

## 2019-04-14 RX ADMIN — NYSTATIN: 100000 POWDER TOPICAL at 08:17

## 2019-04-14 RX ADMIN — QUETIAPINE FUMARATE 400 MG: 100 TABLET ORAL at 08:19

## 2019-04-14 RX ADMIN — THIAMINE HYDROCHLORIDE 200 MG: 100 INJECTION, SOLUTION INTRAMUSCULAR; INTRAVENOUS at 01:31

## 2019-04-14 RX ADMIN — POTASSIUM CHLORIDE 40 MEQ: 1500 TABLET, EXTENDED RELEASE ORAL at 08:17

## 2019-04-14 RX ADMIN — QUETIAPINE FUMARATE 400 MG: 100 TABLET ORAL at 18:29

## 2019-04-14 RX ADMIN — LEVETIRACETAM 750 MG: 250 TABLET, FILM COATED ORAL at 08:18

## 2019-04-14 RX ADMIN — INSULIN LISPRO 3 UNITS: 100 INJECTION, SOLUTION INTRAVENOUS; SUBCUTANEOUS at 16:18

## 2019-04-14 RX ADMIN — POTASSIUM CHLORIDE 40 MEQ: 1500 TABLET, EXTENDED RELEASE ORAL at 16:18

## 2019-04-14 RX ADMIN — ALBUTEROL SULFATE 2 PUFF: 90 AEROSOL, METERED RESPIRATORY (INHALATION) at 18:32

## 2019-04-14 RX ADMIN — MAGNESIUM OXIDE TAB 400 MG (241.3 MG ELEMENTAL MG) 400 MG: 400 (241.3 MG) TAB at 18:29

## 2019-04-14 RX ADMIN — ASCORBIC ACID 1500 MG: 500 INJECTION, SOLUTION INTRAMUSCULAR; INTRAVENOUS; SUBCUTANEOUS at 09:42

## 2019-04-14 RX ADMIN — FLUTICASONE PROPIONATE 1 SPRAY: 50 SPRAY, METERED NASAL at 08:17

## 2019-04-14 RX ADMIN — ASCORBIC ACID 1500 MG: 500 INJECTION, SOLUTION INTRAMUSCULAR; INTRAVENOUS; SUBCUTANEOUS at 03:19

## 2019-04-14 RX ADMIN — AZTREONAM 1000 MG: 1 INJECTION, POWDER, LYOPHILIZED, FOR SOLUTION INTRAMUSCULAR; INTRAVENOUS at 13:55

## 2019-04-14 RX ADMIN — LEVOTHYROXINE SODIUM 150 MCG: 150 TABLET ORAL at 05:16

## 2019-04-14 RX ADMIN — HYDROCORTISONE SODIUM SUCCINATE 50 MG: 100 INJECTION, POWDER, FOR SOLUTION INTRAMUSCULAR; INTRAVENOUS at 21:50

## 2019-04-14 RX ADMIN — HYDROCORTISONE SODIUM SUCCINATE 50 MG: 100 INJECTION, POWDER, FOR SOLUTION INTRAMUSCULAR; INTRAVENOUS at 13:29

## 2019-04-14 RX ADMIN — THIAMINE HYDROCHLORIDE 200 MG: 100 INJECTION, SOLUTION INTRAMUSCULAR; INTRAVENOUS at 13:29

## 2019-04-14 RX ADMIN — NYSTATIN: 100000 POWDER TOPICAL at 18:30

## 2019-04-14 RX ADMIN — VANCOMYCIN HYDROCHLORIDE 1500 MG: 1 INJECTION, POWDER, LYOPHILIZED, FOR SOLUTION INTRAVENOUS at 23:42

## 2019-04-14 RX ADMIN — INSULIN GLARGINE 10 UNITS: 100 INJECTION, SOLUTION SUBCUTANEOUS at 21:43

## 2019-04-14 RX ADMIN — AZTREONAM 1000 MG: 1 INJECTION, POWDER, LYOPHILIZED, FOR SOLUTION INTRAMUSCULAR; INTRAVENOUS at 22:17

## 2019-04-14 RX ADMIN — GABAPENTIN 400 MG: 400 CAPSULE ORAL at 21:44

## 2019-04-15 LAB
ALBUMIN SERPL BCP-MCNC: 1.9 G/DL (ref 3.5–5)
ALP SERPL-CCNC: 35 U/L (ref 46–116)
ALT SERPL W P-5'-P-CCNC: 14 U/L (ref 12–78)
ANION GAP SERPL CALCULATED.3IONS-SCNC: 10 MMOL/L (ref 4–13)
AST SERPL W P-5'-P-CCNC: 15 U/L (ref 5–45)
BASOPHILS # BLD AUTO: 0.01 THOUSANDS/ΜL (ref 0–0.1)
BASOPHILS NFR BLD AUTO: 0 % (ref 0–1)
BILIRUB SERPL-MCNC: 0.1 MG/DL (ref 0.2–1)
BUN SERPL-MCNC: 5 MG/DL (ref 5–25)
CALCIUM SERPL-MCNC: 7.6 MG/DL (ref 8.3–10.1)
CHLORIDE SERPL-SCNC: 106 MMOL/L (ref 100–108)
CO2 SERPL-SCNC: 20 MMOL/L (ref 21–32)
CREAT SERPL-MCNC: 0.46 MG/DL (ref 0.6–1.3)
EOSINOPHIL # BLD AUTO: 0.01 THOUSAND/ΜL (ref 0–0.61)
EOSINOPHIL NFR BLD AUTO: 0 % (ref 0–6)
ERYTHROCYTE [DISTWIDTH] IN BLOOD BY AUTOMATED COUNT: 15.8 % (ref 11.6–15.1)
GFR SERPL CREATININE-BSD FRML MDRD: 108 ML/MIN/1.73SQ M
GLUCOSE SERPL-MCNC: 138 MG/DL (ref 65–140)
GLUCOSE SERPL-MCNC: 155 MG/DL (ref 65–140)
GLUCOSE SERPL-MCNC: 186 MG/DL (ref 65–140)
GLUCOSE SERPL-MCNC: 205 MG/DL (ref 65–140)
GLUCOSE SERPL-MCNC: 259 MG/DL (ref 65–140)
HAV IGM SER QL: NORMAL
HBV CORE IGM SER QL: NORMAL
HBV SURFACE AG SER QL: NORMAL
HCT VFR BLD AUTO: 29.2 % (ref 34.8–46.1)
HCV AB SER QL: NORMAL
HGB BLD-MCNC: 9.9 G/DL (ref 11.5–15.4)
IMM GRANULOCYTES # BLD AUTO: 0.02 THOUSAND/UL (ref 0–0.2)
IMM GRANULOCYTES NFR BLD AUTO: 1 % (ref 0–2)
LYMPHOCYTES # BLD AUTO: 0.82 THOUSANDS/ΜL (ref 0.6–4.47)
LYMPHOCYTES NFR BLD AUTO: 29 % (ref 14–44)
MAGNESIUM SERPL-MCNC: 1.9 MG/DL (ref 1.6–2.6)
MCH RBC QN AUTO: 32.8 PG (ref 26.8–34.3)
MCHC RBC AUTO-ENTMCNC: 33.9 G/DL (ref 31.4–37.4)
MCV RBC AUTO: 97 FL (ref 82–98)
MONOCYTES # BLD AUTO: 0.24 THOUSAND/ΜL (ref 0.17–1.22)
MONOCYTES NFR BLD AUTO: 8 % (ref 4–12)
NEUTROPHILS # BLD AUTO: 1.77 THOUSANDS/ΜL (ref 1.85–7.62)
NEUTS SEG NFR BLD AUTO: 62 % (ref 43–75)
NRBC BLD AUTO-RTO: 0 /100 WBCS
PHOSPHATE SERPL-MCNC: 2.3 MG/DL (ref 2.3–4.1)
PLATELET # BLD AUTO: 361 THOUSANDS/UL (ref 149–390)
PMV BLD AUTO: 8.9 FL (ref 8.9–12.7)
POTASSIUM SERPL-SCNC: 4.2 MMOL/L (ref 3.5–5.3)
PROCALCITONIN SERPL-MCNC: <0.05 NG/ML
PROT SERPL-MCNC: 4.9 G/DL (ref 6.4–8.2)
RBC # BLD AUTO: 3.02 MILLION/UL (ref 3.81–5.12)
SODIUM SERPL-SCNC: 136 MMOL/L (ref 136–145)
WBC # BLD AUTO: 2.87 THOUSAND/UL (ref 4.31–10.16)

## 2019-04-15 PROCEDURE — 97116 GAIT TRAINING THERAPY: CPT

## 2019-04-15 PROCEDURE — 97110 THERAPEUTIC EXERCISES: CPT

## 2019-04-15 PROCEDURE — G8998 SWALLOW D/C STATUS: HCPCS

## 2019-04-15 PROCEDURE — 80053 COMPREHEN METABOLIC PANEL: CPT | Performed by: INTERNAL MEDICINE

## 2019-04-15 PROCEDURE — 99232 SBSQ HOSP IP/OBS MODERATE 35: CPT | Performed by: INTERNAL MEDICINE

## 2019-04-15 PROCEDURE — 84100 ASSAY OF PHOSPHORUS: CPT | Performed by: INTERNAL MEDICINE

## 2019-04-15 PROCEDURE — 99254 IP/OBS CNSLTJ NEW/EST MOD 60: CPT

## 2019-04-15 PROCEDURE — G8996 SWALLOW CURRENT STATUS: HCPCS

## 2019-04-15 PROCEDURE — 85025 COMPLETE CBC W/AUTO DIFF WBC: CPT | Performed by: INTERNAL MEDICINE

## 2019-04-15 PROCEDURE — 92610 EVALUATE SWALLOWING FUNCTION: CPT

## 2019-04-15 PROCEDURE — 97530 THERAPEUTIC ACTIVITIES: CPT

## 2019-04-15 PROCEDURE — G8997 SWALLOW GOAL STATUS: HCPCS

## 2019-04-15 PROCEDURE — 84145 PROCALCITONIN (PCT): CPT | Performed by: INTERNAL MEDICINE

## 2019-04-15 PROCEDURE — 82948 REAGENT STRIP/BLOOD GLUCOSE: CPT

## 2019-04-15 PROCEDURE — 83735 ASSAY OF MAGNESIUM: CPT | Performed by: INTERNAL MEDICINE

## 2019-04-15 RX ORDER — POLYVINYL ALCOHOL 14 MG/ML
1 SOLUTION/ DROPS OPHTHALMIC 3 TIMES DAILY
Status: DISCONTINUED | OUTPATIENT
Start: 2019-04-15 | End: 2019-04-17 | Stop reason: HOSPADM

## 2019-04-15 RX ORDER — VALACYCLOVIR HYDROCHLORIDE 500 MG/1
500 TABLET, FILM COATED ORAL EVERY 12 HOURS SCHEDULED
Status: DISCONTINUED | OUTPATIENT
Start: 2019-04-15 | End: 2019-04-17 | Stop reason: HOSPADM

## 2019-04-15 RX ADMIN — ENOXAPARIN SODIUM 40 MG: 40 INJECTION SUBCUTANEOUS at 08:03

## 2019-04-15 RX ADMIN — NYSTATIN: 100000 POWDER TOPICAL at 18:12

## 2019-04-15 RX ADMIN — HYDROCORTISONE SODIUM SUCCINATE 50 MG: 100 INJECTION, POWDER, FOR SOLUTION INTRAMUSCULAR; INTRAVENOUS at 05:08

## 2019-04-15 RX ADMIN — GABAPENTIN 400 MG: 400 CAPSULE ORAL at 08:02

## 2019-04-15 RX ADMIN — MONTELUKAST SODIUM 10 MG: 10 TABLET, COATED ORAL at 21:08

## 2019-04-15 RX ADMIN — ASCORBIC ACID 1500 MG: 500 INJECTION, SOLUTION INTRAMUSCULAR; INTRAVENOUS; SUBCUTANEOUS at 10:49

## 2019-04-15 RX ADMIN — ZONISAMIDE 300 MG: 100 CAPSULE ORAL at 08:01

## 2019-04-15 RX ADMIN — GABAPENTIN 400 MG: 400 CAPSULE ORAL at 20:57

## 2019-04-15 RX ADMIN — SODIUM CHLORIDE 100 ML/HR: 0.9 INJECTION, SOLUTION INTRAVENOUS at 14:07

## 2019-04-15 RX ADMIN — ZONISAMIDE 200 MG: 100 CAPSULE ORAL at 13:57

## 2019-04-15 RX ADMIN — THIAMINE HYDROCHLORIDE 200 MG: 100 INJECTION, SOLUTION INTRAMUSCULAR; INTRAVENOUS at 13:59

## 2019-04-15 RX ADMIN — HYDROCORTISONE SODIUM SUCCINATE 50 MG: 100 INJECTION, POWDER, FOR SOLUTION INTRAMUSCULAR; INTRAVENOUS at 13:58

## 2019-04-15 RX ADMIN — ATORVASTATIN CALCIUM 40 MG: 40 TABLET, FILM COATED ORAL at 17:04

## 2019-04-15 RX ADMIN — ASPIRIN 81 MG: 81 TABLET, COATED ORAL at 08:02

## 2019-04-15 RX ADMIN — POLYVINYL ALCOHOL 1 DROP: 14 SOLUTION/ DROPS OPHTHALMIC at 10:48

## 2019-04-15 RX ADMIN — ASCORBIC ACID 1500 MG: 500 INJECTION, SOLUTION INTRAMUSCULAR; INTRAVENOUS; SUBCUTANEOUS at 05:07

## 2019-04-15 RX ADMIN — VALACYCLOVIR HYDROCHLORIDE 500 MG: 500 TABLET, FILM COATED ORAL at 10:48

## 2019-04-15 RX ADMIN — AZTREONAM 1000 MG: 1 INJECTION, POWDER, LYOPHILIZED, FOR SOLUTION INTRAMUSCULAR; INTRAVENOUS at 06:08

## 2019-04-15 RX ADMIN — PHENYTOIN SODIUM 300 MG: 100 CAPSULE ORAL at 08:01

## 2019-04-15 RX ADMIN — PHENYTOIN SODIUM 200 MG: 100 CAPSULE ORAL at 13:58

## 2019-04-15 RX ADMIN — PANTOPRAZOLE SODIUM 20 MG: 20 TABLET, DELAYED RELEASE ORAL at 05:08

## 2019-04-15 RX ADMIN — INSULIN LISPRO 1 UNITS: 100 INJECTION, SOLUTION INTRAVENOUS; SUBCUTANEOUS at 17:03

## 2019-04-15 RX ADMIN — INSULIN GLARGINE 10 UNITS: 100 INJECTION, SOLUTION SUBCUTANEOUS at 21:07

## 2019-04-15 RX ADMIN — INSULIN LISPRO 3 UNITS: 100 INJECTION, SOLUTION INTRAVENOUS; SUBCUTANEOUS at 11:59

## 2019-04-15 RX ADMIN — INSULIN LISPRO 1 UNITS: 100 INJECTION, SOLUTION INTRAVENOUS; SUBCUTANEOUS at 07:47

## 2019-04-15 RX ADMIN — NYSTATIN: 100000 POWDER TOPICAL at 08:05

## 2019-04-15 RX ADMIN — LEVETIRACETAM 1500 MG: 500 TABLET, FILM COATED ORAL at 08:02

## 2019-04-15 RX ADMIN — LEVOTHYROXINE SODIUM 150 MCG: 150 TABLET ORAL at 05:08

## 2019-04-15 RX ADMIN — THIAMINE HYDROCHLORIDE 200 MG: 100 INJECTION, SOLUTION INTRAMUSCULAR; INTRAVENOUS at 01:57

## 2019-04-15 RX ADMIN — FLUTICASONE PROPIONATE 1 SPRAY: 50 SPRAY, METERED NASAL at 08:05

## 2019-04-15 RX ADMIN — LEVETIRACETAM 1500 MG: 500 TABLET, FILM COATED ORAL at 20:57

## 2019-04-15 RX ADMIN — ALBUTEROL SULFATE 2 PUFF: 90 AEROSOL, METERED RESPIRATORY (INHALATION) at 10:27

## 2019-04-15 RX ADMIN — QUETIAPINE FUMARATE 400 MG: 100 TABLET ORAL at 08:03

## 2019-04-15 RX ADMIN — GABAPENTIN 400 MG: 400 CAPSULE ORAL at 17:04

## 2019-04-15 RX ADMIN — VALACYCLOVIR HYDROCHLORIDE 500 MG: 500 TABLET, FILM COATED ORAL at 20:57

## 2019-04-15 RX ADMIN — INSULIN LISPRO 2 UNITS: 100 INJECTION, SOLUTION INTRAVENOUS; SUBCUTANEOUS at 21:08

## 2019-04-15 RX ADMIN — POLYVINYL ALCOHOL 1 DROP: 14 SOLUTION/ DROPS OPHTHALMIC at 17:03

## 2019-04-15 RX ADMIN — QUETIAPINE FUMARATE 400 MG: 100 TABLET ORAL at 17:04

## 2019-04-16 PROBLEM — E86.0 DEHYDRATION WITH HYPONATREMIA: Status: ACTIVE | Noted: 2019-04-13

## 2019-04-16 PROBLEM — R53.1 GENERALIZED WEAKNESS: Status: ACTIVE | Noted: 2019-04-16

## 2019-04-16 PROBLEM — L89.153 PRESSURE INJURY OF SACRAL REGION, STAGE 3 (HCC): Status: ACTIVE | Noted: 2019-04-16

## 2019-04-16 PROBLEM — L89.312 PRESSURE ULCER OF RIGHT BUTTOCK, STAGE 2 (HCC): Status: ACTIVE | Noted: 2019-04-16

## 2019-04-16 PROBLEM — L89.323 PRESSURE ULCER OF LEFT BUTTOCK, STAGE 3 (HCC): Status: ACTIVE | Noted: 2019-04-16

## 2019-04-16 LAB
ALBUMIN SERPL BCP-MCNC: 2.2 G/DL (ref 3.5–5)
ALP SERPL-CCNC: 36 U/L (ref 46–116)
ALT SERPL W P-5'-P-CCNC: 14 U/L (ref 12–78)
ANION GAP SERPL CALCULATED.3IONS-SCNC: 10 MMOL/L (ref 4–13)
AST SERPL W P-5'-P-CCNC: 11 U/L (ref 5–45)
BASOPHILS # BLD AUTO: 0.04 THOUSANDS/ΜL (ref 0–0.1)
BASOPHILS NFR BLD AUTO: 1 % (ref 0–1)
BILIRUB SERPL-MCNC: 0.1 MG/DL (ref 0.2–1)
BUN SERPL-MCNC: 4 MG/DL (ref 5–25)
CA-I BLD-SCNC: 1.1 MMOL/L (ref 1.12–1.32)
CALCIUM SERPL-MCNC: 7.7 MG/DL (ref 8.3–10.1)
CHLORIDE SERPL-SCNC: 107 MMOL/L (ref 100–108)
CO2 SERPL-SCNC: 22 MMOL/L (ref 21–32)
CREAT SERPL-MCNC: 0.54 MG/DL (ref 0.6–1.3)
EOSINOPHIL # BLD AUTO: 0.08 THOUSAND/ΜL (ref 0–0.61)
EOSINOPHIL NFR BLD AUTO: 2 % (ref 0–6)
ERYTHROCYTE [DISTWIDTH] IN BLOOD BY AUTOMATED COUNT: 16.4 % (ref 11.6–15.1)
GFR SERPL CREATININE-BSD FRML MDRD: 102 ML/MIN/1.73SQ M
GLUCOSE SERPL-MCNC: 147 MG/DL (ref 65–140)
GLUCOSE SERPL-MCNC: 187 MG/DL (ref 65–140)
GLUCOSE SERPL-MCNC: 79 MG/DL (ref 65–140)
GLUCOSE SERPL-MCNC: 93 MG/DL (ref 65–140)
GLUCOSE SERPL-MCNC: 95 MG/DL (ref 65–140)
HCT VFR BLD AUTO: 30.5 % (ref 34.8–46.1)
HGB BLD-MCNC: 10 G/DL (ref 11.5–15.4)
IMM GRANULOCYTES # BLD AUTO: 0.03 THOUSAND/UL (ref 0–0.2)
IMM GRANULOCYTES NFR BLD AUTO: 1 % (ref 0–2)
LYMPHOCYTES # BLD AUTO: 2.23 THOUSANDS/ΜL (ref 0.6–4.47)
LYMPHOCYTES NFR BLD AUTO: 54 % (ref 14–44)
MAGNESIUM SERPL-MCNC: 1.8 MG/DL (ref 1.6–2.6)
MCH RBC QN AUTO: 33.2 PG (ref 26.8–34.3)
MCHC RBC AUTO-ENTMCNC: 32.8 G/DL (ref 31.4–37.4)
MCV RBC AUTO: 101 FL (ref 82–98)
MONOCYTES # BLD AUTO: 0.38 THOUSAND/ΜL (ref 0.17–1.22)
MONOCYTES NFR BLD AUTO: 9 % (ref 4–12)
NEUTROPHILS # BLD AUTO: 1.37 THOUSANDS/ΜL (ref 1.85–7.62)
NEUTS SEG NFR BLD AUTO: 33 % (ref 43–75)
NRBC BLD AUTO-RTO: 0 /100 WBCS
PHENYTOIN FREE SERPL-MCNC: 0.9 UG/ML (ref 1–2)
PHOSPHATE SERPL-MCNC: 1.9 MG/DL (ref 2.3–4.1)
PLATELET # BLD AUTO: 307 THOUSANDS/UL (ref 149–390)
PMV BLD AUTO: 8.6 FL (ref 8.9–12.7)
POTASSIUM SERPL-SCNC: 3.7 MMOL/L (ref 3.5–5.3)
PROCALCITONIN SERPL-MCNC: <0.05 NG/ML
PROT SERPL-MCNC: 5 G/DL (ref 6.4–8.2)
RBC # BLD AUTO: 3.01 MILLION/UL (ref 3.81–5.12)
SODIUM SERPL-SCNC: 139 MMOL/L (ref 136–145)
WBC # BLD AUTO: 4.13 THOUSAND/UL (ref 4.31–10.16)

## 2019-04-16 PROCEDURE — 82330 ASSAY OF CALCIUM: CPT | Performed by: INTERNAL MEDICINE

## 2019-04-16 PROCEDURE — 97110 THERAPEUTIC EXERCISES: CPT

## 2019-04-16 PROCEDURE — 99232 SBSQ HOSP IP/OBS MODERATE 35: CPT | Performed by: INTERNAL MEDICINE

## 2019-04-16 PROCEDURE — 97530 THERAPEUTIC ACTIVITIES: CPT

## 2019-04-16 PROCEDURE — 97116 GAIT TRAINING THERAPY: CPT

## 2019-04-16 PROCEDURE — 99232 SBSQ HOSP IP/OBS MODERATE 35: CPT

## 2019-04-16 PROCEDURE — 82948 REAGENT STRIP/BLOOD GLUCOSE: CPT

## 2019-04-16 PROCEDURE — 80053 COMPREHEN METABOLIC PANEL: CPT | Performed by: INTERNAL MEDICINE

## 2019-04-16 PROCEDURE — 83735 ASSAY OF MAGNESIUM: CPT | Performed by: INTERNAL MEDICINE

## 2019-04-16 PROCEDURE — 84100 ASSAY OF PHOSPHORUS: CPT | Performed by: INTERNAL MEDICINE

## 2019-04-16 PROCEDURE — 85025 COMPLETE CBC W/AUTO DIFF WBC: CPT | Performed by: INTERNAL MEDICINE

## 2019-04-16 PROCEDURE — 84145 PROCALCITONIN (PCT): CPT | Performed by: INTERNAL MEDICINE

## 2019-04-16 RX ADMIN — ATORVASTATIN CALCIUM 40 MG: 40 TABLET, FILM COATED ORAL at 17:25

## 2019-04-16 RX ADMIN — LEVOTHYROXINE SODIUM 150 MCG: 150 TABLET ORAL at 05:38

## 2019-04-16 RX ADMIN — QUETIAPINE FUMARATE 400 MG: 100 TABLET ORAL at 17:25

## 2019-04-16 RX ADMIN — VALACYCLOVIR HYDROCHLORIDE 500 MG: 500 TABLET, FILM COATED ORAL at 22:08

## 2019-04-16 RX ADMIN — SODIUM CHLORIDE 75 ML/HR: 0.9 INJECTION, SOLUTION INTRAVENOUS at 03:13

## 2019-04-16 RX ADMIN — PANTOPRAZOLE SODIUM 20 MG: 20 TABLET, DELAYED RELEASE ORAL at 05:39

## 2019-04-16 RX ADMIN — ZONISAMIDE 300 MG: 100 CAPSULE ORAL at 08:21

## 2019-04-16 RX ADMIN — FLUTICASONE PROPIONATE 1 SPRAY: 50 SPRAY, METERED NASAL at 09:57

## 2019-04-16 RX ADMIN — PHENYTOIN SODIUM 200 MG: 100 CAPSULE ORAL at 13:54

## 2019-04-16 RX ADMIN — VALACYCLOVIR HYDROCHLORIDE 500 MG: 500 TABLET, FILM COATED ORAL at 09:56

## 2019-04-16 RX ADMIN — NYSTATIN: 100000 POWDER TOPICAL at 17:25

## 2019-04-16 RX ADMIN — GABAPENTIN 400 MG: 400 CAPSULE ORAL at 09:56

## 2019-04-16 RX ADMIN — LEVETIRACETAM 1500 MG: 500 TABLET, FILM COATED ORAL at 09:55

## 2019-04-16 RX ADMIN — GABAPENTIN 400 MG: 400 CAPSULE ORAL at 17:24

## 2019-04-16 RX ADMIN — QUETIAPINE FUMARATE 400 MG: 100 TABLET ORAL at 09:55

## 2019-04-16 RX ADMIN — ZONISAMIDE 200 MG: 100 CAPSULE ORAL at 13:54

## 2019-04-16 RX ADMIN — ASPIRIN 81 MG: 81 TABLET, COATED ORAL at 09:56

## 2019-04-16 RX ADMIN — GABAPENTIN 400 MG: 400 CAPSULE ORAL at 22:07

## 2019-04-16 RX ADMIN — ALBUTEROL SULFATE 2 PUFF: 90 AEROSOL, METERED RESPIRATORY (INHALATION) at 09:56

## 2019-04-16 RX ADMIN — POLYVINYL ALCOHOL 1 DROP: 14 SOLUTION/ DROPS OPHTHALMIC at 17:26

## 2019-04-16 RX ADMIN — LEVETIRACETAM 1500 MG: 500 TABLET, FILM COATED ORAL at 22:08

## 2019-04-16 RX ADMIN — MONTELUKAST SODIUM 10 MG: 10 TABLET, COATED ORAL at 22:08

## 2019-04-16 RX ADMIN — ENOXAPARIN SODIUM 40 MG: 40 INJECTION SUBCUTANEOUS at 09:55

## 2019-04-16 RX ADMIN — POLYVINYL ALCOHOL 1 DROP: 14 SOLUTION/ DROPS OPHTHALMIC at 22:13

## 2019-04-16 RX ADMIN — POTASSIUM PHOSPHATE, MONOBASIC AND POTASSIUM PHOSPHATE, DIBASIC 21 MMOL: 224; 236 INJECTION, SOLUTION INTRAVENOUS at 11:41

## 2019-04-16 RX ADMIN — NYSTATIN: 100000 POWDER TOPICAL at 09:56

## 2019-04-16 RX ADMIN — INSULIN LISPRO 1 UNITS: 100 INJECTION, SOLUTION INTRAVENOUS; SUBCUTANEOUS at 17:25

## 2019-04-16 RX ADMIN — PHENYTOIN SODIUM 300 MG: 100 CAPSULE ORAL at 08:21

## 2019-04-16 RX ADMIN — POLYVINYL ALCOHOL 1 DROP: 14 SOLUTION/ DROPS OPHTHALMIC at 09:57

## 2019-04-17 VITALS
TEMPERATURE: 96.5 F | OXYGEN SATURATION: 95 % | WEIGHT: 212.08 LBS | DIASTOLIC BLOOD PRESSURE: 83 MMHG | HEART RATE: 81 BPM | RESPIRATION RATE: 18 BRPM | BODY MASS INDEX: 34.08 KG/M2 | SYSTOLIC BLOOD PRESSURE: 122 MMHG | HEIGHT: 66 IN

## 2019-04-17 PROBLEM — E87.1 DEHYDRATION WITH HYPONATREMIA: Status: RESOLVED | Noted: 2019-04-13 | Resolved: 2019-04-17

## 2019-04-17 PROBLEM — E86.0 DEHYDRATION WITH HYPONATREMIA: Status: RESOLVED | Noted: 2019-04-13 | Resolved: 2019-04-17

## 2019-04-17 PROBLEM — A41.9 SEVERE SEPSIS (HCC): Status: RESOLVED | Noted: 2019-04-13 | Resolved: 2019-04-17

## 2019-04-17 PROBLEM — E83.42 HYPOMAGNESEMIA: Status: RESOLVED | Noted: 2019-04-13 | Resolved: 2019-04-17

## 2019-04-17 PROBLEM — R33.9 URINARY RETENTION: Status: RESOLVED | Noted: 2019-04-13 | Resolved: 2019-04-17

## 2019-04-17 PROBLEM — E87.6 HYPOKALEMIA: Status: RESOLVED | Noted: 2019-04-14 | Resolved: 2019-04-17

## 2019-04-17 PROBLEM — R65.20 SEVERE SEPSIS (HCC): Status: RESOLVED | Noted: 2019-04-13 | Resolved: 2019-04-17

## 2019-04-17 LAB
GLUCOSE SERPL-MCNC: 138 MG/DL (ref 65–140)
GLUCOSE SERPL-MCNC: 152 MG/DL (ref 65–140)
GLUCOSE SERPL-MCNC: 88 MG/DL (ref 65–140)
GLUCOSE SERPL-MCNC: 95 MG/DL (ref 65–140)
LEVETIRACETAM SERPL-MCNC: 12.1 UG/ML (ref 10–40)
O+P STL CONC: NORMAL
WBC SPEC QL GRAM STN: NORMAL
ZONISAMIDE SERPL-MCNC: 4.7 UG/ML (ref 10–40)

## 2019-04-17 PROCEDURE — 97110 THERAPEUTIC EXERCISES: CPT

## 2019-04-17 PROCEDURE — 97530 THERAPEUTIC ACTIVITIES: CPT

## 2019-04-17 PROCEDURE — 97116 GAIT TRAINING THERAPY: CPT

## 2019-04-17 PROCEDURE — 99239 HOSP IP/OBS DSCHRG MGMT >30: CPT | Performed by: INTERNAL MEDICINE

## 2019-04-17 PROCEDURE — 82948 REAGENT STRIP/BLOOD GLUCOSE: CPT

## 2019-04-17 PROCEDURE — 97535 SELF CARE MNGMENT TRAINING: CPT

## 2019-04-17 RX ORDER — NYSTATIN 100000 [USP'U]/G
POWDER TOPICAL 2 TIMES DAILY
Qty: 15 G | Refills: 0 | Status: SHIPPED | OUTPATIENT
Start: 2019-04-17 | End: 2019-05-13 | Stop reason: SDUPTHER

## 2019-04-17 RX ORDER — POLYVINYL ALCOHOL 14 MG/ML
1 SOLUTION/ DROPS OPHTHALMIC 3 TIMES DAILY
Qty: 15 ML | Refills: 0 | Status: SHIPPED | OUTPATIENT
Start: 2019-04-17

## 2019-04-17 RX ADMIN — POLYVINYL ALCOHOL 1 DROP: 14 SOLUTION/ DROPS OPHTHALMIC at 17:12

## 2019-04-17 RX ADMIN — PHENYTOIN SODIUM 300 MG: 100 CAPSULE ORAL at 09:18

## 2019-04-17 RX ADMIN — ALBUTEROL SULFATE 2 PUFF: 90 AEROSOL, METERED RESPIRATORY (INHALATION) at 09:20

## 2019-04-17 RX ADMIN — PANTOPRAZOLE SODIUM 20 MG: 20 TABLET, DELAYED RELEASE ORAL at 05:42

## 2019-04-17 RX ADMIN — NYSTATIN: 100000 POWDER TOPICAL at 09:20

## 2019-04-17 RX ADMIN — NYSTATIN: 100000 POWDER TOPICAL at 17:12

## 2019-04-17 RX ADMIN — ATORVASTATIN CALCIUM 40 MG: 40 TABLET, FILM COATED ORAL at 17:12

## 2019-04-17 RX ADMIN — QUETIAPINE FUMARATE 400 MG: 100 TABLET ORAL at 17:12

## 2019-04-17 RX ADMIN — ASPIRIN 81 MG: 81 TABLET, COATED ORAL at 09:19

## 2019-04-17 RX ADMIN — ZONISAMIDE 200 MG: 100 CAPSULE ORAL at 14:34

## 2019-04-17 RX ADMIN — ZONISAMIDE 300 MG: 100 CAPSULE ORAL at 09:18

## 2019-04-17 RX ADMIN — LEVOTHYROXINE SODIUM 150 MCG: 150 TABLET ORAL at 05:43

## 2019-04-17 RX ADMIN — QUETIAPINE FUMARATE 400 MG: 100 TABLET ORAL at 09:18

## 2019-04-17 RX ADMIN — ENOXAPARIN SODIUM 40 MG: 40 INJECTION SUBCUTANEOUS at 09:18

## 2019-04-17 RX ADMIN — POLYVINYL ALCOHOL 1 DROP: 14 SOLUTION/ DROPS OPHTHALMIC at 09:20

## 2019-04-17 RX ADMIN — PHENYTOIN SODIUM 200 MG: 100 CAPSULE ORAL at 14:34

## 2019-04-17 RX ADMIN — INSULIN LISPRO 1 UNITS: 100 INJECTION, SOLUTION INTRAVENOUS; SUBCUTANEOUS at 17:15

## 2019-04-17 RX ADMIN — GABAPENTIN 400 MG: 400 CAPSULE ORAL at 17:12

## 2019-04-17 RX ADMIN — LEVETIRACETAM 1500 MG: 500 TABLET, FILM COATED ORAL at 09:18

## 2019-04-17 RX ADMIN — FLUTICASONE PROPIONATE 1 SPRAY: 50 SPRAY, METERED NASAL at 09:20

## 2019-04-17 RX ADMIN — VALACYCLOVIR HYDROCHLORIDE 500 MG: 500 TABLET, FILM COATED ORAL at 09:18

## 2019-04-17 RX ADMIN — GABAPENTIN 400 MG: 400 CAPSULE ORAL at 09:18

## 2019-04-18 LAB
BACTERIA BLD CULT: NORMAL
BACTERIA BLD CULT: NORMAL

## 2019-04-29 ENCOUNTER — TRANSITIONAL CARE MANAGEMENT (OUTPATIENT)
Dept: FAMILY MEDICINE CLINIC | Facility: HOME HEALTHCARE | Age: 64
End: 2019-04-29

## 2019-04-30 ENCOUNTER — OFFICE VISIT (OUTPATIENT)
Dept: FAMILY MEDICINE CLINIC | Facility: HOME HEALTHCARE | Age: 64
End: 2019-04-30
Payer: COMMERCIAL

## 2019-04-30 VITALS
DIASTOLIC BLOOD PRESSURE: 68 MMHG | RESPIRATION RATE: 18 BRPM | OXYGEN SATURATION: 95 % | BODY MASS INDEX: 33.91 KG/M2 | HEART RATE: 83 BPM | TEMPERATURE: 98.4 F | SYSTOLIC BLOOD PRESSURE: 122 MMHG | HEIGHT: 66 IN | WEIGHT: 211 LBS

## 2019-04-30 DIAGNOSIS — R60.0 BILATERAL LEG EDEMA: ICD-10-CM

## 2019-04-30 DIAGNOSIS — N32.81 OVERACTIVE BLADDER: ICD-10-CM

## 2019-04-30 DIAGNOSIS — R39.9 UTI SYMPTOMS: ICD-10-CM

## 2019-04-30 DIAGNOSIS — F31.78 BIPOLAR DISORDER, IN FULL REMISSION, MOST RECENT EPISODE MIXED (HCC): ICD-10-CM

## 2019-04-30 DIAGNOSIS — S30.810D EXCORIATION OF BUTTOCK, SUBSEQUENT ENCOUNTER: ICD-10-CM

## 2019-04-30 DIAGNOSIS — J45.909 MODERATE ASTHMA WITHOUT COMPLICATION, UNSPECIFIED WHETHER PERSISTENT: ICD-10-CM

## 2019-04-30 DIAGNOSIS — I21.4 NSTEMI (NON-ST ELEVATED MYOCARDIAL INFARCTION) (HCC): ICD-10-CM

## 2019-04-30 DIAGNOSIS — K21.9 GERD WITHOUT ESOPHAGITIS: ICD-10-CM

## 2019-04-30 DIAGNOSIS — E11.65 TYPE 2 DIABETES MELLITUS WITH HYPERGLYCEMIA, WITH LONG-TERM CURRENT USE OF INSULIN (HCC): ICD-10-CM

## 2019-04-30 DIAGNOSIS — Z79.4 TYPE 2 DIABETES MELLITUS WITH HYPERGLYCEMIA, WITH LONG-TERM CURRENT USE OF INSULIN (HCC): ICD-10-CM

## 2019-04-30 DIAGNOSIS — J30.89 NON-SEASONAL ALLERGIC RHINITIS, UNSPECIFIED TRIGGER: ICD-10-CM

## 2019-04-30 DIAGNOSIS — E03.9 ACQUIRED HYPOTHYROIDISM: ICD-10-CM

## 2019-04-30 DIAGNOSIS — IMO0001 TRANSITION OF CARE PERFORMED WITH SHARING OF CLINICAL SUMMARY: Primary | ICD-10-CM

## 2019-04-30 DIAGNOSIS — F41.9 ANXIETY: ICD-10-CM

## 2019-04-30 LAB
BACTERIA UR QL AUTO: ABNORMAL /HPF
BILIRUB UR QL STRIP: NEGATIVE
CLARITY UR: ABNORMAL
COLOR UR: ABNORMAL
GLUCOSE UR STRIP-MCNC: NEGATIVE MG/DL
HGB UR QL STRIP.AUTO: NEGATIVE
HYALINE CASTS #/AREA URNS LPF: ABNORMAL /LPF
KETONES UR STRIP-MCNC: ABNORMAL MG/DL
LEUKOCYTE ESTERASE UR QL STRIP: ABNORMAL
NITRITE UR QL STRIP: POSITIVE
NON-SQ EPI CELLS URNS QL MICRO: ABNORMAL /HPF
PH UR STRIP.AUTO: 5 [PH]
PROT UR STRIP-MCNC: NEGATIVE MG/DL
RBC #/AREA URNS AUTO: ABNORMAL /HPF
SP GR UR STRIP.AUTO: 1.01 (ref 1–1.03)
UROBILINOGEN UR QL STRIP.AUTO: 1 E.U./DL
WBC #/AREA URNS AUTO: ABNORMAL /HPF

## 2019-04-30 PROCEDURE — 81001 URINALYSIS AUTO W/SCOPE: CPT | Performed by: NURSE PRACTITIONER

## 2019-04-30 PROCEDURE — 87186 SC STD MICRODIL/AGAR DIL: CPT | Performed by: NURSE PRACTITIONER

## 2019-04-30 PROCEDURE — 87086 URINE CULTURE/COLONY COUNT: CPT | Performed by: NURSE PRACTITIONER

## 2019-04-30 PROCEDURE — T1015 CLINIC SERVICE: HCPCS | Performed by: FAMILY MEDICINE

## 2019-04-30 PROCEDURE — 87077 CULTURE AEROBIC IDENTIFY: CPT | Performed by: NURSE PRACTITIONER

## 2019-04-30 RX ORDER — LORATADINE 10 MG/1
10 TABLET ORAL DAILY
Qty: 30 TABLET | Refills: 3 | Status: SHIPPED | OUTPATIENT
Start: 2019-04-30

## 2019-04-30 RX ORDER — ASPIRIN 81 MG/1
81 TABLET ORAL DAILY
Qty: 90 TABLET | Refills: 1 | Status: SHIPPED | OUTPATIENT
Start: 2019-04-30 | End: 2019-11-05 | Stop reason: SDUPTHER

## 2019-04-30 RX ORDER — QUETIAPINE FUMARATE 400 MG/1
400 TABLET, FILM COATED ORAL 2 TIMES DAILY
Qty: 60 TABLET | Refills: 3 | Status: SHIPPED | OUTPATIENT
Start: 2019-04-30 | End: 2019-09-06 | Stop reason: SDUPTHER

## 2019-04-30 RX ORDER — OXYBUTYNIN CHLORIDE 5 MG/1
5 TABLET ORAL 3 TIMES DAILY
Qty: 90 TABLET | Refills: 1 | Status: SHIPPED | OUTPATIENT
Start: 2019-04-30 | End: 2019-06-28 | Stop reason: SDUPTHER

## 2019-04-30 RX ORDER — MONTELUKAST SODIUM 10 MG/1
10 TABLET ORAL
Qty: 90 TABLET | Refills: 0 | Status: SHIPPED | OUTPATIENT
Start: 2019-04-30 | End: 2019-07-22 | Stop reason: SDUPTHER

## 2019-04-30 RX ORDER — LEVOTHYROXINE SODIUM 0.15 MG/1
150 TABLET ORAL DAILY
Qty: 90 TABLET | Refills: 0 | Status: SHIPPED | OUTPATIENT
Start: 2019-04-30 | End: 2019-09-06 | Stop reason: SDUPTHER

## 2019-04-30 RX ORDER — OMEPRAZOLE 20 MG/1
20 CAPSULE, DELAYED RELEASE ORAL DAILY
Qty: 30 CAPSULE | Refills: 3 | Status: SHIPPED | OUTPATIENT
Start: 2019-04-30 | End: 2019-08-14 | Stop reason: SDUPTHER

## 2019-04-30 RX ORDER — METOPROLOL SUCCINATE 25 MG/1
25 TABLET, EXTENDED RELEASE ORAL DAILY
Qty: 30 TABLET | Refills: 0 | Status: SHIPPED | OUTPATIENT
Start: 2019-04-30 | End: 2019-06-03 | Stop reason: SDUPTHER

## 2019-04-30 RX ORDER — SIMVASTATIN 80 MG
80 TABLET ORAL
Qty: 30 TABLET | Refills: 0 | Status: SHIPPED | OUTPATIENT
Start: 2019-04-30 | End: 2019-06-03 | Stop reason: SDUPTHER

## 2019-04-30 RX ORDER — OINTMENT BASE NO.104
OINTMENT (GRAM) TOPICAL AS NEEDED
Qty: 454 G | Refills: 3 | Status: SHIPPED | OUTPATIENT
Start: 2019-04-30 | End: 2019-05-09

## 2019-04-30 RX ORDER — LISINOPRIL 5 MG/1
5 TABLET ORAL DAILY
Qty: 30 TABLET | Refills: 3 | Status: SHIPPED | OUTPATIENT
Start: 2019-04-30 | End: 2019-08-26 | Stop reason: SDUPTHER

## 2019-04-30 RX ORDER — FUROSEMIDE 40 MG/1
40 TABLET ORAL DAILY
Qty: 60 TABLET | Refills: 0 | Status: SHIPPED | OUTPATIENT
Start: 2019-04-30 | End: 2019-05-13 | Stop reason: SDUPTHER

## 2019-04-30 RX ORDER — GABAPENTIN 800 MG/1
800 TABLET ORAL 4 TIMES DAILY
Qty: 120 TABLET | Refills: 0 | Status: SHIPPED | OUTPATIENT
Start: 2019-04-30 | End: 2019-05-02 | Stop reason: SDUPTHER

## 2019-04-30 RX ORDER — INSULIN GLARGINE 100 [IU]/ML
10 INJECTION, SOLUTION SUBCUTANEOUS
Qty: 10 ML | Refills: 2 | Status: SHIPPED | OUTPATIENT
Start: 2019-04-30 | End: 2020-02-27

## 2019-04-30 RX ORDER — HYDROXYZINE PAMOATE 50 MG/1
50 CAPSULE ORAL 3 TIMES DAILY
Qty: 90 CAPSULE | Refills: 3 | Status: SHIPPED | OUTPATIENT
Start: 2019-04-30 | End: 2019-08-01 | Stop reason: SDUPTHER

## 2019-05-01 DIAGNOSIS — E11.65 TYPE 2 DIABETES MELLITUS WITH HYPERGLYCEMIA, WITH LONG-TERM CURRENT USE OF INSULIN (HCC): ICD-10-CM

## 2019-05-01 DIAGNOSIS — Z79.4 TYPE 2 DIABETES MELLITUS WITH HYPERGLYCEMIA, WITH LONG-TERM CURRENT USE OF INSULIN (HCC): ICD-10-CM

## 2019-05-01 DIAGNOSIS — J45.909 MODERATE ASTHMA WITHOUT COMPLICATION, UNSPECIFIED WHETHER PERSISTENT: Primary | ICD-10-CM

## 2019-05-01 DIAGNOSIS — N30.00 ACUTE CYSTITIS WITHOUT HEMATURIA: Primary | ICD-10-CM

## 2019-05-01 RX ORDER — DOXYCYCLINE HYCLATE 100 MG/1
100 CAPSULE ORAL EVERY 12 HOURS SCHEDULED
Qty: 14 CAPSULE | Refills: 0 | Status: SHIPPED | OUTPATIENT
Start: 2019-05-01 | End: 2019-05-08

## 2019-05-01 RX ORDER — ALBUTEROL SULFATE 90 UG/1
2 AEROSOL, METERED RESPIRATORY (INHALATION) EVERY 6 HOURS PRN
Qty: 1 INHALER | Refills: 3 | Status: SHIPPED | OUTPATIENT
Start: 2019-05-01 | End: 2019-05-02 | Stop reason: SDUPTHER

## 2019-05-02 DIAGNOSIS — J45.909 MODERATE ASTHMA WITHOUT COMPLICATION, UNSPECIFIED WHETHER PERSISTENT: ICD-10-CM

## 2019-05-02 DIAGNOSIS — E11.65 TYPE 2 DIABETES MELLITUS WITH HYPERGLYCEMIA, WITH LONG-TERM CURRENT USE OF INSULIN (HCC): ICD-10-CM

## 2019-05-02 DIAGNOSIS — F31.78 BIPOLAR DISORDER, IN FULL REMISSION, MOST RECENT EPISODE MIXED (HCC): ICD-10-CM

## 2019-05-02 DIAGNOSIS — J43.9 PULMONARY EMPHYSEMA, UNSPECIFIED EMPHYSEMA TYPE (HCC): Primary | ICD-10-CM

## 2019-05-02 DIAGNOSIS — Z79.4 TYPE 2 DIABETES MELLITUS WITH HYPERGLYCEMIA, WITH LONG-TERM CURRENT USE OF INSULIN (HCC): ICD-10-CM

## 2019-05-02 LAB — BACTERIA UR CULT: ABNORMAL

## 2019-05-02 RX ORDER — GABAPENTIN 800 MG/1
800 TABLET ORAL 4 TIMES DAILY
Qty: 120 TABLET | Refills: 0 | Status: SHIPPED | OUTPATIENT
Start: 2019-05-02 | End: 2019-06-28 | Stop reason: SDUPTHER

## 2019-05-02 RX ORDER — ALBUTEROL SULFATE 90 UG/1
2 AEROSOL, METERED RESPIRATORY (INHALATION) EVERY 6 HOURS PRN
Qty: 1 INHALER | Refills: 0 | Status: SHIPPED | OUTPATIENT
Start: 2019-05-02 | End: 2019-12-30 | Stop reason: SDUPTHER

## 2019-05-02 RX ORDER — ALBUTEROL SULFATE 90 UG/1
2 AEROSOL, METERED RESPIRATORY (INHALATION) EVERY 6 HOURS PRN
Qty: 1 INHALER | Refills: 3 | Status: SHIPPED | OUTPATIENT
Start: 2019-05-02 | End: 2019-08-09

## 2019-05-09 DIAGNOSIS — N39.3 STRESS INCONTINENCE OF URINE: ICD-10-CM

## 2019-05-10 DIAGNOSIS — K21.9 GERD WITHOUT ESOPHAGITIS: ICD-10-CM

## 2019-05-10 RX ORDER — OMEPRAZOLE 20 MG/1
CAPSULE, DELAYED RELEASE ORAL
Qty: 30 CAPSULE | Refills: 0 | Status: SHIPPED | OUTPATIENT
Start: 2019-05-10 | End: 2019-07-29 | Stop reason: HOSPADM

## 2019-05-13 DIAGNOSIS — E66.01 OBESITY, MORBID (MORE THAN 100 LBS OVER IDEAL WEIGHT OR BMI > 40) (HCC): ICD-10-CM

## 2019-05-13 DIAGNOSIS — R60.0 BILATERAL LEG EDEMA: ICD-10-CM

## 2019-05-13 RX ORDER — FUROSEMIDE 40 MG/1
40 TABLET ORAL DAILY
Qty: 60 TABLET | Refills: 0 | Status: SHIPPED | OUTPATIENT
Start: 2019-05-13 | End: 2019-12-09 | Stop reason: SDUPTHER

## 2019-05-13 RX ORDER — NYSTATIN 100000 [USP'U]/G
POWDER TOPICAL 2 TIMES DAILY
Qty: 15 G | Refills: 0 | Status: SHIPPED | OUTPATIENT
Start: 2019-05-13 | End: 2019-08-09

## 2019-05-14 DIAGNOSIS — E11.65 TYPE 2 DIABETES MELLITUS WITH HYPERGLYCEMIA, WITH LONG-TERM CURRENT USE OF INSULIN (HCC): ICD-10-CM

## 2019-05-14 DIAGNOSIS — Z79.4 TYPE 2 DIABETES MELLITUS WITH HYPERGLYCEMIA, WITH LONG-TERM CURRENT USE OF INSULIN (HCC): ICD-10-CM

## 2019-05-14 RX ORDER — POLYETHYLENE GLYCOL 3350 17 G/17G
17 POWDER, FOR SOLUTION ORAL DAILY
Qty: 850 G | Refills: 3 | Status: SHIPPED | OUTPATIENT
Start: 2019-05-14

## 2019-05-17 ENCOUNTER — HOSPITAL ENCOUNTER (EMERGENCY)
Facility: HOSPITAL | Age: 64
Discharge: HOME | End: 2019-05-17
Attending: EMERGENCY MEDICINE | Admitting: EMERGENCY MEDICINE
Payer: COMMERCIAL

## 2019-05-17 ENCOUNTER — APPOINTMENT (EMERGENCY)
Dept: CT IMAGING | Facility: HOSPITAL | Age: 64
End: 2019-05-17
Payer: COMMERCIAL

## 2019-05-17 VITALS
SYSTOLIC BLOOD PRESSURE: 109 MMHG | OXYGEN SATURATION: 93 % | BODY MASS INDEX: 32.99 KG/M2 | DIASTOLIC BLOOD PRESSURE: 62 MMHG | RESPIRATION RATE: 12 BRPM | WEIGHT: 204.37 LBS | HEART RATE: 89 BPM | TEMPERATURE: 98.4 F

## 2019-05-17 DIAGNOSIS — R10.31 RIGHT LOWER QUADRANT ABDOMINAL PAIN: ICD-10-CM

## 2019-05-17 DIAGNOSIS — E87.1 ACUTE HYPONATREMIA: Primary | ICD-10-CM

## 2019-05-17 LAB
ALBUMIN SERPL BCP-MCNC: 2.9 G/DL (ref 3.5–5)
ALP SERPL-CCNC: 37 U/L (ref 46–116)
ALT SERPL W P-5'-P-CCNC: 28 U/L (ref 12–78)
ANION GAP SERPL CALCULATED.3IONS-SCNC: 13 MMOL/L (ref 4–13)
AST SERPL W P-5'-P-CCNC: 35 U/L (ref 5–45)
BASOPHILS # BLD AUTO: 0.03 THOUSANDS/ΜL (ref 0–0.1)
BASOPHILS NFR BLD AUTO: 1 % (ref 0–1)
BILIRUB SERPL-MCNC: 0.2 MG/DL (ref 0.2–1)
BILIRUB UR QL STRIP: NEGATIVE
BUN SERPL-MCNC: 7 MG/DL (ref 5–25)
CALCIUM SERPL-MCNC: 8.4 MG/DL (ref 8.3–10.1)
CHLORIDE SERPL-SCNC: 90 MMOL/L (ref 100–108)
CLARITY UR: NORMAL
CO2 SERPL-SCNC: 21 MMOL/L (ref 21–32)
COLOR UR: YELLOW
CREAT SERPL-MCNC: 0.6 MG/DL (ref 0.6–1.3)
EOSINOPHIL # BLD AUTO: 0.21 THOUSAND/ΜL (ref 0–0.61)
EOSINOPHIL NFR BLD AUTO: 4 % (ref 0–6)
ERYTHROCYTE [DISTWIDTH] IN BLOOD BY AUTOMATED COUNT: 12.8 % (ref 11.6–15.1)
GFR SERPL CREATININE-BSD FRML MDRD: 99 ML/MIN/1.73SQ M
GLUCOSE SERPL-MCNC: 80 MG/DL (ref 65–140)
GLUCOSE UR STRIP-MCNC: NEGATIVE MG/DL
HCT VFR BLD AUTO: 32.1 % (ref 34.8–46.1)
HGB BLD-MCNC: 11.2 G/DL (ref 11.5–15.4)
HGB UR QL STRIP.AUTO: NEGATIVE
HOLD SPECIMEN: NORMAL
IMM GRANULOCYTES # BLD AUTO: 0.01 THOUSAND/UL (ref 0–0.2)
IMM GRANULOCYTES NFR BLD AUTO: 0 % (ref 0–2)
KETONES UR STRIP-MCNC: NEGATIVE MG/DL
LACTATE SERPL-SCNC: 1.7 MMOL/L (ref 0.5–2)
LEUKOCYTE ESTERASE UR QL STRIP: NEGATIVE
LIPASE SERPL-CCNC: 149 U/L (ref 73–393)
LYMPHOCYTES # BLD AUTO: 1.22 THOUSANDS/ΜL (ref 0.6–4.47)
LYMPHOCYTES NFR BLD AUTO: 26 % (ref 14–44)
MCH RBC QN AUTO: 33.2 PG (ref 26.8–34.3)
MCHC RBC AUTO-ENTMCNC: 34.9 G/DL (ref 31.4–37.4)
MCV RBC AUTO: 95 FL (ref 82–98)
MONOCYTES # BLD AUTO: 0.48 THOUSAND/ΜL (ref 0.17–1.22)
MONOCYTES NFR BLD AUTO: 10 % (ref 4–12)
NEUTROPHILS # BLD AUTO: 2.77 THOUSANDS/ΜL (ref 1.85–7.62)
NEUTS SEG NFR BLD AUTO: 59 % (ref 43–75)
NITRITE UR QL STRIP: NEGATIVE
NRBC BLD AUTO-RTO: 0 /100 WBCS
PH UR STRIP.AUTO: 6.5 [PH]
PLATELET # BLD AUTO: 296 THOUSANDS/UL (ref 149–390)
PMV BLD AUTO: 8.4 FL (ref 8.9–12.7)
POTASSIUM SERPL-SCNC: 3.6 MMOL/L (ref 3.5–5.3)
PROT SERPL-MCNC: 6.4 G/DL (ref 6.4–8.2)
PROT UR STRIP-MCNC: NEGATIVE MG/DL
RBC # BLD AUTO: 3.37 MILLION/UL (ref 3.81–5.12)
SODIUM SERPL-SCNC: 124 MMOL/L (ref 136–145)
SP GR UR STRIP.AUTO: 1.01 (ref 1–1.03)
UROBILINOGEN UR QL STRIP.AUTO: 0.2 E.U./DL
WBC # BLD AUTO: 4.72 THOUSAND/UL (ref 4.31–10.16)

## 2019-05-17 PROCEDURE — 85025 COMPLETE CBC W/AUTO DIFF WBC: CPT

## 2019-05-17 PROCEDURE — 36415 COLL VENOUS BLD VENIPUNCTURE: CPT

## 2019-05-17 PROCEDURE — 74177 CT ABD & PELVIS W/CONTRAST: CPT

## 2019-05-17 PROCEDURE — 99285 EMERGENCY DEPT VISIT HI MDM: CPT | Performed by: EMERGENCY MEDICINE

## 2019-05-17 PROCEDURE — 99285 EMERGENCY DEPT VISIT HI MDM: CPT

## 2019-05-17 PROCEDURE — 83605 ASSAY OF LACTIC ACID: CPT | Performed by: EMERGENCY MEDICINE

## 2019-05-17 PROCEDURE — 83690 ASSAY OF LIPASE: CPT

## 2019-05-17 PROCEDURE — 96374 THER/PROPH/DIAG INJ IV PUSH: CPT

## 2019-05-17 PROCEDURE — 96361 HYDRATE IV INFUSION ADD-ON: CPT

## 2019-05-17 PROCEDURE — 80053 COMPREHEN METABOLIC PANEL: CPT

## 2019-05-17 PROCEDURE — 96375 TX/PRO/DX INJ NEW DRUG ADDON: CPT

## 2019-05-17 PROCEDURE — 81003 URINALYSIS AUTO W/O SCOPE: CPT

## 2019-05-17 RX ORDER — ONDANSETRON 2 MG/ML
4 INJECTION INTRAMUSCULAR; INTRAVENOUS ONCE
Status: COMPLETED | OUTPATIENT
Start: 2019-05-17 | End: 2019-05-17

## 2019-05-17 RX ORDER — MORPHINE SULFATE 4 MG/ML
4 INJECTION, SOLUTION INTRAMUSCULAR; INTRAVENOUS ONCE
Status: COMPLETED | OUTPATIENT
Start: 2019-05-17 | End: 2019-05-17

## 2019-05-17 RX ORDER — NAPROXEN 500 MG/1
500 TABLET ORAL 2 TIMES DAILY WITH MEALS
Qty: 6 TABLET | Refills: 0 | Status: SHIPPED | OUTPATIENT
Start: 2019-05-17 | End: 2020-02-27

## 2019-05-17 RX ORDER — OXYCODONE HYDROCHLORIDE AND ACETAMINOPHEN 5; 325 MG/1; MG/1
1 TABLET ORAL EVERY 4 HOURS PRN
Qty: 4 TABLET | Refills: 0 | Status: SHIPPED | OUTPATIENT
Start: 2019-05-17 | End: 2019-09-06

## 2019-05-17 RX ORDER — KETOROLAC TROMETHAMINE 30 MG/ML
15 INJECTION, SOLUTION INTRAMUSCULAR; INTRAVENOUS ONCE
Status: COMPLETED | OUTPATIENT
Start: 2019-05-17 | End: 2019-05-17

## 2019-05-17 RX ADMIN — IOHEXOL 100 ML: 350 INJECTION, SOLUTION INTRAVENOUS at 08:16

## 2019-05-17 RX ADMIN — SODIUM CHLORIDE 1000 ML: 0.9 INJECTION, SOLUTION INTRAVENOUS at 08:01

## 2019-05-17 RX ADMIN — MORPHINE SULFATE 4 MG: 4 INJECTION INTRAVENOUS at 08:06

## 2019-05-17 RX ADMIN — ONDANSETRON 4 MG: 2 INJECTION INTRAMUSCULAR; INTRAVENOUS at 08:02

## 2019-05-17 RX ADMIN — KETOROLAC TROMETHAMINE 15 MG: 30 INJECTION, SOLUTION INTRAMUSCULAR; INTRAVENOUS at 08:48

## 2019-05-23 ENCOUNTER — TELEPHONE (OUTPATIENT)
Dept: OTHER | Facility: OTHER | Age: 64
End: 2019-05-23

## 2019-06-03 DIAGNOSIS — K21.9 GASTROESOPHAGEAL REFLUX DISEASE WITHOUT ESOPHAGITIS: Primary | ICD-10-CM

## 2019-06-03 DIAGNOSIS — E11.65 TYPE 2 DIABETES MELLITUS WITH HYPERGLYCEMIA, WITH LONG-TERM CURRENT USE OF INSULIN (HCC): ICD-10-CM

## 2019-06-03 DIAGNOSIS — Z79.4 TYPE 2 DIABETES MELLITUS WITH HYPERGLYCEMIA, WITH LONG-TERM CURRENT USE OF INSULIN (HCC): ICD-10-CM

## 2019-06-03 DIAGNOSIS — I21.4 NSTEMI (NON-ST ELEVATED MYOCARDIAL INFARCTION) (HCC): ICD-10-CM

## 2019-06-03 RX ORDER — SIMVASTATIN 80 MG
80 TABLET ORAL
Qty: 30 TABLET | Refills: 0 | Status: SHIPPED | OUTPATIENT
Start: 2019-06-03 | End: 2019-06-28 | Stop reason: SDUPTHER

## 2019-06-03 RX ORDER — METOPROLOL SUCCINATE 25 MG/1
25 TABLET, EXTENDED RELEASE ORAL DAILY
Qty: 30 TABLET | Refills: 0 | Status: SHIPPED | OUTPATIENT
Start: 2019-06-03 | End: 2019-06-28 | Stop reason: SDUPTHER

## 2019-06-03 RX ORDER — OMEPRAZOLE 20 MG/1
CAPSULE, DELAYED RELEASE ORAL
Qty: 60 CAPSULE | Refills: 0 | Status: SHIPPED | OUTPATIENT
Start: 2019-06-03 | End: 2019-07-29 | Stop reason: HOSPADM

## 2019-06-03 RX ORDER — MELOXICAM 7.5 MG/1
7.5 TABLET ORAL 2 TIMES DAILY
Qty: 60 TABLET | Refills: 0 | Status: SHIPPED | OUTPATIENT
Start: 2019-06-03 | End: 2019-06-10 | Stop reason: SDUPTHER

## 2019-06-10 DIAGNOSIS — Z79.4 TYPE 2 DIABETES MELLITUS WITH HYPERGLYCEMIA, WITH LONG-TERM CURRENT USE OF INSULIN (HCC): ICD-10-CM

## 2019-06-10 DIAGNOSIS — E11.65 TYPE 2 DIABETES MELLITUS WITH HYPERGLYCEMIA, WITH LONG-TERM CURRENT USE OF INSULIN (HCC): ICD-10-CM

## 2019-06-11 RX ORDER — MELOXICAM 7.5 MG/1
7.5 TABLET ORAL 2 TIMES DAILY
Qty: 60 TABLET | Refills: 0 | Status: SHIPPED | OUTPATIENT
Start: 2019-06-11 | End: 2019-09-06 | Stop reason: SDUPTHER

## 2019-06-13 ENCOUNTER — TELEPHONE (OUTPATIENT)
Dept: OTHER | Facility: OTHER | Age: 64
End: 2019-06-13

## 2019-06-28 DIAGNOSIS — I21.4 NSTEMI (NON-ST ELEVATED MYOCARDIAL INFARCTION) (HCC): ICD-10-CM

## 2019-06-28 DIAGNOSIS — J30.2 SEASONAL ALLERGIC RHINITIS, UNSPECIFIED TRIGGER: ICD-10-CM

## 2019-06-28 DIAGNOSIS — Z79.4 TYPE 2 DIABETES MELLITUS WITH HYPERGLYCEMIA, WITH LONG-TERM CURRENT USE OF INSULIN (HCC): ICD-10-CM

## 2019-06-28 DIAGNOSIS — F31.78 BIPOLAR DISORDER, IN FULL REMISSION, MOST RECENT EPISODE MIXED (HCC): ICD-10-CM

## 2019-06-28 DIAGNOSIS — E11.65 TYPE 2 DIABETES MELLITUS WITH HYPERGLYCEMIA, WITH LONG-TERM CURRENT USE OF INSULIN (HCC): ICD-10-CM

## 2019-06-28 DIAGNOSIS — N32.81 OVERACTIVE BLADDER: ICD-10-CM

## 2019-06-28 RX ORDER — METOPROLOL SUCCINATE 25 MG/1
25 TABLET, EXTENDED RELEASE ORAL DAILY
Qty: 30 TABLET | Refills: 0 | Status: SHIPPED | OUTPATIENT
Start: 2019-06-28 | End: 2019-09-06 | Stop reason: SDUPTHER

## 2019-06-28 RX ORDER — GABAPENTIN 800 MG/1
800 TABLET ORAL 4 TIMES DAILY
Qty: 120 TABLET | Refills: 0 | Status: SHIPPED | OUTPATIENT
Start: 2019-06-28 | End: 2020-11-15

## 2019-06-28 RX ORDER — SIMVASTATIN 80 MG
80 TABLET ORAL
Qty: 30 TABLET | Refills: 0 | Status: SHIPPED | OUTPATIENT
Start: 2019-06-28 | End: 2019-08-26 | Stop reason: SDUPTHER

## 2019-06-28 RX ORDER — OXYBUTYNIN CHLORIDE 5 MG/1
5 TABLET ORAL 3 TIMES DAILY
Qty: 90 TABLET | Refills: 0 | Status: SHIPPED | OUTPATIENT
Start: 2019-06-28 | End: 2019-09-06 | Stop reason: SDUPTHER

## 2019-06-28 RX ORDER — FLUTICASONE PROPIONATE 50 MCG
SPRAY, SUSPENSION (ML) NASAL
Qty: 16 G | Refills: 1 | Status: SHIPPED | OUTPATIENT
Start: 2019-06-28 | End: 2019-07-09 | Stop reason: SDUPTHER

## 2019-07-09 DIAGNOSIS — J30.2 SEASONAL ALLERGIC RHINITIS, UNSPECIFIED TRIGGER: ICD-10-CM

## 2019-07-10 RX ORDER — FLUTICASONE PROPIONATE 50 MCG
SPRAY, SUSPENSION (ML) NASAL
Qty: 16 G | Refills: 1 | Status: SHIPPED | OUTPATIENT
Start: 2019-07-10 | End: 2019-11-19 | Stop reason: SDUPTHER

## 2019-07-22 DIAGNOSIS — J45.909 MODERATE ASTHMA WITHOUT COMPLICATION, UNSPECIFIED WHETHER PERSISTENT: ICD-10-CM

## 2019-07-22 RX ORDER — MONTELUKAST SODIUM 10 MG/1
10 TABLET ORAL
Qty: 30 TABLET | Refills: 0 | Status: SHIPPED | OUTPATIENT
Start: 2019-07-22 | End: 2019-11-15 | Stop reason: SDUPTHER

## 2019-07-27 ENCOUNTER — APPOINTMENT (EMERGENCY)
Dept: CT IMAGING | Facility: HOSPITAL | Age: 64
End: 2019-07-27
Payer: COMMERCIAL

## 2019-07-27 ENCOUNTER — HOSPITAL ENCOUNTER (OUTPATIENT)
Facility: HOSPITAL | Age: 64
Setting detail: OBSERVATION
Discharge: HOME HEALTH | End: 2019-07-29
Attending: EMERGENCY MEDICINE | Admitting: INTERNAL MEDICINE
Payer: COMMERCIAL

## 2019-07-27 DIAGNOSIS — D50.9 IRON DEFICIENCY ANEMIA, UNSPECIFIED: Chronic | ICD-10-CM

## 2019-07-27 DIAGNOSIS — L89.153 PRESSURE INJURY OF SACRAL REGION, STAGE 3 (HCC): ICD-10-CM

## 2019-07-27 DIAGNOSIS — Z79.4 TYPE 2 DIABETES MELLITUS WITHOUT COMPLICATION, WITH LONG-TERM CURRENT USE OF INSULIN (HCC): ICD-10-CM

## 2019-07-27 DIAGNOSIS — R53.1 GENERALIZED WEAKNESS: Primary | ICD-10-CM

## 2019-07-27 DIAGNOSIS — E11.9 TYPE 2 DIABETES MELLITUS WITHOUT COMPLICATION, WITH LONG-TERM CURRENT USE OF INSULIN (HCC): ICD-10-CM

## 2019-07-27 LAB
ANION GAP BLD CALC-SCNC: 16 MMOL/L (ref 4–13)
ANION GAP SERPL CALCULATED.3IONS-SCNC: 11 MMOL/L (ref 4–13)
APTT PPP: 26 SECONDS (ref 23–37)
BASOPHILS # BLD AUTO: 0.06 THOUSANDS/ΜL (ref 0–0.1)
BASOPHILS NFR BLD AUTO: 2 % (ref 0–1)
BILIRUB UR QL STRIP: NEGATIVE
BUN BLD-MCNC: 5 MG/DL (ref 5–25)
BUN SERPL-MCNC: 7 MG/DL (ref 5–25)
CA-I BLD-SCNC: 1.1 MMOL/L (ref 1.12–1.32)
CALCIUM SERPL-MCNC: 8.2 MG/DL (ref 8.3–10.1)
CHLORIDE BLD-SCNC: 98 MMOL/L (ref 100–108)
CHLORIDE SERPL-SCNC: 99 MMOL/L (ref 100–108)
CK SERPL-CCNC: 45 U/L (ref 26–192)
CLARITY UR: CLEAR
CO2 SERPL-SCNC: 24 MMOL/L (ref 21–32)
COLOR UR: YELLOW
CREAT BLD-MCNC: 0.5 MG/DL (ref 0.6–1.3)
CREAT SERPL-MCNC: 0.66 MG/DL (ref 0.6–1.3)
EOSINOPHIL # BLD AUTO: 0.35 THOUSAND/ΜL (ref 0–0.61)
EOSINOPHIL NFR BLD AUTO: 9 % (ref 0–6)
ERYTHROCYTE [DISTWIDTH] IN BLOOD BY AUTOMATED COUNT: 15.2 % (ref 11.6–15.1)
ETHANOL SERPL-MCNC: <3 MG/DL (ref 0–3)
GFR SERPL CREATININE-BSD FRML MDRD: 105 ML/MIN/1.73SQ M
GFR SERPL CREATININE-BSD FRML MDRD: 96 ML/MIN/1.73SQ M
GLUCOSE SERPL-MCNC: 61 MG/DL (ref 65–140)
GLUCOSE SERPL-MCNC: 70 MG/DL (ref 65–140)
GLUCOSE SERPL-MCNC: 73 MG/DL (ref 65–140)
GLUCOSE SERPL-MCNC: 80 MG/DL (ref 65–140)
GLUCOSE UR STRIP-MCNC: NEGATIVE MG/DL
HCT VFR BLD AUTO: 32.4 % (ref 34.8–46.1)
HCT VFR BLD CALC: 27 % (ref 34.8–46.1)
HGB BLD-MCNC: 10.7 G/DL (ref 11.5–15.4)
HGB BLDA-MCNC: 9.2 G/DL (ref 11.5–15.4)
HGB UR QL STRIP.AUTO: NEGATIVE
IMM GRANULOCYTES # BLD AUTO: 0.02 THOUSAND/UL (ref 0–0.2)
IMM GRANULOCYTES NFR BLD AUTO: 1 % (ref 0–2)
INR PPP: 1.01 (ref 0.84–1.19)
KETONES UR STRIP-MCNC: NEGATIVE MG/DL
LACTATE SERPL-SCNC: 3.4 MMOL/L (ref 0.5–2)
LACTATE SERPL-SCNC: 3.9 MMOL/L (ref 0.5–2)
LEUKOCYTE ESTERASE UR QL STRIP: NEGATIVE
LYMPHOCYTES # BLD AUTO: 1.63 THOUSANDS/ΜL (ref 0.6–4.47)
LYMPHOCYTES NFR BLD AUTO: 39 % (ref 14–44)
MCH RBC QN AUTO: 32.2 PG (ref 26.8–34.3)
MCHC RBC AUTO-ENTMCNC: 33 G/DL (ref 31.4–37.4)
MCV RBC AUTO: 98 FL (ref 82–98)
MONOCYTES # BLD AUTO: 0.42 THOUSAND/ΜL (ref 0.17–1.22)
MONOCYTES NFR BLD AUTO: 10 % (ref 4–12)
NEUTROPHILS # BLD AUTO: 1.56 THOUSANDS/ΜL (ref 1.85–7.62)
NEUTS SEG NFR BLD AUTO: 39 % (ref 43–75)
NITRITE UR QL STRIP: NEGATIVE
NRBC BLD AUTO-RTO: 0 /100 WBCS
PCO2 BLD: 20 MMOL/L (ref 21–32)
PH UR STRIP.AUTO: 6 [PH]
PLATELET # BLD AUTO: 312 THOUSANDS/UL (ref 149–390)
PMV BLD AUTO: 8.1 FL (ref 8.9–12.7)
POTASSIUM BLD-SCNC: 3.5 MMOL/L (ref 3.5–5.3)
POTASSIUM SERPL-SCNC: 3.8 MMOL/L (ref 3.5–5.3)
PROT UR STRIP-MCNC: NEGATIVE MG/DL
PROTHROMBIN TIME: 13.3 SECONDS (ref 11.6–14.5)
RBC # BLD AUTO: 3.32 MILLION/UL (ref 3.81–5.12)
SODIUM BLD-SCNC: 131 MMOL/L (ref 136–145)
SODIUM SERPL-SCNC: 134 MMOL/L (ref 136–145)
SP GR UR STRIP.AUTO: <=1.005 (ref 1–1.03)
SPECIMEN SOURCE: ABNORMAL
TROPONIN I SERPL-MCNC: <0.02 NG/ML
UROBILINOGEN UR QL STRIP.AUTO: 0.2 E.U./DL
WBC # BLD AUTO: 4.04 THOUSAND/UL (ref 4.31–10.16)

## 2019-07-27 PROCEDURE — 96361 HYDRATE IV INFUSION ADD-ON: CPT

## 2019-07-27 PROCEDURE — 82948 REAGENT STRIP/BLOOD GLUCOSE: CPT

## 2019-07-27 PROCEDURE — 99285 EMERGENCY DEPT VISIT HI MDM: CPT | Performed by: EMERGENCY MEDICINE

## 2019-07-27 PROCEDURE — 80048 BASIC METABOLIC PNL TOTAL CA: CPT | Performed by: EMERGENCY MEDICINE

## 2019-07-27 PROCEDURE — 85730 THROMBOPLASTIN TIME PARTIAL: CPT | Performed by: EMERGENCY MEDICINE

## 2019-07-27 PROCEDURE — 85025 COMPLETE CBC W/AUTO DIFF WBC: CPT | Performed by: EMERGENCY MEDICINE

## 2019-07-27 PROCEDURE — 85610 PROTHROMBIN TIME: CPT | Performed by: EMERGENCY MEDICINE

## 2019-07-27 PROCEDURE — 70450 CT HEAD/BRAIN W/O DYE: CPT

## 2019-07-27 PROCEDURE — 80047 BASIC METABLC PNL IONIZED CA: CPT

## 2019-07-27 PROCEDURE — 93005 ELECTROCARDIOGRAM TRACING: CPT

## 2019-07-27 PROCEDURE — 99219 PR INITIAL OBSERVATION CARE/DAY 50 MINUTES: CPT | Performed by: PHYSICIAN ASSISTANT

## 2019-07-27 PROCEDURE — 72125 CT NECK SPINE W/O DYE: CPT

## 2019-07-27 PROCEDURE — 84484 ASSAY OF TROPONIN QUANT: CPT | Performed by: EMERGENCY MEDICINE

## 2019-07-27 PROCEDURE — 85014 HEMATOCRIT: CPT

## 2019-07-27 PROCEDURE — 99285 EMERGENCY DEPT VISIT HI MDM: CPT

## 2019-07-27 PROCEDURE — 74177 CT ABD & PELVIS W/CONTRAST: CPT

## 2019-07-27 PROCEDURE — 81003 URINALYSIS AUTO W/O SCOPE: CPT | Performed by: EMERGENCY MEDICINE

## 2019-07-27 PROCEDURE — 36415 COLL VENOUS BLD VENIPUNCTURE: CPT | Performed by: EMERGENCY MEDICINE

## 2019-07-27 PROCEDURE — 96360 HYDRATION IV INFUSION INIT: CPT

## 2019-07-27 PROCEDURE — 83605 ASSAY OF LACTIC ACID: CPT | Performed by: EMERGENCY MEDICINE

## 2019-07-27 PROCEDURE — 80320 DRUG SCREEN QUANTALCOHOLS: CPT | Performed by: EMERGENCY MEDICINE

## 2019-07-27 PROCEDURE — 71260 CT THORAX DX C+: CPT

## 2019-07-27 PROCEDURE — 82550 ASSAY OF CK (CPK): CPT | Performed by: EMERGENCY MEDICINE

## 2019-07-27 RX ORDER — MONTELUKAST SODIUM 10 MG/1
10 TABLET ORAL
Status: DISCONTINUED | OUTPATIENT
Start: 2019-07-28 | End: 2019-07-29 | Stop reason: HOSPADM

## 2019-07-27 RX ORDER — DEXTROSE MONOHYDRATE 25 G/50ML
25 INJECTION, SOLUTION INTRAVENOUS ONCE
Status: DISCONTINUED | OUTPATIENT
Start: 2019-07-27 | End: 2019-07-27

## 2019-07-27 RX ORDER — LORATADINE 10 MG/1
10 TABLET ORAL DAILY
Status: DISCONTINUED | OUTPATIENT
Start: 2019-07-28 | End: 2019-07-29 | Stop reason: HOSPADM

## 2019-07-27 RX ORDER — OXYBUTYNIN CHLORIDE 5 MG/1
5 TABLET ORAL 3 TIMES DAILY
Status: DISCONTINUED | OUTPATIENT
Start: 2019-07-28 | End: 2019-07-29 | Stop reason: HOSPADM

## 2019-07-27 RX ORDER — PHENYTOIN SODIUM 100 MG/1
100 CAPSULE, EXTENDED RELEASE ORAL
Status: DISCONTINUED | OUTPATIENT
Start: 2019-07-28 | End: 2019-07-28

## 2019-07-27 RX ORDER — PANTOPRAZOLE SODIUM 40 MG/1
40 TABLET, DELAYED RELEASE ORAL
Status: DISCONTINUED | OUTPATIENT
Start: 2019-07-28 | End: 2019-07-29 | Stop reason: HOSPADM

## 2019-07-27 RX ORDER — LEVETIRACETAM 500 MG/1
1500 TABLET ORAL 2 TIMES DAILY
Status: DISCONTINUED | OUTPATIENT
Start: 2019-07-28 | End: 2019-07-28

## 2019-07-27 RX ORDER — ESTRADIOL 1 MG/1
1 TABLET ORAL
Status: DISCONTINUED | OUTPATIENT
Start: 2019-07-28 | End: 2019-07-29 | Stop reason: HOSPADM

## 2019-07-27 RX ORDER — OSTOMY SUPPLY 1"
1 EACH MISCELLANEOUS
Status: DISCONTINUED | OUTPATIENT
Start: 2019-07-28 | End: 2019-07-28

## 2019-07-27 RX ORDER — ONDANSETRON 2 MG/ML
4 INJECTION INTRAMUSCULAR; INTRAVENOUS EVERY 6 HOURS PRN
Status: DISCONTINUED | OUTPATIENT
Start: 2019-07-27 | End: 2019-07-29 | Stop reason: HOSPADM

## 2019-07-27 RX ORDER — SODIUM CHLORIDE 9 MG/ML
125 INJECTION, SOLUTION INTRAVENOUS CONTINUOUS
Status: DISCONTINUED | OUTPATIENT
Start: 2019-07-28 | End: 2019-07-28

## 2019-07-27 RX ORDER — ATORVASTATIN CALCIUM 40 MG/1
40 TABLET, FILM COATED ORAL
Status: DISCONTINUED | OUTPATIENT
Start: 2019-07-28 | End: 2019-07-29 | Stop reason: HOSPADM

## 2019-07-27 RX ORDER — ONDANSETRON 2 MG/ML
4 INJECTION INTRAMUSCULAR; INTRAVENOUS ONCE AS NEEDED
Status: DISCONTINUED | OUTPATIENT
Start: 2019-07-27 | End: 2019-07-28

## 2019-07-27 RX ORDER — ALBUTEROL SULFATE 90 UG/1
2 AEROSOL, METERED RESPIRATORY (INHALATION) EVERY 6 HOURS PRN
Status: DISCONTINUED | OUTPATIENT
Start: 2019-07-27 | End: 2019-07-29 | Stop reason: HOSPADM

## 2019-07-27 RX ORDER — OXYCODONE HYDROCHLORIDE AND ACETAMINOPHEN 5; 325 MG/1; MG/1
1 TABLET ORAL EVERY 4 HOURS PRN
Status: DISCONTINUED | OUTPATIENT
Start: 2019-07-27 | End: 2019-07-29 | Stop reason: HOSPADM

## 2019-07-27 RX ORDER — ZONISAMIDE 100 MG/1
100 CAPSULE ORAL
Status: DISCONTINUED | OUTPATIENT
Start: 2019-07-28 | End: 2019-07-29 | Stop reason: HOSPADM

## 2019-07-27 RX ORDER — HYDROXYZINE HYDROCHLORIDE 25 MG/1
50 TABLET, FILM COATED ORAL 3 TIMES DAILY
Status: DISCONTINUED | OUTPATIENT
Start: 2019-07-28 | End: 2019-07-29 | Stop reason: HOSPADM

## 2019-07-27 RX ORDER — INSULIN GLARGINE 100 [IU]/ML
10 INJECTION, SOLUTION SUBCUTANEOUS
Status: DISCONTINUED | OUTPATIENT
Start: 2019-07-28 | End: 2019-07-29 | Stop reason: HOSPADM

## 2019-07-27 RX ORDER — QUETIAPINE FUMARATE 100 MG/1
400 TABLET, FILM COATED ORAL 2 TIMES DAILY
Status: DISCONTINUED | OUTPATIENT
Start: 2019-07-28 | End: 2019-07-28

## 2019-07-27 RX ORDER — FLUTICASONE PROPIONATE 50 MCG
1 SPRAY, SUSPENSION (ML) NASAL DAILY
Status: DISCONTINUED | OUTPATIENT
Start: 2019-07-28 | End: 2019-07-29 | Stop reason: HOSPADM

## 2019-07-27 RX ORDER — GABAPENTIN 400 MG/1
800 CAPSULE ORAL 4 TIMES DAILY
Status: DISCONTINUED | OUTPATIENT
Start: 2019-07-28 | End: 2019-07-29 | Stop reason: HOSPADM

## 2019-07-27 RX ORDER — LISINOPRIL 5 MG/1
5 TABLET ORAL DAILY
Status: DISCONTINUED | OUTPATIENT
Start: 2019-07-28 | End: 2019-07-28

## 2019-07-27 RX ORDER — LEVOTHYROXINE SODIUM 0.15 MG/1
150 TABLET ORAL
Status: DISCONTINUED | OUTPATIENT
Start: 2019-07-28 | End: 2019-07-29 | Stop reason: HOSPADM

## 2019-07-27 RX ORDER — METOPROLOL SUCCINATE 25 MG/1
25 TABLET, EXTENDED RELEASE ORAL DAILY
Status: DISCONTINUED | OUTPATIENT
Start: 2019-07-28 | End: 2019-07-28

## 2019-07-27 RX ADMIN — SODIUM CHLORIDE 1000 ML: 0.9 INJECTION, SOLUTION INTRAVENOUS at 21:48

## 2019-07-27 RX ADMIN — IOHEXOL 100 ML: 350 INJECTION, SOLUTION INTRAVENOUS at 21:01

## 2019-07-27 RX ADMIN — SODIUM CHLORIDE 250 ML: 0.9 INJECTION, SOLUTION INTRAVENOUS at 21:16

## 2019-07-28 LAB
ALBUMIN SERPL BCP-MCNC: 2.4 G/DL (ref 3.5–5)
ALP SERPL-CCNC: 38 U/L (ref 46–116)
ALT SERPL W P-5'-P-CCNC: 10 U/L (ref 12–78)
ANION GAP SERPL CALCULATED.3IONS-SCNC: 9 MMOL/L (ref 4–13)
APTT PPP: 25 SECONDS (ref 23–37)
AST SERPL W P-5'-P-CCNC: 15 U/L (ref 5–45)
BILIRUB SERPL-MCNC: 0.2 MG/DL (ref 0.2–1)
BUN SERPL-MCNC: 8 MG/DL (ref 5–25)
CALCIUM SERPL-MCNC: 8 MG/DL (ref 8.3–10.1)
CHLORIDE SERPL-SCNC: 105 MMOL/L (ref 100–108)
CO2 SERPL-SCNC: 25 MMOL/L (ref 21–32)
CREAT SERPL-MCNC: 0.56 MG/DL (ref 0.6–1.3)
ERYTHROCYTE [DISTWIDTH] IN BLOOD BY AUTOMATED COUNT: 15.4 % (ref 11.6–15.1)
EST. AVERAGE GLUCOSE BLD GHB EST-MCNC: 88 MG/DL
FERRITIN SERPL-MCNC: 7 NG/ML (ref 8–388)
GFR SERPL CREATININE-BSD FRML MDRD: 101 ML/MIN/1.73SQ M
GLUCOSE P FAST SERPL-MCNC: 79 MG/DL (ref 65–99)
GLUCOSE SERPL-MCNC: 154 MG/DL (ref 65–140)
GLUCOSE SERPL-MCNC: 178 MG/DL (ref 65–140)
GLUCOSE SERPL-MCNC: 185 MG/DL (ref 65–140)
GLUCOSE SERPL-MCNC: 79 MG/DL (ref 65–140)
GLUCOSE SERPL-MCNC: 84 MG/DL (ref 65–140)
GLUCOSE SERPL-MCNC: 87 MG/DL (ref 65–140)
HBA1C MFR BLD: 4.7 % (ref 4.2–6.3)
HCT VFR BLD AUTO: 30.2 % (ref 34.8–46.1)
HGB BLD-MCNC: 10.1 G/DL (ref 11.5–15.4)
INR PPP: 1.02 (ref 0.84–1.19)
IRON SATN MFR SERPL: 14 %
IRON SERPL-MCNC: 33 UG/DL (ref 50–170)
LACTATE SERPL-SCNC: 2.4 MMOL/L (ref 0.5–2)
LACTATE SERPL-SCNC: 4.3 MMOL/L (ref 0.5–2)
MAGNESIUM SERPL-MCNC: 1.6 MG/DL (ref 1.6–2.6)
MCH RBC QN AUTO: 32.5 PG (ref 26.8–34.3)
MCHC RBC AUTO-ENTMCNC: 33.4 G/DL (ref 31.4–37.4)
MCV RBC AUTO: 97 FL (ref 82–98)
PHOSPHATE SERPL-MCNC: 3.9 MG/DL (ref 2.3–4.1)
PLATELET # BLD AUTO: 279 THOUSANDS/UL (ref 149–390)
PLATELET # BLD AUTO: 295 THOUSANDS/UL (ref 149–390)
PMV BLD AUTO: 8.2 FL (ref 8.9–12.7)
PMV BLD AUTO: 8.7 FL (ref 8.9–12.7)
POTASSIUM SERPL-SCNC: 3.6 MMOL/L (ref 3.5–5.3)
PROT SERPL-MCNC: 5.7 G/DL (ref 6.4–8.2)
PROTHROMBIN TIME: 13.4 SECONDS (ref 11.6–14.5)
RBC # BLD AUTO: 3.11 MILLION/UL (ref 3.81–5.12)
SODIUM SERPL-SCNC: 139 MMOL/L (ref 136–145)
TIBC SERPL-MCNC: 231 UG/DL (ref 250–450)
VIT B12 SERPL-MCNC: 171 PG/ML (ref 100–900)
WBC # BLD AUTO: 4.92 THOUSAND/UL (ref 4.31–10.16)

## 2019-07-28 PROCEDURE — 83036 HEMOGLOBIN GLYCOSYLATED A1C: CPT | Performed by: PHYSICIAN ASSISTANT

## 2019-07-28 PROCEDURE — 83540 ASSAY OF IRON: CPT | Performed by: PHYSICIAN ASSISTANT

## 2019-07-28 PROCEDURE — 36415 COLL VENOUS BLD VENIPUNCTURE: CPT | Performed by: PHYSICIAN ASSISTANT

## 2019-07-28 PROCEDURE — 85027 COMPLETE CBC AUTOMATED: CPT | Performed by: PHYSICIAN ASSISTANT

## 2019-07-28 PROCEDURE — 83735 ASSAY OF MAGNESIUM: CPT | Performed by: PHYSICIAN ASSISTANT

## 2019-07-28 PROCEDURE — 94664 DEMO&/EVAL PT USE INHALER: CPT

## 2019-07-28 PROCEDURE — 99225 PR SBSQ OBSERVATION CARE/DAY 25 MINUTES: CPT | Performed by: FAMILY MEDICINE

## 2019-07-28 PROCEDURE — 82607 VITAMIN B-12: CPT | Performed by: PHYSICIAN ASSISTANT

## 2019-07-28 PROCEDURE — 85049 AUTOMATED PLATELET COUNT: CPT | Performed by: PHYSICIAN ASSISTANT

## 2019-07-28 PROCEDURE — 83550 IRON BINDING TEST: CPT | Performed by: PHYSICIAN ASSISTANT

## 2019-07-28 PROCEDURE — 84100 ASSAY OF PHOSPHORUS: CPT | Performed by: PHYSICIAN ASSISTANT

## 2019-07-28 PROCEDURE — 82948 REAGENT STRIP/BLOOD GLUCOSE: CPT

## 2019-07-28 PROCEDURE — 83605 ASSAY OF LACTIC ACID: CPT | Performed by: PHYSICIAN ASSISTANT

## 2019-07-28 PROCEDURE — 85730 THROMBOPLASTIN TIME PARTIAL: CPT | Performed by: PHYSICIAN ASSISTANT

## 2019-07-28 PROCEDURE — 82728 ASSAY OF FERRITIN: CPT | Performed by: PHYSICIAN ASSISTANT

## 2019-07-28 PROCEDURE — 80053 COMPREHEN METABOLIC PANEL: CPT | Performed by: PHYSICIAN ASSISTANT

## 2019-07-28 PROCEDURE — 85610 PROTHROMBIN TIME: CPT | Performed by: PHYSICIAN ASSISTANT

## 2019-07-28 RX ORDER — PHENYTOIN SODIUM 100 MG/1
CAPSULE, EXTENDED RELEASE ORAL
Status: DISCONTINUED
Start: 2019-07-28 | End: 2019-07-28 | Stop reason: WASHOUT

## 2019-07-28 RX ORDER — QUETIAPINE FUMARATE 100 MG/1
400 TABLET, FILM COATED ORAL 2 TIMES DAILY
Status: DISCONTINUED | OUTPATIENT
Start: 2019-07-28 | End: 2019-07-29 | Stop reason: HOSPADM

## 2019-07-28 RX ORDER — LEVETIRACETAM 500 MG/1
1500 TABLET ORAL 2 TIMES DAILY
Status: DISCONTINUED | OUTPATIENT
Start: 2019-07-28 | End: 2019-07-29 | Stop reason: HOSPADM

## 2019-07-28 RX ORDER — LISINOPRIL 5 MG/1
5 TABLET ORAL DAILY
Status: DISCONTINUED | OUTPATIENT
Start: 2019-07-29 | End: 2019-07-29 | Stop reason: HOSPADM

## 2019-07-28 RX ORDER — PHENYTOIN SODIUM 100 MG/1
300 CAPSULE, EXTENDED RELEASE ORAL EVERY MORNING
Status: DISCONTINUED | OUTPATIENT
Start: 2019-07-28 | End: 2019-07-29 | Stop reason: HOSPADM

## 2019-07-28 RX ORDER — METOPROLOL SUCCINATE 25 MG/1
25 TABLET, EXTENDED RELEASE ORAL DAILY
Status: DISCONTINUED | OUTPATIENT
Start: 2019-07-29 | End: 2019-07-29 | Stop reason: HOSPADM

## 2019-07-28 RX ORDER — PHENYTOIN SODIUM 100 MG/1
200 CAPSULE, EXTENDED RELEASE ORAL
Status: DISCONTINUED | OUTPATIENT
Start: 2019-07-28 | End: 2019-07-29 | Stop reason: HOSPADM

## 2019-07-28 RX ORDER — LANOLIN ALCOHOL/MO/W.PET/CERES
6 CREAM (GRAM) TOPICAL
Status: DISCONTINUED | OUTPATIENT
Start: 2019-07-28 | End: 2019-07-29 | Stop reason: HOSPADM

## 2019-07-28 RX ADMIN — PHENYTOIN SODIUM 200 MG: 100 CAPSULE ORAL at 12:06

## 2019-07-28 RX ADMIN — QUETIAPINE FUMARATE 400 MG: 100 TABLET ORAL at 17:27

## 2019-07-28 RX ADMIN — SODIUM CHLORIDE 125 ML/HR: 0.9 INJECTION, SOLUTION INTRAVENOUS at 19:40

## 2019-07-28 RX ADMIN — OXYBUTYNIN CHLORIDE 5 MG: 5 TABLET ORAL at 01:48

## 2019-07-28 RX ADMIN — OXYBUTYNIN CHLORIDE 5 MG: 5 TABLET ORAL at 21:43

## 2019-07-28 RX ADMIN — INSULIN LISPRO 1 UNITS: 100 INJECTION, SOLUTION INTRAVENOUS; SUBCUTANEOUS at 16:15

## 2019-07-28 RX ADMIN — ATORVASTATIN CALCIUM 40 MG: 40 TABLET, FILM COATED ORAL at 16:09

## 2019-07-28 RX ADMIN — HYDROXYZINE HYDROCHLORIDE 50 MG: 25 TABLET ORAL at 09:14

## 2019-07-28 RX ADMIN — MONTELUKAST 10 MG: 10 TABLET, FILM COATED ORAL at 01:48

## 2019-07-28 RX ADMIN — ESTRADIOL 1 MG: 1 TABLET ORAL at 21:40

## 2019-07-28 RX ADMIN — MONTELUKAST 10 MG: 10 TABLET, FILM COATED ORAL at 21:43

## 2019-07-28 RX ADMIN — INSULIN LISPRO 1 UNITS: 100 INJECTION, SOLUTION INTRAVENOUS; SUBCUTANEOUS at 12:07

## 2019-07-28 RX ADMIN — OXYCODONE HYDROCHLORIDE AND ACETAMINOPHEN 1 TABLET: 5; 325 TABLET ORAL at 09:24

## 2019-07-28 RX ADMIN — OXYBUTYNIN CHLORIDE 5 MG: 5 TABLET ORAL at 09:13

## 2019-07-28 RX ADMIN — ZONISAMIDE 100 MG: 100 CAPSULE ORAL at 21:47

## 2019-07-28 RX ADMIN — PHENYTOIN SODIUM 300 MG: 100 CAPSULE ORAL at 09:16

## 2019-07-28 RX ADMIN — LEVETIRACETAM 1500 MG: 500 TABLET ORAL at 07:41

## 2019-07-28 RX ADMIN — ZONISAMIDE 100 MG: 100 CAPSULE ORAL at 14:47

## 2019-07-28 RX ADMIN — METOPROLOL SUCCINATE 25 MG: 25 TABLET, EXTENDED RELEASE ORAL at 09:12

## 2019-07-28 RX ADMIN — PANTOPRAZOLE SODIUM 40 MG: 40 TABLET, DELAYED RELEASE ORAL at 05:06

## 2019-07-28 RX ADMIN — HYDROXYZINE HYDROCHLORIDE 50 MG: 25 TABLET ORAL at 21:42

## 2019-07-28 RX ADMIN — ZONISAMIDE 100 MG: 100 CAPSULE ORAL at 17:27

## 2019-07-28 RX ADMIN — MELATONIN 6 MG: at 21:41

## 2019-07-28 RX ADMIN — OXYBUTYNIN CHLORIDE 5 MG: 5 TABLET ORAL at 16:09

## 2019-07-28 RX ADMIN — GABAPENTIN 800 MG: 400 CAPSULE ORAL at 09:12

## 2019-07-28 RX ADMIN — OXYCODONE HYDROCHLORIDE AND ACETAMINOPHEN 1 TABLET: 5; 325 TABLET ORAL at 05:06

## 2019-07-28 RX ADMIN — LISINOPRIL 5 MG: 5 TABLET ORAL at 09:15

## 2019-07-28 RX ADMIN — ZONISAMIDE 100 MG: 100 CAPSULE ORAL at 07:41

## 2019-07-28 RX ADMIN — GABAPENTIN 800 MG: 400 CAPSULE ORAL at 01:47

## 2019-07-28 RX ADMIN — INSULIN LISPRO 1 UNITS: 100 INJECTION, SOLUTION INTRAVENOUS; SUBCUTANEOUS at 21:39

## 2019-07-28 RX ADMIN — ENOXAPARIN SODIUM 40 MG: 40 INJECTION SUBCUTANEOUS at 09:19

## 2019-07-28 RX ADMIN — LEVETIRACETAM 1500 MG: 500 TABLET ORAL at 17:27

## 2019-07-28 RX ADMIN — HYDROXYZINE HYDROCHLORIDE 50 MG: 25 TABLET ORAL at 16:09

## 2019-07-28 RX ADMIN — LEVOTHYROXINE SODIUM 150 MCG: 150 TABLET ORAL at 06:56

## 2019-07-28 RX ADMIN — LORATADINE 10 MG: 10 TABLET ORAL at 09:15

## 2019-07-28 RX ADMIN — ALBUTEROL SULFATE 2 PUFF: 90 AEROSOL, METERED RESPIRATORY (INHALATION) at 09:26

## 2019-07-28 RX ADMIN — ESTRADIOL 1 MG: 1 TABLET ORAL at 01:48

## 2019-07-28 RX ADMIN — GABAPENTIN 800 MG: 400 CAPSULE ORAL at 21:43

## 2019-07-28 RX ADMIN — QUETIAPINE FUMARATE 400 MG: 100 TABLET ORAL at 07:40

## 2019-07-28 RX ADMIN — SODIUM CHLORIDE 100 ML/HR: 0.9 INJECTION, SOLUTION INTRAVENOUS at 00:35

## 2019-07-28 RX ADMIN — INSULIN GLARGINE 10 UNITS: 100 INJECTION, SOLUTION SUBCUTANEOUS at 21:40

## 2019-07-28 RX ADMIN — GABAPENTIN 800 MG: 400 CAPSULE ORAL at 17:27

## 2019-07-28 NOTE — PLAN OF CARE
Problem: Potential for Falls  Goal: Patient will remain free of falls  Description  INTERVENTIONS:  - Assess patient frequently for physical needs  -  Identify cognitive and physical deficits and behaviors that affect risk of falls  -  [place] fall precautions as indicated by assessment   - Educate patient/family on patient safety including physical limitations  - Instruct patient to call for assistance with activity based on assessment  - Modify environment to reduce risk of injury  - Consider OT/PT consult to assist with strengthening/mobility  Outcome: Progressing     Problem: Prexisting or High Potential for Compromised Skin Integrity  Goal: Skin integrity is maintained or improved  Description  INTERVENTIONS:  - Identify patients at risk for skin breakdown  - Assess and monitor skin integrity  - Assess and monitor nutrition and hydration status  - Monitor labs (i e  albumin)  - Assess for incontinence   - Turn and reposition patient  - Assist with mobility/ambulation  - Relieve pressure over bony prominences  - Avoid friction and shearing  - Provide appropriate hygiene as needed including keeping skin clean and dry  - Evaluate need for skin moisturizer/barrier cream  - Collaborate with interdisciplinary team (i e  Nutrition, Rehabilitation, etc )   - Patient/family teaching  Outcome: Progressing     Problem: Nutrition/Hydration-ADULT  Goal: Nutrient/Hydration intake appropriate for improving, restoring or maintaining nutritional needs  Description  Monitor and assess patient's nutrition/hydration status for malnutrition (ex- brittle hair, bruises, dry skin, pale skin and conjunctiva, muscle wasting, smooth red tongue, and disorientation)  Collaborate with interdisciplinary team and initiate plan and interventions as ordered  Monitor patient's weight and dietary intake as ordered or per policy  Utilize nutrition screening tool and intervene per policy   Determine patient's food preferences and provide high-protein, high-caloric foods as appropriate       INTERVENTIONS:  - Monitor oral intake, urinary output, labs, and treatment plans  - Assess nutrition and hydration status and recommend course of action  - Evaluate amount of meals eaten  - Assist patient with eating if necessary   - Allow adequate time for meals  - Recommend/ encourage appropriate diets, oral nutritional supplements, and vitamin/mineral supplements  - Order, calculate, and assess calorie counts as needed  - Recommend, monitor, and adjust tube feedings and TPN/PPN based on assessed needs  - Assess need for intravenous fluids  - Provide specific nutrition/hydration education as appropriate  - Include patient/family/caregiver in decisions related to nutrition  Outcome: Progressing     Problem: PAIN - ADULT  Goal: Verbalizes/displays adequate comfort level or baseline comfort level  Description  Interventions:  - Encourage patient to monitor pain and request assistance  - Assess pain using appropriate pain scale  - Administer analgesics based on type and severity of pain and evaluate response  - Implement non-pharmacological measures as appropriate and evaluate response  - Consider cultural and social influences on pain and pain management  - Notify physician/advanced practitioner if interventions unsuccessful or patient reports new pain  Outcome: Progressing     Problem: INFECTION - ADULT  Goal: Absence or prevention of progression during hospitalization  Description  INTERVENTIONS:  - Assess and monitor for signs and symptoms of infection  - Monitor lab/diagnostic results  - Monitor all insertion sites, i e  indwelling lines, tubes, and drains  - Monitor endotracheal (as able) and nasal secretions for changes in amount and color  - [place] appropriate cooling/warming therapies per order  - Administer medications as ordered  - Instruct and encourage patient and family to use good hand hygiene technique  - Identify and instruct in appropriate isolation precautions for identified infection/condition  Outcome: Progressing  Goal: Absence of fever/infection during neutropenic period  Description  INTERVENTIONS:  - Monitor WBC  - Implement neutropenic guidelines  Outcome: Progressing     Problem: INFECTION - ADULT  Goal: Absence or prevention of progression during hospitalization  Description  INTERVENTIONS:  - Assess and monitor for signs and symptoms of infection  - Monitor lab/diagnostic results  - Monitor all insertion sites, i e  indwelling lines, tubes, and drains  - Monitor endotracheal (as able) and nasal secretions for changes in amount and color  - [place] appropriate cooling/warming therapies per order  - Administer medications as ordered  - Instruct and encourage patient and family to use good hand hygiene technique  - Identify and instruct in appropriate isolation precautions for identified infection/condition  Outcome: Progressing  Goal: Absence of fever/infection during neutropenic period  Description  INTERVENTIONS:  - Monitor WBC  - Implement neutropenic guidelines  Outcome: Progressing     Problem: SAFETY ADULT  Goal: Patient will remain free of falls  Description  INTERVENTIONS:  - Assess patient frequently for physical needs  -  Identify cognitive and physical deficits and behaviors that affect risk of falls  -  [place] fall precautions as indicated by assessment   - Educate patient/family on patient safety including physical limitations  - Instruct patient to call for assistance with activity based on assessment  - Modify environment to reduce risk of injury  - Consider OT/PT consult to assist with strengthening/mobility  Outcome: Progressing  Goal: Maintain or return to baseline ADL function  Description  INTERVENTIONS:  - Assess patient frequently for physical needs  -  Identify cognitive and physical deficits and behaviors that affect risk of falls    -  [place] fall precautions as indicated by assessment   - Educate patient/family on patient safety including physical limitations  - Instruct patient to call for assistance with activity based on assessment  - Modify environment to reduce risk of injury  - Consider OT/PT consult to assist with strengthening/mobility  Outcome: Progressing  Goal: Maintain or return mobility status to optimal level  Description  INTERVENTIONS:  -  Assess patient's ability to carry out ADLs; assess patient's baseline for ADL function and identify physical deficits which impact ability to perform ADLs (bathing, care of mouth/teeth, toileting, grooming, dressing, etc )  - Assess/evaluate cause of self-care deficits   - Assess range of motion  - Assess patient's mobility; develop plan if impaired  - Assess patient's need for assistive devices and provide as appropriate  - Encourage maximum independence but intervene and supervise when necessary  ¯ Involve family in performance of ADLs  ¯ Assess for home care needs following discharge   ¯ Request OT consult to assist with ADL evaluation and planning for discharge  ¯ Provide patient education as appropriate  Outcome: Progressing     Problem: DISCHARGE PLANNING  Goal: Discharge to home or other facility with appropriate resources  Description  INTERVENTIONS:  - Identify barriers to discharge w/patient and caregiver  - Arrange for needed discharge resources and transportation as appropriate  - Identify discharge learning needs (meds, wound care, etc )  - Arrange for interpretive services to assist at discharge as needed  - Refer to Case Management Department for coordinating discharge planning if the patient needs post-hospital services based on physician/advanced practitioner order or complex needs related to functional status, cognitive ability, or social support system  Outcome: Progressing     Problem: Knowledge Deficit  Goal: Patient/family/caregiver demonstrates understanding of disease process, treatment plan, medications, and discharge instructions  Description  Complete learning assessment and assess knowledge base    Interventions:  - Provide teaching at level of understanding  - Provide teaching via preferred learning methods  Outcome: Progressing

## 2019-07-28 NOTE — H&P
[facility]  H&P- [facility] 1955, 66 y o  female MRN: [id number]    Unit/Bed#: RM03 Encounter: [id number]    Primary Care Provider: DEIDRE Powers   Date and time admitted to hospital: 7/27/2019  8:17 PM        Generalized weakness  Assessment & Plan  She reports feeling very weak over past several weeks  She reports that she had several falls which she attributes to her weakness  CT head, CT chest abdomen and pelvis shows no acute findings  Admit on observation  Telemetry monitoring  IV normal saline  Check B12, Iron panel  OT/PT eval  AM labs  Supportive care    Pressure injury of sacral region, stage 3 [facility])  Assessment & Plan  Does not appear to be infected  Daily wound care  Wound care consult    Fall at home  [facility]  She reports that she had fall at home today  She denies LOC  She admits to having several falls over past 2 weeks  She reports hitting her head on one of the previous falls  She admitted to feeling weak and tired   CT head shows-No acute intracranial abnormality   Microangiopathic changes  CT cervical Spine shows-No cervical spine fracture or traumatic malalignment  CT chest abdomen and pelvis shows-No findings of acute traumatic injury in the chest, abdomen or pelvis    Fall precautions  OT PT eval  Close monitoring      COPD (chronic obstructive pulmonary disease) (HCC)  Assessment & Plan  No respiratory distress or evidence of Exacerbation  Continue home medications  Respiratory protocol  Supportive care        Lactic acidosis  Assessment & Plan  Lactic Acid level at 3 9  IV normal saline   Trend lactic acid levels until resolved    Essential hypertension  Assessment & Plan  Blood pressure is stable  Continue home medications  Monitor blood pressure closely    Seizure disorder [facility])  Assessment & Plan  No recent seizure activity  Continue home medication  Encourage PCP/Neurology follow up      Type 2 diabetes mellitus without complication, with long-term current use of insulin ([facility] [address] )  Assessment & Plan  Lab Results   Component Value Date    HGBA1C 5 0 04/14/2019     Blood glucose level at 61  Hold oral diabetc medication  Continue home insulin regimen  Sliding scale insulin  Fingerstick glucose 4 times daily AC/HS  Check A1C  AM CMP    GERD without esophagitis  Assessment & Plan  Currently on Prilosec 20 mg PO daily  Will give formulary alternative Protonix 40 mg PO daily    Hypercholesterolemia  Assessment & Plan  She is currently on Simvastatin 80 mg PO HS  Will give formulary alternative Lipitor 40 mg PO HS    Acquired hypothyroidism  Assessment & Plan  Continue Levothyroxine        VTE Prophylaxis: Enoxaparin (Lovenox)  / sequential compression device   Code Status: Level 3- DNAR/DNI  POLST: POLST form is not discussed and not completed at this time  Discussion with family: None    Anticipated Length of Stay:  Patient will be admitted on an Observation basis with an anticipated length of stay of  < 2 midnights  Justification for Hospital Stay: Generalized weakness, Fall at home    Total Time for Visit, including Counseling / Coordination of Care: 30 minutes  Greater than 50% of this total time spent on direct patient counseling and coordination of care  Chief Complaint:   Fall at home    History of Present Illness:    Tri Cam is a 66 y o  female who presents to the emergency room with complaints of generalized weakness and multiple falls over the past week or so  She reports that she had a fall several days ago hitting the back of her head which resulted in a small hematoma  She also states that she had redness to her right shoulder after that for  She denies loss of consciousness  Today's fall she describes being in the bathroom felt extremely weak and her legs apparently gave out falling on her back and again hitting the posterior area of her head  She also denies loss of consciousness today    She denies any shortness of breath, chest pain, cough, fever, chills, nausea, vomiting, diarrhea abdominal pain  She does report using a walker to assist with ambulation  Labs completed in emergency room with results as shown below  CT head without contrast, CT cervical spine, and CT chest abdomen and pelvis completed with results as shown below  She received normal saline for 1 L in the emergency room  At bedside, she has no specific complaints other than feeling a bit weak  She also expressed concern about going back home because she is afraid that she will fall again    Review of Systems:    Review of Systems   Constitutional: Negative for activity change, appetite change, chills, fatigue and fever  HENT: Negative for tinnitus, trouble swallowing and voice change  Eyes: Negative for photophobia, pain, redness and visual disturbance  Respiratory: Negative for chest tightness, shortness of breath and wheezing  Cardiovascular: Negative for chest pain, palpitations and leg swelling  Gastrointestinal: Negative for abdominal pain, diarrhea, nausea and vomiting  Endocrine: Positive for polyuria  Negative for polydipsia and polyphagia  Genitourinary: Positive for frequency  Negative for difficulty urinating, dysuria, flank pain and hematuria  Musculoskeletal: Negative for arthralgias, back pain, gait problem and joint swelling  Skin: Positive for wound  Negative for color change, pallor and rash  Neurological: Positive for weakness  Negative for dizziness, light-headedness and headaches  Psychiatric/Behavioral: Negative for agitation, confusion and sleep disturbance  The patient is not nervous/anxious          Past Medical and Surgical History:     Past Medical History:   Diagnosis Date    Asthma     Bipolar disorder ([facility] )     Chronic UTI (urinary tract infection)     COPD (chronic obstructive pulmonary disease) ([place] [address] )     Diabetes mellitus ([place] [address] )     Disease of thyroid gland     Dyslipidemia 10/31/2018    Essential hypertension 10/31/2018    GERD (gastroesophageal reflux disease)     Obesity due to excess calories 10/31/2018    Seizure [facility])        Past Surgical History:   Procedure Laterality Date    ANKLE FRACTURE SURGERY Right     TUBAL LIGATION      VEIN LIGATION AND STRIPPING         Meds/Allergies:    Prior to Admission medications    Medication Sig Start Date End Date Taking? Authorizing Provider   albuterol (PROAIR HFA) 90 mcg/act inhaler Inhale 2 puffs every 6 (six) hours as needed for wheezing 5/2/19   DEIDRE Butler   albuterol (VENTOLIN HFA) 90 mcg/act inhaler Inhale 2 puffs every 6 (six) hours as needed for wheezing 5/2/19   DEIDRE Butler   aspirin (ECOTRIN LOW STRENGTH) 81 mg EC tablet Take 1 tablet (81 mg total) by mouth daily 4/30/19   DEIDRE Butler   Blood Glucose Monitoring Suppl (BLOOD GLUCOSE MONITOR SYSTEM) w/Device KIT Test blood sugars 3 times a day 4/10/18   DEIDRE Butler   clotrimazole-betamethasone (LOTRISONE) 1-0 05 % cream 2 (two) times a day As needed  10/23/18   Historical Provider, MD   Control Gel Formula Dressing (DUODERM CGF DRESSING) MISC Apply 1 application topically every third day 3/1/19   DEIDRE Nina   Control Gel Formula Dressing (DUODERM CGF DRESSING) MISC Apply 1 application topically every 3 (three) days Please order 5X7 3/13/19   Kecia Matute MD   estradiol (ESTRACE) 1 mg tablet Take 1 mg by mouth daily at bedtime      Historical Provider, MD   fluticasone (FLONASE) 50 mcg/act nasal spray SPRAY ONE (1) SPRAY INTO EACH NOSTRIL ONCE DAILY 7/10/19   DEIDRE Butler   furosemide (LASIX) 40 mg tablet Take 1 tablet (40 mg total) by mouth daily for 30 days 5/13/19 6/12/19  DEIDRE Butler   gabapentin (NEURONTIN) 800 mg tablet Take 1 tablet (800 mg total) by mouth 4 (four) times a day 6/28/19   DEIDRE Butler   glucose blood test strip Test blood sugars 3 times a day   4/10/18   DEIDRE Butler   hydrOXYzine pamoate (VISTARIL) 50 mg capsule Take 1 capsule (50 mg total) by mouth 3 (three) times a day 4/30/19   DEIDRE Butler   Incontinence Supply Disposable (PADSORBER BED PAN LINERS) MISC by Does not apply route as needed (incontinence) 11/7/18   DEIDRE Hong   Infant Care Products (CUTIES SENSITIVE WIPES) MISC 120 Pieces by Does not apply route as needed (incontinence) 11/7/18   DEIDRE Jaquez   insulin glargine (LANTUS) 100 units/mL subcutaneous injection Inject 10 Units under the skin daily at bedtime 4/30/19   DEIDRE Butler   levETIRAcetam (KEPPRA) 750 mg tablet Take 1,500 mg by mouth 2 (two) times a day Pt states she takes 1500MG Bid    Historical Provider, MD   levothyroxine 150 mcg tablet Take 1 tablet (150 mcg total) by mouth daily 4/30/19   DEIDRE Butler   lisinopril (ZESTRIL) 5 mg tablet Take 1 tablet (5 mg total) by mouth daily 4/30/19   DEIDRE Butler   loratadine (CLARITIN) 10 mg tablet Take 1 tablet (10 mg total) by mouth daily 4/30/19   DEIDRE Butler   meloxicam (MOBIC) 7 5 mg tablet Take 1 tablet (7 5 mg total) by mouth 2 (two) times a day 6/11/19   DEIDRE Butler   metFORMIN (GLUCOPHAGE) 1000 MG tablet Take 1 tablet (1,000 mg total) by mouth 2 (two) times a day with meals 5/2/19   DEIDRE Butler   metoprolol succinate (TOPROL-XL) 25 mg 24 hr tablet Take 1 tablet (25 mg total) by mouth daily 6/28/19   DEIDRE Butler   montelukast (SINGULAIR) 10 mg tablet Take 1 tablet (10 mg total) by mouth daily at bedtime 7/22/19   DEIDRE Hendrix   naproxen (NAPROSYN) 500 mg tablet Take 1 tablet (500 mg total) by mouth 2 (two) times a day with meals for 6 doses 5/17/19 5/20/19  Nehemias Landry DO   norethindrone (AYGESTIN) 5 mg tablet Take 5 mg by mouth daily      Historical Provider, MD   NOVOFINE 32G X 6 MM MISC  3/20/18   Historical Provider, MD   NOVOLOG FLEXPEN 100 units/mL injection pen Uses sliding scale  10/3/18   Historical Provider, MD   nystatin (MYCOSTATIN) powder Apply topically 2 (two) times a day 5/13/19   DEIDRE Butler   omeprazole (PriLOSEC) 20 mg delayed release capsule Take 1 capsule (20 mg total) by mouth daily TAKE ONE TABLET IN THE MORNING AND TAKE ONE TABLET IN THE EVENING BY MOUTH DAILY  4/30/19   DEIDRE Butler   omeprazole (PriLOSEC) 20 mg delayed release capsule TAKE ONE CAPSULE BY MOUTH IN THE MORNING AND TAKE ONE CAPSULE IN THE EVENING  5/10/19   DEIDRE Butler   omeprazole (PriLOSEC) 20 mg delayed release capsule TAKE ONE CAPSULE BY MOUTH IN THE MORNING AND TAKE ONE CAPSULE IN THE EVENING  6/3/19   DEIDRE Butler   ONETOUCH DELICA LANCETS 92R MISC Test blood sugars 3 times a day  4/10/18   DEIDRE Butler   oxybutynin (DITROPAN) 5 mg tablet Take 1 tablet (5 mg total) by mouth 3 (three) times a day 6/28/19   DEIDRE Butler   oxyCODONE-acetaminophen (PERCOCET) 5-325 mg per tablet Take 1 tablet by mouth every 4 (four) hours as needed for moderate pain for up to 4 dosesMax Daily Amount: 6 tablets 5/17/19   Carlos Lutz DO   phenytoin (DILANTIN) 100 mg ER capsule Take by mouth 5 (five) times a day 3 tablets in morning and 2 at lunch daily     Historical MD Annelise   polyethylene glycol (GLYCOLAX) powder Take 17 g by mouth daily 5/14/19   DEIDRE Butler   polyvinyl alcohol (LIQUIFILM TEARS) 1 4 % ophthalmic solution Administer 1 drop to both eyes 3 (three) times a day 4/17/19   Mario Alberto Leonardo MD   QUEtiapine (SEROquel) 400 MG tablet Take 1 tablet (400 mg total) by mouth 2 (two) times a day 4/30/19   DEIDRE Butler   simvastatin (ZOCOR) 80 mg tablet Take 1 tablet (80 mg total) by mouth daily at bedtime 6/28/19   DEIDRE Butler   Skin Protectants, Misc   (CALAZIME SKIN PROTECTANT) PSTE Apply 113 g topically 2 (two) times a day 10/17/18   DEIDRE Butler   valACYclovir (VALTREX) 500 mg tablet Take 1 tablet by mouth 2 (two) times a day 3/22/18   Historical Provider, MD   VENTOLIN  (09 Base) MCG/ACT inhaler Inhale 2 puffs every 6 (six) hours as needed for wheezing 10/19/18   DEIDRE Butler   Zinc Oxide 10 % AERO Apply 1 Can topically as needed (incontinence) 5/9/19   DEIDRE Butler   zonisamide (ZONEGRAN) 100 mg capsule Take 100 mg by mouth 5 (five) times a day      Historical Provider, MD BELLA have reviewed home medications with patient personally  Allergies: Allergies   Allergen Reactions    Keflex [Cephalexin] Anaphylaxis     Airway edema     Levaquin [Levofloxacin] Anaphylaxis    Penicillins Anaphylaxis    Bactrim [Sulfamethoxazole-Trimethoprim] Swelling and GI Intolerance     LE edema and thrush    Ciprofloxacin Swelling     Edema and all extremities and thrush   Edema in all extremities and thrush     Noroxin [Norfloxacin]     Tylenol [Acetaminophen]        Social History:     Marital Status: Single   Occupation: Unemployed  Patient Pre-hospital Living Situation: Lives alone  Patient Pre-hospital Level of Mobility: Active  Patient Pre-hospital Diet Restrictions: None reported  Substance Use History:   Social History     Substance and Sexual Activity   Alcohol Use Not Currently    Alcohol/week: 0 0 standard drinks     Social History     Tobacco Use   Smoking Status Never Smoker   Smokeless Tobacco Never Used     Social History     Substance and Sexual Activity   Drug Use No       Family History:    History reviewed  No pertinent family history  Physical Exam:     Vitals:   Blood Pressure: 99/68 (07/28/19 0010)  Pulse: 82 (07/28/19 0010)  Temperature: 99 4 °F (37 4 °C) (07/27/19 2019)  Temp Source: Temporal (07/27/19 2019)  Respirations: 20 (07/28/19 0010)  Height: 5' 6" (167 6 cm) (07/28/19 0010)  Weight - Scale: 85 2 kg (187 lb 13 3 oz) (07/28/19 0010)  SpO2: 96 % (07/28/19 0010)    Physical Exam   Constitutional: She is oriented to person, place, and time  No distress  HENT:   Head: Normocephalic and atraumatic     Mouth/Throat: Oropharynx is clear and moist  Eyes: Pupils are equal, round, and reactive to light  EOM are normal  Right eye exhibits no discharge  Left eye exhibits no discharge  Neck: Normal range of motion  Neck supple  No JVD present  Cardiovascular: Normal rate, regular rhythm and intact distal pulses  Pulmonary/Chest: Effort normal and breath sounds normal  No stridor  No respiratory distress  She has no wheezes  She has no rales  Abdominal: She exhibits no distension and no mass  There is no tenderness  There is no guarding  Musculoskeletal: She exhibits no edema, tenderness or deformity  Lymphadenopathy:     She has no cervical adenopathy  Neurological: She is oriented to person, place, and time  Skin: Skin is warm and dry  Capillary refill takes less than 2 seconds  Rash noted  She is not diaphoretic  No erythema  No pallor  Psychiatric: She has a normal mood and affect  Vitals reviewed  Additional Data:     Lab Results: I have personally reviewed pertinent reports        Results from last 7 days   Lab Units 07/28/19 0239 07/27/19 2112 07/27/19 2038   WBC Thousand/uL  --   --  4 04*   HEMOGLOBIN g/dL  --   --  10 7*   I STAT HEMOGLOBIN g/dl  --  9 2*  --    HEMATOCRIT %  --   --  32 4*   HEMATOCRIT, ISTAT %  --  27*  --    PLATELETS Thousands/uL 295  --  312   NEUTROS PCT %  --   --  39*   LYMPHS PCT %  --   --  39   MONOS PCT %  --   --  10   EOS PCT %  --   --  9*     Results from last 7 days   Lab Units 07/27/19 2112 07/27/19 2038   SODIUM mmol/L  --  134*   POTASSIUM mmol/L  --  3 8   CHLORIDE mmol/L  --  99*   CO2 mmol/L  --  24   CO2, I-STAT mmol/L 20*  --    BUN mg/dL  --  7   CREATININE mg/dL  --  0 66   AGAP mmol/L 16*  --    ANION GAP mmol/L  --  11   CALCIUM mg/dL  --  8 2*   GLUCOSE RANDOM mg/dL  --  80     Results from last 7 days   Lab Units 07/27/19 2038   INR  1 01     Results from last 7 days   Lab Units 07/28/19  0122 07/27/19  2257 07/27/19  2148   POC GLUCOSE mg/dl 84 73 61*         Results from last 7 days   Lab Units 07/27/19  2302 07/27/19 2038   LACTIC ACID mmol/L 3 4* 3 9*       Imaging: I have personally reviewed pertinent reports  CT head without contrast   Final Result by Cheryl Adams MD (07/27 2124)      No acute intracranial abnormality  Microangiopathic changes  I personally discussed this study with Leta Macario on 7/27/2019 at 9:20 PM          Workstation performed: [id number]         CT spine cervical without contrast   Final Result by Cheryl Adams MD (07/27 2124)      No cervical spine fracture or traumatic malalignment  I personally discussed this study with Leta Macario on 7/27/2019 at 9:20 PM       Workstation performed: [id number]         CT chest abdomen pelvis w contrast   Final Result by Cheryl Adams MD (07/27 2123)         1  No findings of acute traumatic injury in the chest, abdomen or pelvis  2   Moderate to large volume retained colonic stool  I personally discussed this study with Leta Macario on 7/27/2019 at 9:20 PM                Workstation performed: [id number]             EKG, Pathology, and Other Studies Reviewed on Admission:   · EKG: None    Allscripts / Epic Records Reviewed: Yes     ** Please Note: This note has been constructed using a voice recognition system   **

## 2019-07-28 NOTE — ED PROVIDER NOTES
H&P Exam - Trauma   John Noriega 66 y o  female MRN: [id number]  Unit/Bed#: GF48/AM64 Encounter: [id number]    Assessment/Plan   Trauma Alert: Trauma Acuity: B  Model of Arrival: Trauma Mode of Arrival: BLS via    Trauma Team: Current Providers  Attending Provider: Robby Lockhart DO  Registered Nurse: Augustine Blanco RN  Consultants: None    Trauma Active Problems: head trauma, neck pain, upper back pain, bilateral flank pain, low back pain    Trauma Plan: cardiac labs, ct head/neck/chest/abd/pelvis, give ivf and iv narcotic pain meds prn    Chief Complaint:   Chief Complaint   Patient presents with    Multiple Falls     fell when getting out of shower today, has been falling frequently in past week       History of Present Illness   HPI:  John Noriega is a 66 y o  female who presents with fall from standing when she states that she became weak and "her legs gave out" and she fell landing on her back and posterior head  Pt denies any LOC  She is c/o a headache, neck pain, upper back pain, flank pain and low back pain  No other injuries  Pt feels like "I have a UTI "  Pt states, "I've been falling a lot lately  I don't think I can go back home  I feel unsafe "  Pt states from a fall several days ago that she has an erythematous abrasion that "is infected now "  The right shoulder does have an erythematous lesion that is warm to the touch and mildly TTP  Pt denies fevers, cough, cp, sob, n/v/d/c, abd pain, dysuria, focal def or syncope      Mechanism:Details of Incident: fell when getting out of shower Injury Date: 07/27/19 Injury Time: 1950 Injury [address]: [place]      Fatigue   Severity:  Mild  Onset quality:  Gradual  Duration:  3 days  Timing:  Constant  Progression:  Worsening  Chronicity:  Recurrent  Relieved by:  Rest  Worsened by:  Standing and activity  Ineffective treatments:  None tried  Associated symptoms: headaches    Associated symptoms: no abdominal pain, no chest pain, no cough, no diarrhea, no dizziness, no dysuria, no fever, no frequency, no myalgias, no nausea, no shortness of breath and no vomiting      Review of Systems   Constitutional: Positive for fatigue  Negative for activity change, appetite change, diaphoresis and fever  HENT: Negative for congestion, facial swelling, mouth sores and trouble swallowing  Eyes: Negative for photophobia, discharge and visual disturbance  Respiratory: Negative for apnea, cough, shortness of breath and wheezing  Cardiovascular: Negative for chest pain and leg swelling  Gastrointestinal: Negative for abdominal pain, constipation, diarrhea, nausea and vomiting  Endocrine: Negative for heat intolerance and polydipsia  Genitourinary: Positive for flank pain  Negative for dysuria, frequency and hematuria  Musculoskeletal: Positive for back pain and neck pain  Negative for gait problem and myalgias  Skin: Negative for rash and wound  Allergic/Immunologic: Negative for immunocompromised state  Neurological: Positive for weakness and headaches  Negative for dizziness, syncope and light-headedness  Hematological: Negative for adenopathy  Psychiatric/Behavioral: Negative for agitation, confusion and self-injury  The patient is not nervous/anxious  Historical Information     Immunizations:   Immunization History   Administered Date(s) Administered    INFLUENZA 09/08/2014, 09/11/2015, 09/20/2016, 10/30/2017    Pneumococcal Polysaccharide PPV23 10/02/2013       Past Medical History:   Diagnosis Date    Asthma     Bipolar disorder ([facility] )     Chronic UTI (urinary tract infection)     COPD (chronic obstructive pulmonary disease) ([facility] )     Diabetes mellitus ([facility] )     Disease of thyroid gland     Dyslipidemia 10/31/2018    Essential hypertension 10/31/2018    GERD (gastroesophageal reflux disease)     Obesity due to excess calories 10/31/2018    Seizure ([facility] )      History reviewed   No pertinent family history  Past Surgical History:   Procedure Laterality Date    ANKLE FRACTURE SURGERY Right     TUBAL LIGATION      VEIN LIGATION AND STRIPPING         Social History     Socioeconomic History    Marital status: Single     Spouse name: None    Number of children: None    Years of education: None    Highest education level: None   Occupational History    None   Social Needs    Financial resource strain: None    Food insecurity:     Worry: None     Inability: None    Transportation needs:     Medical: None     Non-medical: None   Tobacco Use    Smoking status: Never Smoker    Smokeless tobacco: Never Used   Substance and Sexual Activity    Alcohol use: Not Currently     Alcohol/week: 0 0 standard drinks    Drug use: No    Sexual activity: Not Currently   Lifestyle    Physical activity:     Days per week: None     Minutes per session: None    Stress: None   Relationships    Social connections:     Talks on phone: None     Gets together: None     Attends Spiritism service: None     Active member of club or organization: None     Attends meetings of clubs or organizations: None     Relationship status: None    Intimate partner violence:     Fear of current or ex partner: None     Emotionally abused: None     Physically abused: None     Forced sexual activity: None   Other Topics Concern    None   Social History Narrative    None       Family History: non-contributory    Meds/Allergies   Prior to Admission Medications   Prescriptions Last Dose Informant Patient Reported? Taking?    Blood Glucose Monitoring Suppl (BLOOD GLUCOSE MONITOR SYSTEM) w/Device KIT   No No   Sig: Test blood sugars 3 times a day   Control Gel Formula Dressing (DUODERM CGF DRESSING) MISC   No No   Sig: Apply 1 application topically every third day   Control Gel Formula Dressing (DUODERM CGF DRESSING) MISC   No No   Sig: Apply 1 application topically every 3 (three) days Please order 5X7   Incontinence Supply Disposable (PADSORBER BED PAN LINERS) MISC   No No   Sig: by Does not apply route as needed (incontinence)   Infant Care Products (CUTIES SENSITIVE WIPES) MISC   No No   Si Pieces by Does not apply route as needed (incontinence)   NOVOFINE 32G X 6 MM MISC   Yes No   NOVOLOG FLEXPEN 100 units/mL injection pen   Yes No   Sig: Uses sliding scale    ONETOUCH DELICA LANCETS 77Y MISC   No No   Sig: Test blood sugars 3 times a day  QUEtiapine (SEROquel) 400 MG tablet   No No   Sig: Take 1 tablet (400 mg total) by mouth 2 (two) times a day   Skin Protectants, Misc   (CALAZIME SKIN PROTECTANT) PSTE   No No   Sig: Apply 113 g topically 2 (two) times a day   VENTOLIN  (90 Base) MCG/ACT inhaler   No No   Sig: Inhale 2 puffs every 6 (six) hours as needed for wheezing   Zinc Oxide 10 % AERO   No No   Sig: Apply 1 Can topically as needed (incontinence)   albuterol (PROAIR HFA) 90 mcg/act inhaler   No No   Sig: Inhale 2 puffs every 6 (six) hours as needed for wheezing   albuterol (VENTOLIN HFA) 90 mcg/act inhaler   No No   Sig: Inhale 2 puffs every 6 (six) hours as needed for wheezing   aspirin (ECOTRIN LOW STRENGTH) 81 mg EC tablet   No No   Sig: Take 1 tablet (81 mg total) by mouth daily   clotrimazole-betamethasone (LOTRISONE) 1-0 05 % cream   Yes No   Si (two) times a day As needed    estradiol (ESTRACE) 1 mg tablet   Yes No   Sig: Take 1 mg by mouth daily at bedtime     fluticasone (FLONASE) 50 mcg/act nasal spray   No No   Sig: SPRAY ONE (1) SPRAY INTO EACH NOSTRIL ONCE DAILY   furosemide (LASIX) 40 mg tablet   No No   Sig: Take 1 tablet (40 mg total) by mouth daily for 30 days   gabapentin (NEURONTIN) 800 mg tablet   No No   Sig: Take 1 tablet (800 mg total) by mouth 4 (four) times a day   glucose blood test strip   No No   Sig: Test blood sugars 3 times a day    hydrOXYzine pamoate (VISTARIL) 50 mg capsule   No No   Sig: Take 1 capsule (50 mg total) by mouth 3 (three) times a day   insulin glargine (LANTUS) 100 units/mL subcutaneous injection   No No   Sig: Inject 10 Units under the skin daily at bedtime   levETIRAcetam (KEPPRA) 750 mg tablet   Yes No   Sig: Take 1,500 mg by mouth 2 (two) times a day Pt states she takes 1500MG Bid   levothyroxine 150 mcg tablet   No No   Sig: Take 1 tablet (150 mcg total) by mouth daily   lisinopril (ZESTRIL) 5 mg tablet   No No   Sig: Take 1 tablet (5 mg total) by mouth daily   loratadine (CLARITIN) 10 mg tablet   No No   Sig: Take 1 tablet (10 mg total) by mouth daily   meloxicam (MOBIC) 7 5 mg tablet   No No   Sig: Take 1 tablet (7 5 mg total) by mouth 2 (two) times a day   metFORMIN (GLUCOPHAGE) 1000 MG tablet   No No   Sig: Take 1 tablet (1,000 mg total) by mouth 2 (two) times a day with meals   metoprolol succinate (TOPROL-XL) 25 mg 24 hr tablet   No No   Sig: Take 1 tablet (25 mg total) by mouth daily   montelukast (SINGULAIR) 10 mg tablet   No No   Sig: Take 1 tablet (10 mg total) by mouth daily at bedtime   naproxen (NAPROSYN) 500 mg tablet   No No   Sig: Take 1 tablet (500 mg total) by mouth 2 (two) times a day with meals for 6 doses   norethindrone (AYGESTIN) 5 mg tablet   Yes No   Sig: Take 5 mg by mouth daily  nystatin (MYCOSTATIN) powder   No No   Sig: Apply topically 2 (two) times a day   omeprazole (PriLOSEC) 20 mg delayed release capsule   No No   Sig: Take 1 capsule (20 mg total) by mouth daily TAKE ONE TABLET IN THE MORNING AND TAKE ONE TABLET IN THE EVENING BY MOUTH DAILY  omeprazole (PriLOSEC) 20 mg delayed release capsule   No No   Sig: TAKE ONE CAPSULE BY MOUTH IN THE MORNING AND TAKE ONE CAPSULE IN THE EVENING  omeprazole (PriLOSEC) 20 mg delayed release capsule   No No   Sig: TAKE ONE CAPSULE BY MOUTH IN THE MORNING AND TAKE ONE CAPSULE IN THE EVENING     oxyCODONE-acetaminophen (PERCOCET) 5-325 mg per tablet   No No   Sig: Take 1 tablet by mouth every 4 (four) hours as needed for moderate pain for up to 4 dosesMax Daily Amount: 6 tablets   oxybutynin (DITROPAN) 5 mg tablet   No No   Sig: Take 1 tablet (5 mg total) by mouth 3 (three) times a day   phenytoin (DILANTIN) 100 mg ER capsule   Yes No   Sig: Take by mouth 5 (five) times a day 3 tablets in morning and 2 at lunch daily    polyethylene glycol (GLYCOLAX) powder   No No   Sig: Take 17 g by mouth daily   polyvinyl alcohol (LIQUIFILM TEARS) 1 4 % ophthalmic solution   No No   Sig: Administer 1 drop to both eyes 3 (three) times a day   simvastatin (ZOCOR) 80 mg tablet   No No   Sig: Take 1 tablet (80 mg total) by mouth daily at bedtime   valACYclovir (VALTREX) 500 mg tablet   Yes No   Sig: Take 1 tablet by mouth 2 (two) times a day   zonisamide (ZONEGRAN) 100 mg capsule   Yes No   Sig: Take 100 mg by mouth 5 (five) times a day        Facility-Administered Medications: None       Allergies   Allergen Reactions    Keflex [Cephalexin] Anaphylaxis     Airway edema     Levaquin [Levofloxacin] Anaphylaxis    Penicillins Anaphylaxis    Bactrim [Sulfamethoxazole-Trimethoprim] Swelling and GI Intolerance     LE edema and thrush    Ciprofloxacin Swelling     Edema and all extremities and thrush   Edema in all extremities and thrush     Noroxin [Norfloxacin]     Tylenol [Acetaminophen]        PHYSICAL EXAM    PE limited by: No limitations    Objective   Vitals:   First set: Temperature: 99 4 °F (37 4 °C) (07/27/19 2019)  Pulse: 101 (07/27/19 2019)  Respirations: 20 (07/27/19 2019)  Blood Pressure: 101/69 (07/27/19 2019)  SpO2: 94 % (07/27/19 2019)    Primary Survey:   (A) Airway: Intact  (B) Breathing: Bilateral breath sounds  (C) Circulation: Pulses:   normal  (D) Disabliity:  GCS Total:  15  (E) Expose:  Completed    Secondary Survey: (Click on Physical Exam tab above)  Physical Exam   Constitutional: She is oriented to person, place, and time  She appears well-developed and well-nourished  No distress  HENT:   Head: Normocephalic and atraumatic     Right Ear: External ear normal    Left Ear: External ear normal    Nose: Nose normal    Mouth/Throat: Oropharynx is clear and moist  No oropharyngeal exudate  Eyes: Pupils are equal, round, and reactive to light  Conjunctivae and EOM are normal  Right eye exhibits no discharge  Left eye exhibits no discharge  No scleral icterus  Neck: Normal range of motion  No JVD present  No tracheal deviation present  No thyromegaly present  Cardiovascular: Normal rate, regular rhythm and normal heart sounds  No murmur heard  Pulmonary/Chest: Effort normal and breath sounds normal  No stridor  No respiratory distress  She has no wheezes  She has no rales  She exhibits no tenderness  Abdominal: Soft  Bowel sounds are normal  She exhibits no distension and no mass  There is no tenderness  There is no rebound and no guarding  Musculoskeletal: Normal range of motion  She exhibits tenderness  She exhibits no edema or deformity  Arms:  Lymphadenopathy:     She has no cervical adenopathy  Neurological: She is alert and oriented to person, place, and time  She has normal reflexes  She displays normal reflexes  No cranial nerve deficit  She exhibits normal muscle tone  Coordination normal    Skin: Skin is warm and dry  No rash noted  She is not diaphoretic  No erythema  No pallor  Psychiatric: She has a normal mood and affect  Her behavior is normal  Judgment and thought content normal    Nursing note and vitals reviewed        Invasive Devices     Peripheral Intravenous Line            Peripheral IV 07/27/19 Right Arm less than 1 day                Lab Results:   Results Reviewed     Procedure Component Value Units Date/Time    UA w Reflex to Microscopic w Reflex to Culture [[id number]] Collected:  07/27/19 2232    Lab Status:  Final result Specimen:  Urine, Clean Catch Updated:  07/27/19 2242     Color, UA Yellow     Clarity, UA Clear     Specific Gravity, UA <=1 005     pH, UA 6 0     Leukocytes, UA Negative     Nitrite, UA Negative     Protein, UA Negative mg/dl Glucose, UA Negative mg/dl      Ketones, UA Negative mg/dl      Urobilinogen, UA 0 2 E U /dl      Bilirubin, UA Negative     Blood, UA Negative    Fingerstick Glucose (POCT) [[id number]]  (Abnormal) Collected:  07/27/19 2148    Lab Status:  Final result Updated:  07/27/19 2151     POC Glucose 61 mg/dl     Lactic acid, plasma [[id number]]     Lab Status:  No result Specimen:  Blood     POCT Chem 8+ [[id number]]  (Abnormal) Collected:  07/27/19 2112    Lab Status:  Final result Specimen:  Venous Updated:  07/27/19 2117     SODIUM, I-STAT 131 mmol/l      Potassium, i-STAT 3 5 mmol/L      Chloride, istat 98 mmol/L      CO2, i-STAT 20 mmol/L      Anion Gap, i-STAT 16 mmol/L      Calcium, Ionized i-STAT 1 10 mmol/L      BUN, I-STAT 5 mg/dl      Creatinine, i-STAT 0 5 mg/dl      eGFR 105 ml/min/1 73sq m      Glucose, i-STAT 70 mg/dl      Hct, i-STAT 27 %      Hgb, i-STAT 9 2 g/dl      Specimen Type VENOUS    Narrative:       [facility] guidelines for [facility] (CKD):     Stage 1 with normal or high GFR (GFR > 90 mL/min/1 73 square meters)    Stage 2 Mild CKD (GFR = 60-89 mL/min/1 73 square meters)    Stage 3A Moderate CKD (GFR = 45-59 mL/min/1 73 square meters)    Stage 3B Moderate CKD (GFR = 30-44 mL/min/1 73 square meters)    Stage 4 Severe CKD (GFR = 15-29 mL/min/1 73 square meters)    Stage 5 End Stage CKD (GFR <15 mL/min/1 73 square meters)  Note: GFR calculation is accurate only with a steady state creatinine    Lactic acid, plasma [[id number]]  (Abnormal) Collected:  07/27/19 2038    Lab Status:  Final result Specimen:  Blood from Arm, Right Updated:  07/27/19 2113     LACTIC ACID 3 9 mmol/L     Narrative:       Result may be elevated if tourniquet was used during collection      Kreamanda Hippo [[id number]]  (Normal) Collected:  07/27/19 2038    Lab Status:  Final result Specimen:  Blood from Arm, Right Updated:  07/27/19 2108     Protime 13 3 seconds      INR 1 01    APTT [[id number]]  (Normal) Collected:  07/27/19 2038    Lab Status:  Final result Specimen:  Blood from Arm, Right Updated:  07/27/19 2108     PTT 26 seconds     Troponin I [[id number]]  (Normal) Collected:  07/27/19 2038    Lab Status:  Final result Specimen:  Blood from Arm, Right Updated:  07/27/19 2107     Troponin I <0 02 ng/mL     Ethanol [[id number]]  (Normal) Collected:  07/27/19 2038    Lab Status:  Final result Specimen:  Blood from Arm, Right Updated:  07/27/19 2103     Ethanol Lvl <3 mg/dL     Basic metabolic panel [[id number]]  (Abnormal) Collected:  07/27/19 2038    Lab Status:  Final result Specimen:  Blood from Arm, Right Updated:  07/27/19 2103     Sodium 134 mmol/L      Potassium 3 8 mmol/L      Chloride 99 mmol/L      CO2 24 mmol/L      ANION GAP 11 mmol/L      BUN 7 mg/dL      Creatinine 0 66 mg/dL      Glucose 80 mg/dL      Calcium 8 2 mg/dL      eGFR 96 ml/min/1 73sq m     Narrative:       [facility] guidelines for Chronic Kidney Disease (CKD):     Stage 1 with normal or high GFR (GFR > 90 mL/min/1 73 square meters)    Stage 2 Mild CKD (GFR = 60-89 mL/min/1 73 square meters)    Stage 3A Moderate CKD (GFR = 45-59 mL/min/1 73 square meters)    Stage 3B Moderate CKD (GFR = 30-44 mL/min/1 73 square meters)    Stage 4 Severe CKD (GFR = 15-29 mL/min/1 73 square meters)    Stage 5 End Stage CKD (GFR <15 mL/min/1 73 square meters)  Note: GFR calculation is accurate only with a steady state creatinine    CK (with reflex to MB) [[id number]]  (Normal) Collected:  07/27/19 2038    Lab Status:  Final result Specimen:  Blood from Arm, Right Updated:  07/27/19 2103     Total CK 45 U/L     CBC and differential [[id number]]  (Abnormal) Collected:  07/27/19 2038    Lab Status:  Final result Specimen:  Blood from Arm, Right Updated:  07/27/19 2048     WBC 4 04 Thousand/uL      RBC 3 32 Million/uL      Hemoglobin 10 7 g/dL      Hematocrit 32 4 %      MCV 98 fL      MCH 32 2 pg      MCHC 33 0 g/dL      RDW 15 2 %      MPV 8 1 fL      Platelets 593 Thousands/uL      nRBC 0 /100 WBCs      Neutrophils Relative 39 %      Immat GRANS % 1 %      Lymphocytes Relative 39 %      Monocytes Relative 10 %      Eosinophils Relative 9 %      Basophils Relative 2 %      Neutrophils Absolute 1 56 Thousands/µL      Immature Grans Absolute 0 02 Thousand/uL      Lymphocytes Absolute 1 63 Thousands/µL      Monocytes Absolute 0 42 Thousand/µL      Eosinophils Absolute 0 35 Thousand/µL      Basophils Absolute 0 06 Thousands/µL              EKG: normal sinus rhythm, no acute ischemia    Imaging Studies:   Direct to CT: Yes  CT head without contrast   Final Result by Tiana Horvath MD (07/27 2124)      No acute intracranial abnormality  Microangiopathic changes  I personally discussed this study with Huyen Celis on 7/27/2019 at 9:20 PM          Workstation performed: [id number]         CT spine cervical without contrast   Final Result by Tiana Horvath MD (07/27 2124)      No cervical spine fracture or traumatic malalignment  I personally discussed this study with Huyen Celis on 7/27/2019 at 9:20 PM       Workstation performed: [id number]         CT chest abdomen pelvis w contrast   Final Result by Tiana Horvath MD (07/27 2123)         1  No findings of acute traumatic injury in the chest, abdomen or pelvis  2   Moderate to large volume retained colonic stool  I personally discussed this study with Huyen Celis on 7/27/2019 at 9:20 PM                Workstation performed: [id number]             Other Studies: None    Code Status: Prior  Advance Directive and Living Will:      Power of :    POLST:      Procedures  Procedures         ED Course         MDM  Number of Diagnoses or Management Options  Generalized weakness:   Diagnosis management comments: IMP: uti versus right shoulder cellulitis, medication side affect, electrolyte abnormality, dehydration  Consider mild chi, neck strain vs fx, rib contusion vs fx, kidney injury, liver injury  Plan: check cardiac labs, ekg, ct head/neck/chest/abd/pelvis, check urinalysis, give ivf and iv narcotic pain meds prn   - ekg no acute ischemia  - ct scans no acute  - lactic acid 3 9  - mild leukopenia 4 04  - urinalysis no acute  - Pt with lactic acidosis and no acute findings  Will admit to medicine  Amount and/or Complexity of Data Reviewed  Clinical lab tests: ordered and reviewed  Tests in the radiology section of CPT®: ordered and reviewed  Tests in the medicine section of CPT®: ordered and reviewed  Decide to obtain previous medical records or to obtain history from someone other than the patient: yes  Review and summarize past medical records: yes  Discuss the patient with other providers: yes (Hospitalist Service)  Independent visualization of images, tracings, or specimens: yes    Risk of Complications, Morbidity, and/or Mortality  Presenting problems: high  Diagnostic procedures: high  Management options: high    Patient Progress  Patient progress: stable      Disposition  [facility] Transfer: No  Final diagnoses:   Generalized weakness     Time reflects when diagnosis was documented in both MDM as applicable and the Disposition within this note     Time User Action Codes Description Comment    7/27/2019 10:53 PM Shola Maradiaga Add [R53 1] Generalized weakness       ED Disposition     ED Disposition Condition Date/Time Comment    Admit Stable Sat Jul 27, 2019 10:53 PM Case was discussed with Fermin and the patient's admission status was agreed to be Admission Status: observation status to the service of Dr Roberto Clemons   Follow-up Information    None       Patient's Medications   Discharge Prescriptions    No medications on file     No discharge procedures on file        ED Provider  Electronically Signed by         Erin Mcintosh DO  07/27/19 1063

## 2019-07-28 NOTE — ED NOTES
Pt refused all wound care, different dressing and ointments were offered to pt, pt declined all     Damaris Francis RN  07/28/19 1012

## 2019-07-28 NOTE — ED NOTES
Patient transported to CT via litter on cardiac monitor accompanied by BARB Lundberg RN  07/27/19 2044

## 2019-07-28 NOTE — UTILIZATION REVIEW
Initial Clinical Review    Admission: Date/Time/Statement: 7/27/19 @ 2254 OBSERVATION    Orders Placed This Encounter   Procedures    Place in Observation (expected length of stay for this patient is less than two midnights)     Standing Status:   Standing     Number of Occurrences:   1     Order Specific Question:   Admitting Physician     Answer:   [place] Reason     Order Specific Question:   Level of Care     Answer:   Med Surg [16]     ED Arrival Information     Expected Arrival Acuity Means of Arrival Escorted By Service Admission Type    - 7/27/2019 20:17 Urgent Ambulance [facility] Hospitalist Urgent    Arrival Complaint    fall        Chief Complaint   Patient presents with    Multiple Falls     fell when getting out of shower today, has been falling frequently in past week     Assessment/Plan: 65 y/o female presents to ED from home by EMS with fall from standing after becoming weak and legs giving out  Landed on back and posterior head  Denies LOC  Now c/o headache, neck pain, back pain, flank pain  States she feels like she has a UTI  Also reports recent frequent falls and that she feels unsafe at home  Admitted as observation due to generalized weakness, fall, stage 3 sacral pressure ulcer, lactic acidosis  Tele  Continue IVF  PT/OT  Wound care consult  Fall precautions      ED Triage Vitals [07/27/19 2019]   Temperature Pulse Respirations Blood Pressure SpO2   99 4 °F (37 4 °C) 101 20 101/69 94 %      Temp Source Heart Rate Source Patient Position - Orthostatic VS BP Location FiO2 (%)   Temporal Monitor Lying Left arm --      Pain Score       6        Wt Readings from Last 1 Encounters:   07/28/19 85 2 kg (187 lb 13 3 oz)     Additional Vital Signs:   07/28/19 0912  --  82  --  99/61  --  --   07/28/19 0747  96 8 °F (36 °C)Abnormal   68  20  98/58  98 %  None (Room air)   07/28/19 0010  --  82  20  99/68  96 %  None (Room air)   07/27/19 2230  --  88  20  113/60 95 %  --   07/27/19 2105  --  --  20  94/55  --  --       Pertinent Labs/Diagnostic Test Results:   Results from last 7 days   Lab Units 07/28/19  0503 07/28/19  0239 07/27/19 2112 07/27/19 2038   WBC Thousand/uL 4 92  --   --  4 04*   HEMOGLOBIN g/dL 10 1*  --   --  10 7*   I STAT HEMOGLOBIN g/dl  --   --  9 2*  --    HEMATOCRIT % 30 2*  --   --  32 4*   HEMATOCRIT, ISTAT %  --   --  27*  --    PLATELETS Thousands/uL 279 295  --  312   NEUTROS ABS Thousands/µL  --   --   --  1 56*         Results from last 7 days   Lab Units 07/28/19  0503 07/27/19  2112 07/27/19  2038   SODIUM mmol/L 139  --  134*   POTASSIUM mmol/L 3 6  --  3 8   CHLORIDE mmol/L 105  --  99*   CO2 mmol/L 25  --  24   CO2, I-STAT mmol/L  --  20*  --    AGAP mmol/L  --  16*  --    ANION GAP mmol/L 9  --  11   BUN mg/dL 8  --  7   CREATININE mg/dL 0 56*  --  0 66   EGFR ml/min/1 73sq m 101 105 96   CALCIUM mg/dL 8 0*  --  8 2*   CALCIUM, IONIZED, ISTAT mmol/L  --  1 10*  --    MAGNESIUM mg/dL 1 6  --   --    PHOSPHORUS mg/dL 3 9  --   --      Results from last 7 days   Lab Units 07/28/19  0503   AST U/L 15   ALT U/L 10*   ALK PHOS U/L 38*   TOTAL PROTEIN g/dL 5 7*   ALBUMIN g/dL 2 4*   TOTAL BILIRUBIN mg/dL 0 20     Results from last 7 days   Lab Units 07/28/19  0643 07/28/19  0122 07/27/19 2257 07/27/19 2148   POC GLUCOSE mg/dl 87 84 73 61*     Results from last 7 days   Lab Units 07/28/19  0503 07/27/19 2038   GLUCOSE RANDOM mg/dL 79 80     Results from last 7 days   Lab Units 07/27/19 2038   CK TOTAL U/L 45     Results from last 7 days   Lab Units 07/27/19 2038   TROPONIN I ng/mL <0 02         Results from last 7 days   Lab Units 07/28/19  0503 07/27/19  2038   PROTIME seconds 13 4 13 3   INR  1 02 1 01   PTT seconds 25 26         Results from last 7 days   Lab Units 07/28/19  0503 07/28/19  0239 07/27/19  2302 07/27/19 2038   LACTIC ACID mmol/L 2 4* 4 3* 3 4* 3 9*     Results from last 7 days   Lab Units 07/27/19  2232   CLARITY UA Clear   COLOR UA  Yellow   SPEC GRAV UA  <=1 005   PH UA  6 0   GLUCOSE UA mg/dl Negative   KETONES UA mg/dl Negative   BLOOD UA  Negative   PROTEIN UA mg/dl Negative   NITRITE UA  Negative   BILIRUBIN UA  Negative   UROBILINOGEN UA E U /dl 0 2   LEUKOCYTES UA  Negative     Results from last 7 days   Lab Units 07/27/19 2038   ETHANOL LVL mg/dL <3     CT head:  No acute intracranial abnormality   Microangiopathic changes  CT C spine:  No cervical spine fracture or traumatic malalignment  CT chest/abd/pelvis:  1   No findings of acute traumatic injury in the chest, abdomen or pelvis  2   Moderate to large volume retained colonic stool      ED Treatment:   Medication Administration from 07/27/2019 2017 to 07/28/2019 2430       Date/Time Order Dose Route Action Action by Comments     07/27/2019 2316 sodium chloride 0 9 % bolus 250 mL 0 mL Intravenous Stopped Salvador Evans, RN      07/27/2019 2116 sodium chloride 0 9 % bolus 250 mL 250 mL Intravenous [address] Salvador Evans RN      07/27/2019 2101 iohexol (OMNIPAQUE) 350 MG/ML injection (MULTI-DOSE) 100 mL 100 mL Intravenous Given Bailee James      07/27/2019 2348 sodium chloride 0 9 % bolus 1,000 mL 0 mL Intravenous Stopped Salvador Evans RN      07/27/2019 2148 sodium chloride 0 9 % bolus 1,000 mL 1,000 mL Intravenous [address] Salvador Evans RN      07/28/2019 0926 albuterol (PROVENTIL HFA,VENTOLIN HFA) inhaler 2 puff 2 puff Inhalation Given Wilfrid Black RN      07/28/2019 0000 DUODERM CGF DRESSING MISC 1 application 1 application Apply externally Not Given Salvador Evans RN      07/28/2019 0146 DUODERM CGF DRESSING MISC 1 application 1 application Apply externally Not Given Fanny Silva RN patient does not want it applied     07/28/2019 0148 estradiol (ESTRACE) tablet 1 mg 1 mg Oral Given Fanny Silva RN      07/28/2019 0918 fluticasone (FLONASE) 50 mcg/act nasal spray 1 spray 1 spray Each Nare Not Given Wilfrid Black RN 07/28/2019 0912 gabapentin (NEURONTIN) capsule 800 mg 800 mg Oral Given Lona Carmona RN      07/28/2019 0147 gabapentin (NEURONTIN) capsule 800 mg 800 mg Oral Given Chilango Damian RN      07/28/2019 0914 hydrOXYzine HCL (ATARAX) tablet 50 mg 50 mg Oral Given Lona Carmona RN      07/28/2019 0148 hydrOXYzine HCL (ATARAX) tablet 50 mg 50 mg Oral Not Given Chilango Damian RN      07/28/2019 0149 insulin glargine (LANTUS) subcutaneous injection 10 Units 0 1 mL 10 Units Subcutaneous Not Given Chilango Damian RN BS 84--patient refused dose     07/28/2019 0656 levothyroxine tablet 150 mcg 150 mcg Oral Given Xu Guerrero RN      07/28/2019 0915 lisinopril (ZESTRIL) tablet 5 mg 5 mg Oral Given Lona Carmona RN      07/28/2019 0915 loratadine (CLARITIN) tablet 10 mg 10 mg Oral Given Lona Carmona RN      07/28/2019 0912 metoprolol succinate (TOPROL-XL) 24 hr tablet 25 mg 25 mg Oral Given Lona Carmona RN      07/28/2019 0148 montelukast (SINGULAIR) tablet 10 mg 10 mg Oral Given Chilango Damian RN      07/28/2019 0506 pantoprazole (PROTONIX) EC tablet 40 mg 40 mg Oral Given Xu Guerrero RN      07/28/2019 0913 oxybutynin (DITROPAN) tablet 5 mg 5 mg Oral Given Lona Carmona RN      07/28/2019 0148 oxybutynin (DITROPAN) tablet 5 mg 5 mg Oral Given Chilango Damian RN      07/28/2019 0924 oxyCODONE-acetaminophen (PERCOCET) 5-325 mg per tablet 1 tablet 1 tablet Oral Given Lona Carmona RN      07/28/2019 0506 oxyCODONE-acetaminophen (PERCOCET) 5-325 mg per tablet 1 tablet 1 tablet Oral Given Xu Guerrero RN      07/28/2019 0646 phenytoin (DILANTIN) ER capsule 100 mg   Oral Canceled Entry Lona Carmona RN      07/28/2019 0741 zonisamide (ZONEGRAN) capsule 100 mg 100 mg Oral Given Lona Carmona RN      07/28/2019 0311 zonisamide (ZONEGRAN) capsule 100 mg 100 mg Oral Not Given Xu Guerrero RN      07/28/2019 0314 sodium chloride 0 9 % infusion 125 mL/hr Intravenous Rate/Dose Change Jessie Jacome RN      07/28/2019 0035 sodium chloride 0 9 % infusion 100 mL/hr Intravenous [address] [facility], [address] [place] [address]      07/28/2019 0919 enoxaparin (LOVENOX) subcutaneous injection 40 mg 40 mg Subcutaneous Given Ally Parrish RN      07/28/2019 6281 insulin lispro (HumaLOG) 100 units/mL subcutaneous injection 1-6 Units 0 Units Subcutaneous Hold Ally Parrish RN      07/28/2019 0000 insulin lispro (HumaLOG) 100 units/mL subcutaneous injection 1-6 Units 0 Units Subcutaneous Not Given Jessie Jacome RN      07/28/2019 2257 phenytoin (DILANTIN) ER capsule 300 mg 300 mg Oral Given Ally Parrish RN      07/28/2019 0741 levETIRAcetam (KEPPRA) tablet 1,500 mg 1,500 mg Oral Given Ally Parrish RN      07/28/2019 0740 QUEtiapine (SEROquel) tablet 400 mg 400 mg Oral Given Ally Parrish RN         Past Medical History:   Diagnosis Date    Asthma     Bipolar disorder ([place] [address] )     Chronic UTI (urinary tract infection)     COPD (chronic obstructive pulmonary disease) ([place] [address] )     Diabetes mellitus ([place] [address] )     Disease of thyroid gland     Dyslipidemia 10/31/2018    Essential hypertension 10/31/2018    GERD (gastroesophageal reflux disease)     Obesity due to excess calories 10/31/2018    Seizure ([facility] )      Present on Admission:   Seizure disorder ([facility] )   Essential hypertension   Lactic acidosis   COPD (chronic obstructive pulmonary disease) ([facility] )   Hypercholesterolemia   Acquired hypothyroidism   GERD without esophagitis   Generalized weakness   Pressure injury of sacral region, stage 3 ([facility] )      Admitting Diagnosis: Side pain [R10 9]  Age/Sex: 66 y o  female  Admission Orders:    Current Facility-Administered Medications:  albuterol 2 puff Inhalation Q6H PRN x1   atorvastatin 40 mg Oral Daily With Dinner   DUODERM CGF DRESSING 1 each Apply externally Q72H   DUODERM CGF DRESSING 1 application Apply externally Q3 Days   enoxaparin 40 mg Subcutaneous Daily   estradiol 1 mg Oral HS   fluticasone 1 spray Each Nare Daily   gabapentin 800 mg Oral 4x Daily   hydrOXYzine HCL 50 mg Oral TID   insulin glargine 10 Units Subcutaneous HS   insulin lispro 1-6 Units Subcutaneous TID AC   insulin lispro 1-6 Units Subcutaneous HS   levETIRAcetam 1,500 mg Oral BID   levothyroxine 150 mcg Oral Early Morning   lisinopril 5 mg Oral Daily   loratadine 10 mg Oral Daily   metoprolol succinate 25 mg Oral Daily   montelukast 10 mg Oral HS   norethindrone 5 mg Oral Daily   ondansetron 4 mg Intravenous Q6H PRN   oxybutynin 5 mg Oral TID   oxyCODONE-acetaminophen 1 tablet Oral Q4H PRN x2   pantoprazole 40 mg Oral Early Morning   phenytoin 200 mg Oral Daily With Lunch   phenytoin 300 mg Oral QAM   QUEtiapine 400 mg Oral BID   sodium chloride 125 mL/hr Intravenous Continuous   zonisamide 100 mg Oral 5x Daily       IP CONSULT TO CASE MANAGEMENT  IP CONSULT TO CASE MANAGEMENT  IP CONSULT TO WOUND CARE   VS  Tele  SCDs  PT/OT    Network Utilization Review Department  Phone: [phone]; Fax [phone]  [email or web address]  ATTENTION: Please call with any questions or concerns to [phone]  and carefully listen to the prompts so that you are directed to the right person  Send all requests for admission clinical reviews, approved or denied determinations and any other requests to fax [phone]   All voicemails are confidential

## 2019-07-28 NOTE — RESPIRATORY THERAPY NOTE
RT Protocol Note  Arvind Cueto 66 y o  female MRN: [id number]  Unit/Bed#: RM03 Encounter: [id number]    Assessment    Active Problems:    Type 2 diabetes mellitus without complication, with long-term current use of insulin (HCC)    Acquired hypothyroidism    Seizure disorder (HCC)    Essential hypertension    Hypercholesterolemia    GERD without esophagitis    Lactic acidosis    COPD (chronic obstructive pulmonary disease) ([place] [address] )    Fall at home    Generalized weakness      Home Pulmonary Medications:  Non compliant with home meds  Home Devices/Therapy: Other (Comment)    Past Medical History:   Diagnosis Date    Asthma     Bipolar disorder ([place] [address] )     Chronic UTI (urinary tract infection)     COPD (chronic obstructive pulmonary disease) ([place] [address] )     Diabetes mellitus ([place] [address] )     Disease of thyroid gland     Dyslipidemia 10/31/2018    Essential hypertension 10/31/2018    GERD (gastroesophageal reflux disease)     Obesity due to excess calories 10/31/2018    Seizure ([place] [address] )      Social History     Socioeconomic History    Marital status: Single     Spouse name: None    Number of children: None    Years of education: None    Highest education level: None   Occupational History    None   Social Needs    Financial resource strain: None    Food insecurity:     Worry: None     Inability: None    Transportation needs:     Medical: None     Non-medical: None   Tobacco Use    Smoking status: Never Smoker    Smokeless tobacco: Never Used   Substance and Sexual Activity    Alcohol use: Not Currently     Alcohol/week: 0 0 standard drinks    Drug use: No    Sexual activity: Not Currently   Lifestyle    Physical activity:     Days per week: None     Minutes per session: None    Stress: None   Relationships    Social connections:     Talks on phone: None     Gets together: None     Attends Gnosticist service: None     Active member of club or organization: None     Attends meetings of clubs or organizations: None Relationship status: None    Intimate partner violence:     Fear of current or ex partner: None     Emotionally abused: None     Physically abused: None     Forced sexual activity: None   Other Topics Concern    None   Social History Narrative    None       Subjective         Objective    Physical Exam:   Assessment Type: Assess only  General Appearance: Alert, Awake  Respiratory Pattern: Normal  Chest Assessment: Chest expansion symmetrical  Bilateral Breath Sounds: Diminished, Clear  Cough: None    Vitals:  Blood pressure 113/60, pulse 88, temperature 99 4 °F (37 4 °C), temperature source Temporal, resp  rate 20, height 5' 6" (1 676 m), weight 92 1 kg (203 lb 0 7 oz), SpO2 95 %  Imaging and other studies: I have personally reviewed pertinent reports              Plan    Respiratory Plan: Home Bronchodilator Patient pathway      Albuterol Q6 PRN

## 2019-07-29 ENCOUNTER — TRANSITIONAL CARE MANAGEMENT (OUTPATIENT)
Dept: FAMILY MEDICINE CLINIC | Facility: CLINIC | Age: 64
End: 2019-07-29

## 2019-07-29 VITALS
WEIGHT: 197.75 LBS | BODY MASS INDEX: 31.78 KG/M2 | DIASTOLIC BLOOD PRESSURE: 60 MMHG | HEART RATE: 72 BPM | OXYGEN SATURATION: 98 % | RESPIRATION RATE: 18 BRPM | SYSTOLIC BLOOD PRESSURE: 98 MMHG | TEMPERATURE: 97.7 F | HEIGHT: 66 IN

## 2019-07-29 PROBLEM — D50.9 IRON DEFICIENCY ANEMIA, UNSPECIFIED: Chronic | Status: ACTIVE | Noted: 2019-07-29

## 2019-07-29 PROBLEM — R29.6 RECURRENT FALLS: Chronic | Status: ACTIVE | Noted: 2019-04-13

## 2019-07-29 LAB
GLUCOSE SERPL-MCNC: 161 MG/DL (ref 65–140)
GLUCOSE SERPL-MCNC: 86 MG/DL (ref 65–140)
PHENYTOIN SERPL-MCNC: 14 UG/ML (ref 10–20)
TSH SERPL DL<=0.05 MIU/L-ACNC: 1.46 UIU/ML (ref 0.36–3.74)

## 2019-07-29 PROCEDURE — G8988 SELF CARE GOAL STATUS: HCPCS

## 2019-07-29 PROCEDURE — 99217 PR OBSERVATION CARE DISCHARGE MANAGEMENT: CPT | Performed by: FAMILY MEDICINE

## 2019-07-29 PROCEDURE — 80171 DRUG SCREEN QUANT GABAPENTIN: CPT | Performed by: FAMILY MEDICINE

## 2019-07-29 PROCEDURE — 80185 ASSAY OF PHENYTOIN TOTAL: CPT | Performed by: FAMILY MEDICINE

## 2019-07-29 PROCEDURE — 80186 ASSAY OF PHENYTOIN FREE: CPT | Performed by: FAMILY MEDICINE

## 2019-07-29 PROCEDURE — 97530 THERAPEUTIC ACTIVITIES: CPT | Performed by: PHYSICAL THERAPIST

## 2019-07-29 PROCEDURE — 84443 ASSAY THYROID STIM HORMONE: CPT | Performed by: FAMILY MEDICINE

## 2019-07-29 PROCEDURE — 97166 OT EVAL MOD COMPLEX 45 MIN: CPT

## 2019-07-29 PROCEDURE — 97163 PT EVAL HIGH COMPLEX 45 MIN: CPT | Performed by: PHYSICAL THERAPIST

## 2019-07-29 PROCEDURE — G8979 MOBILITY GOAL STATUS: HCPCS | Performed by: PHYSICAL THERAPIST

## 2019-07-29 PROCEDURE — 82948 REAGENT STRIP/BLOOD GLUCOSE: CPT

## 2019-07-29 PROCEDURE — G8978 MOBILITY CURRENT STATUS: HCPCS | Performed by: PHYSICAL THERAPIST

## 2019-07-29 PROCEDURE — 80203 DRUG SCREEN QUANT ZONISAMIDE: CPT | Performed by: FAMILY MEDICINE

## 2019-07-29 PROCEDURE — 80177 DRUG SCRN QUAN LEVETIRACETAM: CPT | Performed by: FAMILY MEDICINE

## 2019-07-29 PROCEDURE — G8987 SELF CARE CURRENT STATUS: HCPCS

## 2019-07-29 RX ORDER — FERROUS SULFATE TAB EC 324 MG (65 MG FE EQUIVALENT) 324 (65 FE) MG
324 TABLET DELAYED RESPONSE ORAL
Qty: 60 TABLET | Refills: 0 | Status: SHIPPED | OUTPATIENT
Start: 2019-07-29 | End: 2019-08-16 | Stop reason: SDUPTHER

## 2019-07-29 RX ADMIN — INSULIN LISPRO 1 UNITS: 100 INJECTION, SOLUTION INTRAVENOUS; SUBCUTANEOUS at 11:18

## 2019-07-29 RX ADMIN — PHENYTOIN SODIUM 200 MG: 100 CAPSULE ORAL at 11:15

## 2019-07-29 RX ADMIN — LEVOTHYROXINE SODIUM 150 MCG: 150 TABLET ORAL at 06:22

## 2019-07-29 RX ADMIN — GABAPENTIN 800 MG: 400 CAPSULE ORAL at 11:15

## 2019-07-29 RX ADMIN — OXYBUTYNIN CHLORIDE 5 MG: 5 TABLET ORAL at 09:01

## 2019-07-29 RX ADMIN — LEVETIRACETAM 1500 MG: 500 TABLET ORAL at 09:00

## 2019-07-29 RX ADMIN — QUETIAPINE FUMARATE 400 MG: 100 TABLET ORAL at 09:00

## 2019-07-29 RX ADMIN — ZONISAMIDE 100 MG: 100 CAPSULE ORAL at 13:40

## 2019-07-29 RX ADMIN — LORATADINE 10 MG: 10 TABLET ORAL at 09:01

## 2019-07-29 RX ADMIN — ZONISAMIDE 100 MG: 100 CAPSULE ORAL at 11:15

## 2019-07-29 RX ADMIN — PANTOPRAZOLE SODIUM 40 MG: 40 TABLET, DELAYED RELEASE ORAL at 06:22

## 2019-07-29 RX ADMIN — HYDROXYZINE HYDROCHLORIDE 50 MG: 25 TABLET ORAL at 09:00

## 2019-07-29 RX ADMIN — GABAPENTIN 800 MG: 400 CAPSULE ORAL at 09:00

## 2019-07-29 RX ADMIN — FLUTICASONE PROPIONATE 1 SPRAY: 50 SPRAY, METERED NASAL at 11:25

## 2019-07-29 RX ADMIN — PHENYTOIN SODIUM 300 MG: 100 CAPSULE ORAL at 09:00

## 2019-07-29 RX ADMIN — ENOXAPARIN SODIUM 40 MG: 40 INJECTION SUBCUTANEOUS at 09:02

## 2019-07-29 RX ADMIN — ZONISAMIDE 100 MG: 100 CAPSULE ORAL at 06:21

## 2019-07-29 NOTE — PLAN OF CARE
Problem: DISCHARGE PLANNING - CARE MANAGEMENT  Goal: Discharge to post-acute care or home with appropriate resources  Description  INTERVENTIONS:  - Conduct assessment to determine patient/family and health care team treatment goals, and need for post-acute services based on payer coverage, community resources, and patient preferences, and barriers to discharge  - Address psychosocial, clinical, and financial barriers to discharge as identified in assessment in conjunction with the patient/family and health care team  - Arrange appropriate level of post-acute services according to patient's   needs and preference and payer coverage in collaboration with the physician and health care team  - Communicate with and update the patient/family, physician, and health care team regarding progress on the discharge plan  - Arrange appropriate transportation to post-acute venues    Pt's goal is to return home with hhc, pt declining snf rehab   Outcome: Completed

## 2019-07-29 NOTE — SOCIAL WORK
Chart reviewed by case management, assessment was completed at the bedside, pt stated to me that I need to get her out of here and then she stated " I was held here against my will" pt was assessed by pt dept , pt is refusing snf rehab, pt is in agreement with [facility], she does not remember the c she had in the past, A post acute care recommendation was made by your care team for [facility]  Discussed [place] of Choice with patient  List of  given to  via   patient aware the list is custom filtered for them by zip code location and that [facility] post acute providers are designated  Pt's choice was [facility] referral was made and the pt was accepted, pt stated she needs a ride home and that she cannot go by [facility] since she does not have her walker with a seat, pt lives in an apartment, no steps outside and elevator inside, pt does receive services through the [employer] program, I called the [facility] of [facility] and her  is Feroz Osorio at [place] [address], pt does have aides that come to help her, pt has a rx plan at [facility], pt states she has a walker with a seat, cane and commode, she stated she had a hospitral bed but [facility] took it back, pt to be d/c today as discussed at care coordination rounds today, pt stated she needs a w/c [facility] and she is aware that there is a fee, I set up transport with [facility] for $75 00 and when I explained she needs to pay then cash or check she staed she could not pay them today, I had to cx the transport, [facility] was called and they are able to transport the pt home today at 3 pm, rn and pt are aware, pcp appointment was set up for the pt, pt is insisting on going home, Patient/caregiver received discharge checklist   Content reviewed  Patient/caregiver encouraged to participate in discharge plan of care prior to discharge home    CM reviewed d/c planning process including the following: identifying help at home, patient preference for d/c planning needs, availability of treatment team to discuss questions or concerns patient and/or family may have regarding understanding medications and recognizing signs and symptoms once discharged  CM also encouraged patient to follow up with all recommended appointments after discharge  Patient advised of importance for patient and family to participate in managing patients medical well being

## 2019-07-29 NOTE — PROGRESS NOTES
Pt stated to RN that she wanted to refuse the ordered IVF and she wanted to sign out DANIEL Lainez notified and at bedside  Per her conversation with pt, pt refused IVF so Candido Hernandez d/c'd the order  IVF stopped by RN  Pt is currently trying to get a ride home but Candido Hernandez had already advised her that we couldn't get transport and that further, she wasn't safe to be at home and needed to be here in the hospital  She advised pt that we would revisit her possible d/c tomorrow  Will continue to monitor and assess pt

## 2019-07-29 NOTE — PLAN OF CARE
Problem: PHYSICAL THERAPY ADULT  Goal: Performs mobility at highest level of function for planned discharge setting  See evaluation for individualized goals  Outcome: Progressing  Note:   Prognosis: Good  Problem List: Decreased strength, Decreased endurance, Impaired balance, Decreased mobility, Decreased safety awareness  Assessment: Pt is a 66 year female with complex PMH including R ankle fx, seizure DM COPD, asthma bipolar and anemia presenting to [facility] presenting with weakness and multiple falls at home  Pt seen for high complexity PT evaluation presenting with bilateral LE weakness decreased balance endurance and ambulation tolerance requiring use of RW for ambulation for balance and fall prevention  Pt with SpO2 at rest and with activity 98-99% however with elevated -124  Pt is performing all mobility at a (S) level with mild unsteadiness but no LOB during ambulation  Pt not agreeable to STR and requesting to return home  pt would benefit from continued activity in PT to improve all impairments and functional deficits to return to (I) LOF and decrease fall risk  Pt would benefit from home health care services on discharge  Recommendation: Home PT(pt refusing STR)     PT - OK to Discharge: Yes(when medically stable for discharge)    See flowsheet documentation for full assessment

## 2019-07-29 NOTE — DISCHARGE SUMMARY
Discharge- Susan Perales 1955, 66 y o  female MRN: [id number]    Unit/Bed#: 401-01 Encounter: [id number]    Primary Care Provider: Parish Hewitt   Date and time admitted to hospital: 7/27/2019  8:17 PM        * Generalized weakness  Assessment & Plan  Patient presented to ER following fall at home  No presyncopal symptoms  She denies LOC or significant injury  CT head shows-No acute intracranial abnormality   Microangiopathic changes  CT cervical Spine shows-No cervical spine fracture or traumatic malalignment  CT chest abdomen and pelvis shows-No findings of acute traumatic injury in the chest, abdomen or pelvis  She admits to having several falls over past 2 weeks and she has been admitted 3 times this calendar year  She admits to feeling weak and tired  She reports intentional 60 lb weight loss with past calendar year  No recent medication changes  Last seen by Neurology in February 2019 and has appointment pending at end of August   Patient mildly hypoglycemic at time of presentation with blood glucose 61, so this may contribute to weakness and falls  Follow-up with PCM to manage diabetes  Patient is on numerous medications that may predispose to sedation or loss of coordination  Will check serum levels for anti-epileptic and antipsychotic medications to ensure they are at therapeutic levels  Labs pending at time of discharge  Anemia also noted at time of admission  Checked iron, B12, and folate levels  Severe iron deficiency noted  Fall precautions  PT/OT consult      Recurrent falls  Assessment & Plan  Patient presented to ER following fall at home  No presyncopal symptoms  She denies LOC or significant injury  CT head shows-No acute intracranial abnormality   Microangiopathic changes  CT cervical Spine shows-No cervical spine fracture or traumatic malalignment  CT chest abdomen and pelvis shows-No findings of acute traumatic injury in the chest, abdomen or pelvis    She admits to having several falls over past 2 weeks and she has been admitted 3 times this calendar year  She admits to feeling weak and tired  She reports intentional 60 lb weight loss with past calendar year  No recent medication changes  Last seen by Neurology in February 2019 and has appointment pending at end of August   Patient mildly hypoglycemic at time of presentation with blood glucose 61, so this may contribute to weakness and falls  Follow-up with PCM to manage diabetes  Patient is on numerous medications that may predispose to sedation or loss of coordination  Will check serum levels for anti-epileptic and antipsychotic medications to ensure they are at therapeutic levels  Labs pending at time of discharge  Anemia also noted at time of admission  Checked iron, B12, and folate levels  Severe iron deficiency noted  Fall precautions  PT/OT consult      Iron deficiency anemia, unspecified  Assessment & Plan  Severe iron deficiency with mild anemia  Start oral iron supplement  Pressure injury of sacral region, stage 3 ([facility])  Assessment & Plan  Chronic injury, gradually healing  Continue routine wound care  Wound care consult  COPD (chronic obstructive pulmonary disease) ([facility])  Assessment & Plan  Chronic stable condition  No respiratory distress or evidence of exacerbation  Continue home medications  Respiratory protocol  Supportive care  Lactic acidosis  Assessment & Plan  Lactic acid mildly elevated at 3 9 at time of presentation  Trending down  Nonspecific lab marker; patient has no evidence of infection at this time  Lab does not require additional monitoring homeless patient develops new signs/symptoms  GERD without esophagitis  Assessment & Plan  Chronic stable condition  Currently on Prilosec 20 mg PO daily  Will give formulary alternative Protonix 40 mg PO daily during admission    Resume outpatient medication at time of discharge  Hypercholesterolemia  Assessment & Plan  Chronic stable condition  She is currently on Simvastatin 80 mg PO HS  Will give formulary alternative Lipitor 40 mg PO HS during admission  Resume outpatient medication at time of discharge  Essential hypertension  Assessment & Plan  Chronic stable condition; blood pressure at goal   Continue outpatient medications  Seizure disorder [facility])  Assessment & Plan  No recent seizure activity  Continue outpatient medications  Serum levels ordered to verify therapeutic dose; pending at time of discharge  Neurology follow up as scheduled at end of August       Acquired hypothyroidism  Assessment & Plan  Chronic condition  TSH ordered to verify appropriate dose; currently in normal range  Continue Levothyroxine at current dose and frequency  Type 2 diabetes mellitus without complication, with long-term current use of insulin [facility])  Assessment & Plan  Lab Results   Component Value Date    HGBA1C 4 7 07/28/2019     Blood glucose level at 61 at time of presentation, which may have contributed to fall  A1c 4 7  Hold oral diabetic medication  Continue basal/bolus insulin with accu-cheks AC and HS  Resume outpatient medication at time of discharge  Bipolar disorder [facility])  Assessment & Plan  Chronic condition  Patient appears hypomanic at this time  Outpatient medications ordered and serum levels obtained to evaluate for therapeutic dosing  Follow up with psychiatry as outpatient                    Resolved Problems  Date Reviewed: 7/29/2019    None          Admission Date:   Admission Orders (From admission, onward)     Ordered        07/27/19 2254  Place in Observation (expected length of stay for this patient is less than two midnights)  Once                     Admitting Diagnosis: Side pain [R10 9]  Generalized weakness [R53 1]  Pressure injury of sacral region, stage 3 ([address] ) [L89 153]    HPI: Darius Gutierres is a 66 y o  female who presents to the emergency room with complaints of generalized weakness and multiple falls over the past week or so  She reports that she had a fall several days ago hitting the back of her head which resulted in a small hematoma  She also states that she had redness to her right shoulder after that for  She denies loss of consciousness  Today's fall she describes being in the bathroom felt extremely weak and her legs apparently gave out falling on her back and again hitting the posterior area of her head  She also denies loss of consciousness today  She denies any shortness of breath, chest pain, cough, fever, chills, nausea, vomiting, diarrhea abdominal pain  She does report using a walker to assist with ambulation  Procedures Performed: No orders of the defined types were placed in this encounter  Summary of Hospital Course: Patient slept well night after admission and reports feeling ready to go home  No significant medical intervention  Significant Findings, Care, Treatment and Services Provided: See above    Complications: None    Condition at Discharge: fair         Discharge instructions/Information to patient and family:   See after visit summary for information provided to patient and family  Provisions for Follow-Up Care:  See after visit summary for information related to follow-up care and any pertinent home health orders  PCP: DEIDRE Awan    Disposition: Home    Planned Readmission: No    Discharge Statement   I spent 30 minutes discharging the patient  This time was spent on the day of discharge  I had direct contact with the patient on the day of discharge  Additional documentation is required if more than 30 minutes were spent on discharge  Discharge Medications:  See after visit summary for reconciled discharge medications provided to patient and family

## 2019-07-29 NOTE — PLAN OF CARE
Problem: Potential for Falls  Goal: Patient will remain free of falls  Description  INTERVENTIONS:  - Assess patient frequently for physical needs  -  Identify cognitive and physical deficits and behaviors that affect risk of falls  -  [place] fall precautions as indicated by assessment   - Educate patient/family on patient safety including physical limitations  - Instruct patient to call for assistance with activity based on assessment  - Modify environment to reduce risk of injury  - Consider OT/PT consult to assist with strengthening/mobility  Outcome: Progressing     Problem: Prexisting or High Potential for Compromised Skin Integrity  Goal: Skin integrity is maintained or improved  Description  INTERVENTIONS:  - Identify patients at risk for skin breakdown  - Assess and monitor skin integrity  - Assess and monitor nutrition and hydration status  - Monitor labs (i e  albumin)  - Assess for incontinence   - Turn and reposition patient  - Assist with mobility/ambulation  - Relieve pressure over bony prominences  - Avoid friction and shearing  - Provide appropriate hygiene as needed including keeping skin clean and dry  - Evaluate need for skin moisturizer/barrier cream  - Collaborate with interdisciplinary team (i e  Nutrition, Rehabilitation, etc )   - Patient/family teaching  Outcome: Progressing     Problem: Nutrition/Hydration-ADULT  Goal: Nutrient/Hydration intake appropriate for improving, restoring or maintaining nutritional needs  Description  Monitor and assess patient's nutrition/hydration status for malnutrition (ex- brittle hair, bruises, dry skin, pale skin and conjunctiva, muscle wasting, smooth red tongue, and disorientation)  Collaborate with interdisciplinary team and initiate plan and interventions as ordered  Monitor patient's weight and dietary intake as ordered or per policy  Utilize nutrition screening tool and intervene per policy   Determine patient's food preferences and provide high-protein, high-caloric foods as appropriate       INTERVENTIONS:  - Monitor oral intake, urinary output, labs, and treatment plans  - Assess nutrition and hydration status and recommend course of action  - Evaluate amount of meals eaten  - Assist patient with eating if necessary   - Allow adequate time for meals  - Recommend/ encourage appropriate diets, oral nutritional supplements, and vitamin/mineral supplements  - Order, calculate, and assess calorie counts as needed  - Recommend, monitor, and adjust tube feedings and TPN/PPN based on assessed needs  - Assess need for intravenous fluids  - Provide specific nutrition/hydration education as appropriate  - Include patient/family/caregiver in decisions related to nutrition  Outcome: Progressing     Problem: PAIN - ADULT  Goal: Verbalizes/displays adequate comfort level or baseline comfort level  Description  Interventions:  - Encourage patient to monitor pain and request assistance  - Assess pain using appropriate pain scale  - Administer analgesics based on type and severity of pain and evaluate response  - Implement non-pharmacological measures as appropriate and evaluate response  - Consider cultural and social influences on pain and pain management  - Notify physician/advanced practitioner if interventions unsuccessful or patient reports new pain  Outcome: Progressing     Problem: INFECTION - ADULT  Goal: Absence or prevention of progression during hospitalization  Description  INTERVENTIONS:  - Assess and monitor for signs and symptoms of infection  - Monitor lab/diagnostic results  - Monitor all insertion sites, i e  indwelling lines, tubes, and drains  - Monitor endotracheal (as able) and nasal secretions for changes in amount and color  - [place] appropriate cooling/warming therapies per order  - Administer medications as ordered  - Instruct and encourage patient and family to use good hand hygiene technique  - Identify and instruct in appropriate isolation precautions for identified infection/condition  Outcome: Progressing  Goal: Absence of fever/infection during neutropenic period  Description  INTERVENTIONS:  - Monitor WBC  - Implement neutropenic guidelines  Outcome: Progressing     Problem: SAFETY ADULT  Goal: Patient will remain free of falls  Description  INTERVENTIONS:  - Assess patient frequently for physical needs  -  Identify cognitive and physical deficits and behaviors that affect risk of falls    -  [place] fall precautions as indicated by assessment   - Educate patient/family on patient safety including physical limitations  - Instruct patient to call for assistance with activity based on assessment  - Modify environment to reduce risk of injury  - Consider OT/PT consult to assist with strengthening/mobility  Outcome: Progressing  Goal: Maintain or return to baseline ADL function  Description  INTERVENTIONS:  -  Assess patient's ability to carry out ADLs; assess patient's baseline for ADL function and identify physical deficits which impact ability to perform ADLs (bathing, care of mouth/teeth, toileting, grooming, dressing, etc )  - Assess/evaluate cause of self-care deficits   - Assess range of motion  - Assess patient's mobility; develop plan if impaired  - Assess patient's need for assistive devices and provide as appropriate  - Encourage maximum independence but intervene and supervise when necessary  ¯ Involve family in performance of ADLs  ¯ Assess for home care needs following discharge   ¯ Request OT consult to assist with ADL evaluation and planning for discharge  ¯ Provide patient education as appropriate  Outcome: Progressing  Goal: Maintain or return mobility status to optimal level  Description  INTERVENTIONS:  - Assess patient's baseline mobility status (ambulation, transfers, stairs, etc )    - Identify cognitive and physical deficits and behaviors that affect mobility  - Identify mobility aids required to assist with transfers and/or ambulation (gait belt, sit-to-stand, lift, walker, cane, etc )  - [place] fall precautions as indicated by assessment  - Record patient progress and toleration of activity level on Mobility SBAR; progress patient to next Phase/Stage  - Instruct patient to call for assistance with activity based on assessment  - Request Rehabilitation consult to assist with strengthening/weightbearing, etc   Outcome: Progressing     Problem: DISCHARGE PLANNING  Goal: Discharge to home or other facility with appropriate resources  Description  INTERVENTIONS:  - Identify barriers to discharge w/patient and caregiver  - Arrange for needed discharge resources and transportation as appropriate  - Identify discharge learning needs (meds, wound care, etc )  - Arrange for interpretive services to assist at discharge as needed  - Refer to Case Management Department for coordinating discharge planning if the patient needs post-hospital services based on physician/advanced practitioner order or complex needs related to functional status, cognitive ability, or social support system  Outcome: Progressing     Problem: Knowledge Deficit  Goal: Patient/family/caregiver demonstrates understanding of disease process, treatment plan, medications, and discharge instructions  Description  Complete learning assessment and assess knowledge base    Interventions:  - Provide teaching at level of understanding  - Provide teaching via preferred learning methods  Outcome: Progressing

## 2019-07-29 NOTE — PHYSICAL THERAPY NOTE
Physical Therapy Evaluation and Treatment    Patient Name: Jerel Boeck    Today's Date: 7/29/2019     Problem List  Patient Active Problem List   Diagnosis    Asthma    Bipolar disorder ([facility] )    Type 2 diabetes mellitus without complication, with long-term current use of insulin ([facility])    Dermatitis    Acquired hypothyroidism    Obesity, morbid (more than 100 lbs over ideal weight or BMI > 40) ([facility])    Seizure disorder ([facility])    NSTEMI (non-ST elevated myocardial infarction) ([facility] )    Volume overload    Skin breakdown    Ambulatory dysfunction    Obesity due to excess calories    E  coli UTI    Essential hypertension    Hypercholesterolemia    GERD without esophagitis    Urinary incontinence    Lactic acidosis    COPD (chronic obstructive pulmonary disease) ([facility])    Recurrent falls    Acute hyponatremia    Hypotension    Hypertriglyceridemia    Vaginal candidiasis    Leukopenia    Neutropenia ([facility])    Low TSH level    Sinus arrhythmia    Pressure injury of sacral region, stage 3 ([facility])    Pressure ulcer of left buttock, stage 3 (HCC)    Pressure ulcer of right buttock, stage 2    Generalized weakness    Right lower quadrant abdominal pain    Iron deficiency anemia, unspecified        Past Medical History  Past Medical History:   Diagnosis Date    Asthma     Bipolar disorder ([facility] )     Chronic UTI (urinary tract infection)     COPD (chronic obstructive pulmonary disease) ([place] [id number] )     Diabetes mellitus ([place] [address] )     Disease of thyroid gland     Dyslipidemia 10/31/2018    Essential hypertension 10/31/2018    GERD (gastroesophageal reflux disease)     Iron deficiency anemia, unspecified 7/29/2019    Obesity due to excess calories 10/31/2018    Recurrent falls 4/13/2019    Seizure [facility])         Past Surgical History  Past Surgical History:   Procedure Laterality Date    ANKLE FRACTURE SURGERY Right     TUBAL LIGATION      VEIN LIGATION AND STRIPPING             07/29/19 2425   Note Type   Note type Eval/Treat   Pain Assessment   Pain Assessment No/denies pain   Pain Score No Pain   Home Living   Type of Home Apartment  ([facility])   Home Layout Able to live on main level with bedroom/bathroom; Performs ADLs on one level;Elevator;Ramped entrance   Bathroom Shower/Tub Tub/shower unit   Bathroom Toilet Standard   Bathroom Equipment Grab bars in [facility]; Wheelchair-manual;Hospital bed  (4 wheeled walker)   Prior Function   Level of [place] Independent with ADLs and functional mobility  ((I) ambulation no A  D  but grabs onto furniture)   Lives With [place] care attendant  (pt has aides 3 days a week for 6 hours)   ADL Assistance Needs assistance  (aides assist with ADL's)   IADLs Needs assistance  (aides assist with IADL's)   Comments family drives   Restrictions/Precautions   Weight Bearing Precautions Per Order No   Other Precautions Telemetry; Fall Risk; Chair Alarm   General   Family/Caregiver Present No   Cognition   Arousal/Participation Alert   Orientation Level Oriented to person;Oriented to place;Oriented to time;Oriented to situation   Following Commands Follows all commands and directions without difficulty   RLE Assessment   RLE Assessment WFL  (3+ to 4-/5 throughout)   LLE Assessment   LLE Assessment WFL  (4- to 4/5 throughout)   Coordination   Sensation WFL   Light Touch   RLE Light Touch Grossly intact   LLE Light Touch Grossly intact   Bed Mobility   Additional Comments Pt OOB seated on couch when PT entered performing ADL   Transfers   Sit to Stand 5  Supervision   Additional items Verbal cues  (no A D )   Stand to Sit 5  Supervision   Additional items Verbal cues  (no A D )   Stand pivot 5  Supervision   Additional items   (with RW)   Additional Comments no LOB with transfers  pt utilized RW for ambulation for balance and stability  Spo2 98-99% -124 at rest and during ambulation  pt with no complaint of SOB  Pt stating, "I want to go home today "   Ambulation/Elevation   Gait pattern Narrow CHARIS; Foward flexed; Short stride   Gait Assistance 5  Supervision   Assistive Device Rolling walker   Distance 100ft with RW (S) mild unsteadiness but no LOB or LE buckling   Balance   Static Sitting Good   Dynamic Sitting Good   Static Standing Fair +  (with RW)   Dynamic Standing Fair  (with RW)   Ambulatory Fair  (with RW)   Endurance Deficit   Endurance Deficit Yes   Endurance Deficit Description limited ambulation and activity tolerance due to weakness and fatigue   Activity Tolerance   Activity Tolerance Patient limited by fatigue   Assessment   Prognosis Good   Problem List Decreased strength;Decreased endurance; Impaired balance;Decreased mobility; Decreased safety awareness   Assessment Pt is a 66 year female with complex PMH including R ankle fx, seizure DM COPD, asthma bipolar and anemia presenting to [facility] presenting with weakness and multiple falls at home  Pt seen for high complexity PT evaluation presenting with bilateral LE weakness decreased balance endurance and ambulation tolerance requiring use of RW for ambulation for balance and fall prevention  Pt with SpO2 at rest and with activity 98-99% however with elevated -124  Pt is performing all mobility at a (S) level with mild unsteadiness but no LOB during ambulation  Pt not agreeable to STR and requesting to return home  pt would benefit from continued activity in PT to improve all impairments and functional deficits to return to (I) LOF and decrease fall risk  Pt would benefit from home health care services on discharge      Goals   Patient Goals To return home   STG Expiration Date 08/05/19   Short Term Goal #1 Improve bilateral LE strength by full muscle grade to improve all bed mobility and transfers with LRAD to (I) no drop in SpO2 below 90%   Short Term Goal #2 Improve standing and ambulatory balance to fair+ to good with LRAD to improve ambulation to 200ft with LRAD modified (I) no LOB   Plan   Treatment/Interventions Functional transfer training;LE strengthening/ROM; Therapeutic exercise; Endurance training;Patient/family training;Bed mobility;Gait training   PT Frequency   (3-5x/wk)   Recommendation   Recommendation Home PT  (pt refusing STR)   PT - OK to Discharge Yes  (when medically stable for discharge)   PHYSICAL THERAPY TREATMENT NOTE    Time In: 9:10    Time Out: 9:20  Total Time: 10 min  MRN: [id number]    S: Pt states, "I just want to go home today  They told me I can go home "    O: Therapeutic Activities:     Sit > stand with supervision ; stand > sit with supervision, stand pivot with RW (S)  ADL-Supervision to Independent  Ambulation with RW with supervision 100ft; ambulates with mild unsteadiness  Vitals: Heart Rate = 106-124  bpm, SpO2 = 98-99%  on RA  A: Pt performing all mobility and ambulation at a (S) level however requiring use of RW for safe ambulation  Instructed pt in use of RW for all ambulation for fall prevention as pt has history of multiple falls due to weakness and LE's giving out  Pt verbalized understanding of use of RW  Pt able to perform ADL's without LOB in room, returned to sitting in chair after ambulation  Pt would benefit from continued activity in PT to improve strength balance endurance mobility transfers and ambulation to maximize current LOF  P: See initial evaluation for full PT POC  Patient returned to chair at bedside and chair alarm activated; patient positioned with all needs, including call bell, within reach      Wenceslao Marker, PT, MPT

## 2019-07-29 NOTE — OCCUPATIONAL THERAPY NOTE
Occupational Therapy Evaluation     Patient Name: Marzena Eid  Today's Date: 7/29/2019  Problem List  Patient Active Problem List   Diagnosis    Asthma    Bipolar disorder ([facility] )    Type 2 diabetes mellitus without complication, with long-term current use of insulin ([facility])    Dermatitis    Acquired hypothyroidism    Obesity, morbid (more than 100 lbs over ideal weight or BMI > 40) ([facility])    Seizure disorder ([facility])    NSTEMI (non-ST elevated myocardial infarction) ([facility] )    Volume overload    Skin breakdown    Ambulatory dysfunction    Obesity due to excess calories    E  coli UTI    Essential hypertension    Hypercholesterolemia    GERD without esophagitis    Urinary incontinence    Lactic acidosis    COPD (chronic obstructive pulmonary disease) ([facility]    Recurrent falls    Acute hyponatremia    Hypotension    Hypertriglyceridemia    Vaginal candidiasis    Leukopenia    Neutropenia ([facility])    Low TSH level    Sinus arrhythmia    Pressure injury of sacral region, stage 3 ([facility])    Pressure ulcer of left buttock, stage 3 (HCC)    Pressure ulcer of right buttock, stage 2    Generalized weakness    Right lower quadrant abdominal pain    Iron deficiency anemia, unspecified     Past Medical History  Past Medical History:   Diagnosis Date    Asthma     Bipolar disorder ([place] [address] )     Chronic UTI (urinary tract infection)     COPD (chronic obstructive pulmonary disease) ([facility] )     Diabetes mellitus ([place] [address] )     Disease of thyroid gland     Dyslipidemia 10/31/2018    Essential hypertension 10/31/2018    GERD (gastroesophageal reflux disease)     Iron deficiency anemia, unspecified 7/29/2019    Obesity due to excess calories 10/31/2018    Recurrent falls 4/13/2019    Seizure [facility])      Past Surgical History  Past Surgical History:   Procedure Laterality Date    ANKLE FRACTURE SURGERY Right     TUBAL LIGATION      VEIN LIGATION AND STRIPPING               07/29/19 0831   Note Type   Note type Eval/Treat   Restrictions/Precautions   Weight Bearing Precautions Per Order No   Other Precautions Chair Alarm;Telemetry; Fall Risk   Pain Assessment   Pain Assessment No/denies pain   Home Living   Type of Home Apartment   Home Layout One level;Performs ADLs on one level; Able to live on main level with bedroom/bathroom; Elevator   Bathroom Shower/Tub Tub/shower unit   Bathroom Toilet Standard   Bathroom Equipment Grab bars in [facility] [id number]; Wheelchair-manual;Hospital bed;Grab bars; Other (Comment)  (lift chair)   Additional Comments pt reports no device at baseline during functional mobility   Prior Function   Level of [place] Needs assistance with ADLs and functional mobility; Needs assistance with IADLs   Lives With Alone   Receives Help From Personal care attendant   ADL Assistance Needs assistance   IADLs Needs assistance   Falls in the last 6 months 5 to 10   Comments pt's family provides transportation   Psychosocial   Psychosocial (WDL) X   Patient Behaviors/Mood Anxious; Verbal   Subjective   Subjective "the home health people are always stealing from me"   ADL   Where Assessed Other (Comment)  (pt seated on couch completing ADL performance)   Grooming Assistance 5  Supervision/Setup   Grooming Deficit Wash/dry face   UB Bathing Assistance 5  Supervision/Setup   LB Bathing Assistance 5  Supervision/Setup   UB Dressing Assistance 4  Minimal Assistance   UB Dressing Deficit Fasteners;Pull around back   [address] 5  Supervision/Setup   LB Dressing Deficit Don/doff R sock; Don/doff L sock   Toileting Assistance  5  Supervision/Setup   Additional Comments performs all ADL performance at (S) and setup; pt with increased falls in recent months   Bed Mobility   Additional Comments seated on couch at start of session and in chair at end of session; pt on RA during session, SpO2 WFL during session with no complaints of SOB   Transfers   Sit to Stand 5  Supervision   Additional items Verbal cues  (RW)   Stand to Sit 5  Supervision   Additional items Verbal cues  (RW)   Stand pivot 5  Supervision   Additional items Verbal cues  (RW)   Additional Comments pt with no LOB or instability; initiall trials without device; utilizes device shortly after for increased stability   Functional Mobility   Functional Mobility 5  Supervision   Additional Comments performs in room and hallway; ~70ft with decreased endurance and distance    Additional items Rolling walker   Balance   Static Sitting Good   Dynamic Sitting Good   Static Standing Fair +   Dynamic Standing Fair +   Ambulatory Fair   Activity Tolerance   Activity Tolerance Patient limited by fatigue   RUE Assessment   RUE Assessment X  (4-/5 grossly; WFL AROM)   LUE Assessment   LUE Assessment X  (4-/5 grossly; WFL AROM)   Hand Function   Gross Motor Coordination Functional   Fine Motor Coordination Functional   Sensation   Light Touch No apparent deficits   Sharp/Dull No apparent deficits   Cognition   Overall Cognitive Status WFL   Arousal/Participation Alert   Attention Within functional limits   Orientation Level Oriented X4   Memory Within functional limits   Following Commands Follows all commands and directions without difficulty   Assessment   Limitation Decreased ADL status; Decreased UE strength;Decreased Safe judgement during ADL;Decreased endurance;Decreased self-care trans;Decreased high-level ADLs   Assessment Pt is a 66 y o  female seen for OT evaluation s/p admit to [facility] on 7/27/2019 w/ Generalized weakness  Comorbidities affecting pt's functional performance at time of assessment include: DM, HTN, COPD and seizures, bipolar, anemia, asthma, GERD, UTI, dyslipidemia   Personal factors affecting pt at time of IE include:steps to enter environment, behavioral pattern, difficulty performing ADLS, difficulty performing IADLS , limited insight into deficits, compliance, decreased initiation and engagement  and health management   Prior to admission, pt was (A) with ADL and IADL performance with use of no device during functional mobility  Upon evaluation: Pt requires (S) level with use of RW during functional mobility 2* the following deficits impacting occupational performance: weakness, decreased strength, decreased tolerance, impaired initiation, impaired problem solving, decreased safety awareness and impaired interpersonal skills  Pt to benefit from continued skilled OT tx while in the hospital to address deficits as defined above and maximize level of functional independence w ADL's and functional mobility  Occupational Performance areas to address include: grooming, bathing/shower, toilet hygiene, dressing, functional mobility, community mobility and clothing management  From OT standpoint, recommendation at time of d/c would be continued home health aide services and home health  Goals   Patient Goals to go home    Short Term Goal  pt will increase independence with functional mobility with RW to mod (I) level with increased endurance   Long Term Goal #1 pt will demonstrate UB/LB bathing and grooming tasks at (I) level    Long Term Goal #2 pt will demonstrate toilet transfers and hygiene to (I) level    Long Term Goal pt will perform UE strengthening exercises   Plan   Treatment Interventions ADL retraining;Functional transfer training;UE strengthening/ROM; Endurance training;Patient/family training; Activityengagement   Goal Expiration Date 08/05/19   OT Frequency 3-5x/wk   Recommendation   OT Discharge Recommendation Home Health Agency   Barthel Index   Feeding 10   Bathing 0   Grooming Score 0   Dressing Score 5   Bladder Score 10   Bowels Score 10   Toilet Use Score 5   Transfers (Bed/Chair) Score 10   Mobility (Level Surface) Score 10   Stairs Score 0   Barthel Index Score 60     Pt will benefit from continued OT services in order to maximize (I) c ADL performance, FM c RW, and improve overall endurance/strength required to complete functional tasks in preparation for d/c  Pt left seated in chair at end of session; all needs within reach; all lines intact

## 2019-07-29 NOTE — PROGRESS NOTES
Progress Note - Aggie Roque 1955, 66 y o  female MRN: [id number]    Unit/Bed#: 401-01 Encounter: [id number]    Primary Care Provider: DEIDRE Barbosa   Date and time admitted to hospital: 7/27/2019  8:17 PM        * Generalized weakness  Assessment & Plan  Patient presented to ER following fall at home  No presyncopal symptoms  She denies LOC or significant injury  CT head shows-No acute intracranial abnormality   Microangiopathic changes  CT cervical Spine shows-No cervical spine fracture or traumatic malalignment  CT chest abdomen and pelvis shows-No findings of acute traumatic injury in the chest, abdomen or pelvis  She admits to having several falls over past 2 weeks and she has been admitted 3 times this calendar year  She admits to feeling weak and tired  She reports intentional 60 lb weight loss with past calendar year  No recent medication changes  Last seen by Neurology in February 2019 and has appointment pending at end of August   Patient is on numerous medications that may predispose to sedation or loss of coordination  Will check serum levels for anti-epileptic and antipsychotic medications to ensure they are at therapeutic levels  Anemia also noted at time of admission  Checked iron, B12, and folate levels  Severe iron deficiency noted  Fall precautions  PT/OT consult      Iron deficiency anemia, unspecified  Assessment & Plan  Severe iron deficiency with mild anemia  Start oral iron supplement  Pressure injury of sacral region, stage 3 (HCC)  Assessment & Plan  Chronic injury, gradually healing  Continue routine wound care  Wound care consult  Recurrent falls  Assessment & Plan  Patient presented to ER following fall at home  No presyncopal symptoms  She denies LOC or significant injury  CT head shows-No acute intracranial abnormality   Microangiopathic changes  CT cervical Spine shows-No cervical spine fracture or traumatic malalignment    CT chest abdomen and pelvis shows-No findings of acute traumatic injury in the chest, abdomen or pelvis  She admits to having several falls over past 2 weeks and she has been admitted 3 times this calendar year  She admits to feeling weak and tired  She reports intentional 60 lb weight loss with past calendar year  No recent medication changes  Last seen by Neurology in February 2019 and has appointment pending at end of August   Patient is on numerous medications that may predispose to sedation or loss of coordination  Will check serum levels for anti-epileptic and antipsychotic medications to ensure they are at therapeutic levels  Anemia also noted at time of admission  Checked iron, B12, and folate levels  Severe iron deficiency noted  Fall precautions  PT/OT consult      COPD (chronic obstructive pulmonary disease) ([facility] )  Assessment & Plan  Chronic stable condition  No respiratory distress or evidence of exacerbation  Continue home medications  Respiratory protocol  Supportive care  Lactic acidosis  Assessment & Plan  Lactic acid mildly elevated at 3 9 at time of presentation  Trending down to 2 4 today  Nonspecific lab marker; patient has no evidence of infection at this time  GERD without esophagitis  Assessment & Plan  Chronic stable condition  Currently on Prilosec 20 mg PO daily  Will give formulary alternative Protonix 40 mg PO daily during admission  Hypercholesterolemia  Assessment & Plan  Chronic stable condition  She is currently on Simvastatin 80 mg PO HS  Will give formulary alternative Lipitor 40 mg PO HS during admission  Essential hypertension  Assessment & Plan  Chronic stable condition; blood pressure at goal   Continue outpatient medications  Seizure disorder [facility])  Assessment & Plan  No recent seizure activity  Continue outpatient medications  Serum levels ordered to verify therapeutic dose    Neurology follow up as scheduled at end of August       Acquired hypothyroidism  Assessment & Plan  Chronic condition  TSH ordered to verify appropriate dose  Continue Levothyroxine at current dose and frequency pending lab results  Type 2 diabetes mellitus without complication, with long-term current use of insulin [facility])  Assessment & Plan  Lab Results   Component Value Date    HGBA1C 4 7 07/28/2019     Blood glucose level at 61 at time of presentation, which may have contributed to fall  A1c 4 7  Hold oral diabetc medication  Continue basal/bolus insulin with accu-cheks AC and HS  Bipolar disorder [facility])  Assessment & Plan  Chronic condition  Patient appears hypomanic at this time  Outpatient medications ordered and serum levels obtained to evaluate for therapeutic dosing  Follow up with psychiatry as outpatient  VTE Pharmacologic Prophylaxis:   Pharmacologic: Enoxaparin (Lovenox)  Mechanical VTE Prophylaxis in Place: No    Patient Centered Rounds: I have performed bedside rounds with nursing staff today  Discussions with Specialists or Other Care Team Provider: None    Education and Discussions with Family / Patient: Greater than 25 minutes spent with this patient discussing diagnosis, prognosis, and plan of care  Time Spent for Care: 45 minutes  More than 50% of total time spent on counseling and coordination of care as described above  Current Length of Stay: 0 day(s)    Current Patient Status: Observation   Certification Statement: The patient will continue to require additional inpatient hospital stay due to evaluation for home safety, PT/OT    Discharge Plan: Anticipate discharge home tomorrow pending labs and PT/OT eval    Code Status: Level 3 - DNAR and DNI      Subjective:   Patient reports she feels good and ready to go home  However, she rapidly shifts the conversation between topics and speech is mildly pressured consistent with being hypomanic    She reports not receiving 1 dose of seroquel in ER, which may explain this presentation  Patient has no acute complaints  Objective:     Vitals:   Temp (24hrs), Av 5 °F (36 4 °C), Min:97 4 °F (36 3 °C), Max:97 7 °F (36 5 °C)    Temp:  [97 4 °F (36 3 °C)-97 7 °F (36 5 °C)] 97 7 °F (36 5 °C)  HR:  [72-78] 72  Resp:  [14-18] 18  BP: (92-98)/(57-60) 98/60  SpO2:  [95 %-98 %] 98 %  Body mass index is 31 92 kg/m²  Input and Output Summary (last 24 hours): Intake/Output Summary (Last 24 hours) at 2019 1316  Last data filed at 2019 1231  Gross per 24 hour   Intake 720 ml   Output 2350 ml   Net -1630 ml       Physical Exam:     Physical Exam  Physical Exam   Constitutional: Patient is oriented to person, place, time, and situation  Appears well-developed and well-nourished  No acute distress  HENT:   Head: Normocephalic and atraumatic  Eyes: Pupils are equal, round, and reactive to light  EOM are normal  No scleral icterus  Neck: Neck supple  No JVD present  Cardiovascular: Normal rate, regular rhythm, normal heart sounds and intact distal pulses  Pulmonary/Chest: Effort normal and breath sounds normal  No stridor  No respiratory distress  No wheezes, no rales, and no chest wall tenderness  Abdominal: Soft  Bowel sounds are normal  No abdominal distension and no mass  There is no tenderness  There is no rebound and no guarding  Musculoskeletal: No edema, tenderness or deformity  Neurological: Patient is alert and oriented to person, place, time, and situation  Skin: Skin is warm and dry  Capillary refill takes less than 2 seconds  No rash noted  Not diaphoretic  No erythema  No pallor  Psychiatric: Patient has a "good" mood with congruent affect  Behavior is normal  Judgment difficult to discern  Thought content marked by rapid shifts between topics  Speech mildly pressured  Nursing note and vitals reviewed      Additional Data:     Labs:    Results from last 7 days   Lab Units 19  0503  19   WBC Thousand/uL 4 92  --  4 04* HEMOGLOBIN g/dL 10 1*  --  10 7*   I STAT HEMOGLOBIN   --    < >  --    HEMATOCRIT % 30 2*  --  32 4*   HEMATOCRIT, ISTAT   --    < >  --    PLATELETS Thousands/uL 279   < > 312   NEUTROS PCT %  --   --  39*   LYMPHS PCT %  --   --  39   MONOS PCT %  --   --  10   EOS PCT %  --   --  9*    < > = values in this interval not displayed  Results from last 7 days   Lab Units 07/28/19  0503   SODIUM mmol/L 139   POTASSIUM mmol/L 3 6   CHLORIDE mmol/L 105   CO2 mmol/L 25   BUN mg/dL 8   CREATININE mg/dL 0 56*   ANION GAP mmol/L 9   CALCIUM mg/dL 8 0*   ALBUMIN g/dL 2 4*   TOTAL BILIRUBIN mg/dL 0 20   ALK PHOS U/L 38*   ALT U/L 10*   AST U/L 15   GLUCOSE RANDOM mg/dL 79     Results from last 7 days   Lab Units 07/28/19  0503   INR  1 02     Results from last 7 days   Lab Units 07/29/19  1117 07/29/19  0724 07/28/19  2130 07/28/19  1614 07/28/19  1200 07/28/19  0643 07/28/19  0122 07/27/19  2257 07/27/19  2148   POC GLUCOSE mg/dl 161* 86 154* 185* 178* 87 84 73 61*     Results from last 7 days   Lab Units 07/28/19  0503   HEMOGLOBIN A1C % 4 7     Results from last 7 days   Lab Units 07/28/19  0503 07/28/19  0239 07/27/19  2302 07/27/19  2038   LACTIC ACID mmol/L 2 4* 4 3* 3 4* 3 9*           * I Have Reviewed All Lab Data Listed Above  * Additional Pertinent Lab Tests Reviewed:  Daniel 68 Admission Reviewed    Imaging:    Imaging Reports Reviewed Today Include:  CT head, C-spine, chest/abdomen/pelvis  Imaging Personally Reviewed by Myself Includes:  None    Recent Cultures (last 7 days):           Last 24 Hours Medication List:     Current Facility-Administered Medications:  albuterol 2 puff Inhalation Q6H PRN Nikunj Quinteros PA-C   atorvastatin 40 mg Oral Daily With Dinner Nikunj Quinteros PA-C   enoxaparin 40 mg Subcutaneous Daily Nikunj Quinteros PA-C   estradiol 1 mg Oral HS Nikunj Quinteros PA-C   fluticasone 1 spray Each Nare Daily Nikunj Quinteros PA-C   gabapentin 800 mg Oral 4x Daily Nikunj Quinteros PA-C   hydrOXYzine HCL 50 mg Oral TID Nikunj Quinteros, PA-C   insulin glargine 10 Units Subcutaneous HS Nikunj Quinteros, PA-C   insulin lispro 1-6 Units Subcutaneous TID AC Nikunj Quinteros, PA-C   insulin lispro 1-6 Units Subcutaneous HS Nikunj Quinteros, PA-C   levETIRAcetam 1,500 mg Oral BID Nikunj Quinteros, PA-C   levothyroxine 150 mcg Oral Early Morning Nikunj Quinteros, PA-C   lisinopril 5 mg Oral Daily Laverda Freitas, CRNP   loratadine 10 mg Oral Daily Nikunj Quinteros, PA-C   melatonin 6 mg Oral HS Laverda Freitas, CRNP   metoprolol succinate 25 mg Oral Daily Laverda Freitas, CRNP   montelukast 10 mg Oral HS Nikunj Quinteros, PA-C   norethindrone 5 mg Oral Daily Nikunj Quinteros, PA-C   ondansetron 4 mg Intravenous Q6H PRN [facility], PA-C   oxybutynin 5 mg Oral TID Nikunj Quinteros, PA-C   oxyCODONE-acetaminophen 1 tablet Oral Q4H PRN Nikunj Quinteros, PA-C   pantoprazole 40 mg Oral Early Morning Nikunj Quinteros, PA-C   phenytoin 200 mg Oral Daily With Lunch Nikunj Quinteros, PA-C   phenytoin 300 mg Oral QAM Nikunj Quinteros, PA-C   QUEtiapine 400 mg Oral BID Nikunj Quinteros, PA-C   zonisamide 100 mg Oral 5x Daily Nikunj Quinteros, PA-C        Today, Patient Was Seen By: Tika Gavin MD    ** Please Note: Dictation voice to text software may have been used in the creation of this document   **

## 2019-07-29 NOTE — ASSESSMENT & PLAN NOTE
Blood pressure is stable  Continue home medications  Monitor blood pressure closely
Chronic condition  Patient appears hypomanic at this time  Outpatient medications ordered and serum levels obtained to evaluate for therapeutic dosing  Follow up with psychiatry as outpatient 
Chronic condition  Patient appears hypomanic at this time  Outpatient medications ordered and serum levels obtained to evaluate for therapeutic dosing  Follow up with psychiatry as outpatient 
Chronic condition  TSH ordered to verify appropriate dose  Continue Levothyroxine at current dose and frequency pending lab results 
Chronic condition  TSH ordered to verify appropriate dose; currently in normal range  Continue Levothyroxine at current dose and frequency 
Chronic injury, gradually healing  Continue routine wound care  Wound care consult 
Chronic injury, gradually healing  Continue routine wound care  Wound care consult 
Chronic stable condition  Currently on Prilosec 20 mg PO daily  Will give formulary alternative Protonix 40 mg PO daily during admission 
Chronic stable condition  Currently on Prilosec 20 mg PO daily  Will give formulary alternative Protonix 40 mg PO daily during admission  Resume outpatient medication at time of discharge 
Chronic stable condition  No respiratory distress or evidence of exacerbation  Continue home medications  Respiratory protocol  Supportive care 
Chronic stable condition  No respiratory distress or evidence of exacerbation  Continue home medications  Respiratory protocol  Supportive care 
Chronic stable condition  She is currently on Simvastatin 80 mg PO HS  Will give formulary alternative Lipitor 40 mg PO HS during admission 
Chronic stable condition  She is currently on Simvastatin 80 mg PO HS  Will give formulary alternative Lipitor 40 mg PO HS during admission  Resume outpatient medication at time of discharge 
Chronic stable condition; blood pressure at goal   Continue outpatient medications 
Chronic stable condition; blood pressure at goal   Continue outpatient medications 
Continue Levothyroxine
Currently on Prilosec 20 mg PO daily  Will give formulary alternative Protonix 40 mg PO daily
Does not appear to be infected  Daily wound care  Wound care consult
Lab Results   Component Value Date    HGBA1C 4 7 07/28/2019     Blood glucose level at 61 at time of presentation, which may have contributed to fall  A1c 4 7  Hold oral diabetc medication  Continue basal/bolus insulin with accu-cheks AC and HS 
Lab Results   Component Value Date    HGBA1C 4 7 07/28/2019     Blood glucose level at 61 at time of presentation, which may have contributed to fall  A1c 4 7  Hold oral diabetic medication  Continue basal/bolus insulin with accu-cheks AC and HS  Resume outpatient medication at time of discharge 
Lab Results   Component Value Date    HGBA1C 5 0 04/14/2019     Blood glucose level at 61  Hold oral diabetc medication  Continue home insulin regimen  Sliding scale insulin  Fingerstick glucose 4 times daily AC/HS  Check A1C  AM CMP
Lactic Acid level at 3 9  IV normal saline   Trend lactic acid levels until resolved
Lactic acid mildly elevated at 3 9 at time of presentation  Trending down  Nonspecific lab marker; patient has no evidence of infection at this time  Lab does not require additional monitoring homeless patient develops new signs/symptoms 
Lactic acid mildly elevated at 3 9 at time of presentation  Trending down to 2 4 today  Nonspecific lab marker; patient has no evidence of infection at this time 
No recent seizure activity  Continue home medication  Encourage PCP/Neurology follow up
No recent seizure activity  Continue outpatient medications  Serum levels ordered to verify therapeutic dose    Neurology follow up as scheduled at end of August 
No recent seizure activity  Continue outpatient medications  Serum levels ordered to verify therapeutic dose; pending at time of discharge    Neurology follow up as scheduled at end of August 
No respiratory distress or evidence of Exacerbation  Continue home medications  Respiratory protocol  Supportive care
Patient presented to ER following fall at home  No presyncopal symptoms  She denies LOC or significant injury  CT head shows-No acute intracranial abnormality   Microangiopathic changes  CT cervical Spine shows-No cervical spine fracture or traumatic malalignment  CT chest abdomen and pelvis shows-No findings of acute traumatic injury in the chest, abdomen or pelvis  She admits to having several falls over past 2 weeks and she has been admitted 3 times this calendar year  She admits to feeling weak and tired  She reports intentional 60 lb weight loss with past calendar year  No recent medication changes  Last seen by Neurology in February 2019 and has appointment pending at end of August   Patient is on numerous medications that may predispose to sedation or loss of coordination  Will check serum levels for anti-epileptic and antipsychotic medications to ensure they are at therapeutic levels  Anemia also noted at time of admission  Checked iron, B12, and folate levels  Severe iron deficiency noted    Fall precautions  PT/OT consult
Patient presented to ER following fall at home  No presyncopal symptoms  She denies LOC or significant injury  CT head shows-No acute intracranial abnormality   Microangiopathic changes  CT cervical Spine shows-No cervical spine fracture or traumatic malalignment  CT chest abdomen and pelvis shows-No findings of acute traumatic injury in the chest, abdomen or pelvis  She admits to having several falls over past 2 weeks and she has been admitted 3 times this calendar year  She admits to feeling weak and tired  She reports intentional 60 lb weight loss with past calendar year  No recent medication changes  Last seen by Neurology in February 2019 and has appointment pending at end of August   Patient is on numerous medications that may predispose to sedation or loss of coordination  Will check serum levels for anti-epileptic and antipsychotic medications to ensure they are at therapeutic levels  Anemia also noted at time of admission  Checked iron, B12, and folate levels  Severe iron deficiency noted    Fall precautions  PT/OT consult
Patient presented to ER following fall at home  No presyncopal symptoms  She denies LOC or significant injury  CT head shows-No acute intracranial abnormality   Microangiopathic changes  CT cervical Spine shows-No cervical spine fracture or traumatic malalignment  CT chest abdomen and pelvis shows-No findings of acute traumatic injury in the chest, abdomen or pelvis  She admits to having several falls over past 2 weeks and she has been admitted 3 times this calendar year  She admits to feeling weak and tired  She reports intentional 60 lb weight loss with past calendar year  No recent medication changes  Last seen by Neurology in February 2019 and has appointment pending at end of August   Patient mildly hypoglycemic at time of presentation with blood glucose 61, so this may contribute to weakness and falls  Follow-up with PCM to manage diabetes  Patient is on numerous medications that may predispose to sedation or loss of coordination  Will check serum levels for anti-epileptic and antipsychotic medications to ensure they are at therapeutic levels  Labs pending at time of discharge  Anemia also noted at time of admission  Checked iron, B12, and folate levels  Severe iron deficiency noted    Fall precautions  PT/OT consult
Patient presented to ER following fall at home  No presyncopal symptoms  She denies LOC or significant injury  CT head shows-No acute intracranial abnormality   Microangiopathic changes  CT cervical Spine shows-No cervical spine fracture or traumatic malalignment  CT chest abdomen and pelvis shows-No findings of acute traumatic injury in the chest, abdomen or pelvis  She admits to having several falls over past 2 weeks and she has been admitted 3 times this calendar year  She admits to feeling weak and tired  She reports intentional 60 lb weight loss with past calendar year  No recent medication changes  Last seen by Neurology in February 2019 and has appointment pending at end of August   Patient mildly hypoglycemic at time of presentation with blood glucose 61, so this may contribute to weakness and falls  Follow-up with PCM to manage diabetes  Patient is on numerous medications that may predispose to sedation or loss of coordination  Will check serum levels for anti-epileptic and antipsychotic medications to ensure they are at therapeutic levels  Labs pending at time of discharge  Anemia also noted at time of admission  Checked iron, B12, and folate levels  Severe iron deficiency noted    Fall precautions  PT/OT consult
Severe iron deficiency with mild anemia  Start oral iron supplement 
Severe iron deficiency with mild anemia  Start oral iron supplement 
She is currently on Simvastatin 80 mg PO HS  Will give formulary alternative Lipitor 40 mg PO HS
She reports feeling very weak over past several weeks  She reports that she had several falls which she attributes to her weakness  CT head, CT chest abdomen and pelvis shows no acute findings  Admit on observation  Telemetry monitoring  IV normal saline  Check B12, Iron panel  OT/PT eval  AM labs  Supportive care
She reports that she had fall at home today  She denies LOC  She admits to having several falls over past 2 weeks  She reports hitting her head on one of the previous falls  She admitted to feeling weak and tired   CT head shows-No acute intracranial abnormality   Microangiopathic changes  CT cervical Spine shows-No cervical spine fracture or traumatic malalignment  CT chest abdomen and pelvis shows-No findings of acute traumatic injury in the chest, abdomen or pelvis    Fall precautions  OT PT eval  Close monitoring
no shortness of breath, no chest pain, no dysuria, no vertigo

## 2019-07-29 NOTE — PLAN OF CARE
Problem: OCCUPATIONAL THERAPY ADULT  Goal: Performs self-care activities at highest level of function for planned discharge setting  See evaluation for individualized goals  Description  Treatment Interventions: ADL retraining, Functional transfer training, UE strengthening/ROM, Endurance training, Patient/family training, Activityengagement          See flowsheet documentation for full assessment, interventions and recommendations  Note:   Limitation: Decreased ADL status, Decreased UE strength, Decreased Safe judgement during ADL, Decreased endurance, Decreased self-care trans, Decreased high-level ADLs     Assessment: Pt is a 66 y o  female seen for OT evaluation s/p admit to [facility] on 7/27/2019 w/ Generalized weakness  Comorbidities affecting pt's functional performance at time of assessment include: DM, HTN, COPD and seizures, bipolar, anemia, asthma, GERD, UTI, dyslipidemia  Personal factors affecting pt at time of IE include:steps to enter environment, behavioral pattern, difficulty performing ADLS, difficulty performing IADLS , limited insight into deficits, compliance, decreased initiation and engagement  and health management   Prior to admission, pt was (A) with ADL and IADL performance with use of no device during functional mobility  Upon evaluation: Pt requires (S) level with use of RW during functional mobility 2* the following deficits impacting occupational performance: weakness, decreased strength, decreased tolerance, impaired initiation, impaired problem solving, decreased safety awareness and impaired interpersonal skills  Pt to benefit from continued skilled OT tx while in the hospital to address deficits as defined above and maximize level of functional independence w ADL's and functional mobility  Occupational Performance areas to address include: grooming, bathing/shower, toilet hygiene, dressing, functional mobility, community mobility and clothing management   From OT standpoint, recommendation at time of d/c would be continued home health aide services and home health         OT Discharge Recommendation: [address]

## 2019-07-31 LAB
GABAPENTIN SERPLBLD-MCNC: 20.3 UG/ML (ref 4–16)
LEVETIRACETAM SERPL-MCNC: 22.3 UG/ML (ref 10–40)
PHENYTOIN FREE SERPL-MCNC: 1.3 UG/ML (ref 1–2)
ZONISAMIDE SERPL-MCNC: 5.5 UG/ML (ref 10–40)

## 2019-08-01 DIAGNOSIS — F41.9 ANXIETY: ICD-10-CM

## 2019-08-01 RX ORDER — HYDROXYZINE PAMOATE 50 MG/1
50 CAPSULE ORAL 3 TIMES DAILY
Qty: 90 CAPSULE | Refills: 3 | Status: SHIPPED | OUTPATIENT
Start: 2019-08-01 | End: 2019-12-09 | Stop reason: SDUPTHER

## 2019-08-05 DIAGNOSIS — E66.01 MORBID OBESITY (HCC): Primary | ICD-10-CM

## 2019-08-06 LAB
ATRIAL RATE: 88 BPM
P AXIS: 62 DEGREES
PR INTERVAL: 146 MS
QRS AXIS: 37 DEGREES
QRSD INTERVAL: 70 MS
QT INTERVAL: 370 MS
QTC INTERVAL: 447 MS
T WAVE AXIS: 53 DEGREES
VENTRICULAR RATE: 88 BPM

## 2019-08-06 PROCEDURE — 93010 ELECTROCARDIOGRAM REPORT: CPT | Performed by: INTERNAL MEDICINE

## 2019-08-08 DIAGNOSIS — E66.01 OBESITY, MORBID (MORE THAN 100 LBS OVER IDEAL WEIGHT OR BMI > 40) (HCC): Primary | ICD-10-CM

## 2019-08-09 ENCOUNTER — OFFICE VISIT (OUTPATIENT)
Dept: FAMILY MEDICINE CLINIC | Facility: HOME HEALTHCARE | Age: 64
End: 2019-08-09
Payer: COMMERCIAL

## 2019-08-09 VITALS
WEIGHT: 205 LBS | SYSTOLIC BLOOD PRESSURE: 110 MMHG | RESPIRATION RATE: 18 BRPM | TEMPERATURE: 97.9 F | DIASTOLIC BLOOD PRESSURE: 71 MMHG | BODY MASS INDEX: 32.95 KG/M2 | OXYGEN SATURATION: 96 % | HEIGHT: 66 IN | HEART RATE: 73 BPM

## 2019-08-09 DIAGNOSIS — B49 FUNGAL INFECTION: ICD-10-CM

## 2019-08-09 DIAGNOSIS — E11.649 TYPE 2 DIABETES MELLITUS WITH HYPOGLYCEMIA WITHOUT COMA, WITH LONG-TERM CURRENT USE OF INSULIN (HCC): ICD-10-CM

## 2019-08-09 DIAGNOSIS — IMO0001 TRANSITION OF CARE PERFORMED WITH SHARING OF CLINICAL SUMMARY: Primary | ICD-10-CM

## 2019-08-09 DIAGNOSIS — N39.0 URINARY TRACT INFECTION WITHOUT HEMATURIA, SITE UNSPECIFIED: ICD-10-CM

## 2019-08-09 DIAGNOSIS — F31.78 BIPOLAR DISORDER, IN FULL REMISSION, MOST RECENT EPISODE MIXED (HCC): ICD-10-CM

## 2019-08-09 DIAGNOSIS — Z79.4 TYPE 2 DIABETES MELLITUS WITH HYPOGLYCEMIA WITHOUT COMA, WITH LONG-TERM CURRENT USE OF INSULIN (HCC): ICD-10-CM

## 2019-08-09 LAB
BACTERIA UR QL AUTO: ABNORMAL /HPF
BILIRUB UR QL STRIP: NEGATIVE
CLARITY UR: ABNORMAL
COLOR UR: ABNORMAL
GLUCOSE UR STRIP-MCNC: NEGATIVE MG/DL
HGB UR QL STRIP.AUTO: ABNORMAL
KETONES UR STRIP-MCNC: ABNORMAL MG/DL
LEUKOCYTE ESTERASE UR QL STRIP: ABNORMAL
NITRITE UR QL STRIP: POSITIVE
NON-SQ EPI CELLS URNS QL MICRO: ABNORMAL /HPF
OTHER STN SPEC: ABNORMAL
PH UR STRIP.AUTO: 5.5 [PH]
PROT UR STRIP-MCNC: NEGATIVE MG/DL
RBC #/AREA URNS AUTO: ABNORMAL /HPF
SL AMB  POCT GLUCOSE, UA: ABNORMAL
SL AMB LEUKOCYTE ESTERASE,UA: ABNORMAL
SL AMB POCT BILIRUBIN,UA: ABNORMAL
SL AMB POCT BLOOD,UA: ABNORMAL
SL AMB POCT CLARITY,UA: CLEAR
SL AMB POCT COLOR,UA: ABNORMAL
SL AMB POCT KETONES,UA: ABNORMAL
SL AMB POCT NITRITE,UA: ABNORMAL
SL AMB POCT PH,UA: 5
SL AMB POCT SPECIFIC GRAVITY,UA: 1.02
SL AMB POCT URINE PROTEIN: ABNORMAL
SL AMB POCT UROBILINOGEN: 0.2
SP GR UR STRIP.AUTO: 1.01 (ref 1–1.03)
UROBILINOGEN UR QL STRIP.AUTO: 1 E.U./DL
WBC #/AREA URNS AUTO: ABNORMAL /HPF

## 2019-08-09 PROCEDURE — 81001 URINALYSIS AUTO W/SCOPE: CPT | Performed by: NURSE PRACTITIONER

## 2019-08-09 PROCEDURE — T1015 CLINIC SERVICE: HCPCS | Performed by: FAMILY MEDICINE

## 2019-08-09 PROCEDURE — 81002 URINALYSIS NONAUTO W/O SCOPE: CPT | Performed by: FAMILY MEDICINE

## 2019-08-09 RX ORDER — NYSTATIN 100000 U/G
OINTMENT TOPICAL 2 TIMES DAILY
Qty: 30 G | Refills: 0 | Status: SHIPPED | OUTPATIENT
Start: 2019-08-09 | End: 2020-01-24 | Stop reason: SDUPTHER

## 2019-08-09 NOTE — PROGRESS NOTES
OFFICE VISIT  Aggie Roque 60 y o  female MRN: [id number]      Assessment / Plan:  Diagnoses and all orders for this visit:    Transition of care performed with sharing of clinical summary    Urinary tract infection without hematuria, site unspecified  -     POCT urine dip  -     UA (URINE) with reflex to Microscopic    Type 2 diabetes mellitus with hypoglycemia without coma, with long-term current use of insulin (HCC)  -     metFORMIN (GLUCOPHAGE) 500 mg tablet; Take 1 tablet (500 mg total) by mouth 2 (two) times a day with meals    Bipolar disorder, in full remission, most recent episode [facility])  -     Ambulatory referral to Psychiatry; Future    Fungal infection  -     nystatin (MYCOSTATIN) ointment; Apply topically 2 (two) times a day     Will send UA out for culture  Decrease metformin to 500 mg twice daily  Call office with sugars, 3 times daily for 1 week  Discussed need for psychiatry follow-up, patient declined  Continue wound care at home for stage II pressure ulcers to buttocks  Keep appointment with Neurology August 2019      Reason For Visit / Chief Complaint  Chief Complaint   Patient presents with    Transition of Care Management    Rash     shoulder    Urinary Tract Infection        HPI:  Aggie Roque is a 60 y o  female who presents today for follow-up from hospitalization  Patient was admitted on July 27th following a fall at home  She had multiple testing done, CT head, normal, micro angiopathy changes  CT cervical spine showed no cervical spine fracture or malalignment  CT of the chest abdomen showed no significant findings  Pt has been having low sugars, has been altering her medication  She has lost weight over the last few months  She has known seizure activity with subtherapeutic levels, she has a followed up with neurology at end of month  She has known mental health hx, including bipolar, her thoughts process is scattered   she has not followed up with psychiatry due to transportation  She has been noncompliant, with much of her medical care, and follow up appts  She is a candidate for SNF although she has tried several times and has left facility  She has rejected and fired many aides at her home  She has a social coordinator who is currently helping her with her affairs  Historical Information   Past Medical History:   Diagnosis Date    Asthma     Bipolar disorder ([place] [address] )     Chronic UTI (urinary tract infection)     COPD (chronic obstructive pulmonary disease) ([place] [address] )     Diabetes mellitus ([place] [address] )     Disease of thyroid gland     Dyslipidemia 10/31/2018    Essential hypertension 10/31/2018    GERD (gastroesophageal reflux disease)     Iron deficiency anemia, unspecified 7/29/2019    Obesity due to excess calories 10/31/2018    Recurrent falls 4/13/2019    Seizure ([place] [address] )      Past Surgical History:   Procedure Laterality Date    ANKLE FRACTURE SURGERY Right     TUBAL LIGATION      VEIN LIGATION AND STRIPPING       Social History   Social History     Substance and Sexual Activity   Alcohol Use Not Currently    Alcohol/week: 0 0 standard drinks     Social History     Substance and Sexual Activity   Drug Use No     Social History     Tobacco Use   Smoking Status Never Smoker   Smokeless Tobacco Never Used     History reviewed  No pertinent family history      Meds/Allergies   Allergies   Allergen Reactions    Keflex [Cephalexin] Anaphylaxis     Airway edema     Levaquin [Levofloxacin] Anaphylaxis    Penicillins Anaphylaxis    Bactrim [Sulfamethoxazole-Trimethoprim] Swelling and GI Intolerance     LE edema and thrush    Ciprofloxacin Swelling     Edema and all extremities and thrush   Edema in all extremities and thrush     Noroxin [Norfloxacin]     Tylenol [Acetaminophen]        Meds:    Current Outpatient Medications:     albuterol (VENTOLIN HFA) 90 mcg/act inhaler, Inhale 2 puffs every 6 (six) hours as needed for wheezing, Disp: 1 Inhaler, Rfl: 0    aspirin (ECOTRIN LOW STRENGTH) 81 mg EC tablet, Take 1 tablet (81 mg total) by mouth daily, Disp: 90 tablet, Rfl: 1    Blood Glucose Monitoring Suppl (BLOOD GLUCOSE MONITOR SYSTEM) w/Device KIT, Test blood sugars 3 times a day, Disp: 1 each, Rfl: 0    clotrimazole-betamethasone (LOTRISONE) 1-0 05 % cream, 2 (two) times a day As needed , Disp: , Rfl:     estradiol (ESTRACE) 1 mg tablet, Take 1 mg by mouth daily at bedtime  , Disp: , Rfl:     ferrous sulfate 324 (65 Fe) mg, Take 1 tablet (324 mg total) by mouth 2 (two) times a day before meals, Disp: 60 tablet, Rfl: 0    insulin glargine (LANTUS) 100 units/mL subcutaneous injection, Inject 10 Units under the skin daily at bedtime, Disp: 10 mL, Rfl: 2    levETIRAcetam (KEPPRA) 750 mg tablet, Take 1,500 mg by mouth 2 (two) times a day Pt states she takes 1500MG Bid, Disp: , Rfl:     levothyroxine 150 mcg tablet, Take 1 tablet (150 mcg total) by mouth daily, Disp: 90 tablet, Rfl: 0    lisinopril (ZESTRIL) 5 mg tablet, Take 1 tablet (5 mg total) by mouth daily, Disp: 30 tablet, Rfl: 3    loratadine (CLARITIN) 10 mg tablet, Take 1 tablet (10 mg total) by mouth daily, Disp: 30 tablet, Rfl: 3    meloxicam (MOBIC) 7 5 mg tablet, Take 1 tablet (7 5 mg total) by mouth 2 (two) times a day, Disp: 60 tablet, Rfl: 0    metoprolol succinate (TOPROL-XL) 25 mg 24 hr tablet, Take 1 tablet (25 mg total) by mouth daily, Disp: 30 tablet, Rfl: 0    Misc   Devices (MATTRESS PAD) MISC, by Does not apply route daily, Disp: 1 each, Rfl: 0    montelukast (SINGULAIR) 10 mg tablet, Take 1 tablet (10 mg total) by mouth daily at bedtime, Disp: 30 tablet, Rfl: 0    norethindrone (AYGESTIN) 5 mg tablet, Take 5 mg by mouth daily at bedtime , Disp: , Rfl:     NOVOFINE 32G X 6 MM MISC, , Disp: , Rfl: 2    NOVOLOG FLEXPEN 100 units/mL injection pen, Uses sliding scale , Disp: , Rfl:     omeprazole (PriLOSEC) 20 mg delayed release capsule, Take 1 capsule (20 mg total) by mouth daily TAKE ONE TABLET IN THE MORNING AND TAKE ONE TABLET IN THE EVENING BY MOUTH DAILY  , Disp: 30 capsule, Rfl: 3    ONETOUCH DELICA LANCETS 94M MISC, Test blood sugars 3 times a day , Disp: 100 each, Rfl: 3    oxybutynin (DITROPAN) 5 mg tablet, Take 1 tablet (5 mg total) by mouth 3 (three) times a day, Disp: 90 tablet, Rfl: 0    phenytoin (DILANTIN) 100 mg ER capsule, Take by mouth 5 (five) times a day 3 tablets in morning and 2 at lunch daily , Disp: , Rfl:     polyethylene glycol (GLYCOLAX) powder, Take 17 g by mouth daily, Disp: 850 g, Rfl: 3    polyvinyl alcohol (LIQUIFILM TEARS) 1 4 % ophthalmic solution, Administer 1 drop to both eyes 3 (three) times a day, Disp: 15 mL, Rfl: 0    QUEtiapine (SEROquel) 400 MG tablet, Take 1 tablet (400 mg total) by mouth 2 (two) times a day, Disp: 60 tablet, Rfl: 3    simvastatin (ZOCOR) 80 mg tablet, Take 1 tablet (80 mg total) by mouth daily at bedtime, Disp: 30 tablet, Rfl: 0    Skin Protectants, Misc   (CALAZIME SKIN PROTECTANT) PSTE, Apply 113 g topically 2 (two) times a day, Disp: 1 Tube, Rfl: 0    valACYclovir (VALTREX) 500 mg tablet, Take 1 tablet by mouth 2 (two) times a day, Disp: , Rfl: 2    VENTOLIN  (90 Base) MCG/ACT inhaler, Inhale 2 puffs every 6 (six) hours as needed for wheezing, Disp: 18 g, Rfl: 0    Zinc Oxide 10 % AERO, Apply 1 Can topically as needed (incontinence), Disp: 1 Can, Rfl: 3    zonisamide (ZONEGRAN) 100 mg capsule, Take 100 mg by mouth 5 (five) times a day  , Disp: , Rfl:     Control Gel Formula Dressing (DUODERM CGF DRESSING) MISC, Apply 1 application topically every third day (Patient not taking: Reported on 7/28/2019), Disp: 20 each, Rfl: 0    Control Gel Formula Dressing (DUODERM CGF DRESSING) MISC, Apply 1 application topically every 3 (three) days Please order 5X7 (Patient not taking: Reported on 7/28/2019), Disp: 20 each, Rfl: 5    fluticasone (FLONASE) 50 mcg/act nasal spray, SPRAY ONE (1) SPRAY INTO EACH NOSTRIL ONCE DAILY, Disp: 16 g, Rfl: 1    furosemide (LASIX) 40 mg tablet, Take 1 tablet (40 mg total) by mouth daily for 30 days, Disp: 60 tablet, Rfl: 0    gabapentin (NEURONTIN) 800 mg tablet, Take 1 tablet (800 mg total) by mouth 4 (four) times a day, Disp: 120 tablet, Rfl: 0    glucose blood test strip, Test blood sugars 3 times a day , Disp: 100 each, Rfl: 3    hydrOXYzine pamoate (VISTARIL) 50 mg capsule, Take 1 capsule (50 mg total) by mouth 3 (three) times a day, Disp: 90 capsule, Rfl: 3    Incontinence Supply Disposable (PADSORBER BED PAN LINERS) MISC, by Does not apply route as needed (incontinence), Disp: 50 each, Rfl: 3    Infant Care Products (CUTIES SENSITIVE WIPES) MISC, 120 Pieces by Does not apply route as needed (incontinence), Disp: 120 each, Rfl: 3    metFORMIN (GLUCOPHAGE) 500 mg tablet, Take 1 tablet (500 mg total) by mouth 2 (two) times a day with meals, Disp: 60 tablet, Rfl: 0    naproxen (NAPROSYN) 500 mg tablet, Take 1 tablet (500 mg total) by mouth 2 (two) times a day with meals for 6 doses, Disp: 6 tablet, Rfl: 0    nystatin (MYCOSTATIN) ointment, Apply topically 2 (two) times a day, Disp: 30 g, Rfl: 0    oxyCODONE-acetaminophen (PERCOCET) 5-325 mg per tablet, Take 1 tablet by mouth every 4 (four) hours as needed for moderate pain for up to 4 dosesMax Daily Amount: 6 tablets (Patient not taking: Reported on 7/28/2019), Disp: 4 tablet, Rfl: 0      REVIEW OF SYSTEMS  Review of Systems   Constitutional: Positive for fatigue  Negative for chills and fever  HENT: Negative for congestion, ear discharge, ear pain, sore throat, trouble swallowing and voice change  Eyes: Negative for pain and redness  Respiratory: Negative for cough, chest tightness, shortness of breath and wheezing  Gastrointestinal: Negative for abdominal pain, blood in stool, constipation, diarrhea, nausea and vomiting  Endocrine: Negative for cold intolerance, heat intolerance, polydipsia, polyphagia and polyuria  Genitourinary: Negative for decreased urine volume, dysuria, frequency and urgency  Musculoskeletal: Negative for arthralgias, back pain, myalgias and neck pain  Skin: Negative for color change and rash  Neurological: Negative for dizziness, syncope, weakness, light-headedness, numbness and headaches  Psychiatric/Behavioral: Negative for sleep disturbance and suicidal ideas  The patient is not nervous/anxious  Current Vitals:   Blood Pressure: 110/71 (08/09/19 0812)  Pulse: 73 (08/09/19 0812)  Temperature: 97 9 °F (36 6 °C) (08/09/19 0812)  Temp Source: Temporal (08/09/19 4008)  Respirations: 18 (08/09/19 0812)  Height: 5' 6" (167 6 cm) (08/09/19 0898)  Weight - Scale: 93 kg (205 lb) (08/09/19 0812)  SpO2: 96 % (08/09/19 0812)  [unfilled]    PHYSICAL EXAMS:  Physical Exam   Constitutional: She is oriented to person, place, and time  She appears well-developed and well-nourished  HENT:   Head: Normocephalic  Right Ear: External ear normal    Left Ear: External ear normal    Mouth/Throat: Oropharynx is clear and moist    Eyes: Pupils are equal, round, and reactive to light  Conjunctivae are normal    Neck: Neck supple  Cardiovascular: Normal rate and regular rhythm  Pulmonary/Chest: Effort normal and breath sounds normal    Abdominal: Soft  Bowel sounds are normal  She exhibits no distension  There is no tenderness  Musculoskeletal: Normal range of motion  Neurological: She is alert and oriented to person, place, and time  Skin: Skin is warm and dry  Rash noted  Stage 2 pressure ulcer X3 to buttocks   Psychiatric: She has a normal mood and affect  Follow up at this office in 1 month     Counseling / Coordination of Care  Total floor / unit time spent today 20 minutes  Greater than 50% of total time was spent with the patient and / or family counseling and / or coordination of care

## 2019-08-12 DIAGNOSIS — E11.8 TYPE 2 DIABETES MELLITUS WITH COMPLICATION, WITH LONG-TERM CURRENT USE OF INSULIN (HCC): ICD-10-CM

## 2019-08-12 DIAGNOSIS — Z79.4 TYPE 2 DIABETES MELLITUS WITH HYPERGLYCEMIA, WITH LONG-TERM CURRENT USE OF INSULIN (HCC): ICD-10-CM

## 2019-08-12 DIAGNOSIS — E11.65 TYPE 2 DIABETES MELLITUS WITH HYPERGLYCEMIA, WITH LONG-TERM CURRENT USE OF INSULIN (HCC): ICD-10-CM

## 2019-08-12 DIAGNOSIS — Z79.4 TYPE 2 DIABETES MELLITUS WITH COMPLICATION, WITH LONG-TERM CURRENT USE OF INSULIN (HCC): ICD-10-CM

## 2019-08-12 DIAGNOSIS — N30.00 ACUTE CYSTITIS WITHOUT HEMATURIA: Primary | ICD-10-CM

## 2019-08-12 RX ORDER — NITROFURANTOIN 25; 75 MG/1; MG/1
100 CAPSULE ORAL 2 TIMES DAILY
Qty: 14 CAPSULE | Refills: 0 | Status: SHIPPED | OUTPATIENT
Start: 2019-08-12 | End: 2019-08-19

## 2019-08-13 DIAGNOSIS — K21.9 GERD WITHOUT ESOPHAGITIS: ICD-10-CM

## 2019-08-13 RX ORDER — LANCETS 33 GAUGE
EACH MISCELLANEOUS
Qty: 100 EACH | Refills: 0 | Status: SHIPPED | OUTPATIENT
Start: 2019-08-13

## 2019-08-14 DIAGNOSIS — K21.9 GERD WITHOUT ESOPHAGITIS: ICD-10-CM

## 2019-08-14 RX ORDER — OMEPRAZOLE 20 MG/1
20 CAPSULE, DELAYED RELEASE ORAL DAILY
Qty: 30 CAPSULE | Refills: 3 | Status: SHIPPED | OUTPATIENT
Start: 2019-08-14 | End: 2019-08-16 | Stop reason: SDUPTHER

## 2019-08-14 RX ORDER — OMEPRAZOLE 20 MG/1
CAPSULE, DELAYED RELEASE ORAL
Qty: 60 CAPSULE | Refills: 0 | OUTPATIENT
Start: 2019-08-14

## 2019-08-16 DIAGNOSIS — D50.9 IRON DEFICIENCY ANEMIA, UNSPECIFIED: Chronic | ICD-10-CM

## 2019-08-16 DIAGNOSIS — K21.9 GERD WITHOUT ESOPHAGITIS: ICD-10-CM

## 2019-08-16 RX ORDER — OMEPRAZOLE 20 MG/1
20 CAPSULE, DELAYED RELEASE ORAL 2 TIMES DAILY
Qty: 60 CAPSULE | Refills: 3 | Status: SHIPPED | OUTPATIENT
Start: 2019-08-16 | End: 2020-01-17

## 2019-08-16 RX ORDER — FERROUS SULFATE TAB EC 324 MG (65 MG FE EQUIVALENT) 324 (65 FE) MG
324 TABLET DELAYED RESPONSE ORAL
Qty: 60 TABLET | Refills: 3 | Status: SHIPPED | OUTPATIENT
Start: 2019-08-16 | End: 2020-01-17 | Stop reason: SDUPTHER

## 2019-08-19 DIAGNOSIS — N39.3 STRESS INCONTINENCE OF URINE: Primary | ICD-10-CM

## 2019-08-20 DIAGNOSIS — E03.9 ACQUIRED HYPOTHYROIDISM: ICD-10-CM

## 2019-08-20 DIAGNOSIS — Z79.4 TYPE 2 DIABETES MELLITUS WITH HYPERGLYCEMIA, WITH LONG-TERM CURRENT USE OF INSULIN (HCC): ICD-10-CM

## 2019-08-20 DIAGNOSIS — J45.909 MODERATE ASTHMA WITHOUT COMPLICATION, UNSPECIFIED WHETHER PERSISTENT: ICD-10-CM

## 2019-08-20 DIAGNOSIS — E11.65 TYPE 2 DIABETES MELLITUS WITH HYPERGLYCEMIA, WITH LONG-TERM CURRENT USE OF INSULIN (HCC): ICD-10-CM

## 2019-08-20 DIAGNOSIS — F31.78 BIPOLAR DISORDER, IN FULL REMISSION, MOST RECENT EPISODE MIXED (HCC): ICD-10-CM

## 2019-08-20 DIAGNOSIS — I21.4 NSTEMI (NON-ST ELEVATED MYOCARDIAL INFARCTION) (HCC): ICD-10-CM

## 2019-08-20 DIAGNOSIS — J30.89 NON-SEASONAL ALLERGIC RHINITIS, UNSPECIFIED TRIGGER: ICD-10-CM

## 2019-08-21 RX ORDER — METOPROLOL SUCCINATE 25 MG/1
25 TABLET, EXTENDED RELEASE ORAL DAILY
Qty: 30 TABLET | Refills: 0 | OUTPATIENT
Start: 2019-08-21

## 2019-08-21 RX ORDER — LEVOTHYROXINE SODIUM 0.15 MG/1
150 TABLET ORAL DAILY
Qty: 30 TABLET | Refills: 0 | OUTPATIENT
Start: 2019-08-21

## 2019-08-21 RX ORDER — MONTELUKAST SODIUM 10 MG/1
10 TABLET ORAL
Qty: 30 TABLET | Refills: 0 | OUTPATIENT
Start: 2019-08-21

## 2019-08-21 RX ORDER — LISINOPRIL 5 MG/1
5 TABLET ORAL DAILY
Qty: 30 TABLET | Refills: 0 | OUTPATIENT
Start: 2019-08-21

## 2019-08-21 RX ORDER — SIMVASTATIN 80 MG
80 TABLET ORAL
Qty: 30 TABLET | Refills: 0 | OUTPATIENT
Start: 2019-08-21

## 2019-08-21 RX ORDER — QUETIAPINE FUMARATE 400 MG/1
400 TABLET, FILM COATED ORAL 2 TIMES DAILY
Qty: 60 TABLET | Refills: 0 | OUTPATIENT
Start: 2019-08-21

## 2019-08-21 RX ORDER — MELOXICAM 7.5 MG/1
TABLET ORAL
Qty: 60 TABLET | Refills: 0 | OUTPATIENT
Start: 2019-08-21

## 2019-08-21 RX ORDER — OXYBUTYNIN CHLORIDE 5 MG/1
TABLET ORAL
Qty: 90 TABLET | Refills: 0 | OUTPATIENT
Start: 2019-08-21

## 2019-08-21 RX ORDER — LORATADINE 10 MG/1
10 TABLET ORAL DAILY
Qty: 30 TABLET | Refills: 0 | OUTPATIENT
Start: 2019-08-21

## 2019-08-26 DIAGNOSIS — I21.4 NSTEMI (NON-ST ELEVATED MYOCARDIAL INFARCTION) (HCC): ICD-10-CM

## 2019-08-26 DIAGNOSIS — E11.65 TYPE 2 DIABETES MELLITUS WITH HYPERGLYCEMIA, WITH LONG-TERM CURRENT USE OF INSULIN (HCC): ICD-10-CM

## 2019-08-26 DIAGNOSIS — Z79.4 TYPE 2 DIABETES MELLITUS WITH HYPERGLYCEMIA, WITH LONG-TERM CURRENT USE OF INSULIN (HCC): ICD-10-CM

## 2019-08-26 RX ORDER — LISINOPRIL 5 MG/1
5 TABLET ORAL DAILY
Qty: 30 TABLET | Refills: 3 | Status: SHIPPED | OUTPATIENT
Start: 2019-08-26 | End: 2019-11-15 | Stop reason: SDUPTHER

## 2019-08-26 RX ORDER — SIMVASTATIN 80 MG
80 TABLET ORAL
Qty: 30 TABLET | Refills: 0 | Status: SHIPPED | OUTPATIENT
Start: 2019-08-26 | End: 2019-09-19 | Stop reason: SDUPTHER

## 2019-08-26 RX ORDER — SIMVASTATIN 80 MG
80 TABLET ORAL
Qty: 30 TABLET | Refills: 0 | OUTPATIENT
Start: 2019-08-26

## 2019-09-06 ENCOUNTER — OFFICE VISIT (OUTPATIENT)
Dept: FAMILY MEDICINE CLINIC | Facility: CLINIC | Age: 64
End: 2019-09-06
Payer: COMMERCIAL

## 2019-09-06 VITALS
DIASTOLIC BLOOD PRESSURE: 68 MMHG | TEMPERATURE: 97.9 F | BODY MASS INDEX: 32.3 KG/M2 | RESPIRATION RATE: 18 BRPM | HEART RATE: 98 BPM | HEIGHT: 66 IN | SYSTOLIC BLOOD PRESSURE: 106 MMHG | OXYGEN SATURATION: 94 % | WEIGHT: 201 LBS

## 2019-09-06 DIAGNOSIS — Z79.4 TYPE 2 DIABETES MELLITUS WITH HYPERGLYCEMIA, WITH LONG-TERM CURRENT USE OF INSULIN (HCC): ICD-10-CM

## 2019-09-06 DIAGNOSIS — E11.65 TYPE 2 DIABETES MELLITUS WITH HYPERGLYCEMIA, WITH LONG-TERM CURRENT USE OF INSULIN (HCC): ICD-10-CM

## 2019-09-06 DIAGNOSIS — I10 ESSENTIAL HYPERTENSION: Primary | ICD-10-CM

## 2019-09-06 DIAGNOSIS — I21.4 NSTEMI (NON-ST ELEVATED MYOCARDIAL INFARCTION) (HCC): ICD-10-CM

## 2019-09-06 DIAGNOSIS — N32.81 OVERACTIVE BLADDER: ICD-10-CM

## 2019-09-06 DIAGNOSIS — B36.9 FUNGAL SKIN INFECTION: ICD-10-CM

## 2019-09-06 DIAGNOSIS — E61.1 IRON DEFICIENCY: ICD-10-CM

## 2019-09-06 DIAGNOSIS — E03.9 ACQUIRED HYPOTHYROIDISM: ICD-10-CM

## 2019-09-06 DIAGNOSIS — F31.78 BIPOLAR DISORDER, IN FULL REMISSION, MOST RECENT EPISODE MIXED (HCC): ICD-10-CM

## 2019-09-06 PROCEDURE — T1015 CLINIC SERVICE: HCPCS | Performed by: FAMILY MEDICINE

## 2019-09-06 RX ORDER — OXYBUTYNIN CHLORIDE 5 MG/1
5 TABLET ORAL 3 TIMES DAILY
Qty: 90 TABLET | Refills: 3 | Status: SHIPPED | OUTPATIENT
Start: 2019-09-06 | End: 2019-12-30 | Stop reason: SDUPTHER

## 2019-09-06 RX ORDER — MELOXICAM 7.5 MG/1
7.5 TABLET ORAL 2 TIMES DAILY
Qty: 60 TABLET | Refills: 1 | Status: SHIPPED | OUTPATIENT
Start: 2019-09-06 | End: 2019-09-13 | Stop reason: SDUPTHER

## 2019-09-06 RX ORDER — CLOTRIMAZOLE AND BETAMETHASONE DIPROPIONATE 10; .5 MG/ML; MG/ML
LOTION TOPICAL 2 TIMES DAILY
Qty: 30 ML | Refills: 0 | Status: SHIPPED | OUTPATIENT
Start: 2019-09-06 | End: 2020-02-28 | Stop reason: ALTCHOICE

## 2019-09-06 RX ORDER — QUETIAPINE FUMARATE 400 MG/1
400 TABLET, FILM COATED ORAL 2 TIMES DAILY
Qty: 60 TABLET | Refills: 3 | Status: SHIPPED | OUTPATIENT
Start: 2019-09-06 | End: 2019-12-09 | Stop reason: SDUPTHER

## 2019-09-06 RX ORDER — LEVOTHYROXINE SODIUM 0.15 MG/1
150 TABLET ORAL DAILY
Qty: 90 TABLET | Refills: 1 | Status: SHIPPED | OUTPATIENT
Start: 2019-09-06 | End: 2019-12-09 | Stop reason: SDUPTHER

## 2019-09-06 RX ORDER — METOPROLOL SUCCINATE 25 MG/1
25 TABLET, EXTENDED RELEASE ORAL DAILY
Qty: 30 TABLET | Refills: 3 | Status: SHIPPED | OUTPATIENT
Start: 2019-09-06 | End: 2020-02-24 | Stop reason: SDUPTHER

## 2019-09-06 RX ORDER — FLUCONAZOLE 50 MG/1
50 TABLET ORAL DAILY
Qty: 5 TABLET | Refills: 0 | Status: SHIPPED | OUTPATIENT
Start: 2019-09-06 | End: 2019-09-11

## 2019-09-06 NOTE — PROGRESS NOTES
Assessment/Plan:     Diagnoses and all orders for this visit:    Fungal skin infection  -     fluconazole (DIFLUCAN) 50 mg tablet; Take 1 tablet (50 mg total) by mouth daily for 5 days  -     clotrimazole-betamethasone (LOTRISONE) 1-0 05 % lotion; Apply topically 2 (two) times a day    Iron deficiency  -     Iron; Future    Bipolar disorder, in full remission, most recent episode mixed (formerly [facility])  -     QUEtiapine (SEROquel) 400 MG tablet; Take 1 tablet (400 mg total) by mouth 2 (two) times a day    Acquired hypothyroidism  -     levothyroxine 150 mcg tablet; Take 1 tablet (150 mcg total) by mouth daily    NSTEMI (non-ST elevated myocardial infarction) (formerly [facility])  -     metoprolol succinate (TOPROL-XL) 25 mg 24 hr tablet; Take 1 tablet (25 mg total) by mouth daily    Type 2 diabetes mellitus with hyperglycemia, with long-term current use of insulin (formerly [facility])  -     meloxicam (MOBIC) 7 5 mg tablet; Take 1 tablet (7 5 mg total) by mouth 2 (two) times a day    Overactive bladder  -     oxybutynin (DITROPAN) 5 mg tablet; Take 1 tablet (5 mg total) by mouth 3 (three) times a day        Start Diflucan 50 mg daily x 5 days along with Lotrisone lotion BID x 10 days  Check iron level in 2 weeks along with sodium level  Continue other meds  RTC 3 months        Subjective:        Patient ID: Dorothea Sim is a 60 y o  female  Chief Complaint   Patient presents with    Follow-up     Patient used to see yanick  Patient states she is here for follow up and medication refills and talk to you about her IRon  61 yo female presents for routine follow up  Today she re(ports having rash on L shoulder that is "roscea"  Discussed iron levels and she has increased to 2 tabs daily        The following portions of the patient's history were reviewed and updated as appropriate: allergies, current medications, past family history, past medical history, past social history, past surgical history and problem list     Patient Active Problem List Diagnosis    Asthma    [facility] [address] [facility]    Type 2 diabetes mellitus without complication, with long-term current use of insulin ([facility])    Dermatitis    Acquired hypothyroidism    Obesity, morbid (more than 100 lbs over ideal weight or BMI > 40) ([facility])    Seizure disorder ([facility])    NSTEMI (non-ST elevated myocardial infarction) ([facility])    Volume overload    Skin breakdown    Ambulatory dysfunction    Obesity due to excess calories    E  coli UTI    Essential hypertension    Hypercholesterolemia    GERD without esophagitis    Urinary incontinence    Lactic acidosis    COPD (chronic obstructive pulmonary disease) ([facility])    Recurrent falls    Acute hyponatremia    Hypotension    Hypertriglyceridemia    Vaginal candidiasis    Leukopenia    Neutropenia ([facility])    Low TSH level    Sinus arrhythmia    Pressure injury of sacral region, stage 3 ([facility])    Pressure ulcer of left buttock, stage 3 ([facility])    Pressure ulcer of right buttock, stage 2    Generalized weakness    Right lower quadrant abdominal pain    Iron deficiency anemia, unspecified       Current Outpatient Medications   Medication Sig Dispense Refill    albuterol (VENTOLIN HFA) 90 mcg/act inhaler Inhale 2 puffs every 6 (six) hours as needed for wheezing 1 Inhaler 0    aspirin (ECOTRIN LOW STRENGTH) 81 mg EC tablet Take 1 tablet (81 mg total) by mouth daily 90 tablet 1    Blood Glucose Monitoring Suppl (BLOOD GLUCOSE MONITOR SYSTEM) w/Device KIT Test blood sugars 3 times a day 1 each 0    clotrimazole-betamethasone (LOTRISONE) 1-0 05 % lotion Apply topically 2 (two) times a day 30 mL 0    estradiol (ESTRACE) 1 mg tablet Take 1 mg by mouth daily at bedtime        ferrous sulfate 324 (65 Fe) mg Take 1 tablet (324 mg total) by mouth 2 (two) times a day before meals 60 tablet 3    fluconazole (DIFLUCAN) 50 mg tablet Take 1 tablet (50 mg total) by mouth daily for 5 days 5 tablet 0    fluticasone (FLONASE) 50 mcg/act nasal spray SPRAY ONE (1) SPRAY INTO EACH NOSTRIL ONCE DAILY 16 g 1    furosemide (LASIX) 40 mg tablet Take 1 tablet (40 mg total) by mouth daily for 30 days 60 tablet 0    gabapentin (NEURONTIN) 800 mg tablet Take 1 tablet (800 mg total) by mouth 4 (four) times a day 120 tablet 0    glucose blood (ONE TOUCH ULTRA TEST) test strip Test blood sugars 3 times a day  100 each 0    hydrOXYzine pamoate (VISTARIL) 50 mg capsule Take 1 capsule (50 mg total) by mouth 3 (three) times a day 90 capsule 3    Incontinence Supply Disposable (PADSORBER BED PAN LINERS) MISC by Does not apply route as needed (incontinence) 50 each 3    Infant Care Products (CUTIES SENSITIVE WIPES) MISC 120 Pieces by Does not apply route as needed (incontinence) 120 each 3    insulin glargine (LANTUS) 100 units/mL subcutaneous injection Inject 10 Units under the skin daily at bedtime 10 mL 2    levETIRAcetam (KEPPRA) 750 mg tablet Take 1,500 mg by mouth 2 (two) times a day Pt states she takes 1500MG Bid      levothyroxine 150 mcg tablet Take 1 tablet (150 mcg total) by mouth daily 90 tablet 1    lisinopril (ZESTRIL) 5 mg tablet Take 1 tablet (5 mg total) by mouth daily 30 tablet 3    loratadine (CLARITIN) 10 mg tablet Take 1 tablet (10 mg total) by mouth daily 30 tablet 3    meloxicam (MOBIC) 7 5 mg tablet Take 1 tablet (7 5 mg total) by mouth 2 (two) times a day 60 tablet 1    metFORMIN (GLUCOPHAGE) 500 mg tablet Take 1 tablet (500 mg total) by mouth 2 (two) times a day with meals 60 tablet 0    metoprolol succinate (TOPROL-XL) 25 mg 24 hr tablet Take 1 tablet (25 mg total) by mouth daily 30 tablet 3    Misc   Devices (MATTRESS PAD) MISC by Does not apply route daily 1 each 0    montelukast (SINGULAIR) 10 mg tablet Take 1 tablet (10 mg total) by mouth daily at bedtime 30 tablet 0    naproxen (NAPROSYN) 500 mg tablet Take 1 tablet (500 mg total) by mouth 2 (two) times a day with meals for 6 doses 6 tablet 0    norethindrone (AYGESTIN) 5 mg tablet Take 5 mg by mouth daily at bedtime       nystatin (MYCOSTATIN) ointment Apply topically 2 (two) times a day 30 g 0    omeprazole (PriLOSEC) 20 mg delayed release capsule Take 1 capsule (20 mg total) by mouth 2 (two) times a day TAKE ONE TABLET IN THE MORNING AND TAKE ONE TABLET IN THE EVENING BY MOUTH DAILY  60 capsule 3    ONETOUCH DELICA LANCETS 35B MISC Test blood sugars 3 times a day  100 each 0    oxybutynin (DITROPAN) 5 mg tablet Take 1 tablet (5 mg total) by mouth 3 (three) times a day 90 tablet 3    phenytoin (DILANTIN) 100 mg ER capsule Take by mouth 5 (five) times a day 3 tablets in morning and 2 at lunch daily       polyethylene glycol (GLYCOLAX) powder Take 17 g by mouth daily 850 g 3    polyvinyl alcohol (LIQUIFILM TEARS) 1 4 % ophthalmic solution Administer 1 drop to both eyes 3 (three) times a day 15 mL 0    QUEtiapine (SEROquel) 400 MG tablet Take 1 tablet (400 mg total) by mouth 2 (two) times a day 60 tablet 3    simvastatin (ZOCOR) 80 mg tablet Take 1 tablet (80 mg total) by mouth daily at bedtime 30 tablet 0    Skin Protectants, Misc  (CALAZIME SKIN PROTECTANT) PSTE Apply 113 g topically 2 (two) times a day 1 Tube 0    valACYclovir (VALTREX) 500 mg tablet Take 1 tablet by mouth 2 (two) times a day  2    VENTOLIN  (90 Base) MCG/ACT inhaler Inhale 2 puffs every 6 (six) hours as needed for wheezing 18 g 0    Zinc Oxide 10 % AERO Apply 1 Can topically as needed (incontinence) 1 Can 3    zonisamide (ZONEGRAN) 100 mg capsule Take 100 mg by mouth 5 (five) times a day         No current facility-administered medications for this visit           Past Medical History:   Diagnosis Date    Asthma     [facility] [address] [facility]     Chronic UTI (urinary tract infection)     COPD (chronic obstructive pulmonary disease) ([facility] [address] [facility]     [facility]     Disease of thyroid gland     Dyslipidemia 10/31/2018    Essential hypertension 10/31/2018    GERD (gastroesophageal reflux disease)  Iron deficiency anemia, unspecified 7/29/2019    Obesity due to excess calories 10/31/2018    Recurrent falls 4/13/2019    Seizure [facility])         Past Surgical History:   Procedure Laterality Date    ANKLE FRACTURE SURGERY Right     TUBAL LIGATION      VEIN LIGATION AND STRIPPING          Social History     Socioeconomic History    Marital status: Single     Spouse name: Not on file    Number of children: Not on file    Years of education: Not on file    Highest education level: Not on file   Occupational History    Not on file   Social Needs    Financial resource strain: Not on file    Food insecurity:     Worry: Not on file     Inability: Not on file    Transportation needs:     Medical: Not on file     Non-medical: Not on file   Tobacco Use    Smoking status: Never Smoker    Smokeless tobacco: Never Used   Substance and Sexual Activity    Alcohol use: Not Currently     Alcohol/week: 0 0 standard drinks    Drug use: No    Sexual activity: Not Currently   Lifestyle    Physical activity:     Days per week: Not on file     Minutes per session: Not on file    Stress: Not on file   Relationships    Social connections:     Talks on phone: Not on file     Gets together: Not on file     Attends Judaism service: Not on file     Active member of club or organization: Not on file     Attends meetings of clubs or organizations: Not on file     Relationship status: Not on file    Intimate partner violence:     Fear of current or ex partner: Not on file     Emotionally abused: Not on file     Physically abused: Not on file     Forced sexual activity: Not on file   Other Topics Concern    Not on file   Social History Narrative    Not on file        Review of Systems   Constitutional: Negative  HENT: Negative  Eyes: Negative  Respiratory: Negative  Cardiovascular: Negative  Gastrointestinal: Negative  Endocrine: Negative  Genitourinary: Negative      Skin:        As per HPI Allergic/Immunologic: Negative  Neurological: Negative  Hematological: Negative  Objective:      /68   Pulse 98   Temp 97 9 °F (36 6 °C)   Resp 18   Ht 5' 6" (1 676 m)   Wt 91 2 kg (201 lb)   LMP  (LMP Unknown)   SpO2 94%   BMI 32 44 kg/m²          Physical Exam   Constitutional: She is oriented to person, place, and time  61 yo female in wheelchair who appears in NAD   HENT:   Head: Normocephalic and atraumatic  Right Ear: External ear normal    Left Ear: External ear normal    Eyes: Pupils are equal, round, and reactive to light  Neck: Normal range of motion  Neck supple  Cardiovascular: Normal rate, regular rhythm and normal heart sounds  Pulmonary/Chest: Effort normal and breath sounds normal  She has no wheezes  She has no rales  Abdominal: Soft  Bowel sounds are normal  There is no tenderness  Musculoskeletal: She exhibits no edema  Lymphadenopathy:     She has no cervical adenopathy  Neurological: She is alert and oriented to person, place, and time  Skin:   Slightly raised red lesions noted on both upper arms L>R  Non scaly  Psychiatric: She has a normal mood and affect  Nursing note and vitals reviewed

## 2019-09-12 ENCOUNTER — TRANSCRIBE ORDERS (OUTPATIENT)
Dept: LAB | Facility: MEDICAL CENTER | Age: 64
End: 2019-09-12

## 2019-09-12 DIAGNOSIS — E11.65 TYPE 2 DIABETES MELLITUS WITH HYPERGLYCEMIA, WITH LONG-TERM CURRENT USE OF INSULIN (HCC): ICD-10-CM

## 2019-09-12 DIAGNOSIS — Z79.4 TYPE 2 DIABETES MELLITUS WITH HYPERGLYCEMIA, WITH LONG-TERM CURRENT USE OF INSULIN (HCC): ICD-10-CM

## 2019-09-13 DIAGNOSIS — Z79.4 TYPE 2 DIABETES MELLITUS WITH HYPERGLYCEMIA, WITH LONG-TERM CURRENT USE OF INSULIN (HCC): ICD-10-CM

## 2019-09-13 DIAGNOSIS — E11.65 TYPE 2 DIABETES MELLITUS WITH HYPERGLYCEMIA, WITH LONG-TERM CURRENT USE OF INSULIN (HCC): ICD-10-CM

## 2019-09-13 RX ORDER — MELOXICAM 7.5 MG/1
TABLET ORAL
Qty: 60 TABLET | Refills: 0 | OUTPATIENT
Start: 2019-09-13

## 2019-09-13 RX ORDER — MELOXICAM 7.5 MG/1
7.5 TABLET ORAL 2 TIMES DAILY
Qty: 60 TABLET | Refills: 1 | Status: SHIPPED | OUTPATIENT
Start: 2019-09-13 | End: 2019-11-08 | Stop reason: SDUPTHER

## 2019-09-18 ENCOUNTER — APPOINTMENT (OUTPATIENT)
Dept: LAB | Facility: MEDICAL CENTER | Age: 64
End: 2019-09-18
Payer: COMMERCIAL

## 2019-09-18 ENCOUNTER — TRANSCRIBE ORDERS (OUTPATIENT)
Dept: ADMINISTRATIVE | Facility: HOSPITAL | Age: 64
End: 2019-09-18

## 2019-09-18 DIAGNOSIS — R56.9 SEIZURES (HCC): ICD-10-CM

## 2019-09-18 DIAGNOSIS — R56.9 SEIZURES (HCC): Primary | ICD-10-CM

## 2019-09-18 DIAGNOSIS — I10 ESSENTIAL HYPERTENSION: ICD-10-CM

## 2019-09-18 DIAGNOSIS — E61.1 IRON DEFICIENCY: ICD-10-CM

## 2019-09-18 LAB
ANION GAP SERPL CALCULATED.3IONS-SCNC: 8 MMOL/L (ref 4–13)
BUN SERPL-MCNC: 11 MG/DL (ref 5–25)
CALCIUM SERPL-MCNC: 8.6 MG/DL (ref 8.3–10.1)
CHLORIDE SERPL-SCNC: 104 MMOL/L (ref 100–108)
CO2 SERPL-SCNC: 24 MMOL/L (ref 21–32)
CREAT SERPL-MCNC: 0.72 MG/DL (ref 0.6–1.3)
GFR SERPL CREATININE-BSD FRML MDRD: 90 ML/MIN/1.73SQ M
GLUCOSE P FAST SERPL-MCNC: 106 MG/DL (ref 65–99)
IRON SERPL-MCNC: 51 UG/DL (ref 50–170)
POTASSIUM SERPL-SCNC: 4.3 MMOL/L (ref 3.5–5.3)
SODIUM SERPL-SCNC: 136 MMOL/L (ref 136–145)

## 2019-09-18 PROCEDURE — 83540 ASSAY OF IRON: CPT

## 2019-09-18 PROCEDURE — 80186 ASSAY OF PHENYTOIN FREE: CPT

## 2019-09-18 PROCEDURE — 36415 COLL VENOUS BLD VENIPUNCTURE: CPT

## 2019-09-18 PROCEDURE — 80048 BASIC METABOLIC PNL TOTAL CA: CPT

## 2019-09-19 DIAGNOSIS — Z79.4 TYPE 2 DIABETES MELLITUS WITH HYPERGLYCEMIA, WITH LONG-TERM CURRENT USE OF INSULIN (HCC): ICD-10-CM

## 2019-09-19 DIAGNOSIS — Z79.4 TYPE 2 DIABETES MELLITUS WITH HYPOGLYCEMIA WITHOUT COMA, WITH LONG-TERM CURRENT USE OF INSULIN (HCC): ICD-10-CM

## 2019-09-19 DIAGNOSIS — E11.65 TYPE 2 DIABETES MELLITUS WITH HYPERGLYCEMIA, WITH LONG-TERM CURRENT USE OF INSULIN (HCC): ICD-10-CM

## 2019-09-19 DIAGNOSIS — E11.649 TYPE 2 DIABETES MELLITUS WITH HYPOGLYCEMIA WITHOUT COMA, WITH LONG-TERM CURRENT USE OF INSULIN (HCC): ICD-10-CM

## 2019-09-19 LAB — PHENYTOIN FREE SERPL-MCNC: 1.9 UG/ML (ref 1–2)

## 2019-09-19 RX ORDER — SIMVASTATIN 80 MG
80 TABLET ORAL
Qty: 30 TABLET | Refills: 0 | OUTPATIENT
Start: 2019-09-19

## 2019-09-19 RX ORDER — SIMVASTATIN 80 MG
80 TABLET ORAL
Qty: 30 TABLET | Refills: 0 | Status: SHIPPED | OUTPATIENT
Start: 2019-09-19 | End: 2019-10-17 | Stop reason: SDUPTHER

## 2019-09-25 ENCOUNTER — TELEPHONE (OUTPATIENT)
Dept: FAMILY MEDICINE CLINIC | Facility: CLINIC | Age: 64
End: 2019-09-25

## 2019-09-30 ENCOUNTER — TELEPHONE (OUTPATIENT)
Dept: FAMILY MEDICINE CLINIC | Facility: CLINIC | Age: 64
End: 2019-09-30

## 2019-09-30 NOTE — TELEPHONE ENCOUNTER
I do not see any ED note can we get more information on why and what her MG level was any paperwork from ED?

## 2019-09-30 NOTE — TELEPHONE ENCOUNTER
Patient notified    ----- Message from [address] [place] [address] sent at 9/27/2019 11:09 AM EDT -----  Yes have her continue to take it her level was 51 normal is  and she was low at 33 when she was not taking the supp  ----- Message -----  From: Dunia Jules MA  Sent: 9/26/2019   3:54 PM EDT  To: DEIDRE Chadwick    Pt called asking if she should still take her iron supplement?  She states she got a phone call about her normal iron level

## 2019-10-04 DIAGNOSIS — Z79.4 TYPE 2 DIABETES MELLITUS WITH HYPERGLYCEMIA, WITH LONG-TERM CURRENT USE OF INSULIN (HCC): ICD-10-CM

## 2019-10-04 DIAGNOSIS — E11.65 TYPE 2 DIABETES MELLITUS WITH HYPERGLYCEMIA, WITH LONG-TERM CURRENT USE OF INSULIN (HCC): ICD-10-CM

## 2019-10-04 RX ORDER — POLYETHYLENE GLYCOL 3350 17 G/17G
17 POWDER, FOR SOLUTION ORAL DAILY
Qty: 527 G | Refills: 0 | Status: SHIPPED | OUTPATIENT
Start: 2019-10-04 | End: 2019-12-09

## 2019-10-14 ENCOUNTER — TELEPHONE (OUTPATIENT)
Dept: FAMILY MEDICINE CLINIC | Facility: HOME HEALTHCARE | Age: 64
End: 2019-10-14

## 2019-10-17 DIAGNOSIS — E11.649 TYPE 2 DIABETES MELLITUS WITH HYPOGLYCEMIA WITHOUT COMA, WITH LONG-TERM CURRENT USE OF INSULIN (HCC): ICD-10-CM

## 2019-10-17 DIAGNOSIS — Z79.4 TYPE 2 DIABETES MELLITUS WITH HYPOGLYCEMIA WITHOUT COMA, WITH LONG-TERM CURRENT USE OF INSULIN (HCC): ICD-10-CM

## 2019-10-17 DIAGNOSIS — Z79.4 TYPE 2 DIABETES MELLITUS WITH HYPERGLYCEMIA, WITH LONG-TERM CURRENT USE OF INSULIN (HCC): ICD-10-CM

## 2019-10-17 DIAGNOSIS — E11.65 TYPE 2 DIABETES MELLITUS WITH HYPERGLYCEMIA, WITH LONG-TERM CURRENT USE OF INSULIN (HCC): ICD-10-CM

## 2019-10-17 RX ORDER — SIMVASTATIN 80 MG
80 TABLET ORAL
Qty: 30 TABLET | Refills: 0 | Status: SHIPPED | OUTPATIENT
Start: 2019-10-17 | End: 2019-11-18 | Stop reason: SDUPTHER

## 2019-10-21 RX ORDER — LEVETIRACETAM 750 MG/1
1500 TABLET ORAL 2 TIMES DAILY
Qty: 60 TABLET | Refills: 3 | OUTPATIENT
Start: 2019-10-21

## 2019-10-22 ENCOUNTER — TELEPHONE (OUTPATIENT)
Dept: FAMILY MEDICINE CLINIC | Facility: CLINIC | Age: 64
End: 2019-10-22

## 2019-10-24 ENCOUNTER — TELEPHONE (OUTPATIENT)
Dept: FAMILY MEDICINE CLINIC | Facility: HOME HEALTHCARE | Age: 64
End: 2019-10-24

## 2019-10-24 NOTE — TELEPHONE ENCOUNTER
Got in touch with  Nino Aiken  Her  made some calls  She isn't eligible for the [facility] and there are no beds available at the [facility]  When something becomes available, her  will notify Afshan Cassidy  In the meantime, [facility] is coming out 10/29/19 to do an evaluation on her for nursing services           Nino Aiken [phone] ext [phone]

## 2019-10-30 DIAGNOSIS — R25.2 MUSCLE CRAMPS: Primary | ICD-10-CM

## 2019-10-30 RX ORDER — MAGNESIUM CHLORIDE 64 MG
64 TABLET, DELAYED RELEASE (ENTERIC COATED) ORAL DAILY
Qty: 30 TABLET | Refills: 3 | Status: SHIPPED | OUTPATIENT
Start: 2019-10-30 | End: 2019-11-05 | Stop reason: SDUPTHER

## 2019-11-05 DIAGNOSIS — R25.2 MUSCLE CRAMPS: ICD-10-CM

## 2019-11-05 DIAGNOSIS — E11.65 TYPE 2 DIABETES MELLITUS WITH HYPERGLYCEMIA, WITH LONG-TERM CURRENT USE OF INSULIN (HCC): ICD-10-CM

## 2019-11-05 DIAGNOSIS — Z79.4 TYPE 2 DIABETES MELLITUS WITH HYPERGLYCEMIA, WITH LONG-TERM CURRENT USE OF INSULIN (HCC): ICD-10-CM

## 2019-11-05 RX ORDER — ASPIRIN 81 MG/1
81 TABLET ORAL DAILY
Qty: 90 TABLET | Refills: 1 | Status: SHIPPED | OUTPATIENT
Start: 2019-11-05 | End: 2019-11-18 | Stop reason: SDUPTHER

## 2019-11-05 RX ORDER — MAGNESIUM CHLORIDE 64 MG
64 TABLET, DELAYED RELEASE (ENTERIC COATED) ORAL 2 TIMES DAILY
Qty: 60 TABLET | Refills: 3 | Status: SHIPPED | OUTPATIENT
Start: 2019-11-05 | End: 2019-11-15 | Stop reason: SDUPTHER

## 2019-11-08 DIAGNOSIS — E11.65 TYPE 2 DIABETES MELLITUS WITH HYPERGLYCEMIA, WITH LONG-TERM CURRENT USE OF INSULIN (HCC): ICD-10-CM

## 2019-11-08 DIAGNOSIS — Z79.4 TYPE 2 DIABETES MELLITUS WITH HYPERGLYCEMIA, WITH LONG-TERM CURRENT USE OF INSULIN (HCC): ICD-10-CM

## 2019-11-11 RX ORDER — MELOXICAM 7.5 MG/1
7.5 TABLET ORAL 2 TIMES DAILY
Qty: 60 TABLET | Refills: 1 | Status: SHIPPED | OUTPATIENT
Start: 2019-11-11 | End: 2020-01-08

## 2019-11-15 ENCOUNTER — OFFICE VISIT (OUTPATIENT)
Dept: FAMILY MEDICINE CLINIC | Facility: CLINIC | Age: 64
End: 2019-11-15
Payer: COMMERCIAL

## 2019-11-15 VITALS
DIASTOLIC BLOOD PRESSURE: 72 MMHG | OXYGEN SATURATION: 97 % | SYSTOLIC BLOOD PRESSURE: 122 MMHG | HEIGHT: 66 IN | RESPIRATION RATE: 18 BRPM | TEMPERATURE: 97.6 F | WEIGHT: 208 LBS | HEART RATE: 92 BPM | BODY MASS INDEX: 33.43 KG/M2

## 2019-11-15 DIAGNOSIS — R21 RASH AND NONSPECIFIC SKIN ERUPTION: ICD-10-CM

## 2019-11-15 DIAGNOSIS — E11.9 DIABETES MELLITUS WITHOUT COMPLICATION (HCC): Primary | ICD-10-CM

## 2019-11-15 DIAGNOSIS — Z79.4 TYPE 2 DIABETES MELLITUS WITH HYPOGLYCEMIA WITHOUT COMA, WITH LONG-TERM CURRENT USE OF INSULIN (HCC): ICD-10-CM

## 2019-11-15 DIAGNOSIS — J45.909 MODERATE ASTHMA WITHOUT COMPLICATION, UNSPECIFIED WHETHER PERSISTENT: ICD-10-CM

## 2019-11-15 DIAGNOSIS — I21.4 NSTEMI (NON-ST ELEVATED MYOCARDIAL INFARCTION) (HCC): ICD-10-CM

## 2019-11-15 DIAGNOSIS — R25.2 MUSCLE CRAMPS: ICD-10-CM

## 2019-11-15 DIAGNOSIS — E11.649 TYPE 2 DIABETES MELLITUS WITH HYPOGLYCEMIA WITHOUT COMA, WITH LONG-TERM CURRENT USE OF INSULIN (HCC): ICD-10-CM

## 2019-11-15 DIAGNOSIS — Z23 NEED FOR INFLUENZA VACCINATION: ICD-10-CM

## 2019-11-15 LAB — SL AMB POCT HEMOGLOBIN AIC: 6.3 (ref ?–6.5)

## 2019-11-15 PROCEDURE — 83036 HEMOGLOBIN GLYCOSYLATED A1C: CPT | Performed by: FAMILY MEDICINE

## 2019-11-15 PROCEDURE — T1015 CLINIC SERVICE: HCPCS | Performed by: FAMILY MEDICINE

## 2019-11-15 RX ORDER — LISINOPRIL 5 MG/1
5 TABLET ORAL DAILY
Qty: 30 TABLET | Refills: 3 | Status: SHIPPED | OUTPATIENT
Start: 2019-11-15 | End: 2020-03-04

## 2019-11-15 RX ORDER — MONTELUKAST SODIUM 10 MG/1
10 TABLET ORAL
Qty: 90 TABLET | Refills: 1 | Status: SHIPPED | OUTPATIENT
Start: 2019-11-15

## 2019-11-15 RX ORDER — MAGNESIUM CHLORIDE 64 MG
64 TABLET, DELAYED RELEASE (ENTERIC COATED) ORAL 2 TIMES DAILY
Qty: 60 TABLET | Refills: 3 | Status: SHIPPED | OUTPATIENT
Start: 2019-11-15

## 2019-11-15 RX ORDER — DOXYCYCLINE 100 MG/1
100 CAPSULE ORAL 2 TIMES DAILY
Qty: 14 CAPSULE | Refills: 1 | Status: SHIPPED | OUTPATIENT
Start: 2019-11-15 | End: 2019-11-22

## 2019-11-15 NOTE — PROGRESS NOTES
Assessment/Plan:     Diagnoses and all orders for this visit:    Diabetes mellitus without complication (HCC)  -     POCT hemoglobin A1c    Rash and nonspecific skin eruption  -     doxycycline monohydrate (MONODOX) 100 mg capsule; Take 1 capsule (100 mg total) by mouth 2 (two) times a day for 7 days  -     Ambulatory referral to Dermatology; Future    NSTEMI (non-ST elevated myocardial infarction) ([facility])  -     lisinopril (ZESTRIL) 5 mg tablet; Take 1 tablet (5 mg total) by mouth daily    Need for influenza vaccination  -     Cancel: FLU VACCINE QUADRIVALENT GREATER THAN OR EQUAL TO 4YO PRESERVATIVE FREE IM  -     influenza vaccine, 2125-7747, quadrivalent, recombinant, PF, 0 5 mL, for patients 18 yr+ (FLUBLOK)    Moderate asthma without complication, unspecified whether persistent  -     montelukast (SINGULAIR) 10 mg tablet; Take 1 tablet (10 mg total) by mouth daily at bedtime    Muscle cramps  -     magnesium chloride (MAG64) 64 MG TBEC EC tablet; Take 1 tablet (64 mg total) by mouth 2 (two) times a day    Type 2 diabetes mellitus with hypoglycemia without coma, with long-term current use of insulin ([facility])  -     metFORMIN (GLUCOPHAGE) 500 mg tablet; Take 1 tablet (500 mg total) by mouth 2 (two) times a day with meals        She refuses mammogram  Dermatology referral  Flu vaccine today  She continues to be followed by Neurology for seizure disorder  A1C today is 6 3%  Continue current meds  RTC 3 month or sooner if needed      Subjective:        Patient ID: Jaja Babcock is a 60 y o  female  Chief Complaint   Patient presents with    Follow-up     Patient is here because she believes she has celulitis on both her legs for about a few weeks rash  keeps spreading and getting worse  61 yo female presents with rash x 2-3 weeks on lower extremities  She continues to have upper arm rash and wants to see a Dermatologist   Otherwise she has no other complainst today        The following portions of the patient's history were reviewed and updated as appropriate: allergies, current medications, past family history, past medical history, past social history, past surgical history and problem list     Patient Active Problem List   Diagnosis    Asthma    Bipolar disorder ([place] [address] )    Type 2 diabetes mellitus without complication, with long-term current use of insulin ([place] [address] )    Dermatitis    Acquired hypothyroidism    Obesity, morbid (more than 100 lbs over ideal weight or BMI > 40) ([place] [address] )    Seizure disorder ([place] [address] )    NSTEMI (non-ST elevated myocardial infarction) ([place] [address] )    Volume overload    Skin breakdown    Ambulatory dysfunction    Obesity due to excess calories    E  coli UTI    Essential hypertension    Hypercholesterolemia    GERD without esophagitis    Urinary incontinence    Lactic acidosis    COPD (ch[facility] obstructive pulmonary disease) ([facility])    Recurrent falls    Acute hyponatremia    Hypotension    Hypertriglyceridemia    Vaginal candidiasis    Leukopenia    Neutropenia ([facility])    Low TSH level    Sinus arrhythmia    Pressure injury of sacral region, stage 3 ([facility])    Pressure ulcer of left buttock, stage 3 (HCC)    Pressure ulcer of right buttock, stage 2 ([facility])    Generalized weakness    Right lower quadrant abdominal pain    Iron deficiency anemia, unspecified       Current Outpatient Medications   Medication Sig Dispense Refill    albuterol (VENTOLIN HFA) 90 mcg/act inhaler Inhale 2 puffs every 6 (six) hours as needed for wheezing 1 Inhaler 0    aspirin (ECOTRIN LOW STRENGTH) 81 mg EC tablet Take 1 tablet (81 mg total) by mouth daily 90 tablet 1    Blood Glucose Monitoring Suppl (BLOOD GLUCOSE MONITOR SYSTEM) w/Device KIT Test blood sugars 3 times a day 1 each 0    clotrimazole-betamethasone (LOTRISONE) 1-0 05 % lotion Apply topically 2 (two) times a day 30 mL 0    doxycycline monohydrate (MONODOX) 100 mg capsule Take 1 capsule (100 mg total) by mouth 2 (two) times a day for 7 days 14 capsule 1    estradiol (ESTRACE) 1 mg tablet Take 1 mg by mouth daily at bedtime        ferrous sulfate 324 (65 Fe) mg Take 1 tablet (324 mg total) by mouth 2 (two) times a day before meals 60 tablet 3    fluticasone (FLONASE) 50 mcg/act nasal spray SPRAY ONE (1) SPRAY INTO EACH NOSTRIL ONCE DAILY 16 g 1    furosemide (LASIX) 40 mg tablet Take 1 tablet (40 mg total) by mouth daily for 30 days 60 tablet 0    gabapentin (NEURONTIN) 800 mg tablet Take 1 tablet (800 mg total) by mouth 4 (four) times a day 120 tablet 0    glucose blood (ONE TOUCH ULTRA TEST) test strip Test blood sugars 3 times a day   100 each 0    hydrOXYzine pamoate (VISTARIL) 50 mg capsule Take 1 capsule (50 mg total) by mouth 3 (three) times a day 90 capsule 3    Incontinence Supply Disposable (PADSORBER BED PAN LINERS) MISC by Does not apply route as needed (incontinence) 50 each 3    Infant Care Products (CUTIES SENSITIVE WIPES) MISC 120 Pieces by Does not apply route as needed (incontinence) 120 each 3    insulin glargine (LANTUS) 100 units/mL subcutaneous injection Inject 10 Units under the skin daily at bedtime 10 mL 2    levETIRAcetam (KEPPRA) 750 mg tablet Take 1,500 mg by mouth 2 (two) times a day Pt states she takes 1500MG Bid      levothyroxine 150 mcg tablet Take 1 tablet (150 mcg total) by mouth daily 90 tablet 1    lisinopril (ZESTRIL) 5 mg tablet Take 1 tablet (5 mg total) by mouth daily 30 tablet 3    loratadine (CLARITIN) 10 mg tablet Take 1 tablet (10 mg total) by mouth daily 30 tablet 3    magnesium chloride (MAG64) 64 MG TBEC EC tablet Take 1 tablet (64 mg total) by mouth 2 (two) times a day 60 tablet 3    meloxicam (MOBIC) 7 5 mg tablet Take 1 tablet (7 5 mg total) by mouth 2 (two) times a day 60 tablet 1    metFORMIN (GLUCOPHAGE) 500 mg tablet Take 1 tablet (500 mg total) by mouth 2 (two) times a day with meals 60 tablet 5    metoprolol succinate (TOPROL-XL) 25 mg 24 hr tablet Take 1 tablet (25 mg total) by mouth daily 30 tablet 3    Misc  Devices (MATTRESS PAD) MISC by Does not apply route daily 1 each 0    montelukast (SINGULAIR) 10 mg tablet Take 1 tablet (10 mg total) by mouth daily at bedtime 90 tablet 1    naproxen (NAPROSYN) 500 mg tablet Take 1 tablet (500 mg total) by mouth 2 (two) times a day with meals for 6 doses 6 tablet 0    norethindrone (AYGESTIN) 5 mg tablet Take 5 mg by mouth daily at bedtime       nystatin (MYCOSTATIN) ointment Apply topically 2 (two) times a day 30 g 0    omeprazole (PriLOSEC) 20 mg delayed release capsule Take 1 capsule (20 mg total) by mouth 2 (two) times a day TAKE ONE TABLET IN THE MORNING AND TAKE ONE TABLET IN THE EVENING BY MOUTH DAILY  60 capsule 3    ONETOUCH DELICA LANCETS 98Z MISC Test blood sugars 3 times a day  100 each 0    oxybutynin (DITROPAN) 5 mg tablet Take 1 tablet (5 mg total) by mouth 3 (three) times a day 90 tablet 3    phenytoin (DILANTIN) 100 mg ER capsule Take by mouth 5 (five) times a day 3 tablets in morning and 2 at lunch daily       polyethylene glycol (GLYCOLAX) powder Take 17 g by mouth daily 850 g 3    polyethylene glycol (GLYCOLAX) powder Take 17 g by mouth daily 527 g 0    polyvinyl alcohol (LIQUIFILM TEARS) 1 4 % ophthalmic solution Administer 1 drop to both eyes 3 (three) times a day 15 mL 0    QUEtiapine (SEROquel) 400 MG tablet Take 1 tablet (400 mg total) by mouth 2 (two) times a day 60 tablet 3    simvastatin (ZOCOR) 80 mg tablet Take 1 tablet (80 mg total) by mouth daily at bedtime 30 tablet 0    Skin Protectants, Misc   (CALAZIME SKIN PROTECTANT) PSTE Apply 113 g topically 2 (two) times a day 1 Tube 0    valACYclovir (VALTREX) 500 mg tablet Take 1 tablet by mouth 2 (two) times a day  2    VENTOLIN  (90 Base) MCG/ACT inhaler Inhale 2 puffs every 6 (six) hours as needed for wheezing 18 g 0    Zinc Oxide 10 % AERO Apply 1 Can topically as needed (incontinence) 1 Can 3    zonisamide (ZONEGRAN) 100 mg capsule Take 100 mg by mouth 5 (five) times a day         No current facility-administered medications for this visit           Past Medical History:   Diagnosis Date    Asthma     Bipolar disorder ([place] [address] )     Chronic UTI (urinary tract infection)     COPD (chronic obstructive pulmonary disease) ([place] [address] )     Diabetes mellitus ([place] [address] )     Disease of thyroid gland     Dyslipidemia 10/31/2018    Essential hypertension 10/31/2018    GERD (gastroesophageal reflux disease)     Iron deficiency anemia, unspecified 7/29/2019    Obesity due to excess calories 10/31/2018    Recurrent falls 4/13/2019    Seizure ([place] [address] )         Past Surgical History:   Procedure Laterality Date    ANKLE FRACTURE SURGERY Right     TUBAL LIGATION      VEIN LIGATION AND STRIPPING          Social History     Socioeconomic History    Marital status: Single     Spouse name: Not on file    Number of children: Not on file    Years of education: Not on file    Highest education level: Not on file   Occupational History    Not on file   Social Needs    Financial resource strain: Not on file    Food insecurity:     Worry: Not on file     Inability: Not on file    Transportation needs:     Medical: Not on file     Non-medical: Not on file   Tobacco Use    Smoking status: Never Smoker    Smokeless tobacco: Never Used   Substance and Sexual Activity    Alcohol use: Not Currently     Alcohol/week: 0 0 standard drinks    Drug use: No    Sexual activity: Not Currently   Lifestyle    Physical activity:     Days per week: Not on file     Minutes per session: Not on file    Stress: Not on file   Relationships    Social connections:     Talks on phone: Not on file     Gets together: Not on file     Attends Islam service: Not on file     Active member of club or organization: Not on file     Attends meetings of clubs or organizations: Not on file     Relationship status: Not on file    Intimate partner violence:     Fear of current or ex partner: Not on file     Emotionally abused: Not on file     Physically abused: Not on file     Forced sexual activity: Not on file   Other Topics Concern    Not on file   Social History Narrative    Not on file        Review of Systems   Constitutional: Negative  HENT: Negative  Eyes: Negative  Respiratory: Negative  Cardiovascular: Negative  Skin: Positive for rash  Psychiatric/Behavioral: Negative  Objective:      /72   Pulse 92   Temp 97 6 °F (36 4 °C)   Resp 18   Ht 5' 6" (1 676 m)   Wt 94 3 kg (208 lb)   LMP  (LMP Unknown)   SpO2 97%   BMI 33 57 kg/m²          Physical Exam   Constitutional: She is oriented to person, place, and time  61 yo female in wheelchair who appears in NAD  HENT:   Head: Normocephalic and atraumatic  Right Ear: External ear normal    Left Ear: External ear normal    Eyes: Pupils are equal, round, and reactive to light  Neck: Neck supple  No thyromegaly present  Cardiovascular: Normal rate, regular rhythm, normal heart sounds and intact distal pulses  Pulmonary/Chest: Effort normal and breath sounds normal  She has no wheezes  She has no rales  Musculoskeletal: She exhibits no edema  Lymphadenopathy:     She has no cervical adenopathy  Neurological: She is alert and oriented to person, place, and time  Skin:   Red pin prick lesions noted on the anterior aspect of both LE's R>L  Brown flat irregular shaped lesions noted on L upper arm  Psychiatric: She has a normal mood and affect  Nursing note and vitals reviewed

## 2019-11-18 DIAGNOSIS — Z79.4 TYPE 2 DIABETES MELLITUS WITH HYPERGLYCEMIA, WITH LONG-TERM CURRENT USE OF INSULIN (HCC): ICD-10-CM

## 2019-11-18 DIAGNOSIS — E11.65 TYPE 2 DIABETES MELLITUS WITH HYPERGLYCEMIA, WITH LONG-TERM CURRENT USE OF INSULIN (HCC): ICD-10-CM

## 2019-11-18 RX ORDER — ASPIRIN 81 MG
TABLET, DELAYED RELEASE (ENTERIC COATED) ORAL
Qty: 90 TABLET | Refills: 0 | OUTPATIENT
Start: 2019-11-18

## 2019-11-18 RX ORDER — ASPIRIN 81 MG/1
81 TABLET ORAL DAILY
Qty: 90 TABLET | Refills: 1 | Status: SHIPPED | OUTPATIENT
Start: 2019-11-18

## 2019-11-18 NOTE — TELEPHONE ENCOUNTER
Flonase nasal spray was approved  Patient was informed and will call pharmacy to have it delivered to home

## 2019-11-19 DIAGNOSIS — J30.2 SEASONAL ALLERGIC RHINITIS, UNSPECIFIED TRIGGER: ICD-10-CM

## 2019-11-19 RX ORDER — SIMVASTATIN 80 MG
80 TABLET ORAL
Qty: 30 TABLET | Refills: 0 | Status: SHIPPED | OUTPATIENT
Start: 2019-11-19 | End: 2019-12-09 | Stop reason: SDUPTHER

## 2019-11-19 RX ORDER — FLUTICASONE PROPIONATE 50 MCG
SPRAY, SUSPENSION (ML) NASAL
Qty: 16 G | Refills: 1 | Status: SHIPPED | OUTPATIENT
Start: 2019-11-19 | End: 2020-01-29

## 2019-11-28 ENCOUNTER — HOSPITAL ENCOUNTER (EMERGENCY)
Facility: HOSPITAL | Age: 64
Discharge: HOME | End: 2019-11-29
Attending: EMERGENCY MEDICINE
Payer: COMMERCIAL

## 2019-11-28 ENCOUNTER — APPOINTMENT (EMERGENCY)
Dept: NON INVASIVE DIAGNOSTICS | Facility: HOSPITAL | Age: 64
End: 2019-11-28
Payer: COMMERCIAL

## 2019-11-28 ENCOUNTER — APPOINTMENT (EMERGENCY)
Dept: RADIOLOGY | Facility: HOSPITAL | Age: 64
End: 2019-11-28
Payer: COMMERCIAL

## 2019-11-28 VITALS
RESPIRATION RATE: 15 BRPM | OXYGEN SATURATION: 97 % | HEART RATE: 72 BPM | SYSTOLIC BLOOD PRESSURE: 130 MMHG | WEIGHT: 209 LBS | DIASTOLIC BLOOD PRESSURE: 88 MMHG | BODY MASS INDEX: 33.59 KG/M2 | TEMPERATURE: 97.9 F | HEIGHT: 66 IN

## 2019-11-28 DIAGNOSIS — L03.115 CELLULITIS OF RIGHT LEG: Primary | ICD-10-CM

## 2019-11-28 LAB
ALBUMIN SERPL BCP-MCNC: 2.6 G/DL (ref 3.5–5)
ALP SERPL-CCNC: 44 U/L (ref 46–116)
ALT SERPL W P-5'-P-CCNC: 12 U/L (ref 12–78)
ANION GAP SERPL CALCULATED.3IONS-SCNC: 9 MMOL/L (ref 4–13)
AST SERPL W P-5'-P-CCNC: 28 U/L (ref 5–45)
BASOPHILS # BLD AUTO: 0.04 THOUSANDS/ΜL (ref 0–0.1)
BASOPHILS NFR BLD AUTO: 1 % (ref 0–1)
BILIRUB SERPL-MCNC: 0.2 MG/DL (ref 0.2–1)
BUN SERPL-MCNC: 21 MG/DL (ref 5–25)
CALCIUM SERPL-MCNC: 8 MG/DL (ref 8.3–10.1)
CHLORIDE SERPL-SCNC: 102 MMOL/L (ref 100–108)
CO2 SERPL-SCNC: 24 MMOL/L (ref 21–32)
CREAT SERPL-MCNC: 0.97 MG/DL (ref 0.6–1.3)
D DIMER PPP FEU-MCNC: 0.54 UG/ML FEU
EOSINOPHIL # BLD AUTO: 0.44 THOUSAND/ΜL (ref 0–0.61)
EOSINOPHIL NFR BLD AUTO: 12 % (ref 0–6)
ERYTHROCYTE [DISTWIDTH] IN BLOOD BY AUTOMATED COUNT: 13 % (ref 11.6–15.1)
GFR SERPL CREATININE-BSD FRML MDRD: 63 ML/MIN/1.73SQ M
GLUCOSE SERPL-MCNC: 207 MG/DL (ref 65–140)
HCT VFR BLD AUTO: 33.1 % (ref 34.8–46.1)
HGB BLD-MCNC: 11.1 G/DL (ref 11.5–15.4)
IMM GRANULOCYTES # BLD AUTO: 0.02 THOUSAND/UL (ref 0–0.2)
IMM GRANULOCYTES NFR BLD AUTO: 1 % (ref 0–2)
LYMPHOCYTES # BLD AUTO: 1.21 THOUSANDS/ΜL (ref 0.6–4.47)
LYMPHOCYTES NFR BLD AUTO: 32 % (ref 14–44)
MCH RBC QN AUTO: 32.4 PG (ref 26.8–34.3)
MCHC RBC AUTO-ENTMCNC: 33.5 G/DL (ref 31.4–37.4)
MCV RBC AUTO: 97 FL (ref 82–98)
MONOCYTES # BLD AUTO: 0.36 THOUSAND/ΜL (ref 0.17–1.22)
MONOCYTES NFR BLD AUTO: 10 % (ref 4–12)
NEUTROPHILS # BLD AUTO: 1.71 THOUSANDS/ΜL (ref 1.85–7.62)
NEUTS SEG NFR BLD AUTO: 44 % (ref 43–75)
NRBC BLD AUTO-RTO: 0 /100 WBCS
PLATELET # BLD AUTO: 248 THOUSANDS/UL (ref 149–390)
PMV BLD AUTO: 8.7 FL (ref 8.9–12.7)
POTASSIUM SERPL-SCNC: 4.9 MMOL/L (ref 3.5–5.3)
PROT SERPL-MCNC: 6.1 G/DL (ref 6.4–8.2)
RBC # BLD AUTO: 3.43 MILLION/UL (ref 3.81–5.12)
SODIUM SERPL-SCNC: 135 MMOL/L (ref 136–145)
WBC # BLD AUTO: 3.78 THOUSAND/UL (ref 4.31–10.16)

## 2019-11-28 PROCEDURE — 73610 X-RAY EXAM OF ANKLE: CPT

## 2019-11-28 PROCEDURE — 93971 EXTREMITY STUDY: CPT

## 2019-11-28 PROCEDURE — 99284 EMERGENCY DEPT VISIT MOD MDM: CPT

## 2019-11-28 PROCEDURE — 36415 COLL VENOUS BLD VENIPUNCTURE: CPT | Performed by: EMERGENCY MEDICINE

## 2019-11-28 PROCEDURE — 85379 FIBRIN DEGRADATION QUANT: CPT | Performed by: EMERGENCY MEDICINE

## 2019-11-28 PROCEDURE — 93971 EXTREMITY STUDY: CPT | Performed by: SURGERY

## 2019-11-28 PROCEDURE — 85025 COMPLETE CBC W/AUTO DIFF WBC: CPT | Performed by: EMERGENCY MEDICINE

## 2019-11-28 PROCEDURE — 99284 EMERGENCY DEPT VISIT MOD MDM: CPT | Performed by: EMERGENCY MEDICINE

## 2019-11-28 PROCEDURE — 73590 X-RAY EXAM OF LOWER LEG: CPT

## 2019-11-28 PROCEDURE — 80053 COMPREHEN METABOLIC PANEL: CPT | Performed by: EMERGENCY MEDICINE

## 2019-11-29 RX ORDER — CLINDAMYCIN HYDROCHLORIDE 150 MG/1
450 CAPSULE ORAL ONCE
Status: DISCONTINUED | OUTPATIENT
Start: 2019-11-29 | End: 2019-11-29 | Stop reason: HOSPADM

## 2019-11-29 RX ORDER — CLINDAMYCIN HYDROCHLORIDE 150 MG/1
300 CAPSULE ORAL ONCE
Status: DISCONTINUED | OUTPATIENT
Start: 2019-11-29 | End: 2019-11-29

## 2019-11-29 RX ORDER — CLINDAMYCIN HYDROCHLORIDE 150 MG/1
450 CAPSULE ORAL 3 TIMES DAILY
Qty: 45 CAPSULE | Refills: 0 | Status: SHIPPED | OUTPATIENT
Start: 2019-11-29 | End: 2019-12-04

## 2019-11-29 NOTE — ED NOTES
Patient requesting meal tray and Seroquel  Dr Wagner Less aware        Romina Paz, RN  11/28/19 9574

## 2019-11-29 NOTE — ED NOTES
Patient continues to list multiple complaints  Patient states she lives in a high [facility] in [place] and has become progressively more weak over the last two months  Patient states she has no way home if she is not admitted to the hospital  Patient states that she was unable to get into her family doctor and took her last dose of antibiotics today for her ongoing cellulitis in both legs        Laine Gomez RN  11/28/19 1956

## 2019-11-29 NOTE — ED NOTES
Lucio Norris from ultrasound called in regards to venous duplex  Voicemail left        Dominga Zhao RN  11/28/19 1943

## 2019-12-03 NOTE — ED PROVIDER NOTES
History  Chief Complaint   Patient presents with    Cellulitis     pt states she has had cellulitis for 2 months treating self with triple antibiotic ointment; pt states her legs are painful because she fell on the carpet 2 months ago which caused infection in her legs; pt has generalized weakness     HPI  This is a 61-year-old woman who presents for evaluation of lower leg pain  Patient had pain it redness a week ago was put on doxycycline  She took her last dose of doxycycline this morning  While she was at home she had more pain and was concerned that she would fall was also concerned that she could have recurrence of her cellulitis so she came in for further evaluation  She denies any fevers or chills  She does endorse some fatigue  She otherwise denies headache neck pain chest pain abdominal pain nausea vomiting  Prior to Admission Medications   Prescriptions Last Dose Informant Patient Reported? Taking? Blood Glucose Monitoring Suppl (BLOOD GLUCOSE MONITOR SYSTEM) w/Device KIT 2019 at Unknown time  No Yes   Sig: Test blood sugars 3 times a day   Incontinence Supply Disposable (PADSORBER BED PAN LINERS) MISC   No No   Sig: by Does not apply route as needed (incontinence)   Infant Care Products (CUTIES SENSITIVE WIPES) MISC   No No   Si Pieces by Does not apply route as needed (incontinence)   Misc  Devices (MATTRESS PAD) MISC 2019 at Unknown time  No Yes   Sig: by Does not apply route daily   ONETOUCH DELICA LANCETS 71P MISC   No No   Sig: Test blood sugars 3 times a day  QUEtiapine (SEROquel) 400 MG tablet   No No   Sig: Take 1 tablet (400 mg total) by mouth 2 (two) times a day   Skin Protectants, Misc   (CALAZIME SKIN PROTECTANT) PSTE   No No   Sig: Apply 113 g topically 2 (two) times a day   VENTOLIN  (90 Base) MCG/ACT inhaler   No No   Sig: Inhale 2 puffs every 6 (six) hours as needed for wheezing   Zinc Oxide 10 % AERO   No No   Sig: Apply 1 Can topically as needed (incontinence)   albuterol (VENTOLIN HFA) 90 mcg/act inhaler Past Week at Unknown time  No Yes   Sig: Inhale 2 puffs every 6 (six) hours as needed for wheezing   aspirin (ECOTRIN LOW STRENGTH) 81 mg EC tablet 11/28/2019 at Unknown time  No Yes   Sig: Take 1 tablet (81 mg total) by mouth daily   clotrimazole-betamethasone (LOTRISONE) 1-0 05 % lotion   No No   Sig: Apply topically 2 (two) times a day   estradiol (ESTRACE) 1 mg tablet   Yes No   Sig: Take 1 mg by mouth daily at bedtime     ferrous sulfate 324 (65 Fe) mg 11/28/2019 at Unknown time  No Yes   Sig: Take 1 tablet (324 mg total) by mouth 2 (two) times a day before meals   fluticasone (FLONASE) 50 mcg/act nasal spray   No No   Sig: SPRAY ONE (1) SPRAY INTO EACH NOSTRIL ONCE DAILY   furosemide (LASIX) 40 mg tablet   No No   Sig: Take 1 tablet (40 mg total) by mouth daily for 30 days   gabapentin (NEURONTIN) 800 mg tablet 11/28/2019 at Unknown time  No Yes   Sig: Take 1 tablet (800 mg total) by mouth 4 (four) times a day   glucose blood (ONE TOUCH ULTRA TEST) test strip 11/28/2019 at Unknown time  No Yes   Sig: Test blood sugars 3 times a day    hydrOXYzine pamoate (VISTARIL) 50 mg capsule 11/28/2019 at Unknown time  No Yes   Sig: Take 1 capsule (50 mg total) by mouth 3 (three) times a day   insulin glargine (LANTUS) 100 units/mL subcutaneous injection   No No   Sig: Inject 10 Units under the skin daily at bedtime   levETIRAcetam (KEPPRA) 750 mg tablet   Yes No   Sig: Take 1,500 mg by mouth 2 (two) times a day Pt states she takes 1500MG Bid   levothyroxine 150 mcg tablet   No No   Sig: Take 1 tablet (150 mcg total) by mouth daily   lisinopril (ZESTRIL) 5 mg tablet 11/28/2019 at Unknown time  No Yes   Sig: Take 1 tablet (5 mg total) by mouth daily   loratadine (CLARITIN) 10 mg tablet 11/28/2019 at Unknown time  No Yes   Sig: Take 1 tablet (10 mg total) by mouth daily   magnesium chloride (MAG64) 64 MG TBEC EC tablet 11/28/2019 at Unknown time  No Yes   Sig: Take 1 tablet (64 mg total) by mouth 2 (two) times a day   meloxicam (MOBIC) 7 5 mg tablet 11/28/2019 at Unknown time  No Yes   Sig: Take 1 tablet (7 5 mg total) by mouth 2 (two) times a day   metFORMIN (GLUCOPHAGE) 500 mg tablet 11/28/2019 at Unknown time  No Yes   Sig: Take 1 tablet (500 mg total) by mouth 2 (two) times a day with meals   metoprolol succinate (TOPROL-XL) 25 mg 24 hr tablet 11/28/2019 at Unknown time  No Yes   Sig: Take 1 tablet (25 mg total) by mouth daily   montelukast (SINGULAIR) 10 mg tablet 11/28/2019 at Unknown time  No Yes   Sig: Take 1 tablet (10 mg total) by mouth daily at bedtime   naproxen (NAPROSYN) 500 mg tablet   No No   Sig: Take 1 tablet (500 mg total) by mouth 2 (two) times a day with meals for 6 doses   norethindrone (AYGESTIN) 5 mg tablet   Yes No   Sig: Take 5 mg by mouth daily at bedtime    nystatin (MYCOSTATIN) ointment   No No   Sig: Apply topically 2 (two) times a day   omeprazole (PriLOSEC) 20 mg delayed release capsule   No No   Sig: Take 1 capsule (20 mg total) by mouth 2 (two) times a day TAKE ONE TABLET IN THE MORNING AND TAKE ONE TABLET IN THE EVENING BY MOUTH DAILY     oxybutynin (DITROPAN) 5 mg tablet   No No   Sig: Take 1 tablet (5 mg total) by mouth 3 (three) times a day   phenytoin (DILANTIN) 100 mg ER capsule   Yes No   Sig: Take by mouth 5 (five) times a day 3 tablets in morning and 2 at lunch daily    polyethylene glycol (GLYCOLAX) powder   No No   Sig: Take 17 g by mouth daily   polyethylene glycol (GLYCOLAX) powder   No No   Sig: Take 17 g by mouth daily   polyvinyl alcohol (LIQUIFILM TEARS) 1 4 % ophthalmic solution   No No   Sig: Administer 1 drop to both eyes 3 (three) times a day   simvastatin (ZOCOR) 80 mg tablet   No No   Sig: Take 1 tablet (80 mg total) by mouth daily at bedtime   valACYclovir (VALTREX) 500 mg tablet   Yes No   Sig: Take 1 tablet by mouth 2 (two) times a day   zonisamide (ZONEGRAN) 100 mg capsule   Yes No   Sig: Take 100 mg by mouth 5 (five) times a day        Facility-Administered Medications: None       Past Medical History:   Diagnosis Date    Asthma     Bipolar disorder ([place] [address] )     Chronic UTI (urinary tract infection)     COPD (chronic obstructive pulmonary disease) ([place] [address] )     Diabetes mellitus ([place] [address] )     Disease of thyroid gland     Dyslipidemia 10/31/2018    Essential hypertension 10/31/2018    GERD (gastroesophageal reflux disease)     Iron deficiency anemia, unspecified 7/29/2019    Obesity due to excess calories 10/31/2018    Recurrent falls 4/13/2019    Seizure ([place] [address] )        Past Surgical History:   Procedure Laterality Date    ANKLE FRACTURE SURGERY Right     TUBAL LIGATION      VEIN LIGATION AND STRIPPING         History reviewed  No pertinent family history  I have reviewed and agree with the history as documented  Social History     Tobacco Use    Smoking status: Never Smoker    Smokeless tobacco: Never Used   Substance Use Topics    Alcohol use: Not Currently     Alcohol/week: 0 0 standard drinks    Drug use: No        Review of Systems   Constitutional: Positive for fatigue  Negative for chills and fever  HENT: Negative  Negative for congestion and sore throat  Eyes: Negative  Negative for discharge and redness  Respiratory: Negative  Negative for chest tightness and shortness of breath  Cardiovascular: Negative  Negative for chest pain and palpitations  Gastrointestinal: Negative  Negative for abdominal pain, nausea and vomiting  Endocrine: Negative  Negative for cold intolerance and polyphagia  Genitourinary: Negative  Negative for difficulty urinating and dysuria  Musculoskeletal: Negative  Negative for arthralgias and back pain  Skin: Positive for color change  Negative for wound  Redness bilateral legs   Allergic/Immunologic: Negative  Negative for environmental allergies  Neurological: Negative  Negative for dizziness, weakness and headaches  Hematological: Negative  Psychiatric/Behavioral: Negative  Negative for behavioral problems  The patient is not nervous/anxious  All other systems reviewed and are negative  Physical Exam  Physical Exam   Constitutional: She is oriented to person, place, and time  She appears well-developed and well-nourished  No distress  HENT:   Head: Normocephalic and atraumatic  Right Ear: External ear normal    Left Ear: External ear normal    Mouth/Throat: Oropharynx is clear and moist    Eyes: Pupils are equal, round, and reactive to light  Conjunctivae and EOM are normal  Right eye exhibits no discharge  Left eye exhibits no discharge  No scleral icterus  Neck: Normal range of motion  Neck supple  No tracheal deviation present  No thyromegaly present  Cardiovascular: Normal rate, regular rhythm and intact distal pulses  Exam reveals no gallop and no friction rub  No murmur heard  Pulmonary/Chest: Effort normal and breath sounds normal  No stridor  No respiratory distress  She has no wheezes  She has no rales  Abdominal: Soft  Bowel sounds are normal  She exhibits no distension  There is no tenderness  There is no rebound and no guarding  Musculoskeletal: Normal range of motion  She exhibits no edema or deformity  Neurological: She is alert and oriented to person, place, and time  No cranial nerve deficit  Skin: Skin is warm and dry  No rash noted  She is not diaphoretic  No erythema  Patient does have bilateral red skin changes with some asymmetric swelling right greater than left  Psychiatric: She has a normal mood and affect  Her behavior is normal  Thought content normal    Nursing note and vitals reviewed        Vital Signs  ED Triage Vitals [11/28/19 1842]   Temperature Pulse Respirations Blood Pressure SpO2   97 9 °F (36 6 °C) 86 18 117/63 96 %      Temp Source Heart Rate Source Patient Position - Orthostatic VS BP Location FiO2 (%)   Temporal Monitor Lying Right arm --      Pain Score       3           Vitals: 11/28/19 2130 11/28/19 2145 11/28/19 2200 11/28/19 2245   BP: 138/79 142/82 139/94 130/88   Pulse: 76 76 75 72   Patient Position - Orthostatic VS: Lying Lying Lying Lying         Visual Acuity  Visual Acuity      Most Recent Value   L Pupil Size (mm)  3   R Pupil Size (mm)  3          ED Medications  Medications - No data to display    Diagnostic Studies  Results Reviewed     Procedure Component Value Units Date/Time    Comprehensive metabolic panel [[id number]]  (Abnormal) Collected:  11/28/19 1930    Lab Status:  Final result Specimen:  Blood from Arm, Left Updated:  11/28/19 1959     Sodium 135 mmol/L      Potassium 4 9 mmol/L      Chloride 102 mmol/L      CO2 24 mmol/L      ANION GAP 9 mmol/L      BUN 21 mg/dL      Creatinine 0 97 mg/dL      Glucose 207 mg/dL      Calcium 8 0 mg/dL      AST 28 U/L      ALT 12 U/L      Alkaline Phosphatase 44 U/L      Total Protein 6 1 g/dL      Albumin 2 6 g/dL      Total Bilirubin 0 20 mg/dL      eGFR 63 ml/min/1 73sq m     Narrative:       [facility] guidelines for Chronic Kidney Disease (CKD):     Stage 1 with normal or high GFR (GFR > 90 mL/min/1 73 square meters)    Stage 2 Mild CKD (GFR = 60-89 mL/min/1 73 square meters)    Stage 3A Moderate CKD (GFR = 45-59 mL/min/1 73 square meters)    Stage 3B Moderate CKD (GFR = 30-44 mL/min/1 73 square meters)    Stage 4 Severe CKD (GFR = 15-29 mL/min/1 73 square meters)    Stage 5 End Stage CKD (GFR <15 mL/min/1 73 square meters)  Note: GFR calculation is accurate only with a steady state creatinine    D-dimer, quantitative [[id number]]  (Abnormal) Collected:  11/28/19 1930    Lab Status:  Final result Specimen:  Blood from Arm, Left Updated:  11/28/19 1948     D-Dimer, Quant 0 54 ug/ml FEU     CBC and differential [[id number]]  (Abnormal) Collected:  11/28/19 1930    Lab Status:  Final result Specimen:  Blood from Arm, Left Updated:  11/28/19 1936     WBC 3 78 Thousand/uL      RBC 3 43 Million/uL Hemoglobin 11 1 g/dL      Hematocrit 33 1 %      MCV 97 fL      MCH 32 4 pg      MCHC 33 5 g/dL      RDW 13 0 %      MPV 8 7 fL      Platelets 670 Thousands/uL      nRBC 0 /100 WBCs      Neutrophils Relative 44 %      Immat GRANS % 1 %      Lymphocytes Relative 32 %      Monocytes Relative 10 %      Eosinophils Relative 12 %      Basophils Relative 1 %      Neutrophils Absolute 1 71 Thousands/µL      Immature Grans Absolute 0 02 Thousand/uL      Lymphocytes Absolute 1 21 Thousands/µL      Monocytes Absolute 0 36 Thousand/µL      Eosinophils Absolute 0 44 Thousand/µL      Basophils Absolute 0 04 Thousands/µL                  XR tibia fibula 2 views RIGHT   ED Interpretation by Ladarius Cash MD (11/28 2329)   No fracture appreciated      Final Result by Delia Carrera MD (11/29 2326)      No acute osseous abnormality  Workstation performed: [id number]         XR ankle 3+ views RIGHT   ED Interpretation by Ladarius Cash MD (11/28 2330)   No fracture appreciated      Final Result by Delia Carrera MD (11/29 4458)      Severe arthritis of the right ankle and postoperative changes without evidence of acute osseous abnormality            Workstation performed: [id number]         VAS lower limb venous duplex study, unilateral/limited   Final Result by Boris Linares MD (11/28 2205)                 Procedures  Procedures         ED Course  ED Course as of Dec 03 0222   Thu Nov 28, 2019   2133 Negative for DVT per us tech      Scan was negative for DVT  Will get x-rays of her lower leg  X-rays were negative  Patient does not have a DVT  Labs are otherwise normal   Will start her on clindamycin for presumed cellulitis and discharge the patient  When I discussed with the patient that she would be discharge, patient became upset stating that she would be admitted  Total air is nothing to be admitted for and we would discharge her to the waiting room    Patient states she was leaving to which she was informed she could not stay here  Patient went to the waiting room, was able to get a ride and left  I personally discussed return precautions with this patient and family  I provided the patient with written discharge instructions and particularly highlighted specific areas of interest to this patient, including but not limited to: medications for symptom managment, follow up recommendations, and return precautions  Patient and family are in agreement with this plan as outlined above  MDM  Number of Diagnoses or Management Options  Cellulitis of right leg:   Diagnosis management comments: Patient presents with redness swelling of her right leg  Could be DVT versus cellulitis versus venous stasis  Will evaluate with basic labs include CBC, CMP  Will get a D-dimer given the asymmetric leg swelling and if positive will evaluate with a venous duplex  Disposition  Final diagnoses:   Cellulitis of right leg     Time reflects when diagnosis was documented in both MDM as applicable and the Disposition within this note     Time User Action Codes Description Comment    11/29/2019 12:10 AM Qi Devlin Add [D54 098] Cellulitis of right leg       ED Disposition     ED Disposition Condition Date/Time Comment    Discharge Stable Fri Nov 29, 2019 12:10 AM Cata Oropeza discharge to home/self care              Follow-up Information     Follow up With Specialties Details Why Contact Info Additional Information    Sanjuanita Pelaez PA-C Family Medicine, Physician Assistant Schedule an appointment as soon as possible for a visit in 3 days follow up being seen in the emergency department [address] [facility] [address] ([place] [address])       [facility] Emergency Medicine Go to  As needed, If symptoms worsen [place] [id number]  [phone] [place] ED, [address], [address]          Discharge Medication List as of 11/29/2019 12:16 AM      START taking these medications    Details   clindamycin (CLEOCIN) 150 mg capsule Take 3 capsules (450 mg total) by mouth 3 (three) times a day for 5 days, Starting Fri 11/29/2019, Until Wed 12/4/2019, Normal         CONTINUE these medications which have NOT CHANGED    Details   !! albuterol (VENTOLIN HFA) 90 mcg/act inhaler Inhale 2 puffs every 6 (six) hours as needed for wheezing, Starting Thu 5/2/2019, Normal      aspirin (ECOTRIN LOW STRENGTH) 81 mg EC tablet Take 1 tablet (81 mg total) by mouth daily, Starting Mon 11/18/2019, Normal      Blood Glucose Monitoring Suppl (BLOOD GLUCOSE MONITOR SYSTEM) w/Device KIT Test blood sugars 3 times a day, Normal      ferrous sulfate 324 (65 Fe) mg Take 1 tablet (324 mg total) by mouth 2 (two) times a day before meals, Starting Fri 8/16/2019, Normal      gabapentin (NEURONTIN) 800 mg tablet Take 1 tablet (800 mg total) by mouth 4 (four) times a day, Starting Fri 6/28/2019, Normal      glucose blood (ONE TOUCH ULTRA TEST) test strip Test blood sugars 3 times a day , Normal      hydrOXYzine pamoate (VISTARIL) 50 mg capsule Take 1 capsule (50 mg total) by mouth 3 (three) times a day, Starting Thu 8/1/2019, Normal      lisinopril (ZESTRIL) 5 mg tablet Take 1 tablet (5 mg total) by mouth daily, Starting Fri 11/15/2019, Normal      loratadine (CLARITIN) 10 mg tablet Take 1 tablet (10 mg total) by mouth daily, Starting Tue 4/30/2019, Normal      magnesium chloride (MAG64) 64 MG TBEC EC tablet Take 1 tablet (64 mg total) by mouth 2 (two) times a day, Starting Fri 11/15/2019, Normal      meloxicam (MOBIC) 7 5 mg tablet Take 1 tablet (7 5 mg total) by mouth 2 (two) times a day, Starting Mon 11/11/2019, Normal      metFORMIN (GLUCOPHAGE) 500 mg tablet Take 1 tablet (500 mg total) by mouth 2 (two) times a day with meals, Starting Fri 11/15/2019, Normal      metoprolol succinate (TOPROL-XL) 25 mg 24 hr tablet Take 1 tablet (25 mg total) by mouth daily, Starting Fri 9/6/2019, Normal      Misc   Devices (MATTRESS PAD) MISC by Does not apply route daily, Starting Mon 8/5/2019, Print      montelukast (SINGULAIR) 10 mg tablet Take 1 tablet (10 mg total) by mouth daily at bedtime, Starting Fri 11/15/2019, Normal      clotrimazole-betamethasone (LOTRISONE) 1-0 05 % lotion Apply topically 2 (two) times a day, Starting Fri 9/6/2019, Normal      estradiol (ESTRACE) 1 mg tablet Take 1 mg by mouth daily at bedtime  , Historical Med      fluticasone (FLONASE) 50 mcg/act nasal spray SPRAY ONE (1) SPRAY INTO EACH NOSTRIL ONCE DAILY, Normal      furosemide (LASIX) 40 mg tablet Take 1 tablet (40 mg total) by mouth daily for 30 days, Starting Mon 5/13/2019, Until Wed 6/12/2019, Print      Incontinence Supply Disposable (PADSORBER BED PAN LINERS) MISC by Does not apply route as needed (incontinence), Starting Wed 11/7/2018, Print      Infant Care Products (CUTIES SENSITIVE WIPES) [address] [facility] 120 Pieces by Does not apply route as needed (incontinence), Starting Wed 11/7/2018, Print      insulin glargine (LANTUS) 100 units/mL subcutaneous injection Inject 10 Units under the skin daily at bedtime, Starting Tue 4/30/2019, Normal      levETIRAcetam (KEPPRA) 750 mg tablet Take 1,500 mg by mouth 2 (two) times a day Pt states she takes 1500MG Bid, Historical Med      levothyroxine 150 mcg tablet Take 1 tablet (150 mcg total) by mouth daily, Starting Fri 9/6/2019, Normal      naproxen (NAPROSYN) 500 mg tablet Take 1 tablet (500 mg total) by mouth 2 (two) times a day with meals for 6 doses, Starting Fri 5/17/2019, Until Mon 5/20/2019, Print      norethindrone (AYGESTIN) 5 mg tablet Take 5 mg by mouth daily at bedtime , Historical Med      nystatin (MYCOSTATIN) ointment Apply topically 2 (two) times a day, Starting Fri 8/9/2019, Normal      omeprazole (PriLOSEC) 20 mg delayed release capsule Take 1 capsule (20 mg total) by mouth 2 (two) times a day TAKE ONE TABLET IN THE MORNING AND TAKE ONE TABLET IN THE EVENING BY MOUTH DAILY  , Starting Fri 8/16/2019, Normal      ONETOUCH DELICA LANCETS 20J MISC Test blood sugars 3 times a day , Normal      oxybutynin (DITROPAN) 5 mg tablet Take 1 tablet (5 mg total) by mouth 3 (three) times a day, Starting Fri 9/6/2019, Normal      phenytoin (DILANTIN) 100 mg ER capsule Take by mouth 5 (five) times a day 3 tablets in morning and 2 at lunch daily , Historical Med      !! polyethylene glycol (GLYCOLAX) powder Take 17 g by mouth daily, Starting Tue 5/14/2019, Normal      !! polyethylene glycol (GLYCOLAX) powder Take 17 g by mouth daily, Starting Fri 10/4/2019, Normal      polyvinyl alcohol (LIQUIFILM TEARS) 1 4 % ophthalmic solution Administer 1 drop to both eyes 3 (three) times a day, Starting Wed 4/17/2019, Print      QUEtiapine (SEROquel) 400 MG tablet Take 1 tablet (400 mg total) by mouth 2 (two) times a day, Starting Fri 9/6/2019, Normal      simvastatin (ZOCOR) 80 mg tablet Take 1 tablet (80 mg total) by mouth daily at bedtime, Starting Tue 11/19/2019, Normal      Skin Protectants, Misc  (CALAZIME SKIN PROTECTANT) PSTE Apply 113 g topically 2 (two) times a day, Starting Wed 10/17/2018, Normal      valACYclovir (VALTREX) 500 mg tablet Take 1 tablet by mouth 2 (two) times a day, Starting Thu 3/22/2018, Historical Med      !! VENTOLIN  (90 Base) MCG/ACT inhaler Inhale 2 puffs every 6 (six) hours as needed for wheezing, Starting Fri 10/19/2018, Normal      Zinc Oxide 10 % AERO Apply 1 Can topically as needed (incontinence), Starting Thu 5/9/2019, Print      zonisamide (ZONEGRAN) 100 mg capsule Take 100 mg by mouth 5 (five) times a day  , Until Discontinued, Historical Med       !! - Potential duplicate medications found  Please discuss with provider  No discharge procedures on file      ED Provider  Electronically Signed by           Mignon Morales MD  12/03/19 4620

## 2019-12-05 ENCOUNTER — TELEPHONE (OUTPATIENT)
Dept: OTHER | Facility: OTHER | Age: 64
End: 2019-12-05

## 2019-12-09 ENCOUNTER — OFFICE VISIT (OUTPATIENT)
Dept: FAMILY MEDICINE CLINIC | Facility: CLINIC | Age: 64
End: 2019-12-09
Payer: COMMERCIAL

## 2019-12-09 VITALS
HEIGHT: 66 IN | SYSTOLIC BLOOD PRESSURE: 108 MMHG | HEART RATE: 92 BPM | RESPIRATION RATE: 18 BRPM | BODY MASS INDEX: 33.75 KG/M2 | TEMPERATURE: 97.5 F | OXYGEN SATURATION: 97 % | DIASTOLIC BLOOD PRESSURE: 64 MMHG | WEIGHT: 210 LBS

## 2019-12-09 DIAGNOSIS — E03.9 ACQUIRED HYPOTHYROIDISM: ICD-10-CM

## 2019-12-09 DIAGNOSIS — Z79.4 TYPE 2 DIABETES MELLITUS WITH HYPERGLYCEMIA, WITH LONG-TERM CURRENT USE OF INSULIN (HCC): ICD-10-CM

## 2019-12-09 DIAGNOSIS — R26.2 AMBULATORY DYSFUNCTION: Primary | ICD-10-CM

## 2019-12-09 DIAGNOSIS — R60.0 BILATERAL LEG EDEMA: ICD-10-CM

## 2019-12-09 DIAGNOSIS — F31.78 BIPOLAR DISORDER, IN FULL REMISSION, MOST RECENT EPISODE MIXED (HCC): ICD-10-CM

## 2019-12-09 DIAGNOSIS — K21.9 GERD WITHOUT ESOPHAGITIS: ICD-10-CM

## 2019-12-09 DIAGNOSIS — E11.65 TYPE 2 DIABETES MELLITUS WITH HYPERGLYCEMIA, WITH LONG-TERM CURRENT USE OF INSULIN (HCC): ICD-10-CM

## 2019-12-09 DIAGNOSIS — F41.9 ANXIETY: ICD-10-CM

## 2019-12-09 DIAGNOSIS — D50.9 IRON DEFICIENCY ANEMIA, UNSPECIFIED: Chronic | ICD-10-CM

## 2019-12-09 PROCEDURE — T1015 CLINIC SERVICE: HCPCS | Performed by: FAMILY MEDICINE

## 2019-12-09 RX ORDER — DIPHENHYDRAMINE HCL 25 MG
25 CAPSULE ORAL EVERY 6 HOURS PRN
COMMUNITY
End: 2020-02-28 | Stop reason: ALTCHOICE

## 2019-12-09 RX ORDER — SIMVASTATIN 80 MG
80 TABLET ORAL
Qty: 30 TABLET | Refills: 5 | Status: SHIPPED | OUTPATIENT
Start: 2019-12-09

## 2019-12-09 RX ORDER — FUROSEMIDE 40 MG/1
40 TABLET ORAL DAILY PRN
Qty: 30 TABLET | Refills: 1 | Status: SHIPPED | OUTPATIENT
Start: 2019-12-09 | End: 2020-02-27

## 2019-12-09 RX ORDER — HYDROXYZINE PAMOATE 50 MG/1
50 CAPSULE ORAL 3 TIMES DAILY
Qty: 90 CAPSULE | Refills: 3 | Status: SHIPPED | OUTPATIENT
Start: 2019-12-09

## 2019-12-09 NOTE — PROGRESS NOTES
Assessment/Plan:     Diagnoses and all orders for this visit:    Ambulatory dysfunction  -     Wheelchair    Anxiety  -     hydrOXYzine pamoate (VISTARIL) 50 mg capsule; Take 1 capsule (50 mg total) by mouth 3 (three) times a day    Bilateral leg edema  -     furosemide (LASIX) 40 mg tablet; Take 1 tablet (40 mg total) by mouth daily as needed (edema)    Type 2 diabetes mellitus with hyperglycemia, with long-term current use of insulin (HCC)  -     simvastatin (ZOCOR) 80 mg tablet; Take 1 tablet (80 mg total) by mouth daily at bedtime    Other orders  -     diphenhydrAMINE (BENADRYL) 25 mg capsule; Take 25 mg by mouth every 6 (six) hours as needed for itching        Take antibiotics as directed and finish them  If no improvement will refer to Dermatology  RTC as previoulsy scheduled  Subjective:        Patient ID: Jules Mcgarry is a 60 y o  female  Chief Complaint   Patient presents with    Cellulitis     Patient states she still has cellulitis on her leg       59 yo female presents with continuing cellulitis  She was prescribed Clindamycin by ER 11/29/19  She still has full bottle  She reports she is taking them and her legs are getting better  She wants to be on an antibiotic for the rest of her life "so this doesn't ever come back"  Alos needs med refills today        The following portions of the patient's history were reviewed and updated as appropriate: allergies, current medications, past family history, past medical history, past social history, past surgical history and problem list     Patient Active Problem List   Diagnosis    Asthma    [facility]ipolar disorder ([facility] )    Type 2 diabetes mellitus without complication, with long-term current use of insulin ([facility] )    Dermatitis    Acquired hypothyroidism    Seizure disorder ([facility] )    NSTEMI (non-ST elevated myocardial infarction) ([facility] )    Volume overload    Skin breakdown    Ambulatory dysfunction    Obesity due to excess calories    E  coli UTI    Essential hypertension    Hypercholesterolemia    GERD without esophagitis    Urinary incontinence    Lactic acidosis    COPD (chronic obstructive pulmonary disease) (HCC)    Recurrent falls    Acute hyponatremia    Hypotension    Hypertriglyceridemia    Vaginal candidiasis    Leukopenia    Neutropenia (HCC)    Low TSH level    Sinus arrhythmia    Pressure injury of sacral region, stage 3 (HCC)    Pressure ulcer of left buttock, stage 3 (HCC)    Pressure ulcer of right buttock, stage 2 (HCC)    Generalized weakness    Right lower quadrant abdominal pain    Iron deficiency anemia, unspecified    BMI 33 0-33 9,adult       Current Outpatient Medications   Medication Sig Dispense Refill    diphenhydrAMINE (BENADRYL) 25 mg capsule Take 25 mg by mouth every 6 (six) hours as needed for itching      albuterol (VENTOLIN HFA) 90 mcg/act inhaler Inhale 2 puffs every 6 (six) hours as needed for wheezing 1 Inhaler 0    aspirin (ECOTRIN LOW STRENGTH) 81 mg EC tablet Take 1 tablet (81 mg total) by mouth daily 90 tablet 1    Blood Glucose Monitoring Suppl (BLOOD GLUCOSE MONITOR SYSTEM) w/Device KIT Test blood sugars 3 times a day 1 each 0    clotrimazole-betamethasone (LOTRISONE) 1-0 05 % lotion Apply topically 2 (two) times a day 30 mL 0    estradiol (ESTRACE) 1 mg tablet Take 1 mg by mouth daily at bedtime        ferrous sulfate 324 (65 Fe) mg Take 1 tablet (324 mg total) by mouth 2 (two) times a day before meals 60 tablet 3    fluticasone (FLONASE) 50 mcg/act nasal spray SPRAY ONE (1) SPRAY INTO EACH NOSTRIL ONCE DAILY 16 g 1    furosemide (LASIX) 40 mg tablet Take 1 tablet (40 mg total) by mouth daily as needed (edema) 30 tablet 1    gabapentin (NEURONTIN) 800 mg tablet Take 1 tablet (800 mg total) by mouth 4 (four) times a day 120 tablet 0    glucose blood (ONE TOUCH ULTRA TEST) test strip Test blood sugars 3 times a day   100 each 0    hydrOXYzine pamoate (VISTARIL) 50 mg capsule Take 1 capsule (50 mg total) by mouth 3 (three) times a day 90 capsule 3    Incontinence Supply Disposable (PADSORBER BED PAN LINERS) MISC by Does not apply route as needed (incontinence) 50 each 3    Infant Care Products (CUTIES SENSITIVE WIPES) MISC 120 Pieces by Does not apply route as needed (incontinence) 120 each 3    insulin glargine (LANTUS) 100 units/mL subcutaneous injection Inject 10 Units under the skin daily at bedtime 10 mL 2    levETIRAcetam (KEPPRA) 750 mg tablet Take 1,500 mg by mouth 2 (two) times a day Pt states she takes 1500MG Bid      levothyroxine 150 mcg tablet Take 1 tablet (150 mcg total) by mouth daily 90 tablet 1    lisinopril (ZESTRIL) 5 mg tablet Take 1 tablet (5 mg total) by mouth daily 30 tablet 3    loratadine (CLARITIN) 10 mg tablet Take 1 tablet (10 mg total) by mouth daily 30 tablet 3    magnesium chloride (MAG64) 64 MG TBEC EC tablet Take 1 tablet (64 mg total) by mouth 2 (two) times a day 60 tablet 3    meloxicam (MOBIC) 7 5 mg tablet Take 1 tablet (7 5 mg total) by mouth 2 (two) times a day 60 tablet 1    metFORMIN (GLUCOPHAGE) 500 mg tablet Take 1 tablet (500 mg total) by mouth 2 (two) times a day with meals 60 tablet 5    metoprolol succinate (TOPROL-XL) 25 mg 24 hr tablet Take 1 tablet (25 mg total) by mouth daily 30 tablet 3    Misc   Devices (MATTRESS PAD) MISC by Does not apply route daily 1 each 0    montelukast (SINGULAIR) 10 mg tablet Take 1 tablet (10 mg total) by mouth daily at bedtime 90 tablet 1    naproxen (NAPROSYN) 500 mg tablet Take 1 tablet (500 mg total) by mouth 2 (two) times a day with meals for 6 doses 6 tablet 0    norethindrone (AYGESTIN) 5 mg tablet Take 5 mg by mouth daily at bedtime       nystatin (MYCOSTATIN) ointment Apply topically 2 (two) times a day 30 g 0    omeprazole (PriLOSEC) 20 mg delayed release capsule Take 1 capsule (20 mg total) by mouth 2 (two) times a day TAKE ONE TABLET IN THE MORNING AND TAKE ONE TABLET IN THE EVENING BY MOUTH DAILY  60 capsule 3    ONETOUCH DELICA LANCETS 71H MISC Test blood sugars 3 times a day  100 each 0    oxybutynin (DITROPAN) 5 mg tablet Take 1 tablet (5 mg total) by mouth 3 (three) times a day 90 tablet 3    phenytoin (DILANTIN) 100 mg ER capsule Take by mouth 5 (five) times a day 3 tablets in morning and 2 at lunch daily       polyethylene glycol (GLYCOLAX) powder Take 17 g by mouth daily 850 g 3    polyvinyl alcohol (LIQUIFILM TEARS) 1 4 % ophthalmic solution Administer 1 drop to both eyes 3 (three) times a day 15 mL 0    QUEtiapine (SEROquel) 400 MG tablet Take 1 tablet (400 mg total) by mouth 2 (two) times a day 60 tablet 3    simvastatin (ZOCOR) 80 mg tablet Take 1 tablet (80 mg total) by mouth daily at bedtime 30 tablet 5    Skin Protectants, Misc  (CALAZIME SKIN PROTECTANT) PSTE Apply 113 g topically 2 (two) times a day 1 Tube 0    valACYclovir (VALTREX) 500 mg tablet Take 1 tablet by mouth 2 (two) times a day  2    VENTOLIN  (90 Base) MCG/ACT inhaler Inhale 2 puffs every 6 (six) hours as needed for wheezing 18 g 0    Zinc Oxide 10 % AERO Apply 1 Can topically as needed (incontinence) 1 Can 3    zonisamide (ZONEGRAN) 100 mg capsule Take 100 mg by mouth 5 (five) times a day         No current facility-administered medications for this visit           Past Medical History:   Diagnosis Date    Asthma     [facility]     Chronic UTI (urinary tract infection)     COPD (chronic obstructive pulmonary disease) [facility]     Diabetes mellitus ([facility]     Disease of thyroid gland     Dyslipidemia 10/31/2018    Essential hypertension 10/31/2018    GERD (gastroesophageal reflux disease)     Iron deficiency anemia, unspecified 7/29/2019    Obesity due to excess calories 10/31/2018    Recurrent falls 4/13/2019    Seizure [facility])         Past Surgical History:   Procedure Laterality Date    ANKLE FRACTURE SURGERY Right     TUBAL LIGATION      VEIN LIGATION AND STRIPPING Social History     Socioeconomic History    Marital status: Single     Spouse name: Not on file    Number of children: Not on file    Years of education: Not on file    Highest education level: Not on file   Occupational History    Not on file   Social Needs    Financial resource strain: Not on file    Food insecurity:     Worry: Not on file     Inability: Not on file    Transportation needs:     Medical: Not on file     Non-medical: Not on file   Tobacco Use    Smoking status: Never Smoker    Smokeless tobacco: Never Used   Substance and Sexual Activity    Alcohol use: Not Currently     Alcohol/week: 0 0 standard drinks    Drug use: No    Sexual activity: Not Currently   Lifestyle    Physical activity:     Days per week: Not on file     Minutes per session: Not on file    Stress: Not on file   Relationships    Social connections:     Talks on phone: Not on file     Gets together: Not on file     Attends Oriental orthodox service: Not on file     Active member of club or organization: Not on file     Attends meetings of clubs or organizations: Not on file     Relationship status: Not on file    Intimate partner violence:     Fear of current or ex partner: Not on file     Emotionally abused: Not on file     Physically abused: Not on file     Forced sexual activity: Not on file   Other Topics Concern    Not on file   Social History Narrative    Not on file        Review of Systems   Constitutional: Negative  HENT: Negative  Eyes: Negative  Respiratory: Negative  Cardiovascular: Negative  Skin:        Per HPI   Psychiatric/Behavioral: Negative  Objective:      /64   Pulse 92   Temp 97 5 °F (36 4 °C)   Resp 18   Ht 5' 6" (1 676 m)   Wt 95 3 kg (210 lb)   LMP  (LMP Unknown)   SpO2 97%   BMI 33 89 kg/m²          Physical Exam   Constitutional: She is oriented to person, place, and time     61 yo female in wheelchair who appears in NAD   HENT:   Head: Normocephalic and atraumatic  Right Ear: External ear normal    Left Ear: External ear normal    Eyes: Pupils are equal, round, and reactive to light  Neck: Neck supple  Cardiovascular: Normal rate and regular rhythm  Pulmonary/Chest: Effort normal and breath sounds normal  She has no wheezes  She has no rales  Musculoskeletal: She exhibits no edema  Lymphadenopathy:     She has no cervical adenopathy  Neurological: She is alert and oriented to person, place, and time  Skin:   Red rash noted over both lower extremities anterior aspect  No open areas  No weeping  Nursing note and vitals reviewed

## 2019-12-10 RX ORDER — QUETIAPINE FUMARATE 400 MG/1
400 TABLET, FILM COATED ORAL 2 TIMES DAILY
Qty: 60 TABLET | Refills: 1 | Status: SHIPPED | OUTPATIENT
Start: 2019-12-10 | End: 2020-03-04

## 2019-12-10 RX ORDER — LEVOTHYROXINE SODIUM 0.15 MG/1
150 TABLET ORAL DAILY
Qty: 30 TABLET | Refills: 0 | Status: SHIPPED | OUTPATIENT
Start: 2019-12-10 | End: 2020-03-04

## 2019-12-30 DIAGNOSIS — J43.9 PULMONARY EMPHYSEMA, UNSPECIFIED EMPHYSEMA TYPE (HCC): ICD-10-CM

## 2019-12-30 DIAGNOSIS — N32.81 OVERACTIVE BLADDER: ICD-10-CM

## 2019-12-30 RX ORDER — OXYBUTYNIN CHLORIDE 5 MG/1
5 TABLET ORAL 3 TIMES DAILY
Qty: 90 TABLET | Refills: 3 | Status: SHIPPED | OUTPATIENT
Start: 2019-12-30

## 2019-12-30 RX ORDER — ALBUTEROL SULFATE 90 UG/1
2 AEROSOL, METERED RESPIRATORY (INHALATION) EVERY 6 HOURS PRN
Qty: 1 INHALER | Refills: 1 | Status: SHIPPED | OUTPATIENT
Start: 2019-12-30

## 2020-01-07 DIAGNOSIS — E11.65 TYPE 2 DIABETES MELLITUS WITH HYPERGLYCEMIA, WITH LONG-TERM CURRENT USE OF INSULIN (HCC): ICD-10-CM

## 2020-01-07 DIAGNOSIS — K21.9 GERD WITHOUT ESOPHAGITIS: ICD-10-CM

## 2020-01-07 DIAGNOSIS — D64.9 ANEMIA, UNSPECIFIED TYPE: Primary | ICD-10-CM

## 2020-01-07 DIAGNOSIS — Z79.4 TYPE 2 DIABETES MELLITUS WITH HYPERGLYCEMIA, WITH LONG-TERM CURRENT USE OF INSULIN (HCC): ICD-10-CM

## 2020-01-08 RX ORDER — MELOXICAM 7.5 MG/1
7.5 TABLET ORAL 2 TIMES DAILY
Qty: 60 TABLET | Refills: 1 | Status: SHIPPED | OUTPATIENT
Start: 2020-01-08 | End: 2020-03-30

## 2020-01-17 RX ORDER — OMEPRAZOLE 20 MG/1
CAPSULE, DELAYED RELEASE ORAL
Qty: 60 CAPSULE | Refills: 0 | Status: SHIPPED | OUTPATIENT
Start: 2020-01-17 | End: 2020-04-01

## 2020-01-17 RX ORDER — FERROUS SULFATE 325(65) MG
TABLET ORAL
Qty: 60 TABLET | Refills: 0 | Status: SHIPPED | OUTPATIENT
Start: 2020-01-17 | End: 2020-01-24 | Stop reason: SDUPTHER

## 2020-01-22 ENCOUNTER — TELEPHONE (OUTPATIENT)
Dept: OTHER | Facility: OTHER | Age: 65
End: 2020-01-22

## 2020-01-24 ENCOUNTER — OFFICE VISIT (OUTPATIENT)
Dept: FAMILY MEDICINE CLINIC | Facility: CLINIC | Age: 65
End: 2020-01-24
Payer: COMMERCIAL

## 2020-01-24 VITALS
RESPIRATION RATE: 18 BRPM | HEIGHT: 66 IN | WEIGHT: 215 LBS | SYSTOLIC BLOOD PRESSURE: 106 MMHG | OXYGEN SATURATION: 97 % | TEMPERATURE: 98 F | DIASTOLIC BLOOD PRESSURE: 64 MMHG | HEART RATE: 75 BPM | BODY MASS INDEX: 34.55 KG/M2

## 2020-01-24 DIAGNOSIS — B49 FUNGAL INFECTION: ICD-10-CM

## 2020-01-24 DIAGNOSIS — D64.9 ANEMIA, UNSPECIFIED TYPE: ICD-10-CM

## 2020-01-24 DIAGNOSIS — R39.9 UTI SYMPTOMS: Primary | ICD-10-CM

## 2020-01-24 PROCEDURE — T1015 CLINIC SERVICE: HCPCS | Performed by: FAMILY MEDICINE

## 2020-01-24 RX ORDER — NYSTATIN 100000 U/G
OINTMENT TOPICAL 2 TIMES DAILY
Qty: 30 G | Refills: 1 | Status: SHIPPED | OUTPATIENT
Start: 2020-01-24 | End: 2020-02-27

## 2020-01-24 RX ORDER — FERROUS SULFATE 325(65) MG
325 TABLET ORAL
Qty: 60 TABLET | Refills: 0 | Status: SHIPPED | OUTPATIENT
Start: 2020-01-24 | End: 2020-03-03 | Stop reason: SDUPTHER

## 2020-01-24 RX ORDER — NITROFURANTOIN 25; 75 MG/1; MG/1
100 CAPSULE ORAL 2 TIMES DAILY
Qty: 14 CAPSULE | Refills: 0 | Status: SHIPPED | OUTPATIENT
Start: 2020-01-24 | End: 2020-01-31

## 2020-01-24 NOTE — PROGRESS NOTES
Assessment/Plan:     Diagnoses and all orders for this visit:    UTI symptoms  -     POCT urine dip  -     nitrofurantoin (MACROBID) 100 mg capsule; Take 1 capsule (100 mg total) by mouth 2 (two) times a day for 7 days    Anemia, unspecified type  -     ferrous sulfate (FEROSUL) 325 (65 Fe) mg tablet; Take 1 tablet (325 mg total) by mouth daily with breakfast    Fungal infection  -     nystatin (MYCOSTATIN) ointment; Apply topically 2 (two) times a day        Unable to do urine dip in office as she is taking Azo  Will treat with Macrobid BID x 7 days  Meds refilled as needed today  RTC as previously scheduled          Subjective:        Patient ID: Jaja Babcock is a 60 y o  female  Chief Complaint   Patient presents with    Urinary Tract Infection     Pt complaining of uti and possibly yeast infection  Also has cellulitits in her legs        61 yo female presents with 2 week hx of urinary frequency and burning  She is taking Azo for relief  Needs med refills also  Wants her legs checked for cellulitis  No other issues today        The following portions of the patient's history were reviewed and updated as appropriate: allergies, current medications, past family history, past medical history, past social history, past surgical history and problem list     Patient Active Problem List   Diagnosis    Asthma    [facility] disorder [facility] )    Type 2 diabetes mellitus without complication, with long-term current use of insulin ([facility] )    Dermatitis    Acquired hypothyroidism    Seizure disorder ([facility] )    NSTEMI (non-ST elevated myocardial infarction) ([facility] )    Volume overload    Skin breakdown    Ambulatory dysfunction    Obesity due to excess calories    E  coli UTI    Essential hypertension    Hypercholesterolemia    GERD without esophagitis    Urinary incontinence    Lactic acidosis    COPD (chronic obstructive pulmonary disease) (HCC)    Recurrent falls    Acute hyponatremia    Hypotension    Hypertriglyceridemia    Vaginal candidiasis    Leukopenia    Neutropenia (HCC)    Low TSH level    Sinus arrhythmia    Pressure injury of sacral region, stage 3 (HCC)    Pressure ulcer of left buttock, stage 3 (HCC)    Pressure ulcer of right buttock, stage 2 (HCC)    Generalized weakness    Right lower quadrant abdominal pain    Iron deficiency anemia, unspecified    BMI 33 0-33 9,adult       Current Outpatient Medications   Medication Sig Dispense Refill    albuterol (VENTOLIN HFA) 90 mcg/act inhaler Inhale 2 puffs every 6 (six) hours as needed for wheezing 1 Inhaler 1    aspirin (ECOTRIN LOW STRENGTH) 81 mg EC tablet Take 1 tablet (81 mg total) by mouth daily 90 tablet 1    Blood Glucose Monitoring Suppl (BLOOD GLUCOSE MONITOR SYSTEM) w/Device KIT Test blood sugars 3 times a day 1 each 0    clotrimazole-betamethasone (LOTRISONE) 1-0 05 % lotion Apply topically 2 (two) times a day 30 mL 0    diphenhydrAMINE (BENADRYL) 25 mg capsule Take 25 mg by mouth every 6 (six) hours as needed for itching      estradiol (ESTRACE) 1 mg tablet Take 1 mg by mouth daily at bedtime        ferrous sulfate (FEROSUL) 325 (65 Fe) mg tablet Take 1 tablet (325 mg total) by mouth daily with breakfast 60 tablet 0    gabapentin (NEURONTIN) 800 mg tablet Take 1 tablet (800 mg total) by mouth 4 (four) times a day 120 tablet 0    glucose blood (ONE TOUCH ULTRA TEST) test strip Test blood sugars 3 times a day   100 each 0    hydrOXYzine pamoate (VISTARIL) 50 mg capsule Take 1 capsule (50 mg total) by mouth 3 (three) times a day 90 capsule 3    Incontinence Supply Disposable (PADSORBER BED PAN LINERS) MISC by Does not apply route as needed (incontinence) 50 each 3    Infant Care Products (CUTIES SENSITIVE WIPES) MISC 120 Pieces by Does not apply route as needed (incontinence) 120 each 3    insulin glargine (LANTUS) 100 units/mL subcutaneous injection Inject 10 Units under the skin daily at bedtime 10 mL 2    levETIRAcetam (KEPPRA) 750 mg tablet Take 1,500 mg by mouth 2 (two) times a day Pt states she takes 1500MG Bid      levothyroxine 150 mcg tablet Take 1 tablet (150 mcg total) by mouth daily 30 tablet 0    lisinopril (ZESTRIL) 5 mg tablet Take 1 tablet (5 mg total) by mouth daily 30 tablet 3    loratadine (CLARITIN) 10 mg tablet Take 1 tablet (10 mg total) by mouth daily 30 tablet 3    magnesium chloride (MAG64) 64 MG TBEC EC tablet Take 1 tablet (64 mg total) by mouth 2 (two) times a day 60 tablet 3    meloxicam (MOBIC) 7 5 mg tablet Take 1 tablet (7 5 mg total) by mouth 2 (two) times a day 60 tablet 1    metFORMIN (GLUCOPHAGE) 500 mg tablet Take 1 tablet (500 mg total) by mouth 2 (two) times a day with meals 60 tablet 5    metoprolol succinate (TOPROL-XL) 25 mg 24 hr tablet Take 1 tablet (25 mg total) by mouth daily 30 tablet 3    Misc  Devices (MATTRESS PAD) MISC by Does not apply route daily 1 each 0    montelukast (SINGULAIR) 10 mg tablet Take 1 tablet (10 mg total) by mouth daily at bedtime 90 tablet 1    norethindrone (AYGESTIN) 5 mg tablet Take 5 mg by mouth daily at bedtime       nystatin (MYCOSTATIN) ointment Apply topically 2 (two) times a day 30 g 1    omeprazole (PriLOSEC) 20 mg delayed release capsule TAKE ONE CAPSULE BY MOUTH TWICE A DAY 60 capsule 0    ONETOUCH DELICA LANCETS 53N MISC Test blood sugars 3 times a day   100 each 0    oxybutynin (DITROPAN) 5 mg tablet Take 1 tablet (5 mg total) by mouth 3 (three) times a day 90 tablet 3    phenytoin (DILANTIN) 100 mg ER capsule Take by mouth 5 (five) times a day 3 tablets in morning and 2 at lunch daily       polyethylene glycol (GLYCOLAX) powder Take 17 g by mouth daily 850 g 3    polyvinyl alcohol (LIQUIFILM TEARS) 1 4 % ophthalmic solution Administer 1 drop to both eyes 3 (three) times a day 15 mL 0    QUEtiapine (SEROquel) 400 MG tablet Take 1 tablet (400 mg total) by mouth 2 (two) times a day 60 tablet 1    simvastatin (ZOCOR) 80 mg tablet Take 1 tablet (80 mg total) by mouth daily at bedtime 30 tablet 5    Skin Protectants, Misc  (CALAZIME SKIN PROTECTANT) PSTE Apply 113 g topically 2 (two) times a day 1 Tube 0    valACYclovir (VALTREX) 500 mg tablet Take 1 tablet by mouth 2 (two) times a day  2    Zinc Oxide 10 % AERO Apply 1 Can topically as needed (incontinence) 1 Can 3    zonisamide (ZONEGRAN) 100 mg capsule Take 100 mg by mouth 5 (five) times a day        fluticasone (FLONASE) 50 mcg/act nasal spray SPRAY ONE (1) SPRAY INTO EACH NOSTRIL ONCE DAILY 16 g 1    furosemide (LASIX) 40 mg tablet Take 1 tablet (40 mg total) by mouth daily as needed (edema) 30 tablet 1    naproxen (NAPROSYN) 500 mg tablet Take 1 tablet (500 mg total) by mouth 2 (two) times a day with meals for 6 doses 6 tablet 0    nitrofurantoin (MACROBID) 100 mg capsule Take 1 capsule (100 mg total) by mouth 2 (two) times a day for 7 days 14 capsule 0     No current facility-administered medications for this visit           Past Medical History:   Diagnosis Date    Asthma     [facility]     Chronic UTI (urinary tract infection)     COPD (chronic obstructive pulmonary disease) ([facility] )     Diabetes mellitus ([facility] )     Disease of thyroid gland     Dyslipidemia 10/31/2018    Essential hypertension 10/31/2018    GERD (gastroesophageal reflux disease)     Iron deficiency anemia, unspecified 7/29/2019    Obesity due to excess calories 10/31/2018    Recurrent falls 4/13/2019    Seizure ([facility] )         Past Surgical History:   Procedure Laterality Date    ANKLE FRACTURE SURGERY Right     TUBAL LIGATION      VEIN LIGATION AND STRIPPING          Social History     Socioeconomic History    Marital status: Single     Spouse name: Not on file    Number of children: Not on file    Years of education: Not on file    Highest education level: Not on file   Occupational History    Not on file   Social Needs    Financial resource strain: Not on file   [place] insecurity:     Worry: Not on file     Inability: Not on file    Transportation needs:     Medical: Not on file     Non-medical: Not on file   Tobacco Use    Smoking status: Never Smoker    Smokeless tobacco: Never Used   Substance and Sexual Activity    Alcohol use: Not Currently     Alcohol/week: 0 0 standard drinks    Drug use: No    Sexual activity: Not Currently   Lifestyle    Physical activity:     Days per week: Not on file     Minutes per session: Not on file    Stress: Not on file   Relationships    Social connections:     Talks on phone: Not on file     Gets together: Not on file     Attends Gnosticism service: Not on file     Active member of club or organization: Not on file     Attends meetings of clubs or organizations: Not on file     Relationship status: Not on file    Intimate partner violence:     Fear of current or ex partner: Not on file     Emotionally abused: Not on file     Physically abused: Not on file     Forced sexual activity: Not on file   Other Topics Concern    Not on file   Social History Narrative    Not on file        Review of Systems   Constitutional: Negative  HENT: Negative  Eyes: Negative  Respiratory: Negative  Cardiovascular: Negative  Gastrointestinal: Negative  Endocrine: Negative  Genitourinary: Positive for dysuria  Skin: Positive for rash  Psychiatric/Behavioral: Negative  Objective:      /64   Pulse 75   Temp 98 °F (36 7 °C) (Tympanic)   Resp 18   Ht 5' 6" (1 676 m)   Wt 97 5 kg (215 lb)   LMP  (LMP Unknown)   SpO2 97%   BMI 34 70 kg/m²          Physical Exam   Constitutional: She is oriented to person, place, and time  59 yo female wheelchair bound who appears in NAD   HENT:   Head: Normocephalic and atraumatic  Right Ear: External ear normal    Left Ear: External ear normal    Eyes: Pupils are equal, round, and reactive to light  Neck: Neck supple  Cardiovascular: Normal rate and regular rhythm  Pulmonary/Chest: Effort normal and breath sounds normal  She has no wheezes  She has no rales  Musculoskeletal: She exhibits no edema  Lymphadenopathy:     She has no cervical adenopathy  Neurological: She is alert and oriented to person, place, and time  Psychiatric: She has a normal mood and affect  Nursing note and vitals reviewed  BMI Counseling: Body mass index is 34 7 kg/m²  The BMI is above normal  Nutrition recommendations include reducing portion sizes, decreasing overall calorie intake, 3-5 servings of fruits/vegetables daily, reducing fast food intake, consuming healthier snacks, decreasing soda and/or juice intake, moderation in carbohydrate intake and increasing intake of lean protein  Exercise recommendations include strength training exercises

## 2020-01-29 ENCOUNTER — TELEPHONE (OUTPATIENT)
Dept: FAMILY MEDICINE CLINIC | Facility: CLINIC | Age: 65
End: 2020-01-29

## 2020-01-29 DIAGNOSIS — J30.2 SEASONAL ALLERGIC RHINITIS, UNSPECIFIED TRIGGER: ICD-10-CM

## 2020-01-29 DIAGNOSIS — N30.00 ACUTE CYSTITIS WITHOUT HEMATURIA: Primary | ICD-10-CM

## 2020-01-29 RX ORDER — DOXYCYCLINE 100 MG/1
100 CAPSULE ORAL 2 TIMES DAILY
Qty: 14 CAPSULE | Refills: 0 | Status: SHIPPED | OUTPATIENT
Start: 2020-01-29 | End: 2020-02-05

## 2020-01-29 RX ORDER — FLUTICASONE PROPIONATE 50 MCG
SPRAY, SUSPENSION (ML) NASAL
Qty: 16 G | Refills: 1 | Status: SHIPPED | OUTPATIENT
Start: 2020-01-29 | End: 2020-03-10

## 2020-01-29 NOTE — TELEPHONE ENCOUNTER
Pt requesting to be put on doxycycline 100 mg for her uti  She says the macrobid does not help and she is now completely incontinent of urine at this point  She believes she has a resistance to macrobid

## 2020-02-06 ENCOUNTER — TELEPHONE (OUTPATIENT)
Dept: FAMILY MEDICINE CLINIC | Facility: CLINIC | Age: 65
End: 2020-02-06

## 2020-02-06 NOTE — TELEPHONE ENCOUNTER
Patient wanted to let us know that she has a new  her name is Aura Hector and the phone number is [phone]

## 2020-02-17 ENCOUNTER — TELEPHONE (OUTPATIENT)
Dept: FAMILY MEDICINE CLINIC | Facility: CLINIC | Age: 65
End: 2020-02-17

## 2020-02-17 NOTE — TELEPHONE ENCOUNTER
SPOKE TO JESSIE AT [facility]    THEYRECEIVED OUR LETTER OF MEDICAL NECESSITY FOR PATIENTS WHEEL CHAIR AND HAVE SENT IT TO PATIENTS INSURANCE

## 2020-02-19 ENCOUNTER — TELEPHONE (OUTPATIENT)
Dept: OTHER | Facility: OTHER | Age: 65
End: 2020-02-19

## 2020-02-19 NOTE — TELEPHONE ENCOUNTER
Pt fell getting into her wheel chair and twisted her ankle  Her apt for today was cancelled and rescheduled for 2/27/20  She would also like to discuss going into to a facility with the Dr  at the [address] [place] [address]

## 2020-02-24 DIAGNOSIS — I21.4 NSTEMI (NON-ST ELEVATED MYOCARDIAL INFARCTION) (HCC): ICD-10-CM

## 2020-02-24 RX ORDER — METOPROLOL SUCCINATE 25 MG/1
25 TABLET, EXTENDED RELEASE ORAL DAILY
Qty: 30 TABLET | Refills: 3 | Status: SHIPPED | OUTPATIENT
Start: 2020-02-24

## 2020-02-27 ENCOUNTER — OFFICE VISIT (OUTPATIENT)
Dept: FAMILY MEDICINE CLINIC | Facility: CLINIC | Age: 65
End: 2020-02-27
Payer: COMMERCIAL

## 2020-02-27 VITALS
DIASTOLIC BLOOD PRESSURE: 80 MMHG | WEIGHT: 219 LBS | BODY MASS INDEX: 35.2 KG/M2 | OXYGEN SATURATION: 97 % | HEART RATE: 102 BPM | SYSTOLIC BLOOD PRESSURE: 124 MMHG | RESPIRATION RATE: 16 BRPM | TEMPERATURE: 97.8 F | HEIGHT: 66 IN

## 2020-02-27 DIAGNOSIS — E11.9 TYPE 2 DIABETES MELLITUS WITHOUT COMPLICATION, WITH LONG-TERM CURRENT USE OF INSULIN (HCC): ICD-10-CM

## 2020-02-27 DIAGNOSIS — Z11.4 SCREENING FOR HIV WITHOUT PRESENCE OF RISK FACTORS: ICD-10-CM

## 2020-02-27 DIAGNOSIS — R39.9 UTI SYMPTOMS: Primary | ICD-10-CM

## 2020-02-27 DIAGNOSIS — Z12.31 ENCOUNTER FOR SCREENING MAMMOGRAM FOR BREAST CANCER: ICD-10-CM

## 2020-02-27 DIAGNOSIS — Z23 NEED FOR TDAP VACCINATION: Primary | ICD-10-CM

## 2020-02-27 DIAGNOSIS — Z12.11 SCREENING FOR COLON CANCER: ICD-10-CM

## 2020-02-27 DIAGNOSIS — Z13.5 SCREENING FOR DIABETIC RETINOPATHY: ICD-10-CM

## 2020-02-27 DIAGNOSIS — R21 SKIN RASH: ICD-10-CM

## 2020-02-27 DIAGNOSIS — Z79.4 TYPE 2 DIABETES MELLITUS WITHOUT COMPLICATION, WITH LONG-TERM CURRENT USE OF INSULIN (HCC): ICD-10-CM

## 2020-02-27 DIAGNOSIS — E61.1 LOW IRON: ICD-10-CM

## 2020-02-27 LAB
BACTERIA UR QL AUTO: ABNORMAL /HPF
BILIRUB UR QL STRIP: ABNORMAL
CLARITY UR: ABNORMAL
COLOR UR: ABNORMAL
GLUCOSE UR STRIP-MCNC: ABNORMAL MG/DL
HGB UR QL STRIP.AUTO: ABNORMAL
IRON SERPL-MCNC: 150 UG/DL (ref 50–170)
KETONES UR STRIP-MCNC: ABNORMAL MG/DL
LEUKOCYTE ESTERASE UR QL STRIP: ABNORMAL
NITRITE UR QL STRIP: ABNORMAL
NON-SQ EPI CELLS URNS QL MICRO: ABNORMAL /HPF
PH UR STRIP.AUTO: ABNORMAL [PH]
PROT UR STRIP-MCNC: ABNORMAL MG/DL
RBC #/AREA URNS AUTO: ABNORMAL /HPF
SL AMB POCT HEMOGLOBIN AIC: 6.4 (ref ?–6.5)
SP GR UR STRIP.AUTO: 1.01 (ref 1–1.03)
UROBILINOGEN UR QL STRIP.AUTO: ABNORMAL E.U./DL
WBC #/AREA URNS AUTO: ABNORMAL /HPF

## 2020-02-27 PROCEDURE — 87086 URINE CULTURE/COLONY COUNT: CPT

## 2020-02-27 PROCEDURE — 87389 HIV-1 AG W/HIV-1&-2 AB AG IA: CPT | Performed by: PHYSICIAN ASSISTANT

## 2020-02-27 PROCEDURE — 83540 ASSAY OF IRON: CPT | Performed by: PHYSICIAN ASSISTANT

## 2020-02-27 PROCEDURE — T1015 CLINIC SERVICE: HCPCS | Performed by: FAMILY MEDICINE

## 2020-02-27 PROCEDURE — 83036 HEMOGLOBIN GLYCOSYLATED A1C: CPT | Performed by: FAMILY MEDICINE

## 2020-02-27 PROCEDURE — 81001 URINALYSIS AUTO W/SCOPE: CPT

## 2020-02-27 RX ORDER — DIAPER,BRIEF,INFANT-TODD,DISP
EACH MISCELLANEOUS 2 TIMES DAILY
Qty: 30 G | Refills: 0 | Status: SHIPPED | OUTPATIENT
Start: 2020-02-27 | End: 2020-02-28 | Stop reason: ALTCHOICE

## 2020-02-27 NOTE — PROGRESS NOTES
Patient's shoes and socks removed  Right Foot/Ankle   Right Foot Inspection  Skin Exam: skin intact and dry skin                            Sensory       Monofilament testing: diminished      Left Foot/Ankle  Left Foot Inspection  Skin Exam: skin intact and dry skin                                         Sensory       Monofilament: diminished
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
## 2020-02-28 ENCOUNTER — TELEPHONE (OUTPATIENT)
Dept: FAMILY MEDICINE CLINIC | Facility: CLINIC | Age: 65
End: 2020-02-28

## 2020-02-28 DIAGNOSIS — R21 SKIN RASH: Primary | ICD-10-CM

## 2020-02-28 LAB — HIV 1+2 AB+HIV1 P24 AG SERPL QL IA: NORMAL

## 2020-02-28 RX ORDER — TRIAMCINOLONE ACETONIDE 1 MG/G
CREAM TOPICAL 2 TIMES DAILY
Qty: 30 G | Refills: 0 | Status: SHIPPED | OUTPATIENT
Start: 2020-02-28

## 2020-02-28 RX ORDER — HYDROXYZINE HYDROCHLORIDE 10 MG/1
10 TABLET, FILM COATED ORAL EVERY 6 HOURS PRN
Qty: 30 TABLET | Refills: 0 | Status: SHIPPED | OUTPATIENT
Start: 2020-02-28 | End: 2020-03-30

## 2020-02-28 NOTE — TELEPHONE ENCOUNTER
Patient made aware    ----- Message from Neville Hedrick PA-C sent at 2/28/2020  9:09 AM EST -----  Kenalog and Atarax sent to her pharmacy    ----- Message -----  From: Edinson Moreno MA  Sent: 2/28/2020   8:20 AM EST  To: Neville Hedrick PA-C    Pt called stating that the hydrocortisone cream she was given isn't helping her and she states she used it three times but her wrist is very itchy  She wants to know if there is something else she can have  She states that she was on a pill in the past for itching and it worked but she doesn't remember the name

## 2020-02-29 LAB — BACTERIA UR CULT: NORMAL

## 2020-03-02 ENCOUNTER — PATIENT OUTREACH (OUTPATIENT)
Dept: CASE MANAGEMENT | Facility: OTHER | Age: 65
End: 2020-03-02

## 2020-03-02 ENCOUNTER — PATIENT OUTREACH (OUTPATIENT)
Dept: FAMILY MEDICINE CLINIC | Facility: CLINIC | Age: 65
End: 2020-03-02

## 2020-03-02 DIAGNOSIS — E11.9 TYPE 2 DIABETES MELLITUS WITHOUT COMPLICATION, WITH LONG-TERM CURRENT USE OF INSULIN (HCC): Primary | ICD-10-CM

## 2020-03-02 DIAGNOSIS — Z79.4 TYPE 2 DIABETES MELLITUS WITHOUT COMPLICATION, WITH LONG-TERM CURRENT USE OF INSULIN (HCC): Primary | ICD-10-CM

## 2020-03-02 DIAGNOSIS — L89.153 PRESSURE INJURY OF SACRAL REGION, STAGE 3 (HCC): ICD-10-CM

## 2020-03-02 DIAGNOSIS — J43.9 PULMONARY EMPHYSEMA, UNSPECIFIED EMPHYSEMA TYPE (HCC): ICD-10-CM

## 2020-03-02 NOTE — PROGRESS NOTES
OPCM,  is responding to consult to assist patient with obtaining Nursing Home Placement  Consulted with [facility] RN,CM and discussed referral  Patient reports having difficulty caring for herself at home  Patient is requesting long term placement  Telephone call placed to patient to discuss her wishes  Patient resides alone in a rented apartment and she has a poor support system  Patient reports that she has an adult son who she believes is in snf  She does not have any family that she is in contact with  Patient informed me that she is nor longer able to manage at home  Patient reports that she received assistance through the waiver in the past  However, she had multiple issues with the service provider and does not wish to continue having care at home  Patient reports that she has a Coordinator through her insurance company, [employer] named Darryle Ghent  Telephone call placed to [facility] and message left on her VM requesting a return call to discuss patient's waiver status  Telephone call place to [facility] [place] Intake and spoke to Carissa Valdovinos  I requested placement of patient at their [place] location  PASRR form completed and sent to [facility] to submit to [facility] along with patient's medical records for review  I will continue to follow and will assist with placement of patient
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
## 2020-03-03 ENCOUNTER — PATIENT OUTREACH (OUTPATIENT)
Dept: FAMILY MEDICINE CLINIC | Facility: CLINIC | Age: 65
End: 2020-03-03

## 2020-03-03 ENCOUNTER — PATIENT OUTREACH (OUTPATIENT)
Dept: CASE MANAGEMENT | Facility: OTHER | Age: 65
End: 2020-03-03

## 2020-03-03 DIAGNOSIS — D64.9 ANEMIA, UNSPECIFIED TYPE: ICD-10-CM

## 2020-03-03 RX ORDER — FERROUS SULFATE 325(65) MG
325 TABLET ORAL
Qty: 60 TABLET | Refills: 0 | Status: SHIPPED | OUTPATIENT
Start: 2020-03-03

## 2020-03-03 NOTE — PROGRESS NOTES
Complex Care Plan Note:  PA preadmissin screening resident review (PASRR) Identification Level I form, History and Physical, note from last PCP visit, facesheet and medication list faxed to [facility]

## 2020-03-03 NOTE — PROGRESS NOTES
Telephone call received from [facility], [address] Kemp Coordinator  Shaila informed me that patient is waiver eligible and is long term SNF level of care  I asked Demond Face to fax the level of care determination to [facility] Intake dept

## 2020-03-04 ENCOUNTER — PATIENT OUTREACH (OUTPATIENT)
Dept: FAMILY MEDICINE CLINIC | Facility: CLINIC | Age: 65
End: 2020-03-04

## 2020-03-04 DIAGNOSIS — I21.4 NSTEMI (NON-ST ELEVATED MYOCARDIAL INFARCTION) (HCC): ICD-10-CM

## 2020-03-04 DIAGNOSIS — E03.9 ACQUIRED HYPOTHYROIDISM: ICD-10-CM

## 2020-03-04 DIAGNOSIS — F31.78 BIPOLAR DISORDER, IN FULL REMISSION, MOST RECENT EPISODE MIXED (HCC): ICD-10-CM

## 2020-03-04 RX ORDER — LEVOTHYROXINE SODIUM 0.15 MG/1
150 TABLET ORAL DAILY
Qty: 30 TABLET | Refills: 3 | Status: SHIPPED | OUTPATIENT
Start: 2020-03-04

## 2020-03-04 RX ORDER — QUETIAPINE FUMARATE 400 MG/1
400 TABLET, FILM COATED ORAL
Qty: 30 TABLET | Refills: 3 | OUTPATIENT
Start: 2020-03-04 | End: 2020-11-15

## 2020-03-04 RX ORDER — LISINOPRIL 5 MG/1
5 TABLET ORAL DAILY
Qty: 30 TABLET | Refills: 3 | Status: SHIPPED | OUTPATIENT
Start: 2020-03-04

## 2020-03-04 NOTE — PROGRESS NOTES
Complex Care Plan Note:  Received phone call from patient reporting that she was approved for a wheelchair which should be delivered within 2 weeks

## 2020-03-05 ENCOUNTER — PATIENT OUTREACH (OUTPATIENT)
Dept: CASE MANAGEMENT | Facility: OTHER | Age: 65
End: 2020-03-05

## 2020-03-05 ENCOUNTER — PATIENT OUTREACH (OUTPATIENT)
Dept: FAMILY MEDICINE CLINIC | Facility: CLINIC | Age: 65
End: 2020-03-05

## 2020-03-05 NOTE — PROGRESS NOTES
Telephone call placed to Sabrina Andrew, Care Coordinator at [facility] regarding patient's level of care determination  She informed me that she does not have that information and advised me to call the [address] [place] [address] on Aging  Telephone call place to [facility] on Aging and spoke to Supervisor, Cadence Bui informed me that patient's level of care was completed over one year ago  Patient will need another Options assessment completed and she requested the completed MA51 and PASRR form to be faxed to her at fax# [phone]  Lesley informed me that she will try to send out a  on Monday 3/9/2020 if she receives this information today  MA51 emailed to Marcos, [address] [facility] for completion and signature of Physician  Consulted with Marcos and she will send completed MA51 and PASRR form to [address] [place] [address] on [facility] as directed  Telephone call placed to Health at [facility] and informed her of above

## 2020-03-05 NOTE — PROGRESS NOTES
Complex Care Plan Note:  IR23 form completed and faxed  to Dr Brenda Cain for signature  Signed form received and faxed along with medication list and facesheet to Lesley Ohl at the [facility] on Aging

## 2020-03-10 ENCOUNTER — PATIENT OUTREACH (OUTPATIENT)
Dept: FAMILY MEDICINE CLINIC | Facility: CLINIC | Age: 65
End: 2020-03-10

## 2020-03-10 ENCOUNTER — PATIENT OUTREACH (OUTPATIENT)
Dept: CASE MANAGEMENT | Facility: OTHER | Age: 65
End: 2020-03-10

## 2020-03-10 DIAGNOSIS — J30.2 SEASONAL ALLERGIC RHINITIS, UNSPECIFIED TRIGGER: ICD-10-CM

## 2020-03-10 RX ORDER — FLUTICASONE PROPIONATE 50 MCG
SPRAY, SUSPENSION (ML) NASAL
Qty: 16 G | Refills: 1 | Status: SHIPPED | OUTPATIENT
Start: 2020-03-10

## 2020-03-10 NOTE — PROGRESS NOTES
Phone call from patient requesting update on her nursing home admission status  States "I'm half packed and almost ready to go"  Phone call to Juan M Olson to discuss  Juan M Olson will call patient when update is available  PRINCIPAL DISCHARGE DIAGNOSIS  Diagnosis: Knee pain  Assessment and Plan of Treatment: PRINCIPAL DISCHARGE DIAGNOSIS  Diagnosis: Knee pain  Assessment and Plan of Treatment: Your child was diagnosed with septic arthritis and was taken to the operating room for a washout and to take cultures of the infected site. She was treated with antibiotics during her hospital stay. Thus far, she has recieved 4 days of an antibiotic called Cefazolin and was switched to a medication that can be taken by mouth called Keflex. Please continue to take this medication by mouth for 10 additional days.   We will follow up the cultures from the knee taken on 12/4.  Please be sure to follow up with your Pediatrician in 1-2 days for follow up. Additionally, please be sure to follow up with Physical Therapy.  Please return to the hospital or seek medical help if Hortensia suddenly develops continued fevers, worsening or severe pain, inability to bear weight on the leg, inability to tolerate fluids or foods. Or if any concern then visit your Pediatrician.

## 2020-03-10 NOTE — PROGRESS NOTES
Telephone call received from Aida Foster RN CM  She requested that I contact patient to discuss Nursing Home placement  Patient is very anxious and is already packing her bags  Telephone call placed to the Aging office and spoke to  Sanjuanita Renee, and Supervisor, Lesley  Ohl  Patient has not received her level of care assessment yet  Lesley informed me that patient will be seen by a  tomorrow, 3/11 and options assessment will be completed  Telephone call placed to [facility] [place] and spoke to Loan Licona in intake  I provided her with an update  Loan Licona informed me that [address]  will be able to accommodate patient in a semi private room once option assessment is completed  LCD determination is to be faxed to [facility] upon completion  Telephone call placed to patient and informed her of above  Patient is in agreement with above plan  Patient informed me that she will need someone to transport her to [facility] and help carry her belongings  Patient does not have any one to assist her  Telephone call placed to Frank Mendenhall Coordinator and requested her assistance with transporting patient to [facility]  Wilson Londono will consult with her Supervisor and get back to me

## 2020-03-11 ENCOUNTER — PATIENT OUTREACH (OUTPATIENT)
Dept: CASE MANAGEMENT | Facility: OTHER | Age: 65
End: 2020-03-11

## 2020-03-11 NOTE — PROGRESS NOTES
Telephone call placed to Fransico Hitchcock, [facility] Coordinator to inquire about transporting patient to [facility]  She informed me that she reached out to her Supervisor and is not able to transport patient  Telephone call placed to [address] [place] [address] on Aging and spoke to , Stefani Vital is going to visit with patient today to complete the Options assessment  Stefani Vital will try to arrange for a volunteer to transport patient to [address] [place] [address] in [address] [place] [address] and will inform me

## 2020-03-12 ENCOUNTER — PATIENT OUTREACH (OUTPATIENT)
Dept: CASE MANAGEMENT | Facility: OTHER | Age: 65
End: 2020-03-12

## 2020-03-12 ENCOUNTER — PATIENT OUTREACH (OUTPATIENT)
Dept: FAMILY MEDICINE CLINIC | Facility: CLINIC | Age: 65
End: 2020-03-12

## 2020-03-12 NOTE — PROGRESS NOTES
Complex Care Plan Note:  Patient left voice mail message this am Voice mail messaged shared and discussed with Isela Mcneill Ma-51 re-faxed as per Chayito's request

## 2020-03-12 NOTE — PROGRESS NOTES
Telephone call placed to [facility] and informed her to resend the MA51 with the Physician's signature to Kathrin Castillo on Aging  Telephone call placed to [facility] Ohl, Supervisor at [address] [place] [address] office and informed that patient was approved at a Nursing Home level of care  She agrees to send the options determination letter to [facility]  She also informed me that she received the updated MA51 with Dr Emani Painter but this one will need to be signed by patient again  I have placed multiple calls to patient's [facility] and also [facility] for [facility] assistance with transporting patient to [facility]  Juliann Escobedo returned my call and will try to assist and contact their participating transport company [facility]  Telephone call placed to [facility], [facility] and spoke to Edd Mendez  I informed her that I am trying to assist with transportation of patient  Edd Mendez informed me that she did not receive the LCD from the Aging Office  [facility] will hold patient's bed until next week if transport can not be arranged for tomorrow  Telephone call placed to [facility] and left message for the manager,Ana Luisa  I requested that maintenance assist patient will her belongings  I will await a call back from [facility]  Telephone also placed to patient and informed her of above

## 2020-03-12 NOTE — PROGRESS NOTES
Telephone call received from [facility], [facility]  Mayra Ulloa informed me that she has arranged transportation for patient with MTM on Monday 3/16 at 9am  MTM will transport patient to [facility] in [facility] informed me that she will also go to patient's apartment and put her belongings in her vehicle  Mayra Ulloa will take patient's personal belongings to [facility]  Telephone call placed to [facility] and spoke to Eileen in [address]  I informed her of above  Telephone call placed to [facility] at [employer] on Aging and left her a message with this information  I also requested that she fax the LCD to [facility]  Telephone call placed to [facility], RN CM and informed her of patient's acceptance to [facility]  Telephone call placed to patient and informed her of above  Patient is in agreement with plan

## 2020-03-16 ENCOUNTER — PATIENT OUTREACH (OUTPATIENT)
Dept: CASE MANAGEMENT | Facility: OTHER | Age: 65
End: 2020-03-16

## 2020-03-16 NOTE — PROGRESS NOTES
Telephone call placed to [facility] in [place] and spoke to Pelon  I confirmed that patient was admitted today  I will close patient's case at this time due

## 2020-03-19 ENCOUNTER — EPISODE CHANGES (OUTPATIENT)
Dept: FAMILY MEDICINE CLINIC | Facility: CLINIC | Age: 65
End: 2020-03-19

## 2020-03-19 RX ORDER — FERROUS SULFATE 325(65) MG
TABLET ORAL
Qty: 60 TABLET | Refills: 0 | OUTPATIENT
Start: 2020-03-19

## 2020-03-19 RX ORDER — OMEPRAZOLE 20 MG/1
CAPSULE, DELAYED RELEASE ORAL
Qty: 60 CAPSULE | Refills: 0 | OUTPATIENT
Start: 2020-03-19

## 2020-03-19 RX ORDER — SIMVASTATIN 80 MG
80 TABLET ORAL
Qty: 30 TABLET | Refills: 0 | OUTPATIENT
Start: 2020-03-19

## 2020-03-27 DIAGNOSIS — K21.9 GERD WITHOUT ESOPHAGITIS: ICD-10-CM

## 2020-03-27 DIAGNOSIS — E11.65 TYPE 2 DIABETES MELLITUS WITH HYPERGLYCEMIA, WITH LONG-TERM CURRENT USE OF INSULIN (HCC): ICD-10-CM

## 2020-03-27 DIAGNOSIS — Z79.4 TYPE 2 DIABETES MELLITUS WITH HYPERGLYCEMIA, WITH LONG-TERM CURRENT USE OF INSULIN (HCC): ICD-10-CM

## 2020-03-27 DIAGNOSIS — R21 SKIN RASH: ICD-10-CM

## 2020-03-30 RX ORDER — HYDROXYZINE HYDROCHLORIDE 10 MG/1
10 TABLET, FILM COATED ORAL EVERY 6 HOURS PRN
Qty: 30 TABLET | Refills: 3 | Status: SHIPPED | OUTPATIENT
Start: 2020-03-30

## 2020-03-30 RX ORDER — MELOXICAM 7.5 MG/1
7.5 TABLET ORAL 2 TIMES DAILY
Qty: 60 TABLET | Refills: 1 | Status: SHIPPED | OUTPATIENT
Start: 2020-03-30

## 2020-04-01 RX ORDER — OMEPRAZOLE 20 MG/1
CAPSULE, DELAYED RELEASE ORAL
Qty: 60 CAPSULE | Refills: 0 | Status: SHIPPED | OUTPATIENT
Start: 2020-04-01

## 2020-11-13 ENCOUNTER — APPOINTMENT (EMERGENCY)
Dept: RADIOLOGY | Facility: HOSPITAL | Age: 65
End: 2020-11-13
Payer: COMMERCIAL

## 2020-11-13 ENCOUNTER — HOSPITAL ENCOUNTER (EMERGENCY)
Facility: HOSPITAL | Age: 65
Discharge: HOME | End: 2020-11-13
Attending: EMERGENCY MEDICINE | Admitting: EMERGENCY MEDICINE
Payer: COMMERCIAL

## 2020-11-13 ENCOUNTER — APPOINTMENT (EMERGENCY)
Dept: CT IMAGING | Facility: HOSPITAL | Age: 65
End: 2020-11-13
Payer: COMMERCIAL

## 2020-11-13 VITALS
RESPIRATION RATE: 16 BRPM | DIASTOLIC BLOOD PRESSURE: 88 MMHG | SYSTOLIC BLOOD PRESSURE: 110 MMHG | HEART RATE: 81 BPM | WEIGHT: 248.24 LBS | HEIGHT: 66 IN | TEMPERATURE: 97.5 F | BODY MASS INDEX: 39.9 KG/M2 | OXYGEN SATURATION: 95 %

## 2020-11-13 DIAGNOSIS — R26.2 AMBULATORY DYSFUNCTION: ICD-10-CM

## 2020-11-13 DIAGNOSIS — R42 LIGHTHEADEDNESS: Primary | ICD-10-CM

## 2020-11-13 DIAGNOSIS — Z76.0 MEDICATION REFILL: ICD-10-CM

## 2020-11-13 DIAGNOSIS — E86.0 DEHYDRATION: ICD-10-CM

## 2020-11-13 LAB
ALBUMIN SERPL BCP-MCNC: 3.5 G/DL (ref 3.5–5)
ALP SERPL-CCNC: 77 U/L (ref 46–116)
ALT SERPL W P-5'-P-CCNC: 22 U/L (ref 12–78)
ANION GAP SERPL CALCULATED.3IONS-SCNC: 10 MMOL/L (ref 4–13)
APTT PPP: 28 SECONDS (ref 23–37)
AST SERPL W P-5'-P-CCNC: 17 U/L (ref 5–45)
BASOPHILS # BLD AUTO: 0.05 THOUSANDS/ΜL (ref 0–0.1)
BASOPHILS NFR BLD AUTO: 1 % (ref 0–1)
BILIRUB SERPL-MCNC: 0.2 MG/DL (ref 0.2–1)
BILIRUB UR QL STRIP: NEGATIVE
BUN SERPL-MCNC: 19 MG/DL (ref 5–25)
CALCIUM SERPL-MCNC: 8.8 MG/DL (ref 8.3–10.1)
CHLORIDE SERPL-SCNC: 97 MMOL/L (ref 100–108)
CK SERPL-CCNC: 120 U/L (ref 26–192)
CLARITY UR: NORMAL
CO2 SERPL-SCNC: 25 MMOL/L (ref 21–32)
COLOR UR: YELLOW
CREAT SERPL-MCNC: 0.66 MG/DL (ref 0.6–1.3)
EOSINOPHIL # BLD AUTO: 0.51 THOUSAND/ΜL (ref 0–0.61)
EOSINOPHIL NFR BLD AUTO: 9 % (ref 0–6)
ERYTHROCYTE [DISTWIDTH] IN BLOOD BY AUTOMATED COUNT: 12.4 % (ref 11.6–15.1)
FLUAV RNA RESP QL NAA+PROBE: NEGATIVE
FLUBV RNA RESP QL NAA+PROBE: NEGATIVE
GFR SERPL CREATININE-BSD FRML MDRD: 94 ML/MIN/1.73SQ M
GLUCOSE SERPL-MCNC: 117 MG/DL (ref 65–140)
GLUCOSE UR STRIP-MCNC: NEGATIVE MG/DL
HCT VFR BLD AUTO: 35 % (ref 34.8–46.1)
HGB BLD-MCNC: 11.7 G/DL (ref 11.5–15.4)
HGB UR QL STRIP.AUTO: NEGATIVE
IMM GRANULOCYTES # BLD AUTO: 0.02 THOUSAND/UL (ref 0–0.2)
IMM GRANULOCYTES NFR BLD AUTO: 0 % (ref 0–2)
INR PPP: 1.01 (ref 0.84–1.19)
KETONES UR STRIP-MCNC: NEGATIVE MG/DL
LACTATE SERPL-SCNC: 3 MMOL/L (ref 0.5–2)
LACTATE SERPL-SCNC: 3.3 MMOL/L (ref 0.5–2)
LEUKOCYTE ESTERASE UR QL STRIP: NEGATIVE
LIPASE SERPL-CCNC: 112 U/L (ref 73–393)
LYMPHOCYTES # BLD AUTO: 1.54 THOUSANDS/ΜL (ref 0.6–4.47)
LYMPHOCYTES NFR BLD AUTO: 28 % (ref 14–44)
MAGNESIUM SERPL-MCNC: 1.6 MG/DL (ref 1.6–2.6)
MCH RBC QN AUTO: 31.9 PG (ref 26.8–34.3)
MCHC RBC AUTO-ENTMCNC: 33.4 G/DL (ref 31.4–37.4)
MCV RBC AUTO: 95 FL (ref 82–98)
MONOCYTES # BLD AUTO: 0.58 THOUSAND/ΜL (ref 0.17–1.22)
MONOCYTES NFR BLD AUTO: 10 % (ref 4–12)
NEUTROPHILS # BLD AUTO: 2.91 THOUSANDS/ΜL (ref 1.85–7.62)
NEUTS SEG NFR BLD AUTO: 52 % (ref 43–75)
NITRITE UR QL STRIP: NEGATIVE
NRBC BLD AUTO-RTO: 0 /100 WBCS
PH UR STRIP.AUTO: 6 [PH]
PHENYTOIN SERPL-MCNC: 13.9 UG/ML (ref 10–20)
PLATELET # BLD AUTO: 231 THOUSANDS/UL (ref 149–390)
PMV BLD AUTO: 9.2 FL (ref 8.9–12.7)
POTASSIUM SERPL-SCNC: 4.4 MMOL/L (ref 3.5–5.3)
PROT SERPL-MCNC: 6.8 G/DL (ref 6.4–8.2)
PROT UR STRIP-MCNC: NEGATIVE MG/DL
PROTHROMBIN TIME: 13.1 SECONDS (ref 11.6–14.5)
RBC # BLD AUTO: 3.67 MILLION/UL (ref 3.81–5.12)
RSV RNA RESP QL NAA+PROBE: NEGATIVE
SARS-COV-2 RNA RESP QL NAA+PROBE: NEGATIVE
SODIUM SERPL-SCNC: 132 MMOL/L (ref 136–145)
SP GR UR STRIP.AUTO: 1.01 (ref 1–1.03)
TROPONIN I SERPL-MCNC: <0.02 NG/ML
TSH SERPL DL<=0.05 MIU/L-ACNC: 3.04 UIU/ML (ref 0.36–3.74)
UROBILINOGEN UR QL STRIP.AUTO: 0.2 E.U./DL
WBC # BLD AUTO: 5.61 THOUSAND/UL (ref 4.31–10.16)

## 2020-11-13 PROCEDURE — G1004 CDSM NDSC: HCPCS

## 2020-11-13 PROCEDURE — 96365 THER/PROPH/DIAG IV INF INIT: CPT

## 2020-11-13 PROCEDURE — 99285 EMERGENCY DEPT VISIT HI MDM: CPT

## 2020-11-13 PROCEDURE — 96361 HYDRATE IV INFUSION ADD-ON: CPT

## 2020-11-13 PROCEDURE — 83605 ASSAY OF LACTIC ACID: CPT | Performed by: EMERGENCY MEDICINE

## 2020-11-13 PROCEDURE — 84443 ASSAY THYROID STIM HORMONE: CPT | Performed by: EMERGENCY MEDICINE

## 2020-11-13 PROCEDURE — 81003 URINALYSIS AUTO W/O SCOPE: CPT | Performed by: EMERGENCY MEDICINE

## 2020-11-13 PROCEDURE — 85610 PROTHROMBIN TIME: CPT | Performed by: EMERGENCY MEDICINE

## 2020-11-13 PROCEDURE — 74177 CT ABD & PELVIS W/CONTRAST: CPT

## 2020-11-13 PROCEDURE — 80185 ASSAY OF PHENYTOIN TOTAL: CPT | Performed by: EMERGENCY MEDICINE

## 2020-11-13 PROCEDURE — 93005 ELECTROCARDIOGRAM TRACING: CPT

## 2020-11-13 PROCEDURE — 87040 BLOOD CULTURE FOR BACTERIA: CPT | Performed by: EMERGENCY MEDICINE

## 2020-11-13 PROCEDURE — 82550 ASSAY OF CK (CPK): CPT | Performed by: EMERGENCY MEDICINE

## 2020-11-13 PROCEDURE — 80053 COMPREHEN METABOLIC PANEL: CPT | Performed by: EMERGENCY MEDICINE

## 2020-11-13 PROCEDURE — 83690 ASSAY OF LIPASE: CPT | Performed by: EMERGENCY MEDICINE

## 2020-11-13 PROCEDURE — 84484 ASSAY OF TROPONIN QUANT: CPT | Performed by: EMERGENCY MEDICINE

## 2020-11-13 PROCEDURE — 71045 X-RAY EXAM CHEST 1 VIEW: CPT

## 2020-11-13 PROCEDURE — 36415 COLL VENOUS BLD VENIPUNCTURE: CPT | Performed by: EMERGENCY MEDICINE

## 2020-11-13 PROCEDURE — 0241U HB NFCT DS VIR RESP RNA 4 TRGT: CPT | Performed by: EMERGENCY MEDICINE

## 2020-11-13 PROCEDURE — 70450 CT HEAD/BRAIN W/O DYE: CPT

## 2020-11-13 PROCEDURE — 99282 EMERGENCY DEPT VISIT SF MDM: CPT | Performed by: EMERGENCY MEDICINE

## 2020-11-13 PROCEDURE — 99284 EMERGENCY DEPT VISIT MOD MDM: CPT | Performed by: EMERGENCY MEDICINE

## 2020-11-13 PROCEDURE — 85025 COMPLETE CBC W/AUTO DIFF WBC: CPT | Performed by: EMERGENCY MEDICINE

## 2020-11-13 PROCEDURE — 85730 THROMBOPLASTIN TIME PARTIAL: CPT | Performed by: EMERGENCY MEDICINE

## 2020-11-13 PROCEDURE — 83735 ASSAY OF MAGNESIUM: CPT | Performed by: EMERGENCY MEDICINE

## 2020-11-13 RX ORDER — LEVETIRACETAM 750 MG/1
1500 TABLET ORAL 2 TIMES DAILY
Qty: 60 TABLET | Refills: 0 | Status: SHIPPED | OUTPATIENT
Start: 2020-11-13 | End: 2020-11-13 | Stop reason: SDUPTHER

## 2020-11-13 RX ORDER — MELOXICAM 15 MG/1
15 TABLET ORAL DAILY
Qty: 15 TABLET | Refills: 0 | Status: SHIPPED | OUTPATIENT
Start: 2020-11-13 | End: 2020-11-28

## 2020-11-13 RX ORDER — LEVETIRACETAM 750 MG/1
1500 TABLET ORAL 2 TIMES DAILY
Qty: 60 TABLET | Refills: 0 | Status: SHIPPED | OUTPATIENT
Start: 2020-11-13 | End: 2020-11-28

## 2020-11-13 RX ORDER — FUROSEMIDE 40 MG/1
40 TABLET ORAL AS NEEDED
COMMUNITY

## 2020-11-13 RX ORDER — MELOXICAM 15 MG/1
15 TABLET ORAL DAILY
Qty: 15 TABLET | Refills: 0 | Status: SHIPPED | OUTPATIENT
Start: 2020-11-13 | End: 2020-11-13 | Stop reason: SDUPTHER

## 2020-11-13 RX ADMIN — IOHEXOL 100 ML: 350 INJECTION, SOLUTION INTRAVENOUS at 12:29

## 2020-11-13 RX ADMIN — THIAMINE HYDROCHLORIDE 200 MG: 100 INJECTION, SOLUTION INTRAMUSCULAR; INTRAVENOUS at 14:58

## 2020-11-13 RX ADMIN — SODIUM CHLORIDE 1000 ML: 0.9 INJECTION, SOLUTION INTRAVENOUS at 11:51

## 2020-11-15 ENCOUNTER — APPOINTMENT (EMERGENCY)
Dept: CT IMAGING | Facility: HOSPITAL | Age: 65
End: 2020-11-15
Payer: COMMERCIAL

## 2020-11-15 ENCOUNTER — HOSPITAL ENCOUNTER (EMERGENCY)
Facility: HOSPITAL | Age: 65
Discharge: HOME | End: 2020-11-15
Attending: EMERGENCY MEDICINE | Admitting: EMERGENCY MEDICINE
Payer: COMMERCIAL

## 2020-11-15 ENCOUNTER — APPOINTMENT (EMERGENCY)
Dept: RADIOLOGY | Facility: HOSPITAL | Age: 65
End: 2020-11-15
Payer: COMMERCIAL

## 2020-11-15 VITALS
OXYGEN SATURATION: 95 % | HEART RATE: 94 BPM | HEIGHT: 66 IN | BODY MASS INDEX: 38.12 KG/M2 | WEIGHT: 237.22 LBS | SYSTOLIC BLOOD PRESSURE: 98 MMHG | RESPIRATION RATE: 18 BRPM | DIASTOLIC BLOOD PRESSURE: 67 MMHG | TEMPERATURE: 97.6 F

## 2020-11-15 DIAGNOSIS — F41.9 ANXIETY: Primary | ICD-10-CM

## 2020-11-15 DIAGNOSIS — F31.78 BIPOLAR DISORDER, IN FULL REMISSION, MOST RECENT EPISODE MIXED (HCC): ICD-10-CM

## 2020-11-15 LAB
ALBUMIN SERPL BCP-MCNC: 3.4 G/DL (ref 3.5–5)
ALP SERPL-CCNC: 71 U/L (ref 46–116)
ALT SERPL W P-5'-P-CCNC: 20 U/L (ref 12–78)
ANION GAP SERPL CALCULATED.3IONS-SCNC: 13 MMOL/L (ref 4–13)
AST SERPL W P-5'-P-CCNC: 18 U/L (ref 5–45)
BASOPHILS # BLD AUTO: 0.06 THOUSANDS/ΜL (ref 0–0.1)
BASOPHILS NFR BLD AUTO: 1 % (ref 0–1)
BILIRUB DIRECT SERPL-MCNC: 0.04 MG/DL (ref 0–0.2)
BILIRUB SERPL-MCNC: 0.2 MG/DL (ref 0.2–1)
BUN SERPL-MCNC: 16 MG/DL (ref 5–25)
CALCIUM SERPL-MCNC: 8.9 MG/DL (ref 8.3–10.1)
CHLORIDE SERPL-SCNC: 102 MMOL/L (ref 100–108)
CO2 SERPL-SCNC: 23 MMOL/L (ref 21–32)
CREAT SERPL-MCNC: 0.97 MG/DL (ref 0.6–1.3)
EOSINOPHIL # BLD AUTO: 0.58 THOUSAND/ΜL (ref 0–0.61)
EOSINOPHIL NFR BLD AUTO: 12 % (ref 0–6)
ERYTHROCYTE [DISTWIDTH] IN BLOOD BY AUTOMATED COUNT: 12.9 % (ref 11.6–15.1)
GFR SERPL CREATININE-BSD FRML MDRD: 62 ML/MIN/1.73SQ M
GLUCOSE SERPL-MCNC: 114 MG/DL (ref 65–140)
HCT VFR BLD AUTO: 34.8 % (ref 34.8–46.1)
HGB BLD-MCNC: 11.4 G/DL (ref 11.5–15.4)
IMM GRANULOCYTES # BLD AUTO: 0.02 THOUSAND/UL (ref 0–0.2)
IMM GRANULOCYTES NFR BLD AUTO: 0 % (ref 0–2)
LYMPHOCYTES # BLD AUTO: 1.76 THOUSANDS/ΜL (ref 0.6–4.47)
LYMPHOCYTES NFR BLD AUTO: 36 % (ref 14–44)
MCH RBC QN AUTO: 31.8 PG (ref 26.8–34.3)
MCHC RBC AUTO-ENTMCNC: 32.8 G/DL (ref 31.4–37.4)
MCV RBC AUTO: 97 FL (ref 82–98)
MONOCYTES # BLD AUTO: 0.53 THOUSAND/ΜL (ref 0.17–1.22)
MONOCYTES NFR BLD AUTO: 11 % (ref 4–12)
NEUTROPHILS # BLD AUTO: 1.89 THOUSANDS/ΜL (ref 1.85–7.62)
NEUTS SEG NFR BLD AUTO: 40 % (ref 43–75)
NRBC BLD AUTO-RTO: 0 /100 WBCS
PHENYTOIN SERPL-MCNC: 10.7 UG/ML (ref 10–20)
PLATELET # BLD AUTO: 222 THOUSANDS/UL (ref 149–390)
PMV BLD AUTO: 8.8 FL (ref 8.9–12.7)
POTASSIUM SERPL-SCNC: 4.6 MMOL/L (ref 3.5–5.3)
PROT SERPL-MCNC: 6.7 G/DL (ref 6.4–8.2)
RBC # BLD AUTO: 3.58 MILLION/UL (ref 3.81–5.12)
SODIUM SERPL-SCNC: 138 MMOL/L (ref 136–145)
TROPONIN I SERPL-MCNC: <0.02 NG/ML
TROPONIN I SERPL-MCNC: <0.02 NG/ML
WBC # BLD AUTO: 4.84 THOUSAND/UL (ref 4.31–10.16)

## 2020-11-15 PROCEDURE — 84484 ASSAY OF TROPONIN QUANT: CPT | Performed by: EMERGENCY MEDICINE

## 2020-11-15 PROCEDURE — 99284 EMERGENCY DEPT VISIT MOD MDM: CPT

## 2020-11-15 PROCEDURE — 93005 ELECTROCARDIOGRAM TRACING: CPT

## 2020-11-15 PROCEDURE — 85025 COMPLETE CBC W/AUTO DIFF WBC: CPT | Performed by: EMERGENCY MEDICINE

## 2020-11-15 PROCEDURE — G1004 CDSM NDSC: HCPCS

## 2020-11-15 PROCEDURE — 80048 BASIC METABOLIC PNL TOTAL CA: CPT | Performed by: EMERGENCY MEDICINE

## 2020-11-15 PROCEDURE — 80177 DRUG SCRN QUAN LEVETIRACETAM: CPT | Performed by: EMERGENCY MEDICINE

## 2020-11-15 PROCEDURE — 71045 X-RAY EXAM CHEST 1 VIEW: CPT

## 2020-11-15 PROCEDURE — 80076 HEPATIC FUNCTION PANEL: CPT | Performed by: EMERGENCY MEDICINE

## 2020-11-15 PROCEDURE — 36415 COLL VENOUS BLD VENIPUNCTURE: CPT | Performed by: EMERGENCY MEDICINE

## 2020-11-15 PROCEDURE — 70450 CT HEAD/BRAIN W/O DYE: CPT

## 2020-11-15 PROCEDURE — 80185 ASSAY OF PHENYTOIN TOTAL: CPT | Performed by: EMERGENCY MEDICINE

## 2020-11-15 RX ORDER — GABAPENTIN 800 MG/1
800 TABLET ORAL 4 TIMES DAILY
Qty: 12 TABLET | Refills: 0 | Status: SHIPPED | OUTPATIENT
Start: 2020-11-15 | End: 2020-11-15 | Stop reason: SDUPTHER

## 2020-11-15 RX ORDER — QUETIAPINE FUMARATE 400 MG/1
400 TABLET, FILM COATED ORAL
Qty: 2 TABLET | Refills: 0 | Status: SHIPPED | OUTPATIENT
Start: 2020-11-15 | End: 2020-11-15 | Stop reason: SDUPTHER

## 2020-11-15 RX ORDER — GABAPENTIN 800 MG/1
800 TABLET ORAL 4 TIMES DAILY
Qty: 12 TABLET | Refills: 0 | Status: SHIPPED | OUTPATIENT
Start: 2020-11-15 | End: 2020-11-18

## 2020-11-15 RX ORDER — QUETIAPINE FUMARATE 400 MG/1
400 TABLET, FILM COATED ORAL
Qty: 2 TABLET | Refills: 0 | Status: SHIPPED | OUTPATIENT
Start: 2020-11-15 | End: 2020-11-17

## 2020-11-16 LAB
ATRIAL RATE: 105 BPM
ATRIAL RATE: 234 BPM
ATRIAL RATE: 94 BPM
P AXIS: 49 DEGREES
PR INTERVAL: 128 MS
PR INTERVAL: 158 MS
QRS AXIS: -2 DEGREES
QRS AXIS: 1 DEGREES
QRS AXIS: 23 DEGREES
QRSD INTERVAL: 72 MS
QRSD INTERVAL: 74 MS
QRSD INTERVAL: 82 MS
QT INTERVAL: 342 MS
QT INTERVAL: 356 MS
QT INTERVAL: 356 MS
QTC INTERVAL: 435 MS
QTC INTERVAL: 445 MS
QTC INTERVAL: 452 MS
T WAVE AXIS: 51 DEGREES
T WAVE AXIS: 71 DEGREES
T WAVE AXIS: 73 DEGREES
VENTRICULAR RATE: 105 BPM
VENTRICULAR RATE: 90 BPM
VENTRICULAR RATE: 94 BPM

## 2020-11-16 PROCEDURE — 93010 ELECTROCARDIOGRAM REPORT: CPT | Performed by: INTERNAL MEDICINE

## 2020-11-18 LAB
BACTERIA BLD CULT: NORMAL
BACTERIA BLD CULT: NORMAL

## 2020-11-19 LAB — LEVETIRACETAM SERPL-MCNC: 67.4 UG/ML (ref 10–40)

## 2021-12-08 ENCOUNTER — OFFICE VISIT (OUTPATIENT)
Dept: URGENT CARE | Facility: CLINIC | Age: 66
End: 2021-12-08
Payer: COMMERCIAL

## 2021-12-08 VITALS
DIASTOLIC BLOOD PRESSURE: 80 MMHG | WEIGHT: 252 LBS | TEMPERATURE: 98.2 F | HEART RATE: 112 BPM | SYSTOLIC BLOOD PRESSURE: 120 MMHG | HEIGHT: 66 IN | BODY MASS INDEX: 40.5 KG/M2 | OXYGEN SATURATION: 97 %

## 2021-12-08 DIAGNOSIS — K11.20 SALIVARY GLAND INFECTION: Primary | ICD-10-CM

## 2021-12-08 DIAGNOSIS — K04.7 DENTAL INFECTION: ICD-10-CM

## 2021-12-08 PROCEDURE — 99213 OFFICE O/P EST LOW 20 MIN: CPT | Performed by: PHYSICIAN ASSISTANT

## 2021-12-08 RX ORDER — CLINDAMYCIN HYDROCHLORIDE 300 MG/1
300 CAPSULE ORAL 3 TIMES DAILY
Qty: 21 CAPSULE | Refills: 0 | Status: SHIPPED | OUTPATIENT
Start: 2021-12-08 | End: 2021-12-08 | Stop reason: CLARIF

## 2021-12-08 RX ORDER — CLINDAMYCIN HYDROCHLORIDE 300 MG/1
300 CAPSULE ORAL 3 TIMES DAILY
Qty: 21 CAPSULE | Refills: 0 | Status: SHIPPED | OUTPATIENT
Start: 2021-12-08 | End: 2021-12-15

## 2022-01-22 ENCOUNTER — OFFICE VISIT (OUTPATIENT)
Dept: URGENT CARE | Facility: CLINIC | Age: 67
End: 2022-01-22
Payer: COMMERCIAL

## 2022-01-22 ENCOUNTER — HOSPITAL ENCOUNTER (EMERGENCY)
Facility: HOSPITAL | Age: 67
Discharge: HOME | End: 2022-01-22
Attending: EMERGENCY MEDICINE
Payer: COMMERCIAL

## 2022-01-22 VITALS
SYSTOLIC BLOOD PRESSURE: 112 MMHG | BODY MASS INDEX: 40.92 KG/M2 | WEIGHT: 253.53 LBS | TEMPERATURE: 97.8 F | OXYGEN SATURATION: 97 % | RESPIRATION RATE: 16 BRPM | HEART RATE: 98 BPM | DIASTOLIC BLOOD PRESSURE: 58 MMHG

## 2022-01-22 VITALS
DIASTOLIC BLOOD PRESSURE: 53 MMHG | WEIGHT: 253 LBS | TEMPERATURE: 98 F | HEIGHT: 66 IN | OXYGEN SATURATION: 95 % | SYSTOLIC BLOOD PRESSURE: 101 MMHG | HEART RATE: 127 BPM | RESPIRATION RATE: 23 BRPM | BODY MASS INDEX: 40.66 KG/M2

## 2022-01-22 DIAGNOSIS — M79.605 PAIN IN BOTH LOWER EXTREMITIES: ICD-10-CM

## 2022-01-22 DIAGNOSIS — R21 RASH: Primary | ICD-10-CM

## 2022-01-22 DIAGNOSIS — R23.3 PETECHIAL RASH: Primary | ICD-10-CM

## 2022-01-22 DIAGNOSIS — M79.604 PAIN IN BOTH LOWER EXTREMITIES: ICD-10-CM

## 2022-01-22 DIAGNOSIS — L81.9 DISCOLORATION OF SKIN OF MULTIPLE SITES OF LOWER EXTREMITY: ICD-10-CM

## 2022-01-22 LAB
ALBUMIN SERPL BCP-MCNC: 2.7 G/DL (ref 3.5–5)
ALP SERPL-CCNC: 42 U/L (ref 46–116)
ALT SERPL W P-5'-P-CCNC: 24 U/L (ref 12–78)
ANION GAP SERPL CALCULATED.3IONS-SCNC: 9 MMOL/L (ref 4–13)
APTT PPP: 24 SECONDS (ref 23–37)
AST SERPL W P-5'-P-CCNC: 11 U/L (ref 5–45)
BASOPHILS # BLD AUTO: 0.07 THOUSANDS/ΜL (ref 0–0.1)
BASOPHILS NFR BLD AUTO: 1 % (ref 0–1)
BILIRUB SERPL-MCNC: 0.13 MG/DL (ref 0.2–1)
BUN SERPL-MCNC: 13 MG/DL (ref 5–25)
CALCIUM ALBUM COR SERPL-MCNC: 9 MG/DL (ref 8.3–10.1)
CALCIUM SERPL-MCNC: 8 MG/DL (ref 8.3–10.1)
CHLORIDE SERPL-SCNC: 98 MMOL/L (ref 100–108)
CO2 SERPL-SCNC: 24 MMOL/L (ref 21–32)
CREAT SERPL-MCNC: 0.92 MG/DL (ref 0.6–1.3)
EOSINOPHIL # BLD AUTO: 0.35 THOUSAND/ΜL (ref 0–0.61)
EOSINOPHIL NFR BLD AUTO: 3 % (ref 0–6)
ERYTHROCYTE [DISTWIDTH] IN BLOOD BY AUTOMATED COUNT: 13.3 % (ref 11.6–15.1)
GFR SERPL CREATININE-BSD FRML MDRD: 66 ML/MIN/1.73SQ M
GLUCOSE SERPL-MCNC: 190 MG/DL (ref 65–140)
HCT VFR BLD AUTO: 34.6 % (ref 34.8–46.1)
HGB BLD-MCNC: 11.7 G/DL (ref 11.5–15.4)
IMM GRANULOCYTES # BLD AUTO: 0.05 THOUSAND/UL (ref 0–0.2)
IMM GRANULOCYTES NFR BLD AUTO: 1 % (ref 0–2)
INR PPP: 1.05 (ref 0.84–1.19)
LYMPHOCYTES # BLD AUTO: 1.33 THOUSANDS/ΜL (ref 0.6–4.47)
LYMPHOCYTES NFR BLD AUTO: 12 % (ref 14–44)
MCH RBC QN AUTO: 31.9 PG (ref 26.8–34.3)
MCHC RBC AUTO-ENTMCNC: 33.8 G/DL (ref 31.4–37.4)
MCV RBC AUTO: 94 FL (ref 82–98)
MONOCYTES # BLD AUTO: 0.62 THOUSAND/ΜL (ref 0.17–1.22)
MONOCYTES NFR BLD AUTO: 6 % (ref 4–12)
NEUTROPHILS # BLD AUTO: 8.5 THOUSANDS/ΜL (ref 1.85–7.62)
NEUTS SEG NFR BLD AUTO: 77 % (ref 43–75)
NRBC BLD AUTO-RTO: 0 /100 WBCS
PLATELET # BLD AUTO: 295 THOUSANDS/UL (ref 149–390)
PMV BLD AUTO: 8.7 FL (ref 8.9–12.7)
POTASSIUM SERPL-SCNC: 4.6 MMOL/L (ref 3.5–5.3)
PROT SERPL-MCNC: 6.4 G/DL (ref 6.4–8.2)
PROTHROMBIN TIME: 13.2 SECONDS (ref 11.6–14.5)
RBC # BLD AUTO: 3.67 MILLION/UL (ref 3.81–5.12)
SODIUM SERPL-SCNC: 131 MMOL/L (ref 136–145)
WBC # BLD AUTO: 10.92 THOUSAND/UL (ref 4.31–10.16)

## 2022-01-22 PROCEDURE — 99283 EMERGENCY DEPT VISIT LOW MDM: CPT

## 2022-01-22 PROCEDURE — 85610 PROTHROMBIN TIME: CPT | Performed by: PHYSICIAN ASSISTANT

## 2022-01-22 PROCEDURE — 85025 COMPLETE CBC W/AUTO DIFF WBC: CPT | Performed by: PHYSICIAN ASSISTANT

## 2022-01-22 PROCEDURE — 99284 EMERGENCY DEPT VISIT MOD MDM: CPT | Performed by: PHYSICIAN ASSISTANT

## 2022-01-22 PROCEDURE — 80053 COMPREHEN METABOLIC PANEL: CPT | Performed by: PHYSICIAN ASSISTANT

## 2022-01-22 PROCEDURE — 85730 THROMBOPLASTIN TIME PARTIAL: CPT | Performed by: PHYSICIAN ASSISTANT

## 2022-01-22 PROCEDURE — 36415 COLL VENOUS BLD VENIPUNCTURE: CPT | Performed by: PHYSICIAN ASSISTANT

## 2022-01-22 PROCEDURE — 99213 OFFICE O/P EST LOW 20 MIN: CPT

## 2022-01-22 RX ORDER — OMEPRAZOLE 40 MG/1
40 CAPSULE, DELAYED RELEASE ORAL 2 TIMES DAILY
COMMUNITY
Start: 2021-11-29

## 2022-01-22 RX ORDER — LEVOTHYROXINE SODIUM 175 UG/1
175 TABLET ORAL DAILY
COMMUNITY
Start: 2021-10-15

## 2022-01-22 RX ORDER — NYSTATIN 100000 U/G
OINTMENT TOPICAL
COMMUNITY
Start: 2021-11-29

## 2022-01-22 RX ORDER — LISINOPRIL 10 MG/1
1 TABLET ORAL DAILY
COMMUNITY
Start: 2021-11-08

## 2022-01-22 RX ORDER — PSEUDOEPHEDRINE HCL 30 MG
1 TABLET ORAL 2 TIMES DAILY PRN
COMMUNITY
Start: 2021-09-15

## 2022-01-22 RX ORDER — TRIAMCINOLONE ACETONIDE 1 MG/G
CREAM TOPICAL 2 TIMES DAILY
Qty: 30 G | Refills: 0 | Status: SHIPPED | OUTPATIENT
Start: 2022-01-22

## 2022-01-22 RX ORDER — DIPHENOXYLATE HYDROCHLORIDE AND ATROPINE SULFATE 2.5; .025 MG/1; MG/1
1 TABLET ORAL DAILY
COMMUNITY
Start: 2021-12-22

## 2022-01-22 NOTE — ED PROVIDER NOTES
History  Chief Complaint   Patient presents with    Rash     Redness/petechiae noted to b/l LE  Sent in by   Reports numbness and tingling in b/l feet  Patient presents to the emergency department today for evaluation bilateral lower extremity rash  This is a 72-year-old female who follows with [facility] primary care, she stated on the dorsum of the right foot last few months she had a rash, primary care was prescribed some medication however over the last 5 days she has noticed a new rash the anterior distal lower legs bilaterally  It is red and painful  She denies any calf pain  No other systemic symptoms such as chest pain shortness of breath fevers chills or sweats  Seen in an urgent care today and was sent here by ambulance for evaluation  Prior to Admission Medications   Prescriptions Last Dose Informant Patient Reported? Taking? Acetaminophen (Tylenol) 325 MG CAPS   Yes Yes   Sig: Take 650 mg by mouth Three times daily as needed   Blood Glucose Monitoring Suppl (BLOOD GLUCOSE MONITOR SYSTEM) w/Device KIT   No Yes   Sig: Test blood sugars 3 times a day   Diclofenac Sodium (VOLTAREN) 1 %   Yes Yes   Sig: APPLY TO AFFECTED AREA EVERY 6 HOURS IF NEEDED FOR PAIN  Docusate Sodium (DSS) 100 MG CAPS   Yes Yes   Sig: Take 1 capsule by mouth 2 (two) times a day as needed   Incontinence Supply Disposable (PADSORBER BED PAN LINERS) MISC   No Yes   Sig: by Does not apply route as needed (incontinence)   Infant Care Products (CUTIES SENSITIVE WIPES) MISC   No Yes   Si Pieces by Does not apply route as needed (incontinence)   Misc  Devices (MATTRESS PAD) MISC   No Yes   Sig: by Does not apply route daily   Multiple Vitamins-Minerals (Centrum Silver 50+Women) TABS   Yes Yes   Sig: Take by mouth   ONETOUCH DELICA LANCETS 99T MISC   No Yes   Sig: Test blood sugars 3 times a day     QUEtiapine (SEROquel) 400 MG tablet   No No   Sig: Take 1 tablet (400 mg total) by mouth daily at bedtime for 2 doses   Skin Protectants, Misc   (CALAZIME SKIN PROTECTANT) PSTE   No No   Sig: Apply 113 g topically 2 (two) times a day   Patient not taking: Reported on 11/13/2020   albuterol (VENTOLIN HFA) 90 mcg/act inhaler   No Yes   Sig: Inhale 2 puffs every 6 (six) hours as needed for wheezing   aspirin (ECOTRIN LOW STRENGTH) 81 mg EC tablet   No Yes   Sig: Take 1 tablet (81 mg total) by mouth daily   estradiol (ESTRACE) 1 mg tablet   Yes Yes   Sig: Take 1 mg by mouth daily at bedtime     ferrous sulfate (FeroSul) 325 (65 Fe) mg tablet   No No   Sig: Take 1 tablet (325 mg total) by mouth daily with breakfast   Patient not taking: Reported on 11/15/2020   fluticasone (FLONASE) 50 mcg/act nasal spray   No Yes   Sig: SPRAY ONE (1) SPRAY INTO EACH NOSTRIL ONCE DAILY   furosemide (LASIX) 40 mg tablet Not Taking at Unknown time  Yes No   Sig: Take 40 mg by mouth as needed   Patient not taking: Reported on 1/22/2022    gabapentin (NEURONTIN) 800 mg tablet   No No   Sig: Take 1 tablet (800 mg total) by mouth 4 (four) times a day for 3 days   glucose blood (ONE TOUCH ULTRA TEST) test strip   No Yes   Sig: Test blood sugars 3 times a day    hydrOXYzine HCL (ATARAX) 10 mg tablet   No No   Sig: Take 1 tablet (10 mg total) by mouth every 6 (six) hours as needed for itching   Patient not taking: Reported on 11/15/2020   hydrOXYzine pamoate (VISTARIL) 50 mg capsule   No No   Sig: Take 1 capsule (50 mg total) by mouth 3 (three) times a day   Patient not taking: Reported on 11/15/2020   levETIRAcetam (KEPPRA) 750 mg tablet   No No   Sig: Take 2 tablets (1,500 mg total) by mouth 2 (two) times a day for 15 days Pt states she takes 1500MG Bid   Patient taking differently: Take 750 mg by mouth 2 (two) times a day Pt states she takes 1500MG Bid    levothyroxine 150 mcg tablet   No Yes   Sig: Take 1 tablet (150 mcg total) by mouth daily   levothyroxine 175 mcg tablet   Yes Yes   Sig: Take 175 mcg by mouth in the morning   lisinopril (ZESTRIL) 10 mg tablet   Yes Yes   Sig: Take 1 tablet by mouth in the morning   lisinopril (ZESTRIL) 5 mg tablet Not Taking at Unknown time  No No   Sig: Take 1 tablet (5 mg total) by mouth daily   Patient not taking: Reported on 1/22/2022    loratadine (CLARITIN) 10 mg tablet   No No   Sig: Take 1 tablet (10 mg total) by mouth daily   Patient not taking: Reported on 11/15/2020   magnesium chloride (MAG64) 64 MG TBEC EC tablet   No Yes   Sig: Take 1 tablet (64 mg total) by mouth 2 (two) times a day   meloxicam (MOBIC) 7 5 mg tablet Not Taking at Unknown time  No No   Sig: Take 1 tablet (7 5 mg total) by mouth 2 (two) times a day   Patient not taking: Reported on 1/22/2022    metFORMIN (GLUCOPHAGE) 500 mg tablet   No Yes   Sig: Take 1 tablet (500 mg total) by mouth 2 (two) times a day with meals   metoprolol succinate (TOPROL-XL) 25 mg 24 hr tablet   No Yes   Sig: Take 1 tablet (25 mg total) by mouth daily   montelukast (SINGULAIR) 10 mg tablet   No Yes   Sig: Take 1 tablet (10 mg total) by mouth daily at bedtime   multivitamin (THERAGRAN) TABS   Yes Yes   Sig: Take 1 tablet by mouth daily   norethindrone (AYGESTIN) 5 mg tablet   Yes Yes   Sig: Take 5 mg by mouth daily at bedtime    nystatin (MYCOSTATIN) ointment   Yes Yes   omeprazole (PriLOSEC) 20 mg delayed release capsule   No No   Sig: TAKE ONE CAPSULE BY MOUTH TWICE A DAY   Patient not taking: Reported on 11/15/2020   omeprazole (PriLOSEC) 40 MG capsule   Yes Yes   Sig: Take 40 mg by mouth 2 (two) times a day   oxybutynin (DITROPAN) 5 mg tablet   No Yes   Sig: Take 1 tablet (5 mg total) by mouth 3 (three) times a day   phenytoin (DILANTIN) 100 mg ER capsule   Yes Yes   Sig: Take by mouth 5 (five) times a day 3 tablets in morning and 2 at lunch daily    polyethylene glycol (GLYCOLAX) powder   No No   Sig: Take 17 g by mouth daily   Patient not taking: Reported on 11/15/2020   polyvinyl alcohol (LIQUIFILM TEARS) 1 4 % ophthalmic solution   No Yes   Sig: Administer 1 drop to both eyes 3 (three) times a day   simvastatin (ZOCOR) 80 mg tablet   No Yes   Sig: Take 1 tablet (80 mg total) by mouth daily at bedtime   triamcinolone (KENALOG) 0 1 % cream   No No   Sig: Apply topically 2 (two) times a day   Patient not taking: Reported on 11/13/2020   valACYclovir (VALTREX) 500 mg tablet   Yes Yes   Sig: Take 1 tablet by mouth 2 (two) times a day   zonisamide (ZONEGRAN) 100 mg capsule   Yes Yes   Sig: Take 100 mg by mouth 3 (three) times a day        Facility-Administered Medications: None       Past Medical History:   Diagnosis Date    Asthma     Bipolar disorder ([facility] )     Chronic UTI (urinary tract infection)     COPD (chronic obstructive pulmonary disease) ([place] [address] )     Diabetes mellitus ([place] [address] )     Disease of thyroid gland     Dyslipidemia 10/31/2018    Essential hypertension 10/31/2018    GERD (gastroesophageal reflux disease)     Heart attack ([facility] )     Iron deficiency anemia, unspecified 7/29/2019    Obesity due to excess calories 10/31/2018    Recurrent falls 4/13/2019    Seizure ([place] [address] )        Past Surgical History:   Procedure Laterality Date    ANKLE FRACTURE SURGERY Right     TUBAL LIGATION      VEIN LIGATION AND STRIPPING         Family History   Problem Relation Age of Onset    Skeletal dysplasia Mother     Stroke Father      I have reviewed and agree with the history as documented  E-Cigarette/Vaping    E-Cigarette Use Never User      E-Cigarette/Vaping Substances    Nicotine No     THC No     CBD No     Flavoring No     Other No     Unknown No      Social History     Tobacco Use    Smoking status: Never Smoker    Smokeless tobacco: Never Used   Vaping Use    Vaping Use: Never used   Substance Use Topics    Alcohol use: Not Currently     Alcohol/week: 0 0 standard drinks    Drug use: No       Review of Systems   Constitutional: Negative for chills and fever  HENT: Negative for ear pain and sore throat  Eyes: Negative for pain and visual disturbance  Respiratory: Negative for cough and shortness of breath  Cardiovascular: Negative for chest pain and palpitations  Gastrointestinal: Negative for abdominal pain and vomiting  Genitourinary: Negative for dysuria and hematuria  Musculoskeletal: Negative for arthralgias and back pain  Skin: Positive for rash  Negative for color change  Neurological: Negative for seizures and syncope  Hematological: Negative  Psychiatric/Behavioral: Negative  All other systems reviewed and are negative  Physical Exam  Physical Exam  Vitals reviewed  Constitutional:       General: She is not in acute distress  Appearance: She is well-developed  She is not ill-appearing or diaphoretic  HENT:      Right Ear: External ear normal  No swelling  Tympanic membrane is not bulging  Left Ear: External ear normal  No swelling  Tympanic membrane is not bulging  Nose: Nose normal       Mouth/Throat:      Pharynx: No oropharyngeal exudate  Eyes:      General: Lids are normal       Conjunctiva/sclera: Conjunctivae normal       Pupils: Pupils are equal, round, and reactive to light  Neck:      Thyroid: No thyromegaly  Vascular: No JVD  Trachea: No tracheal deviation  Cardiovascular:      Rate and Rhythm: Normal rate and regular rhythm  Pulses: Normal pulses  Heart sounds: Normal heart sounds  No murmur heard  No friction rub  No gallop  Pulmonary:      Effort: Pulmonary effort is normal  No respiratory distress  Breath sounds: Normal breath sounds  No stridor  No wheezing or rales  Chest:      Chest wall: No tenderness  Abdominal:      General: Bowel sounds are normal  There is no distension  Palpations: Abdomen is soft  There is no mass  Tenderness: There is no abdominal tenderness  There is no guarding or rebound  Negative signs include Crisostomo's sign  Hernia: No hernia is present  Musculoskeletal:         General: No tenderness   Normal range of motion  Cervical back: Normal range of motion and neck supple  No edema  Normal range of motion  Lymphadenopathy:      Cervical: No cervical adenopathy  Skin:     General: Skin is warm and dry  Capillary Refill: Capillary refill takes less than 2 seconds  Coloration: Skin is not pale  Findings: Rash present  No erythema  Comments: Patient has petechial non raised rash of the bilateral anterior distal legs  No calf involvement  Neurological:      Mental Status: She is alert and oriented to person, place, and time  GCS: GCS eye subscore is 4  GCS verbal subscore is 5  GCS motor subscore is 6  Cranial Nerves: No cranial nerve deficit  Sensory: No sensory deficit  Deep Tendon Reflexes: Reflexes are normal and symmetric  Psychiatric:         Mood and Affect: Mood normal          Speech: Speech normal          Behavior: Behavior normal          Thought Content:  Thought content normal          Judgment: Judgment normal          Vital Signs  ED Triage Vitals [01/22/22 1350]   Temperature Pulse Respirations Blood Pressure SpO2   97 8 °F (36 6 °C) 98 16 112/58 97 %      Temp Source Heart Rate Source Patient Position - Orthostatic VS BP Location FiO2 (%)   Tympanic Monitor Lying Left arm --      Pain Score       8           Vitals:    01/22/22 1350   BP: 112/58   Pulse: 98   Patient Position - Orthostatic VS: Lying         Visual Acuity      ED Medications  Medications - No data to display    Diagnostic Studies  Results Reviewed     Procedure Component Value Units Date/Time    Comprehensive metabolic panel [[id number]]  (Abnormal) Collected: 01/22/22 1418    Lab Status: Final result Specimen: Blood from Arm, Right Updated: 01/22/22 1437     Sodium 131 mmol/L      Potassium 4 6 mmol/L      Chloride 98 mmol/L      CO2 24 mmol/L      ANION GAP 9 mmol/L      BUN 13 mg/dL      Creatinine 0 92 mg/dL      Glucose 190 mg/dL      Calcium 8 0 mg/dL      Corrected Calcium 9 0 mg/dL AST 11 U/L      ALT 24 U/L      Alkaline Phosphatase 42 U/L      Total Protein 6 4 g/dL      Albumin 2 7 g/dL      Total Bilirubin 0 13 mg/dL      eGFR 66 ml/min/1 73sq m     Narrative:      [place] guidelines for Chronic Kidney Disease (CKD):     Stage 1 with normal or high GFR (GFR > 90 mL/min/1 73 square meters)    Stage 2 Mild CKD (GFR = 60-89 mL/min/1 73 square meters)    Stage 3A Moderate CKD (GFR = 45-59 mL/min/1 73 square meters)    Stage 3B Moderate CKD (GFR = 30-44 mL/min/1 73 square meters)    Stage 4 Severe CKD (GFR = 15-29 mL/min/1 73 square meters)    Stage 5 End Stage CKD (GFR <15 mL/min/1 73 square meters)  Note: GFR calculation is accurate only with a steady state creatinine    Protime-INR [[id number]]  (Normal) Collected: 01/22/22 1418    Lab Status: Final result Specimen: Blood from Arm, Right Updated: 01/22/22 1433     Protime 13 2 seconds      INR 1 05    APTT [[id number]]  (Normal) Collected: 01/22/22 1418    Lab Status: Final result Specimen: Blood from Arm, Right Updated: 01/22/22 1433     PTT 24 seconds     CBC and differential [[id number]]  (Abnormal) Collected: 01/22/22 1418    Lab Status: Final result Specimen: Blood from Arm, Right Updated: 01/22/22 1424     WBC 10 92 Thousand/uL      RBC 3 67 Million/uL      Hemoglobin 11 7 g/dL      Hematocrit 34 6 %      MCV 94 fL      MCH 31 9 pg      MCHC 33 8 g/dL      RDW 13 3 %      MPV 8 7 fL      Platelets 963 Thousands/uL      nRBC 0 /100 WBCs      Neutrophils Relative 77 %      Immat GRANS % 1 %      Lymphocytes Relative 12 %      Monocytes Relative 6 %      Eosinophils Relative 3 %      Basophils Relative 1 %      Neutrophils Absolute 8 50 Thousands/µL      Immature Grans Absolute 0 05 Thousand/uL      Lymphocytes Absolute 1 33 Thousands/µL      Monocytes Absolute 0 62 Thousand/µL      Eosinophils Absolute 0 35 Thousand/µL      Basophils Absolute 0 07 Thousands/µL                  No orders to display Procedures  Procedures         ED Course  ED Course as of 01/22/22 1528   Sat Jan 22, 2022   1432 WBC(!): 10 92   1432 Hemoglobin: 11 7   1432 Platelet Count: 650   1448 Sodium(!): 131   1521 Patient with normal platelets, clearly has no meningeal signs such as nuchal rigidity or  No headache  MDM    Disposition  Final diagnoses:   Rash     Time reflects when diagnosis was documented in both MDM as applicable and the Disposition within this note     Time User Action Codes Description Comment    1/22/2022  3:26 PM [place] Jeans Add [R21] Rash       ED Disposition     ED Disposition Condition Date/Time Comment    Discharge Stable Sat Jan 22, 2022  3:26 PM Hortencia Olmstead discharge to home/self care  Follow-up Information     Follow up With Specialties Details Why Contact Info    Kanchan Cronin in 2 days  [address] [phone]            Patient's Medications   Discharge Prescriptions    TRIAMCINOLONE (KENALOG) 0 1 % CREAM    Apply topically 2 (two) times a day       Start Date: 1/22/2022 End Date: --       Order Dose: --       Quantity: 30 g    Refills: 0       No discharge procedures on file      PDMP Review     None          ED Provider  Electronically Signed by           Karyn Crum PA-C  01/22/22 [facility] [address] Anita Giraldo PA-C  01/22/22 1528

## 2022-01-22 NOTE — PROGRESS NOTES
[address]        NAME: Purnima Shrestha is a 61 y o  female  : 1955    MRN: [id number]  DATE: 2022  TIME: 1:26 PM    Assessment and Plan   Petechial rash [R23 3]  1  Petechial rash  Transfer to other facility   2  Discoloration of skin of multiple sites of lower extremity  Transfer to other facility   3  Pain in both lower extremities  Transfer to other facility     Circulatory concerns due to color of lower extremities, abnormal pedal pulses, decreased sensation, and parasthesia  Petechial rash also concerning  Discussed with patient that she needs further testing including blood work and possible imaging  Patient understands and is agreeable to further evaluation in ED  She does not have a ride to ED, we discussed the importance of evaluation sooner rather than later and agreed an ambulance to ED was in her best interest   Patient agreed, ambulance called for transport to ED  Patient Instructions     Ambulance called for transport to ED  Chief Complaint     Chief Complaint   Patient presents with    Leg Swelling     3 days          History of Present Illness       Patient reports pain, paraesthesia, redness, swelling, and rash to bilateral lower extremities that started 5 days ago  She reports difficulty walking due to pain and paraesthesia  She reports using an OTC silicone cream 1-2 times a day since symptom onset  Review of Systems   Review of Systems   Constitutional: Negative for chills, fatigue and fever  HENT: Negative  Respiratory: Negative for cough and shortness of breath  Cardiovascular: Negative for chest pain  Gastrointestinal: Negative for abdominal pain, diarrhea, nausea and vomiting  Musculoskeletal: Positive for gait problem  Skin: Positive for color change (bilateral lower extremties) and rash (bilateral lower extremities)  Neurological: Positive for numbness (bilateral lower extremities)     All other systems reviewed and are negative          Current Medications       Current Outpatient Medications:     albuterol (VENTOLIN HFA) 90 mcg/act inhaler, Inhale 2 puffs every 6 (six) hours as needed for wheezing, Disp: 1 Inhaler, Rfl: 1    aspirin (ECOTRIN LOW STRENGTH) 81 mg EC tablet, Take 1 tablet (81 mg total) by mouth daily, Disp: 90 tablet, Rfl: 1    Blood Glucose Monitoring Suppl (BLOOD GLUCOSE MONITOR SYSTEM) w/Device KIT, Test blood sugars 3 times a day, Disp: 1 each, Rfl: 0    estradiol (ESTRACE) 1 mg tablet, Take 1 mg by mouth daily at bedtime  , Disp: , Rfl:     ferrous sulfate (FeroSul) 325 (65 Fe) mg tablet, Take 1 tablet (325 mg total) by mouth daily with breakfast (Patient not taking: Reported on 11/15/2020), Disp: 60 tablet, Rfl: 0    fluticasone (FLONASE) 50 mcg/act nasal spray, SPRAY ONE (1) SPRAY INTO EACH NOSTRIL ONCE DAILY, Disp: 16 g, Rfl: 1    furosemide (LASIX) 40 mg tablet, Take 40 mg by mouth as needed, Disp: , Rfl:     gabapentin (NEURONTIN) 800 mg tablet, Take 1 tablet (800 mg total) by mouth 4 (four) times a day for 3 days, Disp: 12 tablet, Rfl: 0    glucose blood (ONE TOUCH ULTRA TEST) test strip, Test blood sugars 3 times a day , Disp: 100 each, Rfl: 0    hydrOXYzine HCL (ATARAX) 10 mg tablet, Take 1 tablet (10 mg total) by mouth every 6 (six) hours as needed for itching (Patient not taking: Reported on 11/15/2020), Disp: 30 tablet, Rfl: 3    hydrOXYzine pamoate (VISTARIL) 50 mg capsule, Take 1 capsule (50 mg total) by mouth 3 (three) times a day (Patient not taking: Reported on 11/15/2020), Disp: 90 capsule, Rfl: 3    Incontinence Supply Disposable (PADSORBER BED PAN LINERS) MISC, by Does not apply route as needed (incontinence), Disp: 50 each, Rfl: 3    Infant Care Products (CUTIES SENSITIVE WIPES) MISC, 120 Pieces by Does not apply route as needed (incontinence), Disp: 120 each, Rfl: 3    levETIRAcetam (KEPPRA) 750 mg tablet, Take 2 tablets (1,500 mg total) by mouth 2 (two) times a day for 15 days Pt states she takes 1500MG Bid, Disp: 60 tablet, Rfl: 0    levothyroxine 150 mcg tablet, Take 1 tablet (150 mcg total) by mouth daily, Disp: 30 tablet, Rfl: 3    lisinopril (ZESTRIL) 5 mg tablet, Take 1 tablet (5 mg total) by mouth daily, Disp: 30 tablet, Rfl: 3    loratadine (CLARITIN) 10 mg tablet, Take 1 tablet (10 mg total) by mouth daily (Patient not taking: Reported on 11/15/2020), Disp: 30 tablet, Rfl: 3    magnesium chloride (MAG64) 64 MG TBEC EC tablet, Take 1 tablet (64 mg total) by mouth 2 (two) times a day, Disp: 60 tablet, Rfl: 3    meloxicam (MOBIC) 7 5 mg tablet, Take 1 tablet (7 5 mg total) by mouth 2 (two) times a day, Disp: 60 tablet, Rfl: 1    metFORMIN (GLUCOPHAGE) 500 mg tablet, Take 1 tablet (500 mg total) by mouth 2 (two) times a day with meals, Disp: 60 tablet, Rfl: 5    metoprolol succinate (TOPROL-XL) 25 mg 24 hr tablet, Take 1 tablet (25 mg total) by mouth daily, Disp: 30 tablet, Rfl: 3    Misc   Devices (MATTRESS PAD) MISC, by Does not apply route daily, Disp: 1 each, Rfl: 0    montelukast (SINGULAIR) 10 mg tablet, Take 1 tablet (10 mg total) by mouth daily at bedtime, Disp: 90 tablet, Rfl: 1    norethindrone (AYGESTIN) 5 mg tablet, Take 5 mg by mouth daily at bedtime , Disp: , Rfl:     omeprazole (PriLOSEC) 20 mg delayed release capsule, TAKE ONE CAPSULE BY MOUTH TWICE A DAY (Patient not taking: Reported on 11/15/2020), Disp: 60 capsule, Rfl: 0    ONETOUCH DELICA LANCETS 76D MISC, Test blood sugars 3 times a day , Disp: 100 each, Rfl: 0    oxybutynin (DITROPAN) 5 mg tablet, Take 1 tablet (5 mg total) by mouth 3 (three) times a day, Disp: 90 tablet, Rfl: 3    phenytoin (DILANTIN) 100 mg ER capsule, Take by mouth 5 (five) times a day 3 tablets in morning and 2 at lunch daily , Disp: , Rfl:     polyethylene glycol (GLYCOLAX) powder, Take 17 g by mouth daily (Patient not taking: Reported on 11/15/2020), Disp: 850 g, Rfl: 3    polyvinyl alcohol (LIQUIFILM TEARS) 1 4 % ophthalmic solution, Administer 1 drop to both eyes 3 (three) times a day, Disp: 15 mL, Rfl: 0    QUEtiapine (SEROquel) 400 MG tablet, Take 1 tablet (400 mg total) by mouth daily at bedtime for 2 doses, Disp: 2 tablet, Rfl: 0    simvastatin (ZOCOR) 80 mg tablet, Take 1 tablet (80 mg total) by mouth daily at bedtime, Disp: 30 tablet, Rfl: 5    Skin Protectants, Misc   (CALAZIME SKIN PROTECTANT) PSTE, Apply 113 g topically 2 (two) times a day (Patient not taking: Reported on 11/13/2020), Disp: 1 Tube, Rfl: 0    triamcinolone (KENALOG) 0 1 % cream, Apply topically 2 (two) times a day (Patient not taking: Reported on 11/13/2020), Disp: 30 g, Rfl: 0    valACYclovir (VALTREX) 500 mg tablet, Take 1 tablet by mouth 2 (two) times a day, Disp: , Rfl: 2    zonisamide (ZONEGRAN) 100 mg capsule, Take 100 mg by mouth 5 (five) times a day  , Disp: , Rfl:     Current Allergies     Allergies as of 01/22/2022 - Reviewed 01/22/2022   Allergen Reaction Noted    Keflex [cephalexin] Anaphylaxis 01/03/2011    Levaquin [levofloxacin] Anaphylaxis 11/08/2007    Penicillins Anaphylaxis 11/08/2007    Bactrim [sulfamethoxazole-trimethoprim] Swelling and GI Intolerance 07/01/2015    Ciprofloxacin Swelling 07/01/2015    Noroxin [norfloxacin]  08/24/2018            The following portions of the patient's history were reviewed and updated as appropriate: allergies, current medications, past family history, past medical history, past social history, past surgical history and problem list      Past Medical History:   Diagnosis Date    Asthma     Bipolar disorder ([address] )     Chronic UTI (urinary tract infection)     COPD (chronic obstructive pulmonary disease) ([address] )     Diabetes mellitus [facility] [facility]     Disease of thyroid gland     Dyslipidemia 10/31/2018    Essential hypertension 10/31/2018    GERD (gastroesophageal reflux disease)     Heart attack [facility]     Iron deficiency anemia, unspecified 7/29/2019    Obesity due to excess calories 10/31/2018    Recurrent falls 4/13/2019    Seizure [facility])        Past Surgical History:   Procedure Laterality Date    ANKLE FRACTURE SURGERY Right     TUBAL LIGATION      VEIN LIGATION AND STRIPPING         Family History   Problem Relation Age of Onset    Skeletal dysplasia Mother     Stroke Father          Medications have been verified  Objective   /53   Pulse (!) 127   Temp 98 °F (36 7 °C)   Resp (!) 23   Ht 5' 6" (1 676 m)   Wt 115 kg (253 lb)   LMP  (LMP Unknown)   SpO2 95%   BMI 40 84 kg/m²        Physical Exam     Physical Exam  Vitals and nursing note reviewed  Constitutional:       General: She is not in acute distress  Appearance: Normal appearance  She is not toxic-appearing  HENT:      Head: Normocephalic and atraumatic  Right Ear: External ear normal       Left Ear: External ear normal       Nose: Nose normal       Mouth/Throat:      Mouth: Mucous membranes are moist       Pharynx: Oropharynx is clear  Eyes:      General: No scleral icterus  Right eye: No discharge  Left eye: No discharge  Conjunctiva/sclera: Conjunctivae normal    Cardiovascular:      Rate and Rhythm: Tachycardia present  Pulmonary:      Effort: Pulmonary effort is normal    Musculoskeletal:         General: Normal range of motion  Cervical back: Normal range of motion  Right foot: Abnormal pulse (1+)  Left foot: Abnormal pulse (2+)  Skin:     General: Skin is warm and dry  Capillary Refill: Capillary refill takes more than 3 seconds  Bilateral lower extremities     Coloration: Skin is ashen (bilateral lower extremities)  Findings: Petechiae (bilateral lower extremities right > left) and rash (bilateral lower extremities ) present  Neurological:      General: No focal deficit present  Mental Status: She is alert and oriented to person, place, and time  GCS: GCS eye subscore is 4  GCS verbal subscore is 5   GCS motor subscore is 6       Sensory: Sensory deficit (decreased sensation to bilateral lower extremities) present        Gait: Gait abnormal    Psychiatric:         Mood and Affect: Mood normal          Behavior: Behavior normal

## 2022-03-21 ENCOUNTER — OFFICE VISIT (OUTPATIENT)
Dept: URGENT CARE | Facility: CLINIC | Age: 67
End: 2022-03-21
Payer: COMMERCIAL

## 2022-03-21 VITALS
TEMPERATURE: 97.8 F | OXYGEN SATURATION: 94 % | HEIGHT: 66 IN | SYSTOLIC BLOOD PRESSURE: 116 MMHG | DIASTOLIC BLOOD PRESSURE: 75 MMHG | HEART RATE: 115 BPM | WEIGHT: 254 LBS | BODY MASS INDEX: 40.82 KG/M2

## 2022-03-21 DIAGNOSIS — T21.22XA PARTIAL THICKNESS BURN OF ABDOMEN, INITIAL ENCOUNTER: Primary | ICD-10-CM

## 2022-03-21 PROCEDURE — 99212 OFFICE O/P EST SF 10 MIN: CPT | Performed by: PHYSICIAN ASSISTANT

## 2022-03-21 RX ORDER — GINSENG 100 MG
1 CAPSULE ORAL 2 TIMES DAILY
Status: DISCONTINUED | OUTPATIENT
Start: 2022-03-21 | End: 2022-03-21

## 2022-03-21 RX ORDER — CLINDAMYCIN HYDROCHLORIDE 300 MG/1
300 CAPSULE ORAL 3 TIMES DAILY
Qty: 21 CAPSULE | Refills: 0 | Status: SHIPPED | OUTPATIENT
Start: 2022-03-21 | End: 2022-03-28

## 2022-03-21 RX ADMIN — Medication 1 LARGE APPLICATION: at 13:10

## 2022-03-21 RX ADMIN — Medication 1 LARGE APPLICATION: at 13:13

## 2022-03-21 NOTE — PROGRESS NOTES
[facility]        NAME: Pop Araya is a 61 y o  female  : 1955    MRN: [id number]  DATE: 2022  TIME: 1:16 PM    Assessment and Plan   Partial thickness burn of abdomen, initial encounter [T21 22XA]  1  Partial thickness burn of abdomen, initial encounter  clindamycin (CLEOCIN) 300 MG capsule    bacitracin topical ointment 1 large application         Patient Instructions     Patient Instructions     Second-Degree Burn   WHAT YOU NEED TO KNOW:   A second-degree burn is also called a partial-thickness burn  A second-degree burn occurs when the first layer and some of the second layer of skin are burned  A superficial second-degree burn usually heals within 2 to 3 weeks with some scarring  A deep second-degree burn can take longer to heal  A second-degree burn can also get worse after a few days and become a third-degree burn  DISCHARGE INSTRUCTIONS:   Return to the emergency department if:   · You have a fast heartbeat or breathing  · You are not urinating  Call your doctor or burn specialist if:   · You have a fever  · You have increased redness, numbness, or swelling in the burn area  · Your wound or bandage is leaking pus and has a bad smell  · Your pain does not get better, or gets worse, even after you take pain medicine  · You have a dry mouth or eyes  · You are overly thirsty or tired  · You have dark yellow urine or urinate less than usual     · You have a headache or feel dizzy  · You have questions or concerns about your condition or care  Medicines:   · Medicines  may be given to decrease pain, prevent infection, or help your burn heal  They may be given as a pill or as an ointment applied to your skin  · Take your medicine as directed  Contact your healthcare provider if you think your medicine is not helping or if you have side effects  Tell him or her if you are allergic to any medicine   Keep a list of the medicines, vitamins, and herbs you take  Include the amounts, and when and why you take them  Bring the list or the pill bottles to follow-up visits  Carry your medicine list with you in case of an emergency  Burn care:   · Wash your hands with soap and water  Dry your hands with a clean towel or paper towel  · Remove old bandages  You may need to soak the bandage in water before you remove it so it will not stick to your wound  · Gently clean the burned area daily with mild soap and water  Pat the area dry  Look for any swelling or redness around the burn  Do not break closed blisters  You may cause a skin infection  · Apply cream or ointment to the burn with a cotton swab  Place a nonstick bandage over your burn  · Wrap a layer of gauze around the bandage to hold it in place  The wrap should be snug but not tight  It is too tight if you feel tingling or lose feeling in that area  · Apply gentle pressure for a few minutes if bleeding occurs  · Elevate your burned arm or leg above the level of your heart as often as you can  This will help decrease swelling and pain  Prop your burned arm or leg on pillows or blankets to keep it elevated comfortably  Self-care:   · Drink liquids as directed  You may need to drink extra liquid to help prevent dehydration  Ask how much liquid to drink each day and which liquids are best for you  · Go to physical therapy, if directed  Your muscles and joints may not work well after a second-degree burn  A physical therapist teaches you exercises to help improve movement and strength, and to decrease pain  Prevent second-degree burns:   · Do not leave cups, mugs, or bowls containing hot liquids at the edge of a table  Keep pot handles turned away from the stove front  · Do not leave a lit cigarette  Make sure it is no longer lit  Then dispose of it safely  · Store dangerous items out of the reach of children    Store cigarette lighters, matches, and chemicals where children cannot reach them  Use child safety latches on the door of the safe storage area  · Keep your water heater setting to low or medium  (90°F to 120°F, or 32°C to 48°C)  · Wear sunscreen that has a sun protectant factor (SPF) of 15 or higher  The sunscreen should also have ultraviolet A (UVA) and ultraviolet B (UVB) protection  Follow the directions on the label when you use sunscreen  Put on more sunscreen if you are in the sun for more than an hour  Reapply sunscreen often if you go swimming or are sweating  Follow up with your doctor or burn specialist as directed: You may need to return to have your wound checked and your bandage changed  Write down your questions so you remember to ask them during your visits  © Copyright [employer] 2022 Information is for End User's use only and may not be sold, redistributed or otherwise used for commercial purposes  All illustrations and images included in CareNotes® are the copyrighted property of A D A M , Inc  or [facility]   The above information is an  only  It is not intended as medical advice for individual conditions or treatments  Talk to your doctor, nurse or pharmacist before following any medical regimen to see if it is safe and effective for you  Follow up with PCP in 3-5 days  Proceed to  ER if symptoms worsen  Chief Complaint     Chief Complaint   Patient presents with    Burn         History of Present Illness       Patient is a 80-year-old female who presents the clinic for a burn on her abdomen  The patient states that the burn occurred approximately 1 week ago when she was taking something of a microwave  She states hot liquid spilled on her abdomen  She states she immediately applied ice and the redness did seem to resolve, however of blister soon formed which popped open    She states that for the past couple days she has been having increased itching and surrounding erythema around the blister area  She denies associated fevers, chills, nausea, vomiting, or streaking  The patient states that she has been scratching at the area as it has been itchy  She has also been applying Neosporin and peroxide to clean the area  She is concerned about infection stating that she has a history of cellulitis requiring hospitalization in the past  Tdap UTD  Review of Systems   Review of Systems   Constitutional: Negative for chills and fever  Skin: Positive for wound           Current Medications       Current Outpatient Medications:     Acetaminophen (Tylenol) 325 MG CAPS, Take 650 mg by mouth Three times daily as needed, Disp: , Rfl:     albuterol (VENTOLIN HFA) 90 mcg/act inhaler, Inhale 2 puffs every 6 (six) hours as needed for wheezing, Disp: 1 Inhaler, Rfl: 1    aspirin (ECOTRIN LOW STRENGTH) 81 mg EC tablet, Take 1 tablet (81 mg total) by mouth daily, Disp: 90 tablet, Rfl: 1    Blood Glucose Monitoring Suppl (BLOOD GLUCOSE MONITOR SYSTEM) w/Device KIT, Test blood sugars 3 times a day, Disp: 1 each, Rfl: 0    clindamycin (CLEOCIN) 300 MG capsule, Take 1 capsule (300 mg total) by mouth 3 (three) times a day for 7 days, Disp: 21 capsule, Rfl: 0    Diclofenac Sodium (VOLTAREN) 1 %, APPLY TO AFFECTED AREA EVERY 6 HOURS IF NEEDED FOR PAIN , Disp: , Rfl:     Docusate Sodium (DSS) 100 MG CAPS, Take 1 capsule by mouth 2 (two) times a day as needed, Disp: , Rfl:     estradiol (ESTRACE) 1 mg tablet, Take 1 mg by mouth daily at bedtime  , Disp: , Rfl:     ferrous sulfate (FeroSul) 325 (65 Fe) mg tablet, Take 1 tablet (325 mg total) by mouth daily with breakfast (Patient not taking: Reported on 11/15/2020), Disp: 60 tablet, Rfl: 0    fluticasone (FLONASE) 50 mcg/act nasal spray, SPRAY ONE (1) SPRAY INTO EACH NOSTRIL ONCE DAILY, Disp: 16 g, Rfl: 1    furosemide (LASIX) 40 mg tablet, Take 40 mg by mouth as needed (Patient not taking: Reported on 1/22/2022 ), Disp: , Rfl:     gabapentin (NEURONTIN) 800 mg tablet, Take 1 tablet (800 mg total) by mouth 4 (four) times a day for 3 days, Disp: 12 tablet, Rfl: 0    glucose blood (ONE TOUCH ULTRA TEST) test strip, Test blood sugars 3 times a day , Disp: 100 each, Rfl: 0    hydrOXYzine HCL (ATARAX) 10 mg tablet, Take 1 tablet (10 mg total) by mouth every 6 (six) hours as needed for itching (Patient not taking: Reported on 11/15/2020), Disp: 30 tablet, Rfl: 3    hydrOXYzine pamoate (VISTARIL) 50 mg capsule, Take 1 capsule (50 mg total) by mouth 3 (three) times a day (Patient not taking: Reported on 11/15/2020), Disp: 90 capsule, Rfl: 3    Incontinence Supply Disposable (PADSORBER BED PAN LINERS) MISC, by Does not apply route as needed (incontinence), Disp: 50 each, Rfl: 3    Infant Care Products (CUTIES SENSITIVE WIPES) MISC, 120 Pieces by Does not apply route as needed (incontinence), Disp: 120 each, Rfl: 3    levETIRAcetam (KEPPRA) 750 mg tablet, Take 2 tablets (1,500 mg total) by mouth 2 (two) times a day for 15 days Pt states she takes 1500MG Bid (Patient taking differently: Take 750 mg by mouth 2 (two) times a day Pt states she takes 1500MG Bid ), Disp: 60 tablet, Rfl: 0    levothyroxine 150 mcg tablet, Take 1 tablet (150 mcg total) by mouth daily, Disp: 30 tablet, Rfl: 3    levothyroxine 175 mcg tablet, Take 175 mcg by mouth in the morning, Disp: , Rfl:     lisinopril (ZESTRIL) 10 mg tablet, Take 1 tablet by mouth in the morning, Disp: , Rfl:     lisinopril (ZESTRIL) 5 mg tablet, Take 1 tablet (5 mg total) by mouth daily (Patient not taking: Reported on 1/22/2022 ), Disp: 30 tablet, Rfl: 3    loratadine (CLARITIN) 10 mg tablet, Take 1 tablet (10 mg total) by mouth daily (Patient not taking: Reported on 11/15/2020), Disp: 30 tablet, Rfl: 3    magnesium chloride (MAG64) 64 MG TBEC EC tablet, Take 1 tablet (64 mg total) by mouth 2 (two) times a day, Disp: 60 tablet, Rfl: 3    meloxicam (MOBIC) 7 5 mg tablet, Take 1 tablet (7 5 mg total) by mouth 2 (two) times a day (Patient not taking: Reported on 1/22/2022 ), Disp: 60 tablet, Rfl: 1    metFORMIN (GLUCOPHAGE) 500 mg tablet, Take 1 tablet (500 mg total) by mouth 2 (two) times a day with meals, Disp: 60 tablet, Rfl: 5    metoprolol succinate (TOPROL-XL) 25 mg 24 hr tablet, Take 1 tablet (25 mg total) by mouth daily, Disp: 30 tablet, Rfl: 3    Misc   Devices (MATTRESS PAD) MISC, by Does not apply route daily, Disp: 1 each, Rfl: 0    montelukast (SINGULAIR) 10 mg tablet, Take 1 tablet (10 mg total) by mouth daily at bedtime, Disp: 90 tablet, Rfl: 1    Multiple Vitamins-Minerals (Centrum Silver 50+Women) TABS, Take by mouth, Disp: , Rfl:     multivitamin (THERAGRAN) TABS, Take 1 tablet by mouth daily, Disp: , Rfl:     norethindrone (AYGESTIN) 5 mg tablet, Take 5 mg by mouth daily at bedtime , Disp: , Rfl:     nystatin (MYCOSTATIN) ointment, , Disp: , Rfl:     omeprazole (PriLOSEC) 20 mg delayed release capsule, TAKE ONE CAPSULE BY MOUTH TWICE A DAY (Patient not taking: Reported on 11/15/2020), Disp: 60 capsule, Rfl: 0    omeprazole (PriLOSEC) 40 MG capsule, Take 40 mg by mouth 2 (two) times a day, Disp: , Rfl:     ONETOUCH DELICA LANCETS 45Y MISC, Test blood sugars 3 times a day , Disp: 100 each, Rfl: 0    oxybutynin (DITROPAN) 5 mg tablet, Take 1 tablet (5 mg total) by mouth 3 (three) times a day, Disp: 90 tablet, Rfl: 3    phenytoin (DILANTIN) 100 mg ER capsule, Take by mouth 5 (five) times a day 3 tablets in morning and 2 at lunch daily , Disp: , Rfl:     polyethylene glycol (GLYCOLAX) powder, Take 17 g by mouth daily (Patient not taking: Reported on 11/15/2020), Disp: 850 g, Rfl: 3    polyvinyl alcohol (LIQUIFILM TEARS) 1 4 % ophthalmic solution, Administer 1 drop to both eyes 3 (three) times a day, Disp: 15 mL, Rfl: 0    QUEtiapine (SEROquel) 400 MG tablet, Take 1 tablet (400 mg total) by mouth daily at bedtime for 2 doses, Disp: 2 tablet, Rfl: 0    simvastatin (ZOCOR) 80 mg tablet, Take 1 tablet (80 mg total) by mouth daily at bedtime, Disp: 30 tablet, Rfl: 5    Skin Protectants, Misc   (CALAZIME SKIN PROTECTANT) PSTE, Apply 113 g topically 2 (two) times a day (Patient not taking: Reported on 11/13/2020), Disp: 1 Tube, Rfl: 0    triamcinolone (KENALOG) 0 1 % cream, Apply topically 2 (two) times a day (Patient not taking: Reported on 11/13/2020), Disp: 30 g, Rfl: 0    triamcinolone (KENALOG) 0 1 % cream, Apply topically 2 (two) times a day, Disp: 30 g, Rfl: 0    valACYclovir (VALTREX) 500 mg tablet, Take 1 tablet by mouth 2 (two) times a day, Disp: , Rfl: 2    zonisamide (ZONEGRAN) 100 mg capsule, Take 100 mg by mouth 3 (three) times a day  , Disp: , Rfl:     Current Facility-Administered Medications:     bacitracin topical ointment 1 large application, 1 large application, Topical, BID, Jacky Ford PA-C, 1 large application at 27/57/40 1313    Current Allergies     Allergies as of 03/21/2022 - Reviewed 03/21/2022   Allergen Reaction Noted    Keflex [cephalexin] Anaphylaxis 01/03/2011    Levaquin [levofloxacin] Anaphylaxis 11/08/2007    Penicillins Anaphylaxis 11/08/2007    Bactrim [sulfamethoxazole-trimethoprim] Swelling and GI Intolerance 07/01/2015    Ciprofloxacin Swelling 07/01/2015    Noroxin [norfloxacin]  08/24/2018            The following portions of the patient's history were reviewed and updated as appropriate: allergies, current medications, past family history, past medical history, past social history, past surgical history and problem list      Past Medical History:   Diagnosis Date    Asthma     [facility] [address] [facility]     Chronic UTI (urinary tract infection)     COPD [facility]chronic obstructive pulmonary disease) [facility] [address] [facility]     Diabetes mellitus [facility] [address] [facility]     Disease of thyroid gland     Dyslipidemia 10/31/2018    Essential hypertension 10/31/2018    GERD (gastroesophageal reflux disease)     Heart attack ([facility] [address] [facility]     Iron deficiency anemia, unspecified 7/29/2019    Obesity due to excess calories 10/31/2018    Recurrent falls 4/13/2019    [facility]ure [facility])        Past Surgical History:   Procedure Laterality Date    ANKLE FRACTURE SURGERY Right     TUBAL LIGATION      VEIN LIGATION AND STRIPPING         Family History   Problem Relation Age of Onset    Skeletal dysplasia Mother     Stroke Father          Medications have been verified  Objective   /75   Pulse (!) 115   Temp 97 8 °F (36 6 °C)   Ht 5' 6" (1 676 m)   Wt 115 kg (254 lb)   LMP  (LMP Unknown)   SpO2 94%   BMI 41 00 kg/m²        Physical Exam     Physical Exam  Constitutional:       Appearance: She is obese  Skin:     Findings: Rash present  Rash is crusting  Rash is not vesicular  Comments: -there is a second-degree burn noted on the lower abdomen with an area in the center that resembles a bull eye which has burst   There is surrounding area that is approximately 5 cm x 4 cm in size  There are no streaking  There is no purulent drainage   Neurological:      Mental Status: She is alert  Comments: The patient does not ambulate and is restricted to wheelchair  Psychiatric:         Mood and Affect: Mood normal            -since there is increased erythema I will place the patient on clindamycin for developing infection  The patient was instructed to follow up with primary care doctor in the next 24-48 hours or go to the ER if symptoms worsen    If the wound does not heal in approximately 1 week then she may need to see a wound specialist

## 2022-03-21 NOTE — PATIENT INSTRUCTIONS
Second-Degree Burn   WHAT YOU NEED TO KNOW:   A second-degree burn is also called a partial-thickness burn  A second-degree burn occurs when the first layer and some of the second layer of skin are burned  A superficial second-degree burn usually heals within 2 to 3 weeks with some scarring  A deep second-degree burn can take longer to heal  A second-degree burn can also get worse after a few days and become a third-degree burn  DISCHARGE INSTRUCTIONS:   Return to the emergency department if:   · You have a fast heartbeat or breathing  · You are not urinating  Call your doctor or burn specialist if:   · You have a fever  · You have increased redness, numbness, or swelling in the burn area  · Your wound or bandage is leaking pus and has a bad smell  · Your pain does not get better, or gets worse, even after you take pain medicine  · You have a dry mouth or eyes  · You are overly thirsty or tired  · You have dark yellow urine or urinate less than usual     · You have a headache or feel dizzy  · You have questions or concerns about your condition or care  Medicines:   · Medicines  may be given to decrease pain, prevent infection, or help your burn heal  They may be given as a pill or as an ointment applied to your skin  · Take your medicine as directed  Contact your healthcare provider if you think your medicine is not helping or if you have side effects  Tell him or her if you are allergic to any medicine  Keep a list of the medicines, vitamins, and herbs you take  Include the amounts, and when and why you take them  Bring the list or the pill bottles to follow-up visits  Carry your medicine list with you in case of an emergency  Burn care:   · Wash your hands with soap and water  Dry your hands with a clean towel or paper towel  · Remove old bandages  You may need to soak the bandage in water before you remove it so it will not stick to your wound      · Gently clean the burned area daily with mild soap and water  Pat the area dry  Look for any swelling or redness around the burn  Do not break closed blisters  You may cause a skin infection  · Apply cream or ointment to the burn with a cotton swab  Place a nonstick bandage over your burn  · Wrap a layer of gauze around the bandage to hold it in place  The wrap should be snug but not tight  It is too tight if you feel tingling or lose feeling in that area  · Apply gentle pressure for a few minutes if bleeding occurs  · Elevate your burned arm or leg above the level of your heart as often as you can  This will help decrease swelling and pain  Prop your burned arm or leg on pillows or blankets to keep it elevated comfortably  Self-care:   · Drink liquids as directed  You may need to drink extra liquid to help prevent dehydration  Ask how much liquid to drink each day and which liquids are best for you  · Go to physical therapy, if directed  Your muscles and joints may not work well after a second-degree burn  A physical therapist teaches you exercises to help improve movement and strength, and to decrease pain  Prevent second-degree burns:   · Do not leave cups, mugs, or bowls containing hot liquids at the edge of a table  Keep pot handles turned away from the stove front  · Do not leave a lit cigarette  Make sure it is no longer lit  Then dispose of it safely  · Store dangerous items out of the reach of children  Store cigarette lighters, matches, and chemicals where children cannot reach them  Use child safety latches on the door of the safe storage area  · Keep your water heater setting to low or medium  (90°F to 120°F, or 32°C to 48°C)  · Wear sunscreen that has a sun protectant factor (SPF) of 15 or higher  The sunscreen should also have ultraviolet A (UVA) and ultraviolet B (UVB) protection  Follow the directions on the label when you use sunscreen   Put on more sunscreen if you are in the sun for more than an hour  Reapply sunscreen often if you go swimming or are sweating  Follow up with your doctor or burn specialist as directed: You may need to return to have your wound checked and your bandage changed  Write down your questions so you remember to ask them during your visits  © Copyright [employer] 2022 Information is for End User's use only and may not be sold, redistributed or otherwise used for commercial purposes  All illustrations and images included in CareNotes® are the copyrighted property of A D A M , Inc  or Marta Rodriguez   The above information is an  only  It is not intended as medical advice for individual conditions or treatments  Talk to your doctor, nurse or pharmacist before following any medical regimen to see if it is safe and effective for you

## 2022-03-22 ENCOUNTER — TELEPHONE (OUTPATIENT)
Dept: URGENT CARE | Facility: CLINIC | Age: 67
End: 2022-03-22

## 2022-03-22 NOTE — TELEPHONE ENCOUNTER
Pt called asking for script for dressing bandages [facility] as [facility] needs prior authorization  Informed patient we do no do prior authorizations at the urgent care and she will need to call her PCP for that  Sample provided to patient of dressing supplies  Pt is applying antibiotic ointment to the wound in addition to vasoline gauze  Informed patient that if she uses the antibiotic ointment to apply Telfa nonstick dressing covered and by ABD  Or she may use Vaseline gauze

## 2022-07-30 ENCOUNTER — OFFICE VISIT (OUTPATIENT)
Dept: URGENT CARE | Facility: CLINIC | Age: 67
End: 2022-07-30
Payer: COMMERCIAL

## 2022-07-30 VITALS
WEIGHT: 254 LBS | HEART RATE: 98 BPM | OXYGEN SATURATION: 95 % | TEMPERATURE: 98 F | DIASTOLIC BLOOD PRESSURE: 72 MMHG | HEIGHT: 66 IN | RESPIRATION RATE: 20 BRPM | BODY MASS INDEX: 40.82 KG/M2 | SYSTOLIC BLOOD PRESSURE: 105 MMHG

## 2022-07-30 DIAGNOSIS — Z87.442 HISTORY OF KIDNEY STONES: ICD-10-CM

## 2022-07-30 DIAGNOSIS — R10.9 ACUTE RIGHT FLANK PAIN: Primary | ICD-10-CM

## 2022-07-30 DIAGNOSIS — R30.0 DYSURIA: ICD-10-CM

## 2022-07-30 LAB
SL AMB  POCT GLUCOSE, UA: NORMAL
SL AMB LEUKOCYTE ESTERASE,UA: NORMAL
SL AMB POCT BILIRUBIN,UA: NORMAL
SL AMB POCT BLOOD,UA: NORMAL
SL AMB POCT CLARITY,UA: CLEAR
SL AMB POCT COLOR,UA: YELLOW
SL AMB POCT KETONES,UA: NORMAL
SL AMB POCT NITRITE,UA: NORMAL
SL AMB POCT PH,UA: 6
SL AMB POCT SPECIFIC GRAVITY,UA: 1020
SL AMB POCT URINE PROTEIN: NORMAL
SL AMB POCT UROBILINOGEN: 0.2

## 2022-07-30 PROCEDURE — 87086 URINE CULTURE/COLONY COUNT: CPT

## 2022-07-30 PROCEDURE — 81002 URINALYSIS NONAUTO W/O SCOPE: CPT

## 2022-07-30 PROCEDURE — 99213 OFFICE O/P EST LOW 20 MIN: CPT

## 2022-07-30 NOTE — PROGRESS NOTES
[address]        NAME: Lorie Saxena is a 61 y o  female  : 1955    MRN: [id number]  DATE: 2022  TIME: 1:18 PM    Assessment and Plan   Acute right flank pain [R10 9]  1  Acute right flank pain     2  History of kidney stones     3  Dysuria  POCT urine dip    Urine culture         Patient Instructions       Follow up with PCP in 3-5 days  Proceed to  ER if symptoms worsen  You need to make an appointment with your PCP as soon as possible  You have been receiving refills of antibiotics for urinary symptoms with out resolution  You may need to see a urologist or uro-gyn for further testing  You are to avoid caffeine, alcohol  Drink water  Wipe appropriately  No tub bathing, no wet swimming suit wearing for long periods of time  Avoid hot tubs  Void after sex  You are to go to the ED if symptoms worsen        Chief Complaint     Chief Complaint   Patient presents with    Flank Pain     Kidney pain         History of Present Illness       This is a 61year old female who states is having right flank pain x 2 days  She denies dysuria  She states that she has no hematuria as well  She has just completed 8 days of macrobid (not consecutive days) since 2022  She denies fevers, chills, n/v/d  She has not contacted her PCP  Urine on  showed citrobacter freundii - sensitive to nitrofurantoin         Review of Systems   Review of Systems   Constitutional: Negative  HENT: Negative  Eyes: Negative  Respiratory: Negative  Cardiovascular: Negative  Gastrointestinal: Negative  Endocrine: Negative  Genitourinary: Positive for flank pain  Skin: Negative  Allergic/Immunologic: Negative  Neurological: Negative  Hematological: Negative  Psychiatric/Behavioral: Negative            Current Medications       Current Outpatient Medications:     Acetaminophen (Tylenol) 325 MG CAPS, Take 650 mg by mouth Three times daily as needed, Disp: , Rfl:     albuterol (VENTOLIN HFA) 90 mcg/act inhaler, Inhale 2 puffs every 6 (six) hours as needed for wheezing, Disp: 1 Inhaler, Rfl: 1    aspirin (ECOTRIN LOW STRENGTH) 81 mg EC tablet, Take 1 tablet (81 mg total) by mouth daily, Disp: 90 tablet, Rfl: 1    Blood Glucose Monitoring Suppl (BLOOD GLUCOSE MONITOR SYSTEM) w/Device KIT, Test blood sugars 3 times a day, Disp: 1 each, Rfl: 0    Diclofenac Sodium (VOLTAREN) 1 %, APPLY TO AFFECTED AREA EVERY 6 HOURS IF NEEDED FOR PAIN , Disp: , Rfl:     Docusate Sodium (DSS) 100 MG CAPS, Take 1 capsule by mouth 2 (two) times a day as needed, Disp: , Rfl:     estradiol (ESTRACE) 1 mg tablet, Take 1 mg by mouth daily at bedtime  , Disp: , Rfl:     ferrous sulfate (FeroSul) 325 (65 Fe) mg tablet, Take 1 tablet (325 mg total) by mouth daily with breakfast (Patient not taking: Reported on 11/15/2020), Disp: 60 tablet, Rfl: 0    fluticasone (FLONASE) 50 mcg/act nasal spray, SPRAY ONE (1) SPRAY INTO EACH NOSTRIL ONCE DAILY, Disp: 16 g, Rfl: 1    furosemide (LASIX) 40 mg tablet, Take 40 mg by mouth as needed (Patient not taking: Reported on 1/22/2022 ), Disp: , Rfl:     gabapentin (NEURONTIN) 800 mg tablet, Take 1 tablet (800 mg total) by mouth 4 (four) times a day for 3 days, Disp: 12 tablet, Rfl: 0    glucose blood (ONE TOUCH ULTRA TEST) test strip, Test blood sugars 3 times a day , Disp: 100 each, Rfl: 0    hydrOXYzine HCL (ATARAX) 10 mg tablet, Take 1 tablet (10 mg total) by mouth every 6 (six) hours as needed for itching (Patient not taking: Reported on 11/15/2020), Disp: 30 tablet, Rfl: 3    hydrOXYzine pamoate (VISTARIL) 50 mg capsule, Take 1 capsule (50 mg total) by mouth 3 (three) times a day (Patient not taking: Reported on 11/15/2020), Disp: 90 capsule, Rfl: 3    Incontinence Supply Disposable (PADSORBER BED PAN LINERS) MISC, by Does not apply route as needed (incontinence), Disp: 50 each, Rfl: 3    Infant Care Products (CUTIES SENSITIVE WIPES) MISC, 120 Pieces by Does not apply route as needed (incontinence), Disp: 120 each, Rfl: 3    levETIRAcetam (KEPPRA) 750 mg tablet, Take 2 tablets (1,500 mg total) by mouth 2 (two) times a day for 15 days Pt states she takes 1500MG Bid (Patient taking differently: Take 750 mg by mouth 2 (two) times a day Pt states she takes 1500MG Bid ), Disp: 60 tablet, Rfl: 0    levothyroxine 150 mcg tablet, Take 1 tablet (150 mcg total) by mouth daily, Disp: 30 tablet, Rfl: 3    levothyroxine 175 mcg tablet, Take 175 mcg by mouth in the morning, Disp: , Rfl:     lisinopril (ZESTRIL) 10 mg tablet, Take 1 tablet by mouth in the morning, Disp: , Rfl:     lisinopril (ZESTRIL) 5 mg tablet, Take 1 tablet (5 mg total) by mouth daily (Patient not taking: Reported on 1/22/2022 ), Disp: 30 tablet, Rfl: 3    loratadine (CLARITIN) 10 mg tablet, Take 1 tablet (10 mg total) by mouth daily (Patient not taking: Reported on 11/15/2020), Disp: 30 tablet, Rfl: 3    magnesium chloride (MAG64) 64 MG TBEC EC tablet, Take 1 tablet (64 mg total) by mouth 2 (two) times a day, Disp: 60 tablet, Rfl: 3    meloxicam (MOBIC) 7 5 mg tablet, Take 1 tablet (7 5 mg total) by mouth 2 (two) times a day (Patient not taking: Reported on 1/22/2022 ), Disp: 60 tablet, Rfl: 1    metFORMIN (GLUCOPHAGE) 500 mg tablet, Take 1 tablet (500 mg total) by mouth 2 (two) times a day with meals, Disp: 60 tablet, Rfl: 5    metoprolol succinate (TOPROL-XL) 25 mg 24 hr tablet, Take 1 tablet (25 mg total) by mouth daily, Disp: 30 tablet, Rfl: 3    Misc   Devices (MATTRESS PAD) MISC, by Does not apply route daily, Disp: 1 each, Rfl: 0    montelukast (SINGULAIR) 10 mg tablet, Take 1 tablet (10 mg total) by mouth daily at bedtime, Disp: 90 tablet, Rfl: 1    Multiple Vitamins-Minerals (Centrum Silver 50+Women) TABS, Take by mouth, Disp: , Rfl:     multivitamin (THERAGRAN) TABS, Take 1 tablet by mouth daily, Disp: , Rfl:     norethindrone (AYGESTIN) 5 mg tablet, Take 5 mg by mouth daily at bedtime , Disp: , Rfl:     nystatin (MYCOSTATIN) ointment, , Disp: , Rfl:     omeprazole (PriLOSEC) 20 mg delayed release capsule, TAKE ONE CAPSULE BY MOUTH TWICE A DAY (Patient not taking: Reported on 11/15/2020), Disp: 60 capsule, Rfl: 0    omeprazole (PriLOSEC) 40 MG capsule, Take 40 mg by mouth 2 (two) times a day, Disp: , Rfl:     ONETOUCH DELICA LANCETS 78V MISC, Test blood sugars 3 times a day , Disp: 100 each, Rfl: 0    oxybutynin (DITROPAN) 5 mg tablet, Take 1 tablet (5 mg total) by mouth 3 (three) times a day, Disp: 90 tablet, Rfl: 3    phenytoin (DILANTIN) 100 mg ER capsule, Take by mouth 5 (five) times a day 3 tablets in morning and 2 at lunch daily , Disp: , Rfl:     polyethylene glycol (GLYCOLAX) powder, Take 17 g by mouth daily (Patient not taking: Reported on 11/15/2020), Disp: 850 g, Rfl: 3    polyvinyl alcohol (LIQUIFILM TEARS) 1 4 % ophthalmic solution, Administer 1 drop to both eyes 3 (three) times a day, Disp: 15 mL, Rfl: 0    QUEtiapine (SEROquel) 400 MG tablet, Take 1 tablet (400 mg total) by mouth daily at bedtime for 2 doses, Disp: 2 tablet, Rfl: 0    simvastatin (ZOCOR) 80 mg tablet, Take 1 tablet (80 mg total) by mouth daily at bedtime, Disp: 30 tablet, Rfl: 5    Skin Protectants, Misc   (CALAZIME SKIN PROTECTANT) PSTE, Apply 113 g topically 2 (two) times a day (Patient not taking: Reported on 11/13/2020), Disp: 1 Tube, Rfl: 0    triamcinolone (KENALOG) 0 1 % cream, Apply topically 2 (two) times a day (Patient not taking: Reported on 11/13/2020), Disp: 30 g, Rfl: 0    triamcinolone (KENALOG) 0 1 % cream, Apply topically 2 (two) times a day, Disp: 30 g, Rfl: 0    valACYclovir (VALTREX) 500 mg tablet, Take 1 tablet by mouth 2 (two) times a day, Disp: , Rfl: 2    zonisamide (ZONEGRAN) 100 mg capsule, Take 100 mg by mouth 3 (three) times a day  , Disp: , Rfl:     Current Allergies     Allergies as of 07/30/2022 - Reviewed 07/30/2022   Allergen Reaction Noted    Keflex [cephalexin] Anaphylaxis 01/03/2011    Levaquin [levofloxacin] Anaphylaxis 11/08/2007    Penicillins Anaphylaxis 11/08/2007    Bactrim [sulfamethoxazole-trimethoprim] Swelling and GI Intolerance 07/01/2015    Ciprofloxacin Swelling 07/01/2015    Noroxin [norfloxacin]  08/24/2018            The following portions of the patient's history were reviewed and updated as appropriate: allergies, current medications, past family history, past medical history, past social history, past surgical history and problem list      Past Medical History:   Diagnosis Date    Asthma     Bipolar disorder ([facility] )     Chronic UTI (urinary tract infection)     COPD (chronic obstructive pulmonary disease) ([facility] )     Diabetes mellitus ([place] [address] )     Disease of thyroid gland     Dyslipidemia 10/31/2018    Essential hypertension 10/31/2018    GERD (gastroesophageal reflux disease)     Heart attack ([place] [address] )     Iron deficiency anemia, unspecified 7/29/2019    Obesity due to excess calories 10/31/2018    Recurrent falls 4/13/2019    Seizure ([place] [address] )        Past Surgical History:   Procedure Laterality Date    ANKLE FRACTURE SURGERY Right     TUBAL LIGATION      VEIN LIGATION AND STRIPPING         Family History   Problem Relation Age of Onset    Skeletal dysplasia Mother     Stroke Father          Medications have been verified  Objective   /72   Pulse 98   Temp 98 °F (36 7 °C)   Resp 20   Ht 5' 6" (1 676 m)   Wt 115 kg (254 lb)   LMP  (LMP Unknown)   SpO2 95%   BMI 41 00 kg/m²   No LMP recorded (lmp unknown)  Patient is postmenopausal        Physical Exam     Physical Exam  Vitals and nursing note reviewed  Constitutional:       General: She is not in acute distress  Appearance: Normal appearance  She is obese  She is not ill-appearing, toxic-appearing or diaphoretic  Comments: Sitting in wheel chair    HENT:      Head: Normocephalic and atraumatic        Nose: Nose normal       Mouth/Throat:      Mouth: Mucous membranes are moist       Pharynx: No oropharyngeal exudate or posterior oropharyngeal erythema  Eyes:      Extraocular Movements: Extraocular movements intact  Cardiovascular:      Rate and Rhythm: Normal rate and regular rhythm  Pulses: Normal pulses  Heart sounds: Normal heart sounds  Pulmonary:      Effort: Pulmonary effort is normal       Breath sounds: Normal breath sounds  Abdominal:      General: There is no distension  Palpations: Abdomen is soft  Tenderness: There is no abdominal tenderness  There is right CVA tenderness  There is no left CVA tenderness  Comments: Pendulous abdomen    Musculoskeletal:         General: Normal range of motion  Cervical back: Normal range of motion and neck supple  Skin:     General: Skin is warm and dry  Capillary Refill: Capillary refill takes less than 2 seconds  Neurological:      General: No focal deficit present  Mental Status: She is alert and oriented to person, place, and time  Psychiatric:         Mood and Affect: Mood normal          Behavior: Behavior normal          Thought Content: Thought content normal          Judgment: Judgment normal                poct urine negative   ? Maybe trace leukocytes  Will send urine culture  Pt just completed 8 days of macrobid since 7/12/2022  Will monitor for culture results  Pt instructed to go to ED to rule out pathology for right flank pain  She is refusing to go to ED  She states she will follow up with  Some one

## 2022-07-30 NOTE — PATIENT INSTRUCTIONS
You need to make an appointment with your PCP as soon as possible  You have been receiving refills of antibiotics for urinary symptoms with out resolution  You may need to see a urologist or uro-gyn for further testing  You are to avoid caffeine, alcohol  Drink water  Wipe appropriately  No tub bathing, no wet swimming suit wearing for long periods of time  Avoid hot tubs  Void after sex    You are to go to the ED if symptoms worsen

## 2022-07-31 ENCOUNTER — TELEPHONE (OUTPATIENT)
Dept: URGENT CARE | Facility: CLINIC | Age: 67
End: 2022-07-31

## 2022-07-31 LAB — BACTERIA UR CULT: NORMAL

## 2023-05-20 ENCOUNTER — HOSPITAL ENCOUNTER (INPATIENT)
Facility: HOSPITAL | Age: 68
LOS: 1 days | Discharge: HOME HEALTH | End: 2023-05-22
Attending: EMERGENCY MEDICINE | Admitting: FAMILY MEDICINE

## 2023-05-20 ENCOUNTER — APPOINTMENT (EMERGENCY)
Dept: RADIOLOGY | Facility: HOSPITAL | Age: 68
End: 2023-05-20

## 2023-05-20 ENCOUNTER — APPOINTMENT (EMERGENCY)
Dept: CT IMAGING | Facility: HOSPITAL | Age: 68
End: 2023-05-20

## 2023-05-20 ENCOUNTER — APPOINTMENT (EMERGENCY)
Dept: NON INVASIVE DIAGNOSTICS | Facility: HOSPITAL | Age: 68
End: 2023-05-20

## 2023-05-20 DIAGNOSIS — R53.81 PHYSICAL DECONDITIONING: ICD-10-CM

## 2023-05-20 DIAGNOSIS — R21 RASH: ICD-10-CM

## 2023-05-20 DIAGNOSIS — E11.9 TYPE 2 DIABETES MELLITUS WITHOUT COMPLICATION, WITH LONG-TERM CURRENT USE OF INSULIN (HCC): ICD-10-CM

## 2023-05-20 DIAGNOSIS — N39.0 UTI (URINARY TRACT INFECTION): ICD-10-CM

## 2023-05-20 DIAGNOSIS — Z79.4 TYPE 2 DIABETES MELLITUS WITHOUT COMPLICATION, WITH LONG-TERM CURRENT USE OF INSULIN (HCC): ICD-10-CM

## 2023-05-20 DIAGNOSIS — E11.649 HYPOGLYCEMIA DUE TO TYPE 2 DIABETES MELLITUS (HCC): ICD-10-CM

## 2023-05-20 DIAGNOSIS — R79.89 ELEVATED LACTIC ACID LEVEL: ICD-10-CM

## 2023-05-20 DIAGNOSIS — E83.42 HYPOMAGNESEMIA: ICD-10-CM

## 2023-05-20 DIAGNOSIS — L03.119 LOWER EXTREMITY CELLULITIS: Primary | ICD-10-CM

## 2023-05-20 PROBLEM — E16.2 HYPOGLYCEMIA: Status: ACTIVE | Noted: 2023-05-20

## 2023-05-20 LAB
2HR DELTA HS TROPONIN: 0 NG/L
4HR DELTA HS TROPONIN: 1 NG/L
ALBUMIN SERPL BCP-MCNC: 3.4 G/DL (ref 3.5–5)
ALP SERPL-CCNC: 67 U/L (ref 34–104)
ALT SERPL W P-5'-P-CCNC: 11 U/L (ref 7–52)
ANION GAP SERPL CALCULATED.3IONS-SCNC: 10 MMOL/L (ref 4–13)
APTT PPP: 27 SECONDS (ref 23–37)
AST SERPL W P-5'-P-CCNC: 11 U/L (ref 13–39)
ATRIAL RATE: 90 BPM
BACTERIA UR QL AUTO: ABNORMAL /HPF
BASOPHILS # BLD AUTO: 0.06 THOUSANDS/ÂΜL (ref 0–0.1)
BASOPHILS NFR BLD AUTO: 1 % (ref 0–1)
BILIRUB SERPL-MCNC: 0.22 MG/DL (ref 0.2–1)
BILIRUB UR QL STRIP: NEGATIVE
BNP SERPL-MCNC: 44 PG/ML (ref 0–100)
BUN SERPL-MCNC: 20 MG/DL (ref 5–25)
CALCIUM ALBUM COR SERPL-MCNC: 9.3 MG/DL (ref 8.3–10.1)
CALCIUM SERPL-MCNC: 8.8 MG/DL (ref 8.4–10.2)
CARDIAC TROPONIN I PNL SERPL HS: 3 NG/L
CARDIAC TROPONIN I PNL SERPL HS: 3 NG/L
CARDIAC TROPONIN I PNL SERPL HS: 4 NG/L
CHLORIDE SERPL-SCNC: 99 MMOL/L (ref 96–108)
CK SERPL-CCNC: 33 U/L (ref 26–192)
CLARITY UR: ABNORMAL
CO2 SERPL-SCNC: 23 MMOL/L (ref 21–32)
COLOR UR: YELLOW
CREAT SERPL-MCNC: 0.78 MG/DL (ref 0.6–1.3)
EOSINOPHIL # BLD AUTO: 0.65 THOUSAND/ÂΜL (ref 0–0.61)
EOSINOPHIL NFR BLD AUTO: 8 % (ref 0–6)
ERYTHROCYTE [DISTWIDTH] IN BLOOD BY AUTOMATED COUNT: 14.1 % (ref 11.6–15.1)
GFR SERPL CREATININE-BSD FRML MDRD: 80 ML/MIN/1.73SQ M
GLUCOSE SERPL-MCNC: 101 MG/DL (ref 65–140)
GLUCOSE SERPL-MCNC: 42 MG/DL (ref 65–140)
GLUCOSE SERPL-MCNC: 47 MG/DL (ref 65–140)
GLUCOSE SERPL-MCNC: 60 MG/DL (ref 65–140)
GLUCOSE SERPL-MCNC: 67 MG/DL (ref 65–140)
GLUCOSE UR STRIP-MCNC: NEGATIVE MG/DL
HCT VFR BLD AUTO: 38.2 % (ref 34.8–46.1)
HGB BLD-MCNC: 12.9 G/DL (ref 11.5–15.4)
HGB UR QL STRIP.AUTO: NEGATIVE
IMM GRANULOCYTES # BLD AUTO: 0.05 THOUSAND/UL (ref 0–0.2)
IMM GRANULOCYTES NFR BLD AUTO: 1 % (ref 0–2)
INR PPP: 0.94 (ref 0.84–1.19)
KETONES UR STRIP-MCNC: NEGATIVE MG/DL
LACTATE SERPL-SCNC: 2 MMOL/L (ref 0.5–2)
LACTATE SERPL-SCNC: 2.7 MMOL/L (ref 0.5–2)
LACTATE SERPL-SCNC: 4.1 MMOL/L (ref 0.5–2)
LEUKOCYTE ESTERASE UR QL STRIP: ABNORMAL
LYMPHOCYTES # BLD AUTO: 1.64 THOUSANDS/ÂΜL (ref 0.6–4.47)
LYMPHOCYTES NFR BLD AUTO: 20 % (ref 14–44)
MAGNESIUM SERPL-MCNC: 1.6 MG/DL (ref 1.9–2.7)
MCH RBC QN AUTO: 33.5 PG (ref 26.8–34.3)
MCHC RBC AUTO-ENTMCNC: 33.8 G/DL (ref 31.4–37.4)
MCV RBC AUTO: 99 FL (ref 82–98)
MONOCYTES # BLD AUTO: 0.56 THOUSAND/ÂΜL (ref 0.17–1.22)
MONOCYTES NFR BLD AUTO: 7 % (ref 4–12)
NEUTROPHILS # BLD AUTO: 5.32 THOUSANDS/ÂΜL (ref 1.85–7.62)
NEUTS SEG NFR BLD AUTO: 63 % (ref 43–75)
NITRITE UR QL STRIP: NEGATIVE
NON-SQ EPI CELLS URNS QL MICRO: ABNORMAL /HPF
NRBC BLD AUTO-RTO: 0 /100 WBCS
P AXIS: 35 DEGREES
PH UR STRIP.AUTO: 6.5 [PH]
PLATELET # BLD AUTO: 347 THOUSANDS/UL (ref 149–390)
PMV BLD AUTO: 9.1 FL (ref 8.9–12.7)
POTASSIUM SERPL-SCNC: 4.3 MMOL/L (ref 3.5–5.3)
PR INTERVAL: 188 MS
PROCALCITONIN SERPL-MCNC: <0.05 NG/ML
PROT SERPL-MCNC: 6.1 G/DL (ref 6.4–8.4)
PROT UR STRIP-MCNC: NEGATIVE MG/DL
PROTHROMBIN TIME: 12.7 SECONDS (ref 11.6–14.5)
QRS AXIS: 15 DEGREES
QRSD INTERVAL: 74 MS
QT INTERVAL: 376 MS
QTC INTERVAL: 459 MS
RBC # BLD AUTO: 3.85 MILLION/UL (ref 3.81–5.12)
RBC #/AREA URNS AUTO: ABNORMAL /HPF
SODIUM SERPL-SCNC: 132 MMOL/L (ref 135–147)
SP GR UR STRIP.AUTO: 1.01 (ref 1–1.03)
T WAVE AXIS: 61 DEGREES
TSH SERPL DL<=0.05 MIU/L-ACNC: 0.73 UIU/ML (ref 0.45–4.5)
UROBILINOGEN UR QL STRIP.AUTO: 0.2 E.U./DL
VENTRICULAR RATE: 90 BPM
WBC # BLD AUTO: 8.28 THOUSAND/UL (ref 4.31–10.16)
WBC #/AREA URNS AUTO: ABNORMAL /HPF

## 2023-05-20 RX ORDER — METOPROLOL SUCCINATE 25 MG/1
25 TABLET, EXTENDED RELEASE ORAL DAILY
Status: DISCONTINUED | OUTPATIENT
Start: 2023-05-21 | End: 2023-05-21

## 2023-05-20 RX ORDER — DEXTROSE MONOHYDRATE 25 G/50ML
50 INJECTION, SOLUTION INTRAVENOUS ONCE
Status: COMPLETED | OUTPATIENT
Start: 2023-05-20 | End: 2023-05-20

## 2023-05-20 RX ORDER — LISINOPRIL 5 MG/1
5 TABLET ORAL DAILY
Status: DISCONTINUED | OUTPATIENT
Start: 2023-05-21 | End: 2023-05-22 | Stop reason: HOSPADM

## 2023-05-20 RX ORDER — TRIAMCINOLONE ACETONIDE 1 MG/G
CREAM TOPICAL 2 TIMES DAILY
Status: DISCONTINUED | OUTPATIENT
Start: 2023-05-20 | End: 2023-05-20

## 2023-05-20 RX ORDER — ATORVASTATIN CALCIUM 40 MG/1
40 TABLET, FILM COATED ORAL
Status: DISCONTINUED | OUTPATIENT
Start: 2023-05-21 | End: 2023-05-22 | Stop reason: HOSPADM

## 2023-05-20 RX ORDER — SODIUM CHLORIDE, SODIUM GLUCONATE, SODIUM ACETATE, POTASSIUM CHLORIDE, MAGNESIUM CHLORIDE, SODIUM PHOSPHATE, DIBASIC, AND POTASSIUM PHOSPHATE .53; .5; .37; .037; .03; .012; .00082 G/100ML; G/100ML; G/100ML; G/100ML; G/100ML; G/100ML; G/100ML
1000 INJECTION, SOLUTION INTRAVENOUS ONCE
Status: COMPLETED | OUTPATIENT
Start: 2023-05-20 | End: 2023-05-20

## 2023-05-20 RX ORDER — LEVOTHYROXINE SODIUM 175 UG/1
175 TABLET ORAL
Status: DISCONTINUED | OUTPATIENT
Start: 2023-05-21 | End: 2023-05-22 | Stop reason: HOSPADM

## 2023-05-20 RX ORDER — VALACYCLOVIR HYDROCHLORIDE 500 MG/1
500 TABLET, FILM COATED ORAL 2 TIMES DAILY
Status: DISCONTINUED | OUTPATIENT
Start: 2023-05-20 | End: 2023-05-22 | Stop reason: HOSPADM

## 2023-05-20 RX ORDER — AZTREONAM 1 G/1
1000 INJECTION, POWDER, LYOPHILIZED, FOR SOLUTION INTRAMUSCULAR; INTRAVENOUS EVERY 8 HOURS
Status: DISCONTINUED | OUTPATIENT
Start: 2023-05-21 | End: 2023-05-20

## 2023-05-20 RX ORDER — ALBUTEROL SULFATE 90 UG/1
2 AEROSOL, METERED RESPIRATORY (INHALATION) EVERY 6 HOURS PRN
Status: DISCONTINUED | OUTPATIENT
Start: 2023-05-20 | End: 2023-05-22 | Stop reason: HOSPADM

## 2023-05-20 RX ORDER — OXYBUTYNIN CHLORIDE 5 MG/1
5 TABLET ORAL 3 TIMES DAILY
Status: DISCONTINUED | OUTPATIENT
Start: 2023-05-20 | End: 2023-05-22 | Stop reason: HOSPADM

## 2023-05-20 RX ORDER — SODIUM CHLORIDE 9 MG/ML
3 INJECTION INTRAVENOUS ONCE AS NEEDED
Status: DISCONTINUED | OUTPATIENT
Start: 2023-05-20 | End: 2023-05-22 | Stop reason: HOSPADM

## 2023-05-20 RX ORDER — PHENYTOIN SODIUM 100 MG/1
300 CAPSULE, EXTENDED RELEASE ORAL DAILY
Status: DISCONTINUED | OUTPATIENT
Start: 2023-05-21 | End: 2023-05-22 | Stop reason: HOSPADM

## 2023-05-20 RX ORDER — GABAPENTIN 400 MG/1
800 CAPSULE ORAL 4 TIMES DAILY
Status: DISCONTINUED | OUTPATIENT
Start: 2023-05-20 | End: 2023-05-22 | Stop reason: HOSPADM

## 2023-05-20 RX ORDER — PHENYTOIN SODIUM 100 MG/1
200 CAPSULE, EXTENDED RELEASE ORAL
Status: DISCONTINUED | OUTPATIENT
Start: 2023-05-20 | End: 2023-05-21

## 2023-05-20 RX ORDER — ACETAMINOPHEN 325 MG/1
650 TABLET ORAL EVERY 6 HOURS PRN
Status: DISCONTINUED | OUTPATIENT
Start: 2023-05-20 | End: 2023-05-20 | Stop reason: SDUPTHER

## 2023-05-20 RX ORDER — DEXTROSE MONOHYDRATE 25 G/50ML
25 INJECTION, SOLUTION INTRAVENOUS ONCE
Status: COMPLETED | OUTPATIENT
Start: 2023-05-20 | End: 2023-05-20

## 2023-05-20 RX ORDER — ZONISAMIDE 100 MG/1
100 CAPSULE ORAL 3 TIMES DAILY
Status: DISCONTINUED | OUTPATIENT
Start: 2023-05-20 | End: 2023-05-21

## 2023-05-20 RX ORDER — AZTREONAM 1 G/1
2000 INJECTION, POWDER, LYOPHILIZED, FOR SOLUTION INTRAMUSCULAR; INTRAVENOUS EVERY 8 HOURS
Status: DISCONTINUED | OUTPATIENT
Start: 2023-05-21 | End: 2023-05-20 | Stop reason: CLARIF

## 2023-05-20 RX ORDER — ENOXAPARIN SODIUM 100 MG/ML
40 INJECTION SUBCUTANEOUS DAILY
Status: DISCONTINUED | OUTPATIENT
Start: 2023-05-21 | End: 2023-05-22 | Stop reason: HOSPADM

## 2023-05-20 RX ORDER — PANTOPRAZOLE SODIUM 40 MG/1
40 TABLET, DELAYED RELEASE ORAL
Status: DISCONTINUED | OUTPATIENT
Start: 2023-05-21 | End: 2023-05-21

## 2023-05-20 RX ORDER — QUETIAPINE FUMARATE 100 MG/1
400 TABLET, FILM COATED ORAL
Status: DISCONTINUED | OUTPATIENT
Start: 2023-05-20 | End: 2023-05-21

## 2023-05-20 RX ORDER — MONTELUKAST SODIUM 10 MG/1
10 TABLET ORAL
Status: DISCONTINUED | OUTPATIENT
Start: 2023-05-20 | End: 2023-05-22 | Stop reason: HOSPADM

## 2023-05-20 RX ORDER — MAGNESIUM SULFATE HEPTAHYDRATE 40 MG/ML
2 INJECTION, SOLUTION INTRAVENOUS ONCE
Status: COMPLETED | OUTPATIENT
Start: 2023-05-20 | End: 2023-05-20

## 2023-05-20 RX ORDER — ACETAMINOPHEN 325 MG/1
650 TABLET ORAL EVERY 6 HOURS PRN
Status: DISCONTINUED | OUTPATIENT
Start: 2023-05-20 | End: 2023-05-22 | Stop reason: HOSPADM

## 2023-05-20 RX ORDER — TRIAMCINOLONE ACETONIDE 1 MG/G
CREAM TOPICAL 2 TIMES DAILY
Status: DISCONTINUED | OUTPATIENT
Start: 2023-05-20 | End: 2023-05-22 | Stop reason: HOSPADM

## 2023-05-20 RX ADMIN — VALACYCLOVIR HYDROCHLORIDE 500 MG: 500 TABLET, FILM COATED ORAL at 22:42

## 2023-05-20 RX ADMIN — VANCOMYCIN HYDROCHLORIDE 1250 MG: 500 INJECTION, POWDER, LYOPHILIZED, FOR SOLUTION INTRAVENOUS at 17:44

## 2023-05-20 RX ADMIN — GABAPENTIN 800 MG: 400 CAPSULE ORAL at 22:42

## 2023-05-20 RX ADMIN — SODIUM CHLORIDE, SODIUM GLUCONATE, SODIUM ACETATE, POTASSIUM CHLORIDE, MAGNESIUM CHLORIDE, SODIUM PHOSPHATE, DIBASIC, AND POTASSIUM PHOSPHATE 1000 ML: .53; .5; .37; .037; .03; .012; .00082 INJECTION, SOLUTION INTRAVENOUS at 17:38

## 2023-05-20 RX ADMIN — IOHEXOL 100 ML: 350 INJECTION, SOLUTION INTRAVENOUS at 18:09

## 2023-05-20 RX ADMIN — DEXTROSE MONOHYDRATE 50 ML: 25 INJECTION, SOLUTION INTRAVENOUS at 18:36

## 2023-05-20 RX ADMIN — LEVETIRACETAM 750 MG: 250 TABLET, FILM COATED ORAL at 22:46

## 2023-05-20 RX ADMIN — MAGNESIUM SULFATE HEPTAHYDRATE 2 G: 40 INJECTION, SOLUTION INTRAVENOUS at 17:41

## 2023-05-20 RX ADMIN — DEXTROSE MONOHYDRATE 25 ML: 25 INJECTION, SOLUTION INTRAVENOUS at 16:57

## 2023-05-20 RX ADMIN — AZTREONAM 2000 MG: 2 INJECTION, POWDER, LYOPHILIZED, FOR SOLUTION INTRAMUSCULAR; INTRAVENOUS at 22:38

## 2023-05-20 RX ADMIN — MONTELUKAST 10 MG: 10 TABLET, FILM COATED ORAL at 22:41

## 2023-05-20 RX ADMIN — SODIUM CHLORIDE, SODIUM GLUCONATE, SODIUM ACETATE, POTASSIUM CHLORIDE, MAGNESIUM CHLORIDE, SODIUM PHOSPHATE, DIBASIC, AND POTASSIUM PHOSPHATE 1000 ML: .53; .5; .37; .037; .03; .012; .00082 INJECTION, SOLUTION INTRAVENOUS at 20:11

## 2023-05-20 RX ADMIN — PHENYTOIN SODIUM 200 MG: 100 CAPSULE ORAL at 22:45

## 2023-05-20 RX ADMIN — SODIUM CHLORIDE, SODIUM GLUCONATE, SODIUM ACETATE, POTASSIUM CHLORIDE, MAGNESIUM CHLORIDE, SODIUM PHOSPHATE, DIBASIC, AND POTASSIUM PHOSPHATE 1000 ML: .53; .5; .37; .037; .03; .012; .00082 INJECTION, SOLUTION INTRAVENOUS at 19:13

## 2023-05-20 RX ADMIN — DEXTROSE MONOHYDRATE 50 ML: 25 INJECTION, SOLUTION INTRAVENOUS at 22:13

## 2023-05-20 RX ADMIN — OXYBUTYNIN CHLORIDE 5 MG: 5 TABLET ORAL at 22:41

## 2023-05-20 RX ADMIN — QUETIAPINE FUMARATE 400 MG: 100 TABLET ORAL at 22:43

## 2023-05-20 NOTE — ED PROVIDER NOTES
"History  Chief Complaint   Patient presents with   • Medical Problem     Patient states called EMS due to being \"unsteady\", also complain of cellulitis that she states is not improving, currently on abx for it  States fell Friday, denies LOC, takes aspirin, doctor was aware of fall  EMS reports BG of 51 at 1550 and they gave 15g glucose packet     74year old female with PMH DM, asthma, HLD, bipolar disorder, HTN, HLD, CAD, iron deficiency anemia, seizure disorder presents via EMS from home for evaluation of continued leg redness  Pt reports she was recently diagnosed with cellulitis on her legs  She notes this started out as dots but now redness is present  She states she saw her family doctor and was started on doxycycline 100 mg twice a day which she has been taking without improvement  She notes some pain in the legs but notes itchy as well  Has been taking vistaril without relief  She reports her legs feel swollen  Denies fever, chills  Denies cough, congestion or recent illness  Denies chest pain, SOB, N/V/D/C, abdominal pain  Admits to urinary frequency and incontinence, states she needs to use depends  Denies dizziness, lightheadedness or syncope, although notes she has been unsteady on her feet  She does report she fell on Friday  Denies hitting head  No LOC  No injuries reported to this fall  She notes issues with fluctuating blood sugars  She is on metformin, glipizide and insulin aspart  She reports sugars are sometimes high, sometimes low  She states she doesn't always eat regularly          History provided by:  Patient and medical records   used: No    Medical Problem  Associated symptoms: fatigue, myalgias and rash    Associated symptoms: no abdominal pain, no chest pain, no congestion, no cough, no diarrhea, no fever, no headaches, no loss of consciousness, no nausea, no rhinorrhea, no shortness of breath, no sore throat, no vomiting and no wheezing        Prior " to Admission Medications   Prescriptions Last Dose Informant Patient Reported? Taking? Acetaminophen (Tylenol) 325 MG CAPS   Yes No   Sig: Take 650 mg by mouth Three times daily as needed   Blood Glucose Monitoring Suppl (BLOOD GLUCOSE MONITOR SYSTEM) w/Device KIT   No No   Sig: Test blood sugars 3 times a day   Diclofenac Sodium (VOLTAREN) 1 %   Yes No   Sig: APPLY TO AFFECTED AREA EVERY 6 HOURS IF NEEDED FOR PAIN  Docusate Sodium (DSS) 100 MG CAPS   Yes No   Sig: Take 1 capsule by mouth 2 (two) times a day as needed   Incontinence Supply Disposable (PADSORBER BED PAN LINERS) MISC   No No   Sig: by Does not apply route as needed (incontinence)   Infant Care Products (CUTIES SENSITIVE WIPES) MISC   No No   Si Pieces by Does not apply route as needed (incontinence)   Misc  Devices (MATTRESS PAD) MISC   No No   Sig: by Does not apply route daily   Multiple Vitamins-Minerals (Centrum Silver 50+Women) TABS   Yes No   Sig: Take by mouth   ONETOUCH DELICA LANCETS 50K MISC   No No   Sig: Test blood sugars 3 times a day  QUEtiapine (SEROquel) 400 MG tablet   No No   Sig: Take 1 tablet (400 mg total) by mouth daily at bedtime for 2 doses   Skin Protectants, Misc   (CALAZIME SKIN PROTECTANT) PSTE   No No   Sig: Apply 113 g topically 2 (two) times a day   Patient not taking: Reported on 2020   albuterol (VENTOLIN HFA) 90 mcg/act inhaler   No No   Sig: Inhale 2 puffs every 6 (six) hours as needed for wheezing   aspirin (ECOTRIN LOW STRENGTH) 81 mg EC tablet   No No   Sig: Take 1 tablet (81 mg total) by mouth daily   estradiol (ESTRACE) 1 mg tablet   Yes No   Sig: Take 1 mg by mouth daily at bedtime     ferrous sulfate (FeroSul) 325 (65 Fe) mg tablet   No No   Sig: Take 1 tablet (325 mg total) by mouth daily with breakfast   Patient not taking: Reported on 11/15/2020   fluticasone (FLONASE) 50 mcg/act nasal spray   No No   Sig: SPRAY ONE (1) SPRAY INTO EACH NOSTRIL ONCE DAILY   furosemide (LASIX) 40 mg tablet Yes No   Sig: Take 40 mg by mouth as needed   Patient not taking: Reported on 1/22/2022    gabapentin (NEURONTIN) 800 mg tablet   No No   Sig: Take 1 tablet (800 mg total) by mouth 4 (four) times a day for 3 days   glucose blood (ONE TOUCH ULTRA TEST) test strip   No No   Sig: Test blood sugars 3 times a day    hydrOXYzine HCL (ATARAX) 10 mg tablet   No No   Sig: Take 1 tablet (10 mg total) by mouth every 6 (six) hours as needed for itching   Patient not taking: Reported on 11/15/2020   hydrOXYzine pamoate (VISTARIL) 50 mg capsule   No No   Sig: Take 1 capsule (50 mg total) by mouth 3 (three) times a day   Patient not taking: Reported on 11/15/2020   levETIRAcetam (KEPPRA) 750 mg tablet   No No   Sig: Take 2 tablets (1,500 mg total) by mouth 2 (two) times a day for 15 days Pt states she takes 1500MG Bid   Patient taking differently: Take 750 mg by mouth 2 (two) times a day Pt states she takes 1500MG Bid    levothyroxine 150 mcg tablet   No No   Sig: Take 1 tablet (150 mcg total) by mouth daily   levothyroxine 175 mcg tablet   Yes No   Sig: Take 175 mcg by mouth in the morning   lisinopril (ZESTRIL) 10 mg tablet   Yes No   Sig: Take 1 tablet by mouth in the morning   lisinopril (ZESTRIL) 5 mg tablet   No No   Sig: Take 1 tablet (5 mg total) by mouth daily   Patient not taking: Reported on 1/22/2022    loratadine (CLARITIN) 10 mg tablet   No No   Sig: Take 1 tablet (10 mg total) by mouth daily   Patient not taking: Reported on 11/15/2020   magnesium chloride (MAG64) 64 MG TBEC EC tablet   No No   Sig: Take 1 tablet (64 mg total) by mouth 2 (two) times a day   meloxicam (MOBIC) 7 5 mg tablet   No No   Sig: Take 1 tablet (7 5 mg total) by mouth 2 (two) times a day   Patient not taking: Reported on 1/22/2022    metFORMIN (GLUCOPHAGE) 500 mg tablet   No No   Sig: Take 1 tablet (500 mg total) by mouth 2 (two) times a day with meals   metoprolol succinate (TOPROL-XL) 25 mg 24 hr tablet   No No   Sig: Take 1 tablet (25 mg total) by mouth daily   montelukast (SINGULAIR) 10 mg tablet   No No   Sig: Take 1 tablet (10 mg total) by mouth daily at bedtime   multivitamin (THERAGRAN) TABS   Yes No   Sig: Take 1 tablet by mouth daily   norethindrone (AYGESTIN) 5 mg tablet   Yes No   Sig: Take 5 mg by mouth daily at bedtime    nystatin (MYCOSTATIN) ointment   Yes No   omeprazole (PriLOSEC) 20 mg delayed release capsule   No No   Sig: TAKE ONE CAPSULE BY MOUTH TWICE A DAY   Patient not taking: Reported on 11/15/2020   omeprazole (PriLOSEC) 40 MG capsule   Yes No   Sig: Take 40 mg by mouth 2 (two) times a day   oxybutynin (DITROPAN) 5 mg tablet   No No   Sig: Take 1 tablet (5 mg total) by mouth 3 (three) times a day   phenytoin (DILANTIN) 100 mg ER capsule   Yes No   Sig: Take by mouth 5 (five) times a day 3 tablets in morning and 2 at lunch daily    polyethylene glycol (GLYCOLAX) powder   No No   Sig: Take 17 g by mouth daily   Patient not taking: Reported on 11/15/2020   polyvinyl alcohol (LIQUIFILM TEARS) 1 4 % ophthalmic solution   No No   Sig: Administer 1 drop to both eyes 3 (three) times a day   simvastatin (ZOCOR) 80 mg tablet   No No   Sig: Take 1 tablet (80 mg total) by mouth daily at bedtime   triamcinolone (KENALOG) 0 1 % cream   No No   Sig: Apply topically 2 (two) times a day   Patient not taking: Reported on 11/13/2020   triamcinolone (KENALOG) 0 1 % cream   No No   Sig: Apply topically 2 (two) times a day   valACYclovir (VALTREX) 500 mg tablet   Yes No   Sig: Take 1 tablet by mouth 2 (two) times a day   zonisamide (ZONEGRAN) 100 mg capsule   Yes No   Sig: Take 100 mg by mouth 3 (three) times a day        Facility-Administered Medications: None       Past Medical History:   Diagnosis Date   • Asthma    • Bipolar disorder ([facility] )    • Chronic UTI (urinary tract infection)    • COPD (chronic obstructive pulmonary disease) (HCC)    • Diabetes mellitus ([facility] )    • Disease of thyroid gland    • Dyslipidemia 10/31/2018   • Essential hypertension 10/31/2018   • GERD (gastroesophageal reflux disease)    • Heart attack ([facility] )    • Iron deficiency anemia, unspecified 7/29/2019   • Obesity due to excess calories 10/31/2018   • Recurrent falls 4/13/2019   • Seizure [facility])        Past Surgical History:   Procedure Laterality Date   • ANKLE FRACTURE SURGERY Right    • TUBAL LIGATION     • VEIN LIGATION AND STRIPPING         Family History   Problem Relation Age of Onset   • Skeletal dysplasia Mother    • Stroke Father      I have reviewed and agree with the history as documented  E-Cigarette/Vaping   • E-Cigarette Use Never User      E-Cigarette/Vaping Substances   • Nicotine No    • THC No    • CBD No    • Flavoring No    • Other No    • Unknown No      Social History     Tobacco Use   • Smoking status: Never   • Smokeless tobacco: Never   Vaping Use   • Vaping Use: Never used   Substance Use Topics   • Alcohol use: Not Currently     Alcohol/week: 0 0 standard drinks   • Drug use: No       Review of Systems   Constitutional: Positive for fatigue  Negative for chills and fever  HENT: Negative  Negative for congestion, rhinorrhea and sore throat  Eyes: Negative  Negative for visual disturbance  Respiratory: Negative  Negative for cough, shortness of breath and wheezing  Cardiovascular: Positive for leg swelling  Negative for chest pain and palpitations  Gastrointestinal: Negative  Negative for abdominal pain, constipation, diarrhea, nausea and vomiting  Genitourinary: Positive for frequency  Negative for dysuria, flank pain and hematuria  Musculoskeletal: Positive for myalgias  Negative for back pain and neck pain  Skin: Positive for color change and rash  Neurological: Negative  Negative for dizziness, loss of consciousness, light-headedness, numbness and headaches  Psychiatric/Behavioral: Negative  Negative for confusion  All other systems reviewed and are negative        Physical Exam  Physical Exam  Vitals and nursing note reviewed  Constitutional:       General: She is awake  She is not in acute distress  Appearance: She is well-developed  She is obese  She is not toxic-appearing  HENT:      Head: Normocephalic and atraumatic  Right Ear: Hearing and external ear normal       Left Ear: Hearing and external ear normal       Nose: Nose normal       Mouth/Throat:      Mouth: Mucous membranes are moist       Tongue: Tongue does not deviate from midline  Pharynx: Oropharynx is clear  Uvula midline  Eyes:      General: Lids are normal  No scleral icterus  Conjunctiva/sclera: Conjunctivae normal       Pupils: Pupils are equal, round, and reactive to light  Comments: +glasses   Neck:      Trachea: Trachea and phonation normal  No tracheal deviation  Cardiovascular:      Rate and Rhythm: Normal rate and regular rhythm  Pulses: Normal pulses  Radial pulses are 2+ on the right side and 2+ on the left side  Dorsalis pedis pulses are 2+ on the right side and 2+ on the left side  Posterior tibial pulses are 2+ on the right side and 2+ on the left side  Heart sounds: Normal heart sounds, S1 normal and S2 normal  No murmur heard  Comments: Trace b/l lower extremity edema  Erythema on the lower legs - see photo under media tab  No noted open wounds  Pulmonary:      Effort: Pulmonary effort is normal  No tachypnea or respiratory distress  Breath sounds: Normal breath sounds  No wheezing, rhonchi or rales  Abdominal:      General: Bowel sounds are normal  There is no distension  Palpations: Abdomen is soft  Tenderness: There is no abdominal tenderness  There is no guarding or rebound  Musculoskeletal:      Cervical back: Normal range of motion and neck supple  Skin:     General: Skin is warm and dry  Capillary Refill: Capillary refill takes less than 2 seconds  Findings: Erythema and rash present        Comments: B/l anterior lower extremities - see photo under media tab  Neurological:      General: No focal deficit present  Mental Status: She is alert and oriented to person, place, and time  GCS: GCS eye subscore is 4  GCS verbal subscore is 5  GCS motor subscore is 6  Cranial Nerves: No cranial nerve deficit  Sensory: No sensory deficit  Motor: No abnormal muscle tone  Psychiatric:         Mood and Affect: Mood is anxious           Speech: Speech normal          Behavior: Behavior normal          Vital Signs  ED Triage Vitals [05/20/23 1613]   Temperature Pulse Respirations Blood Pressure SpO2   97 6 °F (36 4 °C) 84 20 135/66 94 %      Temp Source Heart Rate Source Patient Position - Orthostatic VS BP Location FiO2 (%)   Temporal Monitor Lying Right arm --      Pain Score       No Pain           Vitals:    05/20/23 1613 05/20/23 1913 05/20/23 2030   BP: 135/66 131/69 135/81   Pulse: 84 72 84   Patient Position - Orthostatic VS: Lying           Visual Acuity      ED Medications  Medications   sodium chloride (PF) 0 9 % injection 3 mL (has no administration in time range)   acetaminophen (TYLENOL) tablet 650 mg (has no administration in time range)   albuterol (PROVENTIL HFA,VENTOLIN HFA) inhaler 2 puff (has no administration in time range)   aspirin (ECOTRIN LOW STRENGTH) EC tablet 81 mg (has no administration in time range)   gabapentin (NEURONTIN) capsule 800 mg (has no administration in time range)   levETIRAcetam (KEPPRA) tablet 750 mg (has no administration in time range)   levothyroxine tablet 175 mcg (has no administration in time range)   lisinopril (ZESTRIL) tablet 5 mg (has no administration in time range)   metoprolol succinate (TOPROL-XL) 24 hr tablet 25 mg (has no administration in time range)   montelukast (SINGULAIR) tablet 10 mg (has no administration in time range)   pantoprazole (PROTONIX) EC tablet 40 mg (has no administration in time range)   oxybutynin (DITROPAN) tablet 5 mg (has no administration in time range)   phenytoin (DILANTIN) ER capsule 300 mg (has no administration in time range)   phenytoin (DILANTIN) ER capsule 200 mg (has no administration in time range)   QUEtiapine (SEROquel) tablet 400 mg (has no administration in time range)   atorvastatin (LIPITOR) tablet 40 mg (has no administration in time range)   valACYclovir (VALTREX) tablet 500 mg (has no administration in time range)   zonisamide (ZONEGRAN) capsule 100 mg (has no administration in time range)   enoxaparin (LOVENOX) subcutaneous injection 40 mg (has no administration in time range)   aztreonam (AZACTAM) 2,000 mg in sodium chloride 0 9 % 100 mL IVPB (has no administration in time range)   dextrose 50 % IV solution 25 mL (25 mL Intravenous Given 5/20/23 1657)   magnesium sulfate 2 g/50 mL IVPB (premix) 2 g (0 g Intravenous Stopped 5/20/23 1941)   vancomycin (VANCOCIN) 1250 mg in sodium chloride 0 9% 250 mL IVPB (1,250 mg Intravenous New Bag 5/20/23 1744)   multi-electrolyte (PLASMALYTE-A/ISOLYTE-S PH 7 4) IV solution 1,000 mL (0 mL Intravenous Stopped 5/20/23 1808)     Followed by   multi-electrolyte (PLASMALYTE-A/ISOLYTE-S PH 7 4) IV solution 1,000 mL (1,000 mL Intravenous New Bag 5/20/23 1913)     Followed by   multi-electrolyte (PLASMALYTE-A/ISOLYTE-S PH 7 4) IV solution 1,000 mL (1,000 mL Intravenous New Bag 5/20/23 1913)     Followed by   multi-electrolyte (PLASMALYTE-A/ISOLYTE-S PH 7 4) IV solution 1,000 mL (1,000 mL Intravenous New Bag 5/20/23 2011)   iohexol (OMNIPAQUE) 350 MG/ML injection (SINGLE-DOSE) 100 mL (100 mL Intravenous Given 5/20/23 1809)   dextrose 50 % IV solution 50 mL (50 mL Intravenous Given 5/20/23 1836)       Diagnostic Studies  Results Reviewed     Procedure Component Value Units Date/Time    HS Troponin I 2hr [[id number]]  (Normal) Collected: 05/20/23 1935    Lab Status: Final result Specimen: Blood from Arm, Right Updated: 05/20/23 2008     hs TnI 2hr 3 ng/L      Delta 2hr hsTnI 0 ng/L     Lactic acid, plasma (w/reflex if result > 2 0) [[id number]]     Lab Status: No result Specimen: Blood     Lactic acid 2 Hours [[id number]]  (Abnormal) Collected: 05/20/23 1935    Lab Status: Final result Specimen: Blood from Arm, Right Updated: 05/20/23 2005     LACTIC ACID 2 7 mmol/L     Narrative:      Result may be elevated if tourniquet was used during collection      Fingerstick Glucose (POCT) [[id number]]  (Normal) Collected: 05/20/23 1918    Lab Status: Final result Updated: 05/20/23 1919     POC Glucose 101 mg/dl     HS Troponin I 4hr [[id number]]     Lab Status: No result Specimen: Blood     Urine Microscopic [[id number]]  (Abnormal) Collected: 05/20/23 1827    Lab Status: Final result Specimen: Urine, Clean Catch Updated: 05/20/23 1844     RBC, UA 1-2 /hpf      WBC, UA 4-10 /hpf      Epithelial Cells Occasional /hpf      Bacteria, UA Innumerable /hpf     UA w Reflex to Microscopic w Reflex to Culture [[id number]]  (Abnormal) Collected: 05/20/23 1827    Lab Status: Final result Specimen: Urine, Clean Catch Updated: 05/20/23 1834     Color, UA Yellow     Clarity, UA Slightly Cloudy     Specific [place], UA 1 010     pH, UA 6 5     Leukocytes, UA Trace     Nitrite, UA Negative     Protein, UA Negative mg/dl      Glucose, UA Negative mg/dl      Ketones, UA Negative mg/dl      Urobilinogen, UA 0 2 E U /dl      Bilirubin, UA Negative     Occult Blood, UA Negative    Fingerstick Glucose (POCT) [[id number]]  (Abnormal) Collected: 05/20/23 1829    Lab Status: Final result Updated: 05/20/23 1830     POC Glucose 47 mg/dl     TSH, 3rd generation with Free T4 reflex [[id number]]  (Normal) Collected: 05/20/23 1645    Lab Status: Final result Specimen: Blood from Arm, Left Updated: 05/20/23 1728     TSH 3RD GENERATON 0 729 uIU/mL     Procalcitonin [[id number]]  (Normal) Collected: 05/20/23 1645    Lab Status: Final result Specimen: Blood from Arm, Left Updated: 05/20/23 1722     Procalcitonin <0 05 ng/ml     HS Troponin 0hr (reflex protocol) [[id number]]  (Normal) Collected: 05/20/23 1645    Lab Status: Final result Specimen: Blood from Arm, Left Updated: 05/20/23 1719     hs TnI 0hr 3 ng/L     B-Type Natriuretic Peptide(BNP) [[id number]]  (Normal) Collected: 05/20/23 1645    Lab Status: Final result Specimen: Blood from Arm, Left Updated: 05/20/23 1718     BNP 44 pg/mL     Lactic acid [[id number]]  (Abnormal) Collected: 05/20/23 1645    Lab Status: Final result Specimen: Blood from Arm, Left Updated: 05/20/23 1715     LACTIC ACID 4 1 mmol/L     Narrative:      Result may be elevated if tourniquet was used during collection      CK [[id number]]  (Normal) Collected: 05/20/23 1645    Lab Status: Final result Specimen: Blood from Arm, Left Updated: 05/20/23 1712     Total CK 33 U/L     Comprehensive metabolic panel [[id number]]  (Abnormal) Collected: 05/20/23 1645    Lab Status: Final result Specimen: Blood from Arm, Left Updated: 05/20/23 1711     Sodium 132 mmol/L      Potassium 4 3 mmol/L      Chloride 99 mmol/L      CO2 23 mmol/L      ANION GAP 10 mmol/L      BUN 20 mg/dL      Creatinine 0 78 mg/dL      Glucose 67 mg/dL      Calcium 8 8 mg/dL      Corrected Calcium 9 3 mg/dL      AST 11 U/L      ALT 11 U/L      Alkaline Phosphatase 67 U/L      Total Protein 6 1 g/dL      Albumin 3 4 g/dL      Total Bilirubin 0 22 mg/dL      eGFR 80 ml/min/1 73sq m     Narrative:      [place] guidelines for Chronic Kidney Disease (CKD):   •  Stage 1 with normal or high GFR (GFR > 90 mL/min/1 73 square meters)  •  Stage 2 Mild CKD (GFR = 60-89 mL/min/1 73 square meters)  •  Stage 3A Moderate CKD (GFR = 45-59 mL/min/1 73 square meters)  •  Stage 3B Moderate CKD (GFR = 30-44 mL/min/1 73 square meters)  •  Stage 4 Severe CKD (GFR = 15-29 mL/min/1 73 square meters)  •  Stage 5 End Stage CKD (GFR <15 mL/min/1 73 square meters)  Note: GFR calculation is accurate only with a steady state creatinine    Magnesium [[id number]]  (Abnormal) Collected: 05/20/23 1645    Lab Status: Final result Specimen: Blood from Arm, Left Updated: 05/20/23 1711     Magnesium 1 6 mg/dL     Protime-INR [[id number]]  (Normal) Collected: 05/20/23 1645    Lab Status: Final result Specimen: Blood from Arm, Left Updated: 05/20/23 1706     Protime 12 7 seconds      INR 0 94    APTT [[id number]]  (Normal) Collected: 05/20/23 1645    Lab Status: Final result Specimen: Blood from Arm, Left Updated: 05/20/23 1706     PTT 27 seconds     CBC and differential [[id number]]  (Abnormal) Collected: 05/20/23 1645    Lab Status: Final result Specimen: Blood from Arm, Left Updated: 05/20/23 1655     WBC 8 28 Thousand/uL      RBC 3 85 Million/uL      Hemoglobin 12 9 g/dL      Hematocrit 38 2 %      MCV 99 fL      MCH 33 5 pg      MCHC 33 8 g/dL      RDW 14 1 %      MPV 9 1 fL      Platelets 417 Thousands/uL      nRBC 0 /100 WBCs      Neutrophils Relative 63 %      Immat GRANS % 1 %      Lymphocytes Relative 20 %      Monocytes Relative 7 %      Eosinophils Relative 8 %      Basophils Relative 1 %      Neutrophils Absolute 5 32 Thousands/µL      Immature Grans Absolute 0 05 Thousand/uL      Lymphocytes Absolute 1 64 Thousands/µL      Monocytes Absolute 0 56 Thousand/µL      Eosinophils Absolute 0 65 Thousand/µL      Basophils Absolute 0 06 Thousands/µL     Phenytoin level, total [[id number]] Collected: 05/20/23 1645    Lab Status: In process Specimen: Blood from Arm, Left Updated: 05/20/23 1653    Levetiracetam level [[id number]] Collected: 05/20/23 1645    Lab Status: In process Specimen: Blood from Arm, Left Updated: 05/20/23 1652    Blood culture #2 [[id number]] Collected: 05/20/23 1645    Lab Status: In process Specimen: Blood from Arm, Left Updated: 05/20/23 1652    Blood culture #1 [[id number]] Collected: 05/20/23 1645    Lab Status:  In process Specimen: Blood from Arm, Left Updated: 05/20/23 1652    Fingerstick Glucose (POCT) [[id number]]  (Abnormal) Collected: 05/20/23 1612    Lab Status: Final result Updated: 05/20/23 1624     POC Glucose 42 mg/dl                  CT head without contrast   Final Result by Sarah Hubbard MD (05/20 1825)      No acute intracranial abnormality  Workstation performed: [id number]         CT chest abdomen pelvis w contrast   Final Result by Sarah Hubbard MD (05/20 1835)      No acute pathology in the chest, abdomen or pelvis              Workstation performed: [id number]         XR chest portable    (Results Pending)   VAS lower limb venous duplex study, complete bilateral    (Results Pending)              Procedures  ECG 12 Lead Documentation Only    Date/Time: 5/20/2023 4:56 PM  Performed by: Sheree Meza PA-C  Authorized by: Sheree Meza PA-C     Indications / Diagnosis:  Weakness  ECG reviewed by me, the ED Provider: yes    Patient location:  ED  Previous ECG:     Comparison to cardiac monitor: Yes    Interpretation:     Interpretation: non-specific    Rate:     ECG rate:  90    ECG rate assessment: normal    Rhythm:     Rhythm: sinus rhythm    Ectopy:     Ectopy: none    QRS:     QRS axis:  Normal    QRS intervals:  Normal  Conduction:     Conduction: normal    ST segments:     ST segments:  Normal  T waves:     T waves: non-specific    Comments:      , QRS 74, QT/QTc 376/459; no acute ischemic changes, low voltage QRS    CriticalCare Time    Date/Time: 5/20/2023 8:59 PM  Performed by: Sheree Meza PA-C  Authorized by: Sheree Meza PA-C     Critical care provider statement:     Critical care time (minutes):  35    Critical care time was exclusive of:  Separately billable procedures and treating other patients    Critical care was necessary to treat or prevent imminent or life-threatening deterioration of the following conditions:  Sepsis and endocrine crisis    Critical care was time spent personally by me on the following activities:  Obtaining history from patient or surrogate, development of treatment plan with patient or surrogate, discussions with consultants, evaluation of patient's response to treatment, examination of patient, ordering and performing treatments and interventions, ordering and review of laboratory studies, ordering and review of radiographic studies, re-evaluation of patient's condition and review of old charts    I assumed direction of critical care for this patient from another provider in my specialty: no               ED Course  ED Course as of 05/20/23 2111   Sat May 20, 2023   1615 POC Glucose(!): 42   1621 Pt drinking orange juice for low sugar  She is awake, alert, conversant  [address] prior records  Last HgbA1c well controlled on 1/24/23 at 5 3%  was seen by PCP in office for LE cellulitis on 5/15/23 and placed on doxycycline 100 mg BID x5d  Her lisinopril was also decreased to 5 mg daily due to low BP readings  1658 WBC: 8 28   1658 Hemoglobin: 12 9   1658 Platelet Count: 031   1658 Eosinophils(!): 8   1707 POCT INR: 0 94   1707 PROTIME: 12 7   1707 PTT: 27   1708 XR chest portable  Independently viewed and interpreted by me - no acute cardiopulmonary process, limited due to overlying cardiac leads, cannot exclude small effusion RLL; pending official read  [id number] Vascular tech was contacted via TT and aware of ordered study  1712 Glucose, Random: 67   1713 Creatinine: 0 78   1713 BUN: 20   1713 Sodium(!): 132  Value of 131 a year ago   1713 Potassium: 4 3   1713 Chloride: 99   1713 CO2: 23   1713 Anion Gap: 10   1713 CORRECTED CALCIUM: 9 3   1713 AST(!): 11   1713 ALT: 11   1713 Alkaline Phosphatase: 67   1713 Total Protein(!): 6 1   1713 Albumin(!): 3 4   1713 TOTAL BILIRUBIN: 0 22   1713 eGFR: 80   1713 Magnesium(!): 1 6  Decreased, will replete   1713 Total CK: 33   1713 Critical from lab, lactic 4 1  Pt was difficult stick for IV/labs, could be related to specimen collection  Although there is concern for infection given cellulitis of legs     1718 BNP: 44   1719 hs TnI 0hr: 3  Not c/w ACS 1727 Procalcitonin: <0 05   1729 TSH 3RD GENERATON: 0 729   1823 Vascular currently at bedside starting exam   Pt updated on results thus far  Discussed plan for admission after completion of testing and pt agreeable  1828 CT head without contrast  IMPRESSION:     No acute intracranial abnormality  1833 POC Glucose(!): 47  Again dropped; will provide tray and give additional D50    1836 Leukocytes, UA(!): Trace  UA shows trace leukocytes, otherwise negative   1848 WBC, UA(!): 4-10   1848 Bacteria, UA(!): Innumerable  UA concerning for infection   1848 CT chest abdomen pelvis w contrast  IMPRESSION:     No acute pathology in the chest, abdomen or pelvis  1849 TT to on call SLIM to discuss admission  1857 Discussed with vascular tech Kiara and negative for DVT in bilateral lower extremities  1900 Pt updated on additional results  Agreeable to plan of care as previously discussed  1911 Discussed with Dr Jeannine Fink of [address] [place] [address], accepts for obs  1936 POC Glucose: 101   2010 Delta 2hr hsTnI: 0   2010 LACTIC ACID(!!): 2 7  Down trending from 4 1 earlier     Pt admitted in stable condition  HEART Risk Score    Flowsheet Row Most Recent Value   Heart Score Risk Calculator    History 0 Filed at: 05/20/2023 1723   ECG 1 Filed at: 05/20/2023 1723   Age 2 Filed at: 05/20/2023 1723   Risk Factors 2 Filed at: 05/20/2023 1723   Troponin 0 Filed at: 05/20/2023 1723   HEART Score 5 Filed at: 05/20/2023 1723                     Initial Sepsis Screening     Row Name 05/20/23 1724                Is the patient's history suggestive of a new or worsening infection? Yes (Proceed)  -KO        Suspected source of infection soft tissue  -KO        Indicate SIRS criteria --        Are two or more of the above signs & symptoms of infection both present and new to the patient?  No  -KO              User Key  (r) = Recorded By, (t) = Taken By, (c) = Cosigned By    [address] Name Provider Type    PETER Burnette PA-C Physician Assistant                SBIRT 22yo+    Flowsheet Row Most Recent Value   Initial Alcohol Screen: US AUDIT-C     1  How often do you have a drink containing alcohol? 0 Filed at: 05/20/2023 1613   3b  FEMALE Any Age, or MALE 65+: How often do you have 4 or more drinks on one occassion? 0 Filed at: 05/20/2023 1613   Audit-C Score 0 Filed at: 05/20/2023 1613   ELIJAH: How many times in the past year have you    Used an illegal drug or used a prescription medication for non-medical reasons? Never Filed at: 05/20/2023 1613                    Medical Decision Making  73 yo female presenting for evaluation of worsening redness/cellulitis of bilateral lower extremities as well as low blood sugar  Pt did have hypoglycemic episode upon arrival but corrected  Given persistent/worsening cellulitis despite current Rx of doxycycline, will proceed with sepsis labs/work up  Will obtain venous duplex of the legs to rule out DVT  Will check EKG, CXR and labs as well as UA  Pt noted to have numerous drug allergies, specifically antibiotics  Anticipate need for admission for failed outpatient management of her cellulitis, hypoglycemic episodes as well as generalized weakness  Work up obtained as noted above  Significantly elevated lactic acid  Pt afebrile, vitals stable  Normal WBC  No noted SIRS criteria  Pt covered with fluid boluses as well as broad spectrum antibiotics  Lactic acid still elevated on repeat but down trending  Cultures were obtained  Plan to admit for further IV antibiotic therapy  CXR without acute infectious findings such as pneumonia, no noted vascular congestion  Venous duplex was negative for DVT bilaterally  Hypomagnesemia was repleted  CT imaging of head, chest/abd/pelv without acute findings  UA suggestive of infection  Pt did have a second episode of hypoglycemia which was again corrected  Pt did not have any major symptoms related to this    She was awake, alert and oriented through these  She tolerated food intake  This is suspected to be in setting of overcorrection of her blood sugars with her diabetes medications to include insulin and sulfonylurea and poor PO intake  SLIM consulted for admission  Pt comfortable with plan and admitted in stable condition  Please refer to above ER course for further details/discussion  Elevated lactic acid level: acute illness or injury  Hypoglycemia due to type 2 diabetes mellitus ([place] [address] ): acute illness or injury  Hypomagnesemia: acute illness or injury  Lower extremity cellulitis: acute illness or injury  UTI (urinary tract infection): acute illness or injury  Amount and/or Complexity of Data Reviewed  External Data Reviewed: labs, radiology and notes  Labs: ordered  Decision-making details documented in ED Course  Radiology: ordered and independent interpretation performed  Decision-making details documented in ED Course  ECG/medicine tests: ordered and independent interpretation performed  Decision-making details documented in ED Course  Discussion of management or test interpretation with external provider(s): Attending, SLIM    Risk  Prescription drug management  Decision regarding hospitalization            Disposition  Final diagnoses:   Lower extremity cellulitis   Hypoglycemia due to type 2 diabetes mellitus ([place] [address] )   UTI (urinary tract infection)   Elevated lactic acid level   Hypomagnesemia     Time reflects when diagnosis was documented in both MDM as applicable and the Disposition within this note     Time User Action Codes Description Comment    5/20/2023  7:12 PM Viral Higgins [R26 595] Lower extremity cellulitis     5/20/2023  7:12 PM Viral Passadria [E11 649] Hypoglycemia due to type 2 diabetes mellitus ([place] [address] )     5/20/2023  7:13 PM Cala Maxx Add [N39 0] UTI (urinary tract infection)     5/20/2023  7:13 PM Viral Higgins [R79 89] Elevated lactic acid level     5/20/2023  7:13 PM Ardia Maxx Add [E83 42] Hypomagnesemia     5/20/2023  9:08 PM Gena Mathew Add [R21] Rash       ED Disposition     ED Disposition   Admit    Condition   Stable    Date/Time   Sat May 20, 2023  7:12 PM    Comment   Case was discussed with Dr Roman Mercado and the patient's admission status was agreed to be Admission Status: observation status to the service of Dr Roman Mercado  Follow-up Information    None         Patient's Medications   Discharge Prescriptions    No medications on file       No discharge procedures on file      PDMP Review     None          ED Provider  Electronically Signed by           Kae Gunderson PA-C  05/20/23 5633

## 2023-05-21 LAB
ANION GAP SERPL CALCULATED.3IONS-SCNC: 6 MMOL/L (ref 4–13)
BASOPHILS # BLD AUTO: 0.06 THOUSANDS/ÂΜL (ref 0–0.1)
BASOPHILS NFR BLD AUTO: 1 % (ref 0–1)
BUN SERPL-MCNC: 14 MG/DL (ref 5–25)
CALCIUM SERPL-MCNC: 7.8 MG/DL (ref 8.4–10.2)
CHLORIDE SERPL-SCNC: 103 MMOL/L (ref 96–108)
CO2 SERPL-SCNC: 28 MMOL/L (ref 21–32)
CREAT SERPL-MCNC: 0.54 MG/DL (ref 0.6–1.3)
EOSINOPHIL # BLD AUTO: 0.94 THOUSAND/ÂΜL (ref 0–0.61)
EOSINOPHIL NFR BLD AUTO: 15 % (ref 0–6)
ERYTHROCYTE [DISTWIDTH] IN BLOOD BY AUTOMATED COUNT: 14.6 % (ref 11.6–15.1)
GFR SERPL CREATININE-BSD FRML MDRD: 99 ML/MIN/1.73SQ M
GLUCOSE SERPL-MCNC: 130 MG/DL (ref 65–140)
GLUCOSE SERPL-MCNC: 139 MG/DL (ref 65–140)
GLUCOSE SERPL-MCNC: 151 MG/DL (ref 65–140)
GLUCOSE SERPL-MCNC: 157 MG/DL (ref 65–140)
GLUCOSE SERPL-MCNC: 255 MG/DL (ref 65–140)
HCT VFR BLD AUTO: 35 % (ref 34.8–46.1)
HGB BLD-MCNC: 11.7 G/DL (ref 11.5–15.4)
IMM GRANULOCYTES # BLD AUTO: 0.05 THOUSAND/UL (ref 0–0.2)
IMM GRANULOCYTES NFR BLD AUTO: 1 % (ref 0–2)
LYMPHOCYTES # BLD AUTO: 1.24 THOUSANDS/ÂΜL (ref 0.6–4.47)
LYMPHOCYTES NFR BLD AUTO: 20 % (ref 14–44)
MAGNESIUM SERPL-MCNC: 2.1 MG/DL (ref 1.9–2.7)
MCH RBC QN AUTO: 33.5 PG (ref 26.8–34.3)
MCHC RBC AUTO-ENTMCNC: 33.4 G/DL (ref 31.4–37.4)
MCV RBC AUTO: 100 FL (ref 82–98)
MONOCYTES # BLD AUTO: 0.61 THOUSAND/ÂΜL (ref 0.17–1.22)
MONOCYTES NFR BLD AUTO: 10 % (ref 4–12)
NEUTROPHILS # BLD AUTO: 3.25 THOUSANDS/ÂΜL (ref 1.85–7.62)
NEUTS SEG NFR BLD AUTO: 53 % (ref 43–75)
NRBC BLD AUTO-RTO: 0 /100 WBCS
PHENYTOIN SERPL-MCNC: 12.2 UG/ML (ref 10–20)
PLATELET # BLD AUTO: 219 THOUSANDS/UL (ref 149–390)
PMV BLD AUTO: 9 FL (ref 8.9–12.7)
POTASSIUM SERPL-SCNC: 4.6 MMOL/L (ref 3.5–5.3)
RBC # BLD AUTO: 3.49 MILLION/UL (ref 3.81–5.12)
SODIUM SERPL-SCNC: 137 MMOL/L (ref 135–147)
WBC # BLD AUTO: 6.15 THOUSAND/UL (ref 4.31–10.16)

## 2023-05-21 RX ORDER — ZONISAMIDE 100 MG/1
100 CAPSULE ORAL 3 TIMES DAILY
Status: DISCONTINUED | OUTPATIENT
Start: 2023-05-21 | End: 2023-05-22 | Stop reason: HOSPADM

## 2023-05-21 RX ORDER — HYDROXYZINE HYDROCHLORIDE 25 MG/1
50 TABLET, FILM COATED ORAL 3 TIMES DAILY
Status: DISCONTINUED | OUTPATIENT
Start: 2023-05-21 | End: 2023-05-22 | Stop reason: HOSPADM

## 2023-05-21 RX ORDER — QUETIAPINE FUMARATE 100 MG/1
400 TABLET, FILM COATED ORAL
Status: DISCONTINUED | OUTPATIENT
Start: 2023-05-21 | End: 2023-05-22 | Stop reason: HOSPADM

## 2023-05-21 RX ORDER — GUAIFENESIN 600 MG/1
600 TABLET, EXTENDED RELEASE ORAL EVERY 12 HOURS SCHEDULED
Status: DISCONTINUED | OUTPATIENT
Start: 2023-05-21 | End: 2023-05-22 | Stop reason: HOSPADM

## 2023-05-21 RX ORDER — QUETIAPINE FUMARATE 100 MG/1
400 TABLET, FILM COATED ORAL DAILY
Status: DISCONTINUED | OUTPATIENT
Start: 2023-05-22 | End: 2023-05-21

## 2023-05-21 RX ORDER — GLIPIZIDE 10 MG/1
10 TABLET ORAL DAILY
COMMUNITY
Start: 2023-01-23

## 2023-05-21 RX ORDER — ASPIRIN 81 MG/1
81 TABLET, CHEWABLE ORAL DAILY
Status: DISCONTINUED | OUTPATIENT
Start: 2023-05-21 | End: 2023-05-22 | Stop reason: HOSPADM

## 2023-05-21 RX ORDER — METOPROLOL SUCCINATE 25 MG/1
25 TABLET, EXTENDED RELEASE ORAL 2 TIMES DAILY
Status: DISCONTINUED | OUTPATIENT
Start: 2023-05-21 | End: 2023-05-22 | Stop reason: HOSPADM

## 2023-05-21 RX ORDER — PHENAZOPYRIDINE HYDROCHLORIDE 100 MG/1
100 TABLET, FILM COATED ORAL 3 TIMES DAILY PRN
Status: DISCONTINUED | OUTPATIENT
Start: 2023-05-21 | End: 2023-05-22 | Stop reason: HOSPADM

## 2023-05-21 RX ORDER — FLUCONAZOLE 100 MG/1
200 TABLET ORAL DAILY
Status: CANCELLED | OUTPATIENT
Start: 2023-05-22

## 2023-05-21 RX ORDER — FLUTICASONE PROPIONATE 50 MCG
1 SPRAY, SUSPENSION (ML) NASAL 2 TIMES DAILY
Status: DISCONTINUED | OUTPATIENT
Start: 2023-05-21 | End: 2023-05-22 | Stop reason: HOSPADM

## 2023-05-21 RX ORDER — MELOXICAM 7.5 MG/1
7.5 TABLET ORAL 2 TIMES DAILY
Status: DISCONTINUED | OUTPATIENT
Start: 2023-05-21 | End: 2023-05-22 | Stop reason: HOSPADM

## 2023-05-21 RX ORDER — MAGNESIUM CHLORIDE 64 MG
64 TABLET, DELAYED RELEASE (ENTERIC COATED) ORAL 2 TIMES DAILY
Status: DISCONTINUED | OUTPATIENT
Start: 2023-05-21 | End: 2023-05-22 | Stop reason: HOSPADM

## 2023-05-21 RX ORDER — PHENYTOIN SODIUM 100 MG/1
300 CAPSULE, EXTENDED RELEASE ORAL DAILY
Status: DISCONTINUED | OUTPATIENT
Start: 2023-05-22 | End: 2023-05-21

## 2023-05-21 RX ORDER — GUAIFENESIN 400 MG/1
400 TABLET ORAL 2 TIMES DAILY
COMMUNITY

## 2023-05-21 RX ORDER — PANTOPRAZOLE SODIUM 40 MG/1
40 TABLET, DELAYED RELEASE ORAL
Status: DISCONTINUED | OUTPATIENT
Start: 2023-05-21 | End: 2023-05-22 | Stop reason: HOSPADM

## 2023-05-21 RX ORDER — POLYVINYL ALCOHOL 14 MG/ML
1 SOLUTION/ DROPS OPHTHALMIC 3 TIMES DAILY
Status: DISCONTINUED | OUTPATIENT
Start: 2023-05-21 | End: 2023-05-22 | Stop reason: HOSPADM

## 2023-05-21 RX ORDER — NYSTATIN 100000 U/G
1 OINTMENT TOPICAL 2 TIMES DAILY
Status: DISCONTINUED | OUTPATIENT
Start: 2023-05-21 | End: 2023-05-22

## 2023-05-21 RX ORDER — FERROUS SULFATE 325(65) MG
325 TABLET ORAL
Status: DISCONTINUED | OUTPATIENT
Start: 2023-05-22 | End: 2023-05-22 | Stop reason: HOSPADM

## 2023-05-21 RX ORDER — QUETIAPINE FUMARATE 100 MG/1
400 TABLET, FILM COATED ORAL
Status: DISCONTINUED | OUTPATIENT
Start: 2023-05-21 | End: 2023-05-21 | Stop reason: SDUPTHER

## 2023-05-21 RX ORDER — PHENYTOIN SODIUM 100 MG/1
200 CAPSULE, EXTENDED RELEASE ORAL DAILY
Status: DISCONTINUED | OUTPATIENT
Start: 2023-05-21 | End: 2023-05-22 | Stop reason: HOSPADM

## 2023-05-21 RX ADMIN — PHENYTOIN SODIUM 200 MG: 100 CAPSULE ORAL at 14:38

## 2023-05-21 RX ADMIN — GABAPENTIN 800 MG: 400 CAPSULE ORAL at 21:47

## 2023-05-21 RX ADMIN — GABAPENTIN 800 MG: 400 CAPSULE ORAL at 17:40

## 2023-05-21 RX ADMIN — TRIAMCINOLONE ACETONIDE: 1 CREAM TOPICAL at 18:03

## 2023-05-21 RX ADMIN — GUAIFENESIN 600 MG: 600 TABLET, EXTENDED RELEASE ORAL at 21:57

## 2023-05-21 RX ADMIN — MONTELUKAST 10 MG: 10 TABLET, FILM COATED ORAL at 21:48

## 2023-05-21 RX ADMIN — QUETIAPINE FUMARATE 400 MG: 100 TABLET ORAL at 23:52

## 2023-05-21 RX ADMIN — GUAIFENESIN 600 MG: 600 TABLET, EXTENDED RELEASE ORAL at 14:39

## 2023-05-21 RX ADMIN — PHENAZOPYRIDINE 100 MG: 100 TABLET ORAL at 14:38

## 2023-05-21 RX ADMIN — LISINOPRIL 5 MG: 5 TABLET ORAL at 09:52

## 2023-05-21 RX ADMIN — OXYBUTYNIN CHLORIDE 5 MG: 5 TABLET ORAL at 21:48

## 2023-05-21 RX ADMIN — METOPROLOL SUCCINATE 25 MG: 25 TABLET, EXTENDED RELEASE ORAL at 09:54

## 2023-05-21 RX ADMIN — ALBUTEROL SULFATE 2 PUFF: 90 AEROSOL, METERED RESPIRATORY (INHALATION) at 14:38

## 2023-05-21 RX ADMIN — ATORVASTATIN CALCIUM 40 MG: 40 TABLET, FILM COATED ORAL at 17:44

## 2023-05-21 RX ADMIN — METOPROLOL SUCCINATE 25 MG: 25 TABLET, EXTENDED RELEASE ORAL at 17:41

## 2023-05-21 RX ADMIN — ZONISAMIDE 100 MG: 100 CAPSULE ORAL at 09:54

## 2023-05-21 RX ADMIN — OXYBUTYNIN CHLORIDE 5 MG: 5 TABLET ORAL at 17:41

## 2023-05-21 RX ADMIN — VALACYCLOVIR HYDROCHLORIDE 500 MG: 500 TABLET, FILM COATED ORAL at 09:52

## 2023-05-21 RX ADMIN — DICLOFENAC SODIUM TOPICAL GEL, 1%, 2 G: 10 GEL TOPICAL at 18:01

## 2023-05-21 RX ADMIN — LEVETIRACETAM 750 MG: 250 TABLET, FILM COATED ORAL at 17:41

## 2023-05-21 RX ADMIN — HYDROXYZINE HYDROCHLORIDE 50 MG: 25 TABLET ORAL at 21:47

## 2023-05-21 RX ADMIN — AZTREONAM 2000 MG: 2 INJECTION, POWDER, LYOPHILIZED, FOR SOLUTION INTRAMUSCULAR; INTRAVENOUS at 21:57

## 2023-05-21 RX ADMIN — ZONISAMIDE 100 MG: 100 CAPSULE ORAL at 18:01

## 2023-05-21 RX ADMIN — AZTREONAM 2000 MG: 2 INJECTION, POWDER, LYOPHILIZED, FOR SOLUTION INTRAMUSCULAR; INTRAVENOUS at 05:53

## 2023-05-21 RX ADMIN — GABAPENTIN 800 MG: 400 CAPSULE ORAL at 09:53

## 2023-05-21 RX ADMIN — ASPIRIN 81 MG CHEWABLE TABLET 81 MG: 81 TABLET CHEWABLE at 09:52

## 2023-05-21 RX ADMIN — AZTREONAM 2000 MG: 2 INJECTION, POWDER, LYOPHILIZED, FOR SOLUTION INTRAMUSCULAR; INTRAVENOUS at 13:53

## 2023-05-21 RX ADMIN — PANTOPRAZOLE SODIUM 40 MG: 40 TABLET, DELAYED RELEASE ORAL at 17:44

## 2023-05-21 RX ADMIN — GABAPENTIN 800 MG: 400 CAPSULE ORAL at 12:21

## 2023-05-21 RX ADMIN — TRIAMCINOLONE ACETONIDE: 1 CREAM TOPICAL at 09:56

## 2023-05-21 RX ADMIN — LEVETIRACETAM 750 MG: 250 TABLET, FILM COATED ORAL at 09:50

## 2023-05-21 RX ADMIN — OXYBUTYNIN CHLORIDE 5 MG: 5 TABLET ORAL at 09:53

## 2023-05-21 RX ADMIN — VALACYCLOVIR HYDROCHLORIDE 500 MG: 500 TABLET, FILM COATED ORAL at 17:40

## 2023-05-21 RX ADMIN — LEVOTHYROXINE SODIUM 175 MCG: 175 TABLET ORAL at 05:49

## 2023-05-21 RX ADMIN — ENOXAPARIN SODIUM 40 MG: 40 INJECTION SUBCUTANEOUS at 09:55

## 2023-05-21 RX ADMIN — PHENYTOIN SODIUM 300 MG: 100 CAPSULE ORAL at 09:51

## 2023-05-21 RX ADMIN — HYDROXYZINE HYDROCHLORIDE 50 MG: 25 TABLET ORAL at 17:40

## 2023-05-21 RX ADMIN — DICLOFENAC SODIUM TOPICAL GEL, 1%, 2 G: 10 GEL TOPICAL at 21:53

## 2023-05-21 RX ADMIN — PANTOPRAZOLE SODIUM 40 MG: 40 TABLET, DELAYED RELEASE ORAL at 05:56

## 2023-05-21 RX ADMIN — MELOXICAM 7.5 MG: 7.5 TABLET ORAL at 17:40

## 2023-05-21 RX ADMIN — ZONISAMIDE 100 MG: 100 CAPSULE ORAL at 21:52

## 2023-05-21 NOTE — ASSESSMENT & PLAN NOTE
Patient with glucose in the 40s on arrival, reports she had taken home aspart  Additionally reports fall/dizziness earlier today  Suspect hypoglycemia in setting of poor p o  intake while continuing to take insulin, glipizide and metformin    · Hold metformin, scheduled insulin for now  · Every 2 hour point-of-care glucose until improvement in blood sugar  · Sliding-scale insulin  · Hypoglycemia protocol  · Significant improvement

## 2023-05-21 NOTE — PLAN OF CARE
Problem: PAIN - ADULT  Goal: Verbalizes/displays adequate comfort level or baseline comfort level  Description: Interventions:  - Encourage patient to monitor pain and request assistance  - Assess pain using appropriate pain scale  - Administer analgesics based on type and severity of pain and evaluate response  - Implement non-pharmacological measures as appropriate and evaluate response  - Consider cultural and social influences on pain and pain management  - Notify physician/advanced practitioner if interventions unsuccessful or patient reports new pain  Outcome: Progressing     Problem: INFECTION - ADULT  Goal: Absence or prevention of progression during hospitalization  Description: INTERVENTIONS:  - Assess and monitor for signs and symptoms of infection  - Monitor lab/diagnostic results  - Monitor all insertion sites, i e  indwelling lines, tubes, and drains  - Monitor endotracheal if appropriate and nasal secretions for changes in amount and color  - [place] appropriate cooling/warming therapies per order  - Administer medications as ordered  - Instruct and encourage patient and family to use good hand hygiene technique  - Identify and instruct in appropriate isolation precautions for identified infection/condition  Outcome: Progressing     Problem: SAFETY ADULT  Goal: Patient will remain free of falls  Description: INTERVENTIONS:  - Educate patient/family on patient safety including physical limitations  - Instruct patient to call for assistance with activity   - Consult OT/PT to assist with strengthening/mobility   - Keep Call bell within reach  - Keep bed low and locked with side rails adjusted as appropriate  - Keep care items and personal belongings within reach  - Initiate and maintain comfort rounds  - Make Fall Risk Sign visible to staff  - Offer Toileting every 2 Hours, in advance of need  - Initiate/Maintain bedalarm  - Obtain necessary fall risk management equipment:   - Apply yellow socks and bracelet for high fall risk patients  - Consider moving patient to room near nurses station  Outcome: Progressing  Goal: Maintain or return to baseline ADL function  Description: INTERVENTIONS:  -  Assess patient's ability to carry out ADLs; assess patient's baseline for ADL function and identify physical deficits which impact ability to perform ADLs (bathing, care of mouth/teeth, toileting, grooming, dressing, etc )  - Assess/evaluate cause of self-care deficits   - Assess range of motion  - Assess patient's mobility; develop plan if impaired  - Assess patient's need for assistive devices and provide as appropriate  - Encourage maximum independence but intervene and supervise when necessary  - Involve family in performance of ADLs  - Assess for home care needs following discharge   - Consider OT consult to assist with ADL evaluation and planning for discharge  - Provide patient education as appropriate  Outcome: Progressing  Goal: Maintains/Returns to pre admission functional level  Description: INTERVENTIONS:  - Perform BMAT or MOVE assessment daily    - Set and communicate daily mobility goal to care team and patient/family/caregiver  - Collaborate with rehabilitation services on mobility goals if consulted  - Perform Range of Motion 2 times a day  - Reposition patient every 2 hours    - Dangle patient 2 times a day  - Stand patient 2 times a day  - Ambulate patient 2 times a day  - Out of bed to chair 2 times a day   - Out of bed for meals 2 times a day  - Out of bed for toileting  - Record patient progress and toleration of activity level   Outcome: Progressing     Problem: DISCHARGE PLANNING  Goal: Discharge to home or other facility with appropriate resources  Description: INTERVENTIONS:  - Identify barriers to discharge w/patient and caregiver  - Arrange for needed discharge resources and transportation as appropriate  - Identify discharge learning needs (meds, wound care, etc )  - Arrange for interpretive services to assist at discharge as needed  - Refer to Case Management Department for coordinating discharge planning if the patient needs post-hospital services based on physician/advanced practitioner order or complex needs related to functional status, cognitive ability, or social support system  Outcome: Progressing     Problem: Knowledge Deficit  Goal: Patient/family/caregiver demonstrates understanding of disease process, treatment plan, medications, and discharge instructions  Description: Complete learning assessment and assess knowledge base    Interventions:  - Provide teaching at level of understanding  - Provide teaching via preferred learning methods  Outcome: Progressing

## 2023-05-21 NOTE — ASSESSMENT & PLAN NOTE
Patient with glucose in the 40s on arrival, reports she had taken home aspart  Additionally reports fall/dizziness earlier today  Suspect hypoglycemia in setting of poor p o  intake while continuing to take insulin, glipizide and metformin    · Hold metformin, scheduled insulin for now  · Every 2 hour point-of-care glucose until improvement in blood sugar  · Sliding-scale insulin  · Hypoglycemia protocol

## 2023-05-21 NOTE — ASSESSMENT & PLAN NOTE
Patient reports a fall without loss of consciousness, weakness earlier today  She was found to be hypoglycemic in the 40s on arrival to ED, suspect weakness/lightheadedness may be in the setting of hypoglycemia versus deconditioning    Trend point-of-care glucose  PT/OT evaluation  See remainder of plan under hypoglycemia

## 2023-05-21 NOTE — ASSESSMENT & PLAN NOTE
Reports increased urinary frequency  UA showing trace leukocyte esterase, WBCs and innumerable bacteria    Receiving aztreonam for now  Urine culture pending

## 2023-05-21 NOTE — ED NOTES
Patient okay to be moved to 4th floor room per supervisor at this time       Enma Love RN  05/21/23 0475

## 2023-05-21 NOTE — ASSESSMENT & PLAN NOTE
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

## 2023-05-21 NOTE — ED NOTES
Patient provided with orange juice, jaguar crackers, and peanut butter at this time       Garrett Santiago RN  05/20/23 1339

## 2023-05-21 NOTE — ASSESSMENT & PLAN NOTE
Patient presents with bilateral lower extremity erythema started over the last several days  She initially been placed on doxycycline outpatient, but has not seen improvement  Denies new lotion, soap, detergent or anything new topically in area of rash  Possible cellulitis, however rash appears to be more likely dermatitis contact or otherwise  · Given patient's multiple antibiotic allergies, will continue aztreonam for now    · Trend procalcitonin, lactate  · DVT ultrasound negative for acute DVT on preliminary review  · Obtain dermatology consult, may need general surgery for skin biopsy  · Trial kenalog cream

## 2023-05-21 NOTE — ASSESSMENT & PLAN NOTE
Lab Results   Component Value Date    HGBA1C 5 3 01/24/2023       Recent Labs     05/20/23  1612 05/20/23  1829 05/20/23  1918   POCGLU 42* 47* 101       Blood Sugar Average: Last 72 hrs:  (P) 95 [id number]     Current regimen includes metformin, glipizide, aspart 5 units 3 times daily AC  Patient reports taking aspart today, but has had poor p o  intake    Found to be hypoglycemic on arrival   Hold metformin, glipizide and scheduled insulin for now  Sliding scale and glucose checks  Hypoglycemia protocol

## 2023-05-21 NOTE — ED NOTES
Patient requesting to sign out AMA at this time; inpatient provider made aware       Lucio Jean RN  05/21/23 6592

## 2023-05-21 NOTE — UTILIZATION REVIEW
"Initial Clinical Review    Observation 5/20/23 @ 1914 converted to inpatient admission 5/21/23 @ [id number] for continued care & tx for hypoglycemia  Admission: Date/Time/Statement:   Admission Orders (From admission, onward)     Ordered        05/21/23 1538  Inpatient Admission  Once            05/20/23 1914  Place in Observation  Once                      Orders Placed This Encounter   Procedures   • Inpatient Admission     Standing Status:   Standing     Number of Occurrences:   1     Order Specific Question:   Level of Care     Answer:   Med Surg [16]     Order Specific Question:   Estimated length of stay     Answer:   More than 2 Midnights     Order Specific Question:   Certification     Answer:   I certify that inpatient services are medically necessary for this patient for a duration of greater than two midnights  See H&P and MD Progress Notes for additional information about the patient's course of treatment  ED Arrival Information     Expected   -    Arrival   5/20/2023 16:08    Acuity   Urgent            Means of arrival   Ambulance    Escorted by   [facility] [address]   Hospitalist    Admission type   Emergency            Arrival complaint   weakness           Chief Complaint   Patient presents with   • Medical Problem     Patient states called EMS due to being \"unsteady\", also complain of cellulitis that she states is not improving, currently on abx for it  States fell Friday, denies LOC, takes aspirin, doctor was aware of fall  EMS reports BG of 51 at 1550 and they gave 15g glucose packet       Initial Presentation:   74 yof to ER from home via EMS c/o persistent leg redness,  pain, itching, swelling  despite abt & vistaril for cellulitis per PCP  Reports urinary frequency & incontinence, s/p fall on Friday without injury, head strike or LOC  Also reports labile blood sugars  Presents with BLE as per below image  Admission work-up showing blood sugar in 40's, hyponatremia, hypomagnesemia   " Placed in observation status for hypoglycemia  Observation to IP admission 5/21/23:  Significant improvement in blood sugars noted  Continue to monitor and hold on hypoglycemic meds, PO intake improving  IVABT continued for cellulitis  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  Will discuss case with dermatology to determine plan      ED Triage Vitals [05/20/23 1613]   Temperature Pulse Respirations Blood Pressure SpO2   97 6 °F (36 4 °C) 84 20 135/66 94 %      Temp Source Heart Rate Source Patient Position - Orthostatic VS BP Location FiO2 (%)   Temporal Monitor Lying Right arm --      Pain Score       No Pain          Wt Readings from Last 1 Encounters:   07/30/22 115 kg (254 lb)     Additional Vital Signs:   Date/Time Temp Pulse Resp BP MAP (mmHg) SpO2 Calculated FIO2 (%) - Nasal Cannula Nasal Cannula O2 Flow Rate (L/min) O2 Device Patient Position - Orthostatic VS   05/21/23 0600 -- 95 17 152/70 101 95 % 28 2 L/min Nasal cannula Lying   05/21/23 0430 -- 94 16 120/58 83 96 % -- -- Nasal cannula Lying   05/21/23 0300 -- 75 14 139/66 95 97 % 28 2 L/min Nasal cannula Lying   05/21/23 0130 -- 84 20 130/60 87 90 % -- -- -- --   05/20/23 2245 -- 93 20 135/73 94 94 % -- -- None (Room air) --   05/20/23 2230 -- 98 20 127/76 96 94 % -- -- None (Room air) --   05/20/23 2215 -- 77 20 117/61 83 90 % -- -- -- --   05/20/23 2200 -- 74 20 121/88 99 93 % -- -- -- --   05/20/23 2130 -- 84 14 136/63 91 95 % -- -- None (Room air) --   05/20/23 2030 -- 84 14 135/81 104 92 % -- -- -- --   05/20/23 1913 -- 72 16 131/69 -- 92 % -- -- None (Room air) --   05/20/23 1613 97 6 °F (36 4 °C) 84 20 135/66 89 94 % -- -- None (Room air) Lying     Pertinent Labs/Diagnostic Test Results:   VAS lower limb venous duplex study, complete bilateral   Final Result (05/20 2133)  Impression:  RIGHT LOWER LIMB:  No evidence of acute or chronic deep vein thrombosis    Great saphenous vein not visualized in the thigh   Doppler evaluation shows a normal response to augmentation maneuvers     Popliteal and anterior tibial arterial Doppler waveforms are triphasic  LEFT LOWER LIMB:  No evidence of acute or chronic deep vein thrombosis  Great saphenous vein not visualized in the thigh  Doppler evaluation shows a normal response to augmentation maneuvers  Popliteal and anterior tibial arterial Doppler waveforms are triphasic  CT head without contrast   Final Result  (05/20 1825)      No acute intracranial abnormality  CT chest abdomen pelvis w contrast   Final Result  (05/20 1835)      No acute pathology in the chest, abdomen or pelvis           XR chest portable    (Results Pending)     5/20 Ekg=  Rhythm: sinus rhythm     Ectopy:     Ectopy: none     QRS:     QRS axis:  Normal     QRS intervals:  Normal   Conduction:     Conduction: normal     ST segments:     ST segments:  Normal   T waves:     T waves: non-specific     Comments:      , QRS 74, QT/QTc 376/459; no acute ischemic changes, low voltage   QRS    Results from last 7 days   Lab Units 05/21/23  0431 05/20/23  1645   WBC Thousand/uL 6 15 8 28   HEMOGLOBIN g/dL 11 7 12 9   HEMATOCRIT % 35 0 38 2   PLATELETS Thousands/uL 219 347   NEUTROS ABS Thousands/µL 3 25 5 32         Results from last 7 days   Lab Units 05/21/23  0431 05/20/23  1645   SODIUM mmol/L 137 132*   POTASSIUM mmol/L 4 6 4 3   CHLORIDE mmol/L 103 99   CO2 mmol/L 28 23   ANION GAP mmol/L 6 10   BUN mg/dL 14 20   CREATININE mg/dL 0 54* 0 78   EGFR ml/min/1 73sq m 99 80   CALCIUM mg/dL 7 8* 8 8   MAGNESIUM mg/dL 2 1 1 6*     Results from last 7 days   Lab Units 05/20/23  1645   AST U/L 11*   ALT U/L 11   ALK PHOS U/L 67   TOTAL PROTEIN g/dL 6 1*   ALBUMIN g/dL 3 4*   TOTAL BILIRUBIN mg/dL 0 22     Results from last 7 days   Lab Units 05/21/23  1141 05/21/23  0555 05/21/23  0430 05/20/23  2202 05/20/23  1918 05/20/23  1829 05/20/23  1612   POC GLUCOSE mg/dl 255* 139 151* 60* 101 47* 42* Results from last 7 days   Lab Units 05/21/23  0431 05/20/23  1645   GLUCOSE RANDOM mg/dL 130 67       Results from last 7 days   Lab Units 05/20/23  1645   CK TOTAL U/L 33     Results from last 7 days   Lab Units 05/20/23  2201 05/20/23  1935 05/20/23  1645   HS TNI 0HR ng/L  --   --  3   HS TNI 2HR ng/L  --  3  --    HSTNI D2 ng/L  --  0  --    HS TNI 4HR ng/L 4  --   --    HSTNI D4 ng/L 1  --   --        Results from last 7 days   Lab Units 05/20/23  1645   PROTIME seconds 12 7   INR  0 94   PTT seconds 27     Results from last 7 days   Lab Units 05/20/23  1645   TSH 3RD GENERATON uIU/mL 0 729     Results from last 7 days   Lab Units 05/20/23  1645   PROCALCITONIN ng/ml <0 05     Results from last 7 days   Lab Units 05/20/23 2201 05/20/23 1935 05/20/23  1645   LACTIC ACID mmol/L 2 0 2 7* 4 1*       Results from last 7 days   Lab Units 05/20/23  1645   BNP pg/mL 44       Results from last 7 days   Lab Units 05/20/23  1827   CLARITY UA  Slightly Cloudy   COLOR UA  Yellow   SPEC GRAV UA  1 010   PH UA  6 5   GLUCOSE UA mg/dl Negative   KETONES UA mg/dl Negative   BLOOD UA  Negative   PROTEIN UA mg/dl Negative   NITRITE UA  Negative   BILIRUBIN UA  Negative   UROBILINOGEN UA E U /dl 0 2   LEUKOCYTES UA  Trace*   WBC UA /hpf 4-10*   RBC UA /hpf 1-2   BACTERIA UA /hpf Innumerable*   EPITHELIAL CELLS WET PREP /hpf Occasional       Results from last 7 days   Lab Units 05/20/23  1645   BLOOD CULTURE  Received in Microbiology Lab  Culture in Progress  Received in Microbiology Lab  Culture in Progress         ED Treatment:   Medication Administration from 05/20/2023 1608 to 05/21/2023 0759       Date/Time Order Dose Route Action     05/20/2023 1657 EDT dextrose 50 % IV solution 25 mL 25 mL Intravenous Given     05/20/2023 1741 EDT magnesium sulfate 2 g/50 mL IVPB (premix) 2 g 2 g Intravenous New Bag     05/20/2023 1744 EDT vancomycin (VANCOCIN) 1250 mg in sodium chloride 0 9% 250 mL IVPB 1,250 mg Intravenous New Bag 05/20/2023 1738 EDT multi-electrolyte (PLASMALYTE-A/ISOLYTE-S PH 7 4) IV solution 1,000 mL 1,000 mL Intravenous New Bag     05/20/2023 1913 EDT multi-electrolyte (PLASMALYTE-A/ISOLYTE-S PH 7 4) IV solution 1,000 mL 1,000 mL Intravenous New Bag     05/20/2023 1913 EDT multi-electrolyte (PLASMALYTE-A/ISOLYTE-S PH 7 4) IV solution 1,000 mL 1,000 mL Intravenous New Bag     05/20/2023 2011 EDT multi-electrolyte (PLASMALYTE-A/ISOLYTE-S PH 7 4) IV solution 1,000 mL 1,000 mL Intravenous New Bag     05/20/2023 1809 EDT iohexol (OMNIPAQUE) 350 MG/ML injection (SINGLE-DOSE) 100 mL 100 mL Intravenous Given     05/20/2023 1836 EDT dextrose 50 % IV solution 50 mL 50 mL Intravenous Given     05/20/2023 2242 EDT gabapentin (NEURONTIN) capsule 800 mg 800 mg Oral Given     05/20/2023 2246 EDT levETIRAcetam (KEPPRA) tablet 750 mg 750 mg Oral Given     05/21/2023 0549 EDT levothyroxine tablet 175 mcg 175 mcg Oral Given     05/20/2023 2241 EDT montelukast (SINGULAIR) tablet 10 mg 10 mg Oral Given     05/21/2023 0556 EDT pantoprazole (PROTONIX) EC tablet 40 mg 40 mg Oral Given     05/20/2023 2241 EDT oxybutynin (DITROPAN) tablet 5 mg 5 mg Oral Given     05/20/2023 2245 EDT phenytoin (DILANTIN) ER capsule 200 mg 200 mg Oral Given     05/20/2023 2243 EDT QUEtiapine (SEROquel) tablet 400 mg 400 mg Oral Given     05/20/2023 2242 EDT valACYclovir (VALTREX) tablet 500 mg 500 mg Oral Given     05/21/2023 0553 EDT aztreonam (AZACTAM) 2,000 mg in sodium chloride 0 9 % 100 mL IVPB 2,000 mg Intravenous New Bag     05/20/2023 2238 EDT aztreonam (AZACTAM) 2,000 mg in sodium chloride 0 9 % 100 mL IVPB 2,000 mg Intravenous New Bag     05/20/2023 4145 EDT dextrose 50 % IV solution 50 mL 50 mL Intravenous Given        Past Medical History:   Diagnosis Date   • Asthma    • Bipolar disorder ([facility] [address] )    • Chronic UTI (urinary tract infection)    • COPD (chronic obstructive pulmonary disease) (HCC)    • Diabetes mellitus ([facility] [address] )    • Disease of thyroid gland • Dyslipidemia 10/31/2018   • Essential hypertension 10/31/2018   • GERD (gastroesophageal reflux disease)    • Heart attack ([place] [address] )    • Iron deficiency anemia, unspecified 7/29/2019   • Obesity due to excess calories 10/31/2018   • Recurrent falls 4/13/2019   • Seizure [facility])      Present on Admission:  • Acquired hypothyroidism  • Asthma  • Bipolar disorder ([place] [address] )  • Essential hypertension  • Generalized weakness  • GERD without esophagitis  • Seizure disorder ([place] [address] )      Admitting Diagnosis: Hypoglycemia [E16 2]  Age/Sex: 74 y o  female  Admission Orders:  Cont pulse ox  Pt/ot eval & tx  Scd/foot pumps  Consult dermatology  accuchecks    Scheduled Medications:  aspirin, 81 mg, Oral, Daily  atorvastatin, 40 mg, Oral, Daily With Dinner  aztreonam, 2,000 mg, Intravenous, Q8H  enoxaparin, 40 mg, Subcutaneous, Daily  gabapentin, 800 mg, Oral, 4x Daily  levETIRAcetam, 750 mg, Oral, BID  levothyroxine, 175 mcg, Oral, Early Morning  lisinopril, 5 mg, Oral, Daily  metoprolol succinate, 25 mg, Oral, Daily  montelukast, 10 mg, Oral, HS  oxybutynin, 5 mg, Oral, TID  pantoprazole, 40 mg, Oral, Early Morning  phenytoin, 200 mg, Oral, HS  phenytoin, 300 mg, Oral, Daily  QUEtiapine, 400 mg, Oral, HS  triamcinolone, , Topical, BID  valACYclovir, 500 mg, Oral, BID  zonisamide, 100 mg, Oral, TID    PRN Meds:  acetaminophen, 650 mg, Oral, Q6H PRN  albuterol, 2 puff, Inhalation, Q6H PRN  sodium chloride (PF), 3 mL, Intravenous, Once PRN    Network Utilization Review Department  ATTENTION: Please call with any questions or concerns to [phone] and carefully listen to the prompts so that you are directed to the right person  All voicemails are confidential   [facility] all requests for admission clinical reviews, approved or denied determinations and any other requests to dedicated fax number below belonging to the campus where the patient is receiving treatment   List of dedicated fax numbers for the Facilities:  Jd Orozco NUMBER   ADMISSION DENIALS (Administrative/Medical Necessity) [phone]   [address] (Maternity/NICU/Pediatrics) Stephanie Patterson [address] [place]  [phone]   [address] [facility] [address] [place] [place] [address] [facility] [address] [facility] [place] [address] [facility] [phone]

## 2023-05-21 NOTE — ASSESSMENT & PLAN NOTE
Reports increased urinary frequency  UA showing trace leukocyte esterase, WBCs and innumerable bacteria    Receiving aztreonam for now given allergies  Urine culture pending

## 2023-05-21 NOTE — ASSESSMENT & PLAN NOTE
Lab Results   Component Value Date    HGBA1C 5 3 01/24/2023       Recent Labs     05/20/23  2202 05/21/23  0430 05/21/23  0555 05/21/23  1141   POCGLU 60* 151* 139 255*       Blood Sugar Average: Last 72 hrs:  (P) 113 [id number]     Current regimen includes metformin, glipizide, aspart 5 units 3 times daily AC  Patient reports taking aspart today, but has had poor p o  intake    Found to be hypoglycemic on arrival   Hold metformin, glipizide and scheduled insulin for now  Sliding scale and glucose checks  Hypoglycemia protocol  Significant improvement noted, continue to monitor and hold on hypoglycemic meds, PO intake improved

## 2023-05-21 NOTE — H&P
[address]  H&P  Name: Kenya Winters 74 y o  female I MRN: [id number]  Unit/Bed#: PL79 I Date of Admission: 5/20/2023   Date of Service: 5/20/2023 I Hospital Day: 0      Assessment/Plan   Rash  Assessment & Plan  Patient presents with bilateral lower extremity erythema started over the last several days  She initially been placed on doxycycline outpatient, but has not seen improvement  Denies new lotion, soap, detergent or anything new topically in area of rash  Possible cellulitis, however rash appears to be more likely dermatitis contact or otherwise  · Given patient's multiple antibiotic allergies, will continue aztreonam for now  · Trend procalcitonin, lactate  · DVT ultrasound negative for acute DVT on preliminary review  · Obtain dermatology consult, may need general surgery for skin biopsy  · Trial kenalog cream      * Hypoglycemia  Assessment & Plan  Patient with glucose in the 40s on arrival, reports she had taken home aspart  Additionally reports fall/dizziness earlier today  Suspect hypoglycemia in setting of poor p o  intake while continuing to take insulin, glipizide and metformin  · Hold metformin, scheduled insulin for now  · Every 2 hour point-of-care glucose until improvement in blood sugar  · Sliding-scale insulin  · Hypoglycemia protocol    Generalized weakness  Assessment & Plan  Patient reports a fall without loss of consciousness, weakness earlier today  She was found to be hypoglycemic in the 40s on arrival to ED, suspect weakness/lightheadedness may be in the setting of hypoglycemia versus deconditioning  Trend point-of-care glucose  PT/OT evaluation  See remainder of plan under hypoglycemia    UTI (urinary tract infection)  Assessment & Plan  Reports increased urinary frequency  UA showing trace leukocyte esterase, WBCs and innumerable bacteria    Receiving aztreonam for now  Urine culture pending    GERD without esophagitis  Assessment & Plan  Continue PPI    Essential hypertension  Assessment & Plan  Continue lisinopril, metoprolol    Seizure disorder (HCC)  Assessment & Plan  Continue Keppra, phenytoin, zonisamide    Acquired hypothyroidism  Assessment & Plan  Continue levothyroxine    Type 2 diabetes mellitus without complication, with long-term current use of insulin [facility])  Assessment & Plan  Lab Results   Component Value Date    HGBA1C 5 3 01/24/2023       Recent Labs     05/20/23  1612 05/20/23  1829 05/20/23  1918   POCGLU 42* 47* 101       Blood Sugar Average: Last 72 hrs:  (P) 50 [id number]     Current regimen includes metformin, glipizide, aspart 5 units 3 times daily AC  Patient reports taking aspart today, but has had poor p o  intake  Found to be hypoglycemic on arrival   Hold metformin, glipizide and scheduled insulin for now  Sliding scale and glucose checks  Hypoglycemia protocol    Bipolar disorder (HCC)  Assessment & Plan  Continue Seroquel    Asthma  Assessment & Plan  Continue Singulair  Continue albuterol as needed            VTE Prophylaxis: Enoxaparin (Lovenox)  / sequential compression device and foot pump applied   Code Status: Level 3 - DNAR and DNI       Anticipated Length of Stay:  Patient will be admitted on an Observation basis with an anticipated length of stay of less than 2 midnights  Justification for Hospital Stay: Please see detailed plans noted above  Chief Complaint:     Weakness, rash    History of Present Illness:  Shyay Suggs is a 74 y o  female who has past medical history significant for diabetes, seizure disorder, asthma presenting with lower extremity rash and weakness  Patient reports development of bilateral lower leg erythema over the past several days  She denies fever, chills, open wounds, drainage, or trauma/lacerations to legs  She denies new detergent, lotions, soaps, shampoos    She was seen by primary care who started her on doxycycline, but she has not had much improvement in lower extremity redness  She otherwise reports weakness and fall at home today  She denies loss of consciousness, head trauma, chest pain, shortness of breath  She reports she felt off balance with this  Notably, she was found to be hypoglycemic on arrival with BG 40s  She had taken aspirin at home and has had very poor p o  intake per patient  She otherwise reports increased urinary frequency, denies nausea, vomiting, diarrhea  In ED, aside from hypoglycemia in the 40s, lab significant for magnesium 1 6, lactate 4 1, UA showing trace leukocyte esterase, 4-10 WBC and innumerable bacteria  CT chest/abdomen/pelvis negative for acute abnormality        Review of Systems:    Constitutional:  Denies fever or chills   Eyes:  Denies change in visual acuity   HENT:  Denies nasal congestion or sore throat   Respiratory:  Denies cough or shortness of breath   Cardiovascular:  Denies chest pain or edema   GI:  Denies abdominal pain or bloody stools  :  Denies dysuria, reports urinary frequency  Musculoskeletal:  Denies back pain or joint pain   Integument: Reports erythema over bilateral lower legs as described above  Neurologic:  Denies headache, reports weakness and fall as above  Psychiatric:  Denies depression or anxiety     Past Medical and Surgical History:   Past Medical History:   Diagnosis Date   • Asthma    • Bipolar disorder ([facility] )    • Chronic UTI (urinary tract infection)    • COPD (chronic obstructive pulmonary disease) ([facility])    • Diabetes mellitus ([facility] )    • Disease of thyroid gland    • Dyslipidemia 10/31/2018   • Essential hypertension 10/31/2018   • GERD (gastroesophageal reflux disease)    • Heart attack ([facility] )    • Iron deficiency anemia, unspecified 7/29/2019   • Obesity due to excess calories 10/31/2018   • Recurrent falls 4/13/2019   • Seizure [facility])      Past Surgical History:   Procedure Laterality Date   • ANKLE FRACTURE SURGERY Right    • TUBAL LIGATION     • VEIN LIGATION AND STRIPPING Meds/Allergies:  No current facility-administered medications on file prior to encounter  Current Outpatient Medications on File Prior to Encounter   Medication Sig Dispense Refill   • Acetaminophen (Tylenol) 325 MG CAPS Take 650 mg by mouth Three times daily as needed     • albuterol (VENTOLIN HFA) 90 mcg/act inhaler Inhale 2 puffs every 6 (six) hours as needed for wheezing 1 Inhaler 1   • aspirin (ECOTRIN LOW STRENGTH) 81 mg EC tablet Take 1 tablet (81 mg total) by mouth daily 90 tablet 1   • Blood Glucose Monitoring Suppl (BLOOD GLUCOSE MONITOR SYSTEM) w/Device KIT Test blood sugars 3 times a day 1 each 0   • Diclofenac Sodium (VOLTAREN) 1 % APPLY TO AFFECTED AREA EVERY 6 HOURS IF NEEDED FOR PAIN  • Docusate Sodium (DSS) 100 MG CAPS Take 1 capsule by mouth 2 (two) times a day as needed     • estradiol (ESTRACE) 1 mg tablet Take 1 mg by mouth daily at bedtime       • ferrous sulfate (FeroSul) 325 (65 Fe) mg tablet Take 1 tablet (325 mg total) by mouth daily with breakfast (Patient not taking: Reported on 11/15/2020) 60 tablet 0   • fluticasone (FLONASE) 50 mcg/act nasal spray SPRAY ONE (1) SPRAY INTO EACH NOSTRIL ONCE DAILY 16 g 1   • furosemide (LASIX) 40 mg tablet Take 40 mg by mouth as needed (Patient not taking: Reported on 1/22/2022 )     • gabapentin (NEURONTIN) 800 mg tablet Take 1 tablet (800 mg total) by mouth 4 (four) times a day for 3 days 12 tablet 0   • glucose blood (ONE TOUCH ULTRA TEST) test strip Test blood sugars 3 times a day   100 each 0   • hydrOXYzine HCL (ATARAX) 10 mg tablet Take 1 tablet (10 mg total) by mouth every 6 (six) hours as needed for itching (Patient not taking: Reported on 11/15/2020) 30 tablet 3   • hydrOXYzine pamoate (VISTARIL) 50 mg capsule Take 1 capsule (50 mg total) by mouth 3 (three) times a day (Patient not taking: Reported on 11/15/2020) 90 capsule 3   • Incontinence Supply Disposable (PADSORBER BED PAN LINERS) MISC by Does not apply route as needed (incontinence) 50 each 3   • Infant Care Products (CUTIES SENSITIVE WIPES) MISC 120 Pieces by Does not apply route as needed (incontinence) 120 each 3   • levETIRAcetam (KEPPRA) 750 mg tablet Take 2 tablets (1,500 mg total) by mouth 2 (two) times a day for 15 days Pt states she takes 1500MG Bid (Patient taking differently: Take 750 mg by mouth 2 (two) times a day Pt states she takes 1500MG Bid ) 60 tablet 0   • levothyroxine 150 mcg tablet Take 1 tablet (150 mcg total) by mouth daily 30 tablet 3   • levothyroxine 175 mcg tablet Take 175 mcg by mouth in the morning     • lisinopril (ZESTRIL) 10 mg tablet Take 1 tablet by mouth in the morning     • lisinopril (ZESTRIL) 5 mg tablet Take 1 tablet (5 mg total) by mouth daily (Patient not taking: Reported on 1/22/2022 ) 30 tablet 3   • loratadine (CLARITIN) 10 mg tablet Take 1 tablet (10 mg total) by mouth daily (Patient not taking: Reported on 11/15/2020) 30 tablet 3   • magnesium chloride (MAG64) 64 MG TBEC EC tablet Take 1 tablet (64 mg total) by mouth 2 (two) times a day 60 tablet 3   • meloxicam (MOBIC) 7 5 mg tablet Take 1 tablet (7 5 mg total) by mouth 2 (two) times a day (Patient not taking: Reported on 1/22/2022 ) 60 tablet 1   • metFORMIN (GLUCOPHAGE) 500 mg tablet Take 1 tablet (500 mg total) by mouth 2 (two) times a day with meals 60 tablet 5   • metoprolol succinate (TOPROL-XL) 25 mg 24 hr tablet Take 1 tablet (25 mg total) by mouth daily 30 tablet 3   • Misc   Devices (MATTRESS PAD) MISC by Does not apply route daily 1 each 0   • montelukast (SINGULAIR) 10 mg tablet Take 1 tablet (10 mg total) by mouth daily at bedtime 90 tablet 1   • Multiple Vitamins-Minerals (Centrum Silver 50+Women) TABS Take by mouth     • multivitamin (THERAGRAN) TABS Take 1 tablet by mouth daily     • norethindrone (AYGESTIN) 5 mg tablet Take 5 mg by mouth daily at bedtime      • nystatin (MYCOSTATIN) ointment      • omeprazole (PriLOSEC) 20 mg delayed release capsule TAKE ONE CAPSULE BY MOUTH TWICE A DAY (Patient not taking: Reported on 11/15/2020) 60 capsule 0   • omeprazole (PriLOSEC) 40 MG capsule Take 40 mg by mouth 2 (two) times a day     • ONETOUCH DELICA LANCETS 19Q MISC Test blood sugars 3 times a day  100 each 0   • oxybutynin (DITROPAN) 5 mg tablet Take 1 tablet (5 mg total) by mouth 3 (three) times a day 90 tablet 3   • phenytoin (DILANTIN) 100 mg ER capsule Take by mouth 5 (five) times a day 3 tablets in morning and 2 at lunch daily      • polyethylene glycol (GLYCOLAX) powder Take 17 g by mouth daily (Patient not taking: Reported on 11/15/2020) 850 g 3   • polyvinyl alcohol (LIQUIFILM TEARS) 1 4 % ophthalmic solution Administer 1 drop to both eyes 3 (three) times a day 15 mL 0   • QUEtiapine (SEROquel) 400 MG tablet Take 1 tablet (400 mg total) by mouth daily at bedtime for 2 doses 2 tablet 0   • simvastatin (ZOCOR) 80 mg tablet Take 1 tablet (80 mg total) by mouth daily at bedtime 30 tablet 5   • Skin Protectants, Misc  (CALAZIME SKIN PROTECTANT) PSTE Apply 113 g topically 2 (two) times a day (Patient not taking: Reported on 11/13/2020) 1 Tube 0   • triamcinolone (KENALOG) 0 1 % cream Apply topically 2 (two) times a day (Patient not taking: Reported on 11/13/2020) 30 g 0   • triamcinolone (KENALOG) 0 1 % cream Apply topically 2 (two) times a day 30 g 0   • valACYclovir (VALTREX) 500 mg tablet Take 1 tablet by mouth 2 (two) times a day  2   • zonisamide (ZONEGRAN) 100 mg capsule Take 100 mg by mouth 3 (three) times a day             Allergies:    Allergies   Allergen Reactions   • Keflex [Cephalexin] Anaphylaxis     Airway edema    • Levaquin [Levofloxacin] Anaphylaxis   • Penicillins Anaphylaxis   • Bactrim [Sulfamethoxazole-Trimethoprim] Swelling and GI Intolerance     LE edema and thrush   • Ciprofloxacin Swelling     Edema and all extremities and thrush   Edema in all extremities and thrush    • Noroxin [Norfloxacin]        History:  Marital Status:      Substance Use History:   Social History     Substance and Sexual Activity   Alcohol Use Not Currently   • Alcohol/week: 0 0 standard drinks     Social History     Tobacco Use   Smoking Status Never   Smokeless Tobacco Never     Social History     Substance and Sexual Activity   Drug Use No       Family History:  Family History   Problem Relation Age of Onset   • Skeletal dysplasia Mother    • Stroke Father        Physical Exam:     Vitals:   Blood Pressure: 135/81 (05/20/23 2030)  Pulse: 84 (05/20/23 2030)  Temperature: 97 6 °F (36 4 °C) (05/20/23 1613)  Temp Source: Temporal (05/20/23 1613)  Respirations: 14 (05/20/23 2030)  SpO2: 92 % (05/20/23 2030)    Constitutional: Obese female, appears anxious but no acute distress  Eyes:  EOMI, No scleral icterus   HENT:   oropharynx tacky, external ears normal, external nose normal   Respiratory:  No respiratory distress, no wheezing   Cardiovascular:  Normal rate, no murmurs   GI:  Soft, nondistended, no guarding   Musculoskeletal:  no tenderness, no deformities, no lower extremity edema  Integument: Erythema extending from ankle to knee bilaterally, erythema with some central clearing  No raised edges, see photos below  Neurologic:  Alert &awake, communicative, oriented x4, no focal deficits noted   Psychiatric: Tangential speech at times, behavior otherwise appropriate                   Lab Results: I have personally reviewed pertinent reports        Results from last 7 days   Lab Units 05/20/23  1645   WBC Thousand/uL 8 28   HEMOGLOBIN g/dL 12 9   HEMATOCRIT % 38 2   PLATELETS Thousands/uL 347   NEUTROS PCT % 63   LYMPHS PCT % 20   MONOS PCT % 7   EOS PCT % 8*     Results from last 7 days   Lab Units 05/20/23  1645   POTASSIUM mmol/L 4 3   CHLORIDE mmol/L 99   CO2 mmol/L 23   BUN mg/dL 20   CREATININE mg/dL 0 78   CALCIUM mg/dL 8 8   ALK PHOS U/L 67   ALT U/L 11   AST U/L 11*     Results from last 7 days   Lab Units 05/20/23  1645   INR  0 94         Imaging: I have personally reviewed pertinent reports  CT head without contrast    Result Date: 5/20/2023  Narrative: CT BRAIN - WITHOUT CONTRAST INDICATION:   dizziness, unsteadiness, fall on Friday  COMPARISON: 11/15/2020  TECHNIQUE:  CT examination of the brain was performed  Multiplanar 2D reformatted images were created from the source data  Radiation dose length product (DLP) for this visit:  855 48 mGy-cm   This examination, like all CT scans performed in the [facility], was performed utilizing techniques to minimize radiation dose exposure, including the use of iterative  reconstruction and automated exposure control  IMAGE QUALITY:  Diagnostic  FINDINGS: PARENCHYMA: Decreased attenuation is noted in periventricular and subcortical white matter demonstrating an appearance that is statistically most likely to represent mild microangiopathic change  No CT signs of acute infarction  No intracranial mass, mass effect or midline shift  No acute parenchymal hemorrhage  VENTRICLES AND EXTRA-AXIAL SPACES: Stable 10 mm calcified meningioma at the left frontal vertex  No extra-axial collections  Normal ventricles  VISUALIZED ORBITS: Normal visualized orbits  PARANASAL SINUSES: Normal visualized paranasal sinuses  CALVARIUM AND EXTRACRANIAL SOFT TISSUES:  Normal      Impression: No acute intracranial abnormality  Workstation performed: [id number]     CT chest abdomen pelvis w contrast    Result Date: 5/20/2023  Narrative: CT CHEST, ABDOMEN AND PELVIS WITH IV CONTRAST INDICATION:   Sepsis sepsis  COMPARISON: CT abdomen pelvis 11/13/2020  Chest CT 7/27/2019  TECHNIQUE: CT examination of the chest, abdomen and pelvis was performed  Multiplanar 2D reformatted images were created from the source data   This examination, like all CT scans performed in the [facility], was performed utilizing techniques to minimize radiation dose exposure, including the use of iterative reconstruction and automated exposure control  Radiation dose length product (DLP) for this visit:  1620 mGy-cm IV Contrast:  100 mL of iohexol (OMNIPAQUE) Enteric Contrast: Enteric contrast was not administered  FINDINGS: CHEST LUNGS: Mild dependent atelectasis in the right lung  There is no tracheal or endobronchial lesion  PLEURA:  Unremarkable  HEART/GREAT VESSELS: Heart is unremarkable for patient's age  No thoracic aortic aneurysm  MEDIASTINUM AND DARRON:  Unremarkable  CHEST WALL AND LOWER NECK:  Unremarkable  ABDOMEN LIVER/BILIARY TREE:  Unremarkable  GALLBLADDER:  No calcified gallstones  No pericholecystic inflammatory change  SPLEEN:  Unremarkable  PANCREAS:  Unremarkable  ADRENAL GLANDS:  Unremarkable  KIDNEYS/URETERS:  Unremarkable  No hydronephrosis  STOMACH AND BOWEL:  Unremarkable  APPENDIX:  No findings to suggest appendicitis  ABDOMINOPELVIC CAVITY:  No ascites  No pneumoperitoneum  No lymphadenopathy  VESSELS:  Atherosclerotic changes are present  No evidence of aneurysm  PELVIS REPRODUCTIVE ORGANS:  Unremarkable for patient's age  URINARY BLADDER:  Unremarkable  ABDOMINAL WALL/INGUINAL REGIONS: There is a small fat-containing umbilical hernia, unchanged from previous examination  OSSEOUS STRUCTURES:  No acute fracture or destructive osseous lesion  Mild dextroscoliosis and advanced multilevel degenerative changes in the lumbar spine  Impression: No acute pathology in the chest, abdomen or pelvis  Workstation performed: [id number]       Total time for visit, including counseling/coordination of care: 30 minutes  Greater than 50% of this total time spent on direct patient counseling and coorination of care  Epic Records Reviewed as well as Records in Care Everywhere    ** Please Note: Dragon 360 Dictation voice to text software was used in the creation of this document   **

## 2023-05-21 NOTE — ED NOTES
Patient transferred into an inpatient bed at this time  Patient resting comfortably and offering no complaints, call bell within reach       Emily Salvador RN  05/20/23 [id number]

## 2023-05-21 NOTE — PROGRESS NOTES
[address] 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 ring worm  · Will discuss case with dermatology tomorrow to determine best course of action      Generalized weakness  Assessment & Plan  Patient reports a fall without loss of consciousness, weakness earlier today  She was found to be hypoglycemic in the 40s on arrival to ED, suspect weakness/lightheadedness may be in the setting of hypoglycemia versus deconditioning  Trend point-of-care glucose  PT/OT evaluation  See remainder of plan under hypoglycemia    GERD without esophagitis  Assessment & Plan  Continue PPI    Essential hypertension  Assessment & Plan  Continue lisinopril, metoprolol  Vitals stable    Seizure disorder (HCC)  Assessment & Plan  Continue Keppra, phenytoin, zonisamide  Phenytoin therapeutic  Awaiting keppra level    Acquired hypothyroidism  Assessment & Plan  Continue levothyroxine    Type 2 diabetes mellitus without complication, with long-term current use of insulin [facility])  Assessment & Plan  Lab Results   Component Value Date    HGBA1C 5 3 01/24/2023       Recent Labs     05/20/23  2202 05/21/23  0430 05/21/23  0555 05/21/23  1141   POCGLU 60* 151* 139 255*       Blood Sugar Average: Last 72 hrs:  (P) 113 [id number]     Current regimen includes metformin, glipizide, aspart 5 units 3 times daily AC  Patient reports taking aspart today, but has had poor p o  intake    Found to be hypoglycemic on arrival   Hold metformin, glipizide and scheduled insulin for now  Sliding scale and glucose checks  Hypoglycemia protocol  Significant improvement noted, continue to monitor and hold on hypoglycemic meds, PO intake improved    Bipolar disorder (HCC)  Assessment & Plan  Continue Seroquel    Asthma  Assessment & Plan  Continue Singulair  Continue albuterol as needed    * Hypoglycemia  Assessment & Plan  Patient with glucose in the 40s on arrival, reports she had taken home aspart  Additionally reports fall/dizziness earlier today  Suspect hypoglycemia in setting of poor p o  intake while continuing to take insulin, glipizide and metformin  · Hold metformin, scheduled insulin for now  · Every 2 hour point-of-care glucose until improvement in blood sugar  · Sliding-scale insulin  · Hypoglycemia protocol  · Significant improvement             VTE Pharmacologic Prophylaxis: VTE Score: 5 High Risk (Score >/= 5) - Pharmacological DVT Prophylaxis Ordered: enoxaparin (Lovenox)  Sequential Compression Devices Ordered  Patient Centered Rounds: I performed bedside rounds with nursing staff today  Discussions with Specialists or Other Care Team Provider: none    Education and Discussions with Family / Patient: Patient declined call to   Total Time Spent on Date of Encounter in care of patient: 45 minutes This time was spent on one or more of the following: performing physical exam; counseling and coordination of care; obtaining or reviewing history; documenting in the medical record; reviewing/ordering tests, medications or procedures; communicating with other healthcare professionals and discussing with patient's family/caregivers  Current Length of Stay: 0 day(s)  Current Patient Status: Inpatient   Certification Statement: The patient will continue to require additional inpatient hospital stay due to iv abx  Discharge Plan: Anticipate discharge tomorrow to home with home services      Code Status: Level 3 - DNAR and DNI    Subjective:   Patient reports feeling improved  Still having some abdominal discomfort and dysuria  Objective:     Vitals:   No data recorded  HR:  [] 104  Resp:  [14-20] 17  BP: (117-152)/(58-88) 120/76  SpO2:  [90 %-97 %] 95 %  There is no height or weight on file to calculate BMI  Input and Output Summary (last 24 hours): Intake/Output Summary (Last 24 hours) at 5/21/2023 1654  Last data filed at 5/20/2023 2338  Gross per 24 hour   Intake 5100 ml   Output --   Net 5100 ml       Physical Exam:   Physical Exam  Vitals and nursing note reviewed  Constitutional:       General: She is not in acute distress  Appearance: She is obese  She is ill-appearing  HENT:      Head: Normocephalic and atraumatic  Cardiovascular:      Rate and Rhythm: Normal rate and regular rhythm  Pulses: Normal pulses  Heart sounds: Normal heart sounds  Pulmonary:      Effort: Pulmonary effort is normal       Breath sounds: Normal breath sounds  Abdominal:      General: Bowel sounds are normal       Palpations: Abdomen is soft  Tenderness: There is no abdominal tenderness  There is no guarding or rebound  Musculoskeletal:         General: Normal range of motion  Right lower leg: No edema  Left lower leg: No edema  Skin:     General: Skin is warm and dry  Neurological:      General: No focal deficit present  Mental Status: She is alert and oriented to person, place, and time  Mental status is at baseline     Psychiatric:         Mood and Affect: Mood normal          Behavior: Behavior normal           Additional Data:     Labs:  Results from last 7 days   Lab Units 05/21/23  0431   WBC Thousand/uL 6 15   HEMOGLOBIN g/dL 11 7   HEMATOCRIT % 35 0   PLATELETS Thousands/uL 219   NEUTROS PCT % 53   LYMPHS PCT % 20   MONOS PCT % 10   EOS PCT % 15*     Results from last 7 days   Lab Units 05/21/23  0431 05/20/23  1645   SODIUM mmol/L 137 132*   POTASSIUM mmol/L 4 6 4 3   CHLORIDE mmol/L 103 99   CO2 mmol/L 28 23   BUN mg/dL 14 20   CREATININE mg/dL 0 54* 0 78   ANION GAP mmol/L 6 10   CALCIUM mg/dL 7 8* 8 8   ALBUMIN g/dL  --  3 4*   TOTAL BILIRUBIN mg/dL  --  0 22   ALK PHOS U/L  --  67   ALT U/L  --  11   AST U/L  --  11*   GLUCOSE RANDOM mg/dL 130 67     Results from last 7 days   Lab Units 05/20/23  1645   INR  0 94     Results from last 7 days   Lab Units 05/21/23  1141 05/21/23  0555 05/21/23  0430 05/20/23  2202 05/20/23  1918 05/20/23  1829 05/20/23  1612   POC GLUCOSE mg/dl 255* 139 151* 60* 101 47* 42*         Results from last 7 days   Lab Units 05/20/23 2201 05/20/23  1935 05/20/23  1645   LACTIC ACID mmol/L 2 0 2 7* 4 1*   PROCALCITONIN ng/ml  --   --  <0 05       Lines/Drains:  Invasive Devices     Peripheral Intravenous Line  Duration           Peripheral IV 05/20/23 Left Forearm 1 day    Peripheral IV 05/20/23 Right Antecubital <1 day                      Imaging: No pertinent imaging reviewed  Recent Cultures (last 7 days):   Results from last 7 days   Lab Units 05/20/23  1645   BLOOD CULTURE  Received in Microbiology Lab  Culture in Progress  Received in Microbiology Lab  Culture in Progress         Last 24 Hours Medication List:   Current Facility-Administered Medications   Medication Dose Route Frequency Provider Last Rate   • acetaminophen  650 mg Oral Q6H PRN Shanique Oswald PA-C     • albuterol  2 puff Inhalation Q6H PRN Shanique Oswald PA-C     • aspirin  81 mg Oral Daily Shanique Oswald PA-C     • atorvastatin  40 mg Oral Daily With Jessica Ha PA-C     • aztreonam  2,000 mg Intravenous Q8H Shanique Oswald PA-C 2,000 mg (05/21/23 1353)   • Diclofenac Sodium  2 g Topical 4x Daily Shanique Oswald PA-C     • enoxaparin  40 mg Subcutaneous Daily Shanique Oswald PA-C     • [START ON 5/22/2023] ferrous sulfate  325 mg Oral Daily With Breakfast Shanique Oswald PA-C     • fluticasone  1 spray Each Nare BID Shanique Oswald PA-C     • gabapentin  800 mg Oral 4x Daily Shanique Oswald PA-C     • guaiFENesin  600 mg Oral Q12H [address] Weston Huston, RAFI     • hydrOXYzine HCL  50 mg Oral TID Weston Huston, RAFI     • levETIRAcetam  750 mg Oral BID Cristinada Robel, RAFI     • levothyroxine  175 mcg Oral Early Morning Cristinada Robel, RAFI     • lisinopril  5 mg Oral Daily Shelda Robel, RAFI     • magnesium chloride  64 mg Oral BID Shelda Robel, RAFI     • meloxicam  7 5 mg Oral BID Shelda Robel, RAFI     • metoprolol succinate  25 mg Oral BID Shelda Robel, RAFI     • montelukast  10 mg Oral HS Cristinada Robel, RAFI     • nystatin  1 application  Topical BID Cristinada Robel, RAFI     • oxybutynin  5 mg Oral TID Cristinada Robel, RAFI     • pantoprazole  40 mg Oral BID AC Weston Huston, RAFI     • phenazopyridine  100 mg Oral TID PRN Weston Huston, RAFI     • phenytoin  200 mg Oral Daily Cristinada Robel, RAFI     • phenytoin  300 mg Oral Daily Shelda Robel, RAFI     • polyvinyl alcohol  1 drop Both Eyes TID Weston Huston, RAFI     • [START ON 5/22/2023] QUEtiapine  400 mg Oral Daily Weston Huston PA-C     • sodium chloride (PF)  3 mL Intravenous Once PRN Weston Huston PA-C     • triamcinolone   Topical BID Weston Huston, RAFI     • valACYclovir  500 mg Oral BID Cristinada Robel, RAFI     • zonisamide  100 mg Oral TID Weston Huston PA-C          Today, Patient Was Seen By: Weston Huston PA-C    **Please Note: This note may have been constructed using a voice recognition system  **

## 2023-05-22 VITALS
BODY MASS INDEX: 39.86 KG/M2 | WEIGHT: 253.97 LBS | OXYGEN SATURATION: 96 % | TEMPERATURE: 97.5 F | HEIGHT: 67 IN | HEART RATE: 83 BPM | DIASTOLIC BLOOD PRESSURE: 65 MMHG | SYSTOLIC BLOOD PRESSURE: 110 MMHG | RESPIRATION RATE: 19 BRPM

## 2023-05-22 LAB
ATRIAL RATE: 90 BPM
GLUCOSE SERPL-MCNC: 108 MG/DL (ref 65–140)
GLUCOSE SERPL-MCNC: 125 MG/DL (ref 65–140)
GLUCOSE SERPL-MCNC: 133 MG/DL (ref 65–140)
GLUCOSE SERPL-MCNC: 263 MG/DL (ref 65–140)
P AXIS: 35 DEGREES
PR INTERVAL: 188 MS
QRS AXIS: 15 DEGREES
QRSD INTERVAL: 74 MS
QT INTERVAL: 376 MS
QTC INTERVAL: 459 MS
T WAVE AXIS: 61 DEGREES
VENTRICULAR RATE: 90 BPM

## 2023-05-22 RX ORDER — NITROFURANTOIN 25; 75 MG/1; MG/1
100 CAPSULE ORAL 2 TIMES DAILY
Qty: 6 CAPSULE | Refills: 0 | Status: SHIPPED | OUTPATIENT
Start: 2023-05-22 | End: 2023-05-25

## 2023-05-22 RX ORDER — NYSTATIN 100000 U/G
CREAM TOPICAL 2 TIMES DAILY
Status: DISCONTINUED | OUTPATIENT
Start: 2023-05-22 | End: 2023-05-22 | Stop reason: HOSPADM

## 2023-05-22 RX ADMIN — DICLOFENAC SODIUM TOPICAL GEL, 1%, 2 G: 10 GEL TOPICAL at 08:33

## 2023-05-22 RX ADMIN — PHENYTOIN SODIUM 300 MG: 100 CAPSULE ORAL at 08:31

## 2023-05-22 RX ADMIN — ENOXAPARIN SODIUM 40 MG: 40 INJECTION SUBCUTANEOUS at 08:30

## 2023-05-22 RX ADMIN — GABAPENTIN 800 MG: 400 CAPSULE ORAL at 13:04

## 2023-05-22 RX ADMIN — ZONISAMIDE 100 MG: 100 CAPSULE ORAL at 08:33

## 2023-05-22 RX ADMIN — ACETAMINOPHEN 650 MG: 325 TABLET ORAL at 05:34

## 2023-05-22 RX ADMIN — AZTREONAM 2000 MG: 2 INJECTION, POWDER, LYOPHILIZED, FOR SOLUTION INTRAMUSCULAR; INTRAVENOUS at 13:52

## 2023-05-22 RX ADMIN — FERROUS SULFATE TAB 325 MG (65 MG ELEMENTAL FE) 325 MG: 325 (65 FE) TAB at 08:30

## 2023-05-22 RX ADMIN — ASPIRIN 81 MG CHEWABLE TABLET 81 MG: 81 TABLET CHEWABLE at 08:31

## 2023-05-22 RX ADMIN — AZTREONAM 2000 MG: 2 INJECTION, POWDER, LYOPHILIZED, FOR SOLUTION INTRAMUSCULAR; INTRAVENOUS at 05:36

## 2023-05-22 RX ADMIN — LISINOPRIL 5 MG: 5 TABLET ORAL at 08:31

## 2023-05-22 RX ADMIN — MAGNESIUM 64 MG (MAGNESIUM CHLORIDE) TABLET,DELAYED RELEASE 64 MG: at 08:34

## 2023-05-22 RX ADMIN — GUAIFENESIN 600 MG: 600 TABLET, EXTENDED RELEASE ORAL at 08:30

## 2023-05-22 RX ADMIN — GABAPENTIN 800 MG: 400 CAPSULE ORAL at 08:30

## 2023-05-22 RX ADMIN — PANTOPRAZOLE SODIUM 40 MG: 40 TABLET, DELAYED RELEASE ORAL at 06:05

## 2023-05-22 RX ADMIN — FLUTICASONE PROPIONATE 1 SPRAY: 50 SPRAY, METERED NASAL at 08:33

## 2023-05-22 RX ADMIN — VALACYCLOVIR HYDROCHLORIDE 500 MG: 500 TABLET, FILM COATED ORAL at 08:30

## 2023-05-22 RX ADMIN — LEVOTHYROXINE SODIUM 175 MCG: 175 TABLET ORAL at 05:35

## 2023-05-22 RX ADMIN — METOPROLOL SUCCINATE 25 MG: 25 TABLET, EXTENDED RELEASE ORAL at 08:30

## 2023-05-22 RX ADMIN — LEVETIRACETAM 750 MG: 250 TABLET, FILM COATED ORAL at 08:30

## 2023-05-22 RX ADMIN — MELOXICAM 7.5 MG: 7.5 TABLET ORAL at 08:30

## 2023-05-22 RX ADMIN — TRIAMCINOLONE ACETONIDE: 1 CREAM TOPICAL at 08:33

## 2023-05-22 RX ADMIN — OXYBUTYNIN CHLORIDE 5 MG: 5 TABLET ORAL at 08:30

## 2023-05-22 RX ADMIN — DICLOFENAC SODIUM TOPICAL GEL, 1%, 2 G: 10 GEL TOPICAL at 12:38

## 2023-05-22 RX ADMIN — PHENYTOIN SODIUM 200 MG: 100 CAPSULE ORAL at 13:04

## 2023-05-22 RX ADMIN — NYSTATIN: 100000 CREAM TOPICAL at 13:08

## 2023-05-22 NOTE — DISCHARGE INSTR - AVS FIRST PAGE
It is highly suspected that your rash on your legs is granuloma annulare  Please follow up with your dermatologist     Complete a course of antibiotics for your UTI  Should you have stomach upset, take a probiotic or eat yogurt with probiotics in it  This will help prevent yeast infection as well  Follow up with your PCP  For now, can hold off taking insulin  If glucose levels begin running above 200, would reintroduce the insulin         It was a pleasure taking part in your care,    Ana [facility]

## 2023-05-22 NOTE — PHYSICAL THERAPY NOTE
Physical Therapy Evaluation    Patient Name: Prudence Dickey    RTEMI'G Date: 5/22/2023     Problem List  Principal Problem:    Hypoglycemia  Active Problems:    Asthma    Bipolar disorder ([place] [address] )    Type 2 diabetes mellitus without complication, with long-term current use of insulin ([facility] )    Acquired hypothyroidism    Seizure disorder ([facility] )    Essential hypertension    GERD without esophagitis    Generalized weakness    Rash    UTI (urinary tract infection)       Past Medical History  Past Medical History:   Diagnosis Date    Asthma     Bipolar disorder ([place] [address] )     Chronic UTI (urinary tract infection)     COPD (chronic obstructive pulmonary disease) ([facility] )     Diabetes mellitus ([facility] )     Disease of thyroid gland     Dyslipidemia 10/31/2018    Essential hypertension 10/31/2018    GERD (gastroesophageal reflux disease)     Heart attack ([place] [address] )     Iron deficiency anemia, unspecified 7/29/2019    Obesity due to excess calories 10/31/2018    Recurrent falls 4/13/2019    Seizure [facility])         Past Surgical History  Past Surgical History:   Procedure Laterality Date    ANKLE FRACTURE SURGERY Right     TUBAL LIGATION      VEIN LIGATION AND STRIPPING             05/22/23 0943   PT Last Visit   PT Visit Date 05/22/23   Note Type   Note type Evaluation   Pain Assessment   Pain Assessment Tool 0-10   Pain Score No Pain   Restrictions/Precautions   Weight Bearing Precautions Per Order No   Other Precautions Fall Risk;Multiple lines; Bed Alarm   Home Living   Type of [facility] One level;Ramped entrance;Elevator   Bathroom Shower/Tub Tub/shower unit   Bathroom Toilet Standard   Bathroom Equipment Grab bars in [facility]; Wheelchair-manual;Hospital bed;Grab bars; Other (Comment)  (5NP)   Additional Comments pt reports use of 4WW at baseline   Prior Function   Level of [place] Independent with functional "mobility; Needs assistance with ADLs; Needs assistance with IADLS   Lives With Alone   Receives Help From Home health   IADLs Family/Friend/Other provides transportation   Falls in the last 6 months 0   Comments pt receives home health aides; unsure of amount of time   General   Family/Caregiver Present No   Cognition   Overall Cognitive Status WFL   Arousal/Participation Alert   Orientation Level Oriented X4   Following Commands Follows all commands and directions without difficulty   Subjective   Subjective \"The aide left my window open and the cold air came in\"   RLE Assessment   RLE Assessment WFL  (assessed functionally)   LLE Assessment   LLE Assessment WFL  (assessed functionally)   Bed Mobility   Supine to Sit 5  Supervision   Additional items Bedrails;HOB elevated; Increased time required;Verbal cues   Sit to Supine   (pt OOB at end of session)   Transfers   Sit to Stand 5  Supervision   Additional items Increased time required;Verbal cues   Stand to Sit 5  Supervision   Additional items Increased time required;Verbal cues   Toilet transfer 5  Supervision   Additional items Increased time required;Verbal cues;Standard toilet   Additional Comments RW used   Ambulation/Elevation   Gait pattern Excessively slow; Short stride; Foward flexed;Decreased foot clearance   Gait Assistance 5  Supervision   Additional items Verbal cues   Assistive Device Rolling walker   Distance 10'   Balance   Static Sitting Good   Dynamic Sitting Fair +   Static Standing Fair   Dynamic Standing [id number] -  (with RW)   Endurance Deficit   Endurance Deficit Yes   Activity Tolerance   Activity Tolerance Patient limited by fatigue   Assessment   Prognosis Good   Problem List Decreased strength; Impaired balance;Decreased endurance;Decreased mobility   Assessment Patient is a 74 y o  female evaluated by Physical Therapy s/p admit to [address] [facility] [place] [facility] on 5/20/2023 with admitting diagnosis of: Hypomagnesemia, Rash, UTI " (urinary tract infection), Hypoglycemia, Lower extremity cellulitis, Elevated lactic acid level, Hypoglycemia due to type 2 diabetes mellitus, and principal problem of: Hypoglycemia  PT was consulted to assess patient's functional mobility and discharge needs  Ordered are PT Evaluation and treatment with activity level of: up and OOB as tolerated  Comorbidities affecting patient's physical performance at time of assessment include: seizures, DM, COPD, asthma, GERD, bipolar disorder, HTN, hx of MI  Personal factors affecting the patient at time of IE include: ambulating with assistive device, inability to navigate community distances, inability/difficulty performing IADLs and inability/difficulty performing ADLs  Please locate objective findings from PT assessment regarding body systems outlined above  Upon evaluation, pt able to perform all functional mobility with SUP, RW, and increased time  Occasional verbal cuing provided for safety awareness and sequencing  Ambulation limited to short functional distance in room d/t pt requiring use of bathroom and wishing to remain seated there  HR and SpO2 remained WFL on RA throughout; no LOB experienced with mobility  The patient's AM-PAC Basic Mobility Inpatient Short Form Raw Score is 17  A Raw score of greater than 16 suggests the patient may benefit from discharge to home  Please also refer to the recommendation of the Physical Therapist for safe discharge planning  Co treatment with OT secondary to complex medical condition of pt, possible A of 2 required to achieve and maintain transitional movements, requiring the need of skilled therapeutic intervention of 2 therapists to achieve delivery of services  Pt will benefit from continued PT intervention during LOS to address current deficits, increase LOF, and facilitate safe d/c to next level of care when medically appropriate  D/c recommendation at this time is home with home health rehabilitation     Goals   Patient Goals to go home   LTG Expiration Date 06/05/23   Long Term Goal #1 Pt will participate in B LE strengthening exercises to facilitate improved functional activity tolerance  Pt will perform all functional transfers and bed mobility mod(I) with good safety awareness  Pt will ambulate 150' mod(I) with LRAD while maintaining good functional dynamic balance  Plan   Treatment/Interventions Functional transfer training;LE strengthening/ROM; Therapeutic exercise; Endurance training;Bed mobility;Gait training   PT Frequency 3-5x/wk   Recommendation   PT Discharge Recommendation Home with home health rehabilitation   AM-PAC Basic Mobility Inpatient   Turning in Flat Bed Without Bedrails 3   Lying on Back to Sitting on Edge of Flat Bed Without Bedrails 3   Moving Bed to Chair 3   Standing Up From Chair Using Arms 3   Walk in Room 3   Climb 3-5 Stairs With Railing 2   Basic Mobility Inpatient Raw Score 17   Basic Mobility Standardized Score 39 67   Highest Level Of Mobility   JH-HLM Goal 5: Stand one or more mins   JH-HLM Achieved 6: Walk 10 steps or more   End of Consult   Patient Position at End of Consult Other (comment)  (pt seated on toilet with pull cord in reach; instructed to pull when finished)

## 2023-05-22 NOTE — UTILIZATION REVIEW
NOTIFICATION OF INPATIENT ADMISSION   AUTHORIZATION REQUEST   SERVICING FACILITY:   [address] [place] [address]  [address], [place], [address]  Tax ID:  [id number]  NPI: [id number] ATTENDING PROVIDER:  Attending Name and NPI#: Erick Devlin [[id number]]  Address:  [address]  [place] [address]  Phone: [phone]     ADMISSION INFORMATION:  Place of Service: Inpatient [address]  [address]  Place of Service Code: 21  Inpatient Admission Date/Time: 5/21/23  3:38 PM  Discharge Date/Time: No discharge date for patient encounter  Admitting Diagnosis Code/Description:  Hypomagnesemia [E83 42]  Rash [R21]  UTI (urinary tract infection) [N39 0]  Hypoglycemia [E16 2]  Lower extremity cellulitis [D44 944]  Elevated lactic acid level [R79 89]  Hypoglycemia due to type 2 diabetes mellitus ([facility] ) [E11 649]     UTILIZATION REVIEW CONTACT:  Eliana Card Utilization   Network Utilization Review Department  Phone: [phone]  Fax [phone]  Email: [email or web address]  Contact for approvals/pending authorizations, clinical reviews, and discharge  PHYSICIAN ADVISORY SERVICES:  Medical Necessity Denial & Nxed-bs-Jaor Review  Phone: [phone]  Fax: [phone]  Email: [email or web address]

## 2023-05-22 NOTE — PLAN OF CARE
Problem: OCCUPATIONAL THERAPY ADULT  Goal: Performs self-care activities at highest level of function for planned discharge setting  See evaluation for individualized goals  Description: Treatment Interventions: ADL retraining, Functional transfer training, UE strengthening/ROM, Endurance training, Patient/family training, Equipment evaluation/education, Activityengagement          See flowsheet documentation for full assessment, interventions and recommendations  Note: Limitation: Decreased ADL status, Decreased UE strength, Decreased Safe judgement during ADL, Decreased endurance, Decreased self-care trans, Decreased high-level ADLs     Assessment: Pt is a 74 y o  female seen for OT evaluation s/p admit to [facility] on 5/20/2023 w/ Hypoglycemia  Comorbidities affecting pt's functional performance at time of assessment include: seizures, DM, COPD, disease of thyroid gland, asthma, GERD, UTI, bipolar disorder, iron deficiency anemia, falls, MI  Personal factors affecting pt at time of IE include:steps to enter environment, difficulty performing ADLS, difficulty performing IADLS , limited insight into deficits, decreased initiation and engagement  and health management   Prior to admission, pt was (A) with ADLs and IADLs with 4WW during mobility  Upon evaluation: Pt requires (S) level with use of RW during mobility 2* the following deficits impacting occupational performance: weakness, decreased strength, decreased balance, decreased tolerance, impaired initiation, decreased safety awareness and impaired interpersonal skills  Pt to benefit from continued skilled OT tx while in the hospital to address deficits as defined above and maximize level of functional independence w ADL's and functional mobility  Occupational Performance areas to address include: grooming, bathing/shower, toilet hygiene, dressing, functional mobility, community mobility and clothing management   The patient's raw score on the AM-PAC Daily Activity Inpatient Short Form is 21  A raw score of greater than or equal to 19 suggests the patient may benefit from discharge to home  Please refer to the recommendation of the Occupational Therapist for safe discharge planning  Pt benefited from co-evaluation of skilled OT and PT therapists in order to most appropriately address functional deficits d/t extensive assistance required for safe functional mobility, decreased activity tolerance, and regression from functioning level prior to admission and/or onset of present illness  OT/PT objectives were addressed separately; please see PT note for specific goal areas targeted       OT Discharge Recommendation: Home with home health rehabilitation

## 2023-05-22 NOTE — PLAN OF CARE
Problem: PHYSICAL THERAPY ADULT  Goal: Performs mobility at highest level of function for planned discharge setting  See evaluation for individualized goals  Description: Treatment/Interventions: Functional transfer training, LE strengthening/ROM, Therapeutic exercise, Endurance training, Bed mobility, Gait training          See flowsheet documentation for full assessment, interventions and recommendations  Note: Prognosis: Good  Problem List: Decreased strength, Impaired balance, Decreased endurance, Decreased mobility  Assessment: Patient is a 74 y o  female evaluated by Physical Therapy s/p admit to [address] [place] [address],[address] on 5/20/2023 with admitting diagnosis of: Hypomagnesemia, Rash, UTI (urinary tract infection), Hypoglycemia, Lower extremity cellulitis, Elevated lactic acid level, Hypoglycemia due to type 2 diabetes mellitus, and principal problem of: Hypoglycemia  PT was consulted to assess patient's functional mobility and discharge needs  Ordered are PT Evaluation and treatment with activity level of: up and OOB as tolerated  Comorbidities affecting patient's physical performance at time of assessment include: seizures, DM, COPD, asthma, GERD, bipolar disorder, HTN, hx of MI  Personal factors affecting the patient at time of IE include: ambulating with assistive device, inability to navigate community distances, inability/difficulty performing IADLs and inability/difficulty performing ADLs  Please locate objective findings from PT assessment regarding body systems outlined above  Upon evaluation, pt able to perform all functional mobility with SUP, RW, and increased time  Occasional verbal cuing provided for safety awareness and sequencing  Ambulation limited to short functional distance in room d/t pt requiring use of bathroom and wishing to remain seated there  HR and SpO2 remained WFL on RA throughout; no LOB experienced with mobility   The patient's AM-PAC Basic Mobility Inpatient Short Form Raw Score is 17  A Raw score of greater than 16 suggests the patient may benefit from discharge to home  Please also refer to the recommendation of the Physical Therapist for safe discharge planning  Co treatment with OT secondary to complex medical condition of pt, possible A of 2 required to achieve and maintain transitional movements, requiring the need of skilled therapeutic intervention of 2 therapists to achieve delivery of services  Pt will benefit from continued PT intervention during LOS to address current deficits, increase LOF, and facilitate safe d/c to next level of care when medically appropriate  D/c recommendation at this time is home with home health rehabilitation  PT Discharge Recommendation: Home with home health rehabilitation    See flowsheet documentation for full assessment

## 2023-05-22 NOTE — PLAN OF CARE
Problem: PAIN - ADULT  Goal: Verbalizes/displays adequate comfort level or baseline comfort level  Description: Interventions:  - Encourage patient to monitor pain and request assistance  - Assess pain using appropriate pain scale  - Administer analgesics based on type and severity of pain and evaluate response  - Implement non-pharmacological measures as appropriate and evaluate response  - Consider cultural and social influences on pain and pain management  - Notify physician/advanced practitioner if interventions unsuccessful or patient reports new pain  Outcome: Progressing     Problem: INFECTION - ADULT  Goal: Absence or prevention of progression during hospitalization  Description: INTERVENTIONS:  - Assess and monitor for signs and symptoms of infection  - Monitor lab/diagnostic results  - Monitor all insertion sites, i e  indwelling lines, tubes, and drains  - Monitor endotracheal if appropriate and nasal secretions for changes in amount and color  - [place] appropriate cooling/warming therapies per order  - Administer medications as ordered  - Instruct and encourage patient and family to use good hand hygiene technique  - Identify and instruct in appropriate isolation precautions for identified infection/condition  Outcome: Progressing     Problem: SAFETY ADULT  Goal: Patient will remain free of falls  Description: INTERVENTIONS:  - Educate patient/family on patient safety including physical limitations  - Instruct patient to call for assistance with activity   - Consult OT/PT to assist with strengthening/mobility   - Keep Call bell within reach  - Keep bed low and locked with side rails adjusted as appropriate  - Keep care items and personal belongings within reach  - Initiate and maintain comfort rounds  - Make Fall Risk Sign visible to staff  - Offer Toileting every 2 Hours, in advance of need  - Initiate/Maintain alarm  - Obtain necessary fall risk management equipment:   - Apply yellow socks and bracelet for high fall risk patients  - Consider moving patient to room near nurses station  Outcome: Progressing  Goal: Maintain or return to baseline ADL function  Description: INTERVENTIONS:  -  Assess patient's ability to carry out ADLs; assess patient's baseline for ADL function and identify physical deficits which impact ability to perform ADLs (bathing, care of mouth/teeth, toileting, grooming, dressing, etc )  - Assess/evaluate cause of self-care deficits   - Assess range of motion  - Assess patient's mobility; develop plan if impaired  - Assess patient's need for assistive devices and provide as appropriate  - Encourage maximum independence but intervene and supervise when necessary  - Involve family in performance of ADLs  - Assess for home care needs following discharge   - Consider OT consult to assist with ADL evaluation and planning for discharge  - Provide patient education as appropriate  Outcome: Progressing  Goal: Maintains/Returns to pre admission functional level  Description: INTERVENTIONS:  - Perform BMAT or MOVE assessment daily    - Set and communicate daily mobility goal to care team and patient/family/caregiver  - Collaborate with rehabilitation services on mobility goals if consulted  - Perform Range of Motion 3 times a day  - Reposition patient every 2 hours    - Dangle patient 3 times a day  - Stand patient 3 times a day  - Ambulate patient 3 times a day  - Out of bed to chair 3 times a day   - Out of bed for meals 3 times a day  - Out of bed for toileting  - Record patient progress and toleration of activity level   Outcome: Progressing     Problem: DISCHARGE PLANNING  Goal: Discharge to home or other facility with appropriate resources  Description: INTERVENTIONS:  - Identify barriers to discharge w/patient and caregiver  - Arrange for needed discharge resources and transportation as appropriate  - Identify discharge learning needs (meds, wound care, etc )  - Arrange for interpretive services to assist at discharge as needed  - Refer to Case Management Department for coordinating discharge planning if the patient needs post-hospital services based on physician/advanced practitioner order or complex needs related to functional status, cognitive ability, or social support system  Outcome: Progressing     Problem: Knowledge Deficit  Goal: Patient/family/caregiver demonstrates understanding of disease process, treatment plan, medications, and discharge instructions  Description: Complete learning assessment and assess knowledge base    Interventions:  - Provide teaching at level of understanding  - Provide teaching via preferred learning methods  Outcome: Progressing     Problem: Potential for Falls  Goal: Patient will remain free of falls  Description: INTERVENTIONS:  - Educate patient/family on patient safety including physical limitations  - Instruct patient to call for assistance with activity   - Consult OT/PT to assist with strengthening/mobility   - Keep Call bell within reach  - Keep bed low and locked with side rails adjusted as appropriate  - Keep care items and personal belongings within reach  - Initiate and maintain comfort rounds  - Make Fall Risk Sign visible to staff  - Offer Toileting every 2 Hours, in advance of need  - Initiate/Maintain alarm  - Obtain necessary fall risk management equipment:   - Apply yellow socks and bracelet for high fall risk patients  - Consider moving patient to room near nurses station  Outcome: Progressing     Problem: MOBILITY - ADULT  Goal: Maintain or return to baseline ADL function  Description: INTERVENTIONS:  -  Assess patient's ability to carry out ADLs; assess patient's baseline for ADL function and identify physical deficits which impact ability to perform ADLs (bathing, care of mouth/teeth, toileting, grooming, dressing, etc )  - Assess/evaluate cause of self-care deficits   - Assess range of motion  - Assess patient's mobility; develop plan if impaired  - Assess patient's need for assistive devices and provide as appropriate  - Encourage maximum independence but intervene and supervise when necessary  - Involve family in performance of ADLs  - Assess for home care needs following discharge   - Consider OT consult to assist with ADL evaluation and planning for discharge  - Provide patient education as appropriate  Outcome: Progressing  Goal: Maintains/Returns to pre admission functional level  Description: INTERVENTIONS:  - Perform BMAT or MOVE assessment daily    - Set and communicate daily mobility goal to care team and patient/family/caregiver  - Collaborate with rehabilitation services on mobility goals if consulted  - Perform Range of Motion 3 times a day  - Reposition patient every 2 hours    - Dangle patient 3 times a day  - Stand patient 3 times a day  - Ambulate patient 3 times a day  - Out of bed to chair 3 times a day   - Out of bed for meals 3 times a day  - Out of bed for toileting  - Record patient progress and toleration of activity level   Outcome: Progressing     Problem: Prexisting or High Potential for Compromised Skin Integrity  Goal: Skin integrity is maintained or improved  Description: INTERVENTIONS:  - Identify patients at risk for skin breakdown  - Assess and monitor skin integrity  - Assess and monitor nutrition and hydration status  - Monitor labs   - Assess for incontinence   - Turn and reposition patient  - Assist with mobility/ambulation  - Relieve pressure over bony prominences  - Avoid friction and shearing  - Provide appropriate hygiene as needed including keeping skin clean and dry  - Evaluate need for skin moisturizer/barrier cream  - Collaborate with interdisciplinary team   - Patient/family teaching  - Consider wound care consult   Outcome: Progressing

## 2023-05-22 NOTE — CASE MANAGEMENT
Case Management Assessment & Discharge Planning Note    Patient name Kenya Winters  Location [address] [id number] MRN [id number]  : 1955 Date 2023       Current Admission Date: 2023  Current Admission Diagnosis:Hypoglycemia   Patient Active Problem List    Diagnosis Date Noted   • Rash 2023   • Hypoglycemia 2023   • UTI (urinary tract infection) 2023   • BMI 33 0-33 9,adult 2019   • Iron deficiency anemia, unspecified 2019   • Right lower quadrant abdominal pain 2019   • Pressure injury of sacral region, stage 3 ([facility] ) 2019   • Pressure ulcer of left buttock, stage 3 ([facility] ) 2019   • Pressure ulcer of right buttock, stage 2 ([facility] ) 2019   • Generalized weakness 2019   • Leukopenia 2019   • Neutropenia ([facility] ) 2019   • Low TSH level 2019   • Sinus arrhythmia 2019   • Lactic acidosis 2019   • COPD (chronic obstructive pulmonary disease) ([facility] ) 2019   • Recurrent falls 2019   • Acute hyponatremia 2019   • Hypotension 2019   • Hypertriglyceridemia 2019   • Vaginal candidiasis 2019   • GERD without esophagitis 2018   • Urinary incontinence 2018   • Obesity due to excess calories 10/31/2018   • E  coli UTI 10/31/2018   • Essential hypertension 10/31/2018   • Hypercholesterolemia 10/31/2018   • Ambulatory dysfunction 10/30/2018   • NSTEMI (non-ST elevated myocardial infarction) ([facility] ) 10/29/2018   • Volume overload 10/29/2018   • Skin breakdown 10/29/2018   • Asthma 2016   • Type 2 diabetes mellitus without complication, with long-term current use of insulin ([facility] ) 10/21/2014   • Seizure disorder ([facility] ) 2014   • Dermatitis 2007   • Bipolar disorder ([facility] ) 2007   • Acquired hypothyroidism 2007      LOS (days): 1  Geometric Mean LOS (GMLOS) (days):   Days to GMLOS:     OBJECTIVE:    Risk of Unplanned Readmission Score: 19 18         Current admission status: Inpatient       Preferred Pharmacy:   [facility] [place] - [address]  [address]  [address] [address]  Phone: [phone] Fax: [phone]    Primary Care Provider: Mariana Mathews    Primary Insurance: Tangela Ratliff [facility] REP  Secondary Insurance: [address]:  [place] [address] Proxies    There are no active Health Care Proxies on file  Advance Directives  Does patient have a [address]?: No  Was patient offered paperwork?: Yes (declined)  Does patient currently have a Health Care decision maker?: Yes, please see Health Care Proxy section  Does patient have Advance Directives?: No  Was patient offered paperwork?: Yes (declined)  Primary Contact: does not have         Readmission Root Cause  30 Day Readmission: No    Patient Information  Admitted from[de-identified] Home  Mental Status: Alert  During Assessment patient was accompanied by: Not accompanied during assessment  Assessment information provided by[de-identified] Patient  Primary Caregiver: Self  Support Systems: Self, Home care staff (pt has aides 4 hours day 3 days a week through waiver  pt does not know agency name)  [place] of Residence: [facility] do you live in?: [address] [facility] entry access options   Select all that apply : Elevator  Type of Current Residence: Apartment  Upon entering residence, is there a bedroom on the main floor (no further steps)?: Yes  Upon entering residence, is there a bathroom on the main floor (no further steps)?: Yes  In the last 12 months, was there a time when you were not able to pay the mortgage or rent on time?: No  In the last 12 months, how many places have you lived?: 1  In the last 12 months, was there a time when you did not have a steady place to sleep or slept in a shelter (including now)?: No  Homeless/housing insecurity resource given?: N/A  Living Arrangements: Lives Alone  Is patient a ?: No    Activities of Daily Living Prior to Admission  Functional Status: Assistance  Completes ADLs independently?: No  Level of ADL dependence: Assistance  Ambulates independently?: Yes  Does patient use assisted devices?: Yes  Assisted Devices (DME) used: Marin Coalgood, [address] Bed, Wheelchair, Other (Comment) (life recliner)  Does patient currently own DME?: Yes  What DME does the patient currently own?: Straight Desean Pae, Wheelchair, Hospital Bed (lift recliner)  Does patient have a history of Outpatient Therapy (PT/OT)?: No  Does the patient have a history of Short-Term Rehab?: Yes ([facility], [place])  Does patient have a history of HHC?: No  Does patient currently have [facility]?: No         Patient Information Continued  Income Source: SSI/SSD  Does patient have prescription coverage?: Yes  Within the past 12 months, you worried that your food would run out before you got the money to buy more : Never true  Within the past 12 months, the food you bought just didn't last and you didn't have money to get more : Never true  Food insecurity resource given?: No (had moms meals but cancelled she didnt like them)  Does patient receive dialysis treatments?: No  Does patient have a history of substance abuse?: No  Does patient have a history of Mental Health Diagnosis?: Yes  Is patient receiving treatment for mental health?: No  Patient declined treatment information  (follows with pcp for bipolar   no current psych she said they dc her)  Has patient received inpatient treatment related to mental health in the last 2 years?: No         Means of Transportation  Means of Transport to Appts[de-identified] Other (Comment) (STS )  In the past 12 months, has lack of transportation kept you from medical appointments or from getting medications?: No  In the past 12 months, has lack of transportation kept you from meetings, work, or from getting things needed for daily living?: No  Was application for public transport provided?: N/A        DISCHARGE DETAILS:    Discharge planning discussed with[de-identified] patient  Freedom of Choice: Yes  Comments - Freedom of Choice: agreeable to [facility]  CM contacted family/caregiver?: No- see comments (pt does not have any contacts)  Were Treatment Team discharge recommendations reviewed with patient/caregiver?: Yes  Did patient/caregiver verbalize understanding of patient care needs?: Yes  Were patient/caregiver advised of the risks associated with not following Treatment Team discharge recommendations?: Yes    Contacts  Contact Method: In Person  Reason/Outcome: Discharge [address]         Is the patient interested in [facility] [facility] at discharge?: Yes  [address] requested[de-identified] Nursing, Occupational Therapy, Physical [address] Name[de-identified] [address] [facility] Provider[de-identified] PCP  Home Health Services Needed[de-identified] Strengthening/Theraputic Exercises to Improve Function, Evaluate Functional Status and Safety, Diabetes Management  Homebound Criteria Met[de-identified] Requires the Assistance of Another Person for Safe Ambulation or to Leave the Home, Uses an Assist Device (i e  cane, walker, etc)  Supporting Clincal Findings[de-identified] Limited Endurance         Other Referral/Resources/Interventions Provided:  Interventions: [facility]    Would you like to participate in our [address] service program?  : No - Declined    Treatment Team Recommendation: Home with [facility]  Discharge Destination Plan[de-identified] Home with [address] met with the patient to evaluate the patients prior function and living situation and any barriers to d/c and form a safe d/c plan  Cm also evaluated the patient for any services in the home or needs for services  CM also discussed with patient that their preferences will be taken into account/consideration  Pt admitted with UTI, iv abx  Therapy recommended hh  Referral sent  Likely discharge home later today  Request for transport placed via round trip  CM to follow

## 2023-05-22 NOTE — OCCUPATIONAL THERAPY NOTE
Occupational Therapy Evaluation     Patient Name: Alonso Lassiter  [id number] Date: 5/22/2023  Problem List  Principal Problem:    Hypoglycemia  Active Problems:    Asthma    Bipolar disorder ([facility] )    Type 2 diabetes mellitus without complication, with long-term current use of insulin ([facility] )    Acquired hypothyroidism    Seizure disorder ([facility] )    Essential hypertension    GERD without esophagitis    Generalized weakness    Rash    UTI (urinary tract infection)    Past Medical History  Past Medical History:   Diagnosis Date    Asthma     Bipolar disorder ([facility] )     Chronic UTI (urinary tract infection)     COPD (chronic obstructive pulmonary disease) ([facility] )     Diabetes mellitus ([facility] )     Disease of thyroid gland     Dyslipidemia 10/31/2018    Essential hypertension 10/31/2018    GERD (gastroesophageal reflux disease)     Heart attack ([facility] )     Iron deficiency anemia, unspecified 7/29/2019    Obesity due to excess calories 10/31/2018    Recurrent falls 4/13/2019    Seizure ([facility] )      Past Surgical History  Past Surgical History:   Procedure Laterality Date    ANKLE FRACTURE SURGERY Right     TUBAL LIGATION      VEIN LIGATION AND STRIPPING             05/22/23 0941   OT Last Visit   OT Visit Date 05/22/23   Note Type   Note type Evaluation   Pain Assessment   Pain Assessment Tool FLACC   Pain Rating: FLACC (Rest) - Face 0   Pain Rating: FLACC (Rest) - Legs 0   Pain Rating: FLACC (Rest) - Activity 0   Pain Rating: FLACC (Rest) - Cry 0   Pain Rating: FLACC (Rest) - Consolability 0   Score: FLACC (Rest) 0   Pain Rating: FLACC (Activity) - Face 1   Pain Rating: FLACC (Activity) - Legs 1   Pain Rating: FLACC (Activity) - Activity 1   Pain Rating: FLACC (Activity) - Cry 1   Pain Rating: FLACC (Activity) - Consolability 1   Score: FLACC (Activity) 5   Restrictions/Precautions   Weight Bearing Precautions Per Order No   Other Precautions Fall Risk;Multiple lines; Bed Alarm   Home Living   Type of [address] [place] [address] "level;Performs ADLs on one level; Able to live on main level with bedroom/bathroom; Ramped entrance;Elevator   Bathroom Shower/Tub Tub/shower unit   Bathroom Toilet Standard   Bathroom Equipment Grab bars in [facility]; Wheelchair-manual;Grab bars; Hospital bed   Additional Comments pt reports use of 4WW during mobility at baseline   Prior Function   Level of [place] Independent with functional mobility; Needs assistance with ADLs; Needs assistance with IADLS   Lives With Alone   Receives Help From Home health   IADLs Family/Friend/Other provides transportation   Falls in the last 6 months 0   Comments pt received home health services x6 hrs a week x2 hrs a day   Subjective   Subjective \"she left the window open and that's how I got cellulitis\"   ADL   Where Assessed Other (Comment)  (bathroom)   Grooming Assistance 5  Supervision/Setup   LB Dressing Assistance 5  Supervision/Setup   Toileting Assistance  5  Supervision/Setup   Additional Comments no significant difficulties in bathroom   Bed Mobility   Supine to Sit 5  Supervision   Additional items Increased time required;Verbal cues   Sit to Supine   (seated on toilet at end of session)   Additional Comments pt on RA during session; SPO2 WFL with no complaints   Transfers   Sit to Stand 5  Supervision   Additional items Increased time required;Verbal cues   Stand to Sit 5  Supervision   Additional items Increased time required;Verbal cues   Toilet transfer 5  Supervision   Additional items Increased time required;Standard toilet;Verbal cues   Additional Comments pt performs functional transfers with RW during session; no significant LOB or instability   Functional Mobility   Functional Mobility 5  Supervision   Additional Comments pt performs functional mobility with RW; performs to bathroom with no significant LOB or instability; pt tangential throughout session   Additional items Rolling walker   Balance " Static Sitting Good   Dynamic Sitting Fair +   Static Standing Fair   Dynamic Standing Fair   Ambulatory Fair -   Activity Tolerance   Activity Tolerance Patient limited by fatigue   RUE Assessment   RUE Assessment WFL   LUE Assessment   LUE Assessment WFL   Hand Function   Gross Motor Coordination Functional   Fine Motor Coordination Functional   Sensation   Light Touch No apparent deficits   Sharp/Dull No apparent deficits   Psychosocial   Psychosocial (WDL) X   Patient Behaviors/Mood Anxious; Cooperative   Cognition   Overall Cognitive Status WFL   Arousal/Participation Alert   Attention Within functional limits   Orientation Level Oriented X4   Memory Within functional limits   Following Commands Follows all commands and directions without difficulty   Comments pt is extremely tangential throughout session   Assessment   Limitation Decreased ADL status; Decreased UE strength;Decreased Safe judgement during ADL;Decreased endurance;Decreased self-care trans;Decreased high-level ADLs   Assessment Pt is a 74 y o  female seen for OT evaluation s/p admit to [facility] on 5/20/2023 w/ Hypoglycemia  Comorbidities affecting pt's functional performance at time of assessment include: seizures, DM, COPD, disease of thyroid gland, asthma, GERD, UTI, bipolar disorder, iron deficiency anemia, falls, MI  Personal factors affecting pt at time of IE include:steps to enter environment, difficulty performing ADLS, difficulty performing IADLS , limited insight into deficits, decreased initiation and engagement  and health management   Prior to admission, pt was (A) with ADLs and IADLs with 4WW during mobility  Upon evaluation: Pt requires (S) level with use of RW during mobility 2* the following deficits impacting occupational performance: weakness, decreased strength, decreased balance, decreased tolerance, impaired initiation, decreased safety awareness and impaired interpersonal skills   Pt to benefit from continued skilled OT tx while in the hospital to address deficits as defined above and maximize level of functional independence w ADL's and functional mobility  Occupational Performance areas to address include: grooming, bathing/shower, toilet hygiene, dressing, functional mobility, community mobility and clothing management  The patient's raw score on the AM-PAC Daily Activity Inpatient Short Form is 21  A raw score of greater than or equal to 19 suggests the patient may benefit from discharge to home  Please refer to the recommendation of the Occupational Therapist for safe discharge planning  Pt benefited from co-evaluation of skilled OT and PT therapists in order to most appropriately address functional deficits d/t extensive assistance required for safe functional mobility, decreased activity tolerance, and regression from functioning level prior to admission and/or onset of present illness  OT/PT objectives were addressed separately; please see PT note for specific goal areas targeted  Goals   Patient Goals to go home   Short Term Goal  pt will perform UE strengthening exercises   Long Term Goal #1 pt will perform UB/LB bathing and grooming tasks at (I) level   Long Term Goal #2 pt will demonstrate toilet transfers and hygiene at (I) level   Long Term Goal pt will demonstrate functional mobility with RW at mod (I) level   Plan   Treatment Interventions ADL retraining;Functional transfer training;UE strengthening/ROM; Endurance training;Patient/family training;Equipment evaluation/education; Activityengagement   Goal Expiration Date 06/05/23   OT Frequency 3-5x/wk   Recommendation   OT Discharge Recommendation Home with home health rehabilitation   [facility] Daily Activity Inpatient   Lower Body Dressing 3   Bathing 3   Toileting 3   Upper Body Dressing 4   Grooming 4   Eating 4   Daily Activity Raw Score 21   Daily Activity Standardized Score (Calc for Raw Score >=11) 44 27   AM-PAC Applied Cognition Inpatient   Following a Speech/Presentation 4   Understanding Ordinary Conversation 3   Taking Medications 3   Remembering Where Things Are Placed or Put Away 3   Remembering List of 4-5 Errands 3   Taking Care of Complicated Tasks 3   Applied Cognition Raw Score 19   Applied Cognition Standardized Score 39 77

## 2023-05-22 NOTE — PLAN OF CARE
Problem: PAIN - ADULT  Goal: Verbalizes/displays adequate comfort level or baseline comfort level  Description: Interventions:  - Encourage patient to monitor pain and request assistance  - Assess pain using appropriate pain scale  - Administer analgesics based on type and severity of pain and evaluate response  - Implement non-pharmacological measures as appropriate and evaluate response  - Consider cultural and social influences on pain and pain management  - Notify physician/advanced practitioner if interventions unsuccessful or patient reports new pain  Outcome: Progressing     Problem: INFECTION - ADULT  Goal: Absence or prevention of progression during hospitalization  Description: INTERVENTIONS:  - Assess and monitor for signs and symptoms of infection  - Monitor lab/diagnostic results  - Monitor all insertion sites, i e  indwelling lines, tubes, and drains  - [place] appropriate cooling/warming therapies per order  - Administer medications as ordered  - Instruct and encourage patient and family to use good hand hygiene technique  - Identify and instruct in appropriate isolation precautions for identified infection/condition  Outcome: Progressing     Problem: SAFETY ADULT  Goal: Patient will remain free of falls  Description: INTERVENTIONS:  - Educate patient/family on patient safety including physical limitations  - Instruct patient to call for assistance with activity   - Consult OT/PT to assist with strengthening/mobility   - Keep Call bell within reach  - Keep bed low and locked with side rails adjusted as appropriate  - Keep care items and personal belongings within reach  - Initiate and maintain comfort rounds  - Make Fall Risk Sign visible to staff  - Offer Toileting every 2  Hours, in advance of need  - Initiate/Maintain BED/CHAIR alarm  - Obtain necessary fall risk management equipment:  - Apply yellow socks and bracelet for high fall risk patients  - Consider moving patient to room near nurses station  Outcome: Progressing  Goal: Maintain or return to baseline ADL function  Description: INTERVENTIONS:  -  Assess patient's ability to carry out ADLs; assess patient's baseline for ADL function and identify physical deficits which impact ability to perform ADLs (bathing, care of mouth/teeth, toileting, grooming, dressing, etc )  - Assess/evaluate cause of self-care deficits   - Assess range of motion  - Assess patient's mobility; develop plan if impaired  - Assess patient's need for assistive devices and provide as appropriate  - Encourage maximum independence but intervene and supervise when necessary  - Involve family in performance of ADLs  - Assess for home care needs following discharge   - Consider OT consult to assist with ADL evaluation and planning for discharge  - Provide patient education as appropriate  Outcome: Progressing  Goal: Maintains/Returns to pre admission functional level  Description: INTERVENTIONS:  - Perform BMAT or MOVE assessment daily    - Set and communicate daily mobility goal to care team and patient/family/caregiver  - Collaborate with rehabilitation services on mobility goals if consulted  - Perform Range of Motion 3  times a day  - Reposition patient every 2 hours    - Dangle patient 3  times a day  - Stand patient 3 times a day  - Ambulate patient 3  times a day  - Out of bed to chair 3  times a day   - Out of bed for meals 3 times a day  - Out of bed for toileting  - Record patient progress and toleration of activity level   Outcome: Progressing     Problem: DISCHARGE PLANNING  Goal: Discharge to home or other facility with appropriate resources  Description: INTERVENTIONS:  - Identify barriers to discharge w/patient and caregiver  - Arrange for needed discharge resources and transportation as appropriate  - Identify discharge learning needs (meds, wound care, etc )  - Refer to Case Management Department for coordinating discharge planning if the patient needs post-hospital services based on physician/advanced practitioner order or complex needs related to functional status, cognitive ability, or social support system  Outcome: Progressing     Problem: Knowledge Deficit  Goal: Patient/family/caregiver demonstrates understanding of disease process, treatment plan, medications, and discharge instructions  Description: Complete learning assessment and assess knowledge base    Interventions:  - Provide teaching at level of understanding  - Provide teaching via preferred learning methods  Outcome: Progressing     Problem: Potential for Falls  Goal: Patient will remain free of falls  Description: INTERVENTIONS:  - Educate patient/family on patient safety including physical limitations  - Instruct patient to call for assistance with activity   - Consult OT/PT to assist with strengthening/mobility   - Keep Call bell within reach  - Keep bed low and locked with side rails adjusted as appropriate  - Keep care items and personal belongings within reach  - Initiate and maintain comfort rounds  - Make Fall Risk Sign visible to staff  - Offer Toileting every 2 Hours, in advance of need  - Initiate/Maintain bed/chair alarm  - Obtain necessary fall risk management equipment:   - Apply yellow socks and bracelet for high fall risk patients  - Consider moving patient to room near nurses station  Outcome: Progressing     Problem: MOBILITY - ADULT  Goal: Maintain or return to baseline ADL function  Description: INTERVENTIONS:  -  Assess patient's ability to carry out ADLs; assess patient's baseline for ADL function and identify physical deficits which impact ability to perform ADLs (bathing, care of mouth/teeth, toileting, grooming, dressing, etc )  - Assess/evaluate cause of self-care deficits   - Assess range of motion  - Assess patient's mobility; develop plan if impaired  - Assess patient's need for assistive devices and provide as appropriate  - Encourage maximum independence but intervene and supervise when necessary  - Involve family in performance of ADLs  - Assess for home care needs following discharge   - Consider OT consult to assist with ADL evaluation and planning for discharge  - Provide patient education as appropriate  Outcome: Progressing  Goal: Maintains/Returns to pre admission functional level  Description: INTERVENTIONS:  - Perform BMAT or MOVE assessment daily    - Set and communicate daily mobility goal to care team and patient/family/caregiver  - Collaborate with rehabilitation services on mobility goals if consulted  - Perform Range of Motion 3  times a day  - Reposition patient every 2  hours    - Dangle patient 3 times a day  - Stand patient 3  times a day  - Ambulate patient 3  times a day  - Out of bed to chair 3 times a day   - Out of bed for meals 3 times a day  - Out of bed for toileting  - Record patient progress and toleration of activity level   Outcome: Progressing     Problem: Prexisting or High Potential for Compromised Skin Integrity  Goal: Skin integrity is maintained or improved  Description: INTERVENTIONS:  - Identify patients at risk for skin breakdown  - Assess and monitor skin integrity  - Assess and monitor nutrition and hydration status  - Monitor labs   - Assess for incontinence   - Turn and reposition patient  - Assist with mobility/ambulation  - Relieve pressure over bony prominences  - Avoid friction and shearing  - Provide appropriate hygiene as needed including keeping skin clean and dry  - Evaluate need for skin moisturizer/barrier cream  - Collaborate with interdisciplinary team   - Patient/family teaching  - Consider wound care consult   Outcome: Progressing

## 2023-05-22 NOTE — CASE MANAGEMENT
Case Management Discharge Planning Note    Patient name Prudence Dickey  Location [address] [id number] MRN [id number]  : 1955 Date 2023       Current Admission Date: 2023  Current Admission Diagnosis:Hypoglycemia   Patient Active Problem List    Diagnosis Date Noted   • Rash 2023   • Hypoglycemia 2023   • UTI (urinary tract infection) 2023   • BMI 33 0-33 9,adult 2019   • Iron deficiency anemia, unspecified 2019   • Right lower quadrant abdominal pain 2019   • Pressure injury of sacral region, stage 3 ([facility] ) 2019   • Pressure ulcer of left buttock, stage 3 ([facility] ) 2019   • Pressure ulcer of right buttock, stage 2 ([facility] ) 2019   • Generalized weakness 2019   • Leukopenia 2019   • Neutropenia ([facility] ) 2019   • Low TSH level 2019   • Sinus arrhythmia 2019   • Lactic acidosis 2019   • COPD (chronic obstructive pulmonary disease) ([facility] ) 2019   • Recurrent falls 2019   • Acute hyponatremia 2019   • Hypotension 2019   • Hypertriglyceridemia 2019   • Vaginal candidiasis 2019   • GERD without esophagitis 2018   • Urinary incontinence 2018   • Obesity due to excess calories 10/31/2018   • E  coli UTI 10/31/2018   • Essential hypertension 10/31/2018   • Hypercholesterolemia 10/31/2018   • Ambulatory dysfunction 10/30/2018   • NSTEMI (non-ST elevated myocardial infarction) ([facility] ) 10/29/2018   • Volume overload 10/29/2018   • Skin breakdown 10/29/2018   • Asthma 2016   • Type 2 diabetes mellitus without complication, with long-term current use of insulin ([facility] ) 10/21/2014   • Seizure disorder ([facility] ) 2014   • Dermatitis 2007   • Bipolar disorder ([facility] ) 2007   • Acquired hypothyroidism 2007      LOS (days): 1  Geometric Mean LOS (GMLOS) (days):   Days to GMLOS:     OBJECTIVE:  Risk of Unplanned Readmission Score: 19 22         Current admission status: Inpatient Preferred Pharmacy:   [facility] [id number] [facility] [address]  Phone: [phone] Fax: [phone]    Primary Care Provider: Farheen Tam    Primary Insurance: Ronit Carrillo [facility]  Secondary Insurance: [employer]    DISCHARGE DETAILS:    Transport at Discharge : Wheelchair van  Dispatcher Contacted: Yes  Number/Name of Dispatcher: via roundtrip  Transported by [employer] and Unit #): Paula Smart  ETA of Transport (Date): 05/22/23  ETA of Transport (Time): 1500     Transfer Mode: Wheelchair    Discussed with patient preferences on discharge;understanding how to manage health at home; purpose of taking medications; importance of follow up care/appointments; and symptoms to watch out for once discharged home  patient discharging home today with [facility]  Nurse to review AVS, all follow up providers listed

## 2023-05-22 NOTE — PLAN OF CARE
Problem: PAIN - ADULT  Goal: Verbalizes/displays adequate comfort level or baseline comfort level  Description: Interventions:  - Encourage patient to monitor pain and request assistance  - Assess pain using appropriate pain scale  - Administer analgesics based on type and severity of pain and evaluate response  - Implement non-pharmacological measures as appropriate and evaluate response  - Consider cultural and social influences on pain and pain management  - Notify physician/advanced practitioner if interventions unsuccessful or patient reports new pain  5/22/2023 1255 by Nazanin Mazariegos LPN  Outcome: Adequate for Discharge  5/22/2023 1035 by Nazanin Mazariegos LPN  Outcome: Progressing     Problem: INFECTION - ADULT  Goal: Absence or prevention of progression during hospitalization  Description: INTERVENTIONS:  - Assess and monitor for signs and symptoms of infection  - Monitor lab/diagnostic results  - Monitor all insertion sites, i e  indwelling lines, tubes, and drains  - Monitor endotracheal if appropriate and nasal secretions for changes in amount and color  - [place] appropriate cooling/warming therapies per order  - Administer medications as ordered  - Instruct and encourage patient and family to use good hand hygiene technique  - Identify and instruct in appropriate isolation precautions for identified infection/condition  5/22/2023 1255 by Nazanin Mazariegos LPN  Outcome: Adequate for Discharge  5/22/2023 1035 by Nazanin Mazariegos LPN  Outcome: Progressing     Problem: SAFETY ADULT  Goal: Patient will remain free of falls  Description: INTERVENTIONS:  - Educate patient/family on patient safety including physical limitations  - Instruct patient to call for assistance with activity   - Consult OT/PT to assist with strengthening/mobility   - Keep Call bell within reach  - Keep bed low and locked with side rails adjusted as appropriate  - Keep care items and personal belongings within reach  - Initiate and maintain comfort rounds  - Make Fall Risk Sign visible to staff  - Offer Toileting every 2 Hours, in advance of need  - Initiate/Maintain alarm  - Obtain necessary fall risk management equipment:   - Apply yellow socks and bracelet for high fall risk patients  - Consider moving patient to room near nurses station  5/22/2023 1255 by Santa Teran LPN  Outcome: Adequate for Discharge  5/22/2023 1035 by Santa Teran LPN  Outcome: Progressing  Goal: Maintain or return to baseline ADL function  Description: INTERVENTIONS:  -  Assess patient's ability to carry out ADLs; assess patient's baseline for ADL function and identify physical deficits which impact ability to perform ADLs (bathing, care of mouth/teeth, toileting, grooming, dressing, etc )  - Assess/evaluate cause of self-care deficits   - Assess range of motion  - Assess patient's mobility; develop plan if impaired  - Assess patient's need for assistive devices and provide as appropriate  - Encourage maximum independence but intervene and supervise when necessary  - Involve family in performance of ADLs  - Assess for home care needs following discharge   - Consider OT consult to assist with ADL evaluation and planning for discharge  - Provide patient education as appropriate  5/22/2023 1255 by Santa Teran LPN  Outcome: Adequate for Discharge  5/22/2023 1035 by Santa Teran LPN  Outcome: Progressing  Goal: Maintains/Returns to pre admission functional level  Description: INTERVENTIONS:  - Perform BMAT or MOVE assessment daily    - Set and communicate daily mobility goal to care team and patient/family/caregiver  - Collaborate with rehabilitation services on mobility goals if consulted  - Perform Range of Motion 3 times a day  - Reposition patient every 2 hours    - Dangle patient 3 times a day  - Stand patient 3 times a day  - Ambulate patient 3 times a day  - Out of bed to chair 3 times a day   - Out of bed for meals 3 times a day  - Out of bed for toileting  - Record patient progress and toleration of activity level   5/22/2023 1255 by Felix Stanley LPN  Outcome: Adequate for Discharge  5/22/2023 1035 by Felix Stanley LPN  Outcome: Progressing     Problem: DISCHARGE PLANNING  Goal: Discharge to home or other facility with appropriate resources  Description: INTERVENTIONS:  - Identify barriers to discharge w/patient and caregiver  - Arrange for needed discharge resources and transportation as appropriate  - Identify discharge learning needs (meds, wound care, etc )  - Arrange for interpretive services to assist at discharge as needed  - Refer to Case Management Department for coordinating discharge planning if the patient needs post-hospital services based on physician/advanced practitioner order or complex needs related to functional status, cognitive ability, or social support system  5/22/2023 1255 by Felix Stanley LPN  Outcome: Adequate for Discharge  5/22/2023 1035 by Felix Stanley LPN  Outcome: Progressing     Problem: Knowledge Deficit  Goal: Patient/family/caregiver demonstrates understanding of disease process, treatment plan, medications, and discharge instructions  Description: Complete learning assessment and assess knowledge base    Interventions:  - Provide teaching at level of understanding  - Provide teaching via preferred learning methods  5/22/2023 1255 by Felix Stanley LPN  Outcome: Adequate for Discharge  5/22/2023 1035 by Felix Stanley LPN  Outcome: Progressing     Problem: Potential for Falls  Goal: Patient will remain free of falls  Description: INTERVENTIONS:  - Educate patient/family on patient safety including physical limitations  - Instruct patient to call for assistance with activity   - Consult OT/PT to assist with strengthening/mobility   - Keep Call bell within reach  - Keep bed low and locked with side rails adjusted as appropriate  - Keep care items and personal belongings within reach  - Initiate and maintain comfort rounds  - Make Fall Risk Sign visible to staff  - Offer Toileting every 2 Hours, in advance of need  - Initiate/Maintain alarm  - Obtain necessary fall risk management equipment:   - Apply yellow socks and bracelet for high fall risk patients  - Consider moving patient to room near nurses station  5/22/2023 1255 by Sierra Dunlap LPN  Outcome: Adequate for Discharge  5/22/2023 1035 by Sierra Dunlap LPN  Outcome: Progressing     Problem: MOBILITY - ADULT  Goal: Maintain or return to baseline ADL function  Description: INTERVENTIONS:  -  Assess patient's ability to carry out ADLs; assess patient's baseline for ADL function and identify physical deficits which impact ability to perform ADLs (bathing, care of mouth/teeth, toileting, grooming, dressing, etc )  - Assess/evaluate cause of self-care deficits   - Assess range of motion  - Assess patient's mobility; develop plan if impaired  - Assess patient's need for assistive devices and provide as appropriate  - Encourage maximum independence but intervene and supervise when necessary  - Involve family in performance of ADLs  - Assess for home care needs following discharge   - Consider OT consult to assist with ADL evaluation and planning for discharge  - Provide patient education as appropriate  5/22/2023 1255 by Sierra Dunlap LPN  Outcome: Adequate for Discharge  5/22/2023 1035 by Sierra Dunlap LPN  Outcome: Progressing  Goal: Maintains/Returns to pre admission functional level  Description: INTERVENTIONS:  - Perform BMAT or MOVE assessment daily    - Set and communicate daily mobility goal to care team and patient/family/caregiver  - Collaborate with rehabilitation services on mobility goals if consulted  - Perform Range of Motion 3 times a day  - Reposition patient every 12 hours    - Dangle patient 3 times a day  - Stand patient 3 times a day  - Ambulate patient 3 times a day  - Out of bed to chair 3 times a day   - Out of bed for meals 3 times a day  - Out of bed for toileting  - Record patient progress and toleration of activity level   5/22/2023 1255 by Ulises Torres LPN  Outcome: Adequate for Discharge  5/22/2023 1035 by Ulises Torres LPN  Outcome: Progressing     Problem: Prexisting or High Potential for Compromised Skin Integrity  Goal: Skin integrity is maintained or improved  Description: INTERVENTIONS:  - Identify patients at risk for skin breakdown  - Assess and monitor skin integrity  - Assess and monitor nutrition and hydration status  - Monitor labs   - Assess for incontinence   - Turn and reposition patient  - Assist with mobility/ambulation  - Relieve pressure over bony prominences  - Avoid friction and shearing  - Provide appropriate hygiene as needed including keeping skin clean and dry  - Evaluate need for skin moisturizer/barrier cream  - Collaborate with interdisciplinary team   - Patient/family teaching  - Consider wound care consult   5/22/2023 1255 by Ulises Torres LPN  Outcome: Adequate for Discharge  5/22/2023 1035 by Ulises Torres LPN  Outcome: Progressing

## 2023-05-22 NOTE — UTILIZATION REVIEW
NOTIFICATION OF INPATIENT ADMISSION   AUTHORIZATION REQUEST   SERVICING FACILITY:   [address] [place] [address]  [address], [place], [address]  Tax ID:  [id number]  NPI: [id number] ATTENDING PROVIDER:  Attending Name and NPI#: Florencio Weaver [[id number]]  Address:  [address]  Phone: [phone]     ADMISSION INFORMATION:  Place of Service: Inpatient [address]  60[address]  Place of Service Code: 21  Inpatient Admission Date/Time: 5/21/23  3:38 PM  Discharge Date/Time: No discharge date for patient encounter  Admitting Diagnosis Code/Description:  Hypomagnesemia [E83 42]  Rash [R21]  UTI (urinary tract infection) [N39 0]  Hypoglycemia [E16 2]  Lower extremity cellulitis [L33 260]  Elevated lactic acid level [R79 89]  Hypoglycemia due to type 2 diabetes mellitus ([facility] [address] ) [E11 649]     UTILIZATION REVIEW CONTACT:  Austin Carey, Utilization   Network Utilization Review Department  Phone: [phone]  Fax [phone]  Email: [email or web address]  Contact for approvals/pending authorizations, clinical reviews, and discharge  PHYSICIAN ADVISORY SERVICES:  Medical Necessity Denial & [facility]-Pb[facility] Review  Phone: [phone]  Fax: [phone]  Email: [email or web address]

## 2023-05-23 LAB — BACTERIA UR CULT: ABNORMAL

## 2023-05-23 NOTE — UTILIZATION REVIEW
NOTIFICATION OF ADMISSION DISCHARGE   This is a Notification of Discharge from [address]  Please be advised that this patient has been discharge from our facility  Below you will find the admission and discharge date and time including the patient’s disposition  UTILIZATION REVIEW CONTACT:  Deisy Lyons  Utilization   Network Utilization Review Department  Phone: [phone] x carefully listen to the prompts  All voicemails are confidential   Email: [email or web address]     ADMISSION INFORMATION  PRESENTATION DATE: 5/20/2023  4:08 PM  OBERVATION ADMISSION DATE:   INPATIENT ADMISSION DATE: 5/21/23  3:38 PM   DISCHARGE DATE: 5/22/2023  4:48 PM   DISPOSITION:Home with [address] INFORMATION:  Send all requests for admission clinical reviews, approved or denied determinations and any other requests to dedicated fax number below belonging to the campus where the patient is receiving treatment   List of dedicated fax numbers:  [address] DENIALS (Administrative/Medical Necessity) [phone]   [address] (Maternity/NICU/Pediatrics) [phone]   [facility] [phone]   [facility] [phone]   [address] [phone]   220 [facility] [phone]   90 [facility] [phone]   [address] [place] [address] 119 [phone]   [facility]  [phone]   4051 [facility] [phone]   412 [facility] 850 [facility] [phone]

## 2023-05-25 LAB — LEVETIRACETAM SERPL-MCNC: 20.8 UG/ML (ref 10–40)

## 2023-05-26 LAB
BACTERIA BLD CULT: NORMAL
BACTERIA BLD CULT: NORMAL
DME PARACHUTE DELIVERY DATE ACTUAL: NORMAL
DME PARACHUTE DELIVERY DATE EXPECTED: NORMAL
DME PARACHUTE DELIVERY DATE REQUESTED: NORMAL
DME PARACHUTE DELIVERY NOTE: NORMAL
DME PARACHUTE ITEM DESCRIPTION: NORMAL
DME PARACHUTE ORDER STATUS: NORMAL
DME PARACHUTE SUPPLIER NAME: NORMAL
DME PARACHUTE SUPPLIER PHONE: NORMAL

## 2023-06-02 ENCOUNTER — TELEPHONE (OUTPATIENT)
Dept: UROLOGY | Facility: MEDICAL CENTER | Age: 68
End: 2023-06-02

## 2023-06-02 NOTE — TELEPHONE ENCOUNTER
New Patient    What is the reason for the patient’s appointment?:ER follow up- UTI    What office location does the patient prefer?:[place]     Does patient have Imaging/Lab Results:    Have patient records been requested?:  If No, are the records showing in Epic:       INSURANCE:  Do we accept the patient's insurance or is the patient Self-Pay?:    Insurance Provider: Alisha Castano   Plan Type/Number:  Member ID#:       HISTORY:   Has the patient had any previous Urologist(s)?:  Yes   Was the patient seen in the ED?:  Yes   Has the patient had any outside testing done?:  No   Does the patient have a personal history of cancer?: No

## 2023-07-03 ENCOUNTER — EVALUATION (OUTPATIENT)
Dept: PHYSICAL THERAPY | Facility: CLINIC | Age: 68
End: 2023-07-03
Payer: COMMERCIAL

## 2023-07-03 DIAGNOSIS — Z74.09 IMPAIRED MOBILITY: Primary | ICD-10-CM

## 2023-07-03 PROCEDURE — 97163 PT EVAL HIGH COMPLEX 45 MIN: CPT | Performed by: PHYSICAL THERAPIST

## 2023-07-03 NOTE — LETTER
July 3, 2023    [facility] [address]    Patient: Lucio Son   YOB: 1955   Date of Visit: 7/3/2023     Encounter Diagnosis     ICD-10-CM    1. Impaired mobility  Z74.09           Dear Dr. Indiana Gorman: Thank you for your recent referral of Lucio Son. Please review the attached evaluation summary from Ritika's recent visit. Please verify that you agree with the plan of care by signing the attached order. If you have any questions or concerns, please do not hesitate to call. I sincerely appreciate the opportunity to share in the care of one of your patients and hope to have another opportunity to work with you in the near future. Sincerely,    Pedro Bauer, PT      Referring Provider:      I certify that I have read the below Plan of Care and certify the need for these services furnished under this plan of treatment while under my care. Bia Valera [facility] [address]  Via Fax: [phone]          PT Evaluation     Today's date: 7/3/2023  Patient name: Lucio Son  : 1955  MRN: [id number]  Referring provider: Bia Valera*  Dx:   Encounter Diagnosis     ICD-10-CM    1. Impaired mobility  Z74.09                      Assessment  Assessment details: Patient is a 74year old female who presents to PT referred for a motorized wheelchair evaluation. Patient demonstrates significant deficits limiting functional mobility including weakness, decreased balance/gait, poor endurance and increased pain. Patient limits her ADL and social activities due to difficulty of manual propulsion of her wheelchair. PT believes patient would benefit from motorized wheelchair to help increased functional activity performance at home due to inconsistency of aid presence for patient.  I also believe patient would benefit socially because patient limits time outside of her apartment due to manual wheelchair. Patient has difficulty propelling wheelchair due to significant history of UE and LE injuries including carpal tunnel syndrome and ankle fractures. Patient would have an easier time and would be more likely to attend appointments with easier access to medical transportation and accessing facilities with motorized wheelchair. Impairments: abnormal gait, abnormal or restricted ROM, activity intolerance, impaired balance, impaired physical strength, lacks appropriate home exercise program, pain with function, safety issue and weight-bearing intolerance    Plan  Plan details: Patient seen for 1x motorized wheelchair evaluation today. Patient would benefit from: PT eval  Frequency: 1x week  Duration in visits: 1  Duration in weeks: 1  Treatment plan discussed with: patient        Subjective Evaluation    History of Present Illness  Mechanism of injury: Patient presents to PT today for evaluation for motorized wheelchair evaluation. Patient reports she has been using a manual wheelchair for 20+ years for mobility. Patient reports many previous injuries to all extremities including "shattered" ankles 20+ years ago, fx left femur, and injuries to both wrists and forearms. She reports she has CTS bilaterally. Patient was at [facility] office last month and discussed with her about a motorized wheelchair. Patient feels that she is very limited with mobility using manually propelled wheelchair. Patient recently began receiving an aid 3x per week for assistance but was without one for awhile. Patient reports she only uses RW inside her apartment and uses manual wheelchair for mobility out of apartment. Patient reports she mainly will stay inside her apartment due to difficulty of manual propulsion. Patient believes a motorized wheelchair will help make different ADL and overall tasks easier for her.  She believes having a motorized wheelchair will aid with her activities in her apartment and also with attending appointments. She states having a motorized wheelchair will help with being able to get on bus for medical transportation. Patient also would like motorized wheelchair to help her interact more socially in her community. Patient states one of her goals from her mental health care team is to interact more in her community but she limits that due to the difficulty of manual wheelchair. Patient is very interested in improving her quality of life and limiting more deterioration. Patient is now referred to PT for motorized wheelchair evaluation.    Pain  At best pain ratin  At worst pain ratin  Location: Hands  Progression: worsening    Social Support  Steps to enter house: no  Stairs in house: no   Lives in: community-based residential facility  Lives with: alone    Patient Goals  Patient goal: To improve ADL's and mobility        Objective     Concurrent Complaints  Negative for night pain, disturbed sleep, bladder dysfunction, bowel dysfunction, saddle (S4) numbness, cardiac problem, kidney problem, gallbladder problem, stomach problem, ulcer, appendix problem, spleen problem, pancreas problem, history of cancer, history of trauma and infection    Strength/Myotome Testing     Left Hip   Planes of Motion   Flexion: 4-  Abduction: 4-  Adduction: 4-    Right Hip   Planes of Motion   Flexion: 4-  Abduction: 4-  Adduction: 4-    Left Knee   Flexion: 4-  Extension: 4-    Right Knee   Flexion: 4-  Extension: 4-    Left Ankle/Foot   Plantar flexion: 4-    Right Ankle/Foot   Plantar flexion: 4-            Precautions: Fall risk      Manuals                                                                 Neuro Re-Ed                                                                                                        Ther Ex                                                                                                                     Ther Activity                                       Gait Training Modalities

## 2023-07-03 NOTE — PROGRESS NOTES
PT Evaluation     Today's date: 7/3/2023  Patient name: Yosef Barajas  : 1955  MRN: [id number]  Referring provider: Janessa Denton*  Dx:   Encounter Diagnosis     ICD-10-CM    1. Impaired mobility  Z74.09                      Assessment  Assessment details: Patient is a 74year old female who presents to PT referred for a motorized wheelchair evaluation. Patient demonstrates significant deficits limiting functional mobility including weakness, decreased balance/gait, poor endurance and increased pain. Patient limits her ADL and social activities due to difficulty of manual propulsion of her wheelchair. PT believes patient would benefit from motorized wheelchair to help increased functional activity performance at home due to inconsistency of aid presence for patient. I also believe patient would benefit socially because patient limits time outside of her apartment due to manual wheelchair. Patient has difficulty propelling wheelchair due to significant history of UE and LE injuries including carpal tunnel syndrome and ankle fractures. Patient would have an easier time and would be more likely to attend appointments with easier access to medical transportation and accessing facilities with motorized wheelchair. Impairments: abnormal gait, abnormal or restricted ROM, activity intolerance, impaired balance, impaired physical strength, lacks appropriate home exercise program, pain with function, safety issue and weight-bearing intolerance    Plan  Plan details: Patient seen for 1x motorized wheelchair evaluation today. Patient would benefit from: PT eval  Frequency: 1x week  Duration in visits: 1  Duration in weeks: 1  Treatment plan discussed with: patient        Subjective Evaluation    History of Present Illness  Mechanism of injury: Patient presents to PT today for evaluation for motorized wheelchair evaluation. Patient reports she has been using a manual wheelchair for 20+ years for mobility. Patient reports many previous injuries to all extremities including "shattered" ankles 20+ years ago, fx left femur, and injuries to both wrists and forearms. She reports she has CTS bilaterally. Patient was [facility] office last month and discussed with her about a motorized wheelchair. Patient feels that she is very limited with mobility using manually propelled wheelchair. Patient recently began receiving an aid 3x per week for assistance but was without one for awhile. Patient reports she only uses RW inside her apartment and uses manual wheelchair for mobility out of apartment. Patient reports she mainly will stay inside her apartment due to difficulty of manual propulsion. Patient believes a motorized wheelchair will help make different ADL and overall tasks easier for her. She believes having a motorized wheelchair will aid with her activities in her apartment and also with attending appointments. She states having a motorized wheelchair will help with being able to get on bus for medical transportation. Patient also would like motorized wheelchair to help her interact more socially in her community. Patient states one of her goals from her mental health care team is to interact more in her community but she limits that due to the difficulty of manual wheelchair. Patient is very interested in improving her quality of life and limiting more deterioration. Patient is now referred to PT for motorized wheelchair evaluation.    Pain  At best pain ratin  At worst pain ratin  Location: Hands  Progression: worsening    Social Support  Steps to enter house: no  Stairs in house: no   Lives in: community-based residential facility  Lives with: alone    Patient Goals  Patient goal: To improve ADL's and mobility        Objective     Concurrent Complaints  Negative for night pain, disturbed sleep, bladder dysfunction, bowel dysfunction, saddle (S4) numbness, cardiac problem, kidney problem, gallbladder problem, stomach problem, ulcer, appendix problem, spleen problem, pancreas problem, history of cancer, history of trauma and infection    Strength/Myotome Testing     Left Hip   Planes of Motion   Flexion: 4-  Abduction: 4-  Adduction: 4-    Right Hip   Planes of Motion   Flexion: 4-  Abduction: 4-  Adduction: 4-    Left Knee   Flexion: 4-  Extension: 4-    Right Knee   Flexion: 4-  Extension: 4-    Left Ankle/Foot   Plantar flexion: 4-    Right Ankle/Foot   Plantar flexion: 4-             Precautions: Fall risk      Manuals                                                                 Neuro Re-Ed                                                                                                        Ther Ex                                                                                                                     Ther Activity                                       Gait Training                                       Modalities

## 2023-07-21 PROBLEM — N39.0 UTI (URINARY TRACT INFECTION): Status: RESOLVED | Noted: 2023-05-20 | Resolved: 2023-07-21

## 2023-09-17 ENCOUNTER — APPOINTMENT (EMERGENCY)
Dept: CT IMAGING | Facility: HOSPITAL | Age: 68
DRG: 638 | End: 2023-09-17
Payer: COMMERCIAL

## 2023-09-17 ENCOUNTER — HOSPITAL ENCOUNTER (INPATIENT)
Facility: HOSPITAL | Age: 68
LOS: 2 days | Discharge: HOME HEALTH | DRG: 638 | End: 2023-09-20
Attending: EMERGENCY MEDICINE | Admitting: FAMILY MEDICINE
Payer: COMMERCIAL

## 2023-09-17 ENCOUNTER — APPOINTMENT (EMERGENCY)
Dept: RADIOLOGY | Facility: HOSPITAL | Age: 68
DRG: 638 | End: 2023-09-17
Payer: COMMERCIAL

## 2023-09-17 DIAGNOSIS — R26.81 GAIT INSTABILITY: ICD-10-CM

## 2023-09-17 DIAGNOSIS — I95.9 HYPOTENSION: Primary | ICD-10-CM

## 2023-09-17 DIAGNOSIS — R26.2 AMBULATORY DYSFUNCTION: ICD-10-CM

## 2023-09-17 DIAGNOSIS — I21.4 NSTEMI (NON-ST ELEVATED MYOCARDIAL INFARCTION) (HCC): ICD-10-CM

## 2023-09-17 DIAGNOSIS — R29.6 RECURRENT FALLS: Chronic | ICD-10-CM

## 2023-09-17 DIAGNOSIS — R29.6 MULTIPLE FALLS: ICD-10-CM

## 2023-09-17 DIAGNOSIS — E83.42 HYPOMAGNESEMIA: ICD-10-CM

## 2023-09-17 DIAGNOSIS — R79.89 ELEVATED LACTIC ACID LEVEL: ICD-10-CM

## 2023-09-17 DIAGNOSIS — Z79.4 TYPE 2 DIABETES MELLITUS WITH HYPOGLYCEMIA WITHOUT COMA, WITH LONG-TERM CURRENT USE OF INSULIN (HCC): ICD-10-CM

## 2023-09-17 DIAGNOSIS — E11.649 HYPOGLYCEMIA ASSOCIATED WITH TYPE 2 DIABETES MELLITUS (HCC): ICD-10-CM

## 2023-09-17 DIAGNOSIS — E11.649 TYPE 2 DIABETES MELLITUS WITH HYPOGLYCEMIA WITHOUT COMA, WITH LONG-TERM CURRENT USE OF INSULIN (HCC): ICD-10-CM

## 2023-09-17 LAB
ALBUMIN SERPL BCP-MCNC: 3.4 G/DL (ref 3.5–5)
ALP SERPL-CCNC: 50 U/L (ref 34–104)
ALT SERPL W P-5'-P-CCNC: 13 U/L (ref 7–52)
ANION GAP SERPL CALCULATED.3IONS-SCNC: 14 MMOL/L
APTT PPP: 27 SECONDS (ref 23–37)
AST SERPL W P-5'-P-CCNC: 15 U/L (ref 13–39)
BASE EX.OXY STD BLDV CALC-SCNC: 91.4 % (ref 60–80)
BASE EXCESS BLDV CALC-SCNC: -3.3 MMOL/L
BASOPHILS # BLD AUTO: 0.05 THOUSANDS/ÂΜL (ref 0–0.1)
BASOPHILS NFR BLD AUTO: 1 % (ref 0–1)
BILIRUB SERPL-MCNC: 0.2 MG/DL (ref 0.2–1)
BNP SERPL-MCNC: 15 PG/ML (ref 0–100)
BUN SERPL-MCNC: 16 MG/DL (ref 5–25)
CALCIUM ALBUM COR SERPL-MCNC: 9 MG/DL (ref 8.3–10.1)
CALCIUM SERPL-MCNC: 8.5 MG/DL (ref 8.4–10.2)
CARDIAC TROPONIN I PNL SERPL HS: 3 NG/L
CHLORIDE SERPL-SCNC: 101 MMOL/L (ref 96–108)
CO2 SERPL-SCNC: 20 MMOL/L (ref 21–32)
CREAT SERPL-MCNC: 0.69 MG/DL (ref 0.6–1.3)
EOSINOPHIL # BLD AUTO: 1.01 THOUSAND/ÂΜL (ref 0–0.61)
EOSINOPHIL NFR BLD AUTO: 15 % (ref 0–6)
ERYTHROCYTE [DISTWIDTH] IN BLOOD BY AUTOMATED COUNT: 13.2 % (ref 11.6–15.1)
FLUAV RNA RESP QL NAA+PROBE: NEGATIVE
FLUBV RNA RESP QL NAA+PROBE: NEGATIVE
GFR SERPL CREATININE-BSD FRML MDRD: 91 ML/MIN/1.73SQ M
GLUCOSE SERPL-MCNC: 118 MG/DL (ref 65–140)
GLUCOSE SERPL-MCNC: 173 MG/DL (ref 65–140)
GLUCOSE SERPL-MCNC: 195 MG/DL (ref 65–140)
GLUCOSE SERPL-MCNC: 65 MG/DL (ref 65–140)
GLUCOSE SERPL-MCNC: 70 MG/DL (ref 65–140)
GLUCOSE SERPL-MCNC: 77 MG/DL (ref 65–140)
GLUCOSE SERPL-MCNC: 80 MG/DL (ref 65–140)
GLUCOSE SERPL-MCNC: 95 MG/DL (ref 65–140)
HCO3 BLDV-SCNC: 20.5 MMOL/L (ref 24–30)
HCT VFR BLD AUTO: 37.5 % (ref 34.8–46.1)
HGB BLD-MCNC: 12.1 G/DL (ref 11.5–15.4)
IMM GRANULOCYTES # BLD AUTO: 0.04 THOUSAND/UL (ref 0–0.2)
IMM GRANULOCYTES NFR BLD AUTO: 1 % (ref 0–2)
INR PPP: 0.98 (ref 0.84–1.19)
LACTATE SERPL-SCNC: 4.2 MMOL/L (ref 0.5–2)
LIPASE SERPL-CCNC: 37 U/L (ref 11–82)
LYMPHOCYTES # BLD AUTO: 1.24 THOUSANDS/ÂΜL (ref 0.6–4.47)
LYMPHOCYTES NFR BLD AUTO: 19 % (ref 14–44)
MAGNESIUM SERPL-MCNC: 1.8 MG/DL (ref 1.9–2.7)
MCH RBC QN AUTO: 32.4 PG (ref 26.8–34.3)
MCHC RBC AUTO-ENTMCNC: 32.3 G/DL (ref 31.4–37.4)
MCV RBC AUTO: 101 FL (ref 82–98)
MONOCYTES # BLD AUTO: 0.51 THOUSAND/ÂΜL (ref 0.17–1.22)
MONOCYTES NFR BLD AUTO: 8 % (ref 4–12)
NEUTROPHILS # BLD AUTO: 3.8 THOUSANDS/ÂΜL (ref 1.85–7.62)
NEUTS SEG NFR BLD AUTO: 56 % (ref 43–75)
NRBC BLD AUTO-RTO: 0 /100 WBCS
O2 CT BLDV-SCNC: 16.5 ML/DL
PCO2 BLDV: 33.1 MM HG (ref 42–50)
PH BLDV: 7.41 [PH] (ref 7.3–7.4)
PLATELET # BLD AUTO: 238 THOUSANDS/UL (ref 149–390)
PMV BLD AUTO: 9.3 FL (ref 8.9–12.7)
PO2 BLDV: 85.9 MM HG (ref 35–45)
POTASSIUM SERPL-SCNC: 4.7 MMOL/L (ref 3.5–5.3)
PROCALCITONIN SERPL-MCNC: <0.05 NG/ML
PROT SERPL-MCNC: 5.9 G/DL (ref 6.4–8.4)
PROTHROMBIN TIME: 12.9 SECONDS (ref 11.6–14.5)
RBC # BLD AUTO: 3.73 MILLION/UL (ref 3.81–5.12)
RSV RNA RESP QL NAA+PROBE: NEGATIVE
SARS-COV-2 RNA RESP QL NAA+PROBE: NEGATIVE
SODIUM SERPL-SCNC: 135 MMOL/L (ref 135–147)
WBC # BLD AUTO: 6.65 THOUSAND/UL (ref 4.31–10.16)

## 2023-09-17 PROCEDURE — 70450 CT HEAD/BRAIN W/O DYE: CPT

## 2023-09-17 PROCEDURE — 96365 THER/PROPH/DIAG IV INF INIT: CPT

## 2023-09-17 PROCEDURE — 84443 ASSAY THYROID STIM HORMONE: CPT | Performed by: EMERGENCY MEDICINE

## 2023-09-17 PROCEDURE — 36415 COLL VENOUS BLD VENIPUNCTURE: CPT | Performed by: EMERGENCY MEDICINE

## 2023-09-17 PROCEDURE — 83605 ASSAY OF LACTIC ACID: CPT | Performed by: EMERGENCY MEDICINE

## 2023-09-17 PROCEDURE — 93005 ELECTROCARDIOGRAM TRACING: CPT

## 2023-09-17 PROCEDURE — 99291 CRITICAL CARE FIRST HOUR: CPT

## 2023-09-17 PROCEDURE — 96376 TX/PRO/DX INJ SAME DRUG ADON: CPT

## 2023-09-17 PROCEDURE — 82948 REAGENT STRIP/BLOOD GLUCOSE: CPT

## 2023-09-17 PROCEDURE — 96375 TX/PRO/DX INJ NEW DRUG ADDON: CPT

## 2023-09-17 PROCEDURE — G0390 TRAUMA RESPONS W/HOSP CRITI: HCPCS

## 2023-09-17 PROCEDURE — 96361 HYDRATE IV INFUSION ADD-ON: CPT

## 2023-09-17 PROCEDURE — 85730 THROMBOPLASTIN TIME PARTIAL: CPT | Performed by: EMERGENCY MEDICINE

## 2023-09-17 PROCEDURE — 76705 ECHO EXAM OF ABDOMEN: CPT | Performed by: EMERGENCY MEDICINE

## 2023-09-17 PROCEDURE — 73502 X-RAY EXAM HIP UNI 2-3 VIEWS: CPT

## 2023-09-17 PROCEDURE — 74177 CT ABD & PELVIS W/CONTRAST: CPT

## 2023-09-17 PROCEDURE — 82805 BLOOD GASES W/O2 SATURATION: CPT | Performed by: EMERGENCY MEDICINE

## 2023-09-17 PROCEDURE — 76604 US EXAM CHEST: CPT | Performed by: EMERGENCY MEDICINE

## 2023-09-17 PROCEDURE — 72125 CT NECK SPINE W/O DYE: CPT

## 2023-09-17 PROCEDURE — 83735 ASSAY OF MAGNESIUM: CPT | Performed by: EMERGENCY MEDICINE

## 2023-09-17 PROCEDURE — 80053 COMPREHEN METABOLIC PANEL: CPT | Performed by: EMERGENCY MEDICINE

## 2023-09-17 PROCEDURE — 84484 ASSAY OF TROPONIN QUANT: CPT | Performed by: EMERGENCY MEDICINE

## 2023-09-17 PROCEDURE — 0241U HB NFCT DS VIR RESP RNA 4 TRGT: CPT | Performed by: EMERGENCY MEDICINE

## 2023-09-17 PROCEDURE — 85025 COMPLETE CBC W/AUTO DIFF WBC: CPT | Performed by: EMERGENCY MEDICINE

## 2023-09-17 PROCEDURE — 71045 X-RAY EXAM CHEST 1 VIEW: CPT

## 2023-09-17 PROCEDURE — 83880 ASSAY OF NATRIURETIC PEPTIDE: CPT | Performed by: EMERGENCY MEDICINE

## 2023-09-17 PROCEDURE — 93308 TTE F-UP OR LMTD: CPT | Performed by: EMERGENCY MEDICINE

## 2023-09-17 PROCEDURE — 87040 BLOOD CULTURE FOR BACTERIA: CPT | Performed by: EMERGENCY MEDICINE

## 2023-09-17 PROCEDURE — 71260 CT THORAX DX C+: CPT

## 2023-09-17 PROCEDURE — 85610 PROTHROMBIN TIME: CPT | Performed by: EMERGENCY MEDICINE

## 2023-09-17 PROCEDURE — 83690 ASSAY OF LIPASE: CPT | Performed by: EMERGENCY MEDICINE

## 2023-09-17 PROCEDURE — 84145 PROCALCITONIN (PCT): CPT | Performed by: EMERGENCY MEDICINE

## 2023-09-17 RX ORDER — DEXTROSE MONOHYDRATE 50 MG/ML
125 INJECTION, SOLUTION INTRAVENOUS CONTINUOUS
Status: DISCONTINUED | OUTPATIENT
Start: 2023-09-17 | End: 2023-09-18

## 2023-09-17 RX ORDER — DEXTROSE MONOHYDRATE 25 G/50ML
25 INJECTION, SOLUTION INTRAVENOUS ONCE
Status: COMPLETED | OUTPATIENT
Start: 2023-09-17 | End: 2023-09-17

## 2023-09-17 RX ORDER — DEXTROSE MONOHYDRATE 25 G/50ML
INJECTION, SOLUTION INTRAVENOUS
Status: COMPLETED
Start: 2023-09-17 | End: 2023-09-17

## 2023-09-17 RX ORDER — MAGNESIUM SULFATE HEPTAHYDRATE 40 MG/ML
2 INJECTION, SOLUTION INTRAVENOUS ONCE
Status: COMPLETED | OUTPATIENT
Start: 2023-09-17 | End: 2023-09-18

## 2023-09-17 RX ORDER — OCTREOTIDE ACETATE 100 UG/ML
50 INJECTION, SOLUTION INTRAVENOUS; SUBCUTANEOUS ONCE
Status: COMPLETED | OUTPATIENT
Start: 2023-09-17 | End: 2023-09-17

## 2023-09-17 RX ADMIN — DEXTROSE MONOHYDRATE 50 ML: 25 INJECTION, SOLUTION INTRAVENOUS at 22:02

## 2023-09-17 RX ADMIN — SODIUM CHLORIDE 2000 ML: 0.9 INJECTION, SOLUTION INTRAVENOUS at 22:06

## 2023-09-17 RX ADMIN — OCTREOTIDE ACETATE 50 MCG: 100 INJECTION, SOLUTION INTRAVENOUS; SUBCUTANEOUS at 23:57

## 2023-09-17 RX ADMIN — MAGNESIUM SULFATE HEPTAHYDRATE 2 G: 40 INJECTION, SOLUTION INTRAVENOUS at 23:54

## 2023-09-17 RX ADMIN — IOHEXOL 100 ML: 350 INJECTION, SOLUTION INTRAVENOUS at 22:43

## 2023-09-17 RX ADMIN — DEXTROSE MONOHYDRATE 25 ML: 25 INJECTION, SOLUTION INTRAVENOUS at 23:59

## 2023-09-17 RX ADMIN — SODIUM CHLORIDE 2000 ML: 0.9 INJECTION, SOLUTION INTRAVENOUS at 22:03

## 2023-09-17 RX ADMIN — DEXTROSE 125 ML/HR: 5 SOLUTION INTRAVENOUS at 23:54

## 2023-09-18 PROBLEM — R26.81 GAIT INSTABILITY: Status: ACTIVE | Noted: 2023-09-18

## 2023-09-18 PROBLEM — Z79.4 TYPE 2 DIABETES MELLITUS WITH HYPOGLYCEMIA WITHOUT COMA, WITH LONG-TERM CURRENT USE OF INSULIN (HCC): Status: ACTIVE | Noted: 2023-05-20

## 2023-09-18 PROBLEM — E11.649 TYPE 2 DIABETES MELLITUS WITH HYPOGLYCEMIA WITHOUT COMA, WITH LONG-TERM CURRENT USE OF INSULIN (HCC): Status: ACTIVE | Noted: 2023-05-20

## 2023-09-18 PROBLEM — R21 SKIN RASH: Status: ACTIVE | Noted: 2023-09-18

## 2023-09-18 PROBLEM — R71.8 ELEVATED MCV: Status: ACTIVE | Noted: 2023-09-18

## 2023-09-18 LAB
2HR DELTA HS TROPONIN: 0 NG/L
4HR DELTA HS TROPONIN: 1 NG/L
ALBUMIN SERPL BCP-MCNC: 3.1 G/DL (ref 3.5–5)
ALP SERPL-CCNC: 55 U/L (ref 34–104)
ALT SERPL W P-5'-P-CCNC: 11 U/L (ref 7–52)
ANION GAP SERPL CALCULATED.3IONS-SCNC: 6 MMOL/L
AST SERPL W P-5'-P-CCNC: 11 U/L (ref 13–39)
ATRIAL RATE: 97 BPM
BACTERIA UR QL AUTO: ABNORMAL /HPF
BASOPHILS # BLD AUTO: 0.02 THOUSANDS/ÂΜL (ref 0–0.1)
BASOPHILS NFR BLD AUTO: 0 % (ref 0–1)
BILIRUB SERPL-MCNC: 0.22 MG/DL (ref 0.2–1)
BILIRUB UR QL STRIP: NEGATIVE
BUN SERPL-MCNC: 12 MG/DL (ref 5–25)
CALCIUM ALBUM COR SERPL-MCNC: 8.7 MG/DL (ref 8.3–10.1)
CALCIUM SERPL-MCNC: 8 MG/DL (ref 8.4–10.2)
CARDIAC TROPONIN I PNL SERPL HS: 3 NG/L
CARDIAC TROPONIN I PNL SERPL HS: 4 NG/L
CHLORIDE SERPL-SCNC: 105 MMOL/L (ref 96–108)
CLARITY UR: CLEAR
CO2 SERPL-SCNC: 25 MMOL/L (ref 21–32)
COLOR UR: YELLOW
CREAT SERPL-MCNC: 0.5 MG/DL (ref 0.6–1.3)
EOSINOPHIL # BLD AUTO: 0.8 THOUSAND/ÂΜL (ref 0–0.61)
EOSINOPHIL NFR BLD AUTO: 13 % (ref 0–6)
ERYTHROCYTE [DISTWIDTH] IN BLOOD BY AUTOMATED COUNT: 13.2 % (ref 11.6–15.1)
EST. AVERAGE GLUCOSE BLD GHB EST-MCNC: 134 MG/DL
GFR SERPL CREATININE-BSD FRML MDRD: 101 ML/MIN/1.73SQ M
GLUCOSE SERPL-MCNC: 121 MG/DL (ref 65–140)
GLUCOSE SERPL-MCNC: 123 MG/DL (ref 65–140)
GLUCOSE SERPL-MCNC: 131 MG/DL (ref 65–140)
GLUCOSE SERPL-MCNC: 135 MG/DL (ref 65–140)
GLUCOSE SERPL-MCNC: 150 MG/DL (ref 65–140)
GLUCOSE SERPL-MCNC: 156 MG/DL (ref 65–140)
GLUCOSE SERPL-MCNC: 163 MG/DL (ref 65–140)
GLUCOSE SERPL-MCNC: 171 MG/DL (ref 65–140)
GLUCOSE SERPL-MCNC: 182 MG/DL (ref 65–140)
GLUCOSE SERPL-MCNC: 192 MG/DL (ref 65–140)
GLUCOSE SERPL-MCNC: 199 MG/DL (ref 65–140)
GLUCOSE SERPL-MCNC: 286 MG/DL (ref 65–140)
GLUCOSE UR STRIP-MCNC: NEGATIVE MG/DL
HBA1C MFR BLD: 6.3 %
HCT VFR BLD AUTO: 36.2 % (ref 34.8–46.1)
HGB BLD-MCNC: 11.5 G/DL (ref 11.5–15.4)
HGB UR QL STRIP.AUTO: NEGATIVE
IMM GRANULOCYTES # BLD AUTO: 0.04 THOUSAND/UL (ref 0–0.2)
IMM GRANULOCYTES NFR BLD AUTO: 1 % (ref 0–2)
KETONES UR STRIP-MCNC: NEGATIVE MG/DL
LACTATE SERPL-SCNC: 1 MMOL/L (ref 0.5–2)
LACTATE SERPL-SCNC: 3.1 MMOL/L (ref 0.5–2)
LEUKOCYTE ESTERASE UR QL STRIP: ABNORMAL
LYMPHOCYTES # BLD AUTO: 1.55 THOUSANDS/ÂΜL (ref 0.6–4.47)
LYMPHOCYTES NFR BLD AUTO: 25 % (ref 14–44)
MAGNESIUM SERPL-MCNC: 1.7 MG/DL (ref 1.9–2.7)
MCH RBC QN AUTO: 31.9 PG (ref 26.8–34.3)
MCHC RBC AUTO-ENTMCNC: 31.8 G/DL (ref 31.4–37.4)
MCV RBC AUTO: 101 FL (ref 82–98)
MONOCYTES # BLD AUTO: 0.38 THOUSAND/ÂΜL (ref 0.17–1.22)
MONOCYTES NFR BLD AUTO: 6 % (ref 4–12)
NEUTROPHILS # BLD AUTO: 3.46 THOUSANDS/ÂΜL (ref 1.85–7.62)
NEUTS SEG NFR BLD AUTO: 55 % (ref 43–75)
NITRITE UR QL STRIP: POSITIVE
NON-SQ EPI CELLS URNS QL MICRO: ABNORMAL /HPF
NRBC BLD AUTO-RTO: 0 /100 WBCS
P AXIS: 26 DEGREES
PH UR STRIP.AUTO: 6.5 [PH]
PHENYTOIN SERPL-MCNC: 7.6 UG/ML (ref 10–20)
PHOSPHATE SERPL-MCNC: 3.3 MG/DL (ref 2.3–4.1)
PLATELET # BLD AUTO: 222 THOUSANDS/UL (ref 149–390)
PLATELET # BLD AUTO: 231 THOUSANDS/UL (ref 149–390)
PMV BLD AUTO: 8.7 FL (ref 8.9–12.7)
PMV BLD AUTO: 9 FL (ref 8.9–12.7)
POTASSIUM SERPL-SCNC: 4.2 MMOL/L (ref 3.5–5.3)
PR INTERVAL: 174 MS
PROCALCITONIN SERPL-MCNC: <0.05 NG/ML
PROT SERPL-MCNC: 5.3 G/DL (ref 6.4–8.4)
PROT UR STRIP-MCNC: NEGATIVE MG/DL
QRS AXIS: -15 DEGREES
QRSD INTERVAL: 70 MS
QT INTERVAL: 356 MS
QTC INTERVAL: 452 MS
RBC # BLD AUTO: 3.6 MILLION/UL (ref 3.81–5.12)
RBC #/AREA URNS AUTO: ABNORMAL /HPF
SODIUM SERPL-SCNC: 136 MMOL/L (ref 135–147)
SP GR UR STRIP.AUTO: 1.01 (ref 1–1.03)
T WAVE AXIS: 45 DEGREES
TSH SERPL DL<=0.05 MIU/L-ACNC: 0.51 UIU/ML (ref 0.45–4.5)
UROBILINOGEN UR QL STRIP.AUTO: 0.2 E.U./DL
VENTRICULAR RATE: 97 BPM
WBC # BLD AUTO: 6.25 THOUSAND/UL (ref 4.31–10.16)
WBC #/AREA URNS AUTO: ABNORMAL /HPF

## 2023-09-18 PROCEDURE — 76604 US EXAM CHEST: CPT | Performed by: EMERGENCY MEDICINE

## 2023-09-18 PROCEDURE — 83605 ASSAY OF LACTIC ACID: CPT | Performed by: PHYSICIAN ASSISTANT

## 2023-09-18 PROCEDURE — 87086 URINE CULTURE/COLONY COUNT: CPT | Performed by: INTERNAL MEDICINE

## 2023-09-18 PROCEDURE — 97116 GAIT TRAINING THERAPY: CPT

## 2023-09-18 PROCEDURE — 93010 ELECTROCARDIOGRAM REPORT: CPT | Performed by: INTERNAL MEDICINE

## 2023-09-18 PROCEDURE — 97167 OT EVAL HIGH COMPLEX 60 MIN: CPT

## 2023-09-18 PROCEDURE — 85025 COMPLETE CBC W/AUTO DIFF WBC: CPT | Performed by: PHYSICIAN ASSISTANT

## 2023-09-18 PROCEDURE — 80177 DRUG SCRN QUAN LEVETIRACETAM: CPT | Performed by: PHYSICIAN ASSISTANT

## 2023-09-18 PROCEDURE — 97535 SELF CARE MNGMENT TRAINING: CPT

## 2023-09-18 PROCEDURE — 81001 URINALYSIS AUTO W/SCOPE: CPT | Performed by: INTERNAL MEDICINE

## 2023-09-18 PROCEDURE — 83605 ASSAY OF LACTIC ACID: CPT | Performed by: EMERGENCY MEDICINE

## 2023-09-18 PROCEDURE — 80053 COMPREHEN METABOLIC PANEL: CPT | Performed by: PHYSICIAN ASSISTANT

## 2023-09-18 PROCEDURE — 87077 CULTURE AEROBIC IDENTIFY: CPT | Performed by: INTERNAL MEDICINE

## 2023-09-18 PROCEDURE — 87186 SC STD MICRODIL/AGAR DIL: CPT | Performed by: INTERNAL MEDICINE

## 2023-09-18 PROCEDURE — 4A133J1 MONITORING OF ARTERIAL PULSE, PERIPHERAL, PERCUTANEOUS APPROACH: ICD-10-PCS | Performed by: INTERNAL MEDICINE

## 2023-09-18 PROCEDURE — 94760 N-INVAS EAR/PLS OXIMETRY 1: CPT

## 2023-09-18 PROCEDURE — 82948 REAGENT STRIP/BLOOD GLUCOSE: CPT

## 2023-09-18 PROCEDURE — 4A133B1 MONITORING OF ARTERIAL PRESSURE, PERIPHERAL, PERCUTANEOUS APPROACH: ICD-10-PCS | Performed by: INTERNAL MEDICINE

## 2023-09-18 PROCEDURE — 84484 ASSAY OF TROPONIN QUANT: CPT | Performed by: PHYSICIAN ASSISTANT

## 2023-09-18 PROCEDURE — 83036 HEMOGLOBIN GLYCOSYLATED A1C: CPT | Performed by: PHYSICIAN ASSISTANT

## 2023-09-18 PROCEDURE — 84484 ASSAY OF TROPONIN QUANT: CPT | Performed by: EMERGENCY MEDICINE

## 2023-09-18 PROCEDURE — 93308 TTE F-UP OR LMTD: CPT | Performed by: EMERGENCY MEDICINE

## 2023-09-18 PROCEDURE — 76705 ECHO EXAM OF ABDOMEN: CPT | Performed by: EMERGENCY MEDICINE

## 2023-09-18 PROCEDURE — 97163 PT EVAL HIGH COMPLEX 45 MIN: CPT

## 2023-09-18 PROCEDURE — 94664 DEMO&/EVAL PT USE INHALER: CPT

## 2023-09-18 PROCEDURE — 84145 PROCALCITONIN (PCT): CPT | Performed by: PHYSICIAN ASSISTANT

## 2023-09-18 PROCEDURE — 85049 AUTOMATED PLATELET COUNT: CPT | Performed by: PHYSICIAN ASSISTANT

## 2023-09-18 PROCEDURE — NC001 PR NO CHARGE: Performed by: EMERGENCY MEDICINE

## 2023-09-18 PROCEDURE — 84100 ASSAY OF PHOSPHORUS: CPT | Performed by: PHYSICIAN ASSISTANT

## 2023-09-18 PROCEDURE — 36415 COLL VENOUS BLD VENIPUNCTURE: CPT | Performed by: EMERGENCY MEDICINE

## 2023-09-18 PROCEDURE — 80185 ASSAY OF PHENYTOIN TOTAL: CPT | Performed by: PHYSICIAN ASSISTANT

## 2023-09-18 PROCEDURE — 99291 CRITICAL CARE FIRST HOUR: CPT | Performed by: EMERGENCY MEDICINE

## 2023-09-18 PROCEDURE — 99223 1ST HOSP IP/OBS HIGH 75: CPT | Performed by: INTERNAL MEDICINE

## 2023-09-18 PROCEDURE — 83735 ASSAY OF MAGNESIUM: CPT | Performed by: PHYSICIAN ASSISTANT

## 2023-09-18 RX ORDER — FLUTICASONE PROPIONATE 50 MCG
1 SPRAY, SUSPENSION (ML) NASAL 2 TIMES DAILY
Status: DISCONTINUED | OUTPATIENT
Start: 2023-09-18 | End: 2023-09-20 | Stop reason: HOSPADM

## 2023-09-18 RX ORDER — PHENYTOIN SODIUM 100 MG/1
100 CAPSULE, EXTENDED RELEASE ORAL 2 TIMES DAILY
Status: DISCONTINUED | OUTPATIENT
Start: 2023-09-18 | End: 2023-09-20 | Stop reason: HOSPADM

## 2023-09-18 RX ORDER — INSULIN LISPRO 100 [IU]/ML
1-6 INJECTION, SOLUTION INTRAVENOUS; SUBCUTANEOUS
Status: DISCONTINUED | OUTPATIENT
Start: 2023-09-18 | End: 2023-09-20 | Stop reason: HOSPADM

## 2023-09-18 RX ORDER — QUETIAPINE FUMARATE 100 MG/1
200 TABLET, FILM COATED ORAL 2 TIMES DAILY
Status: DISCONTINUED | OUTPATIENT
Start: 2023-09-18 | End: 2023-09-20 | Stop reason: HOSPADM

## 2023-09-18 RX ORDER — OXYBUTYNIN CHLORIDE 5 MG/1
5 TABLET ORAL 3 TIMES DAILY
Status: DISCONTINUED | OUTPATIENT
Start: 2023-09-18 | End: 2023-09-18

## 2023-09-18 RX ORDER — FERROUS SULFATE 325(65) MG
325 TABLET ORAL
Status: DISCONTINUED | OUTPATIENT
Start: 2023-09-18 | End: 2023-09-20 | Stop reason: HOSPADM

## 2023-09-18 RX ORDER — MAGNESIUM SULFATE HEPTAHYDRATE 40 MG/ML
2 INJECTION, SOLUTION INTRAVENOUS ONCE
Status: COMPLETED | OUTPATIENT
Start: 2023-09-18 | End: 2023-09-18

## 2023-09-18 RX ORDER — LEVOTHYROXINE SODIUM 175 UG/1
175 TABLET ORAL
Status: DISCONTINUED | OUTPATIENT
Start: 2023-09-18 | End: 2023-09-20 | Stop reason: HOSPADM

## 2023-09-18 RX ORDER — ENOXAPARIN SODIUM 100 MG/ML
40 INJECTION SUBCUTANEOUS EVERY 12 HOURS SCHEDULED
Status: DISCONTINUED | OUTPATIENT
Start: 2023-09-18 | End: 2023-09-20 | Stop reason: HOSPADM

## 2023-09-18 RX ORDER — INSULIN LISPRO 100 [IU]/ML
1-5 INJECTION, SOLUTION INTRAVENOUS; SUBCUTANEOUS
Status: DISCONTINUED | OUTPATIENT
Start: 2023-09-18 | End: 2023-09-20 | Stop reason: HOSPADM

## 2023-09-18 RX ORDER — ONDANSETRON 2 MG/ML
4 INJECTION INTRAMUSCULAR; INTRAVENOUS EVERY 6 HOURS PRN
Status: DISCONTINUED | OUTPATIENT
Start: 2023-09-18 | End: 2023-09-20 | Stop reason: HOSPADM

## 2023-09-18 RX ORDER — POLYETHYLENE GLYCOL 3350 17 G/17G
17 POWDER, FOR SOLUTION ORAL DAILY PRN
Status: CANCELLED | OUTPATIENT
Start: 2023-09-18

## 2023-09-18 RX ORDER — ACETAMINOPHEN 325 MG/1
650 TABLET ORAL EVERY 6 HOURS PRN
Status: DISCONTINUED | OUTPATIENT
Start: 2023-09-18 | End: 2023-09-20 | Stop reason: HOSPADM

## 2023-09-18 RX ORDER — GABAPENTIN 400 MG/1
400 CAPSULE ORAL 3 TIMES DAILY
Status: DISCONTINUED | OUTPATIENT
Start: 2023-09-18 | End: 2023-09-20 | Stop reason: HOSPADM

## 2023-09-18 RX ORDER — ENOXAPARIN SODIUM 100 MG/ML
40 INJECTION SUBCUTANEOUS DAILY
Status: DISCONTINUED | OUTPATIENT
Start: 2023-09-18 | End: 2023-09-18

## 2023-09-18 RX ORDER — CLONAZEPAM 0.5 MG/1
1 TABLET ORAL 2 TIMES DAILY
Status: DISCONTINUED | OUTPATIENT
Start: 2023-09-18 | End: 2023-09-20 | Stop reason: HOSPADM

## 2023-09-18 RX ORDER — DOCUSATE SODIUM 100 MG/1
100 CAPSULE, LIQUID FILLED ORAL 2 TIMES DAILY PRN
Status: DISCONTINUED | OUTPATIENT
Start: 2023-09-18 | End: 2023-09-20 | Stop reason: HOSPADM

## 2023-09-18 RX ORDER — DEXTROSE MONOHYDRATE 50 MG/ML
75 INJECTION, SOLUTION INTRAVENOUS CONTINUOUS
Status: DISCONTINUED | OUTPATIENT
Start: 2023-09-18 | End: 2023-09-18

## 2023-09-18 RX ORDER — PANTOPRAZOLE SODIUM 40 MG/1
40 TABLET, DELAYED RELEASE ORAL
Status: DISCONTINUED | OUTPATIENT
Start: 2023-09-18 | End: 2023-09-20 | Stop reason: HOSPADM

## 2023-09-18 RX ORDER — SODIUM CHLORIDE 9 MG/ML
125 INJECTION, SOLUTION INTRAVENOUS CONTINUOUS
Status: DISCONTINUED | OUTPATIENT
Start: 2023-09-18 | End: 2023-09-19

## 2023-09-18 RX ORDER — PRAVASTATIN SODIUM 40 MG
40 TABLET ORAL
Status: DISCONTINUED | OUTPATIENT
Start: 2023-09-18 | End: 2023-09-20 | Stop reason: HOSPADM

## 2023-09-18 RX ORDER — ALBUTEROL SULFATE 90 UG/1
2 AEROSOL, METERED RESPIRATORY (INHALATION) EVERY 6 HOURS PRN
Status: DISCONTINUED | OUTPATIENT
Start: 2023-09-18 | End: 2023-09-20 | Stop reason: HOSPADM

## 2023-09-18 RX ORDER — ZONISAMIDE 100 MG/1
200 CAPSULE ORAL 2 TIMES DAILY
Status: DISCONTINUED | OUTPATIENT
Start: 2023-09-18 | End: 2023-09-20 | Stop reason: HOSPADM

## 2023-09-18 RX ORDER — OXYBUTYNIN CHLORIDE 5 MG/1
5 TABLET ORAL 3 TIMES DAILY
Status: DISCONTINUED | OUTPATIENT
Start: 2023-09-18 | End: 2023-09-20 | Stop reason: HOSPADM

## 2023-09-18 RX ORDER — ACETAMINOPHEN 325 MG/1
650 TABLET ORAL EVERY 6 HOURS PRN
Status: DISCONTINUED | OUTPATIENT
Start: 2023-09-18 | End: 2023-09-18

## 2023-09-18 RX ADMIN — ZONISAMIDE 200 MG: 100 CAPSULE ORAL at 08:23

## 2023-09-18 RX ADMIN — GABAPENTIN 400 MG: 400 CAPSULE ORAL at 16:02

## 2023-09-18 RX ADMIN — HYDROCORTISONE: 25 CREAM TOPICAL at 16:01

## 2023-09-18 RX ADMIN — INSULIN LISPRO 1 UNITS: 100 INJECTION, SOLUTION INTRAVENOUS; SUBCUTANEOUS at 16:02

## 2023-09-18 RX ADMIN — SODIUM CHLORIDE 125 ML/HR: 0.9 INJECTION, SOLUTION INTRAVENOUS at 12:14

## 2023-09-18 RX ADMIN — PHENYTOIN SODIUM 100 MG: 100 CAPSULE ORAL at 08:20

## 2023-09-18 RX ADMIN — VANCOMYCIN HYDROCHLORIDE 1750 MG: 1 INJECTION, POWDER, LYOPHILIZED, FOR SOLUTION INTRAVENOUS at 02:13

## 2023-09-18 RX ADMIN — LEVETIRACETAM 750 MG: 250 TABLET, FILM COATED ORAL at 21:06

## 2023-09-18 RX ADMIN — CLONAZEPAM 1 MG: 0.5 TABLET ORAL at 09:54

## 2023-09-18 RX ADMIN — HYDROCORTISONE 1 APPLICATION: 25 CREAM TOPICAL at 21:12

## 2023-09-18 RX ADMIN — QUETIAPINE FUMARATE 200 MG: 100 TABLET ORAL at 09:54

## 2023-09-18 RX ADMIN — GLYCERIN, HYPROMELLOSE, POLYETHYLENE GLYCOL 1 DROP: .2; .2; 1 LIQUID OPHTHALMIC at 21:07

## 2023-09-18 RX ADMIN — ZONISAMIDE 200 MG: 100 CAPSULE ORAL at 17:22

## 2023-09-18 RX ADMIN — GLYCERIN, HYPROMELLOSE, POLYETHYLENE GLYCOL 1 DROP: .2; .2; 1 LIQUID OPHTHALMIC at 09:54

## 2023-09-18 RX ADMIN — INSULIN LISPRO 3 UNITS: 100 INJECTION, SOLUTION INTRAVENOUS; SUBCUTANEOUS at 21:12

## 2023-09-18 RX ADMIN — FLUTICASONE PROPIONATE 1 SPRAY: 50 SPRAY, METERED NASAL at 21:07

## 2023-09-18 RX ADMIN — GLYCERIN, HYPROMELLOSE, POLYETHYLENE GLYCOL 1 DROP: .2; .2; 1 LIQUID OPHTHALMIC at 16:02

## 2023-09-18 RX ADMIN — CLONAZEPAM 1 MG: 0.5 TABLET ORAL at 17:21

## 2023-09-18 RX ADMIN — ASPIRIN 81 MG: 81 TABLET, COATED ORAL at 08:20

## 2023-09-18 RX ADMIN — MAGNESIUM 64 MG (MAGNESIUM CHLORIDE) TABLET,DELAYED RELEASE 64 MG: at 08:20

## 2023-09-18 RX ADMIN — GABAPENTIN 400 MG: 400 CAPSULE ORAL at 21:06

## 2023-09-18 RX ADMIN — LEVOTHYROXINE SODIUM 175 MCG: 175 TABLET ORAL at 06:18

## 2023-09-18 RX ADMIN — SODIUM CHLORIDE 125 ML/HR: 0.9 INJECTION, SOLUTION INTRAVENOUS at 03:58

## 2023-09-18 RX ADMIN — LEVETIRACETAM 750 MG: 250 TABLET, FILM COATED ORAL at 13:03

## 2023-09-18 RX ADMIN — PANTOPRAZOLE SODIUM 40 MG: 40 TABLET, DELAYED RELEASE ORAL at 06:18

## 2023-09-18 RX ADMIN — ACETAMINOPHEN 650 MG: 325 TABLET, FILM COATED ORAL at 21:07

## 2023-09-18 RX ADMIN — QUETIAPINE FUMARATE 200 MG: 100 TABLET ORAL at 17:21

## 2023-09-18 RX ADMIN — INSULIN LISPRO 1 UNITS: 100 INJECTION, SOLUTION INTRAVENOUS; SUBCUTANEOUS at 12:14

## 2023-09-18 RX ADMIN — OXYBUTYNIN CHLORIDE 5 MG: 5 TABLET ORAL at 16:00

## 2023-09-18 RX ADMIN — ACETAMINOPHEN 650 MG: 325 TABLET, FILM COATED ORAL at 13:06

## 2023-09-18 RX ADMIN — GABAPENTIN 400 MG: 400 CAPSULE ORAL at 09:54

## 2023-09-18 RX ADMIN — PRAVASTATIN SODIUM 40 MG: 40 TABLET ORAL at 16:00

## 2023-09-18 RX ADMIN — ENOXAPARIN SODIUM 40 MG: 40 INJECTION SUBCUTANEOUS at 21:09

## 2023-09-18 RX ADMIN — PHENYTOIN SODIUM 100 MG: 100 CAPSULE ORAL at 17:21

## 2023-09-18 RX ADMIN — FLUTICASONE PROPIONATE 1 SPRAY: 50 SPRAY, METERED NASAL at 08:19

## 2023-09-18 RX ADMIN — OXYBUTYNIN CHLORIDE 5 MG: 5 TABLET ORAL at 21:06

## 2023-09-18 RX ADMIN — AZTREONAM 2000 MG: 1 INJECTION, POWDER, LYOPHILIZED, FOR SOLUTION INTRAMUSCULAR; INTRAVENOUS at 00:46

## 2023-09-18 RX ADMIN — FERROUS SULFATE TAB 325 MG (65 MG ELEMENTAL FE) 325 MG: 325 (65 FE) TAB at 08:20

## 2023-09-18 RX ADMIN — ENOXAPARIN SODIUM 40 MG: 40 INJECTION SUBCUTANEOUS at 08:19

## 2023-09-18 RX ADMIN — OXYBUTYNIN CHLORIDE 5 MG: 5 TABLET ORAL at 08:20

## 2023-09-18 RX ADMIN — SODIUM CHLORIDE 125 ML/HR: 0.9 INJECTION, SOLUTION INTRAVENOUS at 20:20

## 2023-09-18 RX ADMIN — MAGNESIUM SULFATE HEPTAHYDRATE 2 G: 40 INJECTION, SOLUTION INTRAVENOUS at 06:20

## 2023-09-18 NOTE — PLAN OF CARE
Problem: OCCUPATIONAL THERAPY ADULT  Goal: Performs self-care activities at highest level of function for planned discharge setting. See evaluation for individualized goals. Description: Treatment Interventions: ADL retraining, Functional transfer training, UE strengthening/ROM, Endurance training, Cognitive reorientation, Patient/family training, Activityengagement          See flowsheet documentation for full assessment, interventions and recommendations. Note: Limitation: Decreased ADL status, Decreased UE strength, Decreased Safe judgement during ADL, Decreased endurance, Decreased self-care trans, Decreased high-level ADLs, Decreased cognition     Assessment: Pt is a 79 y.o. female seen for OT evaluation s/p admit to [facility] on 9/17/2023 w/ Hypotension. Comorbidities affecting pt's functional performance at time of assessment include: seizure, DM, COPD, asthma, GERD, UTI, obesity, HTN, recurrent falls, MI. Personal factors affecting pt at time of IE include:limited home support, difficulty performing ADLS, difficulty performing IADLS , limited insight into deficits, compliance, decreased initiation and engagement  and health management . Prior to admission, pt was (I) with ADLs and (A) with IADLs with use of 4WW during mobility. Upon evaluation: Pt requires min-mod (A) x2 level with use of RW during mobility 2* the following deficits impacting occupational performance: weakness, decreased strength, decreased balance, decreased tolerance, impaired initiation, impaired sequencing, impaired problem solving, decreased safety awareness and impaired interpersonal skills. Pt to benefit from continued skilled OT tx while in the hospital to address deficits as defined above and maximize level of functional independence w ADL's and functional mobility. Occupational Performance areas to address include: grooming, bathing/shower, toilet hygiene, dressing, functional mobility, community mobility and clothing management. The patient's raw score on the AM-PAC Daily Activity Inpatient Short Form is 18. A raw score of less than 19 suggests the patient may benefit from discharge to post-acute rehabilitation services. Please refer to the recommendation of the Occupational Therapist for safe discharge planning. Pt benefited from co-evaluation of skilled OT and PT therapists in order to most appropriately address functional deficits d/t extensive assistance required for safe functional mobility, decreased activity tolerance, and regression from functioning level prior to admission and/or onset of present illness. OT/PT objectives were addressed separately; please see PT note for specific goal areas targeted.      OT Discharge Recommendation: Post acute rehabilitation services

## 2023-09-18 NOTE — RESPIRATORY THERAPY NOTE
09/18/23 0803   Respiratory Assessment   Assessment Type Assess only   General Appearance Awake; Alert   Respiratory Pattern Normal   Chest Assessment Chest expansion symmetrical;Trachea midline   Bilateral Breath Sounds Diminished   Resp Comments nurse is in with patient, she is talking without difficulty, patient denies sob, I have dialed her oxygen down from 2 lpm to 1 lpm. cough on command is dry and npc   Oxygen Therapy/Pulse Ox   O2 Device Nasal cannula   O2 Therapy Oxygen   Nasal Cannula O2 Flow Rate (L/min) 1 L/min   Calculated FIO2 (%) - Nasal Cannula 24   SpO2 Activity At Rest   $ Pulse Oximetry Spot Check Charge Completed

## 2023-09-18 NOTE — ASSESSMENT & PLAN NOTE
Patient presented for evaluation after a fall at home  EMS noted blood glucose to bi in 30s and gave dextrose  Patient is type 2 diabetic currently on oral hypoglycemics and mealtime correction insulin  Patient reports that she stopped taking oral diabetic medication since her las admission but restarted taking them 2-3 weeks ago  Started on dextrose infusion in the ER  Will hold PTA oral diabetic medication  Hold mealtime correction insulin  Fingerstick glucose Q 2 hours for now  Continue dextrose infusion for now  Check Am CMP  Consider Inpatient endocrine consult

## 2023-09-18 NOTE — ASSESSMENT & PLAN NOTE
Patient with significant hypotension in the ER  Initial blood pressure at 61/36  Unclear etiology   Patient reports that she stopped taking her blood pressure medication soon after her last admission but started taking it again about 2-3 weeks ago.  Patient is prescribed Lisinopril 5 mg daily and Metoprolol 25 mg BID  Patient received Fluids boluses x 2L in the ER with some improvement  Received IV vancomycin and IV Azactam in the ER given initial concern for septic shock  A-Line placed in the ER  Cardiopulmonary monitoring  Hold PTA blood pressure medication  Continue IV fluids  Consider vasopressor support if  hypotension continues  Monitor vital signs, I/O, Renal function  Continue AM labs  Supportive care

## 2023-09-18 NOTE — ED PROVIDER NOTES
Emergency Department Trauma Note  Mary Jo Agrawal 79 y.o. female MRN: [id number]  Unit/Bed#: / Encounter: [id number]      Trauma Alert: Trauma Acuity: Trauma Evaluation  Model of Arrival: Mode of Arrival: BLS via Trauma Squad Name and Number: [place] Team: Current Providers  Attending Provider: Anya Ramirez DO  Attending Provider: Neil Cochran MD  Attending Provider: Javier Perkins DO  Registered Nurse: Che Ramirez, RN  Registered Nurse: Gale Hitchcock, BARB  Graduate Nurse: Rosetta Ramirez, BARB  Registered Nurse: Dmitry Mtz, RN  Registered Nurse: Adriana Pedroza, BARB  Unit Clerk: Jenny Pham  Certified Nursing Assistant: Brianda Machado  : CHRISTINE Fisher  RN Care Manager: Sophia Jones RN  Consultants:     None      History of Present Illness     Chief Complaint:   Chief Complaint   Patient presents with   • Trauma     Patient fell twice today. • Hypoglycemia - Symptomatic     Patient found to have low blood sugar via ems     HPI:  Mary Jo Agrawal is a 79 y.o. female who presents from home via EMS with hypoglycemia and reports of at least 2 falls today, both from standing to the carpeted floor. EMS initial dispatch was apparently for a sick person; she was found hypoglycemic with blood glucose in the 30s and received 15 g dextrose orally x2 from EMS still with hypoglycemia in the 40s by time of arrival to the emergency department. Other vital signs were normal.  Patient had reported falling on 2 occasions today, on both occasions from standing and falling to the ground without specifically LOC. Unclear if she struck her head on either occasion. She was not able to rise from the ground on either occasion and required the assistance from a life alert activation to contact EMS who provided lifting assistance. She reports feeling no different from her normal state of health over the past several days; has been taking medications as normally prescribed.  She did not eat normally today however. In further discussion with her, she reports several falls over the past week, with one fall in which she was stuck between her bed and the dresser for about 2 hours prior to EMS being able to help her to stand. At times she feels that her left side will "go numb" and cause her to lose strength on that side. At times she feels lightheaded upon standing, with this causing her to fall. She did not receive medical evaluation after any of these other falls. EMS report of anticoagulant use; patient appears to take aspirin but no anticoagulants per se. No headache/neck pain/chest pain/dyspnea/abd pain/back pain/extremity weakness/extremity paresthesias. Does report pain in R hip after fall; she is able to move the R hip despite this however. Patient seen immediately upon arrival given report of hypoglycemia. Placed on monitor and IV access established. Patient initial BP was normal but several subsequent BPs were significantly hypotensive as low as 60/30. Despite these readings, patient maintained normal mentation and good peripheral circulation with 2+ radial and pedal pulses, suggesting that these were inaccurate. She was found to be recurrently hypoglycemic and IV dextrose was administered. Given the reports of multiple falls with hypotension, an E-FAST was performed. This was negative, and basic cavitary triage performed with the E-fast and physical exam did not suggest ptx, cardiac tamponade, hemothorax, or an obvious source of blood loss that would be accounting for patient's hypotension. Accordingly, I suspected that the traumatic mechanism--which was relatively minor--was not the etiology for hypotension. I was also uncertain as to the accuracy of the BP measurements via noninvasive method given patient's body habitus, with even the largest BP cuff not fitting well on her arm.  I therefore administered IV fluid boluses suspecting hypovolemia or sepsis as opposed to traumatic etiology and did not immediately administer blood transfusion. BP did improve somewhat prior to moving to CT scan after the IV fluid administration. Plan to place arterial line after CT to more definitively determine patient's actual BP. CT trauma scans were ordered nonetheless given identifiable areas of tenderness in the spine. Plan for x-ray of the right hip as well given ecchymosis and pain reported in that area. Plan extensive concurrent metabolic work-up for identification of any endorgan dysfunction, etc., causing hypotension. Will require hospitalization. Mechanism:Details of Incident: patient had two falls today reports pain in lower back and numbness in hip Injury Date: 09/17/23 Injury Time: 2051 Injury Occurence Location - [address] [place] [address]: [address] [place] [address] Road      History provided by:  Medical records, patient and EMS personnel    Review of Systems   Constitutional: Negative for chills and fever. Respiratory: Negative for cough and shortness of breath. Cardiovascular: Negative for chest pain and palpitations. Gastrointestinal: Negative for abdominal pain, diarrhea, nausea and vomiting. Genitourinary: Negative for difficulty urinating, dysuria and flank pain. Musculoskeletal: Positive for arthralgias (R hip) and myalgias. Negative for neck pain and neck stiffness. Skin: Positive for color change (Bruising of R hip). Negative for pallor, rash and wound. Neurological: Positive for light-headedness. Negative for dizziness, syncope, weakness, numbness and headaches. All other systems reviewed and are negative.       Historical Information     Immunizations:   Immunization History   Administered Date(s) Administered   • COVID-19 MODERNA VACC 0.5 ML IM 04/29/2021, 05/27/2021   • COVID-19 PFIZER VACCINE 0.3 ML IM 12/02/2021   • INFLUENZA 09/08/2014, 09/11/2015, 09/20/2016, 10/30/2017   • Influenza, recombinant, quadrivalent,injectable, preservative free 11/15/2019   • Pneumococcal Polysaccharide PPV23 10/02/2013   • Tdap 02/27/2020       Past Medical History:   Diagnosis Date   • Asthma    • Bipolar disorder ([address])    • Chronic UTI (urinary tract infection)    • COPD (chronic obstructive pulmonary disease) ([address])    • Diabetes mellitus ([address])    • Disease of thyroid gland    • Dyslipidemia 10/31/2018   • Essential hypertension 10/31/2018   • GERD (gastroesophageal reflux disease)    • Heart attack ([address])    • Iron deficiency anemia, unspecified 7/29/2019   • Obesity due to excess calories 10/31/2018   • Recurrent falls 4/13/2019   • Seizure ([address])        Family History   Problem Relation Age of Onset   • Skeletal dysplasia Mother    • Stroke Father      Past Surgical History:   Procedure Laterality Date   • ANKLE FRACTURE SURGERY Right    • TUBAL LIGATION     • VEIN LIGATION AND STRIPPING       Social History     Tobacco Use   • Smoking status: Never   • Smokeless tobacco: Never   Vaping Use   • Vaping Use: Never used   Substance Use Topics   • Alcohol use: Not Currently     Alcohol/week: 0.0 standard drinks of alcohol   • Drug use: No     E-Cigarette/Vaping   • E-Cigarette Use Never User      E-Cigarette/Vaping Substances   • Nicotine No    • THC No    • CBD No    • Flavoring No    • Other No    • Unknown No        Family History: non-contributory    Meds/Allergies   Prior to Admission Medications   Prescriptions Last Dose Informant Patient Reported? Taking? Acetaminophen (Tylenol) 325 MG CAPS 9/17/2023  Yes Yes   Sig: Take 650 mg by mouth Three times daily as needed   Blood Glucose Monitoring Suppl (BLOOD GLUCOSE MONITOR SYSTEM) w/Device KIT 9/17/2023  No Yes   Sig: Test blood sugars 3 times a day   Diclofenac Sodium (VOLTAREN) 1 % 9/17/2023  Yes Yes   Sig: APPLY TO AFFECTED AREA EVERY 6 HOURS IF NEEDED FOR PAIN.    Docusate Sodium (DSS) 100 MG CAPS Past Week  Yes Yes   Sig: Take 1 capsule by mouth 2 (two) times a day as needed   Incontinence Supply Disposable (PADSORBER BED PAN LINERS) MISC No No   Sig: by Does not apply route as needed (incontinence)   Infant Care Products (CUTIES SENSITIVE WIPES) MISC   No No   Si Pieces by Does not apply route as needed (incontinence)   Misc. Devices (MATTRESS PAD) MISC   No No   Sig: by Does not apply route daily   ONETOUCH DELICA LANCETS 12P MISC 2023  No Yes   Sig: Test blood sugars 3 times a day. QUEtiapine (SEROquel) 400 MG tablet 2023  No Yes   Sig: Take 1 tablet (400 mg total) by mouth daily at bedtime for 2 doses   Patient taking differently: Take 400 mg by mouth daily   albuterol (VENTOLIN HFA) 90 mcg/act inhaler 2023  No Yes   Sig: Inhale 2 puffs every 6 (six) hours as needed for wheezing   aspirin (ECOTRIN LOW STRENGTH) 81 mg EC tablet 2023  No Yes   Sig: Take 1 tablet (81 mg total) by mouth daily   ferrous sulfate (FeroSul) 325 (65 Fe) mg tablet Not Taking  No No   Sig: Take 1 tablet (325 mg total) by mouth daily with breakfast   Patient not taking: Reported on 2023   fluticasone (FLONASE) 50 mcg/act nasal spray 2023  No Yes   Sig: SPRAY ONE (1) SPRAY INTO EACH NOSTRIL ONCE DAILY   Patient taking differently: 1 spray 2 (two) times a day SPRAY ONE (1) SPRAY INTO EACH NOSTRIL ONCE DAILY   gabapentin (NEURONTIN) 800 mg tablet 2023  No Yes   Sig: Take 1 tablet (800 mg total) by mouth 4 (four) times a day for 3 days   glipiZIDE (GLUCOTROL) 10 mg tablet 2023  Yes Yes   Sig: Take 10 mg by mouth in the morning   glucose blood (ONE TOUCH ULTRA TEST) test strip   No No   Sig: Test blood sugars 3 times a day.    guaiFENesin 400 mg 2023  Yes Yes   Sig: Take 400 mg by mouth 2 (two) times a day   hydrOXYzine pamoate (VISTARIL) 50 mg capsule 2023  No Yes   Sig: Take 1 capsule (50 mg total) by mouth 3 (three) times a day   insulin aspart, w/niacinamide, (FIASP) 100 Units/mL injection pen 2023  No Yes   Sig: Inject 5 Units under the skin 3 (three) times a day as needed for high blood sugar   levETIRAcetam (KEPPRA) 750 mg tablet 2023  No Yes   Sig: Take 2 tablets (1,500 mg total) by mouth 2 (two) times a day for 15 days Pt states she takes 1500MG Bid   Patient taking differently: Take 750 mg by mouth 2 (two) times a day   levothyroxine 175 mcg tablet 2023  Yes Yes   Sig: Take 175 mcg by mouth in the morning   lisinopril (ZESTRIL) 5 mg tablet 2023  No Yes   Sig: Take 1 tablet (5 mg total) by mouth daily   magnesium chloride (MAG64) 64 MG TBEC EC tablet 2023  No Yes   Sig: Take 1 tablet (64 mg total) by mouth 2 (two) times a day   meloxicam (MOBIC) 7.5 mg tablet 2023  No Yes   Sig: Take 1 tablet (7.5 mg total) by mouth 2 (two) times a day   metFORMIN (GLUCOPHAGE) 500 mg tablet 2023  No Yes   Sig: Take 1 tablet (500 mg total) by mouth 2 (two) times a day with meals   Patient taking differently: Take 1,000 mg by mouth 2 (two) times a day with meals   metoprolol succinate (TOPROL-XL) 25 mg 24 hr tablet 2023  No Yes   Sig: Take 1 tablet (25 mg total) by mouth daily   Patient taking differently: Take 25 mg by mouth 2 (two) times a day   multivitamin (THERAGRAN) TABS 2023  Yes Yes   Sig: Take 1 tablet by mouth daily   nystatin (MYCOSTATIN) ointment 2023  Yes Yes   Si application.  2 (two) times a day   omeprazole (PriLOSEC) 40 MG capsule 2023  Yes Yes   Sig: Take 40 mg by mouth 2 (two) times a day   oxybutynin (DITROPAN) 5 mg tablet 2023  No Yes   Sig: Take 1 tablet (5 mg total) by mouth 3 (three) times a day   phenytoin (DILANTIN) 100 mg ER capsule 2023  Yes Yes   Sig: Take by mouth 2 (two) times a day 3 tablets in morning and 2 at lunch daily   polyethylene glycol (GLYCOLAX) powder Past Week  No Yes   Sig: Take 17 g by mouth daily   Patient taking differently: Take 17 g by mouth daily as needed   polyvinyl alcohol (LIQUIFILM TEARS) 1.4 % ophthalmic solution   No No   Sig: Administer 1 drop to both eyes 3 (three) times a day   simvastatin (ZOCOR) 80 mg tablet 9/17/2023  No Yes   Sig: Take 1 tablet (80 mg total) by mouth daily at bedtime   Patient taking differently: Take 40 mg by mouth daily at bedtime   triamcinolone (KENALOG) 0.1 % cream 9/17/2023  No Yes   Sig: Apply topically 2 (two) times a day   valACYclovir (VALTREX) 500 mg tablet 9/17/2023  Yes Yes   Sig: Take 1 tablet by mouth 2 (two) times a day   zonisamide (ZONEGRAN) 100 mg capsule 9/17/2023  Yes Yes   Sig: Take 200 mg by mouth 2 (two) times a day      Facility-Administered Medications: None       Allergies   Allergen Reactions   • Keflex [Cephalexin] Anaphylaxis     Airway edema    • Levaquin [Levofloxacin] Anaphylaxis   • Penicillins Anaphylaxis   • Bactrim [Sulfamethoxazole-Trimethoprim] Swelling and GI Intolerance     LE edema and thrush   • Ciprofloxacin Swelling     Edema and all extremities and thrush   Edema in all extremities and thrush    • Noroxin [Norfloxacin]        PHYSICAL EXAM      Objective   Vitals:   First set: Temperature: (!) 97.1 °F (36.2 °C) (09/17/23 2156)  Pulse: 99 (09/17/23 2156)  Respirations: 18 (09/17/23 2156)  Blood Pressure: (!) 61/36 (09/17/23 2156)  SpO2: 94 % (09/17/23 2156)    Primary survey completed at time of initial evaluation:  Airway patent. Patient phonating normally with no stridor/dysphonia. Lung sounds are present bilaterally with no wheeze/crackles/rhonchi. Distal pulses are present in all extremities: 2+ radial/DP/PT. There is no visible external hemorrhage. GCS 15. BROCK x4 with equal tone. Sensation intact in all extremities. Exposure completed to assess for other occult injuries. Secondary survey was completed for all other injuries. Notable findings are reviewed in the full examination section. Secondary Survey: (Click on Physical Exam tab above)  Physical Exam  Vitals and nursing note reviewed. Constitutional:       General: She is awake. She is not in acute distress. Appearance: Normal appearance. She is well-developed.    HENT:      Head: Normocephalic and atraumatic. Right Ear: Hearing and external ear normal.      Left Ear: Hearing and external ear normal.   Neck:      Trachea: Trachea and phonation normal.      Comments: No posterior midline tenderness to palpation or step-off  Cardiovascular:      Rate and Rhythm: Normal rate and regular rhythm. Pulses:           Radial pulses are 2+ on the right side and 2+ on the left side. Dorsalis pedis pulses are 2+ on the right side and 2+ on the left side. Posterior tibial pulses are 2+ on the right side and 2+ on the left side. Heart sounds: Normal heart sounds, S1 normal and S2 normal. No murmur heard. No friction rub. No gallop. Pulmonary:      Effort: Pulmonary effort is normal. No respiratory distress. Breath sounds: Normal breath sounds. No stridor. No decreased breath sounds, wheezing, rhonchi or rales. Abdominal:      General: There is no distension. Palpations: There is no mass. Tenderness: There is no abdominal tenderness. There is no guarding or rebound. Comments: Obese; nondistended   Musculoskeletal:      Comments: Posterior midline T4-6 and L1-2 tenderness to palpation without step-off or crepitus  This is stable and nontender to compression. Small ecchymosis overlying lateral aspect of right hip but patient able to actively range right hip in flexion to 90 degrees and full internal/external rotation   Skin:     General: Skin is warm and dry. Findings: Ecchymosis (Lateral aspect of right hip) present. Neurological:      Mental Status: She is alert and oriented to person, place, and time. GCS: GCS eye subscore is 4. GCS verbal subscore is 5. GCS motor subscore is 6. Cranial Nerves: No cranial nerve deficit. Sensory: No sensory deficit. Motor: No abnormal muscle tone. Comments: PERRLA; EOMI. Sensation intact to light touch over face in V1-V3 distribution bilaterally. Facial expressions symmetric. Tongue/uvula midline. Shoulder shrug equal bilaterally. Strength 5/5 in UE/LE bilaterally. Sensation intact to light touch in UE/LE bilaterally. Cervical spine cleared by clinical criteria? Yes     Invasive Devices     Peripheral Intravenous Line  Duration           Peripheral IV 09/17/23 Dorsal (posterior); Left Forearm <1 day    Peripheral IV 09/17/23 Dorsal (posterior); Right Wrist <1 day    Peripheral IV 09/17/23 Right;Ventral (anterior) Forearm <1 day          Drain  Duration           External Urinary Catheter <1 day                Lab Results:   Results Reviewed     Procedure Component Value Units Date/Time    Blood culture #1 [[id number]] Collected: 09/17/23 2252    Lab Status: Preliminary result Specimen: Blood from Arm, Right Updated: 09/18/23 1001     Blood Culture Received in Microbiology Lab. Culture in Progress. Blood culture #2 [[id number]] Collected: 09/17/23 2212    Lab Status: Preliminary result Specimen: Blood from Arm, Right Updated: 09/18/23 1001     Blood Culture Received in Microbiology Lab. Culture in Progress. HS Troponin I 4hr [[id number]]  (Normal) Collected: 09/18/23 0230    Lab Status: Final result Specimen: Blood from Artery Updated: 09/18/23 0310     hs TnI 4hr 4 ng/L      Delta 4hr hsTnI 1 ng/L     HS Troponin I 2hr [[id number]]  (Normal) Collected: 09/18/23 0024    Lab Status: Final result Specimen: Blood from Line, Arterial Updated: 09/18/23 0245     hs TnI 2hr 3 ng/L      Delta 2hr hsTnI 0 ng/L     Lactic acid 2 Hours [[id number]]  (Abnormal) Collected: 09/18/23 0024    Lab Status: Final result Specimen: Blood from Line, Arterial Updated: 09/18/23 0217     LACTIC ACID 3.1 mmol/L     Narrative:      Result may be elevated if tourniquet was used during collection.     Fingerstick Glucose (POCT) [[id number]]  (Normal) Collected: 09/18/23 0059    Lab Status: Final result Updated: 09/18/23 0100     POC Glucose 123 mg/dl     Fingerstick Glucose (POCT) [[id number]]  (Normal) Collected: 09/18/23 0041    Lab Status: Final result Updated: 09/18/23 0041     POC Glucose 121 mg/dl     TSH [[id number]]  (Normal) Collected: 09/17/23 2212    Lab Status: Final result Specimen: Blood from Arm, Right Updated: 09/18/23 0038     TSH 3RD GENERATON 0.511 uIU/mL     Fingerstick Glucose (POCT) [[id number]]  (Normal) Collected: 09/18/23 0022    Lab Status: Final result Updated: 09/18/23 0023     POC Glucose 135 mg/dl     Fingerstick Glucose (POCT) [[id number]]  (Abnormal) Collected: 09/18/23 0008    Lab Status: Final result Updated: 09/18/23 0009     POC Glucose 150 mg/dl     Fingerstick Glucose (POCT) [[id number]]  (Normal) Collected: 09/17/23 2348    Lab Status: Final result Updated: 09/17/23 2349     POC Glucose 65 mg/dl     FLU/RSV/COVID - if FLU/RSV clinically relevant [[id number]]  (Normal) Collected: 09/17/23 2252    Lab Status: Final result Specimen: Nares from Nose Updated: 09/17/23 2336     SARS-CoV-2 Negative     INFLUENZA A PCR Negative     INFLUENZA B PCR Negative     RSV PCR Negative    Narrative:      FOR PEDIATRIC PATIENTS - copy/paste COVID Guidelines URL to browser: [email or web address]    SARS-CoV-2 assay is a Nucleic Acid Amplification assay intended for the  qualitative detection of nucleic acid from SARS-CoV-2 in nasopharyngeal  swabs. Results are for the presumptive identification of SARS-CoV-2 RNA. Positive results are indicative of infection with SARS-CoV-2, the virus  causing COVID-19, but do not rule out bacterial infection or co-infection  with other viruses. Laboratories within the [facility] and its  territories are required to report all positive results to the appropriate  public health authorities. Negative results do not preclude SARS-CoV-2  infection and should not be used as the sole basis for treatment or other  patient management decisions.  Negative results must be combined with  clinical observations, patient history, and epidemiological information. This test has not been FDA cleared or approved. This test has been authorized by FDA under an Emergency Use Authorization  (EUA). This test is only authorized for the duration of time the  declaration that circumstances exist justifying the authorization of the  emergency use of an in vitro diagnostic tests for detection of SARS-CoV-2  virus and/or diagnosis of COVID-19 infection under section 564(b)(1) of  the Act, 21 U. S.C. 377OEV-4(W)(1), unless the authorization is terminated  or revoked sooner. The test has been validated but independent review by FDA  and CLIA is pending. Test performed using [employer] GeneXpert: This RT-PCR assay targets N2,  a region unique to SARS-CoV-2. A conserved region in the E-gene was chosen  for pan-Sarbecovirus detection which includes SARS-CoV-2. According to CMS-2020-01-R, this platform meets the definition of high-throughput technology. Fingerstick Glucose (POCT) [[id number]]  (Normal) Collected: 09/17/23 2329    Lab Status: Final result Updated: 09/17/23 2329     POC Glucose 70 mg/dl     Fingerstick Glucose (POCT) [[id number]]  (Normal) Collected: 09/17/23 2311    Lab Status: Final result Updated: 09/17/23 2312     POC Glucose 77 mg/dl     Lactic acid [[id number]]  (Abnormal) Collected: 09/17/23 2212    Lab Status: Final result Specimen: Blood from Arm, Right Updated: 09/17/23 2255     LACTIC ACID 4.2 mmol/L     Narrative:      Result may be elevated if tourniquet was used during collection.     Fingerstick Glucose (POCT) [[id number]]  (Normal) Collected: 09/17/23 2252    Lab Status: Final result Updated: 09/17/23 2254     POC Glucose 80 mg/dl     HS Troponin 0hr (reflex protocol) [[id number]]  (Normal) Collected: 09/17/23 2212    Lab Status: Final result Specimen: Blood from Arm, Right Updated: 09/17/23 2252     hs TnI 0hr 3 ng/L     B-Type Natriuretic Peptide(BNP) [[id number]]  (Normal) Collected: 09/17/23 2212    Lab Status: Final result Specimen: Blood from Arm, Right Updated: 09/17/23 2251     BNP 15 pg/mL     Procalcitonin [[id number]]  (Normal) Collected: 09/17/23 2212    Lab Status: Final result Specimen: Blood from Arm, Right Updated: 09/17/23 2247     Procalcitonin <0.05 ng/ml     Comprehensive metabolic panel [[id number]]  (Abnormal) Collected: 09/17/23 2212    Lab Status: Final result Specimen: Blood from Arm, Right Updated: 09/17/23 2238     Sodium 135 mmol/L      Potassium 4.7 mmol/L      Chloride 101 mmol/L      CO2 20 mmol/L      ANION GAP 14 mmol/L      BUN 16 mg/dL      Creatinine 0.69 mg/dL      Glucose 195 mg/dL      Calcium 8.5 mg/dL      Corrected Calcium 9.0 mg/dL      AST 15 U/L      ALT 13 U/L      Alkaline Phosphatase 50 U/L      Total Protein 5.9 g/dL      Albumin 3.4 g/dL      Total Bilirubin 0.20 mg/dL      eGFR 91 ml/min/1.73sq m     Narrative:      [place] guidelines for Chronic Kidney Disease (CKD):   •  Stage 1 with normal or high GFR (GFR > 90 mL/min/1.73 square meters)  •  Stage 2 Mild CKD (GFR = 60-89 mL/min/1.73 square meters)  •  Stage 3A Moderate CKD (GFR = 45-59 mL/min/1.73 square meters)  •  Stage 3B Moderate CKD (GFR = 30-44 mL/min/1.73 square meters)  •  Stage 4 Severe CKD (GFR = 15-29 mL/min/1.73 square meters)  •  Stage 5 End Stage CKD (GFR <15 mL/min/1.73 square meters)  Note: GFR calculation is accurate only with a steady state creatinine    Lipase [[id number]]  (Normal) Collected: 09/17/23 2212    Lab Status: Final result Specimen: Blood from Arm, Right Updated: 09/17/23 2238     Lipase 37 u/L     Magnesium [[id number]]  (Abnormal) Collected: 09/17/23 2212    Lab Status: Final result Specimen: Blood from Arm, Right Updated: 09/17/23 2238     Magnesium 1.8 mg/dL     Fingerstick Glucose (POCT) [[id number]]  (Normal) Collected: 09/17/23 2236    Lab Status: Final result Updated: 09/17/23 2237     POC Glucose 95 mg/dl     Protime-INR [[id number]]  (Normal) Collected: 09/17/23 2212 Lab Status: Final result Specimen: Blood from Arm, Right Updated: 09/17/23 2232     Protime 12.9 seconds      INR 0.98    APTT [[id number]]  (Normal) Collected: 09/17/23 2212    Lab Status: Final result Specimen: Blood from Arm, Right Updated: 09/17/23 2232     PTT 27 seconds     Blood gas, Venous [[id number]]  (Abnormal) Collected: 09/17/23 2212    Lab Status: Final result Specimen: Blood from Arm, Right Updated: 09/17/23 2224     pH, Fausto 7.410     pCO2, Fausto 33.1 mm Hg      pO2, Fausto 85.9 mm Hg      HCO3, Fausto 20.5 mmol/L      Base Excess, Fausto -3.3 mmol/L      O2 Content, Fausto 16.5 ml/dL      O2 HGB, VENOUS 91.4 %     CBC and differential [[id number]]  (Abnormal) Collected: 09/17/23 2212    Lab Status: Final result Specimen: Blood from Arm, Right Updated: 09/17/23 2222     WBC 6.65 Thousand/uL      RBC 3.73 Million/uL      Hemoglobin 12.1 g/dL      Hematocrit 37.5 %       fL      MCH 32.4 pg      MCHC 32.3 g/dL      RDW 13.2 %      MPV 9.3 fL      Platelets 606 Thousands/uL      nRBC 0 /100 WBCs      Neutrophils Relative 56 %      Immat GRANS % 1 %      Lymphocytes Relative 19 %      Monocytes Relative 8 %      Eosinophils Relative 15 %      Basophils Relative 1 %      Neutrophils Absolute 3.80 Thousands/µL      Immature Grans Absolute 0.04 Thousand/uL      Lymphocytes Absolute 1.24 Thousands/µL      Monocytes Absolute 0.51 Thousand/µL      Eosinophils Absolute 1.01 Thousand/µL      Basophils Absolute 0.05 Thousands/µL     Fingerstick Glucose (POCT) [[id number]]  (Normal) Collected: 09/17/23 2209    Lab Status: Final result Updated: 09/17/23 2210     POC Glucose 118 mg/dl     Fingerstick Glucose (POCT) [[id number]]  (Abnormal) Collected: 09/17/23 2155    Lab Status: Final result Updated: 09/17/23 2156     POC Glucose 173 mg/dl                  Imaging Studies:   Direct to CT: No  CT recon only thoracolumbar   Final Result by Ramiro Ribeiro DO (09/17 2309)      No fracture or traumatic subluxation.             The study was marked in EPIC for immediate notification, per trauma protocol. Workstation performed: [id number]         XR hip/pelv 2-3 vws right if performed   ED Interpretation by Umer Berry DO (09/17 2331)   Limited secondary to body habitus. With that limitation, no fracture or dislocation. Pelvic rings appear intact. Final Result by Galdino Kim MD (09/18 0913)      No acute osseous abnormality. Workstation performed: [id number]         TRAUMA - CT chest abdomen pelvis w contrast   Final Result by Corrinne Furl, DO (09/17 2309)      No acute traumatic injury identified            Workstation performed: [id number]         TRAUMA - CT spine cervical wo contrast   Final Result by Corrinne Furl, DO (09/17 2309)      No cervical spine fracture or traumatic malalignment. Workstation performed: [id number]         TRAUMA - CT head wo contrast   Final Result by Corrinne Furl, DO (09/17 2309)      No intracranial hemorrhage or calvarial fracture. Workstation performed: [id number]         XR chest 1 view portable   ED Interpretation by Umer Berry DO (09/17 2210)   Airway is midline. Lungs are clear bilaterally with no evidence of pulmonary vascular congestion/focal infiltrate/pleural effusion/pneumothorax. Cardiac and mediastinal silhouettes are within normal limits.  Osseous structures appear normal.        Final Result by Paul Ayoub MD (09/18 0901)   Development of right perihilar linear opacity, likely subsegmental atelectasis      Otherwise, no acute cardiopulmonary abnormalities or interval changes               Workstation performed: [id number]               Procedures  POC FAST US    Date/Time: 9/17/2023 9:52 PM    Performed by: Umer Berry DO  Authorized by: Umer Berry DO    Patient location:  ED  Procedure details:     Exam Type:  Diagnostic    Indications: blunt abdominal trauma and blunt chest trauma      Assess for:  Hemothorax, intra-abdominal fluid, pericardial effusion and pneumothorax    Technique: extended FAST      Views obtained:  LUQ - Splenorenal space, Left thorax, Heart - Pericardial sac, RUQ - Bryant's Pouch, Suprapubic - Pouch of Pascual and Right thorax    Image quality: diagnostic      Image availability:  Not saved  FAST Findings:     RUQ (Hepatorenal) free fluid: absent      LUQ (Splenorenal) free fluid: absent      Suprapubic free fluid: absent      Cardiac wall motion: identified      Pericardial effusion: absent    extended FAST (Pulmonary) findings:     Left lung sliding: Present      Right lung sliding: Present      Left pleural effusion: Absent      Right pleural effusion: Absent    Interpretation:     Impressions: negative    CriticalCare Time    Date/Time: 9/18/2023 12:33 AM    Performed by: Eyal Rodriguez DO  Authorized by: Eyal Rodriguez DO    Critical care provider statement:     Critical care time (minutes):  75    Critical care time was exclusive of:  Separately billable procedures and treating other patients    Critical care was necessary to treat or prevent imminent or life-threatening deterioration of the following conditions:  Trauma and shock    Critical care was time spent personally by me on the following activities:  Development of treatment plan with patient or surrogate, discussions with consultants, evaluation of patient's response to treatment, examination of patient, obtaining history from patient or surrogate, ordering and review of laboratory studies, ordering and review of radiographic studies, re-evaluation of patient's condition, review of old charts and ordering and performing treatments and interventions  Arterial Line    Date/Time: 9/17/2023 10:55 PM    Performed by: Eyal Rodriguez DO  Authorized by: Eyal Rodriguez DO    Patient location:  ED  Consent:     Consent obtained:  Verbal    Consent given by:  Patient    Risks discussed:  Bleeding, ischemia, infection and pain  Universal protocol:     Procedure explained and questions answered to patient or proxy's satisfaction: yes      Relevant documents present and verified: yes      Test results available and properly labeled: yes      Radiology Images displayed and confirmed. If images not available, report reviewed: yes      Required blood products, implants, devices, and special equipment available: yes      Site/side marked: yes      Immediately prior to procedure a time out was called: yes      Patient identity confirmed:  Verbally with patient and arm band  Indications:     Indications: hemodynamic monitoring and continuous blood pressure monitoring    Pre-procedure details:     Skin preparation:  Chlorhexidine    Preparation: Patient was prepped and draped in sterile fashion    Anesthesia (see MAR for exact dosages): Anesthesia method:  Local infiltration    Local anesthetic:  Lidocaine 1% w/o epi  Procedure details:     Location / Tip of Catheter:  Radial    Laterality:  Left    Grant's test performed: yes      Grant's test abnormal: no      Needle gauge:  20 G    Placement technique:  Ultrasound guided    Ultrasound image availability:  Not saved    Sterile ultrasound techniques: Sterile gel and sterile probe covers were used      Number of attempts:  1    Successful placement: yes      Transducer: waveform confirmed    Post-procedure details:     Post-procedure:  Secured with tape, sterile dressing applied, sutured and wrist guard applied    CMS:  Normal    Patient tolerance of procedure: Tolerated well, no immediate complications             ED Course  ED Course as of 09/18/23 0042   Tiana Mckeon Sep 17, 2023   2212 Prior BPs in the outpatient setting have been relatively low on multiple occasions over the past year (90//60).    2236 CBC and differential(!)  WBC normal.  Hemoglobin/hematocrit normal.  Platelets normal.   5684 Protime-INR  Normal   2236 APTT  Normal   2236 Blood gas, Venous(!)  Respiratory alkalosis with hyperoxia 2236 By my review of CT scans, no ICH. No displaced C-spine fracture. No pneumothorax. No intra-abdominal free fluid or evidence of solid organ injury on the whole, do not suspect traumatic etiology for patient's hypotension: I suspect alternative etiologies including sepsis/volume depletion/etc.  Will place arterial line to obtain better pressure given body habitus producing difficulty with noninvasive blood pressure monitoring   2237 Fingerstick Glucose (POCT)   2244 Lipase  Normal   2244 Magnesium(!)  Hypomagnesemia; will replete intravenously   2244 Comprehensive metabolic panel(!)  Mildly decreased CO2. Otherwise normal   2328 B-Type Natriuretic Peptide(BNP)   2328 HS Troponin 0hr (reflex protocol)   2328 Procalcitonin   2329 Lactic acid(!!)  Unclear etiology for significant elevation   2329 TRAUMA - CT head wo contrast  FINDINGS:     PARENCHYMA: Decreased attenuation is noted in periventricular and subcortical white matter demonstrating an appearance that is statistically most likely to represent mild microangiopathic change. Possible chronic lacunar infarction in the ha. No CT signs of acute infarction. No intracranial mass, mass effect or midline shift. No acute parenchymal hemorrhage.     VENTRICLES AND EXTRA-AXIAL SPACES: No hydrocephalus. Stable 10 mm calcified meningioma at the left frontal vertex     VISUALIZED ORBITS: Normal visualized orbits.     PARANASAL SINUSES: Normal visualized paranasal sinuses.     CALVARIUM AND EXTRACRANIAL SOFT TISSUES:  Normal.     IMPRESSION:     No intracranial hemorrhage or calvarial fracture. 2329 TRAUMA - CT spine cervical wo contrast  FINDINGS:     ALIGNMENT:  Normal alignment of the cervical spine.  No subluxation.     VERTEBRAE:  No fracture.     DEGENERATIVE CHANGES:  Mild multilevel cervical degenerative changes are noted without critical central canal stenosis.     PREVERTEBRAL AND PARASPINAL SOFT TISSUES: Unremarkable     THORACIC INLET: Normal.     IMPRESSION:     No cervical spine fracture or traumatic malalignment. 2329 TRAUMA - CT chest abdomen pelvis w contrast     FINDINGS:     CHEST     LUNGS: Scattered subsegmental atelectasis. There is no tracheal or endobronchial lesion.     PLEURA:  Unremarkable.     HEART/GREAT VESSELS: Heart is unremarkable for patient's age. No thoracic aortic aneurysm.     MEDIASTINUM AND DARRON:  Unremarkable.     CHEST WALL AND LOWER NECK:  Unremarkable.     ABDOMEN     LIVER/BILIARY TREE:  Unremarkable.     GALLBLADDER:  No calcified gallstones. No pericholecystic inflammatory change.     SPLEEN:  Unremarkable.     PANCREAS:  Unremarkable.     ADRENAL GLANDS:  Unremarkable.     KIDNEYS/URETERS:  Unremarkable. No hydronephrosis.     STOMACH AND BOWEL: Moderate diffuse colonic fecal retention which can be seen in the setting of constipation.     APPENDIX:  No findings to suggest appendicitis.     ABDOMINOPELVIC CAVITY:  No ascites. No pneumoperitoneum. No lymphadenopathy.     VESSELS:  Unremarkable for patient's age.     PELVIS     REPRODUCTIVE ORGANS:  Unremarkable for patient's age.     URINARY BLADDER:  Unremarkable.     ABDOMINAL WALL/INGUINAL REGIONS:  Unremarkable.     OSSEOUS STRUCTURES:  No acute fracture or destructive osseous lesion. Spinal degenerative changes are noted.     IMPRESSION:     No acute traumatic injury identified      2330 CT recon only thoracolumbar  FINDINGS:     ALIGNMENT: Normal alignment. No spondylolisthesis. Thoracolumbar dextroscoliosis.     VERTEBRAE:  No fracture. No acute osseous abnormality.     DEGENERATIVE CHANGES:     Moderate to severe degenerative changes of the bar spine. Mild lower thoracic degenerative disc disease.     PREVERTEBRAL AND PARASPINAL SOFT TISSUES: Normal.     IMPRESSION:     No fracture or traumatic subluxation.         2331 No traumatic findings identified on imaging studies. No etiology identified for recurrent hypoglycemia apart from lack of p.o. intake described by patient earlier today. Elevation of lactic acid remains unclear. BP has improved with IV fluid administration and is confirmed with arterial line pressures as being concordant with BP cuff pressure obtained simultaneously. Falls associated with lightheadedness may have been due to relative hypotension, although again the etiology of this hypotension is unclear. Did not report a history suggestive of fluid loss or volume depletion preceding current symptoms. Will obviously need hospitalization. We will start IV dextrose infusion given recurrent hypoglycemia. We will also administered IV octreotide. Will start magnesium repletion. Will discuss with internal medicine. 2345 Sepsis possible although etiology unclear if that is the case. Will give empiric antibiotics while work-up is pending. The 30ml/kg fluid bolus was not given to the patient despite hypotension and/or significantly elevated lactate of = 4 and/or presence of septic shock due to: Concern for fluid/volume overload; patient also responded to a lower volume of fluid. The patient has been administered 2L of crystalloid instead with good response of BP (MAP >65). Orders for this have been placed in [facility]. The patient may receive additional colloid or crystalloid fluids thereafter based on clinical condition. Eyal Rodriguez DO     Mon Sep 18, 2023   0018 Fingerstick Glucose (POCT)(! )  After administration of IV dextrose: will benefit most from a meal containing carbohydrates/fats/protein: she is awake and conversant and should be able to tolerate eating.  For the moment, will continue IV dextrose infusion while planning to d/w SLIM.   0026 Discussed case with ОЛЬГА Buckley who evaluated patient in the ED; admit to inpatient critical care status, Dr Lisa Haro       [facility]      Disposition  [facility] One Transfer: No  Final diagnoses:   Hypotension   Hypoglycemia associated with type 2 diabetes mellitus ([address])   Hypomagnesemia Multiple falls   Elevated lactic acid level     Time reflects when diagnosis was documented in both MDM as applicable and the Disposition within this note     Time User Action Codes Description Comment    9/18/2023 12:29 AM Janis Hamburger Add [I95.9] Hypotension     9/18/2023 12:29 AM Janis Hamburger Add [E11.649] Hypoglycemia associated with type 2 diabetes mellitus ([address])     9/18/2023 12:29 AM Janis Hamburger Add [E83.42] Hypomagnesemia     9/18/2023 12:29 AM Janis Hamburger Add [R29.6] Multiple falls     9/18/2023 12:29 AM Janis Hamburger Add [R79.89] Elevated lactic acid level     9/18/2023  1:34 AM Nikunj Quinteros Add [R29.6] Recurrent falls       ED Disposition     ED Disposition   Admit    Condition   Stable    Date/Time   Mon Sep 18, 2023 12:29 AM    Comment   Case was discussed with ОЛЬГА Calvin and the patient's admission status was agreed to be Admission Status: inpatient status to the service of Dr. July Alford . Follow-up Information    None       Current Discharge Medication List      CONTINUE these medications which have NOT CHANGED    Details   Acetaminophen (Tylenol) 325 MG CAPS Take 650 mg by mouth Three times daily as needed      albuterol (VENTOLIN HFA) 90 mcg/act inhaler Inhale 2 puffs every 6 (six) hours as needed for wheezing  Qty: 1 Inhaler, Refills: 1    Comments: Substitution to a formulary equivalent within the same pharmaceutical class is authorized.   Associated Diagnoses: Pulmonary emphysema, unspecified emphysema type (HCC)      aspirin (ECOTRIN LOW STRENGTH) 81 mg EC tablet Take 1 tablet (81 mg total) by mouth daily  Qty: 90 tablet, Refills: 1    Associated Diagnoses: Type 2 diabetes mellitus with hyperglycemia, with long-term current use of insulin ([facility])      Blood Glucose Monitoring Suppl (BLOOD GLUCOSE MONITOR SYSTEM) w/Device KIT Test blood sugars 3 times a day  Qty: 1 each, Refills: 0    Comments: One Touch Ultra  Associated Diagnoses: Type 2 diabetes mellitus with complication, with long-term current use of insulin ([facility])      Diclofenac Sodium (VOLTAREN) 1 % APPLY TO AFFECTED AREA EVERY 6 HOURS IF NEEDED FOR PAIN.       Docusate Sodium (DSS) 100 MG CAPS Take 1 capsule by mouth 2 (two) times a day as needed      fluticasone (FLONASE) 50 mcg/act nasal spray SPRAY ONE (1) SPRAY INTO EACH NOSTRIL ONCE DAILY  Qty: 16 g, Refills: 1    Associated Diagnoses: Seasonal allergic rhinitis, unspecified trigger      gabapentin (NEURONTIN) 800 mg tablet Take 1 tablet (800 mg total) by mouth 4 (four) times a day for 3 days  Qty: 12 tablet, Refills: 0    Associated Diagnoses: Bipolar disorder, in full remission, most recent episode mixed ([facility])      glipiZIDE (GLUCOTROL) 10 mg tablet Take 10 mg by mouth in the morning      guaiFENesin 400 mg Take 400 mg by mouth 2 (two) times a day      hydrOXYzine pamoate (VISTARIL) 50 mg capsule Take 1 capsule (50 mg total) by mouth 3 (three) times a day  Qty: 90 capsule, Refills: 3    Associated Diagnoses: Anxiety      insulin aspart, w/niacinamide, (FIASP) 100 Units/mL injection pen Inject 5 Units under the skin 3 (three) times a day as needed for high blood sugar  Qty: 15 mL, Refills: 0    Associated Diagnoses: Type 2 diabetes mellitus without complication, with long-term current use of insulin ([facility])      levETIRAcetam (KEPPRA) 750 mg tablet Take 2 tablets (1,500 mg total) by mouth 2 (two) times a day for 15 days Pt states she takes 1500MG Bid  Qty: 60 tablet, Refills: 0    Associated Diagnoses: Medication refill      levothyroxine 175 mcg tablet Take 175 mcg by mouth in the morning      lisinopril (ZESTRIL) 5 mg tablet Take 1 tablet (5 mg total) by mouth daily  Qty: 30 tablet, Refills: 3    Associated Diagnoses: NSTEMI (non-ST elevated myocardial infarction) ([facility])      magnesium chloride (MAG64) 64 MG TBEC EC tablet Take 1 tablet (64 mg total) by mouth 2 (two) times a day  Qty: 60 tablet, Refills: 3    Associated Diagnoses: Muscle cramps meloxicam (MOBIC) 7.5 mg tablet Take 1 tablet (7.5 mg total) by mouth 2 (two) times a day  Qty: 60 tablet, Refills: 1    Associated Diagnoses: Type 2 diabetes mellitus with hyperglycemia, with long-term current use of insulin ([facility])      metFORMIN (GLUCOPHAGE) 500 mg tablet Take 1 tablet (500 mg total) by mouth 2 (two) times a day with meals  Qty: 60 tablet, Refills: 5    Associated Diagnoses: Type 2 diabetes mellitus with hypoglycemia without coma, with long-term current use of insulin ([facility])      metoprolol succinate (TOPROL-XL) 25 mg 24 hr tablet Take 1 tablet (25 mg total) by mouth daily  Qty: 30 tablet, Refills: 3    Associated Diagnoses: NSTEMI (non-ST elevated myocardial infarction) ([facility])      multivitamin (THERAGRAN) TABS Take 1 tablet by mouth daily      nystatin (MYCOSTATIN) ointment 1 application. 2 (two) times a day      omeprazole (PriLOSEC) 40 MG capsule Take 40 mg by mouth 2 (two) times a day      ONETOUCH DELICA LANCETS 37F MISC Test blood sugars 3 times a day.   Qty: 100 each, Refills: 0    Associated Diagnoses: Type 2 diabetes mellitus with complication, with long-term current use of insulin ([facility])      oxybutynin (DITROPAN) 5 mg tablet Take 1 tablet (5 mg total) by mouth 3 (three) times a day  Qty: 90 tablet, Refills: 3    Associated Diagnoses: Overactive bladder      phenytoin (DILANTIN) 100 mg ER capsule Take by mouth 2 (two) times a day 3 tablets in morning and 2 at lunch daily      polyethylene glycol (GLYCOLAX) powder Take 17 g by mouth daily  Qty: 850 g, Refills: 3    Associated Diagnoses: Type 2 diabetes mellitus with hyperglycemia, with long-term current use of insulin ([facility])      QUEtiapine (SEROquel) 400 MG tablet Take 1 tablet (400 mg total) by mouth daily at bedtime for 2 doses  Qty: 2 tablet, Refills: 0    Associated Diagnoses: Bipolar disorder, in full remission, most recent episode mixed ([facility])      simvastatin (ZOCOR) 80 mg tablet Take 1 tablet (80 mg total) by mouth daily at bedtime  Qty: 30 tablet, Refills: 5    Associated Diagnoses: Type 2 diabetes mellitus with hyperglycemia, with long-term current use of insulin ([facility])      triamcinolone (KENALOG) 0.1 % cream Apply topically 2 (two) times a day  Qty: 30 g, Refills: 0    Associated Diagnoses: Rash      valACYclovir (VALTREX) 500 mg tablet Take 1 tablet by mouth 2 (two) times a day  Refills: 2      zonisamide (ZONEGRAN) 100 mg capsule Take 200 mg by mouth 2 (two) times a day      ferrous sulfate (FeroSul) 325 (65 Fe) mg tablet Take 1 tablet (325 mg total) by mouth daily with breakfast  Qty: 60 tablet, Refills: 0    Associated Diagnoses: Anemia, unspecified type      glucose blood (ONE TOUCH ULTRA TEST) test strip Test blood sugars 3 times a day. Qty: 100 each, Refills: 0    Associated Diagnoses: Type 2 diabetes mellitus with complication, with long-term current use of insulin (HCC)      Incontinence Supply Disposable (PADSORBER BED PAN LINERS) MISC by Does not apply route as needed (incontinence)  Qty: 50 each, Refills: 3    Associated Diagnoses: Stress incontinence of urine      Infant Care Products (CUTIES SENSITIVE WIPES) MISC 120 Pieces by Does not apply route as needed (incontinence)  Qty: 120 each, Refills: 3    Associated Diagnoses: Stress incontinence of urine      Misc. Devices (MATTRESS PAD) MISC by Does not apply route daily  Qty: 1 each, Refills: 0    Associated Diagnoses: Morbid obesity (HCC)      polyvinyl alcohol (LIQUIFILM TEARS) 1.4 % ophthalmic solution Administer 1 drop to both eyes 3 (three) times a day  Qty: 15 mL, Refills: 0    Associated Diagnoses: Severe sepsis ([address])           No discharge procedures on file.     PDMP Review       Value Time User    PDMP Reviewed  Yes 9/18/2023  1:07 AM Shannan Giang PA-C          ED Provider  Electronically Signed by         Igor Ballesteros, DO  09/18/23 [address] [place] [address],   09/18/23 0561

## 2023-09-18 NOTE — UTILIZATION REVIEW
NOTIFICATION OF INPATIENT ADMISSION   AUTHORIZATION REQUEST   SERVICING FACILITY:   [address] [place] [address] [address]  [facility] AN AFFILIATE [address]  Tax ID:  [id number]  NPI: [id number] ATTENDING PROVIDER:  Attending Name and NPI#: Monica Mosher [[id number]]  Address: [address] [place], [place] [address]  Phone: [phone]     ADMISSION INFORMATION:  Place of Service: Inpatient [address] [facility]  Place of Service Code: 21  Inpatient Admission Date/Time: 9/18/23 12:28 AM  Discharge Date/Time: No discharge date for patient encounter. Admitting Diagnosis Code/Description:  Hypomagnesemia [E83.42]  Trauma [T14.90XA]  Hypoglycemia [E16.2]  Hypotension [I95.9]  Elevated lactic acid level [R79.89]  Multiple falls [R29.6]  Hypoglycemia associated with type 2 diabetes mellitus ([facility]) [E11.649]     UTILIZATION REVIEW CONTACT:  Tiera Parks, Utilization   Network Utilization Review Department  Phone: [phone]  Fax [phone]  Email: [email or web address]  Contact for approvals/pending authorizations, clinical reviews, and discharge. PHYSICIAN ADVISORY SERVICES:  Medical Necessity Denial & Smkf-zg-Gyrm Review  Phone: [phone]  Fax: [phone]  Email: [email or web address]

## 2023-09-18 NOTE — ASSESSMENT & PLAN NOTE
· Hold all PO diabetic medications  · Hold all insulin therapy  · The hypoglycemia resolved with D5 IV fluids - monitor off  · Check a new HgbA1c level - 6.3  · Blood glucose monitoring ACHS  · Hypoglycemia protocol  · Outpatient Endocrinology evaluation  · Plan for dc home with metformin alone.  Discontinue glipizide & Humalog given associated hypoglycemic episodes & risks of ongoing episodes

## 2023-09-18 NOTE — ASSESSMENT & PLAN NOTE
· Continue PO levothyroxine     Latest Reference Range & Units 09/17/23 22:12   TSH 3RD GENERATON 0.450 - 4.500 uIU/mL 0.511

## 2023-09-18 NOTE — ASSESSMENT & PLAN NOTE
Hemoglobin Levels stable at 12.1  Continue Iron supplement  Monitor hemoglobin levels while admitted  Check Am CBC

## 2023-09-18 NOTE — UTILIZATION REVIEW
Initial Clinical Review    Admission: Date/Time/Statement:   Admission Orders (From admission, onward)     Ordered        23 0028  Inpatient Admission  Once                      Orders Placed This Encounter   Procedures   • Inpatient Admission     Standing Status:   Standing     Number of Occurrences:   1     Order Specific Question:   Level of Care     Answer:   Critical Care [15]     Order Specific Question:   Estimated length of stay     Answer:   More than 2 Midnights     Order Specific Question:   Certification     Answer:   I certify that inpatient services are medically necessary for this patient for a duration of greater than two midnights. See H&P and MD Progress Notes for additional information about the patient's course of treatment. ED Arrival Information     Expected   -    Arrival   2023 21:42    Acuity   Emergent            Means of arrival   Ambulance    Escorted by   [facility]   Hospitalist    Admission type   Emergency            Arrival complaint   Hypoglycemia           Chief Complaint   Patient presents with   • Trauma     Patient fell twice today. • Hypoglycemia - Symptomatic     Patient found to have low blood sugar via ems       Initial Presentation: 79 y.o. female presents to ed on 23 from home via ems for evaluation and treatment of symptomatic hypoglycemia. She reports two falls from standing today onto carpeted floor. Life alert for assistance. Ems found glucose 30s. Received 15 G dextrose orally x 2. Glucose in 40s on arirval. PMHX: [address] [place] HTN, DM,Clinical assessment significant for hypotension 61/36. Normal mentation. Two areas of ecchymosis on lateral aspect of right hip. LA 4.2, MAG 1.8, VB.410 /  33.1 /  85.9 / 20.5. US: abdomen w/o acute finding. EKG nsr. Imaging w/o acute finding.   Initially treated with iv .9% ns bolus 4L , iv sandostatin, iv mag, iv dextrose x 3, iv aztreonam.      Date: 23   Admit to inpatient critical care for hypotension, hypoglycemia, falls. Plan to hold diabetic medication, continue dextrose infusion until 0735, trend CMP, consider endocrine consult, A line in place, consider vasopressor, intake / output, monitor renal function and vital signs, adjust antihypertensive medications, iv .9% ns 125/hr, monitor on telemetry. Obtain PT/OT evaluations. Temp 97.3, tachypnea with  respirations 24-30 sustained. Monitor off iv antibiotics. Patient not on home O2 placed on oxygen to maintain O2 sats at least 88%. Wean to room air as able. Date: 9-19-23     Day 3: Has surpassed a 2nd midnight with active treatments and services, which include  Discharge planning to inpatient rehab, phenytoin leve; Curtistine Chris Procalcitonin, urine culture, levetiracetam level, iv fluids, fingerstick glucose, insulin adjustments, PT/OT, monitor blood pressure.     ED Triage Vitals [09/17/23 2156]   (!) 97.1 °F (36.2 °C) 99 18 (!) 61/36 94 %      Temporal Monitor         Pain Score       6          09/18/23 120 kg (263 lb 14.3 oz)     Additional Vital Signs:     Date/Time Temp Pulse Resp BP MAP (mmHg) SpO2 Nasal Cannula O2 Flow Rate (L/min)   09/18/23 0803 -- -- -- -- -- -- 1 L/min   09/18/23 0800 97.3 °F (36.3 °C)   Abnormal  78 30   Abnormal  -- -- 95 % 1 L/min   09/18/23 0700 -- 74 18 179/108   Abnormal  138 95 % 2 L/min   09/18/23 0630 -- 72 28   Abnormal  160/77 95 96 % --   09/18/23 0615 97.2 °F (36.2 °C)   Abnormal  75 15 -- -- 95 % --   09/18/23 0600 -- 79 13 154/72 103 95 % 2 L/min   09/18/23 0545 -- 77 13 -- -- 96 % --   09/18/23 0530 -- 78 11   Abnormal  142/72 102 95 % --   09/18/23 0515 -- 77 10  Abnormal  -- -- 95 % --   09/18/23 0500 -- 75 16 128/63 91 95 % 2 L/min   09/18/23 0445 -- 68 30   Abnormal  -- -- 96 % --   09/18/23 0415 -- 78 13 -- -- 88 % Abnormal  --   09/18/23 0400 -- 79 24   Abnormal  114/58 78 94 % --   09/18/23 0300 -- 86 25  Abnormal  103/59 74 97 % 2 L/min   09/18/23 0215 97.5 °F (36.4 °C) 84 20 103/61 77 95 % 2 L/min   09/18/23 0200 -- 79 12 -- -- 95 % --   09/18/23 0145 -- 76 24   Abnormal  -- -- 95 % --   09/18/23 0030 -- 80 20 120/74 -- 96 % --   09/18/23 0000 -- 53   Abnormal  20 111/65 -- 96 % --   09/17/23 2300 -- 91 22 105/59 -- 95 % --   09/17/23 2245 -- 92 22 116/62 -- 95 % --   09/17/23 2230 -- 92 22 80/42   Abnormal  -- 95 % --   09/17/23 2215 -- 95 21 84/60   Abnormal  -- 95 % --   09/17/23 22:00:26 -- 99 18 96/54 -- 93 % --   09/17/23 2200 -- 99 -- 89/41   Abnormal  59   Abnormal  94 % --   09/17/23 2156 97.1 °F (36.2 °C)   Abnormal  99 18 61/36   Abnormal  44   Abnormal  94 % --           Pertinent Labs/Diagnostic Test Results:     CT recon only thoracolumbar   Final  (09/17 2309)      No fracture or traumatic subluxation. XR hip/pelv 2-3 vws right if performed      Final (09/18 0913)      No acute osseous abnormality. TRAUMA - CT chest abdomen pelvis w contrast   Final  (09/17 2309)      No acute traumatic injury identified         TRAUMA - CT spine cervical wo contrast   Final  (09/17 2309)      No cervical spine fracture or traumatic malalignment. TRAUMA - CT head wo contrast   Final (09/17 2309)      No intracranial hemorrhage or calvarial fracture.          XR chest 1 view portable      Final (09/18 0901)   Development of right perihilar linear opacity, likely subsegmental atelectasis           Results from last 7 days   Lab Units 09/17/23  2252   SARS-COV-2  Negative     Results from last 7 days   Lab Units 09/18/23  0427 09/18/23  0230 09/17/23  2212   WBC Thousand/uL 6.25  --  6.65   HEMOGLOBIN g/dL 11.5  --  12.1   HEMATOCRIT % 36.2  --  37.5   PLATELETS Thousands/uL 231 222 238   NEUTROS ABS Thousands/µL 3.46  --  3.80         Results from last 7 days   Lab Units 09/18/23  0427 09/17/23  2212   SODIUM mmol/L 136 135   POTASSIUM mmol/L 4.2 4.7   CHLORIDE mmol/L 105 101   CO2 mmol/L 25 20*   ANION GAP mmol/L 6 14   BUN mg/dL 12 16   CREATININE mg/dL 0.50* 0.69   EGFR ml/min/1.73sq m 101 91   CALCIUM mg/dL 8.0* 8.5   MAGNESIUM mg/dL 1.7* 1.8*   PHOSPHORUS mg/dL 3.3  --      Results from last 7 days   Lab Units 09/18/23 0427 09/17/23 2212   AST U/L 11* 15   ALT U/L 11 13   ALK PHOS U/L 55 50   TOTAL PROTEIN g/dL 5.3* 5.9*   ALBUMIN g/dL 3.1* 3.4*   TOTAL BILIRUBIN mg/dL 0.22 0.20     Results from last 7 days   Lab Units 09/18/23  0823 09/18/23  0624 09/18/23  0426 09/18/23  0229 09/18/23  0059 09/18/23  0041 09/18/23  0022 09/18/23  0008 09/17/23  2348 09/17/23  2329 09/17/23  2311 09/17/23  2252   POC GLUCOSE mg/dl 131 156* 199* 171* 123 121 135 150* 65 70 77 80     Results from last 7 days   Lab Units 09/18/23 0427 09/17/23 2212   GLUCOSE RANDOM mg/dL 192* 195*         Results from last 7 days   Lab Units 09/18/23 0230   HEMOGLOBIN A1C % 6.3*   EAG mg/dl 134       Results from last 7 days   Lab Units 09/17/23 2212   PH TIGIST  7.410*   PCO2 TIGIST mm Hg 33.1*   PO2 TIGIST mm Hg 85.9*   HCO3 TIGIST mmol/L 20.5*   BASE EXC TIGIST mmol/L -3.3   O2 CONTENT TIGIST ml/dL 16.5   O2 HGB, VENOUS % 91.4*             Results from last 7 days   Lab Units 09/18/23  0230 09/18/23  0024 09/17/23 2212   HS TNI 0HR ng/L  --   --  3   HS TNI 2HR ng/L  --  3  --    HSTNI D2 ng/L  --  0  --    HS TNI 4HR ng/L 4  --   --    HSTNI D4 ng/L 1  --   --          Results from last 7 days   Lab Units 09/17/23 2212   PROTIME seconds 12.9   INR  0.98   PTT seconds 27     Results from last 7 days   Lab Units 09/17/23 2212   TSH 3RD GENERATON uIU/mL 0.511     Results from last 7 days   Lab Units 09/18/23  0427 09/17/23  2212   PROCALCITONIN ng/ml <0.05 <0.05     Results from last 7 days   Lab Units 09/18/23  0427 09/18/23  0024 09/17/23 2212   LACTIC ACID mmol/L 1.0 3.1* 4.2*             Results from last 7 days   Lab Units 09/17/23  2212   BNP pg/mL 15       Results from last 7 days   Lab Units 09/17/23  2212   LIPASE u/L 37       Results from last 7 days   Lab Units 09/18/23  0755   CLARITY UA  Clear   COLOR UA Yellow   SPEC GRAV UA  1.010   PH UA  6.5   GLUCOSE UA mg/dl Negative   KETONES UA mg/dl Negative   BLOOD UA  Negative   PROTEIN UA mg/dl Negative   NITRITE UA  Positive*   BILIRUBIN UA  Negative   UROBILINOGEN UA E.U./dl 0.2   LEUKOCYTES UA  Small*   WBC UA /hpf 2-4   RBC UA /hpf None Seen   BACTERIA UA /hpf Occasional   EPITHELIAL CELLS WET PREP /hpf None Seen     Results from last 7 days   Lab Units 09/17/23  2252   INFLUENZA A PCR  Negative   INFLUENZA B PCR  Negative   RSV PCR  Negative         Results from last 7 days   Lab Units 09/17/23  2252 09/17/23  2212   BLOOD CULTURE  Received in Microbiology Lab. Culture in Progress. Received in Microbiology Lab. Culture in Progress.        ED Treatment:   Medication Administration from 09/17/2023 2142 to 09/18/2023 0124       Date/Time Order Dose Route Action     09/17/2023 2203 EDT sodium chloride 0.9 % bolus 2,000 mL 2,000 mL Intravenous New Bag     09/17/2023 2202 EDT dextrose 50 % IV solution 25 mL 50 mL Intravenous Given     09/17/2023 2206 EDT sodium chloride 0.9 % bolus 2,000 mL 2,000 mL Intravenous New Bag     09/17/2023 2357 EDT octreotide (SandoSTATIN) injection 50 mcg 50 mcg Intravenous Given     09/17/2023 2354 EDT magnesium sulfate 2 g/50 mL IVPB (premix) 2 g 2 g Intravenous New Bag     09/18/2023 0030 EDT dextrose 5 % infusion 75 mL/hr Intravenous Rate/Dose Change     09/17/2023 2354 EDT dextrose 5 % infusion 125 mL/hr Intravenous New Bag     09/17/2023 2359 EDT dextrose 50 % IV solution 25 mL 25 mL Intravenous Given     09/18/2023 0046 EDT aztreonam (AZACTAM) 2,000 mg in sodium chloride 0.9 % 100 mL IVPB 2,000 mg Intravenous New Bag     Past Medical History:   Diagnosis Date   • Asthma    • Bipolar disorder ([address])    • Chronic UTI (urinary tract infection)    • COPD (chronic obstructive pulmonary disease) ([address])    • Diabetes mellitus ([address])    • Disease of thyroid gland    • Dyslipidemia 10/31/2018   • Essential hypertension 10/31/2018   • GERD (gastroesophageal reflux disease)    • Heart attack ([address] [facility])    • Iron deficiency anemia, unspecified 7/29/2019   • Obesity due to excess calories 10/31/2018   • Recurrent falls 4/13/2019   • Seizure [facility])      Present on Admission:  • Bipolar disorder ([address] [facility])  • GERD without esophagitis  • Acquired hypothyroidism  • COPD (chronic obstructive pulmonary disease) (HCC)  • Seizure disorder (HCC)  • Hypotension  • Lactic acidosis  • Essential hypertension  • Iron deficiency anemia, unspecified  • Hypoglycemia      Admitting Diagnosis:     Hypomagnesemia [E83.42]  Trauma [T14.90XA]  Hypoglycemia [E16.2]  Hypotension [I95.9]  Elevated lactic acid level [R79.89]  Multiple falls [R29.6]  Hypoglycemia associated with type 2 diabetes mellitus ([address] [place] [address]) [E11.649]    Age/Sex: 79 y.o. female    Scheduled Medications:    aspirin, 81 mg, Oral, Daily  clonazePAM, 1 mg, Oral, BID  enoxaparin, 40 mg, Subcutaneous, Daily  ferrous sulfate, 325 mg, Oral, Daily With Breakfast  fluticasone, 1 spray, Each Nare, BID  gabapentin, 400 mg, Oral, TID  glycerin-hypromellose-, 1 drop, Both Eyes, TID  insulin lispro, 1-5 Units, Subcutaneous, HS  insulin lispro, 1-6 Units, Subcutaneous, TID AC  levothyroxine, 175 mcg, Oral, Early Morning  magnesium chloride, 64 mg, Oral, Daily  oxybutynin, 5 mg, Oral, TID  pantoprazole, 40 mg, Oral, Daily Before Breakfast  phenytoin, 100 mg, Oral, BID  pravastatin, 40 mg, Oral, Daily With Dinner  QUEtiapine, 200 mg, Oral, BID  zonisamide, 200 mg, Oral, BID      Continuous IV Infusions:  sodium chloride, 125 mL/hr, Intravenous, Continuous      PRN Meds:  acetaminophen, 650 mg, Oral, Q6H PRN  albuterol, 2 puff, Inhalation, Q6H PRN  docusate sodium, 100 mg, Oral, BID PRN  ondansetron, 4 mg, Intravenous, Q6H PRN        IP CONSULT TO CASE MANAGEMENT    Network Utilization Review Department  ATTENTION: Please call with any questions or concerns to [phone] and carefully listen to the prompts so that you are directed to the right person. All voicemails are confidential.  Lazarus Kaufman all requests for admission clinical reviews, approved or denied determinations and any other requests to dedicated fax number below belonging to the campus where the patient is receiving treatment.  List of dedicated fax numbers for the Facilities:  [place] DENIALS (Administrative/Medical Necessity) [phone] [address] [facility] (Maternity/NICU/Pediatrics) [phone]   [address] [place] [address] Drive [phone]   [facility] [phone]   [facility] [address] [phone]   [address] [address] [phone]

## 2023-09-18 NOTE — PLAN OF CARE
Problem: PAIN - ADULT  Goal: Verbalizes/displays adequate comfort level or baseline comfort level  Description: Interventions:  - Encourage patient to monitor pain and request assistance  - Assess pain using appropriate pain scale  - Administer analgesics based on type and severity of pain and evaluate response  - Implement non-pharmacological measures as appropriate and evaluate response  - Consider cultural and social influences on pain and pain management  - Notify physician/advanced practitioner if interventions unsuccessful or patient reports new pain  Outcome: Progressing     Problem: INFECTION - ADULT  Goal: Absence or prevention of progression during hospitalization  Description: INTERVENTIONS:  - Assess and monitor for signs and symptoms of infection  - Monitor lab/diagnostic results  - Monitor all insertion sites, i.e. indwelling lines, tubes, and drains  - Monitor endotracheal if appropriate and nasal secretions for changes in amount and color  - Taylor appropriate cooling/warming therapies per order  - Administer medications as ordered  - Instruct and encourage patient and family to use good hand hygiene technique  - Identify and instruct in appropriate isolation precautions for identified infection/condition  Outcome: Progressing     Problem: SAFETY ADULT  Goal: Patient will remain free of falls  Description: INTERVENTIONS:  - Educate patient/family on patient safety including physical limitations  - Instruct patient to call for assistance with activity   - Consult OT/PT to assist with strengthening/mobility   - Keep Call bell within reach  - Keep bed low and locked with side rails adjusted as appropriate  - Keep care items and personal belongings within reach  - Initiate and maintain comfort rounds  - Make Fall Risk Sign visible to staff  - Offer Toileting every 2 Hours, in advance of need  - Initiate/Maintain bed/ chair alarm  - Apply yellow socks and bracelet for high fall risk patients  - Consider moving patient to room near nurses station  Outcome: Progressing  Goal: Maintain or return to baseline ADL function  Description: INTERVENTIONS:  -  Assess patient's ability to carry out ADLs; assess patient's baseline for ADL function and identify physical deficits which impact ability to perform ADLs (bathing, care of mouth/teeth, toileting, grooming, dressing, etc.)  - Assess/evaluate cause of self-care deficits   - Assess range of motion  - Assess patient's mobility; develop plan if impaired  - Assess patient's need for assistive devices and provide as appropriate  - Encourage maximum independence but intervene and supervise when necessary  - Involve family in performance of ADLs  - Assess for home care needs following discharge   - Consider OT consult to assist with ADL evaluation and planning for discharge  - Provide patient education as appropriate  Outcome: Progressing  Goal: Maintains/Returns to pre admission functional level  Description: INTERVENTIONS:  - Perform BMAT or MOVE assessment daily.   - Set and communicate daily mobility goal to care team and patient/family/caregiver. - Collaborate with rehabilitation services on mobility goals if consulted  - Perform Range of Motion 3-4 times a day. - Reposition patient every 2 hours.   - Dangle patient 3 times a day  - Stand patient 3 times a day  - Ambulate patient 3 times a day  - Out of bed to chair 3 times a day   - Out of bed for meals 3 times a day  - Out of bed for toileting  - Record patient progress and toleration of activity level   Outcome: Progressing     Problem: DISCHARGE PLANNING  Goal: Discharge to home or other facility with appropriate resources  Description: INTERVENTIONS:  - Identify barriers to discharge w/patient and caregiver  - Arrange for needed discharge resources and transportation as appropriate  - Identify discharge learning needs (meds, wound care, etc.)  - Arrange for interpretive services to assist at discharge as needed  - Refer to Case Management Department for coordinating discharge planning if the patient needs post-hospital services based on physician/advanced practitioner order or complex needs related to functional status, cognitive ability, or social support system  Outcome: Progressing     Problem: Knowledge Deficit  Goal: Patient/family/caregiver demonstrates understanding of disease process, treatment plan, medications, and discharge instructions  Description: Complete learning assessment and assess knowledge base.   Interventions:  - Provide teaching at level of understanding  - Provide teaching via preferred learning methods  Outcome: Progressing     Problem: CARDIOVASCULAR - ADULT  Goal: Maintains optimal cardiac output and hemodynamic stability  Description: INTERVENTIONS:  - Monitor I/O, vital signs and rhythm  - Monitor for S/S and trends of decreased cardiac output  - Administer and titrate ordered vasoactive medications to optimize hemodynamic stability  - Assess quality of pulses, skin color and temperature  - Assess for signs of decreased coronary artery perfusion  - Instruct patient to report change in severity of symptoms  Outcome: Progressing  Goal: Absence of cardiac dysrhythmias or at baseline rhythm  Description: INTERVENTIONS:  - Continuous cardiac monitoring, vital signs, obtain 12 lead EKG if ordered  - Administer antiarrhythmic and heart rate control medications as ordered  - Monitor electrolytes and administer replacement therapy as ordered  Outcome: Progressing     Problem: RESPIRATORY - ADULT  Goal: Achieves optimal ventilation and oxygenation  Description: INTERVENTIONS:  - Assess for changes in respiratory status  - Assess for changes in mentation and behavior  - Position to facilitate oxygenation and minimize respiratory effort  - Oxygen administered by appropriate delivery if ordered  - Initiate smoking cessation education as indicated  - Encourage broncho-pulmonary hygiene including cough, deep breathe, Incentive Spirometry  - Assess the need for suctioning and aspirate as needed  - Assess and instruct to report SOB or any respiratory difficulty  - Respiratory Therapy support as indicated  Outcome: Progressing     Problem: GASTROINTESTINAL - ADULT  Goal: Establish and maintain optimal ostomy function  Description: INTERVENTIONS:  - Assess bowel function  - Encourage oral fluids to ensure adequate hydration  - Administer IV fluids if ordered to ensure adequate hydration   - Administer ordered medications as needed  - Encourage mobilization and activity  - Nutrition services referral to assist patient with appropriate food choices  - Assess stoma site  - Consider wound care consult   Outcome: Progressing     Problem: METABOLIC, FLUID AND ELECTROLYTES - ADULT  Goal: Electrolytes maintained within normal limits  Description: INTERVENTIONS:  - Monitor labs and assess patient for signs and symptoms of electrolyte imbalances  - Administer electrolyte replacement as ordered  - Monitor response to electrolyte replacements, including repeat lab results as appropriate  - Instruct patient on fluid and nutrition as appropriate  Outcome: Progressing  Goal: Fluid balance maintained  Description: INTERVENTIONS:  - Monitor labs   - Monitor I/O and WT  - Instruct patient on fluid and nutrition as appropriate  - Assess for signs & symptoms of volume excess or deficit  Outcome: Progressing  Goal: Glucose maintained within target range  Description: INTERVENTIONS:  - Monitor Blood Glucose as ordered  - Assess for signs and symptoms of hyperglycemia and hypoglycemia  - Administer ordered medications to maintain glucose within target range  - Assess nutritional intake and initiate nutrition service referral as needed  Outcome: Progressing     Problem: MOBILITY - ADULT  Goal: Maintain or return to baseline ADL function  Description: INTERVENTIONS:  -  Assess patient's ability to carry out ADLs; assess patient's baseline for ADL function and identify physical deficits which impact ability to perform ADLs (bathing, care of mouth/teeth, toileting, grooming, dressing, etc.)  - Assess/evaluate cause of self-care deficits   - Assess range of motion  - Assess patient's mobility; develop plan if impaired  - Assess patient's need for assistive devices and provide as appropriate  - Encourage maximum independence but intervene and supervise when necessary  - Involve family in performance of ADLs  - Assess for home care needs following discharge   - Consider OT consult to assist with ADL evaluation and planning for discharge  - Provide patient education as appropriate  Outcome: Progressing  Goal: Maintains/Returns to pre admission functional level  Description: INTERVENTIONS:  - Perform BMAT or MOVE assessment daily.   - Set and communicate daily mobility goal to care team and patient/family/caregiver. - Collaborate with rehabilitation services on mobility goals if consulted  - Perform Range of Motion 3-4 times a day. - Reposition patient every 2 hours.   - Dangle patient 3 times a day  - Stand patient 3 times a day  - Ambulate patient 3 times a day  - Out of bed to chair 3 times a day   - Out of bed for meals 3 times a day  - Out of bed for toileting  - Record patient progress and toleration of activity level   Outcome: Progressing     Problem: Prexisting or High Potential for Compromised Skin Integrity  Goal: Skin integrity is maintained or improved  Description: INTERVENTIONS:  - Identify patients at risk for skin breakdown  - Assess and monitor skin integrity  - Assess and monitor nutrition and hydration status  - Monitor labs   - Assess for incontinence   - Turn and reposition patient  - Assist with mobility/ambulation  - Relieve pressure over bony prominences  - Avoid friction and shearing  - Provide appropriate hygiene as needed including keeping skin clean and dry  - Evaluate need for skin moisturizer/barrier cream  - Collaborate with interdisciplinary team   - Patient/family teaching  - Consider wound care consult   Outcome: Progressing

## 2023-09-18 NOTE — ASSESSMENT & PLAN NOTE
No respiratory distress or evidence of acute exacerbation  Respiratory protocol  Continue PTA respiratory medication  Monitor respiratory status, SPO2 while admitted  Continue outpatient Pulmonary follow up

## 2023-09-18 NOTE — ED PROCEDURE NOTE
PROCEDURE  ECG 12 Lead Documentation Only    Date/Time: 9/17/2023 10:08 PM    Performed by: Louann Suggs DO  Authorized by: Louann Suggs DO    Indications / Diagnosis:  Hypotension  ECG reviewed by me, the ED Provider: yes    Patient location:  ED  Previous ECG:     Comparison to cardiac monitor: Yes    Rate:     ECG rate:  97    ECG rate assessment: normal    Rhythm:     Rhythm: sinus rhythm    Ectopy:     Ectopy: none    QRS:     QRS axis:  Normal    QRS intervals:  Normal  Conduction:     Conduction: normal    ST segments:     ST segments:  Normal  T waves:     T waves: normal    Comments:      Low voltage    QRS 70  QTc [facility]  09/18/23 0131

## 2023-09-18 NOTE — ASSESSMENT & PLAN NOTE
Magnesium levels at 1.8  Replace magnesium  Monitor magnesium levels  Check magnesium levels in the AM

## 2023-09-18 NOTE — RESPIRATORY THERAPY NOTE
RT Protocol Note  Bebeto Hughes 79 y.o. female MRN: [id number]  Unit/Bed#:  Encounter: [id number]    Assessment    Principal Problem:    Hypotension  Active Problems:    Bipolar disorder ([address])    Type 2 diabetes mellitus without complication, with long-term current use of insulin (HCC)    Acquired hypothyroidism    Seizure disorder ([address])    Essential hypertension    GERD without esophagitis    Lactic acidosis    COPD (chronic obstructive pulmonary disease) ([facility])    Recurrent falls    Hypomagnesemia    Iron deficiency anemia, unspecified    Hypoglycemia      Home Pulmonary Medications: Ventolin       Past Medical History:   Diagnosis Date   • Asthma    • Bipolar disorder ([address])    • Chronic UTI (urinary tract infection)    • COPD (chronic obstructive pulmonary disease) ([facility])    • Diabetes mellitus ([address])    • Disease of thyroid gland    • Dyslipidemia 10/31/2018   • Essential hypertension 10/31/2018   • GERD (gastroesophageal reflux disease)    • Heart attack ([address])    • Iron deficiency anemia, unspecified 7/29/2019   • Obesity due to excess calories 10/31/2018   • Recurrent falls 4/13/2019   • Seizure [facility])      Social History     Socioeconomic History   • Marital status:      Spouse name: None   • Number of children: None   • Years of education: None   • Highest education level: None   Occupational History   • None   Tobacco Use   • Smoking status: Never   • Smokeless tobacco: Never   Vaping Use   • Vaping Use: Never used   Substance and Sexual Activity   • Alcohol use: Not Currently     Alcohol/week: 0.0 standard drinks of alcohol   • Drug use: No   • Sexual activity: Not Currently   Other Topics Concern   • None   Social History Narrative   • None     Social Determinants of Health     Financial Resource Strain: Not on file   Food Insecurity: No Food Insecurity (5/22/2023)    Hunger Vital Sign    • Worried About Running Out of Food in the Last Year: Never true    • Ran Out of Food in the Last Year: Never true   Transportation Needs: No Transportation Needs (5/22/2023)    PRAPARE - Transportation    • Lack of Transportation (Medical): No    • Lack of Transportation (Non-Medical): No   Physical Activity: Not on file   Stress: Not on file   Social Connections: Not on file   Intimate Partner Violence: Not on file   Housing Stability: Low Risk  (5/22/2023)    Housing Stability Vital Sign    • Unable to Pay for Housing in the Last Year: No    • Number of Places Lived in the Last Year: 1    • Unstable Housing in the Last Year: No       Subjective         Objective    Physical Exam:   Assessment Type: Assess only  General Appearance: Alert, Awake  Respiratory Pattern: Normal  Chest Assessment: Chest expansion symmetrical  Bilateral Breath Sounds: Diminished  Cough: None  O2 Device: 2LNC    Vitals:  Blood pressure 114/58, pulse 75, temperature 97.5 °F (36.4 °C), temperature source Temporal, resp. rate 20, height 5' 7" (1.702 m), weight 120 kg (263 lb 14.3 oz), SpO2 97 %. Imaging and other studies: I have personally reviewed pertinent reports.       O2 Device: [facility]     Plan    Respiratory Plan: Home Bronchodilator Patient pathway

## 2023-09-18 NOTE — ASSESSMENT & PLAN NOTE
· Initial SBP in the 60's-80's mmHg  · Discontinue all hypertensive medications indefinitely  · The hypotension has now resolved.   · Discontinue the arterial line  · Monitor off IVF  · Check blood cultures x 2 sets and a urine culture - pending

## 2023-09-18 NOTE — ASSESSMENT & PLAN NOTE
Stable  Currently on Prilosec 40 mg PO daily  Will give formulary alternative Protonix 40 mg PO daily while admitted

## 2023-09-18 NOTE — ASSESSMENT & PLAN NOTE
Hx of hypertension  Patient reports that she stopped taking her blood pressure medication since her last Admission but restarted about 2-3 weeks ago  Patient is hypotensive upon arrival to the ER  Will hold PTA hypertensive medication  Continue management of Hypotension  Monitor closely while admitted

## 2023-09-18 NOTE — PLAN OF CARE
Problem: PAIN - ADULT  Goal: Verbalizes/displays adequate comfort level or baseline comfort level  Description: Interventions:  - Encourage patient to monitor pain and request assistance  - Assess pain using appropriate pain scale  - Administer analgesics based on type and severity of pain and evaluate response  - Implement non-pharmacological measures as appropriate and evaluate response  - Consider cultural and social influences on pain and pain management  - Notify physician/advanced practitioner if interventions unsuccessful or patient reports new pain  Outcome: Progressing     Problem: INFECTION - ADULT  Goal: Absence or prevention of progression during hospitalization  Description: INTERVENTIONS:  - Assess and monitor for signs and symptoms of infection  - Monitor lab/diagnostic results  - Monitor all insertion sites, i.e. indwelling lines,   - [place] appropriate cooling/warming therapies per order  - Administer medications as ordered  - Instruct and encourage patient and family to use good hand hygiene technique  - Identify and instruct in appropriate isolation precautions for identified infection/condition  Outcome: Progressing     Problem: SAFETY ADULT  Goal: Patient will remain free of falls  Description: INTERVENTIONS:  - Educate patient/family on patient safety including physical limitations  - Instruct patient to call for assistance with activity   - Consult OT/PT to assist with strengthening/mobility   - Keep Call bell within reach  - Keep bed low and locked with side rails adjusted as appropriate  - Keep care items and personal belongings within reach  - Initiate and maintain comfort rounds  - Make Fall Risk Sign visible to staff  - Offer Toileting every 2  Hours, in advance of need  - Initiate/Maintain bed/chair alarm  - Obtain necessary fall risk management equipment:   - Apply yellow socks and bracelet for high fall risk patients  - Consider moving patient to room near nurses station  Outcome: Progressing  Goal: Maintain or return to baseline ADL function  Description: INTERVENTIONS:  -  Assess patient's ability to carry out ADLs; assess patient's baseline for ADL function and identify physical deficits which impact ability to perform ADLs (bathing, care of mouth/teeth, toileting, grooming, dressing, etc.)  - Assess/evaluate cause of self-care deficits   - Assess range of motion  - Assess patient's mobility; develop plan if impaired  - Assess patient's need for assistive devices and provide as appropriate  - Encourage maximum independence but intervene and supervise when necessary  - Involve family in performance of ADLs  - Assess for home care needs following discharge   - Consider OT consult to assist with ADL evaluation and planning for discharge  - Provide patient education as appropriate  Outcome: Progressing  Goal: Maintains/Returns to pre admission functional level  Description: INTERVENTIONS:  - Perform BMAT or MOVE assessment daily.   - Set and communicate daily mobility goal to care team and patient/family/caregiver. - Collaborate with rehabilitation services on mobility goals if consulted  - Perform Range of Motion 3 times a day. - Reposition patient every 2 hours.   - Dangle patient 3  times a day  - Stand patient 3  times a day  - Ambulate patient 3 times a day  - Out of bed to chair 3  times a day   - Out of bed for meals 3  times a day  - Out of bed for toileting  - Record patient progress and toleration of activity level   Outcome: Progressing     Problem: DISCHARGE PLANNING  Goal: Discharge to home or other facility with appropriate resources  Description: INTERVENTIONS:  - Identify barriers to discharge w/patient and caregiver  - Arrange for needed discharge resources and transportation as appropriate  - Identify discharge learning needs (meds, wound care, etc.)  - Arrange for interpretive services to assist at discharge as needed  - Refer to Case Management Department for coordinating discharge planning if the patient needs post-hospital services based on physician/advanced practitioner order or complex needs related to functional status, cognitive ability, or social support system  Outcome: Progressing     Problem: Knowledge Deficit  Goal: Patient/family/caregiver demonstrates understanding of disease process, treatment plan, medications, and discharge instructions  Description: Complete learning assessment and assess knowledge base.   Interventions:  - Provide teaching at level of understanding  - Provide teaching via preferred learning methods  Outcome: Progressing     Problem: CARDIOVASCULAR - ADULT  Goal: Maintains optimal cardiac output and hemodynamic stability  Description: INTERVENTIONS:  - Monitor I/O, vital signs and rhythm  - Monitor for S/S and trends of decreased cardiac output  - Administer and titrate ordered vasoactive medications to optimize hemodynamic stability  - Assess quality of pulses, skin color and temperature  - Assess for signs of decreased coronary artery perfusion  - Instruct patient to report change in severity of symptoms  Outcome: Progressing  Goal: Absence of cardiac dysrhythmias or at baseline rhythm  Description: INTERVENTIONS:  - Continuous cardiac monitoring, vital signs, obtain 12 lead EKG if ordered  - Administer antiarrhythmic and heart rate control medications as ordered  - Monitor electrolytes and administer replacement therapy as ordered  Outcome: Progressing     Problem: RESPIRATORY - ADULT  Goal: Achieves optimal ventilation and oxygenation  Description: INTERVENTIONS:  - Assess for changes in respiratory status  - Assess for changes in mentation and behavior  - Position to facilitate oxygenation and minimize respiratory effort  - Oxygen administered by appropriate delivery if ordered  - Initiate smoking cessation education as indicated  - Encourage broncho-pulmonary hygiene including cough, deep breathe, Incentive Spirometry  - Assess the need for suctioning and aspirate as needed  - Assess and instruct to report SOB or any respiratory difficulty  - Respiratory Therapy support as indicated  Outcome: Progressing     Problem: GASTROINTESTINAL - ADULT  Goal: Establish and maintain optimal ostomy function  Description: INTERVENTIONS:  - Assess bowel function  - Encourage oral fluids to ensure adequate hydration  - Administer IV fluids if ordered to ensure adequate hydration   - Administer ordered medications as needed  - Encourage mobilization and activity  - Nutrition services referral to assist patient with appropriate food choices  - Assess stoma site  - Consider wound care consult   Outcome: Progressing     Problem: METABOLIC, FLUID AND ELECTROLYTES - ADULT  Goal: Electrolytes maintained within normal limits  Description: INTERVENTIONS:  - Monitor labs and assess patient for signs and symptoms of electrolyte imbalances  - Administer electrolyte replacement as ordered  - Monitor response to electrolyte replacements, including repeat lab results as appropriate  - Instruct patient on fluid and nutrition as appropriate  Outcome: Progressing  Goal: Fluid balance maintained  Description: INTERVENTIONS:  - Monitor labs   - Monitor I/O and WT  - Instruct patient on fluid and nutrition as appropriate  - Assess for signs & symptoms of volume excess or deficit  Outcome: Progressing  Goal: Glucose maintained within target range  Description: INTERVENTIONS:  - Monitor Blood Glucose as ordered  - Assess for signs and symptoms of hyperglycemia and hypoglycemia  - Administer ordered medications to maintain glucose within target range  - Assess nutritional intake and initiate nutrition service referral as needed  Outcome: Progressing

## 2023-09-18 NOTE — ASSESSMENT & PLAN NOTE
Lab Results   Component Value Date    HGBA1C 5.3 01/24/2023     Last A1C of 5.3  Patient presented with Severe Hypoglycemia  Hold Oral diabetic medication  Hold mealtime correction insulin  Fingerstick glucose every 2 hours for now  Check A1c  See Plan for hypoglycemia

## 2023-09-18 NOTE — H&P
[address]  H&P  Name: Cesar Gonzales 79 y.o. female I MRN: [id number]  Unit/Bed#:  I Date of Admission: 9/17/2023   Date of Service: 9/18/2023 I Hospital Day: 0      Assessment/Plan   Hypoglycemia  Assessment & Plan  Patient presented for evaluation after a fall at home  EMS noted blood glucose to bi in 30s and gave dextrose  Patient is type 2 diabetic currently on oral hypoglycemics and mealtime correction insulin  Patient reports that she stopped taking oral diabetic medication since her las admission but restarted taking them 2-3 weeks ago  Started on dextrose infusion in the ER  Will hold PTA oral diabetic medication  Hold mealtime correction insulin  Fingerstick glucose Q 2 hours for now  Continue dextrose infusion for now  Check Am CMP  Consider Inpatient endocrine consult      * Hypotension  Assessment & Plan  Patient with significant hypotension in the ER  Initial blood pressure at 61/36  Unclear etiology   Patient reports that she stopped taking her blood pressure medication soon after her last admission but started taking it again about 2-3 weeks ago.  Patient is prescribed Lisinopril 5 mg daily and Metoprolol 25 mg BID  Patient received Fluids boluses x 2L in the ER with some improvement  Received IV vancomycin and IV Azactam in the ER given initial concern for septic shock  A-Line placed in the ER  Cardiopulmonary monitoring  Hold PTA blood pressure medication  Continue IV fluids  Consider vasopressor support if  hypotension continues  Monitor vital signs, I/O, Renal function  Continue AM labs  Supportive care        Hypomagnesemia  Assessment & Plan  Magnesium levels at 1.8  Replace magnesium  Monitor magnesium levels  Check magnesium levels in the AM    Recurrent falls  Assessment & Plan  Patient reports several falls within past few weeks  She had two falls today at home which she reports was preceded by weakness to lower extremities  Trauma imaging studies without acute findings  Fall precautions  OT/PT eval  Case management consult for possible short term rehab  Supportive care     COPD (chronic obstructive pulmonary disease) ([facility])  Assessment & Plan  No respiratory distress or evidence of acute exacerbation  Respiratory protocol  Continue PTA respiratory medication  Monitor respiratory status, SPO2 while admitted  Continue outpatient Pulmonary follow up    Lactic acidosis  Assessment & Plan  Lactic acid levels at 4.2  Received IV fluids in the ER  Continue IV fluids   Trend lactic acid levels until resolved     Essential hypertension  Assessment & Plan  Hx of hypertension  Patient reports that she stopped taking her blood pressure medication since her last Admission but restarted about 2-3 weeks ago  Patient is hypotensive upon arrival to the ER  Will hold PTA hypertensive medication  Continue management of Hypotension  Monitor closely while admitted    Seizure disorder [facility])  Assessment & Plan  History of seizures   No recent seizure activities  Continue PTA seizure medication  Checkk KeRalph burdickantin levels  Seizure precautions  Continue Outpatient Neurology follow up        Type 2 diabetes mellitus without complication, with long-term current use of insulin ([facility])  Assessment & Plan  Lab Results   Component Value Date    HGBA1C 5.3 01/24/2023     Last A1C of 5.3  Patient presented with Severe Hypoglycemia  Hold Oral diabetic medication  Hold mealtime correction insulin  Fingerstick glucose every 2 hours for now  Check A1c  See Plan for hypoglycemia        Iron deficiency anemia, unspecified  Assessment & Plan  Hemoglobin Levels stable at 12.1  Continue Iron supplement  Monitor hemoglobin levels while admitted  Check Am CBC    GERD without esophagitis  Assessment & Plan  Stable  Currently on Prilosec 40 mg PO daily  Will give formulary alternative Protonix 40 mg PO daily while admitted      Acquired hypothyroidism  Assessment & Plan  Currently stable  TSH levels within normal limits  Continue levothyroxine      Bipolar disorder (HCC)  Assessment & Plan  Stable  Continue PTA medication  Continue PCP, outpatient Psych follow up post discharge       VTE Pharmacologic Prophylaxis:   Moderate Risk (Score 3-4) - Pharmacological DVT Prophylaxis Ordered: enoxaparin (Lovenox). Code Status: Level 3 - DNAR and DNI   Discussion with family: Patient declined call to . Anticipated Length of Stay: Patient will be admitted on an inpatient basis with an anticipated length of stay of greater than 2 midnights secondary to Hypoglycemia, Hypotension, Lactic acidosis, Fall at home. Total Time Spent on Date of Encounter in care of patient: 40 mins. This time was spent on one or more of the following: performing physical exam; counseling and coordination of care; obtaining or reviewing history; documenting in the medical record; reviewing/ordering tests, medications or procedures; communicating with other healthcare professionals and discussing with patient's family/caregivers. Chief Complaint: Fall at home, Hypoglycemia    History of Present Illness:  Adilson Hercules is a 79 y.o. female with a PMH of COPD, DM, Seizures, HTN, HLD, GERD, Hypothyroidism who presents to the ER via EMS for evaluation of complaint of Low blood sugar and a fall at home. Patient reports that she has been having frequent falls. She states that she had 2 falls yesterday and her her fall was preceded by weakness to her lower extremities. She denies LOC or hitting her head. EMS reports that patient was hypoglycemic upon their arrival with bgl in 35s. Patient received IV dextrose in the field. Of note patient reported that she did not have a full meal today. Patient also stated that she stopped taking her oral diabetic medications after being discharged from her last admission but restarted about 2-3 weeks ago. It is unclear if patient took her medication today.      Upon arrival to the ER patient was significantly hypotensive. Labs completed in the ER significant for lactic acid of 4.2, magnesium of 1.8, CO2 of 20, VBG shows pH of 7.4, pCO2 of 33.1, pO2 of 85.9, HCO3 of 20.5. Troponin negative, TSH within normal limits. EKG shows- NSR at rate of 97 bpm. Imaging studies without acute findings. Patient received IV fluids boluses, IV vancomycin and IV Azactam. IV octreotide, Dextrose 50% 25 ml X 2, magnesuium sulfate 2 g Started on Dextrose 5% infusion. A-line placed to monitor blood pressure. Patient is admitted on inpatient status ICU levels care for further management of Acute hypotension, Hypoglycemia, Lactic acidosis, hypomagnesemia  Fall at home. Review of Systems:  Review of Systems   Constitutional: Positive for activity change. Negative for appetite change, chills, diaphoresis, fatigue and fever. Eyes: Negative for photophobia and visual disturbance. Respiratory: Negative for cough, chest tightness, shortness of breath and wheezing. Cardiovascular: Negative for chest pain, palpitations and leg swelling. Gastrointestinal: Positive for nausea. Negative for abdominal pain, diarrhea and vomiting. Genitourinary: Negative for difficulty urinating, dysuria, flank pain and hematuria. Musculoskeletal: Positive for gait problem. Negative for arthralgias, back pain, myalgias, neck pain and neck stiffness. Skin: Negative for rash and wound. Neurological: Positive for weakness. Negative for dizziness, tremors, seizures, syncope, facial asymmetry and headaches. All other systems reviewed and are negative.       Past Medical and Surgical History:   Past Medical History:   Diagnosis Date   • Asthma    • Bipolar disorder ([facility])    • Chronic UTI (urinary tract infection)    • COPD (chronic obstructive pulmonary disease) ([facility])    • Diabetes mellitus ([facility])    • Disease of thyroid gland    • Dyslipidemia 10/31/2018   • Essential hypertension 10/31/2018   • GERD (gastroesophageal reflux disease)    • Heart attack [facility])    • Iron deficiency anemia, unspecified 7/29/2019   • Obesity due to excess calories 10/31/2018   • Recurrent falls 4/13/2019   • Seizure [facility])        Past Surgical History:   Procedure Laterality Date   • ANKLE FRACTURE SURGERY Right    • TUBAL LIGATION     • VEIN LIGATION AND STRIPPING         Meds/Allergies:  Prior to Admission medications    Medication Sig Start Date End Date Taking?  Authorizing Provider   Acetaminophen (Tylenol) 325 MG CAPS Take 650 mg by mouth Three times daily as needed 10/5/21  Yes Historical Provider, MD   albuterol (VENTOLIN HFA) 90 mcg/act inhaler Inhale 2 puffs every 6 (six) hours as needed for wheezing 12/30/19  Yes Princess Amy PA-C   aspirin (ECOTRIN LOW STRENGTH) 81 mg EC tablet Take 1 tablet (81 mg total) by mouth daily 11/18/19  Yes DEIRDE Clarke   Blood Glucose Monitoring Suppl (BLOOD GLUCOSE MONITOR SYSTEM) w/Device KIT Test blood sugars 3 times a day 4/10/18  Yes DEIDRE Butler   Diclofenac Sodium (VOLTAREN) 1 % APPLY TO AFFECTED AREA EVERY 6 HOURS IF NEEDED FOR PAIN. 12/22/21  Yes Historical Provider, MD   Docusate Sodium (DSS) 100 MG CAPS Take 1 capsule by mouth 2 (two) times a day as needed 9/15/21  Yes Historical Provider, MD   fluticasone (FLONASE) 50 mcg/act nasal spray SPRAY ONE (1) SPRAY INTO EACH NOSTRIL ONCE DAILY  Patient taking differently: 1 spray 2 (two) times a day SPRAY ONE (1) SPRAY INTO EACH NOSTRIL ONCE DAILY 3/10/20  Yes Princess Amy PA-C   gabapentin (NEURONTIN) 800 mg tablet Take 1 tablet (800 mg total) by mouth 4 (four) times a day for 3 days 11/15/20 9/18/23 Yes Keegan Chaparro MD   glipiZIDE (GLUCOTROL) 10 mg tablet Take 10 mg by mouth in the morning 1/23/23  Yes Historical Provider, MD   guaiFENesin 400 mg Take 400 mg by mouth 2 (two) times a day   Yes Historical Provider, MD   hydrOXYzine pamoate (VISTARIL) 50 mg capsule Take 1 capsule (50 mg total) by mouth 3 (three) times a day 12/9/19  Yes Princess Amy PA-C insulin aspart, w/niacinamide, (FIASP) 100 Units/mL injection pen Inject 5 Units under the skin 3 (three) times a day as needed for high blood sugar 5/22/23  Yes Miya Pepe PA-C   levETIRAcetam (KEPPRA) 750 mg tablet Take 2 tablets (1,500 mg total) by mouth 2 (two) times a day for 15 days Pt states she takes 1500MG Bid  Patient taking differently: Take 750 mg by mouth 2 (two) times a day 11/13/20 9/18/23 Yes Jeana Horvath MD   levothyroxine 175 mcg tablet Take 175 mcg by mouth in the morning 10/15/21  Yes Historical Provider, MD   lisinopril (ZESTRIL) 5 mg tablet Take 1 tablet (5 mg total) by mouth daily 3/4/20  Yes Sandhya Carter PA-C   magnesium chloride (MAG64) 64 MG TBEC EC tablet Take 1 tablet (64 mg total) by mouth 2 (two) times a day 11/15/19  Yes Sandhya Carter PA-C   meloxicam (MOBIC) 7.5 mg tablet Take 1 tablet (7.5 mg total) by mouth 2 (two) times a day 3/30/20  Yes Sandhya Carter PA-C   metFORMIN (GLUCOPHAGE) 500 mg tablet Take 1 tablet (500 mg total) by mouth 2 (two) times a day with meals  Patient taking differently: Take 1,000 mg by mouth 2 (two) times a day with meals 11/15/19  Yes Sandhya Carter PA-C   metoprolol succinate (TOPROL-XL) 25 mg 24 hr tablet Take 1 tablet (25 mg total) by mouth daily  Patient taking differently: Take 25 mg by mouth 2 (two) times a day 2/24/20  Yes Sandhya Carter PA-C   multivitamin [facility]) TABS Take 1 tablet by mouth daily 12/22/21  Yes Historical Provider, MD   nystatin (MYCOSTATIN) ointment 1 application. 2 (two) times a day 11/29/21  Yes Historical Provider, MD   omeprazole (PriLOSEC) 40 MG capsule Take 40 mg by mouth 2 (two) times a day 11/29/21  Yes Historical Provider, MD   [facility] LANCETS 59R MISC Test blood sugars 3 times a day.  8/13/19  Yes DEIDRE Butler   oxybutynin (DITROPAN) 5 mg tablet Take 1 tablet (5 mg total) by mouth 3 (three) times a day 12/30/19  Yes Sandhya Carter PA-C   phenytoin (DILANTIN) 100 mg ER capsule Take by mouth 2 (two) times a day 3 tablets in morning and 2 at lunch daily   Yes Historical Provider, MD   polyethylene glycol (GLYCOLAX) powder Take 17 g by mouth daily  Patient taking differently: Take 17 g by mouth daily as needed 5/14/19  Yes DEIDRE Butler   QUEtiapine (SEROquel) 400 MG tablet Take 1 tablet (400 mg total) by mouth daily at bedtime for 2 doses  Patient taking differently: Take 400 mg by mouth daily 11/15/20 9/18/23 Yes Malcolm Butts MD   simvastatin (ZOCOR) 80 mg tablet Take 1 tablet (80 mg total) by mouth daily at bedtime  Patient taking differently: Take 40 mg by mouth daily at bedtime 12/9/19  Yes Brie Harris PA-C   triamcinolone (KENALOG) 0.1 % cream Apply topically 2 (two) times a day 1/22/22  Yes Bj Harper PA-C   valACYclovir (VALTREX) 500 mg tablet Take 1 tablet by mouth 2 (two) times a day 3/22/18  Yes Historical Provider, MD   zonisamide (ZONEGRAN) 100 mg capsule Take 200 mg by mouth 2 (two) times a day   Yes Historical Provider, MD   ferrous sulfate (FeroSul) 325 (65 Fe) mg tablet Take 1 tablet (325 mg total) by mouth daily with breakfast  Patient not taking: Reported on 9/18/2023 3/3/20   Brie Harris PA-C   glucose blood (ONE TOUCH ULTRA TEST) test strip Test blood sugars 3 times a day. 8/13/19   DEIDRE Butler   Incontinence Supply Disposable (PADSORBER BED PAN LINERS) MISC by Does not apply route as needed (incontinence) 11/7/18   DEIDRE Canela   Infant Care Products (CUTIES SENSITIVE WIPES) MISC 120 Pieces by Does not apply route as needed (incontinence) 11/7/18   DEIDRE Butler   Misc. Devices (MATTRESS PAD) MISC by Does not apply route daily 8/5/19   DEIDRE Butler   polyvinyl alcohol (LIQUIFILM TEARS) 1.4 % ophthalmic solution Administer 1 drop to both eyes 3 (three) times a day 4/17/19   Robby Rosales MD     I have reviewed home medications using recent Epic encounter. Allergies:    Allergies   Allergen Reactions   • Keflex [Cephalexin] Anaphylaxis     Airway edema    • Levaquin [Levofloxacin] Anaphylaxis   • Penicillins Anaphylaxis   • Bactrim [Sulfamethoxazole-Trimethoprim] Swelling and GI Intolerance     LE edema and thrush   • Ciprofloxacin Swelling     Edema and all extremities and thrush   Edema in all extremities and thrush    • Noroxin [Norfloxacin]        Social History:  Marital Status:    Occupation: Retired   Patient Pre-hospital Living Situation: Home  Patient Pre-hospital Level of Mobility: walks with walker  Patient Pre-hospital Diet Restrictions: None reported   Substance Use History:   Social History     Substance and Sexual Activity   Alcohol Use Not Currently   • Alcohol/week: 0.0 standard drinks of alcohol     Social History     Tobacco Use   Smoking Status Never   Smokeless Tobacco Never     Social History     Substance and Sexual Activity   Drug Use No       Family History:  Family History   Problem Relation Age of Onset   • Skeletal dysplasia Mother    • Stroke Father        Physical Exam:     Vitals:   Blood Pressure: 114/58 (09/18/23 0400)  Pulse: 79 (09/18/23 0400)  Temperature: 97.5 °F (36.4 °C) (09/18/23 0215)  Temp Source: Temporal (09/18/23 0215)  Respirations: (!) 24 (09/18/23 0400)  Height: 5' 7" (170.2 cm) (09/17/23 2156)  Weight - Scale: 120 kg (263 lb 14.3 oz) (09/18/23 0156)  SpO2: 94 % (09/18/23 0400)    Physical Exam  Constitutional:       General: She is not in acute distress. Appearance: She is obese. She is not ill-appearing. HENT:      Head: Normocephalic and atraumatic. Nose: No congestion or rhinorrhea. Mouth/Throat:      Mouth: Mucous membranes are dry. Pharynx: Oropharynx is clear. Eyes:      General:         Right eye: No discharge. Left eye: No discharge. Cardiovascular:      Rate and Rhythm: Normal rate and regular rhythm. Pulses: Normal pulses. Heart sounds: Normal heart sounds. No murmur heard.   Pulmonary: Effort: No respiratory distress. Breath sounds: No wheezing, rhonchi or rales. Abdominal:      General: There is no distension. Palpations: Abdomen is soft. Tenderness: There is no abdominal tenderness. Musculoskeletal:      Cervical back: Neck supple. Right lower leg: No edema. Left lower leg: No edema. Skin:     Capillary Refill: Capillary refill takes less than 2 seconds. Coloration: Skin is not jaundiced or pale. Findings: Bruising present. No erythema. Neurological:      Mental Status: She is oriented to person, place, and time. Psychiatric:         Mood and Affect: Mood normal.         Additional Data:     Lab Results:  Results from last 7 days   Lab Units 09/18/23  0230 09/17/23  2212   WBC Thousand/uL  --  6.65   HEMOGLOBIN g/dL  --  12.1   HEMATOCRIT %  --  37.5   PLATELETS Thousands/uL 222 238   NEUTROS PCT %  --  56   LYMPHS PCT %  --  19   MONOS PCT %  --  8   EOS PCT %  --  15*     Results from last 7 days   Lab Units 09/17/23  2212   SODIUM mmol/L 135   POTASSIUM mmol/L 4.7   CHLORIDE mmol/L 101   CO2 mmol/L 20*   BUN mg/dL 16   CREATININE mg/dL 0.69   ANION GAP mmol/L 14   CALCIUM mg/dL 8.5   ALBUMIN g/dL 3.4*   TOTAL BILIRUBIN mg/dL 0.20   ALK PHOS U/L 50   ALT U/L 13   AST U/L 15   GLUCOSE RANDOM mg/dL 195*     Results from last 7 days   Lab Units 09/17/23  2212   INR  0.98     Results from last 7 days   Lab Units 09/18/23  0229 09/18/23  0059 09/18/23  0041 09/18/23  0022 09/18/23  0008 09/17/23  2348 09/17/23  2329 09/17/23  2311 09/17/23  2252 09/17/23  2236 09/17/23  2209 09/17/23  2155   POC GLUCOSE mg/dl 171* 123 121 135 150* 65 70 77 80 95 118 173*         Results from last 7 days   Lab Units 09/18/23  0024 09/17/23  2212   LACTIC ACID mmol/L 3.1* 4.2*   PROCALCITONIN ng/ml  --  <0.05       Lines/Drains:  Invasive Devices     Peripheral Intravenous Line  Duration           Peripheral IV 09/17/23 Dorsal (posterior); Left Forearm <1 day    Peripheral IV 09/17/23 Dorsal (posterior); Right Wrist <1 day    Peripheral IV 09/17/23 Right;Ventral (anterior) Forearm <1 day          Arterial Line  Duration           Arterial Line 09/17/23 Radial <1 day                    Imaging: Reviewed radiology reports from this admission including: chest CT scan, abdominal/pelvic CT, CT head and CT Cervical spine  CT recon only thoracolumbar   Final Result by Sumanth Carmen DO (09/17 2309)      No fracture or traumatic subluxation. The study was marked in [facility] for immediate notification, per trauma protocol. Workstation performed: [id number]         XR hip/pelv 2-3 vws right if performed   ED Interpretation by Lew Reyez DO (09/17 2331)   Limited secondary to body habitus. With that limitation, no fracture or dislocation. Pelvic rings appear intact. TRAUMA - CT chest abdomen pelvis w contrast   Final Result by Sumanth Carmen DO (09/17 2309)      No acute traumatic injury identified            Workstation performed: [id number]         TRAUMA - CT spine cervical wo contrast   Final Result by Sumanth Carmen DO (09/17 2309)      No cervical spine fracture or traumatic malalignment. Workstation performed: [id number]         TRAUMA - CT head wo contrast   Final Result by Sumanth Carmen DO (09/17 2309)      No intracranial hemorrhage or calvarial fracture. Workstation performed: [id number]         XR chest 1 view portable   ED Interpretation by Lew Reyez DO (09/17 2210)   Airway is midline. Lungs are clear bilaterally with no evidence of pulmonary vascular congestion/focal infiltrate/pleural effusion/pneumothorax. Cardiac and mediastinal silhouettes are within normal limits. Osseous structures appear normal.            EKG and Other Studies Reviewed on Admission:   · EKG: NSR. HR 97 bpm.    ** Please Note: This note has been constructed using a voice recognition system.  **

## 2023-09-18 NOTE — ASSESSMENT & PLAN NOTE
Patient reports several falls within past few weeks  She had two falls today at home which she reports was preceded by weakness to lower extremities  Trauma imaging studies without acute findings  Fall precautions  OT/PT eval  Case management consult for possible short term rehab  Supportive care

## 2023-09-18 NOTE — ASSESSMENT & PLAN NOTE
History of seizures   No recent seizure activities  Continue PTA seizure medication  Checkk Keppra, Dilantin levels  Seizure precautions  Continue Outpatient Neurology follow up

## 2023-09-18 NOTE — CASE MANAGEMENT
Case Management Assessment & Discharge Planning Note    Patient name Lucie Blancas  Location / MRN [id number]  : 1955 Date 2023       Current Admission Date: 2023  Current Admission Diagnosis:Hypotension   Patient Active Problem List    Diagnosis Date Noted   • Gait instability 2023   • Elevated MCV 2023   • Skin rash 2023   • Rash 2023   • Type 2 diabetes mellitus with hypoglycemia without coma, with long-term current use of insulin ([address]) 2023   • BMI 33.0-33.9,adult 2019   • Iron deficiency anemia, unspecified 2019   • Right lower quadrant abdominal pain 2019   • Pressure injury of sacral region, stage 3 ([address]) 2019   • Pressure ulcer of left buttock, stage 3 ([address]) 2019   • Pressure ulcer of right buttock, stage 2 ([address]) 2019   • Generalized weakness 2019   • Leukopenia 2019   • Neutropenia ([address]) 2019   • Low TSH level 2019   • Sinus arrhythmia 2019   • Lactic acidosis 2019   • Recurrent falls 2019   • Acute hyponatremia 2019   • Hypomagnesemia 2019   • Hypotension 2019   • Hypertriglyceridemia 2019   • Vaginal candidiasis 2019   • GERD without esophagitis 2018   • Urinary incontinence 2018   • Obesity due to excess calories 10/31/2018   • E. coli UTI 10/31/2018   • Hypercholesterolemia 10/31/2018   • Ambulatory dysfunction 10/30/2018   • NSTEMI (non-ST elevated myocardial infarction) ([address]) 10/29/2018   • Volume overload 10/29/2018   • Skin breakdown 10/29/2018   • Asthma 2016   • Seizure disorder ([address]) 2014   • Dermatitis 2007   • Bipolar disorder ([address]) 2007   • Acquired hypothyroidism 2007      LOS (days): 0  Geometric Mean LOS (GMLOS) (days): 2.90  Days to GMLOS:2.3     OBJECTIVE:    Risk of Unplanned Readmission Score: 23.26         Current admission status: Inpatient  Referral Reason:  (discharge Patient will be admitted to care of DR JOHNSON. Admited to [facility].  Will go 
to room 109B. Belongings list completed.  Report to EDWARD MARTINEZ. planning  pt ot rec STR)    Preferred Pharmacy:   [facility] - [address]  [address] [address]  Phone: [phone] Fax: [phone]    Primary Care Provider: Katherine Singh    Primary Insurance: Kasey Obrien [address] [facility]  Secondary Insurance: [address] [facility]    ASSESSMENT:    CM met with patient at the bedside,baseline information  was obtained. CM discussed the role of CM in helping the patient develop a discharge plan and assist the patient in carry out their plan. Patient stated she lives at [facility] apt[address]. 12th floor  Room 1206. Patient stated she has county aides 4 hrs 2xweek, Patient stated she is no happy with the care they provide. CM reached out to [facility], patient is not active with them. CM tried waiver program and had to leave message. Pt stated she has no emergency contact to provide to CM. Patient stated she was in [address] [facility] and rehab in past and does not want to return there. Patient was in [facility] in past also. Active Health Care Proxies    There are no active Health Care Proxies on file.        Advance Directives  Does patient have a [address]?: No  Was patient offered paperwork?: Yes (denied)  Does patient currently have a Health Care decision maker?: Yes, please see Health Care Proxy section (no contacts on file and patient stated she has no emergency contact)  Does patient have Advance Directives?: No  Was patient offered paperwork?: Yes (declined)  Primary Contact: patient has no emergency contact-CM asked and she stated she has no one         Readmission Root Cause  30 Day Readmission: No    Patient Information  Admitted from[de-identified] Home  Mental Status: Alert  During Assessment patient was accompanied by: Not accompanied during assessment  Assessment information provided by[de-identified] Patient  Primary Caregiver: Self  Caregiver's Name[de-identified] patient self has no emergency contact-has county aides  4 hours x2 week  Caregiver's Relationship to Patient[de-identified] Other (Specify) (self)  Support Systems: [address] of Residence: [address] [facility]. do you live in?: [address] [facility] [address] entry access options.  Select all that apply.: No steps to enter home, Elevator  Type of Current Residence: Apartment  Floor Level:  (12)  Upon entering residence, is there a bedroom on the main floor (no further steps)?: Yes  Upon entering residence, is there a bathroom on the main floor (no further steps)?: Yes  In the last 12 months, was there a time when you were not able to pay the mortgage or rent on time?: No  In the last 12 months, how many places have you lived?: 1  In the last 12 months, was there a time when you did not have a steady place to sleep or slept in a shelter (including now)?: No  Homeless/housing insecurity resource given?: N/A  Living Arrangements: Lives Alone  Is patient a ?: No    Activities of Daily Living Prior to Admission  Functional Status: Independent  Completes ADLs independently?: No  Level of ADL dependence: Assistance  Ambulates independently?: Yes  Does patient use assisted devices?: Yes  Assisted Devices (DME) used: Conner Timmy, Wheelchair, Shower Chair, Bedside Commode  Does patient currently own DME?: Yes  What DME does the patient currently own?: Bedside Commode, Conner Timmy, Wheelchair, Shower Chair  Does patient have a history of Outpatient Therapy (PT/OT)?: No  Does the patient have a history of Short-Term Rehab?: Yes (pt was in [facility] in past does not like eighter place)  Does patient have a history of HHC?: No  Does patient currently have [facility]?: No         Patient Information Continued  Income Source: SSI/SSD  Within the past 12 months, you worried that your food would run out before you got the money to buy more.: Never true  Within the past 12 months, the food you bought just didn't last and you didn't have money to get more.: Never true  Food insecurity resource given?: N/A  Does patient receive dialysis treatments?: No  Does patient have a history of substance abuse?: No  Does patient have a history of Mental Health Diagnosis?: No         Means of Transportation  Means of Transport to Appts[de-identified] Other (Comment) (aides and STS)  In the past 12 months, has lack of transportation kept you from medical appointments or from getting medications?: No  In the past 12 months, has lack of transportation kept you from meetings, work, or from getting things needed for daily living?: No  Was application for public transport provided?: N/A        DISCHARGE DETAILS:    Discharge planning discussed with[de-identified] patient  Freedom of Choice: Yes  Comments - Freedom of Choice: CM discussed FOC with patient PT/OT recommendations for STR. patient in agreement with STR blanket  referrals to be placed in aidin  CM contacted family/caregiver?: No- see comments (patient has no emergency contacts)  Were Treatment Team discharge recommendations reviewed with patient/caregiver?: Yes  Did patient/caregiver verbalize understanding of patient care needs?: Yes  Were patient/caregiver advised of the risks associated with not following Treatment Team discharge recommendations?: Yes    Contacts  Patient Contacts: patient has none and will not provide anyone for hospital records       Treatment Team Recommendation: Short Term Rehab  Discharge Destination Plan[de-identified] SNF                CM discussed  [address] with patient and caretaker at bedside. Patient  agreeable with blanket referrals in Aidin for local facilities  to determine bed availability according to your medical needs and insurance coverage.  Patient and caretaker has no preference     Referrals placed in aidin       CM to follow

## 2023-09-18 NOTE — PLAN OF CARE
Problem: PHYSICAL THERAPY ADULT  Goal: Performs mobility at highest level of function for planned discharge setting. See evaluation for individualized goals. Description: Treatment/Interventions: Functional transfer training, LE strengthening/ROM, Therapeutic exercise, Endurance training, Bed mobility, Gait training          See flowsheet documentation for full assessment, interventions and recommendations. Note: Prognosis: Good  Problem List: Decreased strength, Decreased endurance, Impaired balance, Decreased mobility, Pain  Assessment: Patient is a 79 y.o. female evaluated by Physical Therapy s/p admit to [address] [place] [address] on 9/17/2023 with admitting diagnosis of: Hypomagnesemia, Trauma, Hypoglycemia, Hypotension, Elevated lactic acid level, Multiple falls, Hypoglycemia associated with type 2 diabetes mellitus, and principal problem of: Hypotension. PT was consulted to assess patient's functional mobility and discharge needs. Ordered are PT Evaluation and treatment with activity level of: up and OOB as tolerated. Comorbidities affecting patient's physical performance at time of assessment include: hx of seizures, DM, COPD, disease of thyroid gland, asthma, GERD, bipolar disorder, HTN, hx of MI. Personal factors affecting the patient at time of IE include: ambulating with assistive device, inability to navigate community distances, history of fall(s), inability/difficulty performing IADLs and inability/difficulty performing ADLs. Please locate objective findings from PT assessment regarding body systems outlined above. Upon evaluation, pt able to perform all functional mobility with min-modA x 1-2, RW, and increased time. Frequent verbal cuing provided for safety awareness and sequencing. Pt extremely tangential in conversation; redirection provided as able during breaks in speech. Ambulation limited to 15' x 2 to utilize bathroom d/t fatigue.  Moderate postural sway demonstrated but no true LOB experienced. HR and SpO2 remained WFL on 1L O2 throughout. The patient's AM-PAC Basic Mobility Inpatient Short Form Raw Score is 14. A Raw score of less than or equal to 16 suggests the patient may benefit from discharge to post-acute rehabilitation services. Please also refer to the recommendation of the Physical Therapist for safe discharge planning. Co treatment with OT secondary to complex medical condition of pt, possible A of 2 required to achieve and maintain transitional movements, requiring the need of skilled therapeutic intervention of 2 therapists to achieve delivery of services. Pt will benefit from continued PT intervention during LOS to address current deficits, increase LOF, and facilitate safe d/c to next level of care when medically appropriate. D/c recommendation at this time is post-acute rehabilitation services. PT Discharge Recommendation: Post acute rehabilitation services    See flowsheet documentation for full assessment.

## 2023-09-18 NOTE — OCCUPATIONAL THERAPY NOTE
Occupational Therapy Evaluation     Patient Name: Fatuma Dwyer  MZZZI'O Date: 9/18/2023  Problem List  Principal Problem:    Hypotension  Active Problems:    Bipolar disorder ([address])    Acquired hypothyroidism    Seizure disorder ([address])    GERD without esophagitis    Lactic acidosis    Recurrent falls    Hypomagnesemia    Iron deficiency anemia, unspecified    Type 2 diabetes mellitus with hypoglycemia without coma, with long-term current use of insulin (HCC)    Gait instability    Elevated MCV    Skin rash    Past Medical History  Past Medical History:   Diagnosis Date    Asthma     Bipolar disorder ([address])     Chronic UTI (urinary tract infection)     COPD (chronic obstructive pulmonary disease) ([address])     Diabetes mellitus ([address])     Disease of thyroid gland     Dyslipidemia 10/31/2018    Essential hypertension 10/31/2018    GERD (gastroesophageal reflux disease)     Heart attack ([address])     Iron deficiency anemia, unspecified 7/29/2019    Obesity due to excess calories 10/31/2018    Recurrent falls 4/13/2019    Seizure [facility])      Past Surgical History  Past Surgical History:   Procedure Laterality Date    ANKLE FRACTURE SURGERY Right     TUBAL LIGATION      VEIN LIGATION AND STRIPPING               09/18/23 1424   OT Last Visit   OT Visit Date 09/18/23   Note Type   Note type Evaluation  (Simultaneous filing. User may not have seen previous data.)   Pain Assessment   Pain Assessment Tool 0-10  (Simultaneous filing. User may not have seen previous data.)   Pain Score 5  (Simultaneous filing. User may not have seen previous data.)   Pain Location/Orientation Location: Generalized   Restrictions/Precautions   Weight Bearing Precautions Per Order No  (Simultaneous filing. User may not have seen previous data.)   Other Precautions Pain; Fall Risk;Multiple lines;O2;Chair Alarm  (Simultaneous filing. User may not have seen previous data.)   Home Living   Type of Home Apartment  (Simultaneous filing.  User may not have seen previous data.)   Home Layout One level;Ramped entrance  (Simultaneous filing. User may not have seen previous data.)   Bathroom Shower/Tub Tub/shower unit  (Simultaneous filing. User may not have seen previous data.)   Bathroom Toilet Standard  (Simultaneous filing. User may not have seen previous data.)   Bathroom Equipment Grab bars in shower  (Simultaneous filing. User may not have seen previous data.)   Bathroom Accessibility Accessible  (Simultaneous filing. User may not have seen previous data.)   Home Equipment Walker;Cane;Wheelchair-manual;Hospital bed  (Simultaneous filing. User may not have seen previous data.)   Additional Comments pt reports use of 4WW and w/c at baseline  (Simultaneous filing. User may not have seen previous data.)   Prior Function   Level of [place] Independent with functional mobility; Needs assistance with ADLs; Needs assistance with IADLS  (Simultaneous filing. User may not have seen previous data.)   Lives With Alone  (Simultaneous filing. User may not have seen previous data.)   Receives Help From Home health   IADLs Family/Friend/Other provides transportation  (Simultaneous filing. User may not have seen previous data.)   Falls in the last 6 months 1 to 4  (Simultaneous filing. User may not have seen previous data.)   Vocational On disability  (Simultaneous filing. User may not have seen previous data.)   Comments (S)  pt is extremely tangential, and it is difficult to ascern who (A) pt in the home   Subjective   Subjective "I didn't get my tinal today"   ADL   Where Assessed Other (Comment)  (bathroom)   LB Dressing Assistance 2  Maximal Assistance   LB Dressing Deficit Don/doff R shoe;Don/doff L shoe   Toileting Assistance  3  Moderate Assistance   Toileting Deficit Clothing management up;Clothing management down;Perineal hygiene; Increased time to complete   Bed Mobility   Supine to Sit 3  Moderate assistance  (Simultaneous filing.  User may not have seen previous data.) Additional items Increased time required;Verbal cues; Assist x 2;LE management  (Simultaneous filing. User may not have seen previous data.)   Sit to Supine   (pt OOB at end of session  Simultaneous filing. User may not have seen previous data.)   Additional Comments pt on 2L O2 during session; SpO2 WFL with no complaints of SOB   Transfers   Sit to Stand 4  Minimal assistance  (Simultaneous filing. User may not have seen previous data.)   Additional items Assist x 1; Increased time required;Verbal cues  (Simultaneous filing. User may not have seen previous data.)   Stand to Sit 4  Minimal assistance  (Simultaneous filing. User may not have seen previous data.)   Additional items Assist x 1; Increased time required;Verbal cues  (Simultaneous filing. User may not have seen previous data.)   Toilet transfer 4  Minimal assistance  (Simultaneous filing. User may not have seen previous data.)   Additional items Assist x 1; Increased time required;Verbal cues; Commode  (commode frame over standard toilet  Simultaneous filing. User may not have seen previous data.)   Additional Comments RW used  (Simultaneous filing. User may not have seen previous data.)   Functional Mobility   Functional Mobility 4  Minimal assistance   Additional Comments x1 with use of RW; no significant LOB, mild instability and performs ~40ft; limited by endurance and tangential conversation   Additional items Rolling walker   Balance   Static Sitting Good  (Simultaneous filing. User may not have seen previous data.)   Dynamic Sitting Fair +  (Simultaneous filing. User may not have seen previous data.)   Static Standing Fair  (Simultaneous filing. User may not have seen previous data.)   Dynamic Standing Fair -  (Simultaneous filing. User may not have seen previous data.)   Ambulatory Fair -  (with RW  Simultaneous filing.  User may not have seen previous data.)   Activity Tolerance   Activity Tolerance Patient limited by fatigue;Patient limited by pain  (Simultaneous filing. User may not have seen previous data.)   RUE Assessment   RUE Assessment WFL   LUE Assessment   LUE Assessment WFL   Hand Function   Gross Motor Coordination Functional   Fine Motor Coordination Functional   Sensation   Light Touch No apparent deficits   Sharp/Dull No apparent deficits   Psychosocial   Psychosocial (WDL) WDL   Cognition   Overall Cognitive Status WFL  (Simultaneous filing. User may not have seen previous data.)   Arousal/Participation Alert   Attention Within functional limits   Orientation Level Oriented X4  (Simultaneous filing. User may not have seen previous data.)   Memory Within functional limits   Following Commands Follows all commands and directions without difficulty  (Simultaneous filing. User may not have seen previous data.)   Comments pt is very tangential during session and difficulties with concentration and task focus   Assessment   Limitation Decreased ADL status; Decreased UE strength;Decreased Safe judgement during ADL;Decreased endurance;Decreased self-care trans;Decreased high-level ADLs; Decreased cognition   Assessment Pt is a 79 y.o. female seen for OT evaluation s/p admit to [facility] on 9/17/2023 w/ Hypotension. Comorbidities affecting pt's functional performance at time of assessment include: seizure, DM, COPD, asthma, GERD, UTI, obesity, HTN, recurrent falls, MI. Personal factors affecting pt at time of IE include:limited home support, difficulty performing ADLS, difficulty performing IADLS , limited insight into deficits, compliance, decreased initiation and engagement  and health management . Prior to admission, pt was (I) with ADLs and (A) with IADLs with use of 4WW during mobility.  Upon evaluation: Pt requires min-mod (A) x2 level with use of RW during mobility 2* the following deficits impacting occupational performance: weakness, decreased strength, decreased balance, decreased tolerance, impaired initiation, impaired sequencing, impaired problem solving, decreased safety awareness and impaired interpersonal skills. Pt to benefit from continued skilled OT tx while in the hospital to address deficits as defined above and maximize level of functional independence w ADL's and functional mobility. Occupational Performance areas to address include: grooming, bathing/shower, toilet hygiene, dressing, functional mobility, community mobility and clothing management. The patient's raw score on the -PAC Daily Activity Inpatient Short Form is 18. A raw score of less than 19 suggests the patient may benefit from discharge to post-acute rehabilitation services. Please refer to the recommendation of the Occupational Therapist for safe discharge planning. Pt benefited from co-evaluation of skilled OT and PT therapists in order to most appropriately address functional deficits d/t extensive assistance required for safe functional mobility, decreased activity tolerance, and regression from functioning level prior to admission and/or onset of present illness. OT/PT objectives were addressed separately; please see PT note for specific goal areas targeted. Goals   Patient Goals to go home  (Simultaneous filing. User may not have seen previous data.)   Short Term Goal  pt will perform UE strengthening exercises   Long Term Goal #1 pt will demonstrate toilet transfers and hygiene at (I) level   Long Term Goal #2 pt will demonstrate functional mobility with RW at mod (I) level   Long Term Goal pt will perform UB/LB bathing and grooming tasks at (I) level   Plan   Treatment Interventions ADL retraining;Functional transfer training;UE strengthening/ROM; Endurance training;Cognitive reorientation;Patient/family training; Activityengagement   Goal Expiration Date 10/02/23   OT Frequency 3-5x/wk   Recommendation   OT Discharge Recommendation Post acute rehabilitation services   [facility] Daily Activity Inpatient   Lower Body Dressing 2   Bathing 2   Toileting 3   Upper Body Dressing 3   Grooming 4   Eating 4   Daily Activity Raw Score 18   Daily Activity Standardized Score (Calc for Raw Score >=11) 38.66   AM-PAC Applied Cognition Inpatient   Following a Speech/Presentation 3   Understanding Ordinary Conversation 3   Taking Medications 3   Remembering Where Things Are Placed or Put Away 3   Remembering List of 4-5 Errands 3   Taking Care of Complicated Tasks 3   Applied Cognition Raw Score 18   Applied Cognition Standardized Score 38.07

## 2023-09-18 NOTE — PHYSICAL THERAPY NOTE
Physical Therapy Evaluation    Patient Name: Mariangel Zamarripa    ARVJP'K Date: 9/18/2023     Problem List  Principal Problem:    Hypotension  Active Problems:    Bipolar disorder ([address])    Acquired hypothyroidism    Seizure disorder ([address])    GERD without esophagitis    Lactic acidosis    Recurrent falls    Hypomagnesemia    Iron deficiency anemia, unspecified    Type 2 diabetes mellitus with hypoglycemia without coma, with long-term current use of insulin (HCC)    Gait instability    Elevated MCV    Skin rash       Past Medical History  Past Medical History:   Diagnosis Date    Asthma     Bipolar disorder ([address])     Chronic UTI (urinary tract infection)     COPD (chronic obstructive pulmonary disease) ([address])     Diabetes mellitus ([address])     Disease of thyroid gland     Dyslipidemia 10/31/2018    Essential hypertension 10/31/2018    GERD (gastroesophageal reflux disease)     Heart attack ([address])     Iron deficiency anemia, unspecified 7/29/2019    Obesity due to excess calories 10/31/2018    Recurrent falls 4/13/2019    Seizure [facility])         Past Surgical History  Past Surgical History:   Procedure Laterality Date    ANKLE FRACTURE SURGERY Right     TUBAL LIGATION      VEIN LIGATION AND STRIPPING             09/18/23 1424   PT Last Visit   PT Visit Date 09/18/23   Note Type   Note type Evaluation   Pain Assessment   Pain Assessment Tool 0-10   Pain Score 5   Pain Location/Orientation Location: Generalized   Restrictions/Precautions   Weight Bearing Precautions Per Order No   Other Precautions Pain; Fall Risk;Multiple lines;O2;Chair Alarm   Home Living   Type of Home Apartment   Home Layout One level;Ramped entrance   Bathroom Shower/Tub Tub/shower unit   Bathroom Toilet Standard   Bathroom Equipment Grab bars in shower   [address] Walker;Cane;Wheelchair-manual;Hospital bed   Additional Comments pt reports use of 4WW and w/c at baseline   Prior Function   Level of [place] Independent with functional mobility; Needs assistance with ADLs; Needs assistance with IADLS   Lives With Alone   Receives Help From Home health   IADLs Family/Friend/Other provides transportation   Falls in the last 6 months 1 to 4   Vocational On disability   Comments (S)  pt is extremely tangential, and it is difficult to ascern who (A) pt in the home   General   Family/Caregiver Present No   Cognition   Overall Cognitive Status WFL   Arousal/Participation Alert   Attention Within functional limits   Orientation Level Oriented X4   Memory Within functional limits   Following Commands Follows all commands and directions without difficulty   Comments pt is very tangential during session and difficulties with concentration and task focus   Subjective   Subjective pt reports she would perform better had she had Tylenol                 RLE Assessment   RLE Assessment X  (4-/5 grossly)   LLE Assessment   LLE Assessment X  (4-/5 grossly)   Bed Mobility   Supine to Sit 3  Moderate assistance   Additional items Increased time required;Verbal cues; Assist x 2;LE management   Sit to Supine   (pt OOB at end of session  )   Additional Comments pt on 2L O2 during session; SpO2 WFL with no complaints of SOB   Transfers   Sit to Stand 4  Minimal assistance   Additional items Assist x 1; Increased time required;Verbal cues   Stand to Sit 4  Minimal assistance   Additional items Assist x 1; Increased time required;Verbal cues   Toilet transfer 4  Minimal assistance   Additional items Assist x 1; Increased time required;Verbal cues; Commode  (commode frame over standard toilet  )   Additional Comments RW used   Ambulation/Elevation   Gait pattern Excessively slow; Short stride; Foward flexed;Decreased foot clearance;Narrow CHARIS; Improper Weight shift   Gait Assistance 4  Minimal assist   Additional items Assist x 1;Verbal cues   Assistive Device Rolling walker   Distance 15' x 2   Balance Static Sitting Good   Dynamic Sitting Fair +   Static Standing Fair   Dynamic Standing [facility] -  (with RW  )   Endurance Deficit   Endurance Deficit Yes   Endurance Deficit Description pt easily fatigued with ambulation   Activity Tolerance   Activity Tolerance Patient limited by fatigue;Patient limited by pain   Assessment   Prognosis Good   Problem List Decreased strength;Decreased endurance; Impaired balance;Decreased mobility;Pain   Assessment Patient is a 79 y.o. female evaluated by Physical Therapy s/p admit to [address] [place] [address] on 9/17/2023 with admitting diagnosis of: Hypomagnesemia, Trauma, Hypoglycemia, Hypotension, Elevated lactic acid level, Multiple falls, Hypoglycemia associated with type 2 diabetes mellitus, and principal problem of: Hypotension. PT was consulted to assess patient's functional mobility and discharge needs. Ordered are PT Evaluation and treatment with activity level of: up and OOB as tolerated. Comorbidities affecting patient's physical performance at time of assessment include: hx of seizures, DM, COPD, disease of thyroid gland, asthma, GERD, bipolar disorder, HTN, hx of MI. Personal factors affecting the patient at time of IE include: ambulating with assistive device, inability to navigate community distances, history of fall(s), inability/difficulty performing IADLs and inability/difficulty performing ADLs. Please locate objective findings from PT assessment regarding body systems outlined above. Upon evaluation, pt able to perform all functional mobility with min-modA x 1-2, RW, and increased time. Frequent verbal cuing provided for safety awareness and sequencing. Pt extremely tangential in conversation; redirection provided as able during breaks in speech. Ambulation limited to 15' x 2 to utilize bathroom d/t fatigue. Moderate postural sway demonstrated but no true LOB experienced. HR and SpO2 remained WFL on 1L O2 throughout.  The patient's AM-PAC Basic Mobility Inpatient Short Form Raw Score is 14. A Raw score of less than or equal to 16 suggests the patient may benefit from discharge to post-acute rehabilitation services. Please also refer to the recommendation of the Physical Therapist for safe discharge planning. Co treatment with OT secondary to complex medical condition of pt, possible A of 2 required to achieve and maintain transitional movements, requiring the need of skilled therapeutic intervention of 2 therapists to achieve delivery of services. Pt will benefit from continued PT intervention during LOS to address current deficits, increase LOF, and facilitate safe d/c to next level of care when medically appropriate. D/c recommendation at this time is post-acute rehabilitation services. Goals   Patient Goals to go home   LTG Expiration Date 10/02/23   Long Term Goal #1 Pt will participate in B LE strengthening exercises to facilitate improved functional activity tolerance. Pt will perform all functional transfers and bed mobility mod(I) with good safety awareness. Pt will ambulate 250' mod(I) with LRAD while maintaining good functional dynamic balance. Plan   Treatment/Interventions Functional transfer training;LE strengthening/ROM; Therapeutic exercise; Endurance training;Bed mobility;Gait training   PT Frequency 3-5x/wk   Recommendation   PT Discharge Recommendation Post acute rehabilitation services   AM-PAC Basic Mobility Inpatient   Turning in Flat Bed Without Bedrails 2   Lying on Back to Sitting on Edge of Flat Bed Without Bedrails 1   Moving Bed to Chair 3   Standing Up From Chair Using Arms 3   Walk in Room 3   Climb 3-5 Stairs With Railing 2   Basic Mobility Inpatient Raw Score 14   Basic Mobility Standardized Score 35.55   Highest Level Of Mobility   JH-HL Goal 4: Move to chair/commode   JH-HLM Achieved 6: Walk 10 steps or more   End of Consult   Patient Position at End of Consult Bedside chair;Bed/Chair alarm activated; All needs within reach

## 2023-09-18 NOTE — ASSESSMENT & PLAN NOTE
· Received magnesium sulfate 2 grams IV x 1 dose on 09/18/2023  · Follow the magnesium level    Results from last 7 days   Lab Units 09/18/23  0427 09/17/23  2212   MAGNESIUM mg/dL 1.7* 1.8*

## 2023-09-18 NOTE — ASSESSMENT & PLAN NOTE
Lactic acid levels at 4.2  Received IV fluids in the ER  Continue IV fluids   Trend lactic acid levels until resolved

## 2023-09-19 LAB
ALBUMIN SERPL BCP-MCNC: 3.2 G/DL (ref 3.5–5)
ALP SERPL-CCNC: 59 U/L (ref 34–104)
ALT SERPL W P-5'-P-CCNC: 11 U/L (ref 7–52)
ANION GAP SERPL CALCULATED.3IONS-SCNC: 5 MMOL/L
AST SERPL W P-5'-P-CCNC: 10 U/L (ref 13–39)
BASOPHILS # BLD AUTO: 0.05 THOUSANDS/ÂΜL (ref 0–0.1)
BASOPHILS NFR BLD AUTO: 1 % (ref 0–1)
BILIRUB SERPL-MCNC: 0.23 MG/DL (ref 0.2–1)
BUN SERPL-MCNC: 9 MG/DL (ref 5–25)
CALCIUM ALBUM COR SERPL-MCNC: 8.8 MG/DL (ref 8.3–10.1)
CALCIUM SERPL-MCNC: 8.2 MG/DL (ref 8.4–10.2)
CHLORIDE SERPL-SCNC: 108 MMOL/L (ref 96–108)
CO2 SERPL-SCNC: 27 MMOL/L (ref 21–32)
CREAT SERPL-MCNC: 0.52 MG/DL (ref 0.6–1.3)
EOSINOPHIL # BLD AUTO: 1.06 THOUSAND/ÂΜL (ref 0–0.61)
EOSINOPHIL NFR BLD AUTO: 19 % (ref 0–6)
ERYTHROCYTE [DISTWIDTH] IN BLOOD BY AUTOMATED COUNT: 13.5 % (ref 11.6–15.1)
FOLATE SERPL-MCNC: 4.7 NG/ML
GFR SERPL CREATININE-BSD FRML MDRD: 99 ML/MIN/1.73SQ M
GLUCOSE SERPL-MCNC: 143 MG/DL (ref 65–140)
GLUCOSE SERPL-MCNC: 171 MG/DL (ref 65–140)
GLUCOSE SERPL-MCNC: 216 MG/DL (ref 65–140)
GLUCOSE SERPL-MCNC: 246 MG/DL (ref 65–140)
GLUCOSE SERPL-MCNC: 35 MG/DL (ref 65–140)
GLUCOSE SERPL-MCNC: 94 MG/DL (ref 65–140)
GLUCOSE SERPL-MCNC: 97 MG/DL (ref 65–140)
HCT VFR BLD AUTO: 37.3 % (ref 34.8–46.1)
HGB BLD-MCNC: 11.8 G/DL (ref 11.5–15.4)
IMM GRANULOCYTES # BLD AUTO: 0.02 THOUSAND/UL (ref 0–0.2)
IMM GRANULOCYTES NFR BLD AUTO: 0 % (ref 0–2)
LYMPHOCYTES # BLD AUTO: 1.39 THOUSANDS/ÂΜL (ref 0.6–4.47)
LYMPHOCYTES NFR BLD AUTO: 25 % (ref 14–44)
MAGNESIUM SERPL-MCNC: 1.9 MG/DL (ref 1.9–2.7)
MCH RBC QN AUTO: 32.3 PG (ref 26.8–34.3)
MCHC RBC AUTO-ENTMCNC: 31.6 G/DL (ref 31.4–37.4)
MCV RBC AUTO: 102 FL (ref 82–98)
MONOCYTES # BLD AUTO: 0.42 THOUSAND/ÂΜL (ref 0.17–1.22)
MONOCYTES NFR BLD AUTO: 8 % (ref 4–12)
NEUTROPHILS # BLD AUTO: 2.53 THOUSANDS/ÂΜL (ref 1.85–7.62)
NEUTS SEG NFR BLD AUTO: 47 % (ref 43–75)
NRBC BLD AUTO-RTO: 0 /100 WBCS
PHENYTOIN SERPL-MCNC: 7.6 UG/ML (ref 10–20)
PHOSPHATE SERPL-MCNC: 3.2 MG/DL (ref 2.3–4.1)
PLATELET # BLD AUTO: 246 THOUSANDS/UL (ref 149–390)
PMV BLD AUTO: 9.2 FL (ref 8.9–12.7)
POTASSIUM SERPL-SCNC: 4 MMOL/L (ref 3.5–5.3)
PROCALCITONIN SERPL-MCNC: <0.05 NG/ML
PROT SERPL-MCNC: 5.7 G/DL (ref 6.4–8.4)
RBC # BLD AUTO: 3.65 MILLION/UL (ref 3.81–5.12)
SODIUM SERPL-SCNC: 140 MMOL/L (ref 135–147)
VIT B12 SERPL-MCNC: 489 PG/ML (ref 180–914)
WBC # BLD AUTO: 5.47 THOUSAND/UL (ref 4.31–10.16)

## 2023-09-19 PROCEDURE — 84100 ASSAY OF PHOSPHORUS: CPT | Performed by: INTERNAL MEDICINE

## 2023-09-19 PROCEDURE — 82746 ASSAY OF FOLIC ACID SERUM: CPT | Performed by: INTERNAL MEDICINE

## 2023-09-19 PROCEDURE — 97530 THERAPEUTIC ACTIVITIES: CPT

## 2023-09-19 PROCEDURE — 85025 COMPLETE CBC W/AUTO DIFF WBC: CPT | Performed by: INTERNAL MEDICINE

## 2023-09-19 PROCEDURE — 84145 PROCALCITONIN (PCT): CPT | Performed by: INTERNAL MEDICINE

## 2023-09-19 PROCEDURE — 80185 ASSAY OF PHENYTOIN TOTAL: CPT | Performed by: INTERNAL MEDICINE

## 2023-09-19 PROCEDURE — 99232 SBSQ HOSP IP/OBS MODERATE 35: CPT

## 2023-09-19 PROCEDURE — 83735 ASSAY OF MAGNESIUM: CPT | Performed by: INTERNAL MEDICINE

## 2023-09-19 PROCEDURE — 82607 VITAMIN B-12: CPT | Performed by: INTERNAL MEDICINE

## 2023-09-19 PROCEDURE — 80053 COMPREHEN METABOLIC PANEL: CPT | Performed by: INTERNAL MEDICINE

## 2023-09-19 PROCEDURE — 97116 GAIT TRAINING THERAPY: CPT

## 2023-09-19 PROCEDURE — 94760 N-INVAS EAR/PLS OXIMETRY 1: CPT

## 2023-09-19 PROCEDURE — 97535 SELF CARE MNGMENT TRAINING: CPT

## 2023-09-19 PROCEDURE — 82948 REAGENT STRIP/BLOOD GLUCOSE: CPT

## 2023-09-19 RX ORDER — MELOXICAM 7.5 MG/1
7.5 TABLET ORAL DAILY
Status: DISCONTINUED | OUTPATIENT
Start: 2023-09-19 | End: 2023-09-20 | Stop reason: HOSPADM

## 2023-09-19 RX ADMIN — ACETAMINOPHEN 650 MG: 325 TABLET, FILM COATED ORAL at 16:42

## 2023-09-19 RX ADMIN — HYDROCORTISONE 1 APPLICATION: 25 CREAM TOPICAL at 21:35

## 2023-09-19 RX ADMIN — ALBUTEROL SULFATE 2 PUFF: 90 AEROSOL, METERED RESPIRATORY (INHALATION) at 17:48

## 2023-09-19 RX ADMIN — INSULIN LISPRO 2 UNITS: 100 INJECTION, SOLUTION INTRAVENOUS; SUBCUTANEOUS at 12:43

## 2023-09-19 RX ADMIN — PRAVASTATIN SODIUM 40 MG: 40 TABLET ORAL at 16:42

## 2023-09-19 RX ADMIN — INSULIN LISPRO 1 UNITS: 100 INJECTION, SOLUTION INTRAVENOUS; SUBCUTANEOUS at 16:44

## 2023-09-19 RX ADMIN — GABAPENTIN 400 MG: 400 CAPSULE ORAL at 10:09

## 2023-09-19 RX ADMIN — FERROUS SULFATE TAB 325 MG (65 MG ELEMENTAL FE) 325 MG: 325 (65 FE) TAB at 10:09

## 2023-09-19 RX ADMIN — QUETIAPINE FUMARATE 200 MG: 100 TABLET ORAL at 10:09

## 2023-09-19 RX ADMIN — QUETIAPINE FUMARATE 200 MG: 100 TABLET ORAL at 17:47

## 2023-09-19 RX ADMIN — LEVETIRACETAM 750 MG: 250 TABLET, FILM COATED ORAL at 21:32

## 2023-09-19 RX ADMIN — OXYBUTYNIN CHLORIDE 5 MG: 5 TABLET ORAL at 10:09

## 2023-09-19 RX ADMIN — LEVETIRACETAM 750 MG: 250 TABLET, FILM COATED ORAL at 10:09

## 2023-09-19 RX ADMIN — ENOXAPARIN SODIUM 40 MG: 40 INJECTION SUBCUTANEOUS at 10:10

## 2023-09-19 RX ADMIN — CLONAZEPAM 1 MG: 0.5 TABLET ORAL at 10:09

## 2023-09-19 RX ADMIN — HYDROCORTISONE: 25 CREAM TOPICAL at 16:45

## 2023-09-19 RX ADMIN — FLUTICASONE PROPIONATE 1 SPRAY: 50 SPRAY, METERED NASAL at 21:33

## 2023-09-19 RX ADMIN — FLUTICASONE PROPIONATE 1 SPRAY: 50 SPRAY, METERED NASAL at 10:11

## 2023-09-19 RX ADMIN — PHENYTOIN SODIUM 100 MG: 100 CAPSULE ORAL at 10:10

## 2023-09-19 RX ADMIN — ACETAMINOPHEN 650 MG: 325 TABLET, FILM COATED ORAL at 10:10

## 2023-09-19 RX ADMIN — CLONAZEPAM 1 MG: 0.5 TABLET ORAL at 17:47

## 2023-09-19 RX ADMIN — OXYBUTYNIN CHLORIDE 5 MG: 5 TABLET ORAL at 16:43

## 2023-09-19 RX ADMIN — GLYCERIN, HYPROMELLOSE, POLYETHYLENE GLYCOL 1 DROP: .2; .2; 1 LIQUID OPHTHALMIC at 10:11

## 2023-09-19 RX ADMIN — GABAPENTIN 400 MG: 400 CAPSULE ORAL at 16:42

## 2023-09-19 RX ADMIN — GLYCERIN, HYPROMELLOSE, POLYETHYLENE GLYCOL 1 DROP: .2; .2; 1 LIQUID OPHTHALMIC at 21:33

## 2023-09-19 RX ADMIN — PANTOPRAZOLE SODIUM 40 MG: 40 TABLET, DELAYED RELEASE ORAL at 06:13

## 2023-09-19 RX ADMIN — MELOXICAM 7.5 MG: 7.5 TABLET ORAL at 16:42

## 2023-09-19 RX ADMIN — GABAPENTIN 400 MG: 400 CAPSULE ORAL at 21:32

## 2023-09-19 RX ADMIN — HYDROCORTISONE: 25 CREAM TOPICAL at 10:12

## 2023-09-19 RX ADMIN — PHENYTOIN SODIUM 100 MG: 100 CAPSULE ORAL at 17:47

## 2023-09-19 RX ADMIN — ZONISAMIDE 200 MG: 100 CAPSULE ORAL at 17:47

## 2023-09-19 RX ADMIN — ZONISAMIDE 200 MG: 100 CAPSULE ORAL at 10:11

## 2023-09-19 RX ADMIN — GLYCERIN, HYPROMELLOSE, POLYETHYLENE GLYCOL 1 DROP: .2; .2; 1 LIQUID OPHTHALMIC at 16:44

## 2023-09-19 RX ADMIN — OXYBUTYNIN CHLORIDE 5 MG: 5 TABLET ORAL at 21:32

## 2023-09-19 RX ADMIN — LEVOTHYROXINE SODIUM 175 MCG: 175 TABLET ORAL at 06:13

## 2023-09-19 RX ADMIN — ENOXAPARIN SODIUM 40 MG: 40 INJECTION SUBCUTANEOUS at 21:34

## 2023-09-19 RX ADMIN — ASPIRIN 81 MG: 81 TABLET, COATED ORAL at 10:09

## 2023-09-19 NOTE — PLAN OF CARE
Problem: OCCUPATIONAL THERAPY ADULT  Goal: Performs self-care activities at highest level of function for planned discharge setting. See evaluation for individualized goals. Description: Treatment Interventions: ADL retraining, Functional transfer training, UE strengthening/ROM, Endurance training, Cognitive reorientation, Patient/family training, Activityengagement          See flowsheet documentation for full assessment, interventions and recommendations. Outcome: Progressing  Note: Limitation: Decreased ADL status, Decreased UE strength, Decreased Safe judgement during ADL, Decreased endurance, Decreased self-care trans, Decreased high-level ADLs, Decreased cognition     Assessment: Patient participated in Skilled OT session this date with interventions consisting of ADL re training with the use of correct body mechnaics and  therapeutic activities to: increase activity tolerance . Patient agreeable to OT treatment session, upon arrival patient was found supine in bed. Supine to sit txfrs with use of side rail and HOB elevated with S, sit to supine txfrs with Min Ax1 for LE management, Sit to stand txfrs from bed with S, standing balance/functional mobility around room to and from bedside commode with use of RW and CGA, toilet txfr to bedside commode with S, to increase posture, dynamic standing balance, balance during self cares, use of BUE. LB dressing Min A to isaias shoes. Patient requiring verbal cues for safety and verbal cues for correct technique. Patient continues to be functioning below baseline level, occupational performance remains limited secondary to factors listed above and increased risk for falls and injury. From OT standpoint, recommendation at time of d/c would be Post acute rehabilitation services. The patient's raw score on the -PAC Daily Activity inpatient short form is 18, standardized score is 38.66, less than 39.4.  Patients at this level are likely to benefit from discharge to post-acute rehabilitation services. Please refer to the recommendation of the Occupational Therapist for safe discharge planning.      OT Discharge Recommendation: Post acute rehabilitation services

## 2023-09-19 NOTE — UTILIZATION REVIEW
NOTIFICATION OF INPATIENT ADMISSION   AUTHORIZATION REQUEST   SERVICING FACILITY:   [address] [place] [address] [address]  [facility] AN AFFILIATE [address]  Tax ID:  [id number]  NPI: [id number] ATTENDING PROVIDER:  Attending Name and NPI#: Jermaine Mckeon [[id number]]  Address: [address] [place] [address] AN AFFILIATE OF [address]  Phone: [phone]     ADMISSION INFORMATION:  Place of Service: Inpatient [address] [facility]   Place of Service Code: 21  Inpatient Admission Date/Time: 9/18/23 12:28 AM  Discharge Date/Time: No discharge date for patient encounter. Admitting Diagnosis Code/Description:  Hypomagnesemia [E83.42]  Trauma [T14.90XA]  Hypoglycemia [E16.2]  Hypotension [I95.9]  Elevated lactic acid level [R79.89]  Multiple falls [R29.6]  Hypoglycemia associated with type 2 diabetes mellitus ([facility]) [E11.649]     UTILIZATION REVIEW CONTACT:  Ottoniel Rausch Utilization   Network Utilization Review Department  Phone: [phone]  Fax [phone]  Email: [email or web address]  Contact for approvals/pending authorizations, clinical reviews, and discharge. PHYSICIAN ADVISORY SERVICES:  Medical Necessity Denial & Mnaw-lb-Mfbr Review  Phone: [phone]  Fax: [phone]  Email: [email or web address]

## 2023-09-19 NOTE — PHYSICAL THERAPY NOTE
PHYSICAL THERAPY NOTE          Patient Name: Santi Cartwright  ZFESZ'K Date: 9/19/2023 09/19/23 0816   PT Last Visit   PT Visit Date 09/19/23   Note Type   Note Type Treatment   Pain Assessment   Pain Assessment Tool 0-10   Pain Score No Pain   Restrictions/Precautions   Weight Bearing Precautions Per Order No   Other Precautions Chair Alarm;Multiple lines; Bed Alarm   General   Family/Caregiver Present No   Cognition   Overall Cognitive Status WFL   Arousal/Participation Alert   Following Commands Follows all commands and directions without difficulty   Comments pt is very tangential during session and difficulties with concentration and task focus   Subjective   Subjective Agreeable to therapy   Bed Mobility   Supine to Sit 5  Supervision   Additional items HOB elevated; Bedrails; Increased time required;Verbal cues   Additional Comments Increased time to transition to EOB. Sat EOB with fair+ sitting balance   Transfers   Sit to Stand 5  Supervision   Additional items Increased time required;Verbal cues   Stand to Sit 5  Supervision   Additional items Armrests; Increased time required;Verbal cues   Additional Comments RW used   Ambulation/Elevation   Gait pattern   (Excessively slow; Short stride; Decreased foot clearance; Narrow CHARIS; Improper Weight shift)   Gait Assistance 5  Supervision   Additional items Verbal cues   Assistive Device Rolling walker   Distance 50'   Ambulation/Elevation Additional Comments verbal cues to stay on task   Balance   Static Sitting Good   Dynamic Sitting Fair +   Static Standing Fair   Dynamic Standing Fair   Ambulatory Fair -  (RW)   Endurance Deficit   Endurance Deficit Yes   Activity Tolerance   Activity Tolerance Patient limited by fatigue   Assessment   Prognosis Good   Problem List   (Decreased strength; Decreased endurance;  Impaired balance; Decreased mobility)   Assessment Pt. seen for PT treatment session this date with interventions consisting of bed mobility, transfers and  gait training w/ emphasis on improving pt's ability to ambulate. Pt. Requiring  cues for sequence and safety. In comparison to previous session, Pt. With improvements in activity tolerance. Pt is in need of continued activity in PT to improve strength balance endurance mobility transfers and ambulation with return to maximize LOF. From PT/mobility standpoint, recommendation at time of d/c would be HHPT in order to promote return to PLOF and independence. The patient's AM-PAC Basic Mobility Inpatient Short Form Raw Score is 20. A Raw score of greater than 16 suggests the patient may benefit from discharge to home. Please also refer to physical therapy recommendation for safe DC planning. Goals   LTG Expiration Date 10/02/23   PT Treatment Day 1   Plan   Treatment/Interventions   (Functional transfer training; LE strengthening/ROM; Therapeutic exercise; Endurance training; Bed mobility; Gait training)   Progress Progressing toward goals   PT Frequency 3-5x/wk   Recommendation   PT Discharge Recommendation Home with home health rehabilitation   [address] in Flat Bed Without Bedrails 4   Lying on Back to Sitting on Edge of Flat Bed Without Bedrails 3   Moving Bed to Chair 3   Standing Up From Chair Using Arms 4   Walk in Room 3   Climb 3-5 Stairs With Railing 3   Basic Mobility Inpatient Raw Score 20   Basic Mobility Standardized Score 43.99   Highest Level Of Mobility   JH-HLM Goal 6: Walk 10 steps or more   JH-HLM Achieved 7: Walk 25 feet or more   Education   Education Provided Mobility training   Patient Demonstrates verbal understanding;Reinforcement needed   End of Consult   Patient Position at End of Consult Bedside chair;Bed/Chair alarm activated; All needs within reach   End of Consult Comments discussed POC with PT

## 2023-09-19 NOTE — ASSESSMENT & PLAN NOTE
· Given weakness, ambulatory dysfunction PT recs HHC  · However, ongoing concerns for ability to care for oneself at home & complete ADLs, OT recommending STR

## 2023-09-19 NOTE — CASE MANAGEMENT
Case Management Discharge Planning Note    Patient name Hazel Marshall  Location / MRN [id number]  : 1955 Date 2023       Current Admission Date: 2023  Current Admission Diagnosis:Hypotension   Patient Active Problem List    Diagnosis Date Noted   • Gait instability 2023   • Elevated MCV 2023   • Skin rash 2023   • Rash 2023   • Type 2 diabetes mellitus with hypoglycemia without coma, with long-term current use of insulin ([address]) 2023   • BMI 33.0-33.9,adult 2019   • Iron deficiency anemia, unspecified 2019   • Right lower quadrant abdominal pain 2019   • Pressure injury of sacral region, stage 3 ([address]) 2019   • Pressure ulcer of left buttock, stage 3 ([address]) 2019   • Pressure ulcer of right buttock, stage 2 ([address]) 2019   • Generalized weakness 2019   • Leukopenia 2019   • Neutropenia ([address]) 2019   • Low TSH level 2019   • Sinus arrhythmia 2019   • Lactic acidosis 2019   • Recurrent falls 2019   • Acute hyponatremia 2019   • Hypomagnesemia 2019   • Hypotension 2019   • Hypertriglyceridemia 2019   • Vaginal candidiasis 2019   • GERD without esophagitis 2018   • Urinary incontinence 2018   • Obesity due to excess calories 10/31/2018   • E. coli UTI 10/31/2018   • Hypercholesterolemia 10/31/2018   • Ambulatory dysfunction 10/30/2018   • NSTEMI (non-ST elevated myocardial infarction) ([address]) 10/29/2018   • Volume overload 10/29/2018   • Skin breakdown 10/29/2018   • Asthma 2016   • Seizure disorder ([address]) 2014   • Dermatitis 2007   • Bipolar disorder ([address]) 2007   • Acquired hypothyroidism 2007      LOS (days): 1  Geometric Mean LOS (GMLOS) (days): 2.90  Days to GMLOS:1.4     OBJECTIVE:  Risk of Unplanned Readmission Score: 21.82         Current admission status: Inpatient   Preferred Pharmacy:   [address]  [address] [address]  Phone: [phone] Fax: [phone]    Primary Care Provider: Nikolai Miramontes    Primary Insurance: [facility] REP  Secondary Insurance: [address] [facility]    DISCHARGE DETAILS:    Discharge planning discussed with[de-identified] patient  Freedom of Choice: Yes  Comments - Freedom of Choice: CM discussed FOC for STR patient is declining options for [place] [facility] or [facility]-  patient asking for Ash Biswas and her aides from [employer].  CM trying to confirm patient has aides at home  CM contacted family/caregiver?: No- see comments (no emergency contacts listed and  patient will not provide)  Were Treatment Team discharge recommendations reviewed with patient/caregiver?: Yes  Did patient/caregiver verbalize understanding of patient care needs?: Yes  Were patient/caregiver advised of the risks associated with not following Treatment Team discharge recommendations?: Yes         [facility]         Is the patient interested in [address] at discharge?: Yes  [facility] requested[de-identified] Nursing, Physical Therapy, Occupational Therapy, Medical Social Work  [facility] External Referral Reason (only applicable if external HHA name selected): Services not provided in network or near patient location  [facility] Provider[de-identified] PCP  Home Health Services Needed[de-identified] Diabetes Management, Evaluate Functional Status and Safety, Strengthening/Theraputic Exercises to Improve Function, Gait/ADL Training, Other (comment), COPD Management (SN ASSESSMENT AND MEDICATION MANAGEMENT)  Homebound Criteria Met[de-identified] Uses an Assist Device (i.e. cane, walker, etc), Requires the Assistance of Another Person for Safe Ambulation or to Leave the Home  Supporting Clincal Findings[de-identified] Fatigues Easliy in [employer], Limited Endurance    Treatment Team Recommendation: Home with [address] [facility]  Discharge Destination Plan[de-identified] Home with [address] [facility]              Patient discussed in huddle anticipated discharge in 24 hours. CM spoke with patient therapy recommendations for STR if patient has no aides at home other wise home with Home health. Patient aware [facility] and rehab, [facility] and [facility] has beds available for her. Patient declined STR wants to go home with home health stated she will not fire them and her aides. CM trying to reach aide services [facility] [place] under Waiver program to confirm patient has services for discharge. No answers messages left. CM discussed  FOC with patient  at bedside. Patient agreeable with blanket referrals in Aidin for local agencies to determine  availability according to your medical needs and insurance coverage.  Patient and caretaker has no preference     Referrals placed in aidin    CM to follow

## 2023-09-19 NOTE — OCCUPATIONAL THERAPY NOTE
Occupational Therapy Progress Note     Patient Name: Anastasiya Caro  [id number] Date: 9/19/2023  Problem List  Principal Problem:    Hypotension  Active Problems:    Bipolar disorder ([address])    Acquired hypothyroidism    Seizure disorder ([address])    GERD without esophagitis    Lactic acidosis    Recurrent falls    Hypomagnesemia    Iron deficiency anemia, unspecified    Type 2 diabetes mellitus with hypoglycemia without coma, with long-term current use of insulin (HCC)    Gait instability    Elevated MCV    Skin rash          09/19/23 1049   OT Last Visit   OT Visit Date 09/19/23   Note Type   Note Type Treatment   Pain Assessment   Pain Assessment Tool 0-10   Pain Score No Pain   Restrictions/Precautions   Weight Bearing Precautions Per Order No   Other Precautions Multiple lines; Chair Alarm   ADL   Where Assessed Edge of bed   LB Dressing Assistance 4  Minimal Assistance   LB Dressing Deficit Setup;Verbal cueing; Increased time to complete;Supervision/safety; Don/doff R shoe   LB Dressing Comments Pt required Min A to isaias R shoe with patient reporting "I dont have one of those stick things at home but I can get one."   Bed Mobility   Supine to Sit 5  Supervision   Additional items Increased time required;HOB elevated; Bedrails;Verbal cues   Sit to Supine 4  Minimal assistance   Additional items Assist x 1; Increased time required;Verbal cues;LE management; Bedrails   Additional Comments Increased time to complete bed mobility with O2 on room air WFL   Transfers   Sit to Stand 5  Supervision   Additional items Increased time required;Verbal cues   Stand to Sit 5  Supervision   Additional items Increased time required;Verbal cues   Toilet transfer 5  Supervision   Additional items Increased time required;Commode;Assist x 1;Verbal cues   Additional Comments Use of RW   Functional Mobility   Functional Mobility 4  Minimal assistance   Additional Comments Pt completed standing balance/functional mobility around room to and from bedside commode with use of RW and Min Ax1(CGA) with no LOB throughout. Additional items Rolling walker   Toilet Transfers   Toilet Transfer From Rolling walker   Toilet Transfer Type To and from   Toilet Transfer to Standard bedside commode   Toilet Transfer Technique Ambulating   Toilet Transfers Supervision   Cognition   Overall Cognitive Status WFL   Arousal/Participation Alert   Attention Within functional limits   Orientation Level Oriented X4   Memory Within functional limits   Following Commands Follows all commands and directions without difficulty   Assessment   Assessment Patient participated in Skilled OT session this date with interventions consisting of ADL re training with the use of correct body mechnaics and  therapeutic activities to: increase activity tolerance . Patient agreeable to OT treatment session, upon arrival patient was found supine in bed. Supine to sit txfrs with use of side rail and HOB elevated with S, sit to supine txfrs with Min Ax1 for LE management, Sit to stand txfrs from bed with S, standing balance/functional mobility around room to and from bedside commode with use of RW and CGA, toilet txfr to bedside commode with S, to increase posture, dynamic standing balance, balance during self cares, use of BUE. LB dressing Min A to isaias shoes. Patient requiring verbal cues for safety and verbal cues for correct technique. Patient continues to be functioning below baseline level, occupational performance remains limited secondary to factors listed above and increased risk for falls and injury. From OT standpoint, recommendation at time of d/c would be Post acute rehabilitation services. The patient's raw score on the AM-PAC Daily Activity inpatient short form is 18, standardized score is 38.66, less than 39.4. Patients at this level are likely to benefit from discharge to post-acute rehabilitation services.  Please refer to the recommendation of the Occupational Therapist for safe discharge planning. Plan   Goal Expiration Date 10/02/23   OT Treatment Day 1   OT Frequency 3-5x/wk   Recommendation   OT Discharge Recommendation Post acute rehabilitation services   AM-PAC Daily Activity Inpatient   Lower Body Dressing 2   Bathing 2   Toileting 3   Upper Body Dressing 3   Grooming 4   Eating 4   Daily Activity Raw Score 18   Daily Activity Standardized Score (Calc for Raw Score >=11) 38.66   AM-PAC Applied Cognition Inpatient   Following a Speech/Presentation 3   Understanding Ordinary Conversation 3   Taking Medications 3   Remembering Where Things Are Placed or Put Away 3   Remembering List of 4-5 Errands 3   Taking Care of Complicated Tasks 3   Applied Cognition Raw Score 18   Applied Cognition Standardized Score 38.07     Pt left supine on bed with all needs in reach.

## 2023-09-19 NOTE — PLAN OF CARE
Problem: PAIN - ADULT  Goal: Verbalizes/displays adequate comfort level or baseline comfort level  Description: Interventions:  - Encourage patient to monitor pain and request assistance  - Assess pain using appropriate pain scale  - Administer analgesics based on type and severity of pain and evaluate response  - Implement non-pharmacological measures as appropriate and evaluate response  - Consider cultural and social influences on pain and pain management  - Notify physician/advanced practitioner if interventions unsuccessful or patient reports new pain  Outcome: Progressing     Problem: INFECTION - ADULT  Goal: Absence or prevention of progression during hospitalization  Description: INTERVENTIONS:  - Assess and monitor for signs and symptoms of infection  - Monitor lab/diagnostic results  - Monitor all insertion sites, i.e. indwelling lines, tubes, and drains  - Monitor endotracheal if appropriate and nasal secretions for changes in amount and color  - [place] appropriate cooling/warming therapies per order  - Administer medications as ordered  - Instruct and encourage patient and family to use good hand hygiene technique  - Identify and instruct in appropriate isolation precautions for identified infection/condition  Outcome: Progressing     Problem: SAFETY ADULT  Goal: Patient will remain free of falls  Description: INTERVENTIONS:  - Educate patient/family on patient safety including physical limitations  - Instruct patient to call for assistance with activity   - Consult OT/PT to assist with strengthening/mobility   - Keep Call bell within reach  - Keep bed low and locked with side rails adjusted as appropriate  - Keep care items and personal belongings within reach  - Initiate and maintain comfort rounds  - Make Fall Risk Sign visible to staff  - Offer Toileting every 2 Hours, in advance of need  - Initiate/Maintain bed/ chair alarm  - Apply yellow socks and bracelet for high fall risk patients  - Consider moving patient to room near nurses station  Outcome: Progressing  Goal: Maintain or return to baseline ADL function  Description: INTERVENTIONS:  -  Assess patient's ability to carry out ADLs; assess patient's baseline for ADL function and identify physical deficits which impact ability to perform ADLs (bathing, care of mouth/teeth, toileting, grooming, dressing, etc.)  - Assess/evaluate cause of self-care deficits   - Assess range of motion  - Assess patient's mobility; develop plan if impaired  - Assess patient's need for assistive devices and provide as appropriate  - Encourage maximum independence but intervene and supervise when necessary  - Involve family in performance of ADLs  - Assess for home care needs following discharge   - Consider OT consult to assist with ADL evaluation and planning for discharge  - Provide patient education as appropriate  Outcome: Progressing  Goal: Maintains/Returns to pre admission functional level  Description: INTERVENTIONS:  - Perform BMAT or MOVE assessment daily.   - Set and communicate daily mobility goal to care team and patient/family/caregiver. - Collaborate with rehabilitation services on mobility goals if consulted  - Perform Range of Motion 3-4 times a day. - Reposition patient every 2 hours.   - Dangle patient 3 times a day  - Stand patient 3 times a day  - Ambulate patient 3 times a day  - Out of bed to chair 3 times a day   - Out of bed for meals 3 times a day  - Out of bed for toileting  - Record patient progress and toleration of activity level   Outcome: Progressing     Problem: DISCHARGE PLANNING  Goal: Discharge to home or other facility with appropriate resources  Description: INTERVENTIONS:  - Identify barriers to discharge w/patient and caregiver  - Arrange for needed discharge resources and transportation as appropriate  - Identify discharge learning needs (meds, wound care, etc.)  - Arrange for interpretive services to assist at discharge as needed  - Refer to Case Management Department for coordinating discharge planning if the patient needs post-hospital services based on physician/advanced practitioner order or complex needs related to functional status, cognitive ability, or social support system  Outcome: Progressing     Problem: Knowledge Deficit  Goal: Patient/family/caregiver demonstrates understanding of disease process, treatment plan, medications, and discharge instructions  Description: Complete learning assessment and assess knowledge base.   Interventions:  - Provide teaching at level of understanding  - Provide teaching via preferred learning methods  Outcome: Progressing     Problem: CARDIOVASCULAR - ADULT  Goal: Maintains optimal cardiac output and hemodynamic stability  Description: INTERVENTIONS:  - Monitor I/O, vital signs and rhythm  - Monitor for S/S and trends of decreased cardiac output  - Administer and titrate ordered vasoactive medications to optimize hemodynamic stability  - Assess quality of pulses, skin color and temperature  - Assess for signs of decreased coronary artery perfusion  - Instruct patient to report change in severity of symptoms  Outcome: Progressing  Goal: Absence of cardiac dysrhythmias or at baseline rhythm  Description: INTERVENTIONS:  - Continuous cardiac monitoring, vital signs, obtain 12 lead EKG if ordered  - Administer antiarrhythmic and heart rate control medications as ordered  - Monitor electrolytes and administer replacement therapy as ordered  Outcome: Progressing     Problem: RESPIRATORY - ADULT  Goal: Achieves optimal ventilation and oxygenation  Description: INTERVENTIONS:  - Assess for changes in respiratory status  - Assess for changes in mentation and behavior  - Position to facilitate oxygenation and minimize respiratory effort  - Oxygen administered by appropriate delivery if ordered  - Initiate smoking cessation education as indicated  - Encourage broncho-pulmonary hygiene including cough, deep breathe, Incentive Spirometry  - Assess the need for suctioning and aspirate as needed  - Assess and instruct to report SOB or any respiratory difficulty  - Respiratory Therapy support as indicated  Outcome: Progressing     Problem: GASTROINTESTINAL - ADULT  Goal: Establish and maintain optimal ostomy function  Description: INTERVENTIONS:  - Assess bowel function  - Encourage oral fluids to ensure adequate hydration  - Administer IV fluids if ordered to ensure adequate hydration   - Administer ordered medications as needed  - Encourage mobilization and activity  - Nutrition services referral to assist patient with appropriate food choices  - Assess stoma site  - Consider wound care consult   Outcome: Progressing     Problem: METABOLIC, FLUID AND ELECTROLYTES - ADULT  Goal: Electrolytes maintained within normal limits  Description: INTERVENTIONS:  - Monitor labs and assess patient for signs and symptoms of electrolyte imbalances  - Administer electrolyte replacement as ordered  - Monitor response to electrolyte replacements, including repeat lab results as appropriate  - Instruct patient on fluid and nutrition as appropriate  Outcome: Progressing  Goal: Fluid balance maintained  Description: INTERVENTIONS:  - Monitor labs   - Monitor I/O and WT  - Instruct patient on fluid and nutrition as appropriate  - Assess for signs & symptoms of volume excess or deficit  Outcome: Progressing  Goal: Glucose maintained within target range  Description: INTERVENTIONS:  - Monitor Blood Glucose as ordered  - Assess for signs and symptoms of hyperglycemia and hypoglycemia  - Administer ordered medications to maintain glucose within target range  - Assess nutritional intake and initiate nutrition service referral as needed  Outcome: Progressing     Problem: MOBILITY - ADULT  Goal: Maintain or return to baseline ADL function  Description: INTERVENTIONS:  -  Assess patient's ability to carry out ADLs; assess patient's baseline for ADL function and identify physical deficits which impact ability to perform ADLs (bathing, care of mouth/teeth, toileting, grooming, dressing, etc.)  - Assess/evaluate cause of self-care deficits   - Assess range of motion  - Assess patient's mobility; develop plan if impaired  - Assess patient's need for assistive devices and provide as appropriate  - Encourage maximum independence but intervene and supervise when necessary  - Involve family in performance of ADLs  - Assess for home care needs following discharge   - Consider OT consult to assist with ADL evaluation and planning for discharge  - Provide patient education as appropriate  Outcome: Progressing  Goal: Maintains/Returns to pre admission functional level  Description: INTERVENTIONS:  - Perform BMAT or MOVE assessment daily.   - Set and communicate daily mobility goal to care team and patient/family/caregiver. - Collaborate with rehabilitation services on mobility goals if consulted  - Perform Range of Motion 3-4 times a day. - Reposition patient every 2 hours.   - Dangle patient 3 times a day  - Stand patient 3 times a day  - Ambulate patient 3 times a day  - Out of bed to chair 3 times a day   - Out of bed for meals 3 times a day  - Out of bed for toileting  - Record patient progress and toleration of activity level   Outcome: Progressing     Problem: Prexisting or High Potential for Compromised Skin Integrity  Goal: Skin integrity is maintained or improved  Description: INTERVENTIONS:  - Identify patients at risk for skin breakdown  - Assess and monitor skin integrity  - Assess and monitor nutrition and hydration status  - Monitor labs   - Assess for incontinence   - Turn and reposition patient  - Assist with mobility/ambulation  - Relieve pressure over bony prominences  - Avoid friction and shearing  - Provide appropriate hygiene as needed including keeping skin clean and dry  - Evaluate need for skin moisturizer/barrier cream  - Collaborate with interdisciplinary team   - Patient/family teaching  - Consider wound care consult   Outcome: Progressing

## 2023-09-19 NOTE — PROGRESS NOTES
[address]  Progress Note  Name: Jordy Marcus  MRN: [id number]  Unit/Bed#:  I Date of Admission: 9/17/2023   Date of Service: 9/19/2023 I Hospital Day: 1    Assessment/Plan   * Hypotension  Assessment & Plan  · Initial SBP in the 60's-80's mmHg  · Discontinue all hypertensive medications indefinitely  · The hypotension has now resolved. · Discontinue the arterial line  · Monitor off IVF  · Check blood cultures x 2 sets and a urine culture - pending        Type 2 diabetes mellitus with hypoglycemia without coma, with long-term current use of insulin (Formerly [facility])  Assessment & Plan  · Hold all PO diabetic medications  · Hold all insulin therapy  · The hypoglycemia resolved with D5 IV fluids. · The IV fluids have been changed to NSS IV fluids at 125 ml/hr. · Check a new HgbA1c level - 6.3  · Blood glucose monitoring ACHS  · Hypoglycemia protocol  · Outpatient Endocrinology evaluation  · Plan for dc home with metformin alone.  Discontinue glipizide & Humalog given associated hypoglycemic episodes & risks of ongoing episodes       Lactic acidosis  Assessment & Plan  · Due to hypotension and chronic metformin use  · Resolved with IV fluids    Gait instability  Assessment & Plan  · PT/OT evaluations    Iron deficiency anemia, unspecified  Assessment & Plan  · Continue PO ferrous sulafte    Hypomagnesemia  Assessment & Plan  · Received magnesium sulfate 2 grams IV x 1 dose on 09/18/2023  · Follow the magnesium level    Results from last 7 days   Lab Units 09/18/23  0427 09/17/23  2212   MAGNESIUM mg/dL 1.7* 1.8*         Recurrent falls  Assessment & Plan  · PT/OT evaluations    GERD without esophagitis  Assessment & Plan  · Continue PPI therapy  · Outpatient Gastroenterology evaluation      Ambulatory dysfunction  Assessment & Plan  · Given weakness, ambulatory dysfunction PT recs C  · However, ongoing concerns for ability to care for oneself at home & complete ADLs, OT recommending STR    Seizure disorder ([address])  Assessment & Plan  · Continue PO Keppra, PO Dilantin, and PO gabapentin  · Outpatient follow-up with Neurology        Acquired hypothyroidism  Assessment & Plan  · Continue PO levothyroxine     Latest Reference Range & Units 23 22:12   TSH 3RD GENERATON 0.450 - 4.500 uIU/mL 0.511         Bipolar disorder (HCC)  Assessment & Plan  · Continue gabapentin, Klonopin, and Seroquel  · Outpatient follow-up with Psychiatry             VTE Pharmacologic Prophylaxis: VTE Score: 5 lovenox    Patient Centered Rounds: I performed bedside rounds with nursing staff today. Discussions with Specialists or Other Care Team Provider:     Education and Discussions with Family / Patient: Patient declined call to . Total Time Spent on Date of Encounter in care of patient:  mins. This time was spent on one or more of the following: performing physical exam; counseling and coordination of care; obtaining or reviewing history; documenting in the medical record; reviewing/ordering tests, medications or procedures; communicating with other healthcare professionals and discussing with patient's family/caregivers. Current Length of Stay: 1 day(s)  Current Patient Status: Inpatient   Certification Statement: The patient will continue to require additional inpatient hospital stay due to close monitoring of blood glucose and blood pressure, safe dispo planning STR vs [facility]  Discharge Plan: Anticipate discharge in 24-48 hrs to [facility] vs [facility]    Code Status: Level 3 - DNAR and DNI    Subjective:   No acute concerns today. Does report at home she would have BG readings as low as 60s and 40s with home regimen. Also notes that her BP typically runs low 100s and would take antihypertensives. Reported she just started doing that 2-3 weeks ago.  Reports no sensation of "leg wobbliness" with ambulation today    Objective:   Vitals:   Temp (24hrs), Av.8 °F (36.6 °C), Min:97.6 °F (36.4 °C), Max:98.2 °F (36.8 °C)    Temp:  [97.6 °F (36.4 °C)-98.2 °F (36.8 °C)] 97.7 °F (36.5 °C)  HR:  [] 100  Resp:  [16-23] 18  BP: (108-125)/(56-73) 119/56  SpO2:  [92 %-94 %] 94 %  Body mass index is 40.3 kg/m². Input and Output Summary (last 24 hours): Intake/Output Summary (Last 24 hours) at 9/19/2023 1525  Last data filed at 9/19/2023 1300  Gross per 24 hour   Intake 1140 ml   Output 1400 ml   Net -260 ml       Physical Exam:   Physical Exam  Vitals reviewed. Constitutional:       Appearance: She is obese. HENT:      Head: Normocephalic and atraumatic. Cardiovascular:      Rate and Rhythm: Normal rate and regular rhythm. Pulses: Normal pulses. Pulmonary:      Effort: Pulmonary effort is normal. No respiratory distress. Breath sounds: Normal breath sounds. Abdominal:      General: Abdomen is flat. Bowel sounds are normal.      Palpations: Abdomen is soft. Tenderness: There is no abdominal tenderness. Musculoskeletal:      Right lower leg: No edema. Left lower leg: No edema. Skin:     General: Skin is warm. Neurological:      General: No focal deficit present. Mental Status: She is alert and oriented to person, place, and time. Psychiatric:         Mood and Affect: Mood normal.         Speech: Speech is tangential.         Behavior: Behavior is cooperative.           Additional Data:   Labs:  Results from last 7 days   Lab Units 09/19/23  0610   WBC Thousand/uL 5.47   HEMOGLOBIN g/dL 11.8   HEMATOCRIT % 37.3   PLATELETS Thousands/uL 246   NEUTROS PCT % 47   LYMPHS PCT % 25   MONOS PCT % 8   EOS PCT % 19*     Results from last 7 days   Lab Units 09/19/23  0610   SODIUM mmol/L 140   POTASSIUM mmol/L 4.0   CHLORIDE mmol/L 108   CO2 mmol/L 27   BUN mg/dL 9   CREATININE mg/dL 0.52*   ANION GAP mmol/L 5   CALCIUM mg/dL 8.2*   ALBUMIN g/dL 3.2*   TOTAL BILIRUBIN mg/dL 0.23   ALK PHOS U/L 59   ALT U/L 11   AST U/L 10*   GLUCOSE RANDOM mg/dL 97     Results from last 7 days   Lab Units 23  2212   INR  0.98     Results from last 7 days   Lab Units 23  1130 23  0753 23  2111 23  1600 23  1213 23  0823 23  0624 23  0426 23  0229 23  0059 23  0041 23  0022   POC GLUCOSE mg/dl 216* 94 286* 182* 163* 131 156* 199* 171* 123 121 135     Results from last 7 days   Lab Units 23  0230   HEMOGLOBIN A1C % 6.3*     Results from last 7 days   Lab Units 23  0610 23  0427 23  0024 23  2212   LACTIC ACID mmol/L  --  1.0 3.1* 4.2*   PROCALCITONIN ng/ml <0.05 <0.05  --  <0.05       Lines/Drains:  Invasive Devices     Peripheral Intravenous Line  Duration           Peripheral IV 23 Dorsal (posterior); Left Forearm 1 day    Peripheral IV 23 Dorsal (posterior); Right Wrist 1 day    Peripheral IV 23 Right;Ventral (anterior) Forearm 1 day                  Telemetry:  Telemetry Orders (From admission, onward)             24 Hour Telemetry Monitoring  Continuous x 24 Hours (Telem)           Question:  Reason for 24 Hour Telemetry  Answer:  Arrhythmias requiring acute medical intervention / PPM or ICD malfunction                 Telemetry Reviewed: Normal Sinus Rhythm  Indication for Continued Telemetry Use: No indication for continued use. Will discontinue. Imaging: Reviewed radiology reports from this admission including: chest CT scan, abdominal/pelvic CT, CT head and xray(s)    Recent Cultures (last 7 days):   Results from last 7 days   Lab Units 23  0756 23  2252 23  2212   BLOOD CULTURE   --  No Growth at 24 hrs. No Growth at 24 hrs.    URINE CULTURE  Culture too young- will reincubate  --   --        Last 24 Hours Medication List:   Current Facility-Administered Medications   Medication Dose Route Frequency Provider Last Rate   • acetaminophen  650 mg Oral Q6H PRN Susi Christensen,      • albuterol  2 puff Inhalation Q6H PRN Ekta Singh DO     • aspirin  81 mg Oral Daily Funmilayociara Taetter, DO     • clonazePAM  1 mg Oral BID Funmilayo Lenny Taetter, DO     • docusate sodium  100 mg Oral BID PRN Funmilayo Lenny Christensen, DO     • enoxaparin  40 mg Subcutaneous Q12H [facility] Funmilayo Lenny Taetter, DO     • ferrous sulfate  325 mg Oral Daily With Breakfast Funmilayo Lenny Antonior, DO     • fluticasone  1 spray Each Nare BID Funmilayociara Antonior, DO     • gabapentin  400 mg Oral TID Funmilayociara Christensen, DO     • glycerin-hypromellose-  1 drop Both Eyes TID Nikunj Quinteros PA-C     • hydrocortisone   Topical TID Funmilayociara Christensen, DO     • insulin lispro  1-5 Units Subcutaneous HS Funmilayo Lenny Antonior, DO     • insulin lispro  1-6 Units Subcutaneous TID [facility] [place] Funmilayo Lenny Antonior, DO     • levETIRAcetam  750 mg Oral Q12H [facility] Funmilayo Lenny Antonior, DO     • levothyroxine  175 mcg Oral Early Morning Funmilayociara Antonior, DO     • meloxicam  7.5 mg Oral Daily Angelica Hubbard PA-C     • ondansetron  4 mg Intravenous Q6H PRN Funmilayociara Antonior, DO     • oxybutynin  5 mg Oral TID Funmilayociara Antonior, DO     • pantoprazole  40 mg Oral Daily Before Breakfast Funmilayociara Antonior, DO     • phenytoin  100 mg Oral BID Funmilayociara Antonior, DO     • pravastatin  40 mg Oral Daily With [place] Fermin, DO     • QUEtiapine  200 mg Oral BID Funmilayociara Antonior, DO     • zonisamide  200 mg Oral BID Alicia Dickinson, DO          Today, Patient Was Seen By: Natalia Ventura PA-C    **Please Note: This note may have been constructed using a voice recognition system. **

## 2023-09-20 VITALS
RESPIRATION RATE: 20 BRPM | HEIGHT: 67 IN | BODY MASS INDEX: 40.8 KG/M2 | TEMPERATURE: 97.6 F | HEART RATE: 93 BPM | WEIGHT: 259.92 LBS | OXYGEN SATURATION: 94 % | DIASTOLIC BLOOD PRESSURE: 67 MMHG | SYSTOLIC BLOOD PRESSURE: 127 MMHG

## 2023-09-20 LAB
ANION GAP SERPL CALCULATED.3IONS-SCNC: 6 MMOL/L
BASOPHILS # BLD AUTO: 0.04 THOUSANDS/ÂΜL (ref 0–0.1)
BASOPHILS NFR BLD AUTO: 1 % (ref 0–1)
BUN SERPL-MCNC: 9 MG/DL (ref 5–25)
CALCIUM SERPL-MCNC: 9.1 MG/DL (ref 8.4–10.2)
CHLORIDE SERPL-SCNC: 106 MMOL/L (ref 96–108)
CO2 SERPL-SCNC: 27 MMOL/L (ref 21–32)
CREAT SERPL-MCNC: 0.53 MG/DL (ref 0.6–1.3)
EOSINOPHIL # BLD AUTO: 0.82 THOUSAND/ÂΜL (ref 0–0.61)
EOSINOPHIL NFR BLD AUTO: 19 % (ref 0–6)
ERYTHROCYTE [DISTWIDTH] IN BLOOD BY AUTOMATED COUNT: 13.4 % (ref 11.6–15.1)
GFR SERPL CREATININE-BSD FRML MDRD: 99 ML/MIN/1.73SQ M
GLUCOSE SERPL-MCNC: 111 MG/DL (ref 65–140)
GLUCOSE SERPL-MCNC: 124 MG/DL (ref 65–140)
GLUCOSE SERPL-MCNC: 201 MG/DL (ref 65–140)
HCT VFR BLD AUTO: 38.3 % (ref 34.8–46.1)
HGB BLD-MCNC: 12.2 G/DL (ref 11.5–15.4)
IMM GRANULOCYTES # BLD AUTO: 0.01 THOUSAND/UL (ref 0–0.2)
IMM GRANULOCYTES NFR BLD AUTO: 0 % (ref 0–2)
LEVETIRACETAM SERPL-MCNC: 9.9 UG/ML (ref 10–40)
LYMPHOCYTES # BLD AUTO: 1.59 THOUSANDS/ÂΜL (ref 0.6–4.47)
LYMPHOCYTES NFR BLD AUTO: 37 % (ref 14–44)
MAGNESIUM SERPL-MCNC: 1.7 MG/DL (ref 1.9–2.7)
MCH RBC QN AUTO: 32.1 PG (ref 26.8–34.3)
MCHC RBC AUTO-ENTMCNC: 31.9 G/DL (ref 31.4–37.4)
MCV RBC AUTO: 101 FL (ref 82–98)
MONOCYTES # BLD AUTO: 0.37 THOUSAND/ÂΜL (ref 0.17–1.22)
MONOCYTES NFR BLD AUTO: 9 % (ref 4–12)
NEUTROPHILS # BLD AUTO: 1.42 THOUSANDS/ÂΜL (ref 1.85–7.62)
NEUTS SEG NFR BLD AUTO: 34 % (ref 43–75)
NRBC BLD AUTO-RTO: 0 /100 WBCS
PLATELET # BLD AUTO: 259 THOUSANDS/UL (ref 149–390)
PMV BLD AUTO: 9.1 FL (ref 8.9–12.7)
POTASSIUM SERPL-SCNC: 4.1 MMOL/L (ref 3.5–5.3)
RBC # BLD AUTO: 3.8 MILLION/UL (ref 3.81–5.12)
SODIUM SERPL-SCNC: 139 MMOL/L (ref 135–147)
WBC # BLD AUTO: 4.25 THOUSAND/UL (ref 4.31–10.16)

## 2023-09-20 PROCEDURE — 85025 COMPLETE CBC W/AUTO DIFF WBC: CPT

## 2023-09-20 PROCEDURE — 80048 BASIC METABOLIC PNL TOTAL CA: CPT

## 2023-09-20 PROCEDURE — 82948 REAGENT STRIP/BLOOD GLUCOSE: CPT

## 2023-09-20 PROCEDURE — 99239 HOSP IP/OBS DSCHRG MGMT >30: CPT

## 2023-09-20 PROCEDURE — 83735 ASSAY OF MAGNESIUM: CPT

## 2023-09-20 RX ORDER — MAGNESIUM SULFATE HEPTAHYDRATE 40 MG/ML
2 INJECTION, SOLUTION INTRAVENOUS ONCE
Status: COMPLETED | OUTPATIENT
Start: 2023-09-20 | End: 2023-09-20

## 2023-09-20 RX ADMIN — ALBUTEROL SULFATE 2 PUFF: 90 AEROSOL, METERED RESPIRATORY (INHALATION) at 10:46

## 2023-09-20 RX ADMIN — LEVOTHYROXINE SODIUM 175 MCG: 175 TABLET ORAL at 06:05

## 2023-09-20 RX ADMIN — GLYCERIN, HYPROMELLOSE, POLYETHYLENE GLYCOL 1 DROP: .2; .2; 1 LIQUID OPHTHALMIC at 08:34

## 2023-09-20 RX ADMIN — ENOXAPARIN SODIUM 40 MG: 40 INJECTION SUBCUTANEOUS at 08:33

## 2023-09-20 RX ADMIN — LEVETIRACETAM 750 MG: 250 TABLET, FILM COATED ORAL at 08:33

## 2023-09-20 RX ADMIN — ASPIRIN 81 MG: 81 TABLET, COATED ORAL at 08:34

## 2023-09-20 RX ADMIN — GABAPENTIN 400 MG: 400 CAPSULE ORAL at 08:33

## 2023-09-20 RX ADMIN — FERROUS SULFATE TAB 325 MG (65 MG ELEMENTAL FE) 325 MG: 325 (65 FE) TAB at 08:33

## 2023-09-20 RX ADMIN — ACETAMINOPHEN 650 MG: 325 TABLET, FILM COATED ORAL at 06:05

## 2023-09-20 RX ADMIN — MELOXICAM 7.5 MG: 7.5 TABLET ORAL at 08:33

## 2023-09-20 RX ADMIN — CLONAZEPAM 1 MG: 0.5 TABLET ORAL at 08:33

## 2023-09-20 RX ADMIN — PHENYTOIN SODIUM 100 MG: 100 CAPSULE ORAL at 08:34

## 2023-09-20 RX ADMIN — INSULIN LISPRO 2 UNITS: 100 INJECTION, SOLUTION INTRAVENOUS; SUBCUTANEOUS at 13:18

## 2023-09-20 RX ADMIN — ZONISAMIDE 200 MG: 100 CAPSULE ORAL at 08:38

## 2023-09-20 RX ADMIN — OXYBUTYNIN CHLORIDE 5 MG: 5 TABLET ORAL at 08:33

## 2023-09-20 RX ADMIN — HYDROCORTISONE: 25 CREAM TOPICAL at 08:38

## 2023-09-20 RX ADMIN — PANTOPRAZOLE SODIUM 40 MG: 40 TABLET, DELAYED RELEASE ORAL at 06:05

## 2023-09-20 RX ADMIN — QUETIAPINE FUMARATE 200 MG: 100 TABLET ORAL at 08:33

## 2023-09-20 RX ADMIN — MAGNESIUM SULFATE HEPTAHYDRATE 2 G: 40 INJECTION, SOLUTION INTRAVENOUS at 10:08

## 2023-09-20 RX ADMIN — FLUTICASONE PROPIONATE 1 SPRAY: 50 SPRAY, METERED NASAL at 08:34

## 2023-09-20 NOTE — PLAN OF CARE
Problem: PAIN - ADULT  Goal: Verbalizes/displays adequate comfort level or baseline comfort level  Description: Interventions:  - Encourage patient to monitor pain and request assistance  - Assess pain using appropriate pain scale  - Administer analgesics based on type and severity of pain and evaluate response  - Implement non-pharmacological measures as appropriate and evaluate response  - Consider cultural and social influences on pain and pain management  - Notify physician/advanced practitioner if interventions unsuccessful or patient reports new pain  Outcome: Progressing     Problem: INFECTION - ADULT  Goal: Absence or prevention of progression during hospitalization  Description: INTERVENTIONS:  - Assess and monitor for signs and symptoms of infection  - Monitor lab/diagnostic results  - Monitor all insertion sites, i.e. indwelling lines, tubes, and drains  - Monitor endotracheal if appropriate and nasal secretions for changes in amount and color  - [place] appropriate cooling/warming therapies per order  - Administer medications as ordered  - Instruct and encourage patient and family to use good hand hygiene technique  - Identify and instruct in appropriate isolation precautions for identified infection/condition  Outcome: Progressing     Problem: SAFETY ADULT  Goal: Patient will remain free of falls  Description: INTERVENTIONS:  - Educate patient/family on patient safety including physical limitations  - Instruct patient to call for assistance with activity   - Consult OT/PT to assist with strengthening/mobility   - Keep Call bell within reach  - Keep bed low and locked with side rails adjusted as appropriate  - Keep care items and personal belongings within reach  - Initiate and maintain comfort rounds  - Make Fall Risk Sign visible to staff  - Offer Toileting every 2 Hours, in advance of need  - Initiate/Maintain bed/ chair alarm  - Apply yellow socks and bracelet for high fall risk patients  - Consider moving patient to room near nurses station  Outcome: Progressing  Goal: Maintain or return to baseline ADL function  Description: INTERVENTIONS:  -  Assess patient's ability to carry out ADLs; assess patient's baseline for ADL function and identify physical deficits which impact ability to perform ADLs (bathing, care of mouth/teeth, toileting, grooming, dressing, etc.)  - Assess/evaluate cause of self-care deficits   - Assess range of motion  - Assess patient's mobility; develop plan if impaired  - Assess patient's need for assistive devices and provide as appropriate  - Encourage maximum independence but intervene and supervise when necessary  - Involve family in performance of ADLs  - Assess for home care needs following discharge   - Consider OT consult to assist with ADL evaluation and planning for discharge  - Provide patient education as appropriate  Outcome: Progressing  Goal: Maintains/Returns to pre admission functional level  Description: INTERVENTIONS:  - Perform BMAT or MOVE assessment daily.   - Set and communicate daily mobility goal to care team and patient/family/caregiver. - Collaborate with rehabilitation services on mobility goals if consulted  - Perform Range of Motion 3-4 times a day. - Reposition patient every 2 hours.   - Dangle patient 3 times a day  - Stand patient 3 times a day  - Ambulate patient 3 times a day  - Out of bed to chair 3 times a day   - Out of bed for meals 3 times a day  - Out of bed for toileting  - Record patient progress and toleration of activity level   Outcome: Progressing     Problem: DISCHARGE PLANNING  Goal: Discharge to home or other facility with appropriate resources  Description: INTERVENTIONS:  - Identify barriers to discharge w/patient and caregiver  - Arrange for needed discharge resources and transportation as appropriate  - Identify discharge learning needs (meds, wound care, etc.)  - Arrange for interpretive services to assist at discharge as needed  - Refer to Case Management Department for coordinating discharge planning if the patient needs post-hospital services based on physician/advanced practitioner order or complex needs related to functional status, cognitive ability, or social support system  Outcome: Progressing     Problem: Knowledge Deficit  Goal: Patient/family/caregiver demonstrates understanding of disease process, treatment plan, medications, and discharge instructions  Description: Complete learning assessment and assess knowledge base.   Interventions:  - Provide teaching at level of understanding  - Provide teaching via preferred learning methods  Outcome: Progressing     Problem: CARDIOVASCULAR - ADULT  Goal: Maintains optimal cardiac output and hemodynamic stability  Description: INTERVENTIONS:  - Monitor I/O, vital signs and rhythm  - Monitor for S/S and trends of decreased cardiac output  - Administer and titrate ordered vasoactive medications to optimize hemodynamic stability  - Assess quality of pulses, skin color and temperature  - Assess for signs of decreased coronary artery perfusion  - Instruct patient to report change in severity of symptoms  Outcome: Progressing  Goal: Absence of cardiac dysrhythmias or at baseline rhythm  Description: INTERVENTIONS:  - Continuous cardiac monitoring, vital signs, obtain 12 lead EKG if ordered  - Administer antiarrhythmic and heart rate control medications as ordered  - Monitor electrolytes and administer replacement therapy as ordered  Outcome: Progressing     Problem: RESPIRATORY - ADULT  Goal: Achieves optimal ventilation and oxygenation  Description: INTERVENTIONS:  - Assess for changes in respiratory status  - Assess for changes in mentation and behavior  - Position to facilitate oxygenation and minimize respiratory effort  - Oxygen administered by appropriate delivery if ordered  - Initiate smoking cessation education as indicated  - Encourage broncho-pulmonary hygiene including cough, deep breathe, Incentive Spirometry  - Assess the need for suctioning and aspirate as needed  - Assess and instruct to report SOB or any respiratory difficulty  - Respiratory Therapy support as indicated  Outcome: Progressing     Problem: GASTROINTESTINAL - ADULT  Goal: Establish and maintain optimal ostomy function  Description: INTERVENTIONS:  - Assess bowel function  - Encourage oral fluids to ensure adequate hydration  - Administer IV fluids if ordered to ensure adequate hydration   - Administer ordered medications as needed  - Encourage mobilization and activity  - Nutrition services referral to assist patient with appropriate food choices  - Assess stoma site  - Consider wound care consult   Outcome: Progressing     Problem: METABOLIC, FLUID AND ELECTROLYTES - ADULT  Goal: Electrolytes maintained within normal limits  Description: INTERVENTIONS:  - Monitor labs and assess patient for signs and symptoms of electrolyte imbalances  - Administer electrolyte replacement as ordered  - Monitor response to electrolyte replacements, including repeat lab results as appropriate  - Instruct patient on fluid and nutrition as appropriate  Outcome: Progressing  Goal: Fluid balance maintained  Description: INTERVENTIONS:  - Monitor labs   - Monitor I/O and WT  - Instruct patient on fluid and nutrition as appropriate  - Assess for signs & symptoms of volume excess or deficit  Outcome: Progressing  Goal: Glucose maintained within target range  Description: INTERVENTIONS:  - Monitor Blood Glucose as ordered  - Assess for signs and symptoms of hyperglycemia and hypoglycemia  - Administer ordered medications to maintain glucose within target range  - Assess nutritional intake and initiate nutrition service referral as needed  Outcome: Progressing     Problem: MOBILITY - ADULT  Goal: Maintain or return to baseline ADL function  Description: INTERVENTIONS:  -  Assess patient's ability to carry out ADLs; assess patient's baseline for ADL function and identify physical deficits which impact ability to perform ADLs (bathing, care of mouth/teeth, toileting, grooming, dressing, etc.)  - Assess/evaluate cause of self-care deficits   - Assess range of motion  - Assess patient's mobility; develop plan if impaired  - Assess patient's need for assistive devices and provide as appropriate  - Encourage maximum independence but intervene and supervise when necessary  - Involve family in performance of ADLs  - Assess for home care needs following discharge   - Consider OT consult to assist with ADL evaluation and planning for discharge  - Provide patient education as appropriate  Outcome: Progressing  Goal: Maintains/Returns to pre admission functional level  Description: INTERVENTIONS:  - Perform BMAT or MOVE assessment daily.   - Set and communicate daily mobility goal to care team and patient/family/caregiver. - Collaborate with rehabilitation services on mobility goals if consulted  - Perform Range of Motion 3-4 times a day. - Reposition patient every 2 hours.   - Dangle patient 3 times a day  - Stand patient 3 times a day  - Ambulate patient 3 times a day  - Out of bed to chair 3 times a day   - Out of bed for meals 3 times a day  - Out of bed for toileting  - Record patient progress and toleration of activity level   Outcome: Progressing     Problem: Prexisting or High Potential for Compromised Skin Integrity  Goal: Skin integrity is maintained or improved  Description: INTERVENTIONS:  - Identify patients at risk for skin breakdown  - Assess and monitor skin integrity  - Assess and monitor nutrition and hydration status  - Monitor labs   - Assess for incontinence   - Turn and reposition patient  - Assist with mobility/ambulation  - Relieve pressure over bony prominences  - Avoid friction and shearing  - Provide appropriate hygiene as needed including keeping skin clean and dry  - Evaluate need for skin moisturizer/barrier cream  - Collaborate with interdisciplinary team   - Patient/family teaching  - Consider wound care consult   Outcome: Progressing

## 2023-09-20 NOTE — ASSESSMENT & PLAN NOTE
· The hypoglycemia resolved with D5 IV fluids & remained stable off   ·  HgbA1c level - 6.3  · Outpatient Endocrinology evaluation  · Plan for dc home with metformin 500mg twice daily alone.    · Discontinue glipizide & Humalog given associated hypoglycemic episodes & risks of ongoing episodes

## 2023-09-20 NOTE — ASSESSMENT & PLAN NOTE
· Received magnesium sulfate 2 grams IV x 1 dose on 09/18/2023 & 9/20/2023      Results from last 7 days   Lab Units 09/20/23  0523 09/19/23  0610 09/18/23  0427   MAGNESIUM mg/dL 1.7* 1.9 1.7*

## 2023-09-20 NOTE — PLAN OF CARE
Problem: PAIN - ADULT  Goal: Verbalizes/displays adequate comfort level or baseline comfort level  Description: Interventions:  - Encourage patient to monitor pain and request assistance  - Assess pain using appropriate pain scale  - Administer analgesics based on type and severity of pain and evaluate response  - Implement non-pharmacological measures as appropriate and evaluate response  - Consider cultural and social influences on pain and pain management  - Notify physician/advanced practitioner if interventions unsuccessful or patient reports new pain  Outcome: Progressing     Problem: INFECTION - ADULT  Goal: Absence or prevention of progression during hospitalization  Description: INTERVENTIONS:  - Assess and monitor for signs and symptoms of infection  - Monitor lab/diagnostic results  - Monitor all insertion sites, i.e. indwelling lines, tubes, and drains  - Monitor endotracheal if appropriate and nasal secretions for changes in amount and color  - [place] appropriate cooling/warming therapies per order  - Administer medications as ordered  - Instruct and encourage patient and family to use good hand hygiene technique  - Identify and instruct in appropriate isolation precautions for identified infection/condition  Outcome: Progressing     Problem: SAFETY ADULT  Goal: Patient will remain free of falls  Description: INTERVENTIONS:  - Educate patient/family on patient safety including physical limitations  - Instruct patient to call for assistance with activity   - Consult OT/PT to assist with strengthening/mobility   - Keep Call bell within reach  - Keep bed low and locked with side rails adjusted as appropriate  - Keep care items and personal belongings within reach  - Initiate and maintain comfort rounds  - Make Fall Risk Sign visible to staff  - Offer Toileting every 2 Hours, in advance of need  - Initiate/Maintain bed/ chair alarm  - Apply yellow socks and bracelet for high fall risk patients  - Consider moving patient to room near nurses station  Outcome: Progressing  Goal: Maintain or return to baseline ADL function  Description: INTERVENTIONS:  -  Assess patient's ability to carry out ADLs; assess patient's baseline for ADL function and identify physical deficits which impact ability to perform ADLs (bathing, care of mouth/teeth, toileting, grooming, dressing, etc.)  - Assess/evaluate cause of self-care deficits   - Assess range of motion  - Assess patient's mobility; develop plan if impaired  - Assess patient's need for assistive devices and provide as appropriate  - Encourage maximum independence but intervene and supervise when necessary  - Involve family in performance of ADLs  - Assess for home care needs following discharge   - Consider OT consult to assist with ADL evaluation and planning for discharge  - Provide patient education as appropriate  Outcome: Progressing  Goal: Maintains/Returns to pre admission functional level  Description: INTERVENTIONS:  - Perform BMAT or MOVE assessment daily.   - Set and communicate daily mobility goal to care team and patient/family/caregiver. - Collaborate with rehabilitation services on mobility goals if consulted  - Perform Range of Motion 3-4 times a day. - Reposition patient every 2 hours.   - Dangle patient 3 times a day  - Stand patient 3 times a day  - Ambulate patient 3 times a day  - Out of bed to chair 3 times a day   - Out of bed for meals 3 times a day  - Out of bed for toileting  - Record patient progress and toleration of activity level   Outcome: Progressing     Problem: DISCHARGE PLANNING  Goal: Discharge to home or other facility with appropriate resources  Description: INTERVENTIONS:  - Identify barriers to discharge w/patient and caregiver  - Arrange for needed discharge resources and transportation as appropriate  - Identify discharge learning needs (meds, wound care, etc.)  - Arrange for interpretive services to assist at discharge as needed  - Refer to Case Management Department for coordinating discharge planning if the patient needs post-hospital services based on physician/advanced practitioner order or complex needs related to functional status, cognitive ability, or social support system  Outcome: Progressing     Problem: Knowledge Deficit  Goal: Patient/family/caregiver demonstrates understanding of disease process, treatment plan, medications, and discharge instructions  Description: Complete learning assessment and assess knowledge base.   Interventions:  - Provide teaching at level of understanding  - Provide teaching via preferred learning methods  Outcome: Progressing     Problem: CARDIOVASCULAR - ADULT  Goal: Maintains optimal cardiac output and hemodynamic stability  Description: INTERVENTIONS:  - Monitor I/O, vital signs and rhythm  - Monitor for S/S and trends of decreased cardiac output  - Administer and titrate ordered vasoactive medications to optimize hemodynamic stability  - Assess quality of pulses, skin color and temperature  - Assess for signs of decreased coronary artery perfusion  - Instruct patient to report change in severity of symptoms  Outcome: Progressing  Goal: Absence of cardiac dysrhythmias or at baseline rhythm  Description: INTERVENTIONS:  - Continuous cardiac monitoring, vital signs, obtain 12 lead EKG if ordered  - Administer antiarrhythmic and heart rate control medications as ordered  - Monitor electrolytes and administer replacement therapy as ordered  Outcome: Progressing     Problem: RESPIRATORY - ADULT  Goal: Achieves optimal ventilation and oxygenation  Description: INTERVENTIONS:  - Assess for changes in respiratory status  - Assess for changes in mentation and behavior  - Position to facilitate oxygenation and minimize respiratory effort  - Oxygen administered by appropriate delivery if ordered  - Initiate smoking cessation education as indicated  - Encourage broncho-pulmonary hygiene including cough, deep breathe, Incentive Spirometry  - Assess the need for suctioning and aspirate as needed  - Assess and instruct to report SOB or any respiratory difficulty  - Respiratory Therapy support as indicated  Outcome: Progressing     Problem: GASTROINTESTINAL - ADULT  Goal: Establish and maintain optimal ostomy function  Description: INTERVENTIONS:  - Assess bowel function  - Encourage oral fluids to ensure adequate hydration  - Administer IV fluids if ordered to ensure adequate hydration   - Administer ordered medications as needed  - Encourage mobilization and activity  - Nutrition services referral to assist patient with appropriate food choices  - Assess stoma site  - Consider wound care consult   Outcome: Progressing     Problem: METABOLIC, FLUID AND ELECTROLYTES - ADULT  Goal: Electrolytes maintained within normal limits  Description: INTERVENTIONS:  - Monitor labs and assess patient for signs and symptoms of electrolyte imbalances  - Administer electrolyte replacement as ordered  - Monitor response to electrolyte replacements, including repeat lab results as appropriate  - Instruct patient on fluid and nutrition as appropriate  Outcome: Progressing  Goal: Fluid balance maintained  Description: INTERVENTIONS:  - Monitor labs   - Monitor I/O and WT  - Instruct patient on fluid and nutrition as appropriate  - Assess for signs & symptoms of volume excess or deficit  Outcome: Progressing  Goal: Glucose maintained within target range  Description: INTERVENTIONS:  - Monitor Blood Glucose as ordered  - Assess for signs and symptoms of hyperglycemia and hypoglycemia  - Administer ordered medications to maintain glucose within target range  - Assess nutritional intake and initiate nutrition service referral as needed  Outcome: Progressing     Problem: MOBILITY - ADULT  Goal: Maintain or return to baseline ADL function  Description: INTERVENTIONS:  -  Assess patient's ability to carry out ADLs; assess patient's baseline for ADL function and identify physical deficits which impact ability to perform ADLs (bathing, care of mouth/teeth, toileting, grooming, dressing, etc.)  - Assess/evaluate cause of self-care deficits   - Assess range of motion  - Assess patient's mobility; develop plan if impaired  - Assess patient's need for assistive devices and provide as appropriate  - Encourage maximum independence but intervene and supervise when necessary  - Involve family in performance of ADLs  - Assess for home care needs following discharge   - Consider OT consult to assist with ADL evaluation and planning for discharge  - Provide patient education as appropriate  Outcome: Progressing  Goal: Maintains/Returns to pre admission functional level  Description: INTERVENTIONS:  - Perform BMAT or MOVE assessment daily.   - Set and communicate daily mobility goal to care team and patient/family/caregiver. - Collaborate with rehabilitation services on mobility goals if consulted  - Perform Range of Motion 3-4 times a day. - Reposition patient every 2 hours.   - Dangle patient 3 times a day  - Stand patient 3 times a day  - Ambulate patient 3 times a day  - Out of bed to chair 3 times a day   - Out of bed for meals 3 times a day  - Out of bed for toileting  - Record patient progress and toleration of activity level   Outcome: Progressing     Problem: Prexisting or High Potential for Compromised Skin Integrity  Goal: Skin integrity is maintained or improved  Description: INTERVENTIONS:  - Identify patients at risk for skin breakdown  - Assess and monitor skin integrity  - Assess and monitor nutrition and hydration status  - Monitor labs   - Assess for incontinence   - Turn and reposition patient  - Assist with mobility/ambulation  - Relieve pressure over bony prominences  - Avoid friction and shearing  - Provide appropriate hygiene as needed including keeping skin clean and dry  - Evaluate need for skin moisturizer/barrier cream  - Collaborate with interdisciplinary team   - Patient/family teaching  - Consider wound care consult   Outcome: Progressing

## 2023-09-20 NOTE — CASE MANAGEMENT
Case Management Discharge Planning Note    Patient name Akilah Liter  Location [address] [facility] [address]/MS 0 MRN [id number]  : 1955 Date 2023       Current Admission Date: 2023  Current Admission Diagnosis:Hypotension   Patient Active Problem List    Diagnosis Date Noted   • Gait instability 2023   • Elevated MCV 2023   • Skin rash 2023   • Rash 2023   • Type 2 diabetes mellitus with hypoglycemia without coma, with long-term current use of insulin ([address]) 2023   • BMI 33.0-33.9,adult 2019   • Iron deficiency anemia, unspecified 2019   • Right lower quadrant abdominal pain 2019   • Pressure injury of sacral region, stage 3 ([address]) 2019   • Pressure ulcer of left buttock, stage 3 ([address]) 2019   • Pressure ulcer of right buttock, stage 2 ([address]) 2019   • Generalized weakness 2019   • Leukopenia 2019   • Neutropenia ([address]) 2019   • Low TSH level 2019   • Sinus arrhythmia 2019   • Lactic acidosis 2019   • Recurrent falls 2019   • Acute hyponatremia 2019   • Hypomagnesemia 2019   • Hypotension 2019   • Hypertriglyceridemia 2019   • Vaginal candidiasis 2019   • GERD without esophagitis 2018   • Urinary incontinence 2018   • Obesity due to excess calories 10/31/2018   • E. coli UTI 10/31/2018   • Hypercholesterolemia 10/31/2018   • Ambulatory dysfunction 10/30/2018   • NSTEMI (non-ST elevated myocardial infarction) ([address]) 10/29/2018   • Volume overload 10/29/2018   • Skin breakdown 10/29/2018   • Asthma 2016   • Seizure disorder ([address]) 2014   • Dermatitis 2007   • Bipolar disorder ([address]) 2007   • Acquired hypothyroidism 2007      LOS (days): 2  Geometric Mean LOS (GMLOS) (days): 2.90  Days to GMLOS:0.5     OBJECTIVE:  Risk of Unplanned Readmission Score: 19.34         Current admission status: Inpatient   Preferred Pharmacy:   [facility] - [address] [facility]  [address] [facility] [address]  Phone: [phone] Fax: [phone]    Primary Care Provider: Ruchi Smith    Primary Insurance: Soni Baker [facility] REP  Secondary Insurance: [facility]    DISCHARGE DETAILS:    Discharge planning discussed with[de-identified] patient  Freedom of Choice: Yes  Comments - Freedom of Choice: CM discussed  options for Home health patient preference [facility] secured in aidin  CM contacted family/caregiver?: No- see comments (no emergency contact listed nor will patient provide person)  Were Treatment Team discharge recommendations reviewed with patient/caregiver?: Yes  Did patient/caregiver verbalize understanding of patient care needs?: Yes  Were patient/caregiver advised of the risks associated with not following Treatment Team discharge recommendations?: Yes         [facility] Name[de-identified] Residential              Would you like to participate in our [facility] [place] service program?  : No - Declined    Treatment Team Recommendation: Home with [facility] ([facility] home health)  Discharge Destination Plan[de-identified] Home with [address] at Discharge : [facility] spoke with patient and provided choices for home health. Patient preference is [facility]. Secured in aidin     CM spoke with Tee Babcock at [facility] [employer] aide services, she stated patient is no longer with them for Aide services. [facility] on March 1,2023. Verl Smart at [employer] [facility] [employer] stated they will bring her back,Crystal  will reach out to her today. CM requested referral with noe [facility] patient  for patient. Transport confirmed  for 1400 via [employer]  w./c [facility] on round trip.

## 2023-09-20 NOTE — ASSESSMENT & PLAN NOTE
· Initial SBP in the 60's-80's mmHg  · Discontinue all hypertensive medications indefinitely  · The hypotension has now resolved.   · BP remained stable off IVF   · Blood cultures negative x 48 hours, UC without significant growth  · CT c/a/p without acute abnormality   · Close outpatient follow-up with PCP

## 2023-09-20 NOTE — DISCHARGE SUMMARY
[address]  Discharge- Akilah Liter 1955, 79 y.o. female MRN: [id number]  Unit/Bed#:  Encounter: [id number]  Primary Care Provider: Maricruz Flores   Date and time admitted to hospital: 9/17/2023  9:42 PM    * Hypotension  Assessment & Plan  · Initial SBP in the 60's-80's mmHg  · Discontinue all hypertensive medications indefinitely  · The hypotension has now resolved. · BP remained stable off IVF   · Blood cultures negative x 48 hours, UC without significant growth  · CT c/a/p without acute abnormality   · Close outpatient follow-up with PCP          Type 2 diabetes mellitus with hypoglycemia without coma, with long-term current use of insulin ([facility])  Assessment & Plan    · The hypoglycemia resolved with D5 IV fluids & remained stable off   ·  HgbA1c level - 6.3  · Outpatient Endocrinology evaluation  · Plan for dc home with metformin 500mg twice daily alone.    · Discontinue glipizide & Humalog given associated hypoglycemic episodes & risks of ongoing episodes       Lactic acidosis  Assessment & Plan  · Due to hypotension and chronic metformin use  · Resolved with IV fluids    Iron deficiency anemia, unspecified  Assessment & Plan  · Continue PO ferrous sulafte    Hypomagnesemia  Assessment & Plan  · Received magnesium sulfate 2 grams IV x 1 dose on 09/18/2023 & 9/20/2023      Results from last 7 days   Lab Units 09/20/23  0523 09/19/23  0610 09/18/23  0427   MAGNESIUM mg/dL 1.7* 1.9 1.7*         Recurrent falls  Assessment & Plan  · PT/OT evaluation - plan for discharge with [facility] [address]    Ambulatory dysfunction  Assessment & Plan  · Discharged with [address] services   · Declines STR    Seizure disorder ([address])  Assessment & Plan  · Continue PO Keppra, PO Dilantin, and PO gabapentin  · Outpatient follow-up with Neurology        Acquired hypothyroidism  Assessment & Plan  · Continue PO levothyroxine     Latest Reference Range & Units 09/17/23 22:12   TSH 3RD GENERATON 0.450 - 4.500 uIU/mL 0.511         Bipolar disorder ([address])  Assessment & Plan  · Continue gabapentin, Klonopin, and Seroquel  · Outpatient follow-up with Psychiatry      Medical Problems     Resolved Problems  Date Reviewed: 9/20/2023   None       Discharging Physician / Practitioner: Brenda Davies PA-C  PCP: Jose Lechuga  Admission Date:   Admission Orders (From admission, onward)     Ordered        09/18/23 0028  Inpatient Admission  Once                      Discharge Date: 09/20/23    Consultations During Hospital Stay:  · PT/OT/CM    Procedures Performed:   · None    Significant Findings / Test Results:   · CTH: No intracranial hemorrhage or calvarial fracture  · CT cervical spine: No cervical spine fracture or traumatic malalignment. · CT c/a/p:No acute traumatic injury identified  · XR hip/pelvis: No acute osseous abnormality. ·  CT thoracolumbar: No fracture or traumatic subluxation  · Lactic acid 4.2, resolved  · TSH 0.511  · Hs trop 3  · A1c 6.3,   · Blood cultures: NG at 48 hours  · UC 40,000-49,000 cfu     Incidental Findings:   · None    Test Results Pending at Discharge (will require follow up): · Final blood culture      Outpatient Tests Requested:  · None    Complications:  none    Reason for Admission: hypotension    Hospital Course:   Chana Gilford is a 79 y.o. female patient who originally presented to the hospital on 9/17/2023 due to hypotension and hypoglycemia. She was treated with 30cc/kg fluid bolus with resolution of hypotension and lactic acidosis. Underwent infectious workup with CT c/a/p, CXR, blood cultures, urine cultures which was unrevealing. BP remained stable off IVF and with antihypertensives discontinued. Plan for discharge with lisinopril & metoprolol held until follow-up with PCP. Patient also initially requiring D5 infusion for hypoglycemia, was weaned off with stabilization of blood glucose. As above, no infectious etiology suspected. TSH wnl.  Suspect exogenous insulin use & oral antihyperglycemic medications cause given reports of low BG readings at home & A1c at goal. Blood glucose as remained well controlled off home regimen, only seldom requiring Humalog coverage of 1-2 units. Insulin and glimepiride were discontinued, patient was instructed to decrease metformin 500mg twice daily and close follow-up with endocrinology. Please see above list of diagnoses and related plan for additional information. Condition at Discharge: good    Discharge Day Visit / Exam:   Subjective:  Reports feeling stronger today. Ambulating to bathroom with walker. Denies sensation of shaky legs. Vitals: Blood Pressure: 127/67 (09/20/23 0636)  Pulse: 93 (09/20/23 0636)  Temperature: 97.6 °F (36.4 °C) (09/20/23 0636)  Temp Source: Temporal (09/20/23 0636)  Respirations: 20 (09/20/23 0636)  Height: 5' 7" (170.2 cm) (09/17/23 2156)  Weight - Scale: 118 kg (259 lb 14.8 oz) (09/20/23 0600)  SpO2: 94 % (09/20/23 0636)  Exam:   Physical Exam  Constitutional:       Appearance: She is obese. HENT:      Head: Normocephalic and atraumatic. Cardiovascular:      Rate and Rhythm: Normal rate and regular rhythm. Pulses: Normal pulses. Heart sounds: Normal heart sounds. Pulmonary:      Effort: Pulmonary effort is normal. No respiratory distress. Breath sounds: Normal breath sounds. Abdominal:      General: Abdomen is flat. Bowel sounds are normal. There is no distension. Musculoskeletal:      Right lower leg: No edema. Left lower leg: No edema. Skin:     General: Skin is warm and dry. Neurological:      General: No focal deficit present. Mental Status: She is alert and oriented to person, place, and time. Psychiatric:         Mood and Affect: Mood normal.         Behavior: Behavior normal.          Discussion with Family: Patient declined call to .      Discharge instructions/Information to patient and family:   See after visit summary for information provided to patient and family. Provisions for Follow-Up Care:  See after visit summary for information related to follow-up care and any pertinent home health orders. Disposition:   Home with [facility] (Reminder: Complete face to face encounter)    Planned Readmission: none     Discharge Statement:  I spent 35 minutes discharging the patient. This time was spent on the day of discharge. I had direct contact with the patient on the day of discharge. Greater than 50% of the total time was spent examining patient, answering all patient questions, arranging and discussing plan of care with patient as well as directly providing post-discharge instructions. Additional time then spent on discharge activities. Discharge Medications:  See after visit summary for reconciled discharge medications provided to patient and/or family.       **Please Note: This note may have been constructed using a voice recognition system**

## 2023-09-21 LAB
BACTERIA UR CULT: ABNORMAL
BACTERIA UR CULT: ABNORMAL

## 2023-09-21 NOTE — UTILIZATION REVIEW
NOTIFICATION OF ADMISSION DISCHARGE   This is a Notification of Discharge from [facility]. Please be advised that this patient has been discharge from our facility. Below you will find the admission and discharge date and time including the patient’s disposition. UTILIZATION REVIEW CONTACT:  Gemma Clark  Utilization   Network Utilization Review Department  Phone: [phone] x carefully listen to the prompts. All voicemails are confidential.  Email: [email or web address]     ADMISSION INFORMATION  PRESENTATION DATE: 9/17/2023  9:42 PM  OBERVATION ADMISSION DATE:   INPATIENT ADMISSION DATE: 9/18/23 12:28 AM   DISCHARGE DATE: 9/20/2023  2:20 PM   DISPOSITION:Home with Home Health Care    IMPORTANT INFORMATION:  Send all requests for admission clinical reviews, approved or denied determinations and any other requests to dedicated fax number below belonging to the campus where the patient is receiving treatment.  List of dedicated fax numbers:  [place] DENIALS (Administrative/Medical Necessity) [phone] [facility] (Maternity/NICU/Pediatrics) [phone]   [facility] [phone]   [facility] [phone] [facility] [phone]   [address] [place] [address] [phone]   [facility] [phone]   [address] [place] [address] [address] [place] [address] [phone]   [address] [place] [address] [phone] [facility] [phone]   [facility] [address] [facility] [phone]

## 2023-09-23 LAB
BACTERIA BLD CULT: NORMAL
BACTERIA BLD CULT: NORMAL

## 2023-10-02 ENCOUNTER — HOSPITAL ENCOUNTER (INPATIENT)
Facility: HOSPITAL | Age: 68
LOS: 1 days | Discharge: HOME | DRG: 607 | End: 2023-10-03
Attending: EMERGENCY MEDICINE | Admitting: FAMILY MEDICINE
Payer: COMMERCIAL

## 2023-10-02 DIAGNOSIS — R32 URINARY INCONTINENCE, UNSPECIFIED TYPE: ICD-10-CM

## 2023-10-02 DIAGNOSIS — N39.0 UTI (URINARY TRACT INFECTION): Primary | ICD-10-CM

## 2023-10-02 DIAGNOSIS — E11.65 TYPE 2 DIABETES MELLITUS WITH HYPERGLYCEMIA, WITH LONG-TERM CURRENT USE OF INSULIN (HCC): ICD-10-CM

## 2023-10-02 DIAGNOSIS — N30.00 ACUTE CYSTITIS: ICD-10-CM

## 2023-10-02 DIAGNOSIS — B37.2 CUTANEOUS CANDIDIASIS: ICD-10-CM

## 2023-10-02 DIAGNOSIS — Z79.4 TYPE 2 DIABETES MELLITUS WITH HYPERGLYCEMIA, WITH LONG-TERM CURRENT USE OF INSULIN (HCC): ICD-10-CM

## 2023-10-02 LAB
ALBUMIN SERPL BCP-MCNC: 3.5 G/DL (ref 3.5–5)
ALP SERPL-CCNC: 86 U/L (ref 34–104)
ALT SERPL W P-5'-P-CCNC: 16 U/L (ref 7–52)
ANION GAP SERPL CALCULATED.3IONS-SCNC: 10 MMOL/L
AST SERPL W P-5'-P-CCNC: 15 U/L (ref 13–39)
BACTERIA UR QL AUTO: ABNORMAL /HPF
BASOPHILS # BLD AUTO: 0.04 THOUSANDS/ÂΜL (ref 0–0.1)
BASOPHILS NFR BLD AUTO: 1 % (ref 0–1)
BILIRUB SERPL-MCNC: 0.22 MG/DL (ref 0.2–1)
BILIRUB UR QL STRIP: NEGATIVE
BUN SERPL-MCNC: 8 MG/DL (ref 5–25)
CALCIUM SERPL-MCNC: 9 MG/DL (ref 8.4–10.2)
CHLORIDE SERPL-SCNC: 102 MMOL/L (ref 96–108)
CLARITY UR: CLEAR
CO2 SERPL-SCNC: 24 MMOL/L (ref 21–32)
COLOR UR: YELLOW
CREAT SERPL-MCNC: 0.62 MG/DL (ref 0.6–1.3)
EOSINOPHIL # BLD AUTO: 0.76 THOUSAND/ÂΜL (ref 0–0.61)
EOSINOPHIL NFR BLD AUTO: 13 % (ref 0–6)
ERYTHROCYTE [DISTWIDTH] IN BLOOD BY AUTOMATED COUNT: 13.9 % (ref 11.6–15.1)
GFR SERPL CREATININE-BSD FRML MDRD: 94 ML/MIN/1.73SQ M
GLUCOSE SERPL-MCNC: 113 MG/DL (ref 65–140)
GLUCOSE SERPL-MCNC: 177 MG/DL (ref 65–140)
GLUCOSE UR STRIP-MCNC: NEGATIVE MG/DL
HCT VFR BLD AUTO: 38.1 % (ref 34.8–46.1)
HGB BLD-MCNC: 12.4 G/DL (ref 11.5–15.4)
HGB UR QL STRIP.AUTO: NEGATIVE
IMM GRANULOCYTES # BLD AUTO: 0.03 THOUSAND/UL (ref 0–0.2)
IMM GRANULOCYTES NFR BLD AUTO: 1 % (ref 0–2)
KETONES UR STRIP-MCNC: NEGATIVE MG/DL
LEUKOCYTE ESTERASE UR QL STRIP: ABNORMAL
LYMPHOCYTES # BLD AUTO: 1.2 THOUSANDS/ÂΜL (ref 0.6–4.47)
LYMPHOCYTES NFR BLD AUTO: 21 % (ref 14–44)
MCH RBC QN AUTO: 32.8 PG (ref 26.8–34.3)
MCHC RBC AUTO-ENTMCNC: 32.5 G/DL (ref 31.4–37.4)
MCV RBC AUTO: 101 FL (ref 82–98)
MONOCYTES # BLD AUTO: 0.43 THOUSAND/ÂΜL (ref 0.17–1.22)
MONOCYTES NFR BLD AUTO: 7 % (ref 4–12)
NEUTROPHILS # BLD AUTO: 3.4 THOUSANDS/ÂΜL (ref 1.85–7.62)
NEUTS SEG NFR BLD AUTO: 57 % (ref 43–75)
NITRITE UR QL STRIP: POSITIVE
NON-SQ EPI CELLS URNS QL MICRO: ABNORMAL /HPF
NRBC BLD AUTO-RTO: 0 /100 WBCS
PH UR STRIP.AUTO: 7 [PH]
PLATELET # BLD AUTO: 221 THOUSANDS/UL (ref 149–390)
PMV BLD AUTO: 9 FL (ref 8.9–12.7)
POTASSIUM SERPL-SCNC: 3.9 MMOL/L (ref 3.5–5.3)
PROT SERPL-MCNC: 6.6 G/DL (ref 6.4–8.4)
PROT UR STRIP-MCNC: NEGATIVE MG/DL
RBC # BLD AUTO: 3.78 MILLION/UL (ref 3.81–5.12)
RBC #/AREA URNS AUTO: ABNORMAL /HPF
SODIUM SERPL-SCNC: 136 MMOL/L (ref 135–147)
SP GR UR STRIP.AUTO: 1.01 (ref 1–1.03)
UROBILINOGEN UR QL STRIP.AUTO: 0.2 E.U./DL
WBC # BLD AUTO: 5.86 THOUSAND/UL (ref 4.31–10.16)
WBC #/AREA URNS AUTO: ABNORMAL /HPF
WBC CLUMPS # UR AUTO: PRESENT /UL

## 2023-10-02 PROCEDURE — 99223 1ST HOSP IP/OBS HIGH 75: CPT | Performed by: NURSE PRACTITIONER

## 2023-10-02 PROCEDURE — 82948 REAGENT STRIP/BLOOD GLUCOSE: CPT

## 2023-10-02 PROCEDURE — 80053 COMPREHEN METABOLIC PANEL: CPT | Performed by: EMERGENCY MEDICINE

## 2023-10-02 PROCEDURE — 87086 URINE CULTURE/COLONY COUNT: CPT | Performed by: NURSE PRACTITIONER

## 2023-10-02 PROCEDURE — 93005 ELECTROCARDIOGRAM TRACING: CPT

## 2023-10-02 PROCEDURE — 36415 COLL VENOUS BLD VENIPUNCTURE: CPT | Performed by: EMERGENCY MEDICINE

## 2023-10-02 PROCEDURE — 81001 URINALYSIS AUTO W/SCOPE: CPT | Performed by: NURSE PRACTITIONER

## 2023-10-02 PROCEDURE — 99285 EMERGENCY DEPT VISIT HI MDM: CPT

## 2023-10-02 PROCEDURE — 99285 EMERGENCY DEPT VISIT HI MDM: CPT | Performed by: EMERGENCY MEDICINE

## 2023-10-02 PROCEDURE — 87077 CULTURE AEROBIC IDENTIFY: CPT | Performed by: NURSE PRACTITIONER

## 2023-10-02 PROCEDURE — 87186 SC STD MICRODIL/AGAR DIL: CPT | Performed by: NURSE PRACTITIONER

## 2023-10-02 PROCEDURE — 85025 COMPLETE CBC W/AUTO DIFF WBC: CPT | Performed by: EMERGENCY MEDICINE

## 2023-10-02 RX ORDER — ACETAMINOPHEN 325 MG/1
650 TABLET ORAL EVERY 6 HOURS PRN
Status: DISCONTINUED | OUTPATIENT
Start: 2023-10-02 | End: 2023-10-03 | Stop reason: HOSPADM

## 2023-10-02 RX ORDER — PANTOPRAZOLE SODIUM 40 MG/1
40 TABLET, DELAYED RELEASE ORAL 2 TIMES DAILY
Status: DISCONTINUED | OUTPATIENT
Start: 2023-10-02 | End: 2023-10-03 | Stop reason: HOSPADM

## 2023-10-02 RX ORDER — FERROUS SULFATE 325(65) MG
325 TABLET ORAL
Status: DISCONTINUED | OUTPATIENT
Start: 2023-10-03 | End: 2023-10-03 | Stop reason: HOSPADM

## 2023-10-02 RX ORDER — MONTELUKAST SODIUM 10 MG/1
10 TABLET ORAL
Status: DISCONTINUED | OUTPATIENT
Start: 2023-10-02 | End: 2023-10-03 | Stop reason: HOSPADM

## 2023-10-02 RX ORDER — LEVOTHYROXINE SODIUM 175 UG/1
175 TABLET ORAL
Status: DISCONTINUED | OUTPATIENT
Start: 2023-10-03 | End: 2023-10-03 | Stop reason: HOSPADM

## 2023-10-02 RX ORDER — MONTELUKAST SODIUM 10 MG/1
10 TABLET ORAL
COMMUNITY

## 2023-10-02 RX ORDER — ZONISAMIDE 100 MG/1
200 CAPSULE ORAL 2 TIMES DAILY
Status: DISCONTINUED | OUTPATIENT
Start: 2023-10-03 | End: 2023-10-03 | Stop reason: HOSPADM

## 2023-10-02 RX ORDER — FLUTICASONE PROPIONATE 50 MCG
1 SPRAY, SUSPENSION (ML) NASAL 2 TIMES DAILY
Status: DISCONTINUED | OUTPATIENT
Start: 2023-10-03 | End: 2023-10-03 | Stop reason: HOSPADM

## 2023-10-02 RX ORDER — ACETAMINOPHEN 325 MG/1
650 TABLET ORAL 3 TIMES DAILY
Status: DISCONTINUED | OUTPATIENT
Start: 2023-10-02 | End: 2023-10-03 | Stop reason: HOSPADM

## 2023-10-02 RX ORDER — GABAPENTIN 400 MG/1
800 CAPSULE ORAL 4 TIMES DAILY
Status: DISCONTINUED | OUTPATIENT
Start: 2023-10-02 | End: 2023-10-03 | Stop reason: HOSPADM

## 2023-10-02 RX ORDER — PHENYTOIN SODIUM 100 MG/1
200 CAPSULE, EXTENDED RELEASE ORAL
Status: DISCONTINUED | OUTPATIENT
Start: 2023-10-03 | End: 2023-10-03 | Stop reason: HOSPADM

## 2023-10-02 RX ORDER — VALACYCLOVIR HYDROCHLORIDE 500 MG/1
500 TABLET, FILM COATED ORAL 2 TIMES DAILY
Status: DISCONTINUED | OUTPATIENT
Start: 2023-10-02 | End: 2023-10-03 | Stop reason: HOSPADM

## 2023-10-02 RX ORDER — ALBUTEROL SULFATE 90 UG/1
2 AEROSOL, METERED RESPIRATORY (INHALATION) EVERY 6 HOURS PRN
Status: DISCONTINUED | OUTPATIENT
Start: 2023-10-02 | End: 2023-10-03 | Stop reason: HOSPADM

## 2023-10-02 RX ORDER — PHENYTOIN SODIUM 100 MG/1
300 CAPSULE, EXTENDED RELEASE ORAL DAILY
Status: DISCONTINUED | OUTPATIENT
Start: 2023-10-03 | End: 2023-10-03 | Stop reason: HOSPADM

## 2023-10-02 RX ORDER — DOCUSATE SODIUM 100 MG/1
100 CAPSULE, LIQUID FILLED ORAL 2 TIMES DAILY
Status: DISCONTINUED | OUTPATIENT
Start: 2023-10-03 | End: 2023-10-03 | Stop reason: HOSPADM

## 2023-10-02 RX ORDER — ENOXAPARIN SODIUM 100 MG/ML
40 INJECTION SUBCUTANEOUS DAILY
Status: DISCONTINUED | OUTPATIENT
Start: 2023-10-03 | End: 2023-10-03 | Stop reason: HOSPADM

## 2023-10-02 RX ORDER — LEVETIRACETAM 500 MG/1
1000 TABLET ORAL 2 TIMES DAILY
Status: DISCONTINUED | OUTPATIENT
Start: 2023-10-02 | End: 2023-10-03 | Stop reason: HOSPADM

## 2023-10-02 RX ORDER — QUETIAPINE FUMARATE 200 MG/1
400 TABLET, FILM COATED ORAL 2 TIMES DAILY
Status: DISCONTINUED | OUTPATIENT
Start: 2023-10-02 | End: 2023-10-03 | Stop reason: HOSPADM

## 2023-10-02 RX ORDER — NYSTATIN 100000 [USP'U]/G
POWDER TOPICAL 2 TIMES DAILY
Status: DISCONTINUED | OUTPATIENT
Start: 2023-10-02 | End: 2023-10-03 | Stop reason: HOSPADM

## 2023-10-02 RX ORDER — INSULIN LISPRO 100 [IU]/ML
1-6 INJECTION, SOLUTION INTRAVENOUS; SUBCUTANEOUS
Status: DISCONTINUED | OUTPATIENT
Start: 2023-10-02 | End: 2023-10-03 | Stop reason: HOSPADM

## 2023-10-02 RX ORDER — OXYBUTYNIN CHLORIDE 5 MG/1
5 TABLET ORAL 3 TIMES DAILY
Status: DISCONTINUED | OUTPATIENT
Start: 2023-10-02 | End: 2023-10-03 | Stop reason: HOSPADM

## 2023-10-02 RX ORDER — PRAVASTATIN SODIUM 80 MG/1
80 TABLET ORAL
Status: DISCONTINUED | OUTPATIENT
Start: 2023-10-02 | End: 2023-10-03 | Stop reason: HOSPADM

## 2023-10-02 RX ORDER — MELOXICAM 7.5 MG/1
7.5 TABLET ORAL 2 TIMES DAILY
Status: DISCONTINUED | OUTPATIENT
Start: 2023-10-02 | End: 2023-10-03 | Stop reason: HOSPADM

## 2023-10-02 RX ORDER — INSULIN LISPRO 100 [IU]/ML
1-6 INJECTION, SOLUTION INTRAVENOUS; SUBCUTANEOUS
Status: DISCONTINUED | OUTPATIENT
Start: 2023-10-03 | End: 2023-10-03 | Stop reason: HOSPADM

## 2023-10-02 RX ADMIN — GABAPENTIN 800 MG: 400 CAPSULE ORAL at 23:04

## 2023-10-02 RX ADMIN — QUETIAPINE FUMARATE 400 MG: 200 TABLET ORAL at 23:03

## 2023-10-02 RX ADMIN — NYSTATIN 1 APPLICATION: 100000 POWDER TOPICAL at 23:07

## 2023-10-02 RX ADMIN — MELOXICAM 7.5 MG: 7.5 TABLET ORAL at 23:04

## 2023-10-02 RX ADMIN — PANTOPRAZOLE SODIUM 40 MG: 40 TABLET, DELAYED RELEASE ORAL at 23:03

## 2023-10-02 RX ADMIN — OXYBUTYNIN CHLORIDE 5 MG: 5 TABLET ORAL at 23:03

## 2023-10-02 RX ADMIN — MONTELUKAST 10 MG: 10 TABLET, FILM COATED ORAL at 23:03

## 2023-10-02 RX ADMIN — VALACYCLOVIR HYDROCHLORIDE 500 MG: 500 TABLET, FILM COATED ORAL at 23:03

## 2023-10-02 RX ADMIN — PRAVASTATIN SODIUM 80 MG: 80 TABLET ORAL at 23:04

## 2023-10-02 RX ADMIN — AZTREONAM 1000 MG: 1 INJECTION, POWDER, LYOPHILIZED, FOR SOLUTION INTRAMUSCULAR; INTRAVENOUS at 20:27

## 2023-10-02 RX ADMIN — AZTREONAM 1000 MG: 1 INJECTION, POWDER, LYOPHILIZED, FOR SOLUTION INTRAMUSCULAR; INTRAVENOUS at 23:26

## 2023-10-02 RX ADMIN — LEVETIRACETAM 1000 MG: 500 TABLET, FILM COATED ORAL at 23:03

## 2023-10-02 RX ADMIN — ACETAMINOPHEN 650 MG: 325 TABLET, FILM COATED ORAL at 23:03

## 2023-10-02 NOTE — ED PROVIDER NOTES
Bed: 08  Expected date:   Expected time:   Means of arrival:   Comments:  1st triage   History  Chief Complaint   Patient presents with   • Evaluation of Abnormal Diagnostic Test     Pt sent from PCP for abnormal urine culture     Patient recently discharged from hospital, currently on [facility] for treatment of urinary tract infection had urine culture come back with Pseudomonas resistant to many antibiotics. Patient has multiple antibiotic allergies. Based on this, advised by PCP to return to the hospital for treatment with IV antibiotic. Patient has no new complaints at this time. History provided by:  Patient   used: No    Evaluation of Abnormal Diagnostic Test  Patient referred by:  PCP  Resulting agency:  [facility]  Result type: cultures    Cultures:     Urine culture: abnormal (Pseudomonas)        Prior to Admission Medications   Prescriptions Last Dose Informant Patient Reported? Taking? Acetaminophen (Tylenol) 325 MG CAPS   Yes No   Sig: Take 650 mg by mouth Three times daily as needed   Blood Glucose Monitoring Suppl (BLOOD GLUCOSE MONITOR SYSTEM) w/Device KIT   No No   Sig: Test blood sugars 3 times a day   Diclofenac Sodium (VOLTAREN) 1 %   Yes No   Sig: APPLY TO AFFECTED AREA EVERY 6 HOURS IF NEEDED FOR PAIN. Docusate Sodium (DSS) 100 MG CAPS   Yes No   Sig: Take 1 capsule by mouth 2 (two) times a day as needed   Incontinence Supply Disposable (PADSORBER BED PAN LINERS) MISC   No No   Sig: by Does not apply route as needed (incontinence)   Infant Care Products (CUTIES SENSITIVE WIPES) MISC   No No   Si Pieces by Does not apply route as needed (incontinence)   Misc. Devices (MATTRESS PAD) MISC   No No   Sig: by Does not apply route daily   ONETOUCH DELICA LANCETS 95M MISC   No No   Sig: Test blood sugars 3 times a day.    QUEtiapine (SEROquel) 400 MG tablet   No No   Sig: Take 1 tablet (400 mg total) by mouth daily at bedtime for 2 doses   Patient taking differently: Take 400 mg by mouth daily   albuterol (VENTOLIN HFA) 90 mcg/act inhaler   No No   Sig: Inhale 2 puffs every 6 (six) hours as needed for wheezing   aspirin (ECOTRIN LOW STRENGTH) 81 mg EC tablet   No No   Sig: Take 1 tablet (81 mg total) by mouth daily   ferrous sulfate (FeroSul) 325 (65 Fe) mg tablet   No No   Sig: Take 1 tablet (325 mg total) by mouth daily with breakfast   Patient not taking: Reported on 2023   fluticasone (FLONASE) 50 mcg/act nasal spray   No No   Sig: SPRAY ONE (1) SPRAY INTO EACH NOSTRIL ONCE DAILY   Patient taking differently: 1 spray 2 (two) times a day SPRAY ONE (1) SPRAY INTO EACH NOSTRIL ONCE DAILY   gabapentin (NEURONTIN) 800 mg tablet   No No   Sig: Take 1 tablet (800 mg total) by mouth 4 (four) times a day for 3 days   glucose blood (ONE TOUCH ULTRA TEST) test strip   No No   Sig: Test blood sugars 3 times a day. guaiFENesin 400 mg   Yes No   Sig: Take 400 mg by mouth 2 (two) times a day   hydrOXYzine pamoate (VISTARIL) 50 mg capsule   No No   Sig: Take 1 capsule (50 mg total) by mouth 3 (three) times a day   levETIRAcetam (KEPPRA) 750 mg tablet   No No   Sig: Take 2 tablets (1,500 mg total) by mouth 2 (two) times a day for 15 days Pt states she takes 1500MG Bid   Patient taking differently: Take 750 mg by mouth 2 (two) times a day   levothyroxine 175 mcg tablet   Yes No   Sig: Take 175 mcg by mouth in the morning   magnesium chloride (MAG64) 64 MG TBEC EC tablet   No No   Sig: Take 1 tablet (64 mg total) by mouth 2 (two) times a day   meloxicam (MOBIC) 7.5 mg tablet   No No   Sig: Take 1 tablet (7.5 mg total) by mouth 2 (two) times a day   metFORMIN (GLUCOPHAGE) 500 mg tablet   No No   Sig: Take 1 tablet (500 mg total) by mouth 2 (two) times a day with meals   multivitamin (THERAGRAN) TABS   Yes No   Sig: Take 1 tablet by mouth daily   nystatin (MYCOSTATIN) ointment   Yes No   Si application.  2 (two) times a day   omeprazole (PriLOSEC) 40 MG capsule   Yes No   Sig: Take 40 mg by mouth 2 (two) times a day   oxybutynin (DITROPAN) 5 mg tablet   No No   Sig: Take 1 tablet (5 mg total) by mouth 3 (three) times a day   phenytoin (DILANTIN) 100 mg ER capsule   Yes No   Sig: Take by mouth 2 (two) times a day 3 tablets in morning and 2 at lunch daily   polyethylene glycol (GLYCOLAX) powder   No No   Sig: Take 17 g by mouth daily   Patient taking differently: Take 17 g by mouth daily as needed   polyvinyl alcohol (LIQUIFILM TEARS) 1.4 % ophthalmic solution   No No   Sig: Administer 1 drop to both eyes 3 (three) times a day   simvastatin (ZOCOR) 80 mg tablet   No No   Sig: Take 1 tablet (80 mg total) by mouth daily at bedtime   Patient taking differently: Take 40 mg by mouth daily at bedtime   triamcinolone (KENALOG) 0.1 % cream   No No   Sig: Apply topically 2 (two) times a day   valACYclovir (VALTREX) 500 mg tablet   Yes No   Sig: Take 1 tablet by mouth 2 (two) times a day   zonisamide (ZONEGRAN) 100 mg capsule   Yes No   Sig: Take 200 mg by mouth 2 (two) times a day      Facility-Administered Medications: None       Past Medical History:   Diagnosis Date   • Asthma    • Bipolar disorder (HCC)    • Chronic UTI (urinary tract infection)    • COPD (chronic obstructive pulmonary disease) ([facility])    • Diabetes mellitus ([address])    • Disease of thyroid gland    • Dyslipidemia 10/31/2018   • Essential hypertension 10/31/2018   • GERD (gastroesophageal reflux disease)    • Heart attack ([facility])    • Iron deficiency anemia, unspecified 7/29/2019   • Obesity due to excess calories 10/31/2018   • Recurrent falls 4/13/2019   • Seizure [facility])        Past Surgical History:   Procedure Laterality Date   • ANKLE FRACTURE SURGERY Right    • TUBAL LIGATION     • VEIN LIGATION AND STRIPPING         Family History   Problem Relation Age of Onset   • Skeletal dysplasia Mother    • Stroke Father      I have reviewed and agree with the history as documented.     E-Cigarette/Vaping   • E-Cigarette Use Never User      E-Cigarette/Vaping Substances   • Nicotine No    • THC No    • CBD No    • Flavoring No    • Other No    • Unknown No      Social History     Tobacco Use   • Smoking status: Never   • Smokeless tobacco: Never   Vaping Use   • Vaping Use: Never used   Substance Use Topics   • Alcohol use: Not Currently     Alcohol/week: 0.0 standard drinks of alcohol   • Drug use: No       Review of Systems   Constitutional: Negative for chills and fever. HENT: Negative for ear pain, hearing loss, sore throat, trouble swallowing and voice change. Eyes: Negative for pain and discharge. Respiratory: Negative for cough, shortness of breath and wheezing. Cardiovascular: Negative for chest pain and palpitations. Gastrointestinal: Negative for abdominal pain, blood in stool, constipation, diarrhea, nausea and vomiting. Genitourinary: Negative for dysuria, flank pain, frequency and hematuria. Musculoskeletal: Negative for joint swelling, neck pain and neck stiffness. Skin: Negative for rash and wound. Neurological: Negative for dizziness, seizures, syncope, facial asymmetry and headaches. Psychiatric/Behavioral: Negative for hallucinations, self-injury and suicidal ideas. All other systems reviewed and are negative. Physical Exam  Physical Exam  Vitals and nursing note reviewed. Constitutional:       General: She is not in acute distress. Appearance: She is well-developed. HENT:      Head: Normocephalic and atraumatic. Right Ear: External ear normal.      Left Ear: External ear normal.   Eyes:      General: No scleral icterus. Right eye: No discharge. Left eye: No discharge. Extraocular Movements: Extraocular movements intact. Conjunctiva/sclera: Conjunctivae normal.   Cardiovascular:      Rate and Rhythm: Normal rate and regular rhythm. Heart sounds: Normal heart sounds. No murmur heard. Pulmonary:      Effort: Pulmonary effort is normal.      Breath sounds: Normal breath sounds. No wheezing or rales.    Abdominal:      General: Bowel sounds are normal. There is no distension. Palpations: Abdomen is soft. Tenderness: There is no abdominal tenderness. There is no guarding or rebound. Musculoskeletal:         General: No deformity. Normal range of motion. Cervical back: Normal range of motion and neck supple. Skin:     General: Skin is warm and dry. Findings: No rash. Neurological:      General: No focal deficit present. Mental Status: She is alert and oriented to person, place, and time. Cranial Nerves: No cranial nerve deficit. Psychiatric:         Mood and Affect: Mood normal.         Behavior: Behavior normal.         Thought Content:  Thought content normal.         Judgment: Judgment normal.         Vital Signs  ED Triage Vitals [10/02/23 1847]   Temperature Pulse Respirations Blood Pressure SpO2   97.5 °F (36.4 °C) (!) 111 20 125/67 (!) 88 %      Temp Source Heart Rate Source Patient Position - Orthostatic VS BP Location FiO2 (%)   Temporal Monitor Sitting Right arm --      Pain Score       --           Vitals:    10/02/23 1847 10/02/23 1900   BP: 125/67 125/67   Pulse: (!) 111 94   Patient Position - Orthostatic VS: Sitting          Visual Acuity      ED Medications  Medications   aztreonam (AZACTAM) 1,000 mg in sodium chloride 0.9 % 50 mL IVPB (has no administration in time range)       Diagnostic Studies  Results Reviewed     Procedure Component Value Units Date/Time    UA (URINE) with reflex to Scope [[id number]]     Lab Status: No result Specimen: Urine     Urine culture [[id number]]     Lab Status: No result Specimen: Urine, Clean Catch     Comprehensive metabolic panel [[id number]]  (Abnormal) Collected: 10/02/23 1900    Lab Status: Final result Specimen: Blood from Arm, Right Updated: 10/02/23 1922     Sodium 136 mmol/L      Potassium 3.9 mmol/L      Chloride 102 mmol/L      CO2 24 mmol/L      ANION GAP 10 mmol/L      BUN 8 mg/dL      Creatinine 0.62 mg/dL      Glucose 177 mg/dL      Calcium 9.0 mg/dL      AST 15 U/L ALT 16 U/L      Alkaline Phosphatase 86 U/L      Total Protein 6.6 g/dL      Albumin 3.5 g/dL      Total Bilirubin 0.22 mg/dL      eGFR 94 ml/min/1.73sq m     Narrative:      [place] guidelines for Chronic Kidney Disease (CKD):   •  Stage 1 with normal or high GFR (GFR > 90 mL/min/1.73 square meters)  •  Stage 2 Mild CKD (GFR = 60-89 mL/min/1.73 square meters)  •  Stage 3A Moderate CKD (GFR = 45-59 mL/min/1.73 square meters)  •  Stage 3B Moderate CKD (GFR = 30-44 mL/min/1.73 square meters)  •  Stage 4 Severe CKD (GFR = 15-29 mL/min/1.73 square meters)  •  Stage 5 End Stage CKD (GFR <15 mL/min/1.73 square meters)  Note: GFR calculation is accurate only with a steady state creatinine    CBC and differential [[id number]]  (Abnormal) Collected: 10/02/23 1900    Lab Status: Final result Specimen: Blood from Arm, Right Updated: 10/02/23 1905     WBC 5.86 Thousand/uL      RBC 3.78 Million/uL      Hemoglobin 12.4 g/dL      Hematocrit 38.1 %       fL      MCH 32.8 pg      MCHC 32.5 g/dL      RDW 13.9 %      MPV 9.0 fL      Platelets 386 Thousands/uL      nRBC 0 /100 WBCs      Neutrophils Relative 57 %      Immat GRANS % 1 %      Lymphocytes Relative 21 %      Monocytes Relative 7 %      Eosinophils Relative 13 %      Basophils Relative 1 %      Neutrophils Absolute 3.40 Thousands/µL      Immature Grans Absolute 0.03 Thousand/uL      Lymphocytes Absolute 1.20 Thousands/µL      Monocytes Absolute 0.43 Thousand/µL      Eosinophils Absolute 0.76 Thousand/µL      Basophils Absolute 0.04 Thousands/µL                  No orders to display              Procedures  Procedures         ED Course                                             Medical Decision Making  Patient presents to the emergency department with Pseudomonas positive urine culture.       Based on the MSE and the history provided, diagnostic considerations include but are not limited to urinary tract infection    Based on the work-up performed in the emergency department which includes physical examination, laboratory testing, imaging which may include advanced imaging as necessary such as CT scan or ultrasound, it is deemed that the patient will require admission to the hospital for treatment of urinary tract infection. Patient has multiple antibiotic allergies including Levaquin, Cipro, Keflex, penicillins, Bactrim. Based on this and her positive Pseudomonas urine culture, she will require IV antibiotic treatment for UTI. Amount and/or Complexity of Data Reviewed  Labs: ordered. Decision-making details documented in ED Course. Details: Lab work in the emergency department normal      Risk  Decision regarding hospitalization. Disposition  Final diagnoses:   UTI (urinary tract infection)     Time reflects when diagnosis was documented in both MDM as applicable and the Disposition within this note     Time User Action Codes Description Comment    10/2/2023  8:04 PM Anila Peralta Add [N39.0] UTI (urinary tract infection)       ED Disposition     ED Disposition   Admit    Condition   Stable    Date/Time   Mon Oct 2, 2023  8:04 PM    Comment              Follow-up Information    None         Patient's Medications   Discharge Prescriptions    No medications on file       No discharge procedures on file.     PDMP Review       Value Time User    PDMP Reviewed  Yes 9/18/2023  1:07 AM Brandyn Henderson PA-C          ED Provider  Electronically Signed by           Anila Peralta MD  10/02/23 2020

## 2023-10-03 VITALS
HEART RATE: 72 BPM | BODY MASS INDEX: 38.55 KG/M2 | DIASTOLIC BLOOD PRESSURE: 74 MMHG | SYSTOLIC BLOOD PRESSURE: 119 MMHG | HEIGHT: 67 IN | TEMPERATURE: 97.5 F | OXYGEN SATURATION: 92 % | WEIGHT: 245.59 LBS | RESPIRATION RATE: 18 BRPM

## 2023-10-03 PROBLEM — B37.2 CUTANEOUS CANDIDIASIS: Status: ACTIVE | Noted: 2023-10-03

## 2023-10-03 LAB
ALBUMIN SERPL BCP-MCNC: 3.1 G/DL (ref 3.5–5)
ALP SERPL-CCNC: 68 U/L (ref 34–104)
ALT SERPL W P-5'-P-CCNC: 11 U/L (ref 7–52)
ANION GAP SERPL CALCULATED.3IONS-SCNC: 7 MMOL/L
AST SERPL W P-5'-P-CCNC: 15 U/L (ref 13–39)
ATRIAL RATE: 105 BPM
BASOPHILS # BLD AUTO: 0.05 THOUSANDS/ÂΜL (ref 0–0.1)
BASOPHILS NFR BLD AUTO: 1 % (ref 0–1)
BILIRUB SERPL-MCNC: 0.25 MG/DL (ref 0.2–1)
BUN SERPL-MCNC: 9 MG/DL (ref 5–25)
CALCIUM ALBUM COR SERPL-MCNC: 9.2 MG/DL (ref 8.3–10.1)
CALCIUM SERPL-MCNC: 8.5 MG/DL (ref 8.4–10.2)
CHLORIDE SERPL-SCNC: 106 MMOL/L (ref 96–108)
CO2 SERPL-SCNC: 24 MMOL/L (ref 21–32)
CREAT SERPL-MCNC: 0.64 MG/DL (ref 0.6–1.3)
EOSINOPHIL # BLD AUTO: 0.98 THOUSAND/ÂΜL (ref 0–0.61)
EOSINOPHIL NFR BLD AUTO: 19 % (ref 0–6)
ERYTHROCYTE [DISTWIDTH] IN BLOOD BY AUTOMATED COUNT: 14 % (ref 11.6–15.1)
GFR SERPL CREATININE-BSD FRML MDRD: 93 ML/MIN/1.73SQ M
GLUCOSE SERPL-MCNC: 122 MG/DL (ref 65–140)
GLUCOSE SERPL-MCNC: 129 MG/DL (ref 65–140)
GLUCOSE SERPL-MCNC: 192 MG/DL (ref 65–140)
HCT VFR BLD AUTO: 36.9 % (ref 34.8–46.1)
HGB BLD-MCNC: 12 G/DL (ref 11.5–15.4)
IMM GRANULOCYTES # BLD AUTO: 0.04 THOUSAND/UL (ref 0–0.2)
IMM GRANULOCYTES NFR BLD AUTO: 1 % (ref 0–2)
LYMPHOCYTES # BLD AUTO: 1.56 THOUSANDS/ÂΜL (ref 0.6–4.47)
LYMPHOCYTES NFR BLD AUTO: 29 % (ref 14–44)
MAGNESIUM SERPL-MCNC: 1.9 MG/DL (ref 1.9–2.7)
MCH RBC QN AUTO: 33.4 PG (ref 26.8–34.3)
MCHC RBC AUTO-ENTMCNC: 32.5 G/DL (ref 31.4–37.4)
MCV RBC AUTO: 103 FL (ref 82–98)
MONOCYTES # BLD AUTO: 0.44 THOUSAND/ÂΜL (ref 0.17–1.22)
MONOCYTES NFR BLD AUTO: 8 % (ref 4–12)
NEUTROPHILS # BLD AUTO: 2.23 THOUSANDS/ÂΜL (ref 1.85–7.62)
NEUTS SEG NFR BLD AUTO: 42 % (ref 43–75)
NRBC BLD AUTO-RTO: 0 /100 WBCS
P AXIS: 22 DEGREES
PHOSPHATE SERPL-MCNC: 4.6 MG/DL (ref 2.3–4.1)
PLATELET # BLD AUTO: 161 THOUSANDS/UL (ref 149–390)
PMV BLD AUTO: 9.3 FL (ref 8.9–12.7)
POTASSIUM SERPL-SCNC: 4.1 MMOL/L (ref 3.5–5.3)
PR INTERVAL: 170 MS
PROT SERPL-MCNC: 5.9 G/DL (ref 6.4–8.4)
QRS AXIS: -14 DEGREES
QRSD INTERVAL: 70 MS
QT INTERVAL: 334 MS
QTC INTERVAL: 441 MS
RBC # BLD AUTO: 3.59 MILLION/UL (ref 3.81–5.12)
SODIUM SERPL-SCNC: 137 MMOL/L (ref 135–147)
T WAVE AXIS: 37 DEGREES
VENTRICULAR RATE: 105 BPM
WBC # BLD AUTO: 5.3 THOUSAND/UL (ref 4.31–10.16)

## 2023-10-03 PROCEDURE — 85025 COMPLETE CBC W/AUTO DIFF WBC: CPT | Performed by: NURSE PRACTITIONER

## 2023-10-03 PROCEDURE — NC001 PR NO CHARGE: Performed by: INTERNAL MEDICINE

## 2023-10-03 PROCEDURE — 84100 ASSAY OF PHOSPHORUS: CPT | Performed by: NURSE PRACTITIONER

## 2023-10-03 PROCEDURE — 83735 ASSAY OF MAGNESIUM: CPT | Performed by: NURSE PRACTITIONER

## 2023-10-03 PROCEDURE — 97163 PT EVAL HIGH COMPLEX 45 MIN: CPT

## 2023-10-03 PROCEDURE — 97167 OT EVAL HIGH COMPLEX 60 MIN: CPT

## 2023-10-03 PROCEDURE — 80053 COMPREHEN METABOLIC PANEL: CPT | Performed by: NURSE PRACTITIONER

## 2023-10-03 PROCEDURE — 99239 HOSP IP/OBS DSCHRG MGMT >30: CPT | Performed by: FAMILY MEDICINE

## 2023-10-03 PROCEDURE — 82948 REAGENT STRIP/BLOOD GLUCOSE: CPT

## 2023-10-03 RX ORDER — POLYETHYLENE GLYCOL 3350 17 G/17G
17 POWDER, FOR SOLUTION ORAL DAILY PRN
Start: 2023-10-03

## 2023-10-03 RX ORDER — SIMVASTATIN 80 MG
40 TABLET ORAL
Start: 2023-10-03

## 2023-10-03 RX ORDER — METHENAMINE HIPPURATE 1000 MG/1
1 TABLET ORAL 2 TIMES DAILY WITH MEALS
Qty: 60 TABLET | Refills: 1 | Status: SHIPPED | OUTPATIENT
Start: 2023-10-03

## 2023-10-03 RX ORDER — NYSTATIN 100000 [USP'U]/G
POWDER TOPICAL 2 TIMES DAILY
Qty: 30 G | Refills: 0 | Status: SHIPPED | OUTPATIENT
Start: 2023-10-03

## 2023-10-03 RX ORDER — MULTIVIT WITH MINERALS/LUTEIN
500 TABLET ORAL DAILY
Qty: 60 TABLET | Refills: 0 | Status: SHIPPED | OUTPATIENT
Start: 2023-10-03

## 2023-10-03 RX ADMIN — GABAPENTIN 800 MG: 400 CAPSULE ORAL at 11:23

## 2023-10-03 RX ADMIN — LEVETIRACETAM 1000 MG: 500 TABLET, FILM COATED ORAL at 08:07

## 2023-10-03 RX ADMIN — DOCUSATE SODIUM 100 MG: 100 CAPSULE, LIQUID FILLED ORAL at 08:08

## 2023-10-03 RX ADMIN — GABAPENTIN 800 MG: 400 CAPSULE ORAL at 08:07

## 2023-10-03 RX ADMIN — FLUTICASONE PROPIONATE 1 SPRAY: 50 SPRAY, METERED NASAL at 08:06

## 2023-10-03 RX ADMIN — PANTOPRAZOLE SODIUM 40 MG: 40 TABLET, DELAYED RELEASE ORAL at 06:26

## 2023-10-03 RX ADMIN — ENOXAPARIN SODIUM 40 MG: 40 INJECTION SUBCUTANEOUS at 08:06

## 2023-10-03 RX ADMIN — ASPIRIN 81 MG: 81 TABLET, COATED ORAL at 08:07

## 2023-10-03 RX ADMIN — OXYBUTYNIN CHLORIDE 5 MG: 5 TABLET ORAL at 08:07

## 2023-10-03 RX ADMIN — ACETAMINOPHEN 650 MG: 325 TABLET, FILM COATED ORAL at 08:07

## 2023-10-03 RX ADMIN — LEVOTHYROXINE SODIUM 175 MCG: 175 TABLET ORAL at 06:26

## 2023-10-03 RX ADMIN — NYSTATIN: 100000 POWDER TOPICAL at 08:06

## 2023-10-03 RX ADMIN — QUETIAPINE FUMARATE 400 MG: 200 TABLET ORAL at 08:07

## 2023-10-03 RX ADMIN — ZONISAMIDE 200 MG: 100 CAPSULE ORAL at 08:07

## 2023-10-03 RX ADMIN — AZTREONAM 2000 MG: 2 INJECTION, POWDER, LYOPHILIZED, FOR SOLUTION INTRAMUSCULAR; INTRAVENOUS at 06:26

## 2023-10-03 RX ADMIN — MELOXICAM 7.5 MG: 7.5 TABLET ORAL at 08:08

## 2023-10-03 RX ADMIN — VALACYCLOVIR HYDROCHLORIDE 500 MG: 500 TABLET, FILM COATED ORAL at 08:07

## 2023-10-03 RX ADMIN — FERROUS SULFATE TAB 325 MG (65 MG ELEMENTAL FE) 325 MG: 325 (65 FE) TAB at 08:07

## 2023-10-03 RX ADMIN — PHENYTOIN SODIUM 300 MG: 100 CAPSULE ORAL at 08:07

## 2023-10-03 RX ADMIN — INSULIN LISPRO 2 UNITS: 100 INJECTION, SOLUTION INTRAVENOUS; SUBCUTANEOUS at 11:24

## 2023-10-03 NOTE — PLAN OF CARE
Problem: PHYSICAL THERAPY ADULT  Goal: Performs mobility at highest level of function for planned discharge setting. See evaluation for individualized goals. Description: Treatment/Interventions: ADL retraining, Functional transfer training, LE strengthening/ROM, Therapeutic exercise, Endurance training, Patient/family training, Equipment eval/education, Bed mobility, Gait training, Compensatory technique education, Spoke to nursing, Spoke to case management, OT  Equipment Recommended:  (walker-pt has)       See flowsheet documentation for full assessment, interventions and recommendations. Note: Prognosis: Good  Problem List: Decreased strength, Decreased endurance, Impaired balance, Decreased mobility, Impaired judgement, Decreased safety awareness, Obesity, Impaired sensation  Assessment: Pt is a 79 y.o. female seen for PT evaluation s/p admission to [address] [place] [address] on 10/2/2023 with Acute cystitis. Order placed for PT services.   Upon evaluation: Pt is presenting with impaired functional mobility due to decreased strength, decreased endurance, impaired balance, gait deviations, altered sensation, decreased safety awareness, impaired judgment, and fall risk requiring  stand by assistance for transfers and stand by assistance for ambulation with RW . Pt's clinical presentation is currently unpredictable given the functional mobility deficits above, especially weakness, decreased endurance, gait deviations, decreased activity tolerance, decreased functional mobility tolerance, altered sensation, decreased safety awareness, impaired judgement, and easily distracted, talks off topic requiring constant cues for redirection , coupled with fall risks as indicated by AM-PAC 6-Clicks: 41/71 as well as hx of falls, impaired balance, polypharmacy, impaired judgement, decreased safety awareness, limited sensation/neuropathy, and obesity and combined with medical complications of acute cystitis, cutaneous candidiasis. Pt's PMHx and comorbidities that may affect physical performance and progress include: asthma, COPD, DM, HTN, seizure disorder, obesity and bipolar disorder, h/o MI, recurrent UTI and falls. Personal factors affecting pt at time of IE include: limited home support, inability to perform IADLs, inability to perform ADLs, inability to navigate level surfaces without external assistance, inability to ambulate household distances, limited insight into impairments and recent fall(s)/fall history. Pt will benefit from continued skilled PT services to address deficits as defined above and to maximize level of functional mobility to facilitate return toward PLOF and improved QOL. From PT/mobility standpoint, recommendation at time of d/c would be Home PT, home with family support and with walker pending progress in order to reduce fall risk and maximize pt's functional independence and consistency with mobility in order to facilitate return to PLOF. Recommend trial with walker next 1-2 sessions and ther ex next 1-2 sessions. Barriers to Discharge: Decreased caregiver support  Barriers to Discharge Comments: lives alone  PT Discharge Recommendation: Home with home health rehabilitation    See flowsheet documentation for full assessment.

## 2023-10-03 NOTE — PHYSICAL THERAPY NOTE
PHYSICAL THERAPY EVALUATION  NAME:  Horace Bradley  DATE: 10/03/23    AGE:   79 y.o. Mrn:   [id number]  ADMIT DX:  UTI (urinary tract infection) [N39.0]  Abnormal blood chemistry [R79.9]    Past Medical History:   Diagnosis Date   • Asthma    • Bipolar disorder ([address])    • Chronic UTI (urinary tract infection)    • COPD (chronic obstructive pulmonary disease) ([facility])    • Diabetes mellitus ([address])    • Disease of thyroid gland    • Dyslipidemia 10/31/2018   • Essential hypertension 10/31/2018   • GERD (gastroesophageal reflux disease)    • Heart attack ([address])    • Iron deficiency anemia, unspecified 7/29/2019   • Obesity due to excess calories 10/31/2018   • Recurrent falls 4/13/2019   • Seizure ([address])      Length Of Stay: 1  Performed at least 2 patient identifiers during session: Name and Birthday  PHYSICAL THERAPY EVALUATION :    10/03/23 1228   PT Last Visit   PT Visit Date 10/03/23   Note Type   Note type Evaluation   Pain Assessment   Pain Assessment Tool 0-10   Pain Score No Pain   Restrictions/Precautions   Other Precautions Chair Alarm; Bed Alarm; Fall Risk   Home Living   Type of Home Apartment  (Elevator)   Home Layout One level;Performs ADLs on one level; Able to live on main level with bedroom/bathroom   Bathroom Shower/Tub Tub/shower unit   Bathroom Toilet Standard   Bathroom Equipment Grab bars in shower; Tub transfer bench   [address]  (rollator. uses rollator in the apartment and wheelchair when leaves the apartment)   Additional Comments Reports living in an apartment and uses rollator inside and wheelchair outside the apartment. Prior Function   Level of [place] Independent with ADLs; Independent with functional mobility; Needs assistance with IADLS   Lives With Alone   Receives Help From Home health  (4hrs/day 2x/wk)   IADLs Family/Friend/Other provides meals; Family/Friend/Other provides medication management; Independent with meal prep   Falls in the last 6 months 1 to 4 Comments Reports being independent with mobility, ADLs and meals, but has assistance for laundry and grovery shopping. General   Additional Pertinent History pt recently admissed to [address] [place] [address] with hypoentison and hypoglycemia. discharged to home with [facility]. Cognition   Orientation Level Oriented X4   Following Commands Follows one step commands with increased time or repetition   Comments Tangential throughout session, requires constant cues for increased attention and redirection to task. Subjective   Subjective "I can't concentrate when I talk."   RLE Assessment   RLE Assessment WFL  (3+/5)   LLE Assessment   LLE Assessment WFL  (3+/5)   Light Touch   RLE Light Touch Impaired   RLE Light Touch Comments diinsihed distally   LLE Light Touch Impaired   LLE Light Touch Comments diminsihed distally   Bed Mobility   Additional Comments Pt sitting in recliner chair at start and end of sesison with all needs wihtin reach. Transfers   Sit to Stand   (SBA)   Additional items Increased time required;Verbal cues   Stand to Sit   (SBA)   Additional items Increased time required;Verbal cues   Stand pivot   (SBA)   Additional items Increased time required;Verbal cues   Additional Comments use of RW. min cues for hand placement for safety with RW. sit<>stand and spt with RW with SBA with min cues for tunring completely thorughout with RW as patient attempted to push RW aside. Ambulation/Elevation   Gait pattern Wide CHARIS; Improper Weight shift;Decreased foot clearance; Short stride; Excessively slow   Gait Assistance   (SBA)   Additional items Verbal cues   Assistive Device Rolling walker   Distance ambulated 25'x1 and 48'x1 wiht RW with SBA with slow juan with decreased step length and foot clearance with min cues for inc foot clearance. pt with inc path deviation and extremely slowed juna.    Balance   Static Sitting Good   Dynamic Sitting Fair +   Static Standing Fair   Dynamic Standing Fair - Ambulatory Fair -   Activity Tolerance   Activity Tolerance Patient limited by fatigue   Medical Staff Made Aware OT, [address]   Nurse Made Aware RN, Lucy   Assessment   Prognosis Good   Problem List Decreased strength;Decreased endurance; Impaired balance;Decreased mobility; Impaired judgement;Decreased safety awareness; Obesity; Impaired sensation   Barriers to Discharge Decreased caregiver support   Barriers to Discharge Comments lives alone   Goals   Patient Goals "Go home"   STG Expiration Date 10/17/23   PT Treatment Day 0   Plan   Treatment/Interventions ADL retraining;Functional transfer training;LE strengthening/ROM; Therapeutic exercise; Endurance training;Patient/family training;Equipment eval/education; Bed mobility;Gait training; Compensatory technique education;Spoke to nursing;Spoke to case management;OT   PT Frequency 2-3x/wk   Recommendation   PT Discharge Recommendation Home with home health rehabilitation   Equipment Recommended   (walker-pt has)   AM-PAC Basic Mobility Inpatient   Turning in Flat Bed Without Bedrails 3   Lying on Back to Sitting on Edge of Flat Bed Without Bedrails 3   Moving Bed to Chair 3   Standing Up From Chair Using Arms 3   Walk in Room 3   Climb 3-5 Stairs With Railing 3   Basic Mobility Inpatient Raw Score 18   Basic Mobility Standardized Score 41.05   Highest Level Of Mobility   JH-HLM Goal 6: Walk 10 steps or more   JH-HLM Achieved 7: Walk 25 feet or more   End of Consult   Patient Position at End of Consult Bedside chair;Bed/Chair alarm activated; All needs within reach       Pt requires PT/OT co-eval due to medical complexity, signficant assistance with mobility and/or cognitive-behavioral impairments. (Please find full objective findings from PT assessment regarding body systems outlined above). Assessment: Pt is a 79 y.o. female seen for PT evaluation s/p admission to [address] [place] [address] on 10/2/2023 with Acute cystitis. Order placed for PT services.   Upon evaluation: Pt is presenting with impaired functional mobility due to decreased strength, decreased endurance, impaired balance, gait deviations, altered sensation, decreased safety awareness, impaired judgment, and fall risk requiring  stand by assistance for transfers and stand by assistance for ambulation with RW . Pt's clinical presentation is currently unpredictable given the functional mobility deficits above, especially weakness, decreased endurance, gait deviations, decreased activity tolerance, decreased functional mobility tolerance, altered sensation, decreased safety awareness, impaired judgement, and easily distracted, talks off topic requiring constant cues for redirection , coupled with fall risks as indicated by AM-PAC 6-Clicks: 95/22 as well as hx of falls, impaired balance, polypharmacy, impaired judgement, decreased safety awareness, limited sensation/neuropathy, and obesity and combined with medical complications of acute cystitis, cutaneous candidiasis. Pt's PMHx and comorbidities that may affect physical performance and progress include: asthma, COPD, DM, HTN, seizure disorder, obesity and bipolar disorder, h/o MI, recurrent UTI and falls. Personal factors affecting pt at time of IE include: limited home support, inability to perform IADLs, inability to perform ADLs, inability to navigate level surfaces without external assistance, inability to ambulate household distances, limited insight into impairments and recent fall(s)/fall history. Pt will benefit from continued skilled PT services to address deficits as defined above and to maximize level of functional mobility to facilitate return toward PLOF and improved QOL. From PT/mobility standpoint, recommendation at time of d/c would be Home PT, home with family support and with walker pending progress in order to reduce fall risk and maximize pt's functional independence and consistency with mobility in order to facilitate return to PLOF. Recommend trial with walker next 1-2 sessions and ther ex next 1-2 sessions. The patient's AM-PAC Basic Mobility Inpatient Short Form Raw Score is 18. A Raw score of greater than 16 suggests the patient may benefit from discharge to home. Please also refer to the recommendation of the Physical Therapist for safe discharge planning. Goals: Pt will: Perform bed mobility tasks with modified Independent to reposition in bed and prepare for transfers. Pt will perform transfers with modified Independent to decrease burden of care and decrease risk for falls and prepare for ambulation. Pt will ambulate with RW for >/= 76' with  modified Independent  to decrease burden of care, decrease risk for falls, improve activity tolerance and improve gait quality and to access home environment. Pt will participate in objective balance assessment to determine baseline fall risk. Pt will participate in SSWS assessment to determine level of mobility. Pt will increase B LE strength >/= 1/2 MMT grade to facilitate functional mobility.       Saintclair Jerry, PT,DPT

## 2023-10-03 NOTE — DISCHARGE SUMMARY
[facility]  Discharge- Gutierrez Shown 1955, 79 y.o. female MRN: [id number]  Unit/Bed#: -Jose Encounter: [id number]  Primary Care Provider: Marianna Hartman   Date and time admitted to hospital: 10/2/2023  6:44 PM    * Acute cystitis  Assessment & Plan  · Sent in by PCP with history of pseudomonal MDR's urine culture from 9/18  · Outpatient UA with pyuria  · Evaluated by infectious disease patient has no UTI symptoms urine culture likely represents colonization. She does not need current antibiotics. She is at risk of future UTIs recommend starting Hiprex 1 g twice daily with vitamin C outpatient follow-up with urology  · Per ID-would only check urine cultures if patient is clearly symptomatic with fever, dysuria, suprapubic pain. · Refer to outpatient urology as well.   · Patient is cleared to be discharged home    Cutaneous candidiasis  Assessment & Plan  · Extensive groin candidiasis  · Continue Mycostatin powder twice daily we will Rx  · Appreciate wound care consultation    Seizure disorder [facility])  Assessment & Plan  · Continue PTA zonisamide, phenytoin, levetiracetam  · Seizure precautions    Acquired hypothyroidism  Assessment & Plan  · Continue PTA levothyroxine 175 g daily  · TSH 2.8 in August    Bipolar disorder [facility])  Assessment & Plan  · Continue Seroquel        Medical Problems     Resolved Problems  Date Reviewed: 10/3/2023   None       Discharging Physician / Practitioner: Shakira Arriaga MD  PCP: Marianna Hartman  Admission Date:   Admission Orders (From admission, onward)     Ordered        10/02/23 2005  INPATIENT ADMISSION  Once                      Discharge Date: 10/03/23    Consultations During Hospital Stay:  · ID    Procedures Performed:   · none    Significant Findings / Test Results:   · none    Incidental Findings:   ·    · none    Test Results Pending at Discharge (will require follow up):   · none     Outpatient Tests Requested:  · none    Complications:  none    Reason for Admission: Acute cystitis MDRO    Hospital Course:   Mary Jo Agrawal is a 79 y.o. female patient who originally presented to the hospital on 10/2/2023 due to urine culture revealing MDRO cystitis. Patient was admitted for IV antibiotics with consultation to infectious disease evaluation done by infectious disease with patient having only symptomology this is most likely colonization no need for IV antibiotics she does have an increased chance of recurrent UTIs referral has been made to outpatient urology and also she will be started on vitamin C with Hip-Pk 1 g twice daily for prevention  Nystatin powder for intertrigo  Dc home        Please see above list of diagnoses and related plan for additional information. Condition at Discharge: stable    Discharge Day Visit / Exam:   Subjective: Patient seen and examined denies any dysuria abdominal pain. Vitals: Blood Pressure: 119/74 (10/03/23 0736)  Pulse: 72 (10/03/23 0736)  Temperature: 97.5 °F (36.4 °C) (10/03/23 0736)  Temp Source: Temporal (10/02/23 1847)  Respirations: 18 (10/03/23 0736)  Height: 5' 7" (170.2 cm) (10/02/23 2054)  Weight - Scale: 111 kg (245 lb 9.5 oz) (10/02/23 2054)  SpO2: 92 % (10/03/23 0736)  Exam:   Physical Exam  Vitals and nursing note reviewed. Constitutional:       General: She is not in acute distress. Appearance: She is well-developed. She is obese. HENT:      Head: Normocephalic and atraumatic. Eyes:      Conjunctiva/sclera: Conjunctivae normal.   Cardiovascular:      Rate and Rhythm: Normal rate and regular rhythm. Heart sounds: No murmur heard. Pulmonary:      Effort: Pulmonary effort is normal. No respiratory distress. Breath sounds: Normal breath sounds. No wheezing or rales. Abdominal:      Palpations: Abdomen is soft. Tenderness: There is no abdominal tenderness. Musculoskeletal:         General: No swelling. Cervical back: Neck supple. Skin:     General: Skin is warm and dry. Capillary Refill: Capillary refill takes less than 2 seconds. Neurological:      Mental Status: She is alert and oriented to person, place, and time. Psychiatric:         Mood and Affect: Mood normal.          Discussion with Family: Patient declined call to . Discharge instructions/Information to patient and family:   See after visit summary for information provided to patient and family. Provisions for Follow-Up Care:  See after visit summary for information related to follow-up care and any pertinent home health orders. Disposition:   Home    Planned Readmission: no     Discharge Statement:  I spent >35 minutes discharging the patient. This time was spent on the day of discharge. I had direct contact with the patient on the day of discharge. Greater than 50% of the total time was spent examining patient, answering all patient questions, arranging and discussing plan of care with patient as well as directly providing post-discharge instructions. Additional time then spent on discharge activities. Discharge Medications:  See after visit summary for reconciled discharge medications provided to patient and/or family.       **Please Note: This note may have been constructed using a voice recognition system**

## 2023-10-03 NOTE — UTILIZATION REVIEW
NOTIFICATION OF INPATIENT ADMISSION   AUTHORIZATION REQUEST   SERVICING FACILITY:   [address]  Tax ID: [id number]  NPI: [id number] ATTENDING PROVIDER:  Attending Name and NPI#: Omayra Carlos Md [[id number]]  Address: [address] [place] [address]  Phone: [phone]   ADMISSION INFORMATION:  Place of Service: Inpatient [address]  Place of Service Code: 21  Inpatient Admission Date/Time: 10/2/23  8:05 PM  Discharge Date/Time: No discharge date for patient encounter. Admitting Diagnosis Code/Description:  UTI (urinary tract infection) [N39.0]  Abnormal blood chemistry [R79.9]     UTILIZATION REVIEW CONTACT:  Elia Crump Utilization   Network Utilization Review Department  Phone: [phone]  Fax [phone]  Email: [email or web address]  Contact for approvals/pending authorizations, clinical reviews, and discharge. PHYSICIAN ADVISORY SERVICES:  Medical Necessity Denial & [id number] Review  Phone: [phone]  Fax: [phone]  Email: [email or web address]

## 2023-10-03 NOTE — ASSESSMENT & PLAN NOTE
· Extensive groin candidiasis  · Continue Mycostatin powder twice daily  · Appreciate wound care consultation

## 2023-10-03 NOTE — CONSULTS
Consultation - Infectious Disease   Jennifer Marcus 79 y.o. female MRN: [id number]  Unit/Bed#: -01 Encounter: [id number]      Inpatient consult to Infectious Diseases  Consult performed by: Kassi Melendez MD  Consult ordered by: DEIDRE Grewal            REQUIRED DOCUMENTATION:     1. This service was provided via Telemedicine. 2. Provider located at [facility]. 3. TeleMed provider: Kassi Melendez MD.  4. Identify all parties in room with patient during tele consult:RN  5. After connecting through [employer], patient was identified by name and date of birth and assistant checked wristband. Patient was then informed that this was a Telemedicine visit and that the exam was being conducted confidentially over secure lines. My office door was closed. No one else was in the room. Patient acknowledged consent and understanding of privacy and security of the Telemedicine visit, and gave us permission to have the assistant stay in the room in order to assist with the history and to conduct the exam.  I informed the patient that I have reviewed their record in Epic and presented the opportunity for them to ask any questions regarding the visit today. The patient agreed to participate. Assessment/Recommendations     1. Asymptomatic bacteriuria  - Patient has a hx recurrent uti (symptoms present with dysuria and urinary frequency) but currently denies any localizing symptoms, she is asymptomatic and is afebrile without leukocytosis. Positive urine cx in this setting likely represents colonization. Recent CT without obstructive uropathy or anatomical abnormality but suspect incomplete bladder emptying. · No antibiotics at this time, would only check urine cultures if patient is clearly symptomatic with fever, dysuria, suprapubic pain.   · Recommend outpatient fu with urology and will need renal US with post void residual volumes  · Discussed double voiding  · Stressed importance of hygiene and bowel regime to prevent constipation   · Recommend hiprex 1 g bid and vitamin C  Continue supportive care    2. Groin intertrigo    · Continue topical nystatin, prevention of moisture associated skin damage, barrier creams and recommend wound care evaluation  · If symptoms are refractory would recommend an oral dose of fluconazole 150 mg x 1    3. Constipation  - risk factor for recurrent uti    · Recommend aggressive bowel regime per primary team    4. Type 2 diabetes with recent hypoglycemia, morbid obesity  - A1C 6.3, risk factor for infection    · Management per primary team    5. Multiple drug allergies and intolerance  - Anaphylaxis with levaquin, keflex and penicillin. Swelling with cipro and bactrim    Discussed in detail with the primary service. History     Reason for Consult: Possible UTI  HPI: Cesar Gonzales is a 79y.o. year old female with type 2 diabetes with neuropathy, seizure disorder, hx recurrent UTI since age 24 not following with a urologist. She was recently hospitalized from 9/18 to 9/20 with hypotension and hypoglcemia. An infectious work up was unrevealing and she improved with fluids. Of note, urine cx grew MDR Pseudomonas but this was not treated . Patient reports that she did have burning with urination that developed shortly after hospitalization as well as increased urinary frequency and her PCP started her on macrobid. Shortly after she developed a fungal rash in her groin and after starting treatment for it her urinary symptoms resolved. She was then called by her pcp yesterday and was told to go to the hospital for iv antibiotics for a resistant bacteria in her urine. She has no current symptoms and otherwise feels well except for groin rash and constipation. Infectious disease is being consulted for diagnostic work up and antibiotic management. Review of Systems  Pertinent positives and negatives as noted in HPI.  Rest complete 12 point system-based review of systems is otherwise negative. PAST MEDICAL HISTORY:  Past Medical History:   Diagnosis Date   • Asthma    • Bipolar disorder ([facility])    • Chronic UTI (urinary tract infection)    • COPD (chronic obstructive pulmonary disease) ([facility])    • Diabetes mellitus ([facility])    • Disease of thyroid gland    • Dyslipidemia 10/31/2018   • Essential hypertension 10/31/2018   • GERD (gastroesophageal reflux disease)    • Heart attack ([facility])    • Iron deficiency anemia, unspecified 2019   • Obesity due to excess calories 10/31/2018   • Recurrent falls 2019   • Seizure ([facility])      Past Surgical History:   Procedure Laterality Date   • ANKLE FRACTURE SURGERY Right    • TUBAL LIGATION     • VEIN LIGATION AND STRIPPING         FAMILY HISTORY:  Non-contributory    SOCIAL HISTORY:  Social History     Social History     Substance and Sexual Activity   Alcohol Use Not Currently   • Alcohol/week: 0.0 standard drinks of alcohol     Social History     Substance and Sexual Activity   Drug Use No     Social History     Tobacco Use   Smoking Status Never   Smokeless Tobacco Never       ALLERGIES:  Allergies   Allergen Reactions   • Keflex [Cephalexin] Anaphylaxis     Airway edema    • Levaquin [Levofloxacin] Anaphylaxis   • Penicillins Anaphylaxis   • Bactrim [Sulfamethoxazole-Trimethoprim] Swelling and GI Intolerance     LE edema and thrush   • Ciprofloxacin Swelling     Edema and all extremities and thrush   Edema in all extremities and thrush    • Noroxin [Norfloxacin]        MEDICATIONS:  All current active medications have been reviewed.     Physical Exam     Temp:  [97.5 °F (36.4 °C)-97.9 °F (36.6 °C)] 97.5 °F (36.4 °C)  HR:  [] 72  Resp:  [18-20] 18  BP: (119-125)/(67-81) 119/74  SpO2:  [88 %-95 %] 92 %  Temp (24hrs), Av.6 °F (36.4 °C), Min:97.5 °F (36.4 °C), Max:97.9 °F (36.6 °C)  Current: Temperature: 97.5 °F (36.4 °C)    Intake/Output Summary (Last 24 hours) at 10/3/2023 1024  Last data filed at 10/2/2023 2100  Gross per 24 hour Intake 240 ml   Output 400 ml   Net -160 ml         Physical exam findings reported by bedside and primary medical team staff    General Appearance:  Appearing chronically well, nontoxic, and in no distress, appears stated age   Head:  Normocephalic, without obvious abnormality, atraumatic   Eyes:  PERRL, conjunctiva pink and sclera anicteric, both eyes   Nose: Nares normal, mucosa normal, no drainage   Throat: Oropharynx moist without lesions; lips, mucosa, and tongue normal; teeth and gums normal   Neck: Supple, symmetrical, trachea midline, no adenopathy, no tenderness/mass/nodules   Back:   Symmetric, no curvature, ROM normal, no CVA tenderness   Lungs:   Clear to auscultation bilaterally, no audible wheezes, rhonchi and rales, respirations unlabored   Chest Wall:  No tenderness or deformity   Heart:  Regular rate and rhythm, S1, S2 normal, no murmur, rub or gallop   Abdomen:   Soft, non-tender, non-distended, positive bowel sounds, no masses, no organomegaly    No CVA tenderness   Extremities: Extremities normal, atraumatic, no cyanosis, clubbing or edema   Skin: Macerated erythematous patches bilateral groin without spreading erythema or drainage   Lymph nodes: Cervical, supraclavicular, and axillary nodes normal   Neurologic: Alert and oriented times 3       Invasive Devices:   Peripheral IV 10/02/23 Right;Ventral (anterior) Forearm (Active)   Site Assessment WDL 10/03/23 0729   Dressing Type Transparent 10/03/23 0729   Line Status Flushed;Saline locked 10/03/23 0729   Dressing Status Clean;Dry; Intact 10/03/23 0729   Dressing Change Due 10/06/23 10/02/23 2312       External Urinary Catheter (Active)       Labs, Imaging, & Other Studies     Lab Results:    I have personally reviewed pertinent labs.     Results from last 7 days   Lab Units 10/03/23  0538 10/02/23  1900   WBC Thousand/uL 5.30 5.86   HEMOGLOBIN g/dL 12.0 12.4   PLATELETS Thousands/uL 161 221     Results from last 7 days   Lab Units 10/03/23  0538 10/02/23  1900   POTASSIUM mmol/L 4.1 3.9   CHLORIDE mmol/L 106 102   CO2 mmol/L 24 24   BUN mg/dL 9 8   CREATININE mg/dL 0.64 0.62   EGFR ml/min/1.73sq m 93 94   CALCIUM mg/dL 8.5 9.0   AST U/L 15 15   ALT U/L 11 16   ALK PHOS U/L 68 86           Imaging Studies:   I have personally reviewed pertinent imaging study reports and images in PACS. EKG, Pathology, and Other Studies:   I have personally reviewed pertinent reports and reviewed external records. Counseling/Coordination of care: Total 90 minutes communication with the patient via telehealth. Labs, medical tests and imaging studies were independently and extensively reviewed by me as noted above in HPI and old records were obtained and summarized as noted above in HPI. My recommendations were discussed with the patient in detail who verbalized understanding.

## 2023-10-03 NOTE — PLAN OF CARE
Problem: PAIN - ADULT  Goal: Verbalizes/displays adequate comfort level or baseline comfort level  Description: Interventions:  - Encourage patient to monitor pain and request assistance  - Assess pain using appropriate pain scale  - Administer analgesics based on type and severity of pain and evaluate response  - Implement non-pharmacological measures as appropriate and evaluate response  - Consider cultural and social influences on pain and pain management  - Notify physician/advanced practitioner if interventions unsuccessful or patient reports new pain  Outcome: Progressing     Problem: INFECTION - ADULT  Goal: Absence or prevention of progression during hospitalization  Description: INTERVENTIONS:  - Assess and monitor for signs and symptoms of infection  - Monitor lab/diagnostic results  - Monitor all insertion sites, i.e. indwelling lines, tubes, and drains  - Monitor endotracheal if appropriate and nasal secretions for changes in amount and color  - Dalton appropriate cooling/warming therapies per order  - Administer medications as ordered  - Instruct and encourage patient and family to use good hand hygiene technique  - Identify and instruct in appropriate isolation precautions for identified infection/condition  Outcome: Progressing  Goal: Absence of fever/infection during neutropenic period  Description: INTERVENTIONS:  - Monitor WBC    Outcome: Progressing     Problem: SAFETY ADULT  Goal: Patient will remain free of falls  Description: INTERVENTIONS:  - Educate patient/family on patient safety including physical limitations  - Instruct patient to call for assistance with activity   - Consult OT/PT to assist with strengthening/mobility   - Keep Call bell within reach  - Keep bed low and locked with side rails adjusted as appropriate  - Keep care items and personal belongings within reach  - Initiate and maintain comfort rounds  - Make Fall Risk Sign visible to staff  - Offer Toileting every 2 Hours, in advance of need  - Initiate/Maintain alarm  - Obtain necessary fall risk management equipment  - Apply yellow socks and bracelet for high fall risk patients  - Consider moving patient to room near nurses station  Outcome: Progressing  Goal: Maintain or return to baseline ADL function  Description: INTERVENTIONS:  -  Assess patient's ability to carry out ADLs; assess patient's baseline for ADL function and identify physical deficits which impact ability to perform ADLs (bathing, care of mouth/teeth, toileting, grooming, dressing, etc.)  - Assess/evaluate cause of self-care deficits   - Assess range of motion  - Assess patient's mobility; develop plan if impaired  - Assess patient's need for assistive devices and provide as appropriate  - Encourage maximum independence but intervene and supervise when necessary  - Involve family in performance of ADLs  - Assess for home care needs following discharge   - Consider OT consult to assist with ADL evaluation and planning for discharge  - Provide patient education as appropriate  Outcome: Progressing  Goal: Maintains/Returns to pre admission functional level  Description: INTERVENTIONS:  - Perform BMAT or MOVE assessment daily.   - Set and communicate daily mobility goal to care team and patient/family/caregiver. - Collaborate with rehabilitation services on mobility goals if consulted  - Perform Range of Motion - times a day. - Reposition patient every -- hours.   - Dangle patient - times a day  - Stand patient - times a day  - Ambulate patient - times a day  - Out of bed to chair - times a day   - Out of bed for meals - times a day  - Out of bed for toileting  - Record patient progress and toleration of activity level   Outcome: Progressing     Problem: DISCHARGE PLANNING  Goal: Discharge to home or other facility with appropriate resources  Description: INTERVENTIONS:  - Identify barriers to discharge w/patient and caregiver  - Arrange for needed discharge resources and transportation as appropriate  - Identify discharge learning needs (meds, wound care, etc.)  - Arrange for interpretive services to assist at discharge as needed  - Refer to Case Management Department for coordinating discharge planning if the patient needs post-hospital services based on physician/advanced practitioner order or complex needs related to functional status, cognitive ability, or social support system  Outcome: Progressing     Problem: Knowledge Deficit  Goal: Patient/family/caregiver demonstrates understanding of disease process, treatment plan, medications, and discharge instructions  Description: Complete learning assessment and assess knowledge base.   Interventions:  - Provide teaching at level of understanding  - Provide teaching via preferred learning methods  Outcome: Progressing     Problem: GENITOURINARY - ADULT  Goal: Maintains or returns to baseline urinary function  Description: INTERVENTIONS:  - Assess urinary function  - Encourage oral fluids to ensure adequate hydration if ordered  - Administer IV fluids as ordered to ensure adequate hydration  - Administer ordered medications as needed  - Offer frequent toileting  - Follow urinary retention protocol if ordered  Outcome: Progressing  Goal: Absence of urinary retention  Description: INTERVENTIONS:  - Assess patient’s ability to void and empty bladder  - Monitor I/O  - Bladder scan as needed  - Discuss with physician/AP medications to alleviate retention as needed  - Discuss catheterization for long term situations as appropriate  Outcome: Progressing     Problem: METABOLIC, FLUID AND ELECTROLYTES - ADULT  Goal: Electrolytes maintained within normal limits  Description: INTERVENTIONS:  - Monitor labs and assess patient for signs and symptoms of electrolyte imbalances  - Administer electrolyte replacement as ordered  - Monitor response to electrolyte replacements, including repeat lab results as appropriate  - Instruct patient on fluid and nutrition as appropriate  Outcome: Progressing  Goal: Fluid balance maintained  Description: INTERVENTIONS:  - Monitor labs   - Monitor I/O and WT  - Instruct patient on fluid and nutrition as appropriate  - Assess for signs & symptoms of volume excess or deficit  Outcome: Progressing     Problem: SKIN/TISSUE INTEGRITY - ADULT  Goal: Skin Integrity remains intact(Skin Breakdown Prevention)  Description: Assess:  -Perform Diogenes assessment every -  -Clean and moisturize skin every -  -Inspect skin when repositioning, toileting, and assisting with ADLS  -Assess under medical devices such as - every -  -Assess extremities for adequate circulation and sensation     Bed Management:  -Have minimal linens on bed & keep smooth, unwrinkled  -Change linens as needed when moist or perspiring  -Avoid sitting or lying in one position for more than - hours while in bed  -Keep HOB at -degrees     Toileting:  -Offer bedside commode  -Assess for incontinence every -  -Use incontinent care products after each incontinent episode such as -    Activity:  -Mobilize patient - times a day  -Encourage activity and walks on unit  -Encourage or provide ROM exercises   -Turn and reposition patient every - Hours  -Use appropriate equipment to lift or move patient in bed  -Instruct/ Assist with weight shifting every - when out of bed in chair  -Consider limitation of chair time - hour intervals    Skin Care:  -Avoid use of baby powder, tape, friction and shearing, hot water or constrictive clothing  -Relieve pressure over bony prominences using -  -Do not massage red bony areas    Next Steps:  -Teach patient strategies to minimize risks such as -   -Consider consults to  interdisciplinary teams such as -  Outcome: Progressing  Goal: Incision(s), wounds(s) or drain site(s) healing without S/S of infection  Description: INTERVENTIONS  - Assess and document dressing, incision, wound bed, drain sites and surrounding tissue  - Provide patient and family education  - Perform skin care/dressing changes every --  Outcome: Progressing     Problem: MUSCULOSKELETAL - ADULT  Goal: Maintain or return mobility to safest level of function  Description: INTERVENTIONS:  - Assess patient's ability to carry out ADLs; assess patient's baseline for ADL function and identify physical deficits which impact ability to perform ADLs (bathing, care of mouth/teeth, toileting, grooming, dressing, etc.)  - Assess/evaluate cause of self-care deficits   - Assess range of motion  - Assess patient's mobility  - Assess patient's need for assistive devices and provide as appropriate  - Encourage maximum independence but intervene and supervise when necessary  - Involve family in performance of ADLs  - Assess for home care needs following discharge   - Consider OT consult to assist with ADL evaluation and planning for discharge  - Provide patient education as appropriate  Outcome: Progressing  Goal: Maintain proper alignment of affected body part  Description: INTERVENTIONS:  - Support, maintain and protect limb and body alignment  - Provide patient/ family with appropriate education  Outcome: Progressing

## 2023-10-03 NOTE — CASE MANAGEMENT
Case Management Discharge Planning Note    Patient name Sindhu Ferris  Location [address] [facility] [id number] MRN [id number]  : 1955 Date 10/3/2023       Current Admission Date: 10/2/2023  Current Admission Diagnosis:Acute cystitis   Patient Active Problem List    Diagnosis Date Noted   • Cutaneous candidiasis 10/03/2023   • Acute cystitis 10/02/2023   • Gait instability 2023   • Elevated MCV 2023   • Skin rash 2023   • Rash 2023   • Type 2 diabetes mellitus with hypoglycemia without coma, with long-term current use of insulin ([address]) 2023   • BMI 33.0-33.9,adult 2019   • Iron deficiency anemia, unspecified 2019   • Right lower quadrant abdominal pain 2019   • Pressure injury of sacral region, stage 3 ([address]) 2019   • Pressure ulcer of left buttock, stage 3 ([address]) 2019   • Pressure ulcer of right buttock, stage 2 ([address]) 2019   • Generalized weakness 2019   • Leukopenia 2019   • Neutropenia ([address]) 2019   • Low TSH level 2019   • Sinus arrhythmia 2019   • Lactic acidosis 2019   • Recurrent falls 2019   • Acute hyponatremia 2019   • Hypomagnesemia 2019   • Hypotension 2019   • Hypertriglyceridemia 2019   • Vaginal candidiasis 2019   • GERD without esophagitis 2018   • Urinary incontinence 2018   • Obesity due to excess calories 10/31/2018   • E. coli UTI 10/31/2018   • Hypercholesterolemia 10/31/2018   • Ambulatory dysfunction 10/30/2018   • NSTEMI (non-ST elevated myocardial infarction) ([address]) 10/29/2018   • Volume overload 10/29/2018   • Skin breakdown 10/29/2018   • Asthma 2016   • Seizure disorder ([address]) 2014   • Dermatitis 2007   • Bipolar disorder ([address]) 2007   • Acquired hypothyroidism 2007      LOS (days): 1  Geometric Mean LOS (GMLOS) (days): 2.90  Days to GMLOS:2.2     OBJECTIVE:  Risk of Unplanned Readmission Score: 22.34         Current admission status: Inpatient   Preferred Pharmacy:   [facility] - [address] [facility]  [address] [facility] [address]  Phone: [phone] Fax: [phone]    Primary Care Provider: Kathia Weiner    Primary Insurance: Cristopher Inocente BELLAMY  Secondary Insurance: [address]:                       as per bedside nurse, pt requires transport home.   CM placing transportation request to [facility] in [address]          as per [facility] will  pt at 1300 to transport to home address

## 2023-10-03 NOTE — ASSESSMENT & PLAN NOTE
· Extensive groin candidiasis  · Continue Mycostatin powder twice daily we will Rx  · Appreciate wound care consultation

## 2023-10-03 NOTE — PLAN OF CARE
Problem: INFECTION - ADULT  Goal: Absence or prevention of progression during hospitalization  Description: INTERVENTIONS:  - Assess and monitor for signs and symptoms of infection  - Monitor lab/diagnostic results  - Monitor all insertion sites, i.e. indwelling lines, tubes, and drains  - Monitor endotracheal if appropriate and nasal secretions for changes in amount and color  - [place] appropriate cooling/warming therapies per order  - Administer medications as ordered  - Instruct and encourage patient and family to use good hand hygiene technique  - Identify and instruct in appropriate isolation precautions for identified infection/condition  Outcome: Progressing  Goal: Absence of fever/infection during neutropenic period  Description: INTERVENTIONS:  - Monitor WBC    Outcome: Progressing     Problem: SAFETY ADULT  Goal: Patient will remain free of falls  Description: INTERVENTIONS:  - Educate patient/family on patient safety including physical limitations  - Instruct patient to call for assistance with activity   - Consult OT/PT to assist with strengthening/mobility   - Keep Call bell within reach  - Keep bed low and locked with side rails adjusted as appropriate  - Keep care items and personal belongings within reach  - Initiate and maintain comfort rounds  - Make Fall Risk Sign . to staff  - Offer Toileting every . Hours, in advance of need  - Initiate/Maintain . alarm  - Obtain necessary fall risk management equipment: . ..  - Apply yellow socks and bracelet for high fall risk patients  - Consider moving patient to room near nurses station  Outcome: Progressing  Goal: Maintain or return to baseline ADL function  Description: INTERVENTIONS:  -  Assess patient's ability to carry out ADLs; assess patient's baseline for ADL function and identify physical deficits which impact ability to perform ADLs (bathing, care of mouth/teeth, toileting, grooming, dressing, etc.)  - Assess/evaluate cause of self-care deficits   - Assess range of motion  - Assess patient's mobility; develop plan if impaired  - Assess patient's need for assistive devices and provide as appropriate  - Encourage maximum independence but intervene and supervise when necessary  - Involve family in performance of ADLs  - Assess for home care needs following discharge   - Consider OT consult to assist with ADL evaluation and planning for discharge  - Provide patient education as appropriate  Outcome: Progressing  Goal: Maintains/Returns to pre admission functional level  Description: INTERVENTIONS:  - Perform BMAT or MOVE assessment daily.   - Set and communicate daily mobility goal to care team and patient/family/caregiver. - Collaborate with rehabilitation services on mobility goals if consulted  - Perform Range of Motion . times a day. - Reposition patient every . hours. - Dangle patient . times a day  - Stand patient . times a day  - Ambulate patient . times a day  - Out of bed to chair . times a day   - Out of bed for meals . .. times a day  - Out of bed for toileting  - Record patient progress and toleration of activity level   Outcome: Progressing     Problem: DISCHARGE PLANNING  Goal: Discharge to home or other facility with appropriate resources  Description: INTERVENTIONS:  - Identify barriers to discharge w/patient and caregiver  - Arrange for needed discharge resources and transportation as appropriate  - Identify discharge learning needs (meds, wound care, etc.)  - Arrange for interpretive services to assist at discharge as needed  - Refer to Case Management Department for coordinating discharge planning if the patient needs post-hospital services based on physician/advanced practitioner order or complex needs related to functional status, cognitive ability, or social support system  Outcome: Progressing     Problem: Knowledge Deficit  Goal: Patient/family/caregiver demonstrates understanding of disease process, treatment plan, medications, and discharge instructions  Description: Complete learning assessment and assess knowledge base. Interventions:  - Provide teaching at level of understanding  - Provide teaching via preferred learning methods  Outcome: Progressing     Problem: GENITOURINARY - ADULT  Goal: Maintains or returns to baseline urinary function  Description: INTERVENTIONS:  - Assess urinary function  - Encourage oral fluids to ensure adequate hydration if ordered  - Administer IV fluids as ordered to ensure adequate hydration  - Administer ordered medications as needed  - Offer frequent toileting  - Follow urinary retention protocol if ordered  Outcome: Progressing  Goal: Absence of urinary retention  Description: INTERVENTIONS:  - Assess patient's ability to void and empty bladder  - Monitor I/O  - Bladder scan as needed  - Discuss with physician/AP medications to alleviate retention as needed  - Discuss catheterization for long term situations as appropriate  Outcome: Progressing     Problem: SKIN/TISSUE INTEGRITY - ADULT  Goal: Skin Integrity remains intact(Skin Breakdown Prevention)  Description: Assess:  -Perform Diogenes assessment every .  -Clean and moisturize skin every .  -Inspect skin when repositioning, toileting, and assisting with ADLS  -Assess under medical devices such as . every .  -Assess extremities for adequate circulation and sensation     Bed Management:  -Have minimal linens on bed & keep smooth, unwrinkled  -Change linens as needed when moist or perspiring  -Avoid sitting or lying in one position for more than . hours while in bed  -Keep HOB at .degrees     Toileting:  -Offer bedside commode  -Assess for incontinence every . -Use incontinent care products after each incontinent episode such as . Activity:  -Mobilize patient . times a day  -Encourage activity and walks on unit  -Encourage or provide ROM exercises   -Turn and reposition patient every .  Hours  -Use appropriate equipment to lift or move patient in bed  -Instruct/ Assist with weight shifting every . when out of bed in chair  -Consider limitation of chair time . hour intervals    Skin Care:  -Avoid use of baby powder, tape, friction and shearing, hot water or constrictive clothing  -Relieve pressure over bony prominences using .  -Do not massage red bony areas    Next Steps:  -Teach patient strategies to minimize risks such as .   -Consider consults to  interdisciplinary teams such as . .. Outcome: Progressing  Goal: Incision(s), wounds(s) or drain site(s) healing without S/S of infection  Description: INTERVENTIONS  - Assess and document dressing, incision, wound bed, drain sites and surrounding tissue  - Provide patient and family education  - Perform skin care/dressing changes every .   Outcome: Progressing     Problem: MUSCULOSKELETAL - ADULT  Goal: Maintain or return mobility to safest level of function  Description: INTERVENTIONS:  - Assess patient's ability to carry out ADLs; assess patient's baseline for ADL function and identify physical deficits which impact ability to perform ADLs (bathing, care of mouth/teeth, toileting, grooming, dressing, etc.)  - Assess/evaluate cause of self-care deficits   - Assess range of motion  - Assess patient's mobility  - Assess patient's need for assistive devices and provide as appropriate  - Encourage maximum independence but intervene and supervise when necessary  - Involve family in performance of ADLs  - Assess for home care needs following discharge   - Consider OT consult to assist with ADL evaluation and planning for discharge  - Provide patient education as appropriate  Outcome: Progressing  Goal: Maintain proper alignment of affected body part  Description: INTERVENTIONS:  - Support, maintain and protect limb and body alignment  - Provide patient/ family with appropriate education  Outcome: Progressing     Problem: MOBILITY - ADULT  Goal: Maintain or return to baseline ADL function  Description: INTERVENTIONS:  -  Assess patient's ability to carry out ADLs; assess patient's baseline for ADL function and identify physical deficits which impact ability to perform ADLs (bathing, care of mouth/teeth, toileting, grooming, dressing, etc.)  - Assess/evaluate cause of self-care deficits   - Assess range of motion  - Assess patient's mobility; develop plan if impaired  - Assess patient's need for assistive devices and provide as appropriate  - Encourage maximum independence but intervene and supervise when necessary  - Involve family in performance of ADLs  - Assess for home care needs following discharge   - Consider OT consult to assist with ADL evaluation and planning for discharge  - Provide patient education as appropriate  Outcome: Progressing  Goal: Maintains/Returns to pre admission functional level  Description: INTERVENTIONS:  - Perform BMAT or MOVE assessment daily.   - Set and communicate daily mobility goal to care team and patient/family/caregiver. - Collaborate with rehabilitation services on mobility goals if consulted  - Perform Range of Motion . times a day. - Reposition patient every . hours. - Dangle patient . times a day  - Stand patient . times a day  - Ambulate patient . times a day  - Out of bed to chair . times a day   - Out of bed for meals . .. times a day  - Out of bed for toileting  - Record patient progress and toleration of activity level   Outcome: Progressing     Problem: Prexisting or High Potential for Compromised Skin Integrity  Goal: Skin integrity is maintained or improved  Description: INTERVENTIONS:  - Identify patients at risk for skin breakdown  - Assess and monitor skin integrity  - Assess and monitor nutrition and hydration status  - Monitor labs   - Assess for incontinence   - Turn and reposition patient  - Assist with mobility/ambulation  - Relieve pressure over bony prominences  - Avoid friction and shearing  - Provide appropriate hygiene as needed including keeping skin clean and dry  - Evaluate need for skin moisturizer/barrier cream  - Collaborate with interdisciplinary team   - Patient/family teaching  - Consider wound care consult   Outcome: Progressing

## 2023-10-03 NOTE — H&P
[address] [facility]  H&P  Name: Tonya Mcghee 79 y.o. female I MRN: [id number]  Unit/Bed#: -01 I Date of Admission: 10/2/2023   Date of Service: 10/3/2023 I Hospital Day: 1      Assessment/Plan   * Acute cystitis  Assessment & Plan  · Sent in by PCP with history of pseudomonal MDR's urine culture from 9/18  · Outpatient UA with pyuria  · Aztreonam initiated due to multiple allergies  · Appreciate infectious disease consultation  · Repeat UA/urine culture ordered  · No fever or leukocytosis  · Tachycardia noted    Cutaneous candidiasis  Assessment & Plan  · Extensive groin candidiasis  · Continue Mycostatin powder twice daily  · Appreciate wound care consultation    Seizure disorder [facility])  Assessment & Plan  · Continue PTA zonisamide, phenytoin, levetiracetam  · Seizure precautions    Acquired hypothyroidism  Assessment & Plan  · Continue PTA levothyroxine 175 g daily  · TSH 2.8 in August    Bipolar disorder [facility])  Assessment & Plan  · Continue Seroquel       VTE Pharmacologic Prophylaxis:   High Risk (Score >/= 5) - Pharmacological DVT Prophylaxis Ordered: enoxaparin (Lovenox). Sequential Compression Devices Ordered. Code Status: Level 3 - DNAR and DNI   Discussion with family: Patient declined call to . Anticipated Length of Stay: Patient will be admitted on an inpatient basis with an anticipated length of stay of greater than 2 midnights secondary to UTI. Total Time Spent on Date of Encounter in care of patient: 45 mins. This time was spent on one or more of the following: performing physical exam; counseling and coordination of care; obtaining or reviewing history; documenting in the medical record; reviewing/ordering tests, medications or procedures; communicating with other healthcare professionals and discussing with patient's family/caregivers.     Chief Complaint: UTI    History of Present Illness:  Tonya Mcghee is a 79 y.o. female with a PMH of diabetes mellitus, hypertension, LOLA, bipolar disorder, seizure disorder who presents from PCP office with positive UTI. Recent hospitalization at outside facility, urine culture 9/18 with MDR Pseudomonas. Patient with multiple allergies will require IV aztreonam.  Denies fever, chills, urinary symptoms. Complaining of candidiasis in the groin. Ambulatory dysfunction, lives alone, has no contacts or family that can assist her. Review of Systems:  Review of Systems   Constitutional: Negative for chills and fever. HENT: Negative for ear pain and sore throat. Eyes: Negative for pain and visual disturbance. Respiratory: Negative for cough and shortness of breath. Cardiovascular: Negative for chest pain and palpitations. Gastrointestinal: Negative for abdominal pain and vomiting. Genitourinary: Positive for frequency. Negative for dysuria and hematuria. Musculoskeletal: Positive for gait problem. Negative for arthralgias and back pain. Skin: Positive for rash. Negative for color change. Neurological: Negative for seizures and syncope. Psychiatric/Behavioral: The patient is nervous/anxious. All other systems reviewed and are negative.       Past Medical and Surgical History:   Past Medical History:   Diagnosis Date   • Asthma    • Bipolar disorder ([address])    • Chronic UTI (urinary tract infection)    • COPD (chronic obstructive pulmonary disease) ([address])    • Diabetes mellitus ([address])    • Disease of thyroid gland    • Dyslipidemia 10/31/2018   • Essential hypertension 10/31/2018   • GERD (gastroesophageal reflux disease)    • Heart attack ([address])    • Iron deficiency anemia, unspecified 7/29/2019   • Obesity due to excess calories 10/31/2018   • Recurrent falls 4/13/2019   • Seizure [facility])        Past Surgical History:   Procedure Laterality Date   • ANKLE FRACTURE SURGERY Right    • TUBAL LIGATION     • VEIN LIGATION AND STRIPPING         Meds/Allergies:  Prior to Admission medications    Medication Sig Start Date End Date Taking? Authorizing Provider   montelukast (SINGULAIR) 10 mg tablet Take 10 mg by mouth daily at bedtime   Yes Historical Provider, MD   Acetaminophen (Tylenol) 325 MG CAPS Take 650 mg by mouth Three times daily as needed 10/5/21   Historical Provider, MD   albuterol (VENTOLIN HFA) 90 mcg/act inhaler Inhale 2 puffs every 6 (six) hours as needed for wheezing 12/30/19   Ok Brooke PA-C   aspirin (ECOTRIN LOW STRENGTH) 81 mg EC tablet Take 1 tablet (81 mg total) by mouth daily 11/18/19   DEIDRE Funez   Blood Glucose Monitoring Suppl (BLOOD GLUCOSE MONITOR SYSTEM) w/Device KIT Test blood sugars 3 times a day 4/10/18   DEIDRE Butler   Diclofenac Sodium (VOLTAREN) 1 % APPLY TO AFFECTED AREA EVERY 6 HOURS IF NEEDED FOR PAIN. 12/22/21   Historical Provider, MD   Docusate Sodium (DSS) 100 MG CAPS Take 1 capsule by mouth 2 (two) times a day as needed 9/15/21   Historical Provider, MD   ferrous sulfate (FeroSul) 325 (65 Fe) mg tablet Take 1 tablet (325 mg total) by mouth daily with breakfast  Patient not taking: Reported on 9/18/2023 3/3/20   Ok Brooke PA-C   fluticasone (FLONASE) 50 mcg/act nasal spray SPRAY ONE (1) SPRAY INTO EACH NOSTRIL ONCE DAILY  Patient taking differently: 1 spray 2 (two) times a day SPRAY ONE (1) SPRAY INTO EACH NOSTRIL ONCE DAILY 3/10/20   Ok Brooke PA-C   gabapentin (NEURONTIN) 800 mg tablet Take 1 tablet (800 mg total) by mouth 4 (four) times a day for 3 days 11/15/20 9/18/23  Junie Hernandez MD   glucose blood (ONE TOUCH ULTRA TEST) test strip Test blood sugars 3 times a day.  8/13/19   DEIDRE Butler   guaiFENesin 400 mg Take 400 mg by mouth 2 (two) times a day    Historical Provider, MD   Incontinence Supply Disposable (PADSORBER BED PAN LINERS) MISC by Does not apply route as needed (incontinence) 11/7/18   DEIDRE Merrill   Infant Care Products (CUTIES SENSITIVE WIPES) [place] 120 Pieces by Does not apply route as needed (incontinence) 11/7/18   DEIDRE Butler   levETIRAcetam (KEPPRA) 750 mg tablet Take 2 tablets (1,500 mg total) by mouth 2 (two) times a day for 15 days Pt states she takes 1500MG Bid  Patient taking differently: Take 750 mg by mouth 2 (two) times a day 11/13/20 9/18/23  Jin Wolfe MD   levothyroxine 175 mcg tablet Take 175 mcg by mouth in the morning 10/15/21   Historical Provider, MD   magnesium chloride (MAG64) 64 MG TBEC EC tablet Take 1 tablet (64 mg total) by mouth 2 (two) times a day 11/15/19   Micky Jaramillo PA-C   meloxicam (MOBIC) 7.5 mg tablet Take 1 tablet (7.5 mg total) by mouth 2 (two) times a day 3/30/20   Micky Jaramillo PA-C   metFORMIN (GLUCOPHAGE) 500 mg tablet Take 1 tablet (500 mg total) by mouth 2 (two) times a day with meals 9/20/23 10/20/23  Brooke Tucker PA-C   Misc. Devices (MATTRESS PAD) MISC by Does not apply route daily 8/5/19   DEIDRE Butler   multivitamin [facility]) TABS Take 1 tablet by mouth daily 12/22/21   Historical Provider, MD   nystatin (MYCOSTATIN) ointment 1 application. 2 (two) times a day 11/29/21   Historical Provider, MD   omeprazole (PriLOSEC) 40 MG capsule Take 40 mg by mouth 2 (two) times a day 11/29/21   Historical Provider, MD Rasta Barnard LANCETS 93L MISC Test blood sugars 3 times a day.  8/13/19   DEIDRE Butler   oxybutynin (DITROPAN) 5 mg tablet Take 1 tablet (5 mg total) by mouth 3 (three) times a day 12/30/19   Micky Jaramillo PA-C   phenytoin (DILANTIN) 100 mg ER capsule Take by mouth 2 (two) times a day 3 tablets in morning and 2 at lunch daily    Historical Provider, MD   polyethylene glycol (GLYCOLAX) powder Take 17 g by mouth daily  Patient taking differently: Take 17 g by mouth daily as needed 5/14/19   DEIDRE Butler   polyvinyl alcohol (LIQUIFILM TEARS) 1.4 % ophthalmic solution Administer 1 drop to both eyes 3 (three) times a day 4/17/19   Yomi Lee MD   QUEtiapine (SEROquel) 400 MG tablet Take 1 tablet (400 mg total) by mouth daily at bedtime for 2 doses  Patient taking differently: Take 400 mg by mouth daily 11/15/20 9/18/23  Viktoriya Pierre MD   simvastatin (ZOCOR) 80 mg tablet Take 1 tablet (80 mg total) by mouth daily at bedtime  Patient taking differently: Take 40 mg by mouth daily at bedtime 12/9/19   Dean Peraza PA-C   triamcinolone (KENALOG) 0.1 % cream Apply topically 2 (two) times a day 1/22/22   Shellie Thornton PA-C   valACYclovir (VALTREX) 500 mg tablet Take 1 tablet by mouth 2 (two) times a day 3/22/18   Historical Provider, MD   zonisamide (ZONEGRAN) 100 mg capsule Take 200 mg by mouth 2 (two) times a day    Historical Provider, MD   hydrOXYzine pamoate (VISTARIL) 50 mg capsule Take 1 capsule (50 mg total) by mouth 3 (three) times a day 12/9/19 10/2/23  Dean Peraza PA-C     I have reviewed home medications with patient personally. Allergies:    Allergies   Allergen Reactions   • Keflex [Cephalexin] Anaphylaxis     Airway edema    • Levaquin [Levofloxacin] Anaphylaxis   • Penicillins Anaphylaxis   • Bactrim [Sulfamethoxazole-Trimethoprim] Swelling and GI Intolerance     LE edema and thrush   • Ciprofloxacin Swelling     Edema and all extremities and thrush   Edema in all extremities and thrush    • Noroxin [Norfloxacin]        Social History:  Marital Status:    Occupation: disabled  Patient Pre-hospital Living Situation: Alone  Patient Pre-hospital Level of Mobility: unable to be assessed at time of evaluation  Patient Pre-hospital Diet Restrictions: none  Substance Use History:   Social History     Substance and Sexual Activity   Alcohol Use Not Currently   • Alcohol/week: 0.0 standard drinks of alcohol     Social History     Tobacco Use   Smoking Status Never   Smokeless Tobacco Never     Social History     Substance and Sexual Activity   Drug Use No       Family History:  Family History   Problem Relation Age of Onset   • Skeletal dysplasia Mother    • Stroke Father        Physical Exam:     Vitals:   Blood Pressure: 120/81 (10/02/23 2100)  Pulse: 102 (10/02/23 2100)  Temperature: 97.9 °F (36.6 °C) (10/02/23 2100)  Temp Source: Temporal (10/02/23 1847)  Respirations: 20 (10/02/23 1900)  Height: 5' 7" (170.2 cm) (10/02/23 2054)  Weight - Scale: 111 kg (245 lb 9.5 oz) (10/02/23 2054)  SpO2: 92 % (10/02/23 2318)    Physical Exam  Vitals and nursing note reviewed. Constitutional:       General: She is not in acute distress. Appearance: She is well-developed. She is obese. HENT:      Head: Normocephalic and atraumatic. Mouth/Throat:      Mouth: Mucous membranes are moist.   Eyes:      Conjunctiva/sclera: Conjunctivae normal.   Cardiovascular:      Rate and Rhythm: Normal rate and regular rhythm. Heart sounds: No murmur heard. Pulmonary:      Effort: Pulmonary effort is normal. No respiratory distress. Breath sounds: Normal breath sounds. Abdominal:      Palpations: Abdomen is soft. Tenderness: There is no abdominal tenderness. Musculoskeletal:         General: No swelling. Cervical back: Neck supple. Skin:     General: Skin is warm and dry. Capillary Refill: Capillary refill takes less than 2 seconds. Findings: Rash present. Comments: Severe candidiasis in groin   Neurological:      General: No focal deficit present. Mental Status: She is alert and oriented to person, place, and time. Psychiatric:         Mood and Affect: Mood is anxious. Affect is labile.          Additional Data:     Lab Results:  Results from last 7 days   Lab Units 10/02/23  1900   WBC Thousand/uL 5.86   HEMOGLOBIN g/dL 12.4   HEMATOCRIT % 38.1   PLATELETS Thousands/uL 221   NEUTROS PCT % 57   LYMPHS PCT % 21   MONOS PCT % 7   EOS PCT % 13*     Results from last 7 days   Lab Units 10/02/23  1900   SODIUM mmol/L 136   POTASSIUM mmol/L 3.9   CHLORIDE mmol/L 102   CO2 mmol/L 24   BUN mg/dL 8   CREATININE mg/dL 0.62   ANION GAP mmol/L 10   CALCIUM mg/dL 9.0   ALBUMIN g/dL 3.5   TOTAL BILIRUBIN mg/dL 0.22   ALK PHOS U/L 86   ALT U/L 16   AST U/L 15   GLUCOSE RANDOM mg/dL 177*         Results from last 7 days   Lab Units 10/02/23  2134   POC GLUCOSE mg/dl 113               Lines/Drains:  Invasive Devices     Peripheral Intravenous Line  Duration           Peripheral IV 10/02/23 Right;Ventral (anterior) Forearm <1 day          Drain  Duration           External Urinary Catheter <1 day                    Imaging: No pertinent imaging reviewed. No orders to display       EKG and Other Studies Reviewed on Admission:   All    ** Please Note: This note has been constructed using a voice recognition system.  **

## 2023-10-03 NOTE — UTILIZATION REVIEW
Initial Clinical Review    Admission: Date/Time/Statement:   Admission Orders (From admission, onward)     Ordered        10/02/23 2005  INPATIENT ADMISSION  Once                      Orders Placed This Encounter   Procedures   • INPATIENT ADMISSION     Standing Status:   Standing     Number of Occurrences:   1     Order Specific Question:   Level of Care     Answer:   Med Surg [16]     Order Specific Question:   Estimated length of stay     Answer:   More than 2 Midnights     Order Specific Question:   Certification     Answer:   I certify that inpatient services are medically necessary for this patient for a duration of greater than two midnights. See H&P and MD Progress Notes for additional information about the patient's course of treatment. ED Arrival Information     Expected   10/2/2023 18:44    Arrival   10/2/2023 18:44    Acuity   Urgent            Means of arrival   Ambulance    Escorted by   [address] [place] [address]   Hospitalist    Admission type   Emergency            Arrival complaint   abnormal labs           Chief Complaint   Patient presents with   • Evaluation of Abnormal Diagnostic Test     Pt sent from PCP for abnormal urine culture       Initial Presentation: 79 y.o. female to ED via EMS from home  Present to ED from PCP office with positive UTI.  urine culture 9/18 with MDR Pseudomonas. Outpatient UA with pyuria. Patient with multiple allergies will require IV aztreonam.  Complaining of candidiasis in the groin. PMHX diabetes mellitus, hypertension, LOLA, bipolar disorder, seizure disorder   Admitted to [address] [place] [address] with DX: Acute cystitis  PLAN: cont iv abx; f/u repeat UA/urine culture; monitor labs; seizure precuations; wound care consult       Date: 10/3/23    Day 2  Hospital Course:   Merle Longo is a 79 y.o. female patient who originally presented to the hospital on 10/2/2023 due to urine culture revealing MDRO cystitis.   Patient was admitted for IV antibiotics with consultation to infectious disease evaluation done by infectious disease with patient having only symptomology this is most likely colonization no need for IV antibiotics she does have an increased chance of recurrent UTIs referral has been made to outpatient urology and also she will be started on vitamin C with Hip-Pk 1 g twice daily for prevention  Nystatin powder for intertrigo  Dc home        ED Triage Vitals   Temperature Pulse Respirations Blood Pressure SpO2   10/02/23 1847 10/02/23 1847 10/02/23 1847 10/02/23 1847 10/02/23 1847   97.5 °F (36.4 °C) (!) 111 20 125/67 (!) 88 %      Temp Source Heart Rate Source Patient Position - Orthostatic VS BP Location FiO2 (%)   10/02/23 1847 10/02/23 1847 10/02/23 1847 10/02/23 1847 --   Temporal Monitor Sitting Right arm       Pain Score       10/02/23 2303       Med Not Given for Pain - for MAR use only          Wt Readings from Last 1 Encounters:   10/02/23 111 kg (245 lb 9.5 oz)     Additional Vital Signs:   Date/Time Temp Pulse Resp BP MAP (mmHg) SpO2 Calculated FIO2 (%) - Nasal Cannula Nasal Cannula O2 Flow Rate (L/min) O2 Device Patient Position - Orthostatic VS   10/03/23 07:36:06 97.5 °F (36.4 °C) 72 18 119/74 89 92 % -- -- -- --   10/03/23 0729 -- -- -- -- -- 95 % -- -- None (Room air) --   10/02/23 2318 -- -- -- -- -- 92 % 28 2 L/min Nasal cannula --   10/02/23 21:00:56 97.9 °F (36.6 °C) 102 -- 120/81 94 95 % -- -- -- --   10/02/23 1900 -- 94 20 125/67 91 94 % -- -- None (Room air) --   10/02/23 1847 97.5 °F (36.4 °C) 111 Abnormal  20 125/67 -- 88 % Abnormal  -- -- None (Room air) Sitting           EKG: None obtained      Results from last 7 days   Lab Units 10/03/23  0538 10/02/23  1900   WBC Thousand/uL 5.30 5.86   HEMOGLOBIN g/dL 12.0 12.4   HEMATOCRIT % 36.9 38.1   PLATELETS Thousands/uL 161 221   NEUTROS ABS Thousands/µL 2.23 3.40         Results from last 7 days   Lab Units 10/03/23  0538 10/02/23  1900   SODIUM mmol/L 137 136   POTASSIUM mmol/L 4.1 3.9   CHLORIDE mmol/L 106 102   CO2 mmol/L 24 24   ANION GAP mmol/L 7 10   BUN mg/dL 9 8   CREATININE mg/dL 0.64 0.62   EGFR ml/min/1.73sq m 93 94   CALCIUM mg/dL 8.5 9.0   MAGNESIUM mg/dL 1.9  --    PHOSPHORUS mg/dL 4.6*  --      Results from last 7 days   Lab Units 10/03/23  0538 10/02/23  1900   AST U/L 15 15   ALT U/L 11 16   ALK PHOS U/L 68 86   TOTAL PROTEIN g/dL 5.9* 6.6   ALBUMIN g/dL 3.1* 3.5   TOTAL BILIRUBIN mg/dL 0.25 0.22     Results from last 7 days   Lab Units 10/03/23  0734 10/02/23  2134   POC GLUCOSE mg/dl 122 113     Results from last 7 days   Lab Units 10/03/23  0538 10/02/23  1900   GLUCOSE RANDOM mg/dL 129 177*       Results from last 7 days   Lab Units 10/02/23  2236   CLARITY UA  Clear   COLOR UA  Yellow   SPEC GRAV UA  1.010   PH UA  7.0   GLUCOSE UA mg/dl Negative   KETONES UA mg/dl Negative   BLOOD UA  Negative   PROTEIN UA mg/dl Negative   NITRITE UA  Positive*   BILIRUBIN UA  Negative   UROBILINOGEN UA E.U./dl 0.2   LEUKOCYTES UA  Moderate*   WBC UA /hpf 10-20*   RBC UA /hpf 0-1   BACTERIA UA /hpf Moderate*   EPITHELIAL CELLS WET PREP /hpf Occasional       ED Treatment:   Medication Administration from 10/02/2023 1844 to 10/02/2023 2054       Date/Time Order Dose Route Action     10/02/2023 2027 EDT aztreonam (AZACTAM) 1,000 mg in sodium chloride 0.9 % 50 mL IVPB 1,000 mg Intravenous New Bag          Present on Admission:  • Acute cystitis  • Acquired hypothyroidism  • Bipolar disorder ([address])  • Seizure disorder ([address])      Admitting Diagnosis: UTI (urinary tract infection) [N39.0]  Abnormal blood chemistry [R79.9]     Age/Sex: 79 y.o. female     Admission Orders: SCDs; Consistent Carbohydrate Diet. Blood glucose checks ACHS.       Scheduled Medications:  acetaminophen, 650 mg, Oral, TID  aspirin, 81 mg, Oral, Daily  docusate sodium, 100 mg, Oral, BID  enoxaparin, 40 mg, Subcutaneous, Daily  ferrous sulfate, 325 mg, Oral, Daily With Breakfast  fluticasone, 1 spray, Each Nare, BID  gabapentin, 800 mg, Oral, 4x Daily  insulin lispro, 1-6 Units, Subcutaneous, TID AC  insulin lispro, 1-6 Units, Subcutaneous, HS  levETIRAcetam, 1,000 mg, Oral, BID  levothyroxine, 175 mcg, Oral, Early Morning  meloxicam, 7.5 mg, Oral, BID  montelukast, 10 mg, Oral, HS  nystatin, , Topical, BID  oxybutynin, 5 mg, Oral, TID  pantoprazole, 40 mg, Oral, BID  phenytoin, 200 mg, Oral, After Lunch  phenytoin, 300 mg, Oral, Daily  pravastatin, 80 mg, Oral, Daily With Dinner  QUEtiapine, 400 mg, Oral, BID  valACYclovir, 500 mg, Oral, BID  zonisamide, 200 mg, Oral, BID    aztreonam (AZACTAM) 1,000 mg in sodium chloride 0.9 % 50 mL IVPB  Dose: 1,000 mg  Freq: Once Route: IV  Last Dose: 1,000 mg (10/02/23 2326)  Start: 10/02/23 2315 End: 10/02/23 2356      Continuous IV Infusions: None     PRN Meds:  acetaminophen, 650 mg, Oral, Q6H PRN  albuterol, 2 puff, Inhalation, Q6H PRN  Diclofenac Sodium, 2 g, Topical, 4x Daily PRN  glycerin-hypromellose-, 2 drop, Both Eyes, Q6H PRN        IP CONSULT TO INFECTIOUS DISEASES  IP CONSULT TO CASE MANAGEMENT    Network Utilization Review Department  ATTENTION: Please call with any questions or concerns to [phone] and carefully listen to the prompts so that you are directed to the right person. All voicemails are confidential.  Lazarus Kaufman all requests for admission clinical reviews, approved or denied determinations and any other requests to dedicated fax number below belonging to the campus where the patient is receiving treatment.  List of dedicated fax numbers for the Facilities:  [place] DENIALS (Administrative/Medical Necessity) [phone] 230[address] [facility] (Maternity/NICU/Pediatrics) [phone]   [address] [facility] [employer] [address] [place] [address] 1000 [facility] [phone] [address] [phone]   [facility] [address] [place] [address] [address] [address] [facility] [address] [facility] [address] 1300 [facility] [phone]

## 2023-10-03 NOTE — ASSESSMENT & PLAN NOTE
· Sent in by PCP with history of pseudomonal MDR's urine culture from 9/18  · Outpatient UA with pyuria  · Evaluated by infectious disease patient has no UTI symptoms urine culture likely represents colonization. She does not need current antibiotics. She is at risk of future UTIs recommend starting Hiprex 1 g twice daily with vitamin C outpatient follow-up with urology  · Per ID-would only check urine cultures if patient is clearly symptomatic with fever, dysuria, suprapubic pain. · Refer to outpatient urology as well.   · Patient is cleared to be discharged home

## 2023-10-03 NOTE — PLAN OF CARE
Problem: INFECTION - ADULT  Goal: Absence or prevention of progression during hospitalization  Description: INTERVENTIONS:  - Assess and monitor for signs and symptoms of infection  - Monitor lab/diagnostic results  - Monitor all insertion sites, i.e. indwelling lines, tubes, and drains  - Monitor endotracheal if appropriate and nasal secretions for changes in amount and color  - [place] appropriate cooling/warming therapies per order  - Administer medications as ordered  - Instruct and encourage patient and family to use good hand hygiene technique  - Identify and instruct in appropriate isolation precautions for identified infection/condition  10/3/2023 1138 by Dominique Lei RN  Outcome: Adequate for Discharge  10/3/2023 0713 by Dominique Lei RN  Outcome: Progressing  Goal: Absence of fever/infection during neutropenic period  Description: INTERVENTIONS:  - Monitor WBC    10/3/2023 1138 by Dominique Lei RN  Outcome: Adequate for Discharge  10/3/2023 0713 by Dominique Lei RN  Outcome: Progressing     Problem: SAFETY ADULT  Goal: Patient will remain free of falls  Description: INTERVENTIONS:  - Educate patient/family on patient safety including physical limitations  - Instruct patient to call for assistance with activity   - Consult OT/PT to assist with strengthening/mobility   - Keep Call bell within reach  - Keep bed low and locked with side rails adjusted as appropriate  - Keep care items and personal belongings within reach  - Initiate and maintain comfort rounds  - Make Fall Risk Sign . to staff  - Offer Toileting every . Hours, in advance of need  - Initiate/Maintain . alarm  - Obtain necessary fall risk management equipment: . ..  - Apply yellow socks and bracelet for high fall risk patients  - Consider moving patient to room near nurses station  10/3/2023 1138 by Dominique Lei RN  Outcome: Adequate for Discharge  10/3/2023 0713 by Dominiuqe Lei RN  Outcome: Progressing  Goal: Maintain or return to baseline ADL function  Description: INTERVENTIONS:  -  Assess patient's ability to carry out ADLs; assess patient's baseline for ADL function and identify physical deficits which impact ability to perform ADLs (bathing, care of mouth/teeth, toileting, grooming, dressing, etc.)  - Assess/evaluate cause of self-care deficits   - Assess range of motion  - Assess patient's mobility; develop plan if impaired  - Assess patient's need for assistive devices and provide as appropriate  - Encourage maximum independence but intervene and supervise when necessary  - Involve family in performance of ADLs  - Assess for home care needs following discharge   - Consider OT consult to assist with ADL evaluation and planning for discharge  - Provide patient education as appropriate  10/3/2023 1138 by Natalia Nguyen RN  Outcome: Adequate for Discharge  10/3/2023 0713 by Natalia Nguyen RN  Outcome: Progressing  Goal: Maintains/Returns to pre admission functional level  Description: INTERVENTIONS:  - Perform BMAT or MOVE assessment daily.   - Set and communicate daily mobility goal to care team and patient/family/caregiver. - Collaborate with rehabilitation services on mobility goals if consulted  - Perform Range of Motion . times a day. - Reposition patient every . hours. - Dangle patient . times a day  - Stand patient . times a day  - Ambulate patient . times a day  - Out of bed to chair . times a day   - Out of bed for meals . .. times a day  - Out of bed for toileting  - Record patient progress and toleration of activity level   10/3/2023 1138 by Natalia Nguyen RN  Outcome: Adequate for Discharge  10/3/2023 0713 by Natalia Nguyen RN  Outcome: Progressing     Problem: DISCHARGE PLANNING  Goal: Discharge to home or other facility with appropriate resources  Description: INTERVENTIONS:  - Identify barriers to discharge w/patient and caregiver  - Arrange for needed discharge resources and transportation as appropriate  - Identify discharge learning needs (meds, wound care, etc.)  - Arrange for interpretive services to assist at discharge as needed  - Refer to Case Management Department for coordinating discharge planning if the patient needs post-hospital services based on physician/advanced practitioner order or complex needs related to functional status, cognitive ability, or social support system  10/3/2023 1138 by Dominga Howard RN  Outcome: Adequate for Discharge  10/3/2023 0713 by Dominga Howard RN  Outcome: Progressing     Problem: Knowledge Deficit  Goal: Patient/family/caregiver demonstrates understanding of disease process, treatment plan, medications, and discharge instructions  Description: Complete learning assessment and assess knowledge base.   Interventions:  - Provide teaching at level of understanding  - Provide teaching via preferred learning methods  10/3/2023 1138 by Dominga Howard RN  Outcome: Adequate for Discharge  10/3/2023 0713 by Dominga Howard RN  Outcome: Progressing     Problem: GENITOURINARY - ADULT  Goal: Maintains or returns to baseline urinary function  Description: INTERVENTIONS:  - Assess urinary function  - Encourage oral fluids to ensure adequate hydration if ordered  - Administer IV fluids as ordered to ensure adequate hydration  - Administer ordered medications as needed  - Offer frequent toileting  - Follow urinary retention protocol if ordered  10/3/2023 1138 by Dominga Howard RN  Outcome: Adequate for Discharge  10/3/2023 0713 by Dominga Howard RN  Outcome: Progressing  Goal: Absence of urinary retention  Description: INTERVENTIONS:  - Assess patient's ability to void and empty bladder  - Monitor I/O  - Bladder scan as needed  - Discuss with physician/AP medications to alleviate retention as needed  - Discuss catheterization for long term situations as appropriate  10/3/2023 1138 by Dominga Howard RN  Outcome: Adequate for Discharge  10/3/2023 0713 by Dominga Howard RN  Outcome: Progressing     Problem: SKIN/TISSUE INTEGRITY - ADULT  Goal: Skin Integrity remains intact(Skin Breakdown Prevention)  Description: Assess:  -Perform Diogenes assessment every .  -Clean and moisturize skin every .  -Inspect skin when repositioning, toileting, and assisting with ADLS  -Assess under medical devices such as . every .  -Assess extremities for adequate circulation and sensation     Bed Management:  -Have minimal linens on bed & keep smooth, unwrinkled  -Change linens as needed when moist or perspiring  -Avoid sitting or lying in one position for more than . hours while in bed  -Keep HOB at .degrees     Toileting:  -Offer bedside commode  -Assess for incontinence every . -Use incontinent care products after each incontinent episode such as . Activity:  -Mobilize patient . times a day  -Encourage activity and walks on unit  -Encourage or provide ROM exercises   -Turn and reposition patient every . Hours  -Use appropriate equipment to lift or move patient in bed  -Instruct/ Assist with weight shifting every . when out of bed in chair  -Consider limitation of chair time . hour intervals    Skin Care:  -Avoid use of baby powder, tape, friction and shearing, hot water or constrictive clothing  -Relieve pressure over bony prominences using .  -Do not massage red bony areas    Next Steps:  -Teach patient strategies to minimize risks such as .   -Consider consults to  interdisciplinary teams such as . ..  10/3/2023 1138 by Ya Urban RN  Outcome: Adequate for Discharge  10/3/2023 5332 by Ya Urban RN  Outcome: Progressing  Goal: Incision(s), wounds(s) or drain site(s) healing without S/S of infection  Description: INTERVENTIONS  - Assess and document dressing, incision, wound bed, drain sites and surrounding tissue  - Provide patient and family education  - Perform skin care/dressing changes every .   10/3/2023 1138 by Ya Urban RN  Outcome: Adequate for Discharge  10/3/2023 1762 by Ya Urban RN  Outcome: Progressing     Problem: MUSCULOSKELETAL - ADULT  Goal: Maintain or return mobility to safest level of function  Description: INTERVENTIONS:  - Assess patient's ability to carry out ADLs; assess patient's baseline for ADL function and identify physical deficits which impact ability to perform ADLs (bathing, care of mouth/teeth, toileting, grooming, dressing, etc.)  - Assess/evaluate cause of self-care deficits   - Assess range of motion  - Assess patient's mobility  - Assess patient's need for assistive devices and provide as appropriate  - Encourage maximum independence but intervene and supervise when necessary  - Involve family in performance of ADLs  - Assess for home care needs following discharge   - Consider OT consult to assist with ADL evaluation and planning for discharge  - Provide patient education as appropriate  10/3/2023 1138 by Daniel Wright RN  Outcome: Adequate for Discharge  10/3/2023 0713 by Daniel Wright RN  Outcome: Progressing  Goal: Maintain proper alignment of affected body part  Description: INTERVENTIONS:  - Support, maintain and protect limb and body alignment  - Provide patient/ family with appropriate education  10/3/2023 1138 by Daniel Wright RN  Outcome: Adequate for Discharge  10/3/2023 0713 by Daniel Wright RN  Outcome: Progressing     Problem: MOBILITY - ADULT  Goal: Maintain or return to baseline ADL function  Description: INTERVENTIONS:  -  Assess patient's ability to carry out ADLs; assess patient's baseline for ADL function and identify physical deficits which impact ability to perform ADLs (bathing, care of mouth/teeth, toileting, grooming, dressing, etc.)  - Assess/evaluate cause of self-care deficits   - Assess range of motion  - Assess patient's mobility; develop plan if impaired  - Assess patient's need for assistive devices and provide as appropriate  - Encourage maximum independence but intervene and supervise when necessary  - Involve family in performance of ADLs  - Assess for home care needs following discharge   - Consider OT consult to assist with ADL evaluation and planning for discharge  - Provide patient education as appropriate  10/3/2023 1138 by Daniel Wright RN  Outcome: Adequate for Discharge  10/3/2023 0713 by Daniel Wright RN  Outcome: Progressing  Goal: Maintains/Returns to pre admission functional level  Description: INTERVENTIONS:  - Perform BMAT or MOVE assessment daily.   - Set and communicate daily mobility goal to care team and patient/family/caregiver. - Collaborate with rehabilitation services on mobility goals if consulted  - Perform Range of Motion . times a day. - Reposition patient every . hours. - Dangle patient . times a day  - Stand patient . times a day  - Ambulate patient . times a day  - Out of bed to chair . times a day   - Out of bed for meals . .. times a day  - Out of bed for toileting  - Record patient progress and toleration of activity level   10/3/2023 1138 by Daniel Wright RN  Outcome: Adequate for Discharge  10/3/2023 0713 by Daniel Wright RN  Outcome: Progressing     Problem: Prexisting or High Potential for Compromised Skin Integrity  Goal: Skin integrity is maintained or improved  Description: INTERVENTIONS:  - Identify patients at risk for skin breakdown  - Assess and monitor skin integrity  - Assess and monitor nutrition and hydration status  - Monitor labs   - Assess for incontinence   - Turn and reposition patient  - Assist with mobility/ambulation  - Relieve pressure over bony prominences  - Avoid friction and shearing  - Provide appropriate hygiene as needed including keeping skin clean and dry  - Evaluate need for skin moisturizer/barrier cream  - Collaborate with interdisciplinary team   - Patient/family teaching  - Consider wound care consult   10/3/2023 1138 by Daniel Wright RN  Outcome: Adequate for Discharge  10/3/2023 0713 by Daniel Wright RN  Outcome: Progressing

## 2023-10-03 NOTE — ASSESSMENT & PLAN NOTE
· Sent in by PCP with history of pseudomonal MDR's urine culture from 9/18  · Outpatient UA with pyuria  · Aztreonam initiated due to multiple allergies  · Appreciate infectious disease consultation  · Repeat UA/urine culture ordered  · No fever or leukocytosis  · Tachycardia noted

## 2023-10-03 NOTE — OCCUPATIONAL THERAPY NOTE
Occupational Therapy Evaluation     Patient Name: Brandt Hunter  ZIIWC'Y Date: 10/3/2023  Problem List  Principal Problem:    Acute cystitis  Active Problems:    Bipolar disorder ([address])    Acquired hypothyroidism    Seizure disorder ([address])    Cutaneous candidiasis    Past Medical History  Past Medical History:   Diagnosis Date    Asthma     Bipolar disorder ([address])     Chronic UTI (urinary tract infection)     COPD (chronic obstructive pulmonary disease) ([address])     Diabetes mellitus ([address])     Disease of thyroid gland     Dyslipidemia 10/31/2018    Essential hypertension 10/31/2018    GERD (gastroesophageal reflux disease)     Heart attack ([address])     Iron deficiency anemia, unspecified 7/29/2019    Obesity due to excess calories 10/31/2018    Recurrent falls 4/13/2019    Seizure [facility])      Past Surgical History  Past Surgical History:   Procedure Laterality Date    ANKLE FRACTURE SURGERY Right     TUBAL LIGATION      VEIN LIGATION AND STRIPPING          10/03/23 1230   Note Type   Note type Evaluation   Pain Assessment   Pain Assessment Tool 0-10   Pain Score No Pain   Restrictions/Precautions   Other Precautions Chair Alarm; Bed Alarm; Fall Risk   Home Living   Type of [address] [place] [address] Avenue One level;Performs ADLs on one level; Able to live on main level with bedroom/bathroom   Bathroom Shower/Tub Tub/shower unit   Bathroom Toilet Standard   Bathroom Equipment Grab bars in shower; Tub transfer bench   [address]  (rollator)   Additional Comments Pt reports living in an apartment. RW use at baseline, W/C use for community distances. Pt reports difficulty propelling herself d/t carpal tunnel. Prior Function   Level of [place] Independent with ADLs; Independent with functional mobility; Needs assistance with IADLS   Lives With Alone   Receives Help From Home health  (4hrs/day 2x/wk)   IADLs Family/Friend/Other provides meals; Family/Friend/Other provides medication management; Independent with meal prep   Falls in the last 6 months 1 to 4   ADL   UB Dressing Assistance 5  Supervision/Setup   UB Dressing Deficit Setup;Verbal cueing; Increased time to complete   LB Dressing Assistance   (SBA)   LB Dressing Deficit Setup; Requires assistive device for steadying;Verbal cueing; Increased time to complete   Additional Comments UB ADLs @ S/set-up while seated in chair. LB ADLs @ SBA d/t decreased standing balance. Pt declined donned socks as she does not wear any at home. Bed Mobility   Additional Comments Pt seated OOB in chair at beginning and end of session. Bed mobility not assessed at this time. Transfers   Sit to Stand   (SBA)   Additional items Increased time required;Verbal cues   Stand to Sit   (SBA)   Additional items Increased time required;Verbal cues   Stand pivot   (SBA)   Additional items Increased time required;Verbal cues   Additional Comments STS from EOB to RW @ SBA. Pt able to complete functional mobility around room to/from bathroom with RW @ SBA. Significant cues required for RW management/safety throughout. Pt seated OOB in chair at end of session with all needs met. Balance   Static Sitting Good   Dynamic Sitting Fair +   Static Standing Fair +   Dynamic Standing Fair   Activity Tolerance   Activity Tolerance Patient tolerated treatment well   Medical Staff Made Aware Spoke with PT Valorie Peña   Nurse Made Aware Spoke with RN Edilia LAND Assessment   RUE Assessment WFL   LUE Assessment   LUE Assessment WFL   Hand Function   Gross Motor Coordination Functional   Fine Motor Coordination Functional   Cognition   Overall Cognitive Status WFL   Arousal/Participation Alert; Responsive; Cooperative   Attention Difficulty attending to directions   Orientation Level Oriented X4   Memory Within functional limits   Following Commands Follows one step commands with increased time or repetition   Comments Pt tangential requiring constant cueing for attention and redirection to task   Assessment   Limitation Decreased ADL status; Decreased UE strength;Decreased Safe judgement during ADL;Decreased endurance;Decreased self-care trans;Decreased high-level ADLs   Prognosis Good   Assessment Pt is a 79 y.o. female, admitted to [address] [place] [address] 10/2/2023 d/t experiencing abnormal urine cultures. Dx: acute cystitis. Pt with PMHx impacting their performance during ADL tasks, including: asthma, bipolar disorder, chronic UTI, COPD, diabetes mellitus, disease of thyroid gland, dyslipidemia, essential hypertension, GERD, heart attack, iron deficiency anemia, obesity, recurrent falls, seizure. Prior to admission to the hospital Pt was performing ADLs without physical assistance. IADLs with physical assistance. Functional transfers/ambulation without physical assistance. Cognitive status was PTA was Intact. OT order placed to assess Pt's ADLs, cognitive status, and performance during functional tasks in order to maximize safety and independence while making most appropriate d/c recommendations. Pt's clinical presentation is currently unstable/unpredictable given new onset deficits that effect Pt's occupational performance and ability to safely return to PLOF including decrease activity tolerance, decrease standing balance, decrease performance during ADL tasks, decrease safety awareness , decrease UB MS, decrease generalized strength, decrease activity engagement and decrease performance during functional transfers combined with medical complications of abnormal CBC and need for input for mobility technique/safety. Personal factors affecting Pt at time of initial evaluation include: limited home support, limited insight into impairments and recent fall(s)/fall history. Pt will benefit from continued skilled OT services to address deficits as defined above and to maximize level independence/participation during ADLs and functional tasks to facilitate return toward PLOF and improved quality of life.  From an occupational therapy standpoint, recommendation at time of d/c would be [facility]. Plan   Treatment Interventions ADL retraining;Visual perceptual retraining;Functional transfer training;UE strengthening/ROM; Endurance training;Cognitive reorientation;Patient/family training;Equipment evaluation/education; Neuromuscular reeducation; Fine motor coordination activities; Compensatory technique education;UE splinting;Cardiac education;Continued evaluation; Activityengagement; Energy conservation   Goal Expiration Date 10/17/23   OT Frequency 2-3x/wk   Recommendation   OT Discharge Recommendation Home with home health rehabilitation   [facility] Daily Activity Inpatient   Lower Body Dressing 3   Bathing 3   Toileting 3   Upper Body Dressing 3   Grooming 3   Eating 4   Daily Activity Raw Score 19   Daily Activity Standardized Score (Calc for Raw Score >=11) 40.22   AM-PAC Applied Cognition Inpatient   Following a Speech/Presentation 3   Understanding Ordinary Conversation 3   Taking Medications 3   Remembering Where Things Are Placed or Put Away 3   Remembering List of 4-5 Errands 3   Taking Care of Complicated Tasks 3   Applied Cognition Raw Score 18   Applied Cognition Standardized Score 38.07     The patient's raw score on the AM-PAC Daily Activity Inpatient Short Form is 19. A raw score of greater than or equal to 19 suggests the patient may benefit from discharge to home. Please refer to the recommendation of the Occupational Therapist for safe discharge planning. Pt goals to be met by 10/17/2023    Pt will demonstrate ability to complete grooming/hygiene tasks @ Mod I after set-up. Pt will demonstrate ability to complete supine<>sit @ Mod I in order to increase safety and independence during ADL tasks.   Pt will demonstrate ability to complete UB ADLs including washing/dressing @ Mod I in order to increase performance and participation during meaningful tasks  Pt will demonstrate ability to complete LB dressing @ Mod I in order to increase safety and independence during meaningful tasks. Pt will demonstrate ability to complete toileting tasks including CM and pericare @ Mod I in order to increase safety and independence during meaningful tasks. Pt will demonstrate ability to complete EOB, chair, toilet/commode transfers @ Mod I in order to increase performance and participation during functional tasks. Pt will demonstrate ability to stand for 5-8 minutes while maintaining Fair + balance with use of RW for UB support PRN. Pt will demonstrate ability to tolerate 30-35 minute OT session with no vc'ing for deep breathing or use of energy conservation techniques in order to increase activity tolerance during functional tasks. Pt will demonstrate Good carryover of use of energy conservation/compensatory strategies during ADLs and functional tasks in order to increase safety and reduce risk for falls. Pt will demonstrate Good attention and participation in continued evaluation of functional ambulation house hold distances in order to assist with safe d/c planning. Pt will attend to continued cognitive assessments 100% of the time in order to provide most appropriate d/c recommendations. Pt will follow 100% simple 2-step commands and be A&O x4 consistently with environmental cues to increase participation in functional activities. Pt will identify 3 areas of interest/hobbies and 1 intervention on how to incorporate into daily life in order to increase interaction with environment and peers as well as increase participation in meaningful tasks. Pt will demonstrate 100% carryover of BUE HEP in order to increase BUE MS and increase performance during functional tasks upon d/c home.     Ozzie Sewell, OTR/L

## 2023-10-04 NOTE — QUICK NOTE
In light of urine cx being positive As stated on discharge asymptomatic bacteuria - NO NEED FOR ANTIBIOTICS AS DISCUSSED WITH ID to treat it.

## 2023-10-04 NOTE — UTILIZATION REVIEW
NOTIFICATION OF ADMISSION DISCHARGE   This is a Notification of Discharge from [facility]. Please be advised that this patient has been discharge from our facility. Below you will find the admission and discharge date and time including the patient’s disposition. UTILIZATION REVIEW CONTACT:  Lesa Ortiz  Utilization   Network Utilization Review Department  Phone: [phone] x carefully listen to the prompts. All voicemails are confidential.  Email: [email or web address]     ADMISSION INFORMATION  PRESENTATION DATE: 10/2/2023  6:44 PM  OBERVATION ADMISSION DATE:   INPATIENT ADMISSION DATE: 10/2/23  8:05 PM   DISCHARGE DATE: 10/3/2023  1:16 PM   DISPOSITION:Home/Self Care    Network Utilization Review Department  ATTENTION: Please call with any questions or concerns to [phone] and carefully listen to the prompts so that you are directed to the right person. All voicemails are confidential.   For Discharge needs, contact Care Management DC Support Team at [phone] opt. 2  Send all requests for admission clinical reviews, approved or denied determinations and any other requests to dedicated fax number below belonging to the campus where the patient is receiving treatment.  List of dedicated fax numbers for the Facilities:  [place] DENIALS (Administrative/Medical Necessity) [phone]   DISCHARGE SUPPORT TEAM (Network) [phone] [facility] (Maternity/NICU/Pediatrics) [phone]   [address] [facility] [address] [place] [address] [address]  [facility] [phone]

## 2023-10-05 LAB
BACTERIA UR CULT: ABNORMAL
BACTERIA UR CULT: ABNORMAL

## 2023-11-29 ENCOUNTER — OFFICE VISIT (OUTPATIENT)
Dept: URGENT CARE | Facility: CLINIC | Age: 68
End: 2023-11-29
Payer: COMMERCIAL

## 2023-11-29 VITALS
HEART RATE: 100 BPM | SYSTOLIC BLOOD PRESSURE: 100 MMHG | DIASTOLIC BLOOD PRESSURE: 68 MMHG | TEMPERATURE: 98.1 F | RESPIRATION RATE: 18 BRPM | OXYGEN SATURATION: 95 %
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
## 2023-11-29 PROCEDURE — S9088 SERVICES PROVIDED IN URGENT: HCPCS

## 2023-11-29 PROCEDURE — 99213 OFFICE O/P EST LOW 20 MIN: CPT

## 2023-11-29 RX ORDER — CLINDAMYCIN HYDROCHLORIDE 300 MG/1
300 CAPSULE ORAL 3 TIMES DAILY
Qty: 21 CAPSULE | Refills: 0 | Status: SHIPPED | OUTPATIENT
Start: 2023-11-29 | End: 2023-12-06

## 2023-11-29 NOTE — PATIENT INSTRUCTIONS
Keep wounds clean and dry. Use mild soap and water. May apply bacitracin to the wounds. Cover while out and about. Allow open to air at times. Take antibiotic as directed. This antibiotic was chosen given all the allergies to antibiotics you have. If symptoms fail to improve proceed to the ED.

## 2023-11-29 NOTE — PROGRESS NOTES
[facility]        NAME: Fatuma Dwyer is a 79 y.o. female  : 1955    MRN: [id number]  DATE: 2023  TIME: 6:42 PM    Assessment and Plan   Dog scratch [I15. 8XXA]  1. Dog scratch  clindamycin (CLEOCIN) 300 MG capsule        Patient's tetanus is up-to-date. Will treat with clindamycin given patient has numerous allergies to medications  Dog form sent to [facility] by tech    Patient Instructions   Keep wounds clean and dry. Use mild soap and water. May apply bacitracin to the wounds. Cover while out and about. Allow open to air at times. Take antibiotic as directed. This antibiotic was chosen given all the allergies to antibiotics you have. If symptoms fail to improve proceed to the ED. Follow up with PCP in 3-5 days. Proceed to  ER if symptoms worsen. Chief Complaint     Chief Complaint   Patient presents with    Abrasion         History of Present Illness       Patient is a 82-year-old female presents the office today for an infection of bilateral arms secondary to a dog scratch. She states she went to her friend's apartment her friend's puppy jumped up and scratched her in her arms. Since then she has been cleaning the wound with alcohol. She notes that she put a Band-Aid on the wound and thinks that she is allergic to the Band-Aid adhesive because when she pulled it off her skin was red and inflamed. Review of Systems   Review of Systems   Constitutional:  Negative for chills and fever. Skin:  Positive for color change and wound. All other systems reviewed and are negative.         Current Medications       Current Outpatient Medications:     clindamycin (CLEOCIN) 300 MG capsule, Take 1 capsule (300 mg total) by mouth 3 (three) times a day for 7 days, Disp: 21 capsule, Rfl: 0    Acetaminophen (Tylenol) 325 MG CAPS, Take 650 mg by mouth Three times daily as needed, Disp: , Rfl:     albuterol (VENTOLIN HFA) 90 mcg/act inhaler, Inhale 2 puffs every 6 (six) hours as needed for wheezing, Disp: 1 Inhaler, Rfl: 1    ascorbic acid (VITAMIN C) 250 mg tablet, Take 2 tablets (500 mg total) by mouth daily, Disp: 60 tablet, Rfl: 0    aspirin (ECOTRIN LOW STRENGTH) 81 mg EC tablet, Take 1 tablet (81 mg total) by mouth daily, Disp: 90 tablet, Rfl: 1    Blood Glucose Monitoring Suppl (BLOOD GLUCOSE MONITOR SYSTEM) w/Device KIT, Test blood sugars 3 times a day, Disp: 1 each, Rfl: 0    Diclofenac Sodium (VOLTAREN) 1 %, APPLY TO AFFECTED AREA EVERY 6 HOURS IF NEEDED FOR PAIN., Disp: , Rfl:     Docusate Sodium (DSS) 100 MG CAPS, Take 1 capsule by mouth 2 (two) times a day as needed, Disp: , Rfl:     ferrous sulfate (FeroSul) 325 (65 Fe) mg tablet, Take 1 tablet (325 mg total) by mouth daily with breakfast, Disp: 60 tablet, Rfl: 0    fluticasone (FLONASE) 50 mcg/act nasal spray, SPRAY ONE (1) SPRAY INTO EACH NOSTRIL ONCE DAILY (Patient taking differently: 1 spray 2 (two) times a day SPRAY ONE (1) SPRAY INTO EACH NOSTRIL ONCE DAILY), Disp: 16 g, Rfl: 1    gabapentin (NEURONTIN) 800 mg tablet, Take 1 tablet (800 mg total) by mouth 4 (four) times a day for 3 days, Disp: 12 tablet, Rfl: 0    glucose blood (ONE TOUCH ULTRA TEST) test strip, Test blood sugars 3 times a day., Disp: 100 each, Rfl: 0    guaiFENesin 400 mg, Take 400 mg by mouth 2 (two) times a day, Disp: , Rfl:     Incontinence Supply Disposable (PADSORBER BED PAN LINERS) MISC, by Does not apply route as needed (incontinence), Disp: 50 each, Rfl: 3    Infant Care Products (CUTIES SENSITIVE WIPES) MISC, 120 Pieces by Does not apply route as needed (incontinence), Disp: 120 each, Rfl: 3    levETIRAcetam (KEPPRA) 750 mg tablet, Take 2 tablets (1,500 mg total) by mouth 2 (two) times a day for 15 days Pt states she takes 1500MG Bid (Patient taking differently: Take 750 mg by mouth 2 (two) times a day), Disp: 60 tablet, Rfl: 0    levothyroxine 175 mcg tablet, Take 175 mcg by mouth in the morning, Disp: , Rfl:     magnesium chloride (MAG64) 64 MG TBEC EC tablet, Take 1 tablet (64 mg total) by mouth 2 (two) times a day, Disp: 60 tablet, Rfl: 3    meloxicam (MOBIC) 7.5 mg tablet, Take 1 tablet (7.5 mg total) by mouth 2 (two) times a day, Disp: 60 tablet, Rfl: 1    metFORMIN (GLUCOPHAGE) 500 mg tablet, Take 1 tablet (500 mg total) by mouth 2 (two) times a day with meals, Disp: 60 tablet, Rfl: 0    methenamine hippurate (HIPREX) 1 g tablet, Take 1 tablet (1 g total) by mouth 2 (two) times a day with meals, Disp: 60 tablet, Rfl: 1    Misc.  Devices (MATTRESS PAD) MISC, by Does not apply route daily, Disp: 1 each, Rfl: 0    montelukast (SINGULAIR) 10 mg tablet, Take 10 mg by mouth daily at bedtime, Disp: , Rfl:     multivitamin (THERAGRAN) TABS, Take 1 tablet by mouth daily, Disp: , Rfl:     nystatin (MYCOSTATIN) powder, Apply topically 2 (two) times a day, Disp: 30 g, Rfl: 0    omeprazole (PriLOSEC) 40 MG capsule, Take 40 mg by mouth 2 (two) times a day, Disp: , Rfl:     ONETOUCH DELICA LANCETS 77I MISC, Test blood sugars 3 times a day., Disp: 100 each, Rfl: 0    oxybutynin (DITROPAN) 5 mg tablet, Take 1 tablet (5 mg total) by mouth 3 (three) times a day, Disp: 90 tablet, Rfl: 3    phenytoin (DILANTIN) 100 mg ER capsule, Take by mouth 2 (two) times a day 3 tablets in morning and 2 at lunch daily, Disp: , Rfl:     polyethylene glycol (GLYCOLAX) 17 GM/SCOOP powder, Take 17 g by mouth daily as needed (constipation), Disp: , Rfl:     polyvinyl alcohol (LIQUIFILM TEARS) 1.4 % ophthalmic solution, Administer 1 drop to both eyes 3 (three) times a day, Disp: 15 mL, Rfl: 0    QUEtiapine (SEROquel) 400 MG tablet, Take 1 tablet (400 mg total) by mouth daily at bedtime for 2 doses (Patient taking differently: Take 400 mg by mouth daily), Disp: 2 tablet, Rfl: 0    simvastatin (ZOCOR) 80 mg tablet, Take 0.5 tablets (40 mg total) by mouth daily at bedtime, Disp: , Rfl:     triamcinolone (KENALOG) 0.1 % cream, Apply topically 2 (two) times a day, Disp: 30 g, Rfl: 0    valACYclovir (VALTREX) 500 mg tablet, Take 1 tablet by mouth 2 (two) times a day, Disp: , Rfl: 2    zonisamide (ZONEGRAN) 100 mg capsule, Take 200 mg by mouth 2 (two) times a day, Disp: , Rfl:     Current Allergies     Allergies as of 11/29/2023 - Reviewed 11/29/2023   Allergen Reaction Noted    Keflex [cephalexin] Anaphylaxis 01/03/2011    Levaquin [levofloxacin] Anaphylaxis 11/08/2007    Penicillins Anaphylaxis 11/08/2007    Bactrim [sulfamethoxazole-trimethoprim] Swelling and GI Intolerance 07/01/2015    Ciprofloxacin Swelling 07/01/2015    Noroxin [norfloxacin]  08/24/2018            The following portions of the patient's history were reviewed and updated as appropriate: allergies, current medications, past family history, past medical history, past social history, past surgical history and problem list.     Past Medical History:   Diagnosis Date    Asthma     Bipolar disorder ([address])     Chronic UTI (urinary tract infection)     COPD (chronic obstructive pulmonary disease) ([address])     Diabetes mellitus ([address])     Disease of thyroid gland     Dyslipidemia 10/31/2018    Essential hypertension 10/31/2018    GERD (gastroesophageal reflux disease)     Heart attack ([address])     Iron deficiency anemia, unspecified 7/29/2019    Obesity due to excess calories 10/31/2018    Recurrent falls 4/13/2019    Seizure ([address])        Past Surgical History:   Procedure Laterality Date    ANKLE FRACTURE SURGERY Right     TUBAL LIGATION      VEIN LIGATION AND STRIPPING         Family History   Problem Relation Age of Onset    Skeletal dysplasia Mother     Stroke Father          Medications have been verified. Objective   /68   Pulse 100   Temp 98.1 °F (36.7 °C)   Resp 18   LMP  (LMP Unknown)   SpO2 95%        Physical Exam     Physical Exam  Vitals and nursing note reviewed. Constitutional:       General: She is not in acute distress. Appearance: Normal appearance. She is not ill-appearing or toxic-appearing.    Cardiovascular: Rate and Rhythm: Normal rate. Pulses: Normal pulses. Pulmonary:      Effort: Pulmonary effort is normal.   Musculoskeletal:         General: Signs of injury present. Skin:     General: Skin is warm. Capillary Refill: Capillary refill takes less than 2 seconds. Neurological:      Mental Status: She is alert.

## 2023-11-30 ENCOUNTER — TELEPHONE (OUTPATIENT)
Dept: URGENT CARE | Facility: CLINIC | Age: 68
End: 2023-11-30

## 2023-11-30 NOTE — TELEPHONE ENCOUNTER
Patient called to discuss wound care. She states that she has a wound spray at home that has lidocaine in it and she is wondering if she should use that to clean her wounds as it is demarcated for burns. I discussed with patient that as we discussed yesterday and is in her discharge instructions that she should use mild soap and water to clean the wounds. She the proceeds to then states that she noticed a rash on her arms where the bandages were placed yesterday and she has very sensitive skin that if anything touches her skin she immediately gets a rash. I discussed with patient that we will allow the wounds to be open to the air and not to put any dressings on them unless she is going out of her house. She is to follow with her primary care doctor.

## 2023-12-02 PROBLEM — N30.00 ACUTE CYSTITIS: Status: RESOLVED | Noted: 2023-10-02 | Resolved: 2023-12-02

## 2024-02-21 ENCOUNTER — HOSPITAL ENCOUNTER (EMERGENCY)
Facility: HOSPITAL | Age: 69
Discharge: HOME | End: 2024-02-21
Attending: EMERGENCY MEDICINE
Payer: COMMERCIAL

## 2024-02-21 VITALS
OXYGEN SATURATION: 93 % | BODY MASS INDEX: 39.27 KG/M2 | HEIGHT: 67 IN | HEART RATE: 113 BPM | RESPIRATION RATE: 18 BRPM | SYSTOLIC BLOOD PRESSURE: 125 MMHG | DIASTOLIC BLOOD PRESSURE: 72 MMHG | WEIGHT: 250.22 LBS

## 2024-02-21 DIAGNOSIS — K59.00 CONSTIPATION: Primary | ICD-10-CM

## 2024-02-21 PROBLEM — N39.0 E. COLI UTI: Status: RESOLVED | Noted: 2018-10-31 | Resolved: 2024-02-21

## 2024-02-21 PROBLEM — B96.20 E. COLI UTI: Status: RESOLVED | Noted: 2018-10-31 | Resolved: 2024-02-21

## 2024-02-21 LAB
ALBUMIN SERPL BCP-MCNC: 3.8 G/DL (ref 3.5–5)
ALP SERPL-CCNC: 66 U/L (ref 34–104)
ALT SERPL W P-5'-P-CCNC: 15 U/L (ref 7–52)
ANION GAP SERPL CALCULATED.3IONS-SCNC: 9 MMOL/L
AST SERPL W P-5'-P-CCNC: 13 U/L (ref 13–39)
BASOPHILS # BLD AUTO: 0.04 THOUSANDS/ÂΜL (ref 0–0.1)
BASOPHILS NFR BLD AUTO: 0 % (ref 0–1)
BILIRUB SERPL-MCNC: 0.23 MG/DL (ref 0.2–1)
BUN SERPL-MCNC: 12 MG/DL (ref 5–25)
CALCIUM SERPL-MCNC: 8.8 MG/DL (ref 8.4–10.2)
CHLORIDE SERPL-SCNC: 100 MMOL/L (ref 96–108)
CO2 SERPL-SCNC: 27 MMOL/L (ref 21–32)
CREAT SERPL-MCNC: 0.6 MG/DL (ref 0.6–1.3)
EOSINOPHIL # BLD AUTO: 0.33 THOUSAND/ÂΜL (ref 0–0.61)
EOSINOPHIL NFR BLD AUTO: 4 % (ref 0–6)
ERYTHROCYTE [DISTWIDTH] IN BLOOD BY AUTOMATED COUNT: 13.7 % (ref 11.6–15.1)
GFR SERPL CREATININE-BSD FRML MDRD: 95 ML/MIN/1.73SQ M
GLUCOSE SERPL-MCNC: 127 MG/DL (ref 65–140)
HCT VFR BLD AUTO: 40.7 % (ref 34.8–46.1)
HGB BLD-MCNC: 13.4 G/DL (ref 11.5–15.4)
IMM GRANULOCYTES # BLD AUTO: 0.07 THOUSAND/UL (ref 0–0.2)
IMM GRANULOCYTES NFR BLD AUTO: 1 % (ref 0–2)
LIPASE SERPL-CCNC: 14 U/L (ref 11–82)
LYMPHOCYTES # BLD AUTO: 1.06 THOUSANDS/ÂΜL (ref 0.6–4.47)
LYMPHOCYTES NFR BLD AUTO: 11 % (ref 14–44)
MCH RBC QN AUTO: 33 PG (ref 26.8–34.3)
MCHC RBC AUTO-ENTMCNC: 32.9 G/DL (ref 31.4–37.4)
MCV RBC AUTO: 100 FL (ref 82–98)
MONOCYTES # BLD AUTO: 0.49 THOUSAND/ÂΜL (ref 0.17–1.22)
MONOCYTES NFR BLD AUTO: 5 % (ref 4–12)
NEUTROPHILS # BLD AUTO: 7.27 THOUSANDS/ÂΜL (ref 1.85–7.62)
NEUTS SEG NFR BLD AUTO: 79 % (ref 43–75)
NRBC BLD AUTO-RTO: 0 /100 WBCS
PLATELET # BLD AUTO: 261 THOUSANDS/UL (ref 149–390)
PMV BLD AUTO: 8.6 FL (ref 8.9–12.7)
POTASSIUM SERPL-SCNC: 4.5 MMOL/L (ref 3.5–5.3)
PROT SERPL-MCNC: 7.1 G/DL (ref 6.4–8.4)
RBC # BLD AUTO: 4.06 MILLION/UL (ref 3.81–5.12)
SODIUM SERPL-SCNC: 136 MMOL/L (ref 135–147)
WBC # BLD AUTO: 9.26 THOUSAND/UL (ref 4.31–10.16)

## 2024-02-21 PROCEDURE — 80053 COMPREHEN METABOLIC PANEL: CPT | Performed by: EMERGENCY MEDICINE

## 2024-02-21 PROCEDURE — 36415 COLL VENOUS BLD VENIPUNCTURE: CPT | Performed by: EMERGENCY MEDICINE

## 2024-02-21 PROCEDURE — 99284 EMERGENCY DEPT VISIT MOD MDM: CPT | Performed by: EMERGENCY MEDICINE

## 2024-02-21 PROCEDURE — 99283 EMERGENCY DEPT VISIT LOW MDM: CPT

## 2024-02-21 PROCEDURE — 85025 COMPLETE CBC W/AUTO DIFF WBC: CPT | Performed by: EMERGENCY MEDICINE

## 2024-02-21 PROCEDURE — 83690 ASSAY OF LIPASE: CPT | Performed by: EMERGENCY MEDICINE

## 2024-02-21 RX ORDER — ENEMA 19; 7 G/133ML; G/133ML
1 ENEMA RECTAL ONCE
Status: COMPLETED | OUTPATIENT
Start: 2024-02-21 | End: 2024-02-21

## 2024-02-21 RX ORDER — MAGNESIUM CARB/ALUMINUM HYDROX 105-160MG
296 TABLET,CHEWABLE ORAL ONCE
Status: DISCONTINUED | OUTPATIENT
Start: 2024-02-21 | End: 2024-02-21 | Stop reason: HOSPADM

## 2024-02-21 RX ORDER — POLYETHYLENE GLYCOL 3350 17 G/17G
17 POWDER, FOR SOLUTION ORAL DAILY
Qty: 238 G | Refills: 0 | Status: SHIPPED | OUTPATIENT
Start: 2024-02-21 | End: 2024-03-06

## 2024-02-21 RX ORDER — POLYETHYLENE GLYCOL 3350 17 G/17G
17 POWDER, FOR SOLUTION ORAL ONCE
Status: COMPLETED | OUTPATIENT
Start: 2024-02-21 | End: 2024-02-21

## 2024-02-21 RX ADMIN — POLYETHYLENE GLYCOL 3350 17 G: 17 POWDER, FOR SOLUTION ORAL at 15:39

## 2024-02-21 RX ADMIN — SODIUM PHOSPHATE, DIBASIC AND SODIUM PHOSPHATE, MONOBASIC 1 ENEMA: 7; 19 ENEMA RECTAL at 15:53

## 2024-02-21 NOTE — ED PROVIDER NOTES
History  Chief Complaint   Patient presents with    Constipation     No BM since 24     Patient complains of constipation without bowel movement over the past 8 days.  She complains of abdominal distention.  However she denies significant pain.  She denies fevers or nausea or vomiting.  No other complaints at this time.      History provided by:  Patient   used: No    Constipation  Severity:  Moderate  Time since last bowel movement:  8 days  Timing:  Constant  Progression:  Unchanged  Chronicity:  New  Relieved by:  Nothing  Worsened by:  Nothing  Ineffective treatments:  None tried  Associated symptoms: no abdominal pain, no back pain, no diarrhea, no dysuria, no fever, no flatus, no hematochezia, no nausea and no vomiting        Prior to Admission Medications   Prescriptions Last Dose Informant Patient Reported? Taking?   Acetaminophen (Tylenol) 325 MG CAPS   Yes No   Sig: Take 650 mg by mouth Three times daily as needed   Blood Glucose Monitoring Suppl (BLOOD GLUCOSE MONITOR SYSTEM) w/Device KIT   No No   Sig: Test blood sugars 3 times a day   Diclofenac Sodium (VOLTAREN) 1 %   Yes No   Sig: APPLY TO AFFECTED AREA EVERY 6 HOURS IF NEEDED FOR PAIN.   Docusate Sodium (DSS) 100 MG CAPS   Yes No   Sig: Take 1 capsule by mouth 2 (two) times a day as needed   Incontinence Supply Disposable (PADSORBER BED PAN LINERS) MISC   No No   Sig: by Does not apply route as needed (incontinence)   Infant Care Products (CUTIES SENSITIVE WIPES) MISC   No No   Si Pieces by Does not apply route as needed (incontinence)   Misc. Devices (MATTRESS PAD) MISC   No No   Sig: by Does not apply route daily   ONETOUCH DELICA LANCETS 33G MISC   No No   Sig: Test blood sugars 3 times a day.   QUEtiapine (SEROquel) 400 MG tablet   No No   Sig: Take 1 tablet (400 mg total) by mouth daily at bedtime for 2 doses   Patient taking differently: Take 400 mg by mouth daily   albuterol (VENTOLIN HFA) 90 mcg/act inhaler   No  No   Sig: Inhale 2 puffs every 6 (six) hours as needed for wheezing   ascorbic acid (VITAMIN C) 250 mg tablet   No No   Sig: Take 2 tablets (500 mg total) by mouth daily   aspirin (ECOTRIN LOW STRENGTH) 81 mg EC tablet   No No   Sig: Take 1 tablet (81 mg total) by mouth daily   ferrous sulfate (FeroSul) 325 (65 Fe) mg tablet   No No   Sig: Take 1 tablet (325 mg total) by mouth daily with breakfast   fluticasone (FLONASE) 50 mcg/act nasal spray   No No   Sig: SPRAY ONE (1) SPRAY INTO EACH NOSTRIL ONCE DAILY   Patient taking differently: 1 spray 2 (two) times a day SPRAY ONE (1) SPRAY INTO EACH NOSTRIL ONCE DAILY   gabapentin (NEURONTIN) 800 mg tablet   No No   Sig: Take 1 tablet (800 mg total) by mouth 4 (four) times a day for 3 days   glucose blood (ONE TOUCH ULTRA TEST) test strip   No No   Sig: Test blood sugars 3 times a day.   guaiFENesin 400 mg   Yes No   Sig: Take 400 mg by mouth 2 (two) times a day   levETIRAcetam (KEPPRA) 750 mg tablet   No No   Sig: Take 2 tablets (1,500 mg total) by mouth 2 (two) times a day for 15 days Pt states she takes 1500MG Bid   Patient taking differently: Take 750 mg by mouth 2 (two) times a day   levothyroxine 175 mcg tablet   Yes No   Sig: Take 175 mcg by mouth in the morning   magnesium chloride (MAG64) 64 MG TBEC EC tablet   No No   Sig: Take 1 tablet (64 mg total) by mouth 2 (two) times a day   meloxicam (MOBIC) 7.5 mg tablet   No No   Sig: Take 1 tablet (7.5 mg total) by mouth 2 (two) times a day   metFORMIN (GLUCOPHAGE) 500 mg tablet   No No   Sig: Take 1 tablet (500 mg total) by mouth 2 (two) times a day with meals   methenamine hippurate (HIPREX) 1 g tablet   No No   Sig: Take 1 tablet (1 g total) by mouth 2 (two) times a day with meals   montelukast (SINGULAIR) 10 mg tablet   Yes No   Sig: Take 10 mg by mouth daily at bedtime   multivitamin (THERAGRAN) TABS   Yes No   Sig: Take 1 tablet by mouth daily   nystatin (MYCOSTATIN) powder   No No   Sig: Apply topically 2 (two)  times a day   omeprazole (PriLOSEC) 40 MG capsule   Yes No   Sig: Take 40 mg by mouth 2 (two) times a day   oxybutynin (DITROPAN) 5 mg tablet   No No   Sig: Take 1 tablet (5 mg total) by mouth 3 (three) times a day   phenytoin (DILANTIN) 100 mg ER capsule   Yes No   Sig: Take by mouth 2 (two) times a day 3 tablets in morning and 2 at lunch daily   polyethylene glycol (GLYCOLAX) 17 GM/SCOOP powder   No No   Sig: Take 17 g by mouth daily as needed (constipation)   polyvinyl alcohol (LIQUIFILM TEARS) 1.4 % ophthalmic solution   No No   Sig: Administer 1 drop to both eyes 3 (three) times a day   simvastatin (ZOCOR) 80 mg tablet   No No   Sig: Take 0.5 tablets (40 mg total) by mouth daily at bedtime   triamcinolone (KENALOG) 0.1 % cream   No No   Sig: Apply topically 2 (two) times a day   valACYclovir (VALTREX) 500 mg tablet   Yes No   Sig: Take 1 tablet by mouth 2 (two) times a day   zonisamide (ZONEGRAN) 100 mg capsule   Yes No   Sig: Take 200 mg by mouth 2 (two) times a day      Facility-Administered Medications: None       Past Medical History:   Diagnosis Date    Asthma     Bipolar disorder (HCC)     Chronic UTI (urinary tract infection)     COPD (chronic obstructive pulmonary disease) (HCC)     Diabetes mellitus (HCC)     Disease of thyroid gland     Dyslipidemia 10/31/2018    Essential hypertension 10/31/2018    GERD (gastroesophageal reflux disease)     Heart attack (HCC)     Iron deficiency anemia, unspecified 7/29/2019    Obesity due to excess calories 10/31/2018    Recurrent falls 4/13/2019    Seizure ([facility])        Past Surgical History:   Procedure Laterality Date    ANKLE FRACTURE SURGERY Right     TUBAL LIGATION      VEIN LIGATION AND STRIPPING         Family History   Problem Relation Age of Onset    Skeletal dysplasia Mother     Stroke Father      I have reviewed and agree with the history as documented.    E-Cigarette/Vaping    E-Cigarette Use Never User      E-Cigarette/Vaping Substances    Nicotine No      THC No     CBD No     Flavoring No     Other No     Unknown No      Social History     Tobacco Use    Smoking status: Never    Smokeless tobacco: Never   Vaping Use    Vaping status: Never Used   Substance Use Topics    Alcohol use: Not Currently     Alcohol/week: 0.0 standard drinks of alcohol    Drug use: No       Review of Systems   Constitutional:  Negative for chills and fever.   HENT:  Negative for ear pain, hearing loss, sore throat, trouble swallowing and voice change.    Eyes:  Negative for pain and discharge.   Respiratory:  Negative for cough, shortness of breath and wheezing.    Cardiovascular:  Negative for chest pain and palpitations.   Gastrointestinal:  Positive for constipation. Negative for abdominal pain, blood in stool, diarrhea, flatus, hematochezia, nausea and vomiting.   Genitourinary:  Negative for dysuria, flank pain, frequency and hematuria.   Musculoskeletal:  Negative for back pain, joint swelling, neck pain and neck stiffness.   Skin:  Negative for rash and wound.   Neurological:  Negative for dizziness, seizures, syncope, facial asymmetry and headaches.   Psychiatric/Behavioral:  Negative for hallucinations, self-injury and suicidal ideas.    All other systems reviewed and are negative.      Physical Exam  Physical Exam  Vitals and nursing note reviewed.   Constitutional:       General: She is not in acute distress.     Appearance: She is well-developed.   HENT:      Head: Normocephalic and atraumatic.      Right Ear: External ear normal.      Left Ear: External ear normal.   Eyes:      General: No scleral icterus.        Right eye: No discharge.         Left eye: No discharge.      Extraocular Movements: Extraocular movements intact.      Conjunctiva/sclera: Conjunctivae normal.   Cardiovascular:      Rate and Rhythm: Normal rate and regular rhythm.      Heart sounds: Normal heart sounds. No murmur heard.  Pulmonary:      Effort: Pulmonary effort is normal.      Breath sounds:  Normal breath sounds. No wheezing or rales.   Abdominal:      General: Bowel sounds are normal. There is distension.      Palpations: Abdomen is soft.      Tenderness: There is no abdominal tenderness. There is no guarding or rebound.   Musculoskeletal:         General: No deformity. Normal range of motion.      Cervical back: Normal range of motion and neck supple.   Skin:     General: Skin is warm and dry.      Findings: No rash.   Neurological:      General: No focal deficit present.      Mental Status: She is alert and oriented to person, place, and time.      Cranial Nerves: No cranial nerve deficit.   Psychiatric:         Mood and Affect: Mood normal.         Behavior: Behavior normal.         Thought Content: Thought content normal.         Judgment: Judgment normal.         Vital Signs  ED Triage Vitals [02/21/24 1515]   Temp Pulse Respirations Blood Pressure SpO2   -- (!) 113 18 125/72 93 %      Temp src Heart Rate Source Patient Position - Orthostatic VS BP Location FiO2 (%)   -- Monitor Lying Left arm --      Pain Score       7           Vitals:    02/21/24 1515   BP: 125/72   Pulse: (!) 113   Patient Position - Orthostatic VS: Lying         Visual Acuity      ED Medications  Medications   magnesium citrate (CITROMA) oral solution 296 mL (0 mL Oral Hold 2/21/24 1553)   polyethylene glycol (MIRALAX) packet 17 g (17 g Oral Given 2/21/24 1539)   sodium phosphate-biphosphate (FLEET) enema 1 enema (1 enema Rectal Given 2/21/24 1553)       Diagnostic Studies  Results Reviewed       Procedure Component Value Units Date/Time    Comprehensive metabolic panel [[id number]] Collected: 02/21/24 1557    Lab Status: Final result Specimen: Blood from Arm, Left Updated: 02/21/24 1631     Sodium 136 mmol/L      Potassium 4.5 mmol/L      Chloride 100 mmol/L      CO2 27 mmol/L      ANION GAP 9 mmol/L      BUN 12 mg/dL      Creatinine 0.60 mg/dL      Glucose 127 mg/dL      Calcium 8.8 mg/dL      AST 13 U/L      ALT 15 U/L       Alkaline Phosphatase 66 U/L      Total Protein 7.1 g/dL      Albumin 3.8 g/dL      Total Bilirubin 0.23 mg/dL      eGFR 95 ml/min/1.73sq m     Narrative:      [employer] guidelines for Chronic Kidney Disease (CKD):     Stage 1 with normal or high GFR (GFR > 90 mL/min/1.73 square meters)    Stage 2 Mild CKD (GFR = 60-89 mL/min/1.73 square meters)    Stage 3A Moderate CKD (GFR = 45-59 mL/min/1.73 square meters)    Stage 3B Moderate CKD (GFR = 30-44 mL/min/1.73 square meters)    Stage 4 Severe CKD (GFR = 15-29 mL/min/1.73 square meters)    Stage 5 End Stage CKD (GFR <15 mL/min/1.73 square meters)  Note: GFR calculation is accurate only with a steady state creatinine    Lipase [[id number]]  (Normal) Collected: 02/21/24 1557    Lab Status: Final result Specimen: Blood from Arm, Left Updated: 02/21/24 1631     Lipase 14 u/L     CBC and differential [[id number]]  (Abnormal) Collected: 02/21/24 1557    Lab Status: Final result Specimen: Blood from Arm, Left Updated: 02/21/24 1614     WBC 9.26 Thousand/uL      RBC 4.06 Million/uL      Hemoglobin 13.4 g/dL      Hematocrit 40.7 %       fL      MCH 33.0 pg      MCHC 32.9 g/dL      RDW 13.7 %      MPV 8.6 fL      Platelets 261 Thousands/uL      nRBC 0 /100 WBCs      Neutrophils Relative 79 %      Immat GRANS % 1 %      Lymphocytes Relative 11 %      Monocytes Relative 5 %      Eosinophils Relative 4 %      Basophils Relative 0 %      Neutrophils Absolute 7.27 Thousands/µL      Immature Grans Absolute 0.07 Thousand/uL      Lymphocytes Absolute 1.06 Thousands/µL      Monocytes Absolute 0.49 Thousand/µL      Eosinophils Absolute 0.33 Thousand/µL      Basophils Absolute 0.04 Thousands/µL                    No orders to display              Procedures  Procedures         ED Course  ED Course as of 02/21/24 1636   Wed Feb 21, 2024 1627 After oral MiraLAX (pt refused magnesium citrate) as well as Fleet enema, patient able to have bowel movement.  Remains  hemodynamically stable.                               SBIRT 20yo+      Flowsheet Row Most Recent Value   Initial Alcohol Screen: US AUDIT-C     1. How often do you have a drink containing alcohol? 0 Filed at: 02/21/2024 1519   2. How many drinks containing alcohol do you have on a typical day you are drinking?  0 Filed at: 02/21/2024 1519   3b. FEMALE Any Age, or MALE 65+: How often do you have 4 or more drinks on one occassion? 0 Filed at: 02/21/2024 1519   Audit-C Score 0 Filed at: 02/21/2024 1519   ELIJAH: How many times in the past year have you...    Used an illegal drug or used a prescription medication for non-medical reasons? Never Filed at: 02/21/2024 1519                      Medical Decision Making  Based on the history and medical screening exam performed the diagnostic considerations include but are not limited to constipation, bowel obstruction.    Based on the work-up performed in the emergency room which includes physical examination, and which may include laboratory studies and imaging as warranted including advanced imaging such as CT scan or ultrasound, the diagnostic considerations are narrowed to exclude limb or life-threatening process.    The patient is stable for discharge.  History and exam not consistent with bowel obstruction.  Labs negative.  Patient able to pass stools after MiraLAX and enema.  She refused the magnesium citrate.  Stable for discharge.    Amount and/or Complexity of Data Reviewed  Labs: ordered. Decision-making details documented in ED Course.     Details: Normal    Risk  OTC drugs.             Disposition  Final diagnoses:   Constipation     Time reflects when diagnosis was documented in both MDM as applicable and the Disposition within this note       Time User Action Codes Description Comment    2/21/2024  4:32 PM Dante Kimble Add [K59.00] Constipation           ED Disposition       ED Disposition   Discharge    Condition   Stable    Date/Time   Wed Feb 21, 2024  5251    Comment   Ritika Kwong discharge to home/self care.                   Follow-up Information       Follow up With Specialties Details Why Contact Info    Valeria Burns    [address] [place], [place] [id number] [phone]              Patient's Medications   Discharge Prescriptions    POLYETHYLENE GLYCOL (MIRALAX) 17 G PACKET    Take 17 g by mouth daily for 14 days       Start Date: 2/21/2024 End Date: 3/6/2024       Order Dose: 17 g       Quantity: 238 g    Refills: 0       No discharge procedures on file.    PDMP Review         Value Time User    PDMP Reviewed  Yes 9/18/2023  1:07 AM Nikunj Quinteros PA-C            ED Provider  Electronically Signed by             Dante Kimble MD  02/21/24 6110

## 2024-05-12 ENCOUNTER — HOSPITAL ENCOUNTER (EMERGENCY)
Facility: HOSPITAL | Age: 69
Discharge: HOME | End: 2024-05-13
Attending: STUDENT IN AN ORGANIZED HEALTH CARE EDUCATION/TRAINING PROGRAM
Payer: COMMERCIAL

## 2024-05-12 DIAGNOSIS — S61.210A LACERATION OF RIGHT INDEX FINGER WITHOUT FOREIGN BODY WITHOUT DAMAGE TO NAIL, INITIAL ENCOUNTER: Primary | ICD-10-CM

## 2024-05-12 LAB — GLUCOSE SERPL-MCNC: 178 MG/DL (ref 65–140)

## 2024-05-12 PROCEDURE — 99283 EMERGENCY DEPT VISIT LOW MDM: CPT

## 2024-05-12 PROCEDURE — 12001 RPR S/N/AX/GEN/TRNK 2.5CM/<: CPT | Performed by: PHYSICIAN ASSISTANT

## 2024-05-12 PROCEDURE — 99283 EMERGENCY DEPT VISIT LOW MDM: CPT | Performed by: PHYSICIAN ASSISTANT

## 2024-05-12 PROCEDURE — 82948 REAGENT STRIP/BLOOD GLUCOSE: CPT

## 2024-05-12 RX ORDER — QUETIAPINE FUMARATE 100 MG/1
400 TABLET, FILM COATED ORAL ONCE
Status: COMPLETED | OUTPATIENT
Start: 2024-05-12 | End: 2024-05-12

## 2024-05-12 RX ORDER — INSULIN ASPART 100 [IU]/ML
INJECTION, SOLUTION INTRAVENOUS; SUBCUTANEOUS
COMMUNITY
Start: 2024-04-15

## 2024-05-12 RX ORDER — LEVETIRACETAM 500 MG/1
750 TABLET ORAL ONCE
Status: COMPLETED | OUTPATIENT
Start: 2024-05-12 | End: 2024-05-12

## 2024-05-12 RX ORDER — MELOXICAM 7.5 MG/1
7.5 TABLET ORAL DAILY
Status: DISCONTINUED | OUTPATIENT
Start: 2024-05-12 | End: 2024-05-13 | Stop reason: HOSPADM

## 2024-05-12 RX ORDER — MELOXICAM 7.5 MG/1
TABLET ORAL
Status: DISCONTINUED
Start: 2024-05-12 | End: 2024-05-13 | Stop reason: HOSPADM

## 2024-05-12 RX ORDER — OXYBUTYNIN CHLORIDE 5 MG/1
5 TABLET ORAL 3 TIMES DAILY
Status: DISCONTINUED | OUTPATIENT
Start: 2024-05-12 | End: 2024-05-13 | Stop reason: HOSPADM

## 2024-05-12 RX ORDER — GABAPENTIN 400 MG/1
800 CAPSULE ORAL ONCE
Status: COMPLETED | OUTPATIENT
Start: 2024-05-12 | End: 2024-05-12

## 2024-05-12 RX ORDER — SIMVASTATIN 10 MG
40 TABLET ORAL
Status: DISCONTINUED | OUTPATIENT
Start: 2024-05-13 | End: 2024-05-12

## 2024-05-12 RX ADMIN — MELOXICAM 7.5 MG: 7.5 TABLET ORAL at 22:20

## 2024-05-12 RX ADMIN — LEVETIRACETAM 750 MG: 500 TABLET, FILM COATED ORAL at 22:19

## 2024-05-12 RX ADMIN — OXYBUTYNIN CHLORIDE 5 MG: 5 TABLET ORAL at 22:19

## 2024-05-12 RX ADMIN — QUETIAPINE FUMARATE 400 MG: 100 TABLET ORAL at 22:18

## 2024-05-12 RX ADMIN — GABAPENTIN 800 MG: 400 CAPSULE ORAL at 22:18

## 2024-05-13 VITALS
TEMPERATURE: 97.9 F | RESPIRATION RATE: 18 BRPM | OXYGEN SATURATION: 91 % | HEART RATE: 97 BPM | SYSTOLIC BLOOD PRESSURE: 138 MMHG | WEIGHT: 247.58 LBS | DIASTOLIC BLOOD PRESSURE: 78 MMHG | BODY MASS INDEX: 38.78 KG/M2

## 2024-05-13 NOTE — ED NOTES
"Pt's O2 sat ranges from 86-92%. Pt states \"this is her normal and she does not go to pulmonology.\" Pt denies SOB. Provider made aware.      Nichelle Fernando RN  05/13/24 0776    "

## 2024-05-13 NOTE — ED PROVIDER NOTES
History  Chief Complaint   Patient presents with    Finger Laceration     Patient presents to the ED with complaints of a laceration to the right index finger. She states that she was putting away steak knives when one sliced her finger.      Is a 68-year-old female presents  today with a chief complaint of finger laceration.  The patient was putting away brand-new sharp steak knives and accidentally injured her right index finger.  The patient has a small laceration.  Last tetanus vaccine was in       Finger Laceration  Location:  Finger  Finger laceration location:  R index finger  Length:  1  Depth:  Cutaneous  Time since incident:  1 hour  Laceration mechanism:  Knife  Foreign body present:  No foreign bodies  Relieved by:  Pressure  Worsened by:  Pressure  Ineffective treatments:  Pressure  Tetanus status:  Up to date  Associated symptoms: no fever        Prior to Admission Medications   Prescriptions Last Dose Informant Patient Reported? Taking?   Acetaminophen (Tylenol) 325 MG CAPS   Yes No   Sig: Take 650 mg by mouth Three times daily as needed   Blood Glucose Monitoring Suppl (BLOOD GLUCOSE MONITOR SYSTEM) w/Device KIT   No No   Sig: Test blood sugars 3 times a day   Diclofenac Sodium (VOLTAREN) 1 %   Yes No   Sig: APPLY TO AFFECTED AREA EVERY 6 HOURS IF NEEDED FOR PAIN.   Docusate Sodium (DSS) 100 MG CAPS   Yes No   Sig: Take 1 capsule by mouth 2 (two) times a day as needed   Incontinence Supply Disposable (PADSORBER BED PAN LINERS) MISC   No No   Sig: by Does not apply route as needed (incontinence)   Infant Care Products (CUTIES SENSITIVE WIPES) MISC   No No   Si Pieces by Does not apply route as needed (incontinence)   Misc. Devices (MATTRESS PAD) MISC   No No   Sig: by Does not apply route daily   ONETOUCH DELICA LANCETS 33G MISC   No No   Sig: Test blood sugars 3 times a day.   QUEtiapine (SEROquel) 400 MG tablet   No No   Sig: Take 1 tablet (400 mg total) by mouth daily at bedtime for 2  doses   Patient taking differently: Take 400 mg by mouth daily   albuterol (VENTOLIN HFA) 90 mcg/act inhaler   No No   Sig: Inhale 2 puffs every 6 (six) hours as needed for wheezing   ascorbic acid (VITAMIN C) 250 mg tablet   No No   Sig: Take 2 tablets (500 mg total) by mouth daily   aspirin (ECOTRIN LOW STRENGTH) 81 mg EC tablet   No No   Sig: Take 1 tablet (81 mg total) by mouth daily   ferrous sulfate (FeroSul) 325 (65 Fe) mg tablet   No No   Sig: Take 1 tablet (325 mg total) by mouth daily with breakfast   fluticasone (FLONASE) 50 mcg/act nasal spray   No No   Sig: SPRAY ONE (1) SPRAY INTO EACH NOSTRIL ONCE DAILY   Patient taking differently: 1 spray 2 (two) times a day SPRAY ONE (1) SPRAY INTO EACH NOSTRIL ONCE DAILY   gabapentin (NEURONTIN) 800 mg tablet   No No   Sig: Take 1 tablet (800 mg total) by mouth 4 (four) times a day for 3 days   glucose blood (ONE TOUCH ULTRA TEST) test strip   No No   Sig: Test blood sugars 3 times a day.   guaiFENesin 400 mg   Yes No   Sig: Take 400 mg by mouth 2 (two) times a day   levETIRAcetam (KEPPRA) 750 mg tablet   No No   Sig: Take 2 tablets (1,500 mg total) by mouth 2 (two) times a day for 15 days Pt states she takes 1500MG Bid   Patient taking differently: Take 750 mg by mouth 2 (two) times a day   levothyroxine 175 mcg tablet   Yes No   Sig: Take 175 mcg by mouth in the morning   magnesium chloride (MAG64) 64 MG TBEC EC tablet   No No   Sig: Take 1 tablet (64 mg total) by mouth 2 (two) times a day   meloxicam (MOBIC) 7.5 mg tablet   No No   Sig: Take 1 tablet (7.5 mg total) by mouth 2 (two) times a day   metFORMIN (GLUCOPHAGE) 500 mg tablet   No No   Sig: Take 1 tablet (500 mg total) by mouth 2 (two) times a day with meals   methenamine hippurate (HIPREX) 1 g tablet   No No   Sig: Take 1 tablet (1 g total) by mouth 2 (two) times a day with meals   montelukast (SINGULAIR) 10 mg tablet   Yes No   Sig: Take 10 mg by mouth daily at bedtime   multivitamin (THERAGRAN) TABS    Yes No   Sig: Take 1 tablet by mouth daily   nystatin (MYCOSTATIN) powder   No No   Sig: Apply topically 2 (two) times a day   omeprazole (PriLOSEC) 40 MG capsule   Yes No   Sig: Take 40 mg by mouth 2 (two) times a day   oxybutynin (DITROPAN) 5 mg tablet   No No   Sig: Take 1 tablet (5 mg total) by mouth 3 (three) times a day   phenytoin (DILANTIN) 100 mg ER capsule   Yes No   Sig: Take by mouth 2 (two) times a day 3 tablets in morning and 2 at lunch daily   polyethylene glycol (GLYCOLAX) 17 GM/SCOOP powder   No No   Sig: Take 17 g by mouth daily as needed (constipation)   polyethylene glycol (MIRALAX) 17 g packet   No No   Sig: Take 17 g by mouth daily for 14 days   polyvinyl alcohol (LIQUIFILM TEARS) 1.4 % ophthalmic solution   No No   Sig: Administer 1 drop to both eyes 3 (three) times a day   simvastatin (ZOCOR) 80 mg tablet   No No   Sig: Take 0.5 tablets (40 mg total) by mouth daily at bedtime   triamcinolone (KENALOG) 0.1 % cream   No No   Sig: Apply topically 2 (two) times a day   valACYclovir (VALTREX) 500 mg tablet   Yes No   Sig: Take 1 tablet by mouth 2 (two) times a day   zonisamide (ZONEGRAN) 100 mg capsule   Yes No   Sig: Take 200 mg by mouth 2 (two) times a day      Facility-Administered Medications: None       Past Medical History:   Diagnosis Date    Asthma     Bipolar disorder (HCC)     Chronic UTI (urinary tract infection)     COPD (chronic obstructive pulmonary disease) (HCC)     Diabetes mellitus (HCC)     Disease of thyroid gland     Dyslipidemia 10/31/2018    Essential hypertension 10/31/2018    GERD (gastroesophageal reflux disease)     Heart attack (HCC)     Iron deficiency anemia, unspecified 7/29/2019    Obesity due to excess calories 10/31/2018    Recurrent falls 4/13/2019    Seizure ([facility])        Past Surgical History:   Procedure Laterality Date    ANKLE FRACTURE SURGERY Right     TUBAL LIGATION      VEIN LIGATION AND STRIPPING         Family History   Problem Relation Age of Onset     Skeletal dysplasia Mother     Stroke Father      I have reviewed and agree with the history as documented.    E-Cigarette/Vaping    E-Cigarette Use Never User      E-Cigarette/Vaping Substances    Nicotine No     THC No     CBD No     Flavoring No     Other No     Unknown No      Social History     Tobacco Use    Smoking status: Never    Smokeless tobacco: Never   Vaping Use    Vaping status: Never Used   Substance Use Topics    Alcohol use: Not Currently     Alcohol/week: 0.0 standard drinks of alcohol    Drug use: No       Review of Systems   Constitutional:  Negative for fever.   All other systems reviewed and are negative.      Physical Exam  Physical Exam  Vitals and nursing note reviewed.   Constitutional:       General: She is not in acute distress.     Appearance: She is well-developed.   HENT:      Head: Normocephalic.   Eyes:      Pupils: Pupils are equal, round, and reactive to light.   Cardiovascular:      Rate and Rhythm: Normal rate.   Pulmonary:      Effort: Pulmonary effort is normal.   Musculoskeletal:        Hands:       Cervical back: Normal range of motion.   Skin:     General: Skin is warm and dry.      Capillary Refill: Capillary refill takes less than 2 seconds.   Neurological:      Mental Status: She is alert and oriented to person, place, and time.      Coordination: Coordination normal.   Psychiatric:         Behavior: Behavior normal.         Vital Signs  ED Triage Vitals [05/12/24 2011]   Temperature Pulse Respirations Blood Pressure SpO2   97.9 °F (36.6 °C) 92 18 148/67 92 %      Temp Source Heart Rate Source Patient Position - Orthostatic VS BP Location FiO2 (%)   Temporal Monitor Lying Left arm --      Pain Score       4           Vitals:    05/12/24 2011 05/12/24 2015   BP: 148/67 126/68   Pulse: 92 98   Patient Position - Orthostatic VS: Lying Lying         Visual Acuity      ED Medications  Medications - No data to display    Diagnostic Studies  Results Reviewed       None                    No orders to display              Procedures  Universal Protocol:  Procedure performed by:  Consent: Verbal consent obtained.  Consent given by: patient  Timeout called at: 5/12/2024 8:28 PM.  Laceration repair    Date/Time: 5/12/2024 8:28 PM    Performed by: Fermin Adams PA-C  Authorized by: Fermin Adams PA-C  Body area: upper extremity  Location details: right index finger  Laceration length: 1 cm  Foreign bodies: no foreign bodies  Tendon involvement: none  Nerve involvement: none    Sedation:  Patient sedated: no        Procedure Details:  Amount of cleaning: extensive  Debridement: none  Degree of undermining: none  Skin closure: glue  Approximation: close  Approximation difficulty: simple  Dressing: tube gauze  Patient tolerance: patient tolerated the procedure well with no immediate complications               ED Course                               SBIRT 20yo+      Flowsheet Row Most Recent Value   Initial Alcohol Screen: US AUDIT-C     1. How often do you have a drink containing alcohol? 0 Filed at: 05/12/2024 2009   2. How many drinks containing alcohol do you have on a typical day you are drinking?  0 Filed at: 05/12/2024 2009   3b. FEMALE Any Age, or MALE 65+: How often do you have 4 or more drinks on one occassion? 0 Filed at: 05/12/2024 2009   Audit-C Score 0 Filed at: 05/12/2024 2009   ELIJAH: How many times in the past year have you...    Used an illegal drug or used a prescription medication for non-medical reasons? Never Filed at: 05/12/2024 2009                      Medical Decision Making  Is a 68-year-old female presents  today with a chief complaint of finger laceration.  The patient was putting away brand-new sharp steak knives and accidentally injured her right index finger.  The patient has a small laceration.  Last tetanus vaccine was in 2020    Superficial laceration, cleansed with iodine and will place Dermabond.  Tetanus is up-to-date.  Given wound care discharged  home.             Disposition  Final diagnoses:   Laceration of right index finger without foreign body without damage to nail, initial encounter     Time reflects when diagnosis was documented in both MDM as applicable and the Disposition within this note       Time User Action Codes Description Comment    5/12/2024  8:29 PM Fermin Adams Add [S61.210A] Laceration of right index finger without foreign body without damage to nail, initial encounter           ED Disposition       ED Disposition   Discharge    Condition   Stable    Date/Time   Sun May 12, 2024 2029    Comment   Ritika Kwong discharge to home/self care.                   Follow-up Information    None         Patient's Medications   Discharge Prescriptions    No medications on file       No discharge procedures on file.    PDMP Review         Value Time User    PDMP Reviewed  Yes 9/18/2023  1:07 AM Nikunj Quinteros PA-C            ED Provider  Electronically Signed by             Fermin Adams PA-C  05/12/24 2039

## 2024-05-13 NOTE — ED NOTES
Pt requested nightly medications, as transport time is still TBD; medication list reviewed with pt. Provider informed of need for HS medication orders.     Abril Mercado RN  05/12/24 5812

## 2024-05-13 NOTE — ED NOTES
Pt resting comfortably with call bell in reach. She offers no complaints at this time.     Nichelle Fernando, RN  05/13/24 0695

## 2024-07-26 ENCOUNTER — OFFICE VISIT (OUTPATIENT)
Dept: URGENT CARE | Facility: CLINIC | Age: 69
End: 2024-07-26
Payer: COMMERCIAL

## 2024-07-26 VITALS
RESPIRATION RATE: 18 BRPM | WEIGHT: 247 LBS | OXYGEN SATURATION: 92 % | HEIGHT: 67 IN | DIASTOLIC BLOOD PRESSURE: 84 MMHG | BODY MASS INDEX: 38.77 KG/M2 | TEMPERATURE: 98 F | HEART RATE: 105 BPM | SYSTOLIC BLOOD PRESSURE: 136 MMHG

## 2024-07-26 DIAGNOSIS — R39.9 UTI SYMPTOMS: Primary | ICD-10-CM

## 2024-07-26 DIAGNOSIS — N39.0 RECURRENT UTI: ICD-10-CM

## 2024-07-26 PROCEDURE — S9088 SERVICES PROVIDED IN URGENT: HCPCS | Performed by: PHYSICIAN ASSISTANT

## 2024-07-26 PROCEDURE — 99213 OFFICE O/P EST LOW 20 MIN: CPT | Performed by: PHYSICIAN ASSISTANT

## 2024-07-26 RX ORDER — LEVETIRACETAM 1000 MG/1
1000 TABLET ORAL 2 TIMES DAILY
COMMUNITY

## 2024-07-26 RX ORDER — NITROFURANTOIN 25; 75 MG/1; MG/1
100 CAPSULE ORAL 2 TIMES DAILY
Qty: 14 CAPSULE | Refills: 0 | Status: SHIPPED | OUTPATIENT
Start: 2024-07-26 | End: 2024-08-02

## 2024-07-26 NOTE — PATIENT INSTRUCTIONS
Take Nitrofurantoin as prescribed  Drink plenty of water   Cranberry supplements  Urinate within 5 minutes following intercourse  Follow up with PCP in 3-5 days.  Proceed to  ER if symptoms worsen.    If tests have been performed at [facility], our office will contact you with results if changes need to be made to the care plan discussed with you at the visit.  You can review your full results on [facility] MyChart.    Eat yogurt with live and active cultures and/or take a probiotic at least 3 hours before or after antibiotic dose. Monitor stool for diarrhea and/or blood. If this occurs, contact primary care doctor ASAP.

## 2024-07-26 NOTE — PROGRESS NOTES
[facility]        NAME: Ritika Kwong is a 68 y.o. female  : 1955    MRN: [id number]  DATE: 2024  TIME: 12:54 PM    Assessment and Plan   UTI symptoms [R39.9]  1. UTI symptoms  POCT urine dip    Urine culture    nitrofurantoin (MACROBID) 100 mg capsule      2. Recurrent UTI  Ambulatory Referral to Urology        Patient was unable to produce a specimen. She urinated multiple times today at home. Provided her with a urine cup to bring to the lab.     Patient Instructions     Take Nitrofurantoin as prescribed  Drink plenty of water   Cranberry supplements  Urinate within 5 minutes following intercourse  Follow up with PCP in 3-5 days.  Proceed to  ER if symptoms worsen.    If tests have been performed at [facility], our office will contact you with results if changes need to be made to the care plan discussed with you at the visit.  [facility]    Eat yogurt with live and active cultures and/or take a probiotic at least 3 hours before or after antibiotic dose. Monitor stool for diarrhea and/or blood. If this occurs, contact primary care doctor ASAP.       Chief Complaint     Chief Complaint   Patient presents with    Possible UTI     Right flank pain, frequency of urination for 1 month         History of Present Illness       Patient has previously been evaluated by urology.     Urinary Tract Infection   This is a recurrent problem. The current episode started 1 to 4 weeks ago. The problem has been waxing and waning. There has been no fever. Associated symptoms include flank pain (R) and frequency. Pertinent negatives include no chills, hematuria, nausea, urgency or vomiting. She has tried nothing for the symptoms. Her past medical history is significant for recurrent UTIs (tx with Macrobid).       Review of Systems   Review of Systems   Constitutional:  Negative for chills and fever.   Gastrointestinal:  Negative for abdominal pain, nausea and  vomiting.   Genitourinary:  Positive for flank pain (R) and frequency. Negative for dysuria, hematuria and urgency.         Current Medications       Current Outpatient Medications:     Acetaminophen (Tylenol) 325 MG CAPS, Take 650 mg by mouth Three times daily as needed, Disp: , Rfl:     albuterol (VENTOLIN HFA) 90 mcg/act inhaler, Inhale 2 puffs every 6 (six) hours as needed for wheezing, Disp: 1 Inhaler, Rfl: 1    ascorbic acid (VITAMIN C) 250 mg tablet, Take 2 tablets (500 mg total) by mouth daily, Disp: 60 tablet, Rfl: 0    aspirin (ECOTRIN LOW STRENGTH) 81 mg EC tablet, Take 1 tablet (81 mg total) by mouth daily, Disp: 90 tablet, Rfl: 1    Blood Glucose Monitoring Suppl (BLOOD GLUCOSE MONITOR SYSTEM) w/Device KIT, Test blood sugars 3 times a day, Disp: 1 each, Rfl: 0    Diclofenac Sodium (VOLTAREN) 1 %, APPLY TO AFFECTED AREA EVERY 6 HOURS IF NEEDED FOR PAIN., Disp: , Rfl:     Docusate Sodium (DSS) 100 MG CAPS, Take 1 capsule by mouth 2 (two) times a day as needed, Disp: , Rfl:     ferrous sulfate (FeroSul) 325 (65 Fe) mg tablet, Take 1 tablet (325 mg total) by mouth daily with breakfast, Disp: 60 tablet, Rfl: 0    fluticasone (FLONASE) 50 mcg/act nasal spray, SPRAY ONE (1) SPRAY INTO EACH NOSTRIL ONCE DAILY (Patient taking differently: 1 spray 2 (two) times a day SPRAY ONE (1) SPRAY INTO EACH NOSTRIL ONCE DAILY), Disp: 16 g, Rfl: 1    glucose blood (ONE TOUCH ULTRA TEST) test strip, Test blood sugars 3 times a day., Disp: 100 each, Rfl: 0    guaiFENesin 400 mg, Take 400 mg by mouth 2 (two) times a day, Disp: , Rfl:     Incontinence Supply Disposable (PADSORBER BED PAN LINERS) MISC, by Does not apply route as needed (incontinence), Disp: 50 each, Rfl: 3    Infant Care Products (CUTIES SENSITIVE WIPES) MISC, 120 Pieces by Does not apply route as needed (incontinence), Disp: 120 each, Rfl: 3    insulin aspart (NovoLOG FlexPen) 100 UNIT/ML injection pen, inject 5 units under the skin in the morning and 5 units at  noon and 5 units in the evening. inject before meals, Disp: , Rfl:     levETIRAcetam (KEPPRA) 1000 MG tablet, Take 1,000 mg by mouth 2 (two) times a day, Disp: , Rfl:     levothyroxine 175 mcg tablet, Take 175 mcg by mouth in the morning, Disp: , Rfl:     magnesium chloride (MAG64) 64 MG TBEC EC tablet, Take 1 tablet (64 mg total) by mouth 2 (two) times a day, Disp: 60 tablet, Rfl: 3    meloxicam (MOBIC) 7.5 mg tablet, Take 1 tablet (7.5 mg total) by mouth 2 (two) times a day, Disp: 60 tablet, Rfl: 1    metFORMIN (GLUCOPHAGE) 1000 MG tablet, Take 1,000 mg by mouth 2 (two) times a day with meals, Disp: , Rfl:     montelukast (SINGULAIR) 10 mg tablet, Take 10 mg by mouth daily at bedtime, Disp: , Rfl:     multivitamin (THERAGRAN) TABS, Take 1 tablet by mouth daily, Disp: , Rfl:     nitrofurantoin (MACROBID) 100 mg capsule, Take 1 capsule (100 mg total) by mouth 2 (two) times a day for 7 days, Disp: 14 capsule, Rfl: 0    nystatin (MYCOSTATIN) powder, Apply topically 2 (two) times a day, Disp: 30 g, Rfl: 0    omeprazole (PriLOSEC) 40 MG capsule, Take 40 mg by mouth 2 (two) times a day, Disp: , Rfl:     ONETOUCH DELICA LANCETS 33G MISC, Test blood sugars 3 times a day., Disp: 100 each, Rfl: 0    oxybutynin (DITROPAN) 5 mg tablet, Take 1 tablet (5 mg total) by mouth 3 (three) times a day, Disp: 90 tablet, Rfl: 3    phenytoin (DILANTIN) 100 mg ER capsule, Take by mouth 2 (two) times a day 3 tablets in morning and 2 at lunch daily, Disp: , Rfl:     polyethylene glycol (GLYCOLAX) 17 GM/SCOOP powder, Take 17 g by mouth daily as needed (constipation), Disp: , Rfl:     polyvinyl alcohol (LIQUIFILM TEARS) 1.4 % ophthalmic solution, Administer 1 drop to both eyes 3 (three) times a day, Disp: 15 mL, Rfl: 0    simvastatin (ZOCOR) 80 mg tablet, Take 0.5 tablets (40 mg total) by mouth daily at bedtime, Disp: , Rfl:     triamcinolone (KENALOG) 0.1 % cream, Apply topically 2 (two) times a day, Disp: 30 g, Rfl: 0    valACYclovir  (VALTREX) 500 mg tablet, Take 1 tablet by mouth 2 (two) times a day, Disp: , Rfl: 2    zonisamide (ZONEGRAN) 100 mg capsule, Take 200 mg by mouth 2 (two) times a day, Disp: , Rfl:     gabapentin (NEURONTIN) 800 mg tablet, Take 1 tablet (800 mg total) by mouth 4 (four) times a day for 3 days, Disp: 12 tablet, Rfl: 0    levETIRAcetam (KEPPRA) 750 mg tablet, Take 2 tablets (1,500 mg total) by mouth 2 (two) times a day for 15 days Pt states she takes 1500MG Bid (Patient taking differently: Take 750 mg by mouth 2 (two) times a day), Disp: 60 tablet, Rfl: 0    metFORMIN (GLUCOPHAGE) 500 mg tablet, Take 1 tablet (500 mg total) by mouth 2 (two) times a day with meals, Disp: 60 tablet, Rfl: 0    methenamine hippurate (HIPREX) 1 g tablet, Take 1 tablet (1 g total) by mouth 2 (two) times a day with meals (Patient not taking: Reported on 7/26/2024), Disp: 60 tablet, Rfl: 1    Misc. Devices (MATTRESS PAD) MISC, by Does not apply route daily (Patient not taking: Reported on 7/26/2024), Disp: 1 each, Rfl: 0    polyethylene glycol (MIRALAX) 17 g packet, Take 17 g by mouth daily for 14 days, Disp: 238 g, Rfl: 0    QUEtiapine (SEROquel) 400 MG tablet, Take 1 tablet (400 mg total) by mouth daily at bedtime for 2 doses (Patient taking differently: Take 400 mg by mouth daily), Disp: 2 tablet, Rfl: 0    Current Allergies     Allergies as of 07/26/2024 - Reviewed 07/26/2024   Allergen Reaction Noted    Keflex [cephalexin] Anaphylaxis 01/03/2011    Levaquin [levofloxacin] Anaphylaxis 11/08/2007    Penicillins Anaphylaxis 11/08/2007    Bactrim [sulfamethoxazole-trimethoprim] Swelling and GI Intolerance 07/01/2015    Ciprofloxacin Swelling 07/01/2015    Noroxin [norfloxacin]  08/24/2018            The following portions of the patient's history were reviewed and updated as appropriate: allergies, current medications, past family history, past medical history, past social history, past surgical history and problem list.     Past Medical  "History:   Diagnosis Date    Asthma     Bipolar disorder (HCC)     Chronic UTI (urinary tract infection)     COPD (chronic obstructive pulmonary disease) (HCC)     Diabetes mellitus (HCC)     Disease of thyroid gland     Dyslipidemia 10/31/2018    Essential hypertension 10/31/2018    GERD (gastroesophageal reflux disease)     Heart attack (HCC)     Iron deficiency anemia, unspecified 7/29/2019    Obesity due to excess calories 10/31/2018    Recurrent falls 4/13/2019    Seizure (HCC)        Past Surgical History:   Procedure Laterality Date    ANKLE FRACTURE SURGERY Right     TUBAL LIGATION      VEIN LIGATION AND STRIPPING         Family History   Problem Relation Age of Onset    Skeletal dysplasia Mother     Stroke Father          Medications have been verified.        Objective   /84   Pulse 105   Temp 98 °F (36.7 °C) (Temporal)   Resp 18   Ht 5' 7\" (1.702 m)   Wt 112 kg (247 lb)   LMP  (LMP Unknown)   SpO2 92%   BMI 38.69 kg/m²   No LMP recorded (lmp unknown). Patient is postmenopausal.       Physical Exam     Physical Exam  Vitals reviewed.   Constitutional:       General: She is not in acute distress.     Appearance: She is well-developed. She is not diaphoretic.   Cardiovascular:      Rate and Rhythm: Normal rate and regular rhythm.      Heart sounds: Normal heart sounds. No murmur heard.     No friction rub. No gallop.   Pulmonary:      Effort: Pulmonary effort is normal. No respiratory distress.      Breath sounds: Normal breath sounds. No wheezing, rhonchi or rales.   Abdominal:      Palpations: Abdomen is soft.      Tenderness: There is no abdominal tenderness.      Comments: Negative CVA tenderness.   Skin:     General: Skin is warm.   Neurological:      Mental Status: She is alert.   Psychiatric:         Behavior: Behavior normal.         Thought Content: Thought content normal.         Judgment: Judgment normal.                   "

## 2024-11-07 ENCOUNTER — OFFICE VISIT (OUTPATIENT)
Dept: URGENT CARE | Facility: CLINIC | Age: 69
End: 2024-11-07
Payer: COMMERCIAL

## 2024-11-07 VITALS
SYSTOLIC BLOOD PRESSURE: 128 MMHG | DIASTOLIC BLOOD PRESSURE: 76 MMHG | HEART RATE: 128 BPM | RESPIRATION RATE: 18 BRPM | OXYGEN SATURATION: 95 % | TEMPERATURE: 99.7 F

## 2024-11-07 DIAGNOSIS — K08.89 PAIN, DENTAL: Primary | ICD-10-CM

## 2024-11-07 DIAGNOSIS — R05.1 ACUTE COUGH: ICD-10-CM

## 2024-11-07 PROCEDURE — 99213 OFFICE O/P EST LOW 20 MIN: CPT

## 2024-11-07 RX ORDER — BENZONATATE 200 MG/1
200 CAPSULE ORAL 3 TIMES DAILY PRN
Qty: 20 CAPSULE | Refills: 0 | Status: SHIPPED | OUTPATIENT
Start: 2024-11-07

## 2024-11-07 RX ORDER — CLINDAMYCIN HYDROCHLORIDE 300 MG/1
300 CAPSULE ORAL 3 TIMES DAILY
Qty: 30 CAPSULE | Refills: 0 | Status: SHIPPED | OUTPATIENT
Start: 2024-11-07 | End: 2024-11-17

## 2024-11-07 NOTE — PATIENT INSTRUCTIONS
Take antibiotic as prescribed  Warm salt water gargles   Orajel OTC as needed applied to gums  Can ice cheek as needed  Tylenol or ibuprofen as needed for pain and inflammation  Follow up with dentist   Follow up with PCP in 3-5 days.  Proceed to  ER if symptoms worsen.    If tests are performed, our office will contact you with results only if changes need to made to the care plan discussed with you at the visit. You can review your full results on [facility].

## 2024-11-07 NOTE — PROGRESS NOTES
[facility]        NAME: Ritika Kwong is a 69 y.o. female  : 1955    MRN: [id number]  DATE: 2024  TIME: 10:54 AM    Assessment and Plan   Pain, dental [K08.89]  1. Pain, dental  clindamycin (CLEOCIN) 300 MG capsule      2. Acute cough  benzonatate (TESSALON) 200 MG capsule            Patient Instructions     Take antibiotic as prescribed  Warm salt water gargles   Orajel OTC as needed applied to gums  Can ice cheek as needed  Tylenol or ibuprofen as needed for pain and inflammation  Follow up with dentist   Follow up with PCP in 3-5 days.  Proceed to  ER if symptoms worsen.    If tests are performed, our office will contact you with results only if changes need to made to the care plan discussed with you at the visit. [facility]    Chief Complaint     Chief Complaint   Patient presents with    Oral Pain     Right side, started about 3 days ago          History of Present Illness       Patient is presenting for R sided jaw and mouth pain x 3 days. She stated that she has R cheek swelling. She stated she gets sialadenitis. She has also had a cough recently and coughing up green mucus. She has pain with eating. She denies any difficulty breathing, swallowing or drooling. She denies fevers. She does endorse poor dentition.         Review of Systems   Review of Systems   Constitutional: Negative.    HENT:  Positive for dental problem and facial swelling (R cheek). Negative for drooling, mouth sores, sinus pressure, sore throat, trouble swallowing and voice change.    Respiratory: Negative.     Cardiovascular: Negative.    Musculoskeletal:  Negative for neck pain and neck stiffness.         Current Medications       Current Outpatient Medications:     Acetaminophen (Tylenol) 325 MG CAPS, Take 650 mg by mouth Three times daily as needed, Disp: , Rfl:     albuterol (VENTOLIN HFA) 90 mcg/act inhaler, Inhale 2 puffs every 6 (six) hours as needed for  wheezing, Disp: 1 Inhaler, Rfl: 1    aspirin (ECOTRIN LOW STRENGTH) 81 mg EC tablet, Take 1 tablet (81 mg total) by mouth daily, Disp: 90 tablet, Rfl: 1    benzonatate (TESSALON) 200 MG capsule, Take 1 capsule (200 mg total) by mouth 3 (three) times a day as needed for cough, Disp: 20 capsule, Rfl: 0    Blood Glucose Monitoring Suppl (BLOOD GLUCOSE MONITOR SYSTEM) w/Device KIT, Test blood sugars 3 times a day, Disp: 1 each, Rfl: 0    clindamycin (CLEOCIN) 300 MG capsule, Take 1 capsule (300 mg total) by mouth 3 (three) times a day for 10 days, Disp: 30 capsule, Rfl: 0    Docusate Sodium (DSS) 100 MG CAPS, Take 1 capsule by mouth 2 (two) times a day as needed, Disp: , Rfl:     ferrous sulfate (FeroSul) 325 (65 Fe) mg tablet, Take 1 tablet (325 mg total) by mouth daily with breakfast, Disp: 60 tablet, Rfl: 0    glucose blood (ONE TOUCH ULTRA TEST) test strip, Test blood sugars 3 times a day., Disp: 100 each, Rfl: 0    guaiFENesin 400 mg, Take 400 mg by mouth 2 (two) times a day, Disp: , Rfl:     levETIRAcetam (KEPPRA) 1000 MG tablet, Take 1,000 mg by mouth 2 (two) times a day, Disp: , Rfl:     levothyroxine 175 mcg tablet, Take 175 mcg by mouth in the morning, Disp: , Rfl:     nystatin (MYCOSTATIN) powder, Apply topically 2 (two) times a day, Disp: 30 g, Rfl: 0    omeprazole (PriLOSEC) 40 MG capsule, Take 40 mg by mouth 2 (two) times a day, Disp: , Rfl:     phenytoin (DILANTIN) 100 mg ER capsule, Take by mouth 2 (two) times a day 3 tablets in morning and 2 at lunch daily, Disp: , Rfl:     polyvinyl alcohol (LIQUIFILM TEARS) 1.4 % ophthalmic solution, Administer 1 drop to both eyes 3 (three) times a day, Disp: 15 mL, Rfl: 0    simvastatin (ZOCOR) 80 mg tablet, Take 0.5 tablets (40 mg total) by mouth daily at bedtime, Disp: , Rfl:     valACYclovir (VALTREX) 500 mg tablet, Take 1 tablet by mouth 2 (two) times a day, Disp: , Rfl: 2    zonisamide (ZONEGRAN) 100 mg capsule, Take 200 mg by mouth 2 (two) times a day, Disp: ,  Rfl:     ascorbic acid (VITAMIN C) 250 mg tablet, Take 2 tablets (500 mg total) by mouth daily, Disp: 60 tablet, Rfl: 0    Diclofenac Sodium (VOLTAREN) 1 %, APPLY TO AFFECTED AREA EVERY 6 HOURS IF NEEDED FOR PAIN., Disp: , Rfl:     fluticasone (FLONASE) 50 mcg/act nasal spray, SPRAY ONE (1) SPRAY INTO EACH NOSTRIL ONCE DAILY (Patient taking differently: 1 spray 2 (two) times a day SPRAY ONE (1) SPRAY INTO EACH NOSTRIL ONCE DAILY), Disp: 16 g, Rfl: 1    gabapentin (NEURONTIN) 800 mg tablet, Take 1 tablet (800 mg total) by mouth 4 (four) times a day for 3 days, Disp: 12 tablet, Rfl: 0    Incontinence Supply Disposable (PADSORBER BED PAN LINERS) MISC, by Does not apply route as needed (incontinence), Disp: 50 each, Rfl: 3    Infant Care Products (CUTIES SENSITIVE WIPES) MISC, 120 Pieces by Does not apply route as needed (incontinence), Disp: 120 each, Rfl: 3    insulin aspart (NovoLOG FlexPen) 100 UNIT/ML injection pen, inject 5 units under the skin in the morning and 5 units at noon and 5 units in the evening. inject before meals, Disp: , Rfl:     levETIRAcetam (KEPPRA) 750 mg tablet, Take 2 tablets (1,500 mg total) by mouth 2 (two) times a day for 15 days Pt states she takes 1500MG Bid (Patient taking differently: Take 750 mg by mouth 2 (two) times a day), Disp: 60 tablet, Rfl: 0    magnesium chloride (MAG64) 64 MG TBEC EC tablet, Take 1 tablet (64 mg total) by mouth 2 (two) times a day, Disp: 60 tablet, Rfl: 3    meloxicam (MOBIC) 7.5 mg tablet, Take 1 tablet (7.5 mg total) by mouth 2 (two) times a day, Disp: 60 tablet, Rfl: 1    metFORMIN (GLUCOPHAGE) 1000 MG tablet, Take 1,000 mg by mouth 2 (two) times a day with meals, Disp: , Rfl:     methenamine hippurate (HIPREX) 1 g tablet, Take 1 tablet (1 g total) by mouth 2 (two) times a day with meals (Patient not taking: Reported on 7/26/2024), Disp: 60 tablet, Rfl: 1    Misc. Devices (MATTRESS PAD) MISC, by Does not apply route daily (Patient not taking: Reported on  7/26/2024), Disp: 1 each, Rfl: 0    montelukast (SINGULAIR) 10 mg tablet, Take 10 mg by mouth daily at bedtime, Disp: , Rfl:     multivitamin (THERAGRAN) TABS, Take 1 tablet by mouth daily, Disp: , Rfl:     ONETOUCH DELICA LANCETS 33G MISC, Test blood sugars 3 times a day., Disp: 100 each, Rfl: 0    oxybutynin (DITROPAN) 5 mg tablet, Take 1 tablet (5 mg total) by mouth 3 (three) times a day, Disp: 90 tablet, Rfl: 3    polyethylene glycol (GLYCOLAX) 17 GM/SCOOP powder, Take 17 g by mouth daily as needed (constipation), Disp: , Rfl:     polyethylene glycol (MIRALAX) 17 g packet, Take 17 g by mouth daily for 14 days, Disp: 238 g, Rfl: 0    QUEtiapine (SEROquel) 400 MG tablet, Take 1 tablet (400 mg total) by mouth daily at bedtime for 2 doses (Patient taking differently: Take 400 mg by mouth daily), Disp: 2 tablet, Rfl: 0    triamcinolone (KENALOG) 0.1 % cream, Apply topically 2 (two) times a day, Disp: 30 g, Rfl: 0    Current Allergies     Allergies as of 11/07/2024 - Reviewed 11/07/2024   Allergen Reaction Noted    Keflex [cephalexin] Anaphylaxis 01/03/2011    Levaquin [levofloxacin] Anaphylaxis 11/08/2007    Penicillins Anaphylaxis 11/08/2007    Bactrim [sulfamethoxazole-trimethoprim] Swelling and GI Intolerance 07/01/2015    Ciprofloxacin Swelling 07/01/2015    Noroxin [norfloxacin]  08/24/2018            The following portions of the patient's history were reviewed and updated as appropriate: allergies, current medications, past family history, past medical history, past social history, past surgical history and problem list.     Past Medical History:   Diagnosis Date    Asthma     Bipolar disorder (HCC)     Chronic UTI (urinary tract infection)     COPD (chronic obstructive pulmonary disease) (HCC)     Diabetes mellitus (HCC)     Disease of thyroid gland     Dyslipidemia 10/31/2018    Essential hypertension 10/31/2018    GERD (gastroesophageal reflux disease)     Heart attack (HCC)     Iron deficiency anemia,  unspecified 7/29/2019    Obesity due to excess calories 10/31/2018    Recurrent falls 4/13/2019    Seizure (HCC)        Past Surgical History:   Procedure Laterality Date    ANKLE FRACTURE SURGERY Right     TUBAL LIGATION      VEIN LIGATION AND STRIPPING         Family History   Problem Relation Age of Onset    Skeletal dysplasia Mother     Stroke Father          Medications have been verified.        Objective   /76   Pulse (!) 128   Temp 99.7 °F (37.6 °C)   Resp 18   LMP  (LMP Unknown)   SpO2 95%        Physical Exam     Physical Exam  Constitutional:       Appearance: Normal appearance.   HENT:      Head: Normocephalic.      Right Ear: Tympanic membrane and external ear normal.      Left Ear: Tympanic membrane and external ear normal.      Mouth/Throat:      Mouth: Mucous membranes are moist. No oral lesions.      Dentition: Abnormal dentition. Gingival swelling and dental caries present.      Pharynx: Oropharynx is clear. No oropharyngeal exudate or posterior oropharyngeal erythema.      Tonsils: No tonsillar exudate.   Cardiovascular:      Rate and Rhythm: Normal rate and regular rhythm.      Pulses: Normal pulses.      Heart sounds: Normal heart sounds.   Pulmonary:      Effort: Pulmonary effort is normal.      Breath sounds: Normal breath sounds.   Neurological:      General: No focal deficit present.      Mental Status: She is alert. Mental status is at baseline.

## 2025-03-17 ENCOUNTER — APPOINTMENT (EMERGENCY)
Dept: RADIOLOGY | Facility: HOSPITAL | Age: 70
End: 2025-03-17
Payer: COMMERCIAL

## 2025-03-17 ENCOUNTER — APPOINTMENT (OUTPATIENT)
Dept: CT IMAGING | Facility: HOSPITAL | Age: 70
End: 2025-03-17
Payer: COMMERCIAL

## 2025-03-17 ENCOUNTER — HOSPITAL ENCOUNTER (INPATIENT)
Facility: HOSPITAL | Age: 70
LOS: 1 days | Discharge: HOME HEALTH | End: 2025-03-19
Admitting: INTERNAL MEDICINE
Payer: COMMERCIAL

## 2025-03-17 DIAGNOSIS — R00.0 TACHYCARDIA: ICD-10-CM

## 2025-03-17 DIAGNOSIS — R09.02 HYPOXIA: Primary | ICD-10-CM

## 2025-03-17 DIAGNOSIS — Z13.9 ENCOUNTER FOR SCREENING INVOLVING SOCIAL DETERMINANTS OF HEALTH (SDOH): ICD-10-CM

## 2025-03-17 DIAGNOSIS — R53.1 GENERALIZED WEAKNESS: ICD-10-CM

## 2025-03-17 DIAGNOSIS — J44.1 COPD WITH ACUTE EXACERBATION (HCC): ICD-10-CM

## 2025-03-17 DIAGNOSIS — R20.2 PARESTHESIA OF BOTH LOWER EXTREMITIES: ICD-10-CM

## 2025-03-17 DIAGNOSIS — R21 RASH, SKIN: ICD-10-CM

## 2025-03-17 PROBLEM — E11.65 TYPE 2 DIABETES MELLITUS WITH HYPERGLYCEMIA, WITHOUT LONG-TERM CURRENT USE OF INSULIN (HCC): Status: ACTIVE | Noted: 2023-05-20

## 2025-03-17 PROBLEM — R06.00 DYSPNEA: Status: ACTIVE | Noted: 2025-03-17

## 2025-03-17 PROBLEM — M79.672 LEFT FOOT PAIN: Status: ACTIVE | Noted: 2025-03-17

## 2025-03-17 PROBLEM — J98.6 ELEVATED HEMIDIAPHRAGM: Status: ACTIVE | Noted: 2025-03-17

## 2025-03-17 PROBLEM — J96.01 ACUTE RESPIRATORY FAILURE WITH HYPOXIA (HCC): Status: ACTIVE | Noted: 2025-03-17

## 2025-03-17 PROBLEM — E11.9 TYPE 2 DIABETES MELLITUS WITHOUT COMPLICATION, WITHOUT LONG-TERM CURRENT USE OF INSULIN (HCC): Status: ACTIVE | Noted: 2023-05-20

## 2025-03-17 PROBLEM — E66.01 CLASS 2 SEVERE OBESITY WITH SERIOUS COMORBIDITY AND BODY MASS INDEX (BMI) OF 35.0 TO 35.9 IN ADULT (HCC): Status: ACTIVE | Noted: 2025-03-17

## 2025-03-17 PROBLEM — E66.812 CLASS 2 SEVERE OBESITY WITH SERIOUS COMORBIDITY AND BODY MASS INDEX (BMI) OF 35.0 TO 35.9 IN ADULT (HCC): Status: ACTIVE | Noted: 2025-03-17

## 2025-03-17 LAB
ALBUMIN SERPL BCG-MCNC: 3.8 G/DL (ref 3.5–5)
ALP SERPL-CCNC: 54 U/L (ref 34–104)
ALT SERPL W P-5'-P-CCNC: 21 U/L (ref 7–52)
ANION GAP SERPL CALCULATED.3IONS-SCNC: 12 MMOL/L (ref 4–13)
AST SERPL W P-5'-P-CCNC: 25 U/L (ref 13–39)
B PARAP IS1001 DNA NPH QL NAA+NON-PROBE: NOT DETECTED
B PERT.PT PRMT NPH QL NAA+NON-PROBE: NOT DETECTED
BACTERIA UR QL AUTO: ABNORMAL /HPF
BASE EX.OXY STD BLDV CALC-SCNC: 94.8 % (ref 60–80)
BASE EXCESS BLDV CALC-SCNC: 1 MMOL/L
BASOPHILS # BLD AUTO: 0.05 THOUSANDS/ÂΜL (ref 0–0.1)
BASOPHILS NFR BLD AUTO: 1 % (ref 0–1)
BILIRUB SERPL-MCNC: 0.21 MG/DL (ref 0.2–1)
BILIRUB UR QL STRIP: NEGATIVE
BNP SERPL-MCNC: 24 PG/ML (ref 0–100)
BUN SERPL-MCNC: 13 MG/DL (ref 5–25)
C PNEUM DNA NPH QL NAA+NON-PROBE: NOT DETECTED
CALCIUM SERPL-MCNC: 9 MG/DL (ref 8.4–10.2)
CHLORIDE SERPL-SCNC: 100 MMOL/L (ref 96–108)
CLARITY UR: ABNORMAL
CO2 SERPL-SCNC: 26 MMOL/L (ref 21–32)
COLOR UR: YELLOW
CREAT SERPL-MCNC: 0.86 MG/DL (ref 0.6–1.3)
D DIMER PPP FEU-MCNC: 0.33 UG/ML FEU
EOSINOPHIL # BLD AUTO: 0.53 THOUSAND/ÂΜL (ref 0–0.61)
EOSINOPHIL NFR BLD AUTO: 9 % (ref 0–6)
ERYTHROCYTE [DISTWIDTH] IN BLOOD BY AUTOMATED COUNT: 12.4 % (ref 11.6–15.1)
EST. AVERAGE GLUCOSE BLD GHB EST-MCNC: 131 MG/DL
FLUAV RNA NPH QL NAA+NON-PROBE: NOT DETECTED
FLUBV RNA NPH QL NAA+NON-PROBE: NOT DETECTED
GFR SERPL CREATININE-BSD FRML MDRD: 70 ML/MIN/1.73SQ M
GLUCOSE SERPL-MCNC: 103 MG/DL (ref 65–140)
GLUCOSE SERPL-MCNC: 142 MG/DL (ref 65–140)
GLUCOSE SERPL-MCNC: 149 MG/DL (ref 65–140)
GLUCOSE SERPL-MCNC: 156 MG/DL (ref 65–140)
GLUCOSE SERPL-MCNC: 77 MG/DL (ref 65–140)
GLUCOSE UR STRIP-MCNC: NEGATIVE MG/DL
HADV DNA NPH QL NAA+NON-PROBE: NOT DETECTED
HBA1C MFR BLD: 6.2 %
HCO3 BLDV-SCNC: 26.1 MMOL/L (ref 24–30)
HCOV 229E RNA NPH QL NAA+NON-PROBE: NOT DETECTED
HCOV HKU1 RNA NPH QL NAA+NON-PROBE: NOT DETECTED
HCOV NL63 RNA NPH QL NAA+NON-PROBE: NOT DETECTED
HCOV OC43 RNA NPH QL NAA+NON-PROBE: NOT DETECTED
HCT VFR BLD AUTO: 41.7 % (ref 34.8–46.1)
HGB BLD-MCNC: 13.5 G/DL (ref 11.5–15.4)
HGB UR QL STRIP.AUTO: NEGATIVE
HMPV RNA NPH QL NAA+NON-PROBE: NOT DETECTED
HPIV1 RNA NPH QL NAA+NON-PROBE: NOT DETECTED
HPIV2 RNA NPH QL NAA+NON-PROBE: NOT DETECTED
HPIV3 RNA NPH QL NAA+NON-PROBE: NOT DETECTED
HPIV4 RNA NPH QL NAA+NON-PROBE: NOT DETECTED
IMM GRANULOCYTES # BLD AUTO: 0.03 THOUSAND/UL (ref 0–0.2)
IMM GRANULOCYTES NFR BLD AUTO: 1 % (ref 0–2)
KETONES UR STRIP-MCNC: NEGATIVE MG/DL
LEUKOCYTE ESTERASE UR QL STRIP: ABNORMAL
LEVETIRACETAM SERPL-MCNC: 24.6 UG/ML (ref 12–46)
LYMPHOCYTES # BLD AUTO: 1.56 THOUSANDS/ÂΜL (ref 0.6–4.47)
LYMPHOCYTES NFR BLD AUTO: 28 % (ref 14–44)
M PNEUMO DNA NPH QL NAA+NON-PROBE: NOT DETECTED
MAGNESIUM SERPL-MCNC: 1.8 MG/DL (ref 1.9–2.7)
MCH RBC QN AUTO: 33.5 PG (ref 26.8–34.3)
MCHC RBC AUTO-ENTMCNC: 32.4 G/DL (ref 31.4–37.4)
MCV RBC AUTO: 104 FL (ref 82–98)
MONOCYTES # BLD AUTO: 0.52 THOUSAND/ÂΜL (ref 0.17–1.22)
MONOCYTES NFR BLD AUTO: 9 % (ref 4–12)
NEUTROPHILS # BLD AUTO: 2.95 THOUSANDS/ÂΜL (ref 1.85–7.62)
NEUTS SEG NFR BLD AUTO: 52 % (ref 43–75)
NITRITE UR QL STRIP: NEGATIVE
NON-SQ EPI CELLS URNS QL MICRO: ABNORMAL /HPF
NRBC BLD AUTO-RTO: 0 /100 WBCS
O2 CT BLDV-SCNC: 18.9 ML/DL
PCO2 BLDV: 43.3 MM HG (ref 42–50)
PH BLDV: 7.4 [PH] (ref 7.3–7.4)
PH UR STRIP.AUTO: 7 [PH]
PHENYTOIN SERPL-MCNC: 6.6 UG/ML (ref 10–20)
PHOSPHATE SERPL-MCNC: 4.7 MG/DL (ref 2.3–4.1)
PLATELET # BLD AUTO: 230 THOUSANDS/UL (ref 149–390)
PLATELET # BLD AUTO: 242 THOUSANDS/UL (ref 149–390)
PMV BLD AUTO: 8.8 FL (ref 8.9–12.7)
PMV BLD AUTO: 8.9 FL (ref 8.9–12.7)
PO2 BLDV: 100.6 MM HG (ref 35–45)
POTASSIUM SERPL-SCNC: 4.5 MMOL/L (ref 3.5–5.3)
PROCALCITONIN SERPL-MCNC: <0.05 NG/ML
PROT SERPL-MCNC: 7.2 G/DL (ref 6.4–8.4)
PROT UR STRIP-MCNC: NEGATIVE MG/DL
RBC # BLD AUTO: 4.03 MILLION/UL (ref 3.81–5.12)
RBC #/AREA URNS AUTO: ABNORMAL /HPF
RSV RNA NPH QL NAA+NON-PROBE: NOT DETECTED
RV+EV RNA NPH QL NAA+NON-PROBE: NOT DETECTED
SARS-COV-2 RNA NPH QL NAA+NON-PROBE: NOT DETECTED
SODIUM SERPL-SCNC: 138 MMOL/L (ref 135–147)
SP GR UR STRIP.AUTO: 1.02 (ref 1–1.03)
TRANS CELLS #/AREA URNS HPF: PRESENT /[HPF]
TSH SERPL DL<=0.05 MIU/L-ACNC: 1.83 UIU/ML (ref 0.45–4.5)
UROBILINOGEN UR STRIP-ACNC: <2 MG/DL
WBC # BLD AUTO: 5.64 THOUSAND/UL (ref 4.31–10.16)
WBC #/AREA URNS AUTO: ABNORMAL /HPF
WBC CASTS URNS QL MICRO: ABNORMAL /LPF
WBC CLUMPS # UR AUTO: PRESENT /UL

## 2025-03-17 PROCEDURE — 82948 REAGENT STRIP/BLOOD GLUCOSE: CPT

## 2025-03-17 PROCEDURE — 97166 OT EVAL MOD COMPLEX 45 MIN: CPT

## 2025-03-17 PROCEDURE — 36415 COLL VENOUS BLD VENIPUNCTURE: CPT

## 2025-03-17 PROCEDURE — 80185 ASSAY OF PHENYTOIN TOTAL: CPT | Performed by: PHYSICIAN ASSISTANT

## 2025-03-17 PROCEDURE — 71275 CT ANGIOGRAPHY CHEST: CPT

## 2025-03-17 PROCEDURE — 85049 AUTOMATED PLATELET COUNT: CPT | Performed by: PHYSICIAN ASSISTANT

## 2025-03-17 PROCEDURE — 81001 URINALYSIS AUTO W/SCOPE: CPT | Performed by: INTERNAL MEDICINE

## 2025-03-17 PROCEDURE — 99285 EMERGENCY DEPT VISIT HI MDM: CPT

## 2025-03-17 PROCEDURE — 87086 URINE CULTURE/COLONY COUNT: CPT | Performed by: INTERNAL MEDICINE

## 2025-03-17 PROCEDURE — 94640 AIRWAY INHALATION TREATMENT: CPT

## 2025-03-17 PROCEDURE — 83735 ASSAY OF MAGNESIUM: CPT

## 2025-03-17 PROCEDURE — 71045 X-RAY EXAM CHEST 1 VIEW: CPT

## 2025-03-17 PROCEDURE — 83880 ASSAY OF NATRIURETIC PEPTIDE: CPT

## 2025-03-17 PROCEDURE — 85025 COMPLETE CBC W/AUTO DIFF WBC: CPT

## 2025-03-17 PROCEDURE — 0202U NFCT DS 22 TRGT SARS-COV-2: CPT | Performed by: PHYSICIAN ASSISTANT

## 2025-03-17 PROCEDURE — 94760 N-INVAS EAR/PLS OXIMETRY 1: CPT

## 2025-03-17 PROCEDURE — 87147 CULTURE TYPE IMMUNOLOGIC: CPT | Performed by: INTERNAL MEDICINE

## 2025-03-17 PROCEDURE — 99223 1ST HOSP IP/OBS HIGH 75: CPT | Performed by: INTERNAL MEDICINE

## 2025-03-17 PROCEDURE — 87077 CULTURE AEROBIC IDENTIFY: CPT | Performed by: INTERNAL MEDICINE

## 2025-03-17 PROCEDURE — 82805 BLOOD GASES W/O2 SATURATION: CPT

## 2025-03-17 PROCEDURE — 80053 COMPREHEN METABOLIC PANEL: CPT

## 2025-03-17 PROCEDURE — 84100 ASSAY OF PHOSPHORUS: CPT

## 2025-03-17 PROCEDURE — 84145 PROCALCITONIN (PCT): CPT

## 2025-03-17 PROCEDURE — 83036 HEMOGLOBIN GLYCOSYLATED A1C: CPT | Performed by: PHYSICIAN ASSISTANT

## 2025-03-17 PROCEDURE — 87186 SC STD MICRODIL/AGAR DIL: CPT | Performed by: INTERNAL MEDICINE

## 2025-03-17 PROCEDURE — 93005 ELECTROCARDIOGRAM TRACING: CPT

## 2025-03-17 PROCEDURE — 85379 FIBRIN DEGRADATION QUANT: CPT

## 2025-03-17 PROCEDURE — 99215 OFFICE O/P EST HI 40 MIN: CPT

## 2025-03-17 PROCEDURE — 87081 CULTURE SCREEN ONLY: CPT | Performed by: INTERNAL MEDICINE

## 2025-03-17 PROCEDURE — 83520 IMMUNOASSAY QUANT NOS NONAB: CPT | Performed by: PHYSICIAN ASSISTANT

## 2025-03-17 PROCEDURE — 84443 ASSAY THYROID STIM HORMONE: CPT

## 2025-03-17 RX ORDER — QUETIAPINE FUMARATE 100 MG/1
400 TABLET, FILM COATED ORAL 2 TIMES DAILY
Status: DISCONTINUED | OUTPATIENT
Start: 2025-03-17 | End: 2025-03-19 | Stop reason: HOSPADM

## 2025-03-17 RX ORDER — FLUTICASONE PROPIONATE 50 MCG
1 SPRAY, SUSPENSION (ML) NASAL 2 TIMES DAILY
Status: DISCONTINUED | OUTPATIENT
Start: 2025-03-17 | End: 2025-03-19 | Stop reason: HOSPADM

## 2025-03-17 RX ORDER — MAGNESIUM SULFATE HEPTAHYDRATE 40 MG/ML
2 INJECTION, SOLUTION INTRAVENOUS ONCE
Status: COMPLETED | OUTPATIENT
Start: 2025-03-17 | End: 2025-03-17

## 2025-03-17 RX ORDER — IPRATROPIUM BROMIDE AND ALBUTEROL SULFATE 2.5; .5 MG/3ML; MG/3ML
3 SOLUTION RESPIRATORY (INHALATION) ONCE
Status: COMPLETED | OUTPATIENT
Start: 2025-03-17 | End: 2025-03-17

## 2025-03-17 RX ORDER — ENOXAPARIN SODIUM 100 MG/ML
40 INJECTION SUBCUTANEOUS EVERY 24 HOURS
Status: DISCONTINUED | OUTPATIENT
Start: 2025-03-17 | End: 2025-03-19 | Stop reason: HOSPADM

## 2025-03-17 RX ORDER — QUETIAPINE FUMARATE 400 MG/1
400 TABLET, FILM COATED ORAL 2 TIMES DAILY
COMMUNITY

## 2025-03-17 RX ORDER — METOPROLOL TARTRATE 1 MG/ML
5 INJECTION, SOLUTION INTRAVENOUS EVERY 6 HOURS PRN
Status: DISCONTINUED | OUTPATIENT
Start: 2025-03-17 | End: 2025-03-19 | Stop reason: HOSPADM

## 2025-03-17 RX ORDER — ACETAMINOPHEN 325 MG/1
650 TABLET ORAL EVERY 6 HOURS PRN
Status: DISCONTINUED | OUTPATIENT
Start: 2025-03-17 | End: 2025-03-19 | Stop reason: HOSPADM

## 2025-03-17 RX ORDER — LEVOTHYROXINE SODIUM 175 UG/1
175 TABLET ORAL
Status: DISCONTINUED | OUTPATIENT
Start: 2025-03-17 | End: 2025-03-19 | Stop reason: HOSPADM

## 2025-03-17 RX ORDER — LEVETIRACETAM 500 MG/1
1000 TABLET ORAL 2 TIMES DAILY
Status: DISCONTINUED | OUTPATIENT
Start: 2025-03-17 | End: 2025-03-19 | Stop reason: HOSPADM

## 2025-03-17 RX ORDER — OXYBUTYNIN CHLORIDE 5 MG/1
5 TABLET ORAL 3 TIMES DAILY
Status: DISCONTINUED | OUTPATIENT
Start: 2025-03-17 | End: 2025-03-19 | Stop reason: HOSPADM

## 2025-03-17 RX ORDER — ATORVASTATIN CALCIUM 40 MG/1
40 TABLET, FILM COATED ORAL
Status: DISCONTINUED | OUTPATIENT
Start: 2025-03-17 | End: 2025-03-19 | Stop reason: HOSPADM

## 2025-03-17 RX ORDER — PANTOPRAZOLE SODIUM 40 MG/1
40 TABLET, DELAYED RELEASE ORAL
Status: DISCONTINUED | OUTPATIENT
Start: 2025-03-17 | End: 2025-03-19 | Stop reason: HOSPADM

## 2025-03-17 RX ORDER — METHYLPREDNISOLONE SODIUM SUCCINATE 40 MG/ML
40 INJECTION, POWDER, LYOPHILIZED, FOR SOLUTION INTRAMUSCULAR; INTRAVENOUS EVERY 12 HOURS SCHEDULED
Status: DISCONTINUED | OUTPATIENT
Start: 2025-03-17 | End: 2025-03-18

## 2025-03-17 RX ORDER — ZONISAMIDE 100 MG/1
200 CAPSULE ORAL 2 TIMES DAILY
Status: DISCONTINUED | OUTPATIENT
Start: 2025-03-17 | End: 2025-03-19 | Stop reason: HOSPADM

## 2025-03-17 RX ORDER — VALACYCLOVIR HYDROCHLORIDE 500 MG/1
500 TABLET, FILM COATED ORAL 2 TIMES DAILY
Status: DISCONTINUED | OUTPATIENT
Start: 2025-03-17 | End: 2025-03-19 | Stop reason: HOSPADM

## 2025-03-17 RX ORDER — ASPIRIN 81 MG/1
81 TABLET ORAL DAILY
Status: DISCONTINUED | OUTPATIENT
Start: 2025-03-17 | End: 2025-03-19 | Stop reason: HOSPADM

## 2025-03-17 RX ORDER — PHENYTOIN SODIUM 100 MG/1
100 CAPSULE, EXTENDED RELEASE ORAL 2 TIMES DAILY
Status: DISCONTINUED | OUTPATIENT
Start: 2025-03-17 | End: 2025-03-18

## 2025-03-17 RX ORDER — ONDANSETRON 2 MG/ML
4 INJECTION INTRAMUSCULAR; INTRAVENOUS EVERY 6 HOURS PRN
Status: DISCONTINUED | OUTPATIENT
Start: 2025-03-17 | End: 2025-03-19 | Stop reason: HOSPADM

## 2025-03-17 RX ORDER — HEPARIN SODIUM 5000 [USP'U]/ML
5000 INJECTION, SOLUTION INTRAVENOUS; SUBCUTANEOUS EVERY 8 HOURS SCHEDULED
Status: DISCONTINUED | OUTPATIENT
Start: 2025-03-17 | End: 2025-03-17

## 2025-03-17 RX ORDER — NYSTATIN 100000 U/G
1 OINTMENT TOPICAL 2 TIMES DAILY
COMMUNITY

## 2025-03-17 RX ORDER — INSULIN LISPRO 100 [IU]/ML
1-5 INJECTION, SOLUTION INTRAVENOUS; SUBCUTANEOUS
Status: DISCONTINUED | OUTPATIENT
Start: 2025-03-17 | End: 2025-03-19 | Stop reason: HOSPADM

## 2025-03-17 RX ORDER — INSULIN LISPRO 100 [IU]/ML
5 INJECTION, SOLUTION INTRAVENOUS; SUBCUTANEOUS
Status: DISCONTINUED | OUTPATIENT
Start: 2025-03-17 | End: 2025-03-19 | Stop reason: HOSPADM

## 2025-03-17 RX ORDER — FERROUS SULFATE 325(65) MG
325 TABLET ORAL
Status: DISCONTINUED | OUTPATIENT
Start: 2025-03-17 | End: 2025-03-19 | Stop reason: HOSPADM

## 2025-03-17 RX ORDER — MELOXICAM 7.5 MG/1
7.5 TABLET ORAL DAILY
Status: DISCONTINUED | OUTPATIENT
Start: 2025-03-17 | End: 2025-03-19 | Stop reason: HOSPADM

## 2025-03-17 RX ORDER — ALBUTEROL SULFATE 90 UG/1
2 INHALANT RESPIRATORY (INHALATION) EVERY 6 HOURS PRN
Status: DISCONTINUED | OUTPATIENT
Start: 2025-03-17 | End: 2025-03-19 | Stop reason: HOSPADM

## 2025-03-17 RX ORDER — GUAIFENESIN 600 MG/1
600 TABLET, EXTENDED RELEASE ORAL EVERY 12 HOURS SCHEDULED
Status: DISCONTINUED | OUTPATIENT
Start: 2025-03-17 | End: 2025-03-19 | Stop reason: HOSPADM

## 2025-03-17 RX ORDER — DOCUSATE SODIUM 100 MG/1
100 CAPSULE, LIQUID FILLED ORAL 2 TIMES DAILY PRN
Status: DISCONTINUED | OUTPATIENT
Start: 2025-03-17 | End: 2025-03-19 | Stop reason: HOSPADM

## 2025-03-17 RX ORDER — MONTELUKAST SODIUM 10 MG/1
10 TABLET ORAL
Status: DISCONTINUED | OUTPATIENT
Start: 2025-03-17 | End: 2025-03-19 | Stop reason: HOSPADM

## 2025-03-17 RX ADMIN — PHENYTOIN SODIUM 100 MG: 100 CAPSULE, EXTENDED RELEASE ORAL at 12:59

## 2025-03-17 RX ADMIN — ZONISAMIDE 200 MG: 100 CAPSULE ORAL at 17:56

## 2025-03-17 RX ADMIN — DEXTRAN 70, GLYCERIN, HYPROMELLOSE 1 DROP: 1; 2; 3 SOLUTION/ DROPS OPHTHALMIC at 21:10

## 2025-03-17 RX ADMIN — PHENYTOIN SODIUM 100 MG: 100 CAPSULE, EXTENDED RELEASE ORAL at 08:25

## 2025-03-17 RX ADMIN — METOROPROLOL TARTRATE 5 MG: 5 INJECTION, SOLUTION INTRAVENOUS at 10:42

## 2025-03-17 RX ADMIN — MAGNESIUM 64 MG (MAGNESIUM CHLORIDE) TABLET,DELAYED RELEASE 64 MG: at 08:24

## 2025-03-17 RX ADMIN — INSULIN LISPRO 5 UNITS: 100 INJECTION, SOLUTION INTRAVENOUS; SUBCUTANEOUS at 17:55

## 2025-03-17 RX ADMIN — ENOXAPARIN SODIUM 40 MG: 40 INJECTION SUBCUTANEOUS at 14:38

## 2025-03-17 RX ADMIN — MELOXICAM 7.5 MG: 7.5 TABLET ORAL at 08:24

## 2025-03-17 RX ADMIN — LEVOTHYROXINE SODIUM 175 MCG: 175 TABLET ORAL at 07:30

## 2025-03-17 RX ADMIN — METHYLPREDNISOLONE SODIUM SUCCINATE 40 MG: 40 INJECTION, POWDER, FOR SOLUTION INTRAMUSCULAR; INTRAVENOUS at 10:03

## 2025-03-17 RX ADMIN — IPRATROPIUM BROMIDE AND ALBUTEROL SULFATE 3 ML: .5; 3 SOLUTION RESPIRATORY (INHALATION) at 02:50

## 2025-03-17 RX ADMIN — IOHEXOL 85 ML: 350 INJECTION, SOLUTION INTRAVENOUS at 14:30

## 2025-03-17 RX ADMIN — LEVETIRACETAM 1000 MG: 500 TABLET, FILM COATED ORAL at 08:24

## 2025-03-17 RX ADMIN — ASPIRIN 81 MG: 81 TABLET, COATED ORAL at 08:25

## 2025-03-17 RX ADMIN — VALACYCLOVIR 500 MG: 500 TABLET, FILM COATED ORAL at 17:54

## 2025-03-17 RX ADMIN — HEPARIN SODIUM 5000 UNITS: 5000 INJECTION, SOLUTION INTRAVENOUS; SUBCUTANEOUS at 06:25

## 2025-03-17 RX ADMIN — MAGNESIUM SULFATE HEPTAHYDRATE 2 G: 40 INJECTION, SOLUTION INTRAVENOUS at 14:38

## 2025-03-17 RX ADMIN — GUAIFENESIN 600 MG: 600 TABLET ORAL at 21:10

## 2025-03-17 RX ADMIN — ZONISAMIDE 200 MG: 100 CAPSULE ORAL at 10:03

## 2025-03-17 RX ADMIN — FLUTICASONE PROPIONATE 1 SPRAY: 50 SPRAY, METERED NASAL at 21:10

## 2025-03-17 RX ADMIN — FERROUS SULFATE TAB 325 MG (65 MG ELEMENTAL FE) 325 MG: 325 (65 FE) TAB at 08:25

## 2025-03-17 RX ADMIN — VALACYCLOVIR 500 MG: 500 TABLET, FILM COATED ORAL at 08:25

## 2025-03-17 RX ADMIN — OXYBUTYNIN CHLORIDE 5 MG: 5 TABLET ORAL at 18:00

## 2025-03-17 RX ADMIN — B-COMPLEX W/ C & FOLIC ACID TAB 1 TABLET: TAB at 08:24

## 2025-03-17 RX ADMIN — ACETAMINOPHEN 650 MG: 325 TABLET, FILM COATED ORAL at 21:14

## 2025-03-17 RX ADMIN — OXYBUTYNIN CHLORIDE 5 MG: 5 TABLET ORAL at 12:59

## 2025-03-17 RX ADMIN — ACETAMINOPHEN 650 MG: 325 TABLET, FILM COATED ORAL at 12:59

## 2025-03-17 RX ADMIN — ATORVASTATIN CALCIUM 40 MG: 40 TABLET, FILM COATED ORAL at 17:54

## 2025-03-17 RX ADMIN — LEVETIRACETAM 1000 MG: 500 TABLET, FILM COATED ORAL at 17:54

## 2025-03-17 RX ADMIN — QUETIAPINE FUMARATE 400 MG: 100 TABLET ORAL at 08:25

## 2025-03-17 RX ADMIN — QUETIAPINE FUMARATE 400 MG: 100 TABLET ORAL at 17:54

## 2025-03-17 RX ADMIN — GUAIFENESIN 600 MG: 600 TABLET ORAL at 08:25

## 2025-03-17 RX ADMIN — INSULIN LISPRO 5 UNITS: 100 INJECTION, SOLUTION INTRAVENOUS; SUBCUTANEOUS at 08:25

## 2025-03-17 RX ADMIN — OXYBUTYNIN CHLORIDE 5 MG: 5 TABLET ORAL at 08:24

## 2025-03-17 RX ADMIN — FLUTICASONE PROPIONATE 1 SPRAY: 50 SPRAY, METERED NASAL at 08:25

## 2025-03-17 RX ADMIN — PANTOPRAZOLE SODIUM 40 MG: 40 TABLET, DELAYED RELEASE ORAL at 07:30

## 2025-03-17 RX ADMIN — DEXTRAN 70, GLYCERIN, HYPROMELLOSE 1 DROP: 1; 2; 3 SOLUTION/ DROPS OPHTHALMIC at 08:27

## 2025-03-17 RX ADMIN — INSULIN LISPRO 5 UNITS: 100 INJECTION, SOLUTION INTRAVENOUS; SUBCUTANEOUS at 12:25

## 2025-03-17 RX ADMIN — DEXTRAN 70, GLYCERIN, HYPROMELLOSE 1 DROP: 1; 2; 3 SOLUTION/ DROPS OPHTHALMIC at 17:54

## 2025-03-17 RX ADMIN — METHYLPREDNISOLONE SODIUM SUCCINATE 40 MG: 40 INJECTION, POWDER, FOR SOLUTION INTRAMUSCULAR; INTRAVENOUS at 21:10

## 2025-03-17 RX ADMIN — MONTELUKAST 10 MG: 10 TABLET, FILM COATED ORAL at 21:10

## 2025-03-17 NOTE — ASSESSMENT & PLAN NOTE
Found to have an oxygen saturation level of 83% on room air  Currently requiring 1 Lpm of continuous supplemental oxygen to maintain oxygen saturation levels at 90% and above  Incentive spirometry  Respiratory protocol  I appreciate Pulmonology's input.  CTA chest PE study negative for PE or other acute intrathoracic pathoogy

## 2025-03-17 NOTE — CASE MANAGEMENT
Case Management Discharge Planning Note    Patient name Ritika Kwong  Location /413-01 MRN [id number]  : 1955 Date 3/17/2025       Current Admission Date: 3/17/2025  Current Admission Diagnosis:Hypoxia   Patient Active Problem List    Diagnosis Date Noted Date Diagnosed    Dyspnea 2025     Left foot pain 2025     Tachycardia 2025     Hypoxia 2025     Elevated hemidiaphragm 2025     Cutaneous candidiasis 10/03/2023     Gait instability 2023     Elevated MCV 2023     Skin rash 2023     Rash 2023     Type 2 diabetes mellitus with hyperglycemia, with long-term current use of insulin ([facility]) 2023     BMI 33.0-33.9,adult 2019     Iron deficiency anemia, unspecified 2019     Right lower quadrant abdominal pain 2019     Pressure injury of sacral region, stage 3 ([facility]) 2019     Pressure ulcer of left buttock, stage 3 ([facility]) 2019     Pressure ulcer of right buttock, stage 2 ([facility]) 2019     Generalized weakness 2019     Leukopenia 2019     Neutropenia ([facility]) 2019     Low TSH level 2019     Sinus arrhythmia 2019     Lactic acidosis 2019     Recurrent falls 2019     Acute hyponatremia 2019     Hypomagnesemia 2019     Hypotension 2019     Hypertriglyceridemia 2019     Vaginal candidiasis 2019     Urinary incontinence 2018     Obesity due to excess calories 10/31/2018     Hypercholesterolemia 10/31/2018     Ambulatory dysfunction 10/30/2018     NSTEMI (non-ST elevated myocardial infarction) ([facility]) 10/29/2018     Volume overload 10/29/2018     Skin breakdown 10/29/2018     Asthma 2016     Seizure disorder ([facility]) 2014     Dermatitis 2007     Bipolar disorder ([facility]) 2007     Acquired hypothyroidism 2007       LOS (days): 0  Geometric Mean LOS (GMLOS) (days):   Days to GMLOS:     OBJECTIVE:            Current admission status:  Observation   Preferred Pharmacy:   [id number] - [place], [place] - [address]  [address]  [place] [address]  Phone: [phone] Fax: [phone]    [facility], [place] - [address]  [address] [facility] [place] [address]  Phone: [phone] Fax: [phone]    Primary Care Provider: Valeria Burns    Primary Insurance: [facility]  Secondary Insurance: [employer] [facility]    DISCHARGE DETAILS:  Discussed pt in interdisciplinary team meeting. Pt is a possible dc home in 24-48 hours.Plans at this time are home on dc with OP follow up.  Not on home 02, likely will need home 02 eval on dc. CM will continue to follow and assist in dc planning.

## 2025-03-17 NOTE — ASSESSMENT & PLAN NOTE
Lab Results   Component Value Date    HGBA1C 6.2 (H) 03/17/2025     Continue Humalog 5 Units TID with meals  Check a new HgbA1c level  ISS with blood glucose monitoring ACHS  Hypoglycemia protocol  Outpatient Endocrinology evaluation

## 2025-03-17 NOTE — ASSESSMENT & PLAN NOTE
History of seizures  No recent seizure activity  Currently on Keppra and Dilantin  --Will continue Keppra and Dilantin  --Check Keppra and Dilantin levels  --Seizure precautions  --Continue outpatient neurology follow-up

## 2025-03-17 NOTE — ASSESSMENT & PLAN NOTE
Magnesium level at 1.8  --Replace magnesium magnesium  --Continue magnesium supplement  --Monitor magnesium level

## 2025-03-17 NOTE — ASSESSMENT & PLAN NOTE
Stable  --Currently on Prilosec 40 mg p.o. twice daily  --Give formulary alternative Protonix 40 mg p.o. daily

## 2025-03-17 NOTE — CONSULTS
Consultation - Pulmonology   Name: Ritika Kwong 69 y.o. female I MRN: [id number]  Unit/Bed#: 413-01 I Date of Admission: 3/17/2025   Date of Service: 3/17/2025 I Hospital Day: 0   Inpatient consult to Pulmonology  Consult performed by: Michael Meeks PA-C  Consult ordered by: Nikunj Quinteros PA-C        Physician Requesting Evaluation: Arian Christensen DO   Reason for Evaluation / Principal Problem: COPD     Assessment & Plan  Acute respiratory failure with hypoxia (HCC)  Currently requiring 1 L nasal cannula.  No oxygen requirement at baseline.  Maintain saturations greater than or equal to 88%.  Pulmonary toilet:  Increase activity as tolerated, out of bed as tolerated, cough and deep breathing exercises encourage, incentive spirometry q.1 hour  Dyspnea  Patient had spirometry in 2010 that was normal. She does not have emphysema on CT imaging. She is a life long nonsmoker. It is very unlikely that she has COPD.   She may have asthma but it does not appear to be in exacerbation currently.   Discontinue systemic steroids.   Recommend CT chest wo contrast to further evaluate lung parenchyma.   Recommend repeat PFTs as outpatient   Class 2 severe obesity with serious comorbidity and body mass index (BMI) of 35.0 to 35.9 in adult (HCC)  With high pre-test probability for LOLA  Recommend outpatient sleep study.   Weight loss encouraged   Elevated hemidiaphragm  Noted   Will need outpatient Sniff test.     I have discussed the above management plan in detail with the primary service.   Pulmonology service will follow.    History of Present Illness   Ritika Kwong is a 69 y.o. female who presents with left foot pain.  Patient carries a diagnosis of COPD along with diabetes, seizures, hypertension, hyperlipidemia, GERD.  She came to the ED for left-sided foot pain without any trauma.  While in the ED she was noted to be tachycardic and later hypoxic during the night.  She endorses chronic symptoms of shortness of breath  with exertion, chest tightness and nonproductive cough but states that these are unchanged over the last several years.  Laboratory workup was unremarkable and chest imaging for hemidiaphragm elevation.  She was admitted for COPD exacerbation.  Pulmonary was consulted to help manage this.    Patient does not follow with a pulmonologist.  She states she was diagnosed with COPD a few years ago.  She uses Ventolin 4 times a day but states she does not feel like it helps.  She is a lifelong non-smoker.  Denies occupational exposures to asbestos, silica, coal, dust, chemicals.  Denies autoimmune disease to her knowledge. No pets or birds. No recent travel. No sick contacts.     Review of Systems   All other systems reviewed and are negative.      Historical Information   Historical Information   Past Medical History:   Diagnosis Date    Asthma     Bipolar disorder (HCC)     Chronic UTI (urinary tract infection)     COPD (chronic obstructive pulmonary disease) (HCC)     Diabetes mellitus (HCC)     Disease of thyroid gland     Dyslipidemia 10/31/2018    Essential hypertension 10/31/2018    GERD (gastroesophageal reflux disease)     Heart attack (HCC)     Iron deficiency anemia, unspecified 7/29/2019    Obesity due to excess calories 10/31/2018    Recurrent falls 4/13/2019    Seizure ([facility])      Past Surgical History:   Procedure Laterality Date    ANKLE FRACTURE SURGERY Right     TUBAL LIGATION      VEIN LIGATION AND STRIPPING       Social History     Tobacco Use    Smoking status: Never    Smokeless tobacco: Never   Vaping Use    Vaping status: Never Used   Substance and Sexual Activity    Alcohol use: Not Currently     Alcohol/week: 0.0 standard drinks of alcohol    Drug use: No    Sexual activity: Not Currently     E-Cigarette/Vaping    E-Cigarette Use Never User      E-Cigarette/Vaping Substances    Nicotine No     THC No     CBD No     Flavoring No     Other No     Unknown No      Tobacco History: never  Occupational  History: noncontributory, hasn't worked since age 32  Family History:  Family History   Problem Relation Age of Onset    Skeletal dysplasia Mother     Stroke Father        Objective :  Temp:  [97.9 °F (36.6 °C)-98.8 °F (37.1 °C)] 98.1 °F (36.7 °C)  HR:  [103-134] 134  BP: (112-140)/(69-84) 127/78  Resp:  [16-21] 16  SpO2:  [83 %-97 %] 88 %  O2 Device: Nasal cannula  Nasal Cannula O2 Flow Rate (L/min):  [2 L/min] 2 L/min    Physical Exam  Vitals reviewed.   Constitutional:       Appearance: Normal appearance. She is well-developed.   HENT:      Head: Normocephalic and atraumatic.      Nose: Nose normal.      Mouth/Throat:      Mouth: Mucous membranes are moist.   Eyes:      Extraocular Movements: Extraocular movements intact.   Cardiovascular:      Rate and Rhythm: Normal rate and regular rhythm.      Heart sounds: Normal heart sounds.   Pulmonary:      Effort: Pulmonary effort is normal. No respiratory distress.      Breath sounds: No wheezing, rhonchi or rales.   Musculoskeletal:         General: No swelling.   Skin:     General: Skin is warm and dry.   Neurological:      Mental Status: She is alert. Mental status is at baseline.   Psychiatric:         Mood and Affect: Mood normal.         Behavior: Behavior normal.         Lab Results: I have reviewed the following results:  .     03/17/25  0227 03/17/25  0252 03/17/25  0625   WBC 5.64  --   --    HGB 13.5  --   --    HCT 41.7  --   --      --  230   SODIUM 138  --   --    K 4.5  --   --      --   --    CO2 26  --   --    BUN 13  --   --    CREATININE 0.86  --   --    GLUC 77  --   --    MG 1.8*  --   --    PHOS 4.7*  --   --    AST 25  --   --    ALT 21  --   --    ALB 3.8  --   --    TBILI 0.21  --   --    ALKPHOS 54  --   --    BNP  --  24  --      ABG: No new results in last 24 hours.    Imaging Results Review: I personally reviewed the following image studies in PACS and associated radiology reports: chest xray. My interpretation of the radiology  images/reports is: CXR 3/17/25 lungs are clear without acute infiltrate,, pleural effusion.    PFT Results Reviewed: Reviewed  Spirometry 10/20/2010  FEV1/FVC ratio 87%  FEV1 106% predicted  FVC 92% predicted

## 2025-03-17 NOTE — PLAN OF CARE
Problem: Prexisting or High Potential for Compromised Skin Integrity  Goal: Skin integrity is maintained or improved  Description: INTERVENTIONS:  - Identify patients at risk for skin breakdown  - Assess and monitor skin integrity  - Assess and monitor nutrition and hydration status  - Monitor labs   - Assess for incontinence   - Turn and reposition patient  - Assist with mobility/ambulation  - Relieve pressure over bony prominences  - Avoid friction and shearing  - Provide appropriate hygiene as needed including keeping skin clean and dry  - Evaluate need for skin moisturizer/barrier cream  - Collaborate with interdisciplinary team   - Patient/family teaching  - Consider wound care consult   Outcome: Progressing     Problem: PAIN - ADULT  Goal: Verbalizes/displays adequate comfort level or baseline comfort level  Description: Interventions:  - Encourage patient to monitor pain and request assistance  - Assess pain using appropriate pain scale  - Administer analgesics based on type and severity of pain and evaluate response  - Implement non-pharmacological measures as appropriate and evaluate response  - Consider cultural and social influences on pain and pain management  - Notify physician/advanced practitioner if interventions unsuccessful or patient reports new pain  Outcome: Progressing     Problem: INFECTION - ADULT  Goal: Absence or prevention of progression during hospitalization  Description: INTERVENTIONS:  - Assess and monitor for signs and symptoms of infection  - Monitor lab/diagnostic results  - Monitor all insertion sites, i.e. indwelling lines, tubes, and drains  - Monitor endotracheal if appropriate and nasal secretions for changes in amount and color  - [place] appropriate cooling/warming therapies per order  - Administer medications as ordered  - Instruct and encourage patient and family to use good hand hygiene technique  - Identify and instruct in appropriate isolation precautions for  identified infection/condition  Outcome: Progressing  Goal: Absence of fever/infection during neutropenic period  Description: INTERVENTIONS:  - Monitor WBC    Outcome: Progressing     Problem: SAFETY ADULT  Goal: Patient will remain free of falls  Description: INTERVENTIONS:  - Educate patient/family on patient safety including physical limitations  - Instruct patient to call for assistance with activity   - Consult OT/PT to assist with strengthening/mobility   - Keep Call bell within reach  - Keep bed low and locked with side rails adjusted as appropriate  - Keep care items and personal belongings within reach  - Initiate and maintain comfort rounds  - Make Fall Risk Sign visible to staff  - Offer Toileting every 2 Hours, in advance of need  - Initiate/Maintain bed alarm  - Obtain necessary fall risk management equipment:   - Apply yellow socks and bracelet for high fall risk patients  - Consider moving patient to room near nurses station  Outcome: Progressing  Goal: Maintain or return to baseline ADL function  Description: INTERVENTIONS:  -  Assess patient's ability to carry out ADLs; assess patient's baseline for ADL function and identify physical deficits which impact ability to perform ADLs (bathing, care of mouth/teeth, toileting, grooming, dressing, etc.)  - Assess/evaluate cause of self-care deficits   - Assess range of motion  - Assess patient's mobility; develop plan if impaired  - Assess patient's need for assistive devices and provide as appropriate  - Encourage maximum independence but intervene and supervise when necessary  - Involve family in performance of ADLs  - Assess for home care needs following discharge   - Consider OT consult to assist with ADL evaluation and planning for discharge  - Provide patient education as appropriate  Outcome: Progressing  Goal: Maintains/Returns to pre admission functional level  Description: INTERVENTIONS:  - Perform AM-PAC 6 Click Basic Mobility/ Daily Activity  assessment daily.  - Set and communicate daily mobility goal to care team and patient/family/caregiver.   - Collaborate with rehabilitation services on mobility goals if consulted  - Perform Range of Motion 3 times a day.  - Reposition patient every 2 hours.  - Dangle patient 3 times a day  - Stand patient 3 times a day  - Ambulate patient 3 times a day  - Out of bed to chair 3 times a day   - Out of bed for meals 3 times a day  - Out of bed for toileting  - Record patient progress and toleration of activity level   Outcome: Progressing     Problem: DISCHARGE PLANNING  Goal: Discharge to home or other facility with appropriate resources  Description: INTERVENTIONS:  - Identify barriers to discharge w/patient and caregiver  - Arrange for needed discharge resources and transportation as appropriate  - Identify discharge learning needs (meds, wound care, etc.)  - Arrange for interpretive services to assist at discharge as needed  - Refer to Case Management Department for coordinating discharge planning if the patient needs post-hospital services based on physician/advanced practitioner order or complex needs related to functional status, cognitive ability, or social support system  Outcome: Progressing     Problem: Knowledge Deficit  Goal: Patient/family/caregiver demonstrates understanding of disease process, treatment plan, medications, and discharge instructions  Description: Complete learning assessment and assess knowledge base.  Interventions:  - Provide teaching at level of understanding  - Provide teaching via preferred learning methods  Outcome: Progressing

## 2025-03-17 NOTE — ASSESSMENT & PLAN NOTE
With high pre-test probability for LOLA  Recommend outpatient sleep study.   Weight loss encouraged

## 2025-03-17 NOTE — H&P
H&P - Hospitalist   Name: Ritika Kwong 69 y.o. female I MRN: [id number]  Unit/Bed#: 413-01 I Date of Admission: 3/17/2025   Date of Service: 3/17/2025 I Hospital Day: 0     Assessment & Plan  COPD with acute exacerbation (HCC)  Patient with frequent hypoxic episodes while in the ER  SpO2  89% while at rest in the ER  Ambulatory pulse ox at 86%  Patient reports shortness of breath upon exertion  Not currently on home oxygen  --Admitted observation  --Respiratory protocol  --Continue oxygen at 2 L/min for now  --Check sputum culture and Gram stain  --Solu-Medrol 40 mg IV every 12 hours  --Monitor respiratory status, SpO2  --Pulmonary consult  Seizure disorder ([facility])  History of seizures  No recent seizure activity  Currently on Keppra and Dilantin  --Will continue Keppra and Dilantin  --Check Keppra and Dilantin levels  --Seizure precautions  --Continue outpatient neurology follow-up  Hypomagnesemia  Magnesium level at 1.8  --Replace magnesium magnesium  --Continue magnesium supplement  --Monitor magnesium level  Acquired hypothyroidism  Stable  --Continue levothyroxine  GERD without esophagitis  Stable  --Currently on Prilosec 40 mg p.o. twice daily  --Give formulary alternative Protonix 40 mg p.o. daily  Type 2 diabetes mellitus without complication, without long-term current use of insulin ([facility])  Lab Results   Component Value Date    HGBA1C 5.9 (H) 03/14/2024   Initial blood glucose level at 77  --Hold oral diabetic medication  --Fingerstick glucose 4 times daily AC/HS  --Continue PTA insulin regimen  --Check A1C  --Hypoglycemia protocol  --Monitor while admitted  Left foot pain  Presented to the ER for evaluation of complaint of left foot pain  Patient denies recent injury  No evidence of swelling or deformity  --Pain medication PRN  --OT/PT eval       VTE Pharmacologic Prophylaxis:   Moderate Risk (Score 3-4) - Pharmacological DVT Prophylaxis Ordered: heparin.  Code Status: Prior   Discussion with family: Patient  declined call to .     Anticipated Length of Stay: Patient will be admitted on an observation basis with an anticipated length of stay of less than 2 midnights secondary to COPD exacerbation, hypomagnesemia, left foot pain.    History of Present Illness   Chief Complaint: Shortness of breath, foot pain    Ritika Kwong is a 69 y.o. female with a PMH of COPD, diabetes, seizures, hypertension, hyperlipidemia, GERD who presents to the emergency room for evaluation of complaint of pain to the left foot.  Patient denied having any falls or trauma to her foot.  While in the ER patient noted to be tachycardic.  She also complained of having shortness of breath upon exertion.  She also stated that she had intermittent nonproductive cough.  Patient seem to be wavering between her complaints.  Patient was noted to be hypoxic in the 80s while at rest.  She was also noted to have hypoxia upon with ambulation at 86%.  Patient denies having any chest pain, fever, chills, nausea, vomiting, diarrhea, abdominal pain.    Workup in the emergency room included labs significant for phosphorus of 4.7, magnesium of 1.8, BNP of 24, D-dimer of 0.33.  Chest x-ray completed official report pending.  EKG showed [place] tachycardia at a rate of 124 bpm.  While in the emergency room patient received DuoNeb.  Additionally patient noted to be hypoxic with SpO2 of 89%.  Patient apparently had several hypoxic episodes while at rest.  Patient also had ambulatory pulse oximetry with SpO2 dipping into 86%.  Patient was subsequently placed on oxygen at 2 L/min.    Patient is being admitted on observation status [facility] care for further management of COPD exacerbation, hypomagnesemia, left foot pain.     Review of Systems   Constitutional:  Positive for activity change. Negative for appetite change, fatigue and fever.   Eyes:  Negative for photophobia and visual disturbance.   Respiratory:  Positive for cough and shortness of breath.     Cardiovascular:  Negative for chest pain, palpitations and leg swelling.   Gastrointestinal:  Negative for abdominal pain, diarrhea, nausea and vomiting.   Genitourinary:  Negative for difficulty urinating, dysuria, flank pain and hematuria.   Musculoskeletal:  Positive for gait problem. Negative for back pain, neck pain and neck stiffness.   Skin:  Negative for rash and wound.   Neurological:  Negative for dizziness, tremors, syncope, weakness and headaches.   Psychiatric/Behavioral:  Negative for agitation and confusion. The patient is not nervous/anxious.        Historical Information   Past Medical History:   Diagnosis Date    Asthma     Bipolar disorder ([facility])     Chronic UTI (urinary tract infection)     COPD (chronic obstructive pulmonary disease) ([facility])     Diabetes mellitus ([facility])     Disease of thyroid gland     Dyslipidemia 10/31/2018    Essential hypertension 10/31/2018    GERD (gastroesophageal reflux disease)     Heart attack ([facility])     Iron deficiency anemia, unspecified 7/29/2019    Obesity due to excess calories 10/31/2018    Recurrent falls 4/13/2019    Seizure ([facility])      Past Surgical History:   Procedure Laterality Date    ANKLE FRACTURE SURGERY Right     TUBAL LIGATION      VEIN LIGATION AND STRIPPING       Social History     Tobacco Use    Smoking status: Never    Smokeless tobacco: Never   Vaping Use    Vaping status: Never Used   Substance and Sexual Activity    Alcohol use: Not Currently     Alcohol/week: 0.0 standard drinks of alcohol    Drug use: No    Sexual activity: Not Currently     E-Cigarette/Vaping    E-Cigarette Use Never User      E-Cigarette/Vaping Substances    Nicotine No     THC No     CBD No     Flavoring No     Other No     Unknown No      Family History   Problem Relation Age of Onset    Skeletal dysplasia Mother     Stroke Father      Social History:  Marital Status:    Occupation:   Patient Pre-hospital Living Situation: Home  Patient Pre-hospital Level of Mobility:  walks with walker  Patient Pre-hospital Diet Restrictions: None reported    Meds/Allergies   I have reviewed home medications using recent Epic encounter.  Prior to Admission medications    Medication Sig Start Date End Date Taking? Authorizing Provider   Acetaminophen (Tylenol) 325 MG CAPS Take 650 mg by mouth Three times daily as needed 10/5/21   Historical Provider, MD   albuterol (VENTOLIN HFA) 90 mcg/act inhaler Inhale 2 puffs every 6 (six) hours as needed for wheezing 12/30/19   Virgilio Pool PA-C   ascorbic acid (VITAMIN C) 250 mg tablet Take 2 tablets (500 mg total) by mouth daily 10/3/23   Jaqueline Rico MD   aspirin (ECOTRIN LOW STRENGTH) 81 mg EC tablet Take 1 tablet (81 mg total) by mouth daily 11/18/19   DEIDRE Uriostegui   benzonatate (TESSALON) 200 MG capsule Take 1 capsule (200 mg total) by mouth 3 (three) times a day as needed for cough 11/7/24   Abilio Palomino PA-C   Blood Glucose Monitoring Suppl (BLOOD GLUCOSE MONITOR SYSTEM) w/Device KIT Test blood sugars 3 times a day 4/10/18   Milka Mckeon DNP   Diclofenac Sodium (VOLTAREN) 1 % APPLY TO AFFECTED AREA EVERY 6 HOURS IF NEEDED FOR PAIN. 12/22/21   Historical Provider, MD   Docusate Sodium (DSS) 100 MG CAPS Take 1 capsule by mouth 2 (two) times a day as needed 9/15/21   Historical Provider, MD   ferrous sulfate (FeroSul) 325 (65 Fe) mg tablet Take 1 tablet (325 mg total) by mouth daily with breakfast 3/3/20   Virgilio Pool PA-C   fluticasone (FLONASE) 50 mcg/act nasal spray SPRAY ONE (1) SPRAY INTO EACH NOSTRIL ONCE DAILY  Patient taking differently: 1 spray 2 (two) times a day SPRAY ONE (1) SPRAY INTO EACH NOSTRIL ONCE DAILY 3/10/20   Virgilio Pool PA-C   gabapentin (NEURONTIN) 800 mg tablet Take 1 tablet (800 mg total) by mouth 4 (four) times a day for 3 days 11/15/20 5/12/24  Nik Corea MD   glucose blood (ONE TOUCH ULTRA TEST) test strip Test blood sugars 3 times a day. 8/13/19   Milka Mckeon DNP   guaiFENesin 400 mg  Take 400 mg by mouth 2 (two) times a day    Historical Provider, MD   Incontinence Supply Disposable (PADSORBER BED PAN LINERS) MISC by Does not apply route as needed (incontinence) 11/7/18   Milka Mckeon DNP   Infant Care Products (CUTIES SENSITIVE WIPES) MISC 120 Pieces by Does not apply route as needed (incontinence) 11/7/18   Milka Mckeon DNP   insulin aspart (NovoLOG FlexPen) 100 UNIT/ML injection pen inject 5 units under the skin in the morning and 5 units at noon and 5 units in the evening. inject before meals 4/15/24   Historical Provider, MD   levETIRAcetam (KEPPRA) 1000 MG tablet Take 1,000 mg by mouth 2 (two) times a day    Historical Provider, MD   levETIRAcetam (KEPPRA) 750 mg tablet Take 2 tablets (1,500 mg total) by mouth 2 (two) times a day for 15 days Pt states she takes 1500MG Bid  Patient taking differently: Take 750 mg by mouth 2 (two) times a day 11/13/20 5/12/24  Gaby Almendarez MD   levothyroxine 175 mcg tablet Take 175 mcg by mouth in the morning 10/15/21   Historical Provider, MD   magnesium chloride (MAG64) 64 MG TBEC EC tablet Take 1 tablet (64 mg total) by mouth 2 (two) times a day 11/15/19   Virgilio Pool PA-C   meloxicam (MOBIC) 7.5 mg tablet Take 1 tablet (7.5 mg total) by mouth 2 (two) times a day 3/30/20   Virgilio Pool PA-C   metFORMIN (GLUCOPHAGE) 1000 MG tablet Take 1,000 mg by mouth 2 (two) times a day with meals    Historical Provider, MD   methenamine hippurate (HIPREX) 1 g tablet Take 1 tablet (1 g total) by mouth 2 (two) times a day with meals  Patient not taking: Reported on 7/26/2024 10/3/23   Jaqueline Rico MD   Misc. Devices (MATTRESS PAD) MISC by Does not apply route daily  Patient not taking: Reported on 7/26/2024 8/5/19   Milka Mckeon DNP   montelukast (SINGULAIR) 10 mg tablet Take 10 mg by mouth daily at bedtime    Historical Provider, MD   multivitamin (THERAGRAN) TABS Take 1 tablet by mouth daily 12/22/21   Historical Provider, MD   nystatin  (MYCOSTATIN) powder Apply topically 2 (two) times a day 10/3/23   Jaqueline Rico MD   omeprazole (PriLOSEC) 40 MG capsule Take 40 mg by mouth 2 (two) times a day 11/29/21   Historical Provider, MD   ONETOUCH DELICA LANCETS 33G MISC Test blood sugars 3 times a day. 8/13/19   Milka Mckeon, ZANDRA   oxybutynin (DITROPAN) 5 mg tablet Take 1 tablet (5 mg total) by mouth 3 (three) times a day 12/30/19   Virgilio Pool PA-C   phenytoin (DILANTIN) 100 mg ER capsule Take by mouth 2 (two) times a day 3 tablets in morning and 2 at lunch daily    Historical Provider, MD   polyethylene glycol (GLYCOLAX) 17 GM/SCOOP powder Take 17 g by mouth daily as needed (constipation) 10/3/23   Jaqueline Rico MD   polyvinyl alcohol (LIQUIFILM TEARS) 1.4 % ophthalmic solution Administer 1 drop to both eyes 3 (three) times a day 4/17/19   Wes Telles MD   QUEtiapine (SEROquel) 400 MG tablet Take 1 tablet (400 mg total) by mouth daily at bedtime for 2 doses  Patient taking differently: Take 400 mg by mouth daily 11/15/20 5/12/24  Nik Corea MD   simvastatin (ZOCOR) 80 mg tablet Take 0.5 tablets (40 mg total) by mouth daily at bedtime 10/3/23   Jaqueline Rico MD   triamcinolone (KENALOG) 0.1 % cream Apply topically 2 (two) times a day 1/22/22   Flako Dia PA-C   valACYclovir (VALTREX) 500 mg tablet Take 1 tablet by mouth 2 (two) times a day 3/22/18   Historical Provider, MD   zonisamide (ZONEGRAN) 100 mg capsule Take 200 mg by mouth 2 (two) times a day    Historical Provider, MD     Allergies   Allergen Reactions    Keflex [Cephalexin] Anaphylaxis     Airway edema     Levaquin [Levofloxacin] Anaphylaxis    Penicillins Anaphylaxis    Bactrim [Sulfamethoxazole-Trimethoprim] Swelling and GI Intolerance     LE edema and thrush    Ciprofloxacin Swelling     Edema and all extremities and thrush   Edema in all extremities and thrush     Noroxin [Norfloxacin]        Objective :  Temp:  [97.9 °F (36.6 °C)] 97.9 °F (36.6  °C)  HR:  [103-127] 103  BP: (135-140)/(73-84) 140/84  Resp:  [18-21] 18  SpO2:  [83 %-97 %] 95 %  O2 Device: Nasal cannula  Nasal Cannula O2 Flow Rate (L/min):  [2 L/min] 2 L/min    Physical Exam  HENT:      Head: Normocephalic and atraumatic.      Mouth/Throat:      Mouth: Mucous membranes are moist.      Pharynx: Oropharynx is clear.   Eyes:      Pupils: Pupils are equal, round, and reactive to light.   Cardiovascular:      Rate and Rhythm: Regular rhythm. Tachycardia present.      Pulses: Normal pulses.   Pulmonary:      Effort: No respiratory distress.      Breath sounds: No stridor. No wheezing.      Comments: Tachypneic  Musculoskeletal:         General: No swelling or tenderness. Normal range of motion.      Cervical back: Normal range of motion and neck supple.      Right lower leg: No edema.      Left lower leg: No edema.   Skin:     General: Skin is warm and dry.      Capillary Refill: Capillary refill takes less than 2 seconds.      Coloration: Skin is not jaundiced or pale.      Findings: No bruising, erythema or rash.   Neurological:      Mental Status: She is alert and oriented to person, place, and time.          Lines/Drains:            Lab Results: I have reviewed the following results:  Results from last 7 days   Lab Units 03/17/25 0227   WBC Thousand/uL 5.64   HEMOGLOBIN g/dL 13.5   HEMATOCRIT % 41.7   PLATELETS Thousands/uL 242   SEGS PCT % 52   LYMPHO PCT % 28   MONO PCT % 9   EOS PCT % 9*     Results from last 7 days   Lab Units 03/17/25  0227   SODIUM mmol/L 138   POTASSIUM mmol/L 4.5   CHLORIDE mmol/L 100   CO2 mmol/L 26   BUN mg/dL 13   CREATININE mg/dL 0.86   ANION GAP mmol/L 12   CALCIUM mg/dL 9.0   ALBUMIN g/dL 3.8   TOTAL BILIRUBIN mg/dL 0.21   ALK PHOS U/L 54   ALT U/L 21   AST U/L 25   GLUCOSE RANDOM mg/dL 77             Lab Results   Component Value Date    HGBA1C 5.9 (H) 03/14/2024    HGBA1C 6.3 (H) 09/18/2023    HGBA1C 5.3 01/24/2023     Results from last 7 days   Lab Units  03/17/25  0227   PROCALCITONIN ng/ml <0.05       Imaging Results Review: I reviewed radiology reports from this admission including: chest xray.  Other Study Results Review: EKG was reviewed.     Administrative Statements   I have spent a total time of 40 minutes in caring for this patient on the day of the visit/encounter including Documenting in the medical record, Reviewing/placing orders in the medical record (including tests, medications, and/or procedures), Obtaining or reviewing history  , and Communicating with other healthcare professionals .    ** Please Note: This note has been constructed using a voice recognition system. **

## 2025-03-17 NOTE — OCCUPATIONAL THERAPY NOTE
Occupational Therapy Evaluation     Patient Name: Ritika Kwong  Today's Date: 3/17/2025  Problem List  Principal Problem:    Hypoxia  Active Problems:    Acquired hypothyroidism    Seizure disorder (HCC)    Hypomagnesemia    Type 2 diabetes mellitus with hyperglycemia, with long-term current use of insulin (HCC)    Dyspnea    Left foot pain    Tachycardia    Elevated hemidiaphragm    Past Medical History  Past Medical History:   Diagnosis Date    Asthma     Bipolar disorder (HCC)     Chronic UTI (urinary tract infection)     COPD (chronic obstructive pulmonary disease) (HCC)     Diabetes mellitus (HCC)     Disease of thyroid gland     Dyslipidemia 10/31/2018    Essential hypertension 10/31/2018    GERD (gastroesophageal reflux disease)     Heart attack (HCC)     Iron deficiency anemia, unspecified 7/29/2019    Obesity due to excess calories 10/31/2018    Recurrent falls 4/13/2019    Seizure (HCC)      Past Surgical History  Past Surgical History:   Procedure Laterality Date    ANKLE FRACTURE SURGERY Right     TUBAL LIGATION      VEIN LIGATION AND STRIPPING               03/17/25 1334   OT Last Visit   OT Visit Date 03/17/25   Note Type   Note type Evaluation   Pain Assessment   Pain Assessment Tool 0-10   Pain Score No Pain   Restrictions/Precautions   Weight Bearing Precautions Per Order No   Other Precautions Impulsive;O2;Fall Risk;Multiple lines;Bed Alarm;Chair Alarm   Home Living   Type of Home Apartment   Home Layout One level;Able to live on main level with bedroom/bathroom;Performs ADLs on one level;Elevator;Ramped entrance   Bathroom Shower/Tub Tub/shower unit   Bathroom Toilet Standard   Bathroom Equipment Grab bars in shower;Tub transfer bench   Bathroom Accessibility Accessible   Home Equipment Walker;Wheelchair-manual;Hospital bed;Grab bars  (4WW baseline)   Prior Function   Level of [place] Independent with ADLs;Independent with functional mobility;Needs assistance with IADLS   Lives With Alone  "  Receives Help From Personal care attendant   IADLs Family/Friend/Other provides transportation;Family/Friend/Other provides meals;Family/Friend/Other provides medication management   Falls in the last 6 months 0   Vocational On disability   Comments home health services 4hrs x2 a week   Subjective   Subjective \" you just do it\"   ADL   Where Assessed Other (Comment)  (bathroom, chair)   Eating Assistance 5  Supervision/Setup   Grooming Assistance 5  Supervision/Setup   UB Bathing Assistance 4  Minimal Assistance   LB Bathing Assistance 3  Moderate Assistance   UB Dressing Assistance 5  Supervision/Setup   LB Dressing Assistance 3  Moderate Assistance   LB Dressing Deficit Don/doff R sock;Don/doff L sock;Thread RLE into underwear;Thread LLE into underwear   Toileting Assistance  3  Moderate Assistance   Toileting Deficit Perineal hygiene;Supervison/safety;Increased time to complete   Bed Mobility   Rolling R 5  Supervision   Additional items Bedrails;Increased time required;Verbal cues   Supine to Sit 5  Supervision   Additional items Increased time required;Verbal cues   Additional Comments pt seated in chair at end of session on 1LO2, SPO2>90% throughout session   Transfers   Sit to Stand 5  Supervision   Additional items Increased time required;Impulsive;Verbal cues  (RW)   Stand to Sit 5  Supervision   Additional items Increased time required;Verbal cues;Impulsive  (RW)   Stand pivot 5  Supervision   Additional items Increased time required;Impulsive;Verbal cues  (RW)   Additional Comments functional transfers with Rw and (S); mild instability and posterior lean upon sit to stand; no signifiicant LOB   Functional Mobility   Functional Mobility 5  Supervision   Additional Comments completing functional distance from bed to bathroom with RW; pt mild instability with no LOB, vc for safety   Additional items Rolling walker   Balance   Static Sitting Good   Dynamic Sitting Fair +   Static Standing Fair   Dynamic " Standing Fair -   Ambulatory Fair -   Activity Tolerance   Activity Tolerance Patient limited by fatigue   RUE Assessment   RUE Assessment WFL   LUE Assessment   LUE Assessment WFL   Hand Function   Gross Motor Coordination Functional   Fine Motor Coordination Functional   Sensation   Light Touch No apparent deficits   Sharp/Dull No apparent deficits   Psychosocial   Psychosocial (WDL) WDL   Cognition   Overall Cognitive Status WFL   Arousal/Participation Alert   Attention Within functional limits   Orientation Level Oriented X4   Memory Within functional limits   Following Commands Follows one step commands without difficulty   Assessment   Limitation Decreased ADL status;Decreased UE strength;Decreased Safe judgement during ADL;Decreased endurance;Decreased self-care trans;Decreased high-level ADLs   Assessment Pt is a 69 y.o. female seen for OT evaluation s/p admit to [facility] on 3/17/2025 w/ Hypoxia. Pt with PMHx impacting their performance during ADL tasks, including: dyspnea, tachycardia, hypoxia, elevated hemidiaphragm, DMII, seizure disorder. Prior to admission to the hospital Pt was performing ADLs without physical assistance. IADLs with physical assistance. Functional transfers/ambulation without physical assistance. Cognitive status PTA was A&Ox4. OT order placed to assess Pt's ADLs, cognitive status, and performance during functional tasks in order to maximize safety and independence while making most appropriate d/c recommendations. Pt's clinical presentation is currently evolving given new onset deficits that affect Pt's occupational performance and ability to safely return to [facility] including decrease activity tolerance, decrease standing balance, decrease performance during ADL tasks, decrease safety awareness , increase impulsiveness, decrease UB MS, decrease generalized strength, decrease activity engagement, decrease performance during functional transfers, high fall risk, and limited insight to deficits  combined with medical complications of abnormal blood sugars, low SpO2 values, new onset O2 use, impulsivity during admission, and need for input for mobility technique/safety. 1LO2 needs. Personal factors affecting Pt at time of initial evaluation include: limited home support, inability to perform IADLs, inability to perform ADLs, inability to ambulate household distances, inability to navigate community distances, limited insight into impairments, decreased initiation and engagement, and recent fall(s)/fall history. The patient's raw score on the -PAC Daily Activity Inpatient Short Form is 17. A raw score of less than 19 suggests the patient may benefit from discharge to post-acute rehabilitation services. Please refer to the recommendation of the Occupational Therapist for safe discharge planning. Pt will benefit from continued skilled OT services to address deficits as defined above and to maximize level independence/participation during ADLs and functional tasks to facilitate return toward PLOF and improved quality of life. From an occupational therapy standpoint, Level I (Maximum Resource Intensity) is recommended upon d/c.   Goals   Patient Goals to go home   Short Term Goal  pt will complete UE strengthening exercises   Long Term Goal #1 pt will complete toilet transfers and hygiene (I)   Long Term Goal #2 pt will complete UB/LB bathing/dressing with (S)   Long Term Goal pt will complete functional mobility with RW at mod (I)   Plan   Treatment Interventions UE strengthening/ROM;Functional transfer training;ADL retraining;Endurance training;Patient/family training;Equipment evaluation/education;Activityengagement;Energy conservation   Goal Expiration Date 03/31/25   OT Frequency 3-5x/wk   Discharge Recommendation   Rehab Resource Intensity Level, OT I (Maximum Resource Intensity)   AM-PAC Daily Activity Inpatient   Lower Body Dressing 2   Bathing 2   Toileting 2   Upper Body Dressing 3   Grooming 4    Eating 4   Daily Activity Raw Score 17   Daily Activity Standardized Score (Calc for Raw Score >=11) 37.26   AM-PAC Applied Cognition Inpatient   Following a Speech/Presentation 4   Understanding Ordinary Conversation 4   Taking Medications 3   Remembering Where Things Are Placed or Put Away 4   Remembering List of 4-5 Errands 4   Taking Care of Complicated Tasks 3   Applied Cognition Raw Score 22   Applied Cognition Standardized Score 47.83

## 2025-03-17 NOTE — PLAN OF CARE
Problem: Prexisting or High Potential for Compromised Skin Integrity  Goal: Skin integrity is maintained or improved  Description: INTERVENTIONS:  - Identify patients at risk for skin breakdown  - Assess and monitor skin integrity  - Assess and monitor nutrition and hydration status  - Monitor labs   - Assess for incontinence   - Turn and reposition patient  - Assist with mobility/ambulation  - Relieve pressure over bony prominences  - Avoid friction and shearing  - Provide appropriate hygiene as needed including keeping skin clean and dry  - Evaluate need for skin moisturizer/barrier cream  - Collaborate with interdisciplinary team   - Patient/family teaching  - Consider wound care consult   Outcome: Progressing     Problem: PAIN - ADULT  Goal: Verbalizes/displays adequate comfort level or baseline comfort level  Description: Interventions:  - Encourage patient to monitor pain and request assistance  - Assess pain using appropriate pain scale  - Administer analgesics based on type and severity of pain and evaluate response  - Implement non-pharmacological measures as appropriate and evaluate response  - Consider cultural and social influences on pain and pain management  - Notify physician/advanced practitioner if interventions unsuccessful or patient reports new pain  Outcome: Progressing     Problem: INFECTION - ADULT  Goal: Absence or prevention of progression during hospitalization  Description: INTERVENTIONS:  - Assess and monitor for signs and symptoms of infection  - Monitor lab/diagnostic results  - Monitor all insertion sites, i.e. indwelling lines, tubes, and drains  - Monitor endotracheal if appropriate and nasal secretions for changes in amount and color  - [place] appropriate cooling/warming therapies per order  - Administer medications as ordered  - Instruct and encourage patient and family to use good hand hygiene technique  - Identify and instruct in appropriate isolation precautions for  identified infection/condition  Outcome: Progressing  Goal: Absence of fever/infection during neutropenic period  Description: INTERVENTIONS:  - Monitor WBC    Outcome: Progressing     Problem: SAFETY ADULT  Goal: Patient will remain free of falls  Description: INTERVENTIONS:  - Educate patient/family on patient safety including physical limitations  - Instruct patient to call for assistance with activity   - Consult OT/PT to assist with strengthening/mobility   - Keep Call bell within reach  - Keep bed low and locked with side rails adjusted as appropriate  - Keep care items and personal belongings within reach  - Initiate and maintain comfort rounds  - Make Fall Risk Sign visible to staff  - Offer Toileting every 2 Hours, in advance of need  - Initiate/Maintain bed and chair alarm  - Obtain necessary fall risk management equipment: bed and chair alarm  - Apply yellow socks and bracelet for high fall risk patients  - Consider moving patient to room near nurses station  Outcome: Progressing  Goal: Maintain or return to baseline ADL function  Description: INTERVENTIONS:  -  Assess patient's ability to carry out ADLs; assess patient's baseline for ADL function and identify physical deficits which impact ability to perform ADLs (bathing, care of mouth/teeth, toileting, grooming, dressing, etc.)  - Assess/evaluate cause of self-care deficits   - Assess range of motion  - Assess patient's mobility; develop plan if impaired  - Assess patient's need for assistive devices and provide as appropriate  - Encourage maximum independence but intervene and supervise when necessary  - Involve family in performance of ADLs  - Assess for home care needs following discharge   - Consider OT consult to assist with ADL evaluation and planning for discharge  - Provide patient education as appropriate  Outcome: Progressing  Goal: Maintains/Returns to pre admission functional level  Description: INTERVENTIONS:  - Perform AM-PAC 6 Click  Basic Mobility/ Daily Activity assessment daily.  - Set and communicate daily mobility goal to care team and patient/family/caregiver.   - Collaborate with rehabilitation services on mobility goals if consulted  - Perform Range of Motion 3 times a day.  - Reposition patient every 2 hours.  - Dangle patient 3 times a day  - Stand patient 3 times a day  - Ambulate patient 3 times a day  - Out of bed to chair 3 times a day   - Out of bed for meals 3 times a day  - Out of bed for toileting  - Record patient progress and toleration of activity level   Outcome: Progressing     Problem: DISCHARGE PLANNING  Goal: Discharge to home or other facility with appropriate resources  Description: INTERVENTIONS:  - Identify barriers to discharge w/patient and caregiver  - Arrange for needed discharge resources and transportation as appropriate  - Identify discharge learning needs (meds, wound care, etc.)  - Arrange for interpretive services to assist at discharge as needed  - Refer to Case Management Department for coordinating discharge planning if the patient needs post-hospital services based on physician/advanced practitioner order or complex needs related to functional status, cognitive ability, or social support system  Outcome: Progressing     Problem: Knowledge Deficit  Goal: Patient/family/caregiver demonstrates understanding of disease process, treatment plan, medications, and discharge instructions  Description: Complete learning assessment and assess knowledge base.  Interventions:  - Provide teaching at level of understanding  - Provide teaching via preferred learning methods  Outcome: Progressing

## 2025-03-17 NOTE — ED NOTES
Ambulatory trial completed. O2 saturation 86-92% and heart rate 120-147. Placed on 2L O2 in the room     Darius Johnson RN  03/17/25 [id number]

## 2025-03-17 NOTE — ASSESSMENT & PLAN NOTE
Patient had spirometry in 2010 that was normal. She does not have emphysema on CT imaging. She is a life long nonsmoker. It is very unlikely that she has COPD.   She may have asthma but it does not appear to be in exacerbation currently.   Discontinue systemic steroids.   Recommend CT chest wo contrast to further evaluate lung parenchyma.   Recommend repeat PFTs as outpatient

## 2025-03-17 NOTE — ASSESSMENT & PLAN NOTE
Received magnesium sulfate 2 grams IV x 1 dose on 03/17/2025  Follow the magnesium level    Results from last 7 days   Lab Units 03/18/25  0445 03/17/25  0227   MAGNESIUM mg/dL 1.9 1.8*

## 2025-03-17 NOTE — ASSESSMENT & PLAN NOTE
Currently requiring 1 L nasal cannula.  No oxygen requirement at baseline.  Maintain saturations greater than or equal to 88%.  Pulmonary toilet:  Increase activity as tolerated, out of bed as tolerated, cough and deep breathing exercises encourage, incentive spirometry q.1 hour

## 2025-03-17 NOTE — ASSESSMENT & PLAN NOTE
Lab Results   Component Value Date    HGBA1C 5.9 (H) 03/14/2024   Initial blood glucose level at 77  --Hold oral diabetic medication  --Fingerstick glucose 4 times daily AC/HS  --Continue PTA insulin regimen  --Check A1C  --Hypoglycemia protocol  --Monitor while admitted

## 2025-03-17 NOTE — RESPIRATORY THERAPY NOTE
RT Protocol Note  Ritika Kwong 69 y.o. female MRN: [id number]  Unit/Bed#: 413-01 Encounter: [id number]    Assessment    Principal Problem:    COPD with acute exacerbation (HCC)  Active Problems:    Acquired hypothyroidism    Seizure disorder (HCC)    GERD without esophagitis    Hypomagnesemia    Type 2 diabetes mellitus without complication, without long-term current use of insulin (HCC)    Left foot pain      Home Pulmonary Medications:  albuterol       Past Medical History:   Diagnosis Date    Asthma     Bipolar disorder (HCC)     Chronic UTI (urinary tract infection)     COPD (chronic obstructive pulmonary disease) (HCC)     Diabetes mellitus (HCC)     Disease of thyroid gland     Dyslipidemia 10/31/2018    Essential hypertension 10/31/2018    GERD (gastroesophageal reflux disease)     Heart attack ([facility])     Iron deficiency anemia, unspecified 7/29/2019    Obesity due to excess calories 10/31/2018    Recurrent falls 4/13/2019    Seizure ([facility])      Social History     Socioeconomic History    Marital status:      Spouse name: None    Number of children: None    Years of education: None    Highest education level: None   Occupational History    None   Tobacco Use    Smoking status: Never    Smokeless tobacco: Never   Vaping Use    Vaping status: Never Used   Substance and Sexual Activity    Alcohol use: Not Currently     Alcohol/week: 0.0 standard drinks of alcohol    Drug use: No    Sexual activity: Not Currently   Other Topics Concern    None   Social History Narrative    None     Social Drivers of Health     Financial Resource Strain: Low Risk  (6/20/2023)    Received from [facility]    Financial Resource Strain     Do you have any trouble paying for your medications, or do you think you might in the future?: No     Does your family have trouble paying for medicine? (Household - for ages 0-17 years): Not on file   Food Insecurity: No Food Insecurity (3/17/2025)    Nursing - Inadequate Food Risk  "Classification     Worried About Running Out of Food in the Last Year: Not on file     Ran Out of Food in the Last Year: Not on file     Ran Out of Food in the Last Year: Never true   Transportation Needs: Unmet Transportation Needs (3/17/2025)    Nursing - Transportation Risk Classification     Lack of Transportation: Not on file     Lack of Transportation: Yes   Physical Activity: Not on file   Stress: Not on file   Social Connections: Unknown (2024)    Received from [facility]     How often do you feel lonely or isolated from those around you? (Adult - for ages 18 years and over): Not on file   Intimate Partner Violence: Unknown (3/17/2025)    Nursing IPS     Feels Physically and Emotionally Safe: Not on file     Physically Hurt by Someone: Not on file     Humiliated or Emotionally Abused by Someone: Not on file     Physically Hurt by Someone: No     Hurt or Threatened by Someone: No   Housing Stability: Unknown (3/17/2025)    Nursing: Inadequate Housing Risk Classification     Has Housing: Not on file     Worried About Losing Housing: Not on file     Unable to Get Utilities: Not on file     Unable to Pay for Housing in the Last Year: No     Has Housin       Subjective         Objective    Physical Exam:   Assessment Type: (P) Assess only  General Appearance: (P) Alert, Awake  Respiratory Pattern: (P) Normal  Chest Assessment: (P) Chest expansion symmetrical  Bilateral Breath Sounds: (P) Clear, Diminished  Cough: (P) Dry  O2 Device: (P) NC    Vitals:  Blood pressure 112/69, pulse (!) 115, temperature 98.1 °F (36.7 °C), temperature source Oral, resp. rate 16, height 5' 7\" (1.702 m), weight 103 kg (227 lb 8.2 oz), SpO2 96%.          Imaging and other studies: Results Review Statement: I reviewed radiology reports from this admission including: chest xray.    O2 Device: (P) NC     Plan    Respiratory Plan: (P) Home Bronchodilator Patient pathway        Resp Comments: (P) Pt initially on " 2L, weaned to 1L. Patient denies the use of home oxygen, or PAP therapy. Patient does have an order for albuterol prn. Will continue to wean oxygen as able and continue with albuterol inhaler prn along with IS.

## 2025-03-17 NOTE — ASSESSMENT & PLAN NOTE
Patient with frequent hypoxic episodes while in the ER  SpO2  89% while at rest in the ER  Ambulatory pulse ox at 86%  Patient reports shortness of breath upon exertion  Not currently on home oxygen  --Admitted observation  --Respiratory protocol  --Continue oxygen at 2 L/min for now  --Check sputum culture and Gram stain  --Solu-Medrol 40 mg IV every 12 hours  --Monitor respiratory status, SpO2  --Pulmonary consult

## 2025-03-17 NOTE — ASSESSMENT & PLAN NOTE
Presented to the ER for evaluation of complaint of left foot pain  Patient denies recent injury  No evidence of swelling or deformity  --Pain medication PRN  --OT/PT eval

## 2025-03-17 NOTE — PLAN OF CARE
Problem: OCCUPATIONAL THERAPY ADULT  Goal: Performs self-care activities at highest level of function for planned discharge setting.  See evaluation for individualized goals.  Description: Treatment Interventions: UE strengthening/ROM, Functional transfer training, ADL retraining, Endurance training, Patient/family training, Equipment evaluation/education, Activityengagement, Energy conservation          See flowsheet documentation for full assessment, interventions and recommendations.   Note: Limitation: Decreased ADL status, Decreased UE strength, Decreased Safe judgement during ADL, Decreased endurance, Decreased self-care trans, Decreased high-level ADLs     Assessment: Pt is a 69 y.o. female seen for OT evaluation s/p admit to [facility] on 3/17/2025 w/ Hypoxia. Pt with PMHx impacting their performance during ADL tasks, including: dyspnea, tachycardia, hypoxia, elevated hemidiaphragm, DMII, seizure disorder. Prior to admission to the hospital Pt was performing ADLs without physical assistance. IADLs with physical assistance. Functional transfers/ambulation without physical assistance. Cognitive status PTA was A&Ox4. OT order placed to assess Pt's ADLs, cognitive status, and performance during functional tasks in order to maximize safety and independence while making most appropriate d/c recommendations. Pt's clinical presentation is currently evolving given new onset deficits that affect Pt's occupational performance and ability to safely return to [facility] including decrease activity tolerance, decrease standing balance, decrease performance during ADL tasks, decrease safety awareness , increase impulsiveness, decrease UB MS, decrease generalized strength, decrease activity engagement, decrease performance during functional transfers, high fall risk, and limited insight to deficits combined with medical complications of abnormal blood sugars, low SpO2 values, new onset O2 use, impulsivity during admission, and need  for input for mobility technique/safety. 1LO2 needs. Personal factors affecting Pt at time of initial evaluation include: limited home support, inability to perform IADLs, inability to perform ADLs, inability to ambulate household distances, inability to navigate community distances, limited insight into impairments, decreased initiation and engagement, and recent fall(s)/fall history. The patient's raw score on the -PAC Daily Activity Inpatient Short Form is 17. A raw score of less than 19 suggests the patient may benefit from discharge to post-acute rehabilitation services. Please refer to the recommendation of the Occupational Therapist for safe discharge planning. Pt will benefit from continued skilled OT services to address deficits as defined above and to maximize level independence/participation during ADLs and functional tasks to facilitate return toward PLOF and improved quality of life. From an occupational therapy standpoint, Level I (Maximum Resource Intensity) is recommended upon d/c.     Rehab Resource Intensity Level, OT: I (Maximum Resource Intensity)

## 2025-03-18 ENCOUNTER — APPOINTMENT (OUTPATIENT)
Dept: NON INVASIVE DIAGNOSTICS | Facility: HOSPITAL | Age: 70
End: 2025-03-18
Payer: COMMERCIAL

## 2025-03-18 LAB
ALBUMIN SERPL BCG-MCNC: 4 G/DL (ref 3.5–5)
ALP SERPL-CCNC: 56 U/L (ref 34–104)
ALT SERPL W P-5'-P-CCNC: 18 U/L (ref 7–52)
ANION GAP SERPL CALCULATED.3IONS-SCNC: 12 MMOL/L (ref 4–13)
AORTIC ROOT: 3.6 CM
ASCENDING AORTA: 3.9 CM
AST SERPL W P-5'-P-CCNC: 17 U/L (ref 13–39)
ATRIAL RATE: 124 BPM
BASOPHILS # BLD AUTO: 0.03 THOUSANDS/ÂΜL (ref 0–0.1)
BASOPHILS NFR BLD AUTO: 1 % (ref 0–1)
BILIRUB SERPL-MCNC: 0.23 MG/DL (ref 0.2–1)
BSA FOR ECHO PROCEDURE: 2.13 M2
BUN SERPL-MCNC: 15 MG/DL (ref 5–25)
CALCIUM SERPL-MCNC: 9.6 MG/DL (ref 8.4–10.2)
CHLORIDE SERPL-SCNC: 99 MMOL/L (ref 96–108)
CO2 SERPL-SCNC: 28 MMOL/L (ref 21–32)
CREAT SERPL-MCNC: 0.59 MG/DL (ref 0.6–1.3)
E WAVE DECELERATION TIME: 106 MS
EOSINOPHIL # BLD AUTO: 0.08 THOUSAND/ÂΜL (ref 0–0.61)
EOSINOPHIL NFR BLD AUTO: 2 % (ref 0–6)
ERYTHROCYTE [DISTWIDTH] IN BLOOD BY AUTOMATED COUNT: 12.2 % (ref 11.6–15.1)
FRACTIONAL SHORTENING: 27 (ref 28–44)
GFR SERPL CREATININE-BSD FRML MDRD: 95 ML/MIN/1.73SQ M
GLUCOSE P FAST SERPL-MCNC: 132 MG/DL (ref 65–99)
GLUCOSE SERPL-MCNC: 113 MG/DL (ref 65–140)
GLUCOSE SERPL-MCNC: 132 MG/DL (ref 65–140)
GLUCOSE SERPL-MCNC: 137 MG/DL (ref 65–140)
GLUCOSE SERPL-MCNC: 173 MG/DL (ref 65–140)
GLUCOSE SERPL-MCNC: 231 MG/DL (ref 65–140)
GLUCOSE SERPL-MCNC: 234 MG/DL (ref 65–140)
HCT VFR BLD AUTO: 42.7 % (ref 34.8–46.1)
HGB BLD-MCNC: 14.2 G/DL (ref 11.5–15.4)
IMM GRANULOCYTES # BLD AUTO: 0.03 THOUSAND/UL (ref 0–0.2)
IMM GRANULOCYTES NFR BLD AUTO: 1 % (ref 0–2)
INTERVENTRICULAR SEPTUM IN DIASTOLE (PARASTERNAL SHORT AXIS VIEW): 1 CM
INTERVENTRICULAR SEPTUM: 1 CM (ref 0.6–1.1)
LAAS-AP2: 25.3 CM2
LAAS-AP4: 22.1 CM2
LEFT ATRIUM SIZE: 3.4 CM
LEFT ATRIUM VOLUME (MOD BIPLANE): 92 ML
LEFT ATRIUM VOLUME INDEX (MOD BIPLANE): 43 ML/M2
LEFT INTERNAL DIMENSION IN SYSTOLE: 3.3 CM (ref 2.1–4)
LEFT VENTRICULAR INTERNAL DIMENSION IN DIASTOLE: 4.5 CM (ref 3.5–6)
LEFT VENTRICULAR POSTERIOR WALL IN END DIASTOLE: 1 CM
LEFT VENTRICULAR STROKE VOLUME: 49 ML
LV EF US.2D.A4C+ESTIMATED: 53 %
LVSV (TEICH): 49 ML
LYMPHOCYTES # BLD AUTO: 1.18 THOUSANDS/ÂΜL (ref 0.6–4.47)
LYMPHOCYTES NFR BLD AUTO: 24 % (ref 14–44)
MAGNESIUM SERPL-MCNC: 1.9 MG/DL (ref 1.9–2.7)
MCH RBC QN AUTO: 33.5 PG (ref 26.8–34.3)
MCHC RBC AUTO-ENTMCNC: 33.3 G/DL (ref 31.4–37.4)
MCV RBC AUTO: 101 FL (ref 82–98)
MONOCYTES # BLD AUTO: 0.35 THOUSAND/ÂΜL (ref 0.17–1.22)
MONOCYTES NFR BLD AUTO: 7 % (ref 4–12)
MRSA NOSE QL CULT: ABNORMAL
MRSA NOSE QL CULT: ABNORMAL
MV E'TISSUE VEL-LAT: 8 CM/S
MV E'TISSUE VEL-SEP: 11 CM/S
MV PEAK E VEL: 121 CM/S
MV STENOSIS PRESSURE HALF TIME: 31 MS
MV VALVE AREA P 1/2 METHOD: 7.1
NEUTROPHILS # BLD AUTO: 3.33 THOUSANDS/ÂΜL (ref 1.85–7.62)
NEUTS SEG NFR BLD AUTO: 65 % (ref 43–75)
NRBC BLD AUTO-RTO: 0 /100 WBCS
P AXIS: 33 DEGREES
PHOSPHATE SERPL-MCNC: 4.9 MG/DL (ref 2.3–4.1)
PLATELET # BLD AUTO: 270 THOUSANDS/UL (ref 149–390)
PMV BLD AUTO: 9.3 FL (ref 8.9–12.7)
POTASSIUM SERPL-SCNC: 3.9 MMOL/L (ref 3.5–5.3)
PR INTERVAL: 176 MS
PROT SERPL-MCNC: 7.4 G/DL (ref 6.4–8.4)
QRS AXIS: 15 DEGREES
QRSD INTERVAL: 74 MS
QT INTERVAL: 304 MS
QTC INTERVAL: 436 MS
RA PRESSURE ESTIMATED: 8 MMHG
RBC # BLD AUTO: 4.24 MILLION/UL (ref 3.81–5.12)
RIGHT ATRIUM AREA SYSTOLE A4C: 19.6 CM2
RIGHT VENTRICLE ID DIMENSION: 3.5 CM
RV PSP: 23 MMHG
SL CV LEFT ATRIUM LENGTH A2C: 5.2 CM
SL CV LV EF: 50
SL CV PED ECHO LEFT VENTRICLE DIASTOLIC VOLUME (MOD BIPLANE) 2D: 93 ML
SL CV PED ECHO LEFT VENTRICLE SYSTOLIC VOLUME (MOD BIPLANE) 2D: 45 ML
SODIUM SERPL-SCNC: 139 MMOL/L (ref 135–147)
T WAVE AXIS: 62 DEGREES
TR MAX PG: 15 MMHG
TR PEAK VELOCITY: 1.9 M/S
TRICUSPID ANNULAR PLANE SYSTOLIC EXCURSION: 1.4 CM
TRICUSPID VALVE PEAK REGURGITATION VELOCITY: 1.93 M/S
TSH SERPL DL<=0.05 MIU/L-ACNC: 1.48 UIU/ML (ref 0.45–4.5)
VENTRICULAR RATE: 124 BPM
WBC # BLD AUTO: 5 THOUSAND/UL (ref 4.31–10.16)

## 2025-03-18 PROCEDURE — 93010 ELECTROCARDIOGRAM REPORT: CPT | Performed by: INTERNAL MEDICINE

## 2025-03-18 PROCEDURE — 83735 ASSAY OF MAGNESIUM: CPT | Performed by: PHYSICIAN ASSISTANT

## 2025-03-18 PROCEDURE — 85025 COMPLETE CBC W/AUTO DIFF WBC: CPT | Performed by: PHYSICIAN ASSISTANT

## 2025-03-18 PROCEDURE — 93306 TTE W/DOPPLER COMPLETE: CPT | Performed by: INTERNAL MEDICINE

## 2025-03-18 PROCEDURE — 84100 ASSAY OF PHOSPHORUS: CPT | Performed by: PHYSICIAN ASSISTANT

## 2025-03-18 PROCEDURE — 80053 COMPREHEN METABOLIC PANEL: CPT | Performed by: PHYSICIAN ASSISTANT

## 2025-03-18 PROCEDURE — 82948 REAGENT STRIP/BLOOD GLUCOSE: CPT

## 2025-03-18 PROCEDURE — 94760 N-INVAS EAR/PLS OXIMETRY 1: CPT

## 2025-03-18 PROCEDURE — 99232 SBSQ HOSP IP/OBS MODERATE 35: CPT

## 2025-03-18 PROCEDURE — 93306 TTE W/DOPPLER COMPLETE: CPT

## 2025-03-18 PROCEDURE — 97162 PT EVAL MOD COMPLEX 30 MIN: CPT

## 2025-03-18 PROCEDURE — 84443 ASSAY THYROID STIM HORMONE: CPT | Performed by: INTERNAL MEDICINE

## 2025-03-18 RX ORDER — PHENYTOIN SODIUM 100 MG/1
300 CAPSULE, EXTENDED RELEASE ORAL DAILY
Status: DISCONTINUED | OUTPATIENT
Start: 2025-03-19 | End: 2025-03-19 | Stop reason: HOSPADM

## 2025-03-18 RX ORDER — PHENYTOIN SODIUM 100 MG/1
200 CAPSULE, EXTENDED RELEASE ORAL
Status: DISCONTINUED | OUTPATIENT
Start: 2025-03-19 | End: 2025-03-19 | Stop reason: HOSPADM

## 2025-03-18 RX ORDER — PHENYTOIN SODIUM 100 MG/1
100 CAPSULE, EXTENDED RELEASE ORAL ONCE
Status: COMPLETED | OUTPATIENT
Start: 2025-03-18 | End: 2025-03-18

## 2025-03-18 RX ADMIN — ALBUTEROL SULFATE 2 PUFF: 90 AEROSOL, METERED RESPIRATORY (INHALATION) at 22:06

## 2025-03-18 RX ADMIN — ZONISAMIDE 200 MG: 100 CAPSULE ORAL at 18:11

## 2025-03-18 RX ADMIN — PANTOPRAZOLE SODIUM 40 MG: 40 TABLET, DELAYED RELEASE ORAL at 06:09

## 2025-03-18 RX ADMIN — ENOXAPARIN SODIUM 40 MG: 40 INJECTION SUBCUTANEOUS at 13:46

## 2025-03-18 RX ADMIN — METOROPROLOL TARTRATE 5 MG: 5 INJECTION, SOLUTION INTRAVENOUS at 22:11

## 2025-03-18 RX ADMIN — VALACYCLOVIR 500 MG: 500 TABLET, FILM COATED ORAL at 08:07

## 2025-03-18 RX ADMIN — OXYBUTYNIN CHLORIDE 5 MG: 5 TABLET ORAL at 18:11

## 2025-03-18 RX ADMIN — QUETIAPINE FUMARATE 400 MG: 100 TABLET ORAL at 08:07

## 2025-03-18 RX ADMIN — LEVETIRACETAM 1000 MG: 500 TABLET, FILM COATED ORAL at 18:11

## 2025-03-18 RX ADMIN — ASPIRIN 81 MG: 81 TABLET, COATED ORAL at 08:07

## 2025-03-18 RX ADMIN — DICLOFENAC SODIUM 2 G: 10 GEL TOPICAL at 11:57

## 2025-03-18 RX ADMIN — DEXTRAN 70, GLYCERIN, HYPROMELLOSE 1 DROP: 1; 2; 3 SOLUTION/ DROPS OPHTHALMIC at 16:37

## 2025-03-18 RX ADMIN — DEXTRAN 70, GLYCERIN, HYPROMELLOSE 1 DROP: 1; 2; 3 SOLUTION/ DROPS OPHTHALMIC at 08:08

## 2025-03-18 RX ADMIN — FLUTICASONE PROPIONATE 1 SPRAY: 50 SPRAY, METERED NASAL at 22:00

## 2025-03-18 RX ADMIN — PHENYTOIN SODIUM 100 MG: 100 CAPSULE, EXTENDED RELEASE ORAL at 12:01

## 2025-03-18 RX ADMIN — FERROUS SULFATE TAB 325 MG (65 MG ELEMENTAL FE) 325 MG: 325 (65 FE) TAB at 08:07

## 2025-03-18 RX ADMIN — QUETIAPINE FUMARATE 400 MG: 100 TABLET ORAL at 18:11

## 2025-03-18 RX ADMIN — FLUTICASONE PROPIONATE 1 SPRAY: 50 SPRAY, METERED NASAL at 08:08

## 2025-03-18 RX ADMIN — ACETAMINOPHEN 650 MG: 325 TABLET, FILM COATED ORAL at 22:02

## 2025-03-18 RX ADMIN — OXYBUTYNIN CHLORIDE 5 MG: 5 TABLET ORAL at 11:56

## 2025-03-18 RX ADMIN — INSULIN LISPRO 5 UNITS: 100 INJECTION, SOLUTION INTRAVENOUS; SUBCUTANEOUS at 16:37

## 2025-03-18 RX ADMIN — LEVETIRACETAM 1000 MG: 500 TABLET, FILM COATED ORAL at 08:07

## 2025-03-18 RX ADMIN — ATORVASTATIN CALCIUM 40 MG: 40 TABLET, FILM COATED ORAL at 16:37

## 2025-03-18 RX ADMIN — VALACYCLOVIR 500 MG: 500 TABLET, FILM COATED ORAL at 18:11

## 2025-03-18 RX ADMIN — GUAIFENESIN 600 MG: 600 TABLET ORAL at 21:59

## 2025-03-18 RX ADMIN — MELOXICAM 7.5 MG: 7.5 TABLET ORAL at 08:07

## 2025-03-18 RX ADMIN — B-COMPLEX W/ C & FOLIC ACID TAB 1 TABLET: TAB at 08:07

## 2025-03-18 RX ADMIN — PHENYTOIN SODIUM 100 MG: 100 CAPSULE, EXTENDED RELEASE ORAL at 12:15

## 2025-03-18 RX ADMIN — GUAIFENESIN 600 MG: 600 TABLET ORAL at 08:07

## 2025-03-18 RX ADMIN — LEVOTHYROXINE SODIUM 175 MCG: 175 TABLET ORAL at 06:09

## 2025-03-18 RX ADMIN — DICLOFENAC SODIUM 2 G: 10 GEL TOPICAL at 08:08

## 2025-03-18 RX ADMIN — PHENYTOIN SODIUM 100 MG: 100 CAPSULE, EXTENDED RELEASE ORAL at 08:07

## 2025-03-18 RX ADMIN — OXYBUTYNIN CHLORIDE 5 MG: 5 TABLET ORAL at 08:07

## 2025-03-18 RX ADMIN — METHYLPREDNISOLONE SODIUM SUCCINATE 40 MG: 40 INJECTION, POWDER, FOR SOLUTION INTRAMUSCULAR; INTRAVENOUS at 08:07

## 2025-03-18 RX ADMIN — ZONISAMIDE 200 MG: 100 CAPSULE ORAL at 08:08

## 2025-03-18 RX ADMIN — INSULIN LISPRO 5 UNITS: 100 INJECTION, SOLUTION INTRAVENOUS; SUBCUTANEOUS at 11:57

## 2025-03-18 RX ADMIN — DICLOFENAC SODIUM 2 G: 10 GEL TOPICAL at 18:11

## 2025-03-18 RX ADMIN — MONTELUKAST 10 MG: 10 TABLET, FILM COATED ORAL at 21:59

## 2025-03-18 RX ADMIN — ALBUTEROL SULFATE 2 PUFF: 90 AEROSOL, METERED RESPIRATORY (INHALATION) at 13:46

## 2025-03-18 NOTE — UTILIZATION REVIEW
OBSERVATION 3-17-25 CHANGED TO INPATIENT 3-18-25 FOR IV STEROID TREATMENT OF HYPOXIA.     Initial Clinical Review    Admission: Date/Time/Statement:     Admission Orders (From admission, onward)       Ordered        25 1143  INPATIENT ADMISSION  Once            25 0501  Place in Observation  Once                             ED Arrival Information       Expected   -    Arrival   3/17/2025 01:57    Acuity   Urgent              Means of arrival   Ambulance    Escorted by   [facility] [place]   [place]ospitalist    Admission type   Emergency              Arrival complaint   Foot Pain             Chief Complaint   Patient presents with    Foot Pain     Left foot pain and left posterior thigh rash. Decreased feeling in left foot       Initial Presentation: 69 y.o. female presents to ed from home via ems for evaluation and treatment of left foot pain associated with paresthesia and posterior thigh rash.  She also reports shortness of breath on exertion and intermittent cough. PMHX:  Asthma, not on home O2, DM2, seizure disorder.      Clinical assessment significant for hypoxia 90% >83% on room air.  On 2L O2nc 97% resting, heart rate 117-127 sustained.  Rash on left thigh ( see photos).  Mag 1.8, Phos 4.7, VB.398/ 43.3/ 100.6/ 26. EKG: sinus tachycardia, UA large leukocytes.    Initially treated with duo neb x 1. Admit to observation for hypoxia.   Plan includes: wean O2 to baseline, iv solumedrol, iv Mag x 1, check RP2 panel, monitor on telemetry, valcyclovir 500 mg bid      Pulmonology consult:  etiology of hypoxia is unclear.  On exam elevated hemidiaphragm.  Never a smoker.  Suspicion of Copd is low.  Agree with Echo. Titrate Spo2 goal >88%. Check ambulatory pulse ox on discharge.  Recommend outpatient follow up including PSG, PFT, sniff.    Anticipated Length of Stay/Certification Statement:   Patient will be admitted on an observation basis with an anticipated length of  stay of less than 2 midnights secondary to COPD exacerbation, hypomagnesemia, left foot pain.     Date: 3-18-25    Day 2: observation changed to inpatient   Temp 97.3. Requires 1L O2 nc to maintain spo2 at least 93% resting. Patient also had ambulatory pulse oximetry with SpO2 dipping into 86%.   OT evaluation completed: patient has functional deficits that would benefit from inpatient rehab including limited home support and decreased ability to perform ADLs, navigate community distanced and recent fall.  Patient reports generalized pain 4/10 treated with tylenol, and meloxicam.  Remains on iv solumedrol 40 mg q 12.  Follow up sputum cultures.       Date: 3-19-25   Day 3: Has surpassed a 2nd midnight with active treatments and services for hypoxia. Patient remains on 1 L O2nc to maintain an spo2 at least 92% resting.   Temp 97.1, heart rate  129-133.   PT/OT treat today finds patient with moderate functional deficits and limitations with shortness of breath.  Home O2 evaluation completed today.  On exertion room air sat 87%,  91% room air at rest.  1L O2nc  improved spo2 to 94% resting.  Recommend home O2 at 1L nc.  No further iv solumedrol.         ED Treatment-Medication Administration from 03/17/2025 0157 to 03/17/2025 0524         Date/Time Order Dose Route Action     03/17/2025 0250 ipratropium-albuterol (DUO-NEB) 0.5-2.5 mg/3 mL inhalation solution 3 mL 3 mL Nebulization Given            Scheduled Medications:    Artificial Tears, 1 drop, Both Eyes, TID  aspirin, 81 mg, Oral, Daily  atorvastatin, 40 mg, Oral, Daily With Dinner  Diclofenac Sodium, 2 g, Topical, 4x Daily  enoxaparin, 40 mg, Subcutaneous, Q24H  ferrous sulfate, 325 mg, Oral, Daily With Breakfast  fluticasone, 1 spray, Each Nare, BID  guaiFENesin, 600 mg, Oral, Q12H ERI  insulin lispro, 1-5 Units, Subcutaneous, HS  insulin lispro, 5 Units, Subcutaneous, TID With Meals  levETIRAcetam, 1,000 mg, Oral, BID  levothyroxine, 175 mcg, Oral, Early  Morning  meloxicam, 7.5 mg, Oral, Daily  methylPREDNISolone sodium succinate, 40 mg, Intravenous, Q12H ERI  montelukast, 10 mg, Oral, HS  multivitamin stress formula, 1 tablet, Oral, Daily  oxybutynin, 5 mg, Oral, TID  pantoprazole, 40 mg, Oral, Early Morning  phenytoin, 100 mg, Oral, BID  QUEtiapine, 400 mg, Oral, BID  valACYclovir, 500 mg, Oral, BID  zonisamide, 200 mg, Oral, BID      Continuous IV Infusions:     PRN Meds:  acetaminophen, 650 mg, Oral, Q6H PRN  albuterol, 2 puff, Inhalation, Q6H PRN  docusate sodium, 100 mg, Oral, BID PRN  metoprolol, 5 mg, Intravenous, Q6H PRN  ondansetron, 4 mg, Intravenous, Q6H PRN      ED Triage Vitals   Temperature Pulse Respirations Blood Pressure SpO2 Pain Score   03/17/25 0159 03/17/25 0159 03/17/25 0159 03/17/25 0159 03/17/25 0159 03/17/25 0551   97.9 °F (36.6 °C) (!) 127 20 135/73 90 % 2     Weight (last 2 days)       Date/Time Weight    03/18/25 07:25:50 103 (227.07)    03/17/25 0551 103 (227.52)    03/17/25 0159 108 (237.22)            Vital Signs (last 3 days)       Date/Time Temp Pulse Resp BP MAP (mmHg) SpO2 Nasal Cannula O2 Flow Rate (L/min) Pain    03/18/25 07:25:50 97.3 °F (36.3 °C) 88 18 133/80 98 93 % 1 L/min --    03/18/25 07:13:32 -- 98 -- 133/80 98 93 % -- --    03/18/25 0700 -- -- -- -- -- -- 1 L/min --    03/18/25 03:28:53 97.3 °F (36.3 °C) 96 16 137/80 99 95 % -- --    03/17/25 23:25:51 97.3 °F (36.3 °C) 92 -- 125/76 92 93 % -- --    03/17/25 2114 -- -- -- -- -- -- 2 L/min 4    03/17/25 19:50:17 97.5 °F (36.4 °C) 86 -- 127/76 93 92 % -- --    03/17/25 1940 -- -- -- -- -- 95 % 1 L/min --    03/17/25 1334 -- -- -- -- -- -- -- No Pain    03/17/25 1259 -- -- -- -- -- -- -- 3    03/17/25 1139 -- -- -- -- -- -- -- No Pain    03/17/25 10:43:07 -- 134 -- 127/78 94 88 % -- --    03/17/25 07:40:09 98.1 °F (36.7 °C) 115 16 112/69 83 96 % -- --    03/17/25 0551 98.8 °F (37.1 °C) -- -- -- -- -- -- 2    03/17/25 05:39:08 -- 103 18 140/84 103 95 % -- --    03/17/25 0300  -- 117 21 -- -- 97 % 2 L/min --    03/17/25 0241 -- -- -- -- -- 83 % -- --    03/17/25 0201 -- -- -- -- -- 96 % -- --    03/17/25 0159 97.9 °F (36.6 °C) 127 20 135/73 -- 90 % -- --         Pertinent Labs/Diagnostic Test Results:     Left thigh photos                 Radiology:  CTA chest pe study   Final (03/17 1500)      No pulmonary embolism or acute intrathoracic pathology.         XR chest portable   Final  (03/17 1155)      No acute consolidation or congestion      Stable chest radiograph        Cardiology:  Echo complete w/ contrast if indicated     (03/18 0800)      ECG 12 lead   Final (03/18 0748)   Sinus tachycardia   Otherwise normal ECG   When compared with ECG of 02-Oct-2023 18:56,           GI:  No orders to display       Results from last 7 days   Lab Units 03/17/25  0533   SARS-COV-2  Not Detected     Results from last 7 days   Lab Units 03/18/25 0445 03/17/25  0625 03/17/25 0227   WBC Thousand/uL 5.00  --  5.64   HEMOGLOBIN g/dL 14.2  --  13.5   HEMATOCRIT % 42.7  --  41.7   PLATELETS Thousands/uL 270 230 242   TOTAL NEUT ABS Thousands/µL 3.33  --  2.95         Results from last 7 days   Lab Units 03/18/25 0445 03/17/25  0227   SODIUM mmol/L 139 138   POTASSIUM mmol/L 3.9 4.5   CHLORIDE mmol/L 99 100   CO2 mmol/L 28 26   ANION GAP mmol/L 12 12   BUN mg/dL 15 13   CREATININE mg/dL 0.59* 0.86   EGFR ml/min/1.73sq m 95 70   CALCIUM mg/dL 9.6 9.0   MAGNESIUM mg/dL 1.9 1.8*   PHOSPHORUS mg/dL 4.9* 4.7*     Results from last 7 days   Lab Units 03/18/25 0445 03/17/25  0227   AST U/L 17 25   ALT U/L 18 21   ALK PHOS U/L 56 54   TOTAL PROTEIN g/dL 7.4 7.2   ALBUMIN g/dL 4.0 3.8   TOTAL BILIRUBIN mg/dL 0.23 0.21     Results from last 7 days   Lab Units 03/18/25  0724 03/18/25  0239 03/17/25  2117 03/17/25  1610 03/17/25  1044 03/17/25  0740   POC GLUCOSE mg/dl 113 173* 149* 156* 142* 103     Results from last 7 days   Lab Units 03/18/25  0445 03/17/25  0227   GLUCOSE RANDOM mg/dL 132 77         Results  from last 7 days   Lab Units 03/17/25  0625   HEMOGLOBIN A1C % 6.2*   EAG mg/dl 131     Results from last 7 days   Lab Units 03/17/25  0252   PH TIGIST  7.398   PCO2 TIGIST mm Hg 43.3   PO2 TIGIST mm Hg 100.6*   HCO3 TIGIST mmol/L 26.1   BASE EXC TIGIST mmol/L 1.0   O2 CONTENT TIGIST ml/dL 18.9   O2 HGB, VENOUS % 94.8*       Results from last 7 days   Lab Units 03/17/25  0227   D-DIMER QUANTITATIVE ug/ml FEU 0.33         Results from last 7 days   Lab Units 03/18/25  0445 03/17/25  0227   TSH 3RD GENERATON uIU/mL 1.480 1.830     Results from last 7 days   Lab Units 03/17/25  0227   PROCALCITONIN ng/ml <0.05       Results from last 7 days   Lab Units 03/17/25  0252   BNP pg/mL 24     Results from last 7 days   Lab Units 03/17/25  1440   CLARITY UA  Slightly Cloudy   COLOR UA  Yellow   SPEC GRAV UA  1.020   PH UA  7.0   GLUCOSE UA mg/dl Negative   KETONES UA mg/dl Negative   BLOOD UA  Negative   PROTEIN UA mg/dl Negative   NITRITE UA  Negative   BILIRUBIN UA  Negative   UROBILINOGEN UA (BE) mg/dl <2.0   LEUKOCYTES UA  Large*   WBC UA /hpf 10-20*   RBC UA /hpf None Seen   BACTERIA UA /hpf Occasional   EPITHELIAL CELLS WET PREP /hpf Occasional     Results from last 7 days   Lab Units 03/17/25  0533   INFLUENZA B  Not Detected   RESPIRATORY SYNCYTIAL VIRUS  Not Detected     Results from last 7 days   Lab Units 03/17/25  0533   ADENOVIRUS  Not Detected   BORDETELLA PARAPERTUSSIS  Not Detected   BORDETELLA PERTUSSIS  Not Detected   CHLAMYDIA PNEUMONIAE  Not Detected   CORONAVIRUS 229E  Not Detected   CORONAVIRUS HKU1  Not Detected   CORONAVIRUS NL63  Not Detected   CORONAVIRUS OC43  Not Detected   METAPNEUMOVIRUS  Not Detected   RHINOVIRUS  Not Detected   MYCOPLASMA PNEUMONIAE  Not Detected   PARAINFLUENZA 1  Not Detected   PARAINFLUENZA 2  Not Detected   PARAINFLUENZA 3  Not Detected   PARAINFLUENZA 4  Not Detected       Past Medical History:   Diagnosis Date    Asthma     Bipolar disorder (HCC)     Chronic UTI (urinary tract infection)      COPD (chronic obstructive pulmonary disease) (HCC)     Diabetes mellitus (HCC)     Disease of thyroid gland     Dyslipidemia 10/31/2018    Essential hypertension 10/31/2018    GERD (gastroesophageal reflux disease)     Heart attack (HCC)     Iron deficiency anemia, unspecified 7/29/2019    Obesity due to excess calories 10/31/2018    Recurrent falls 4/13/2019    Seizure (HCC)      Present on Admission:   Acquired hypothyroidism   Dyspnea   Hypomagnesemia   Seizure disorder (HCC)   Left foot pain      Admitting Diagnosis:     Foot pain [M79.673]  Tachycardia [R00.0]  Rash, skin [R21]  Hypoxia [R09.02]  Paresthesia of both lower extremities [R20.2]    Age/Sex: 69 y.o. female    Network Utilization Review Department  ATTENTION: Please call with any questions or concerns to [phone] and carefully listen to the prompts so that you are directed to the right person. All voicemails are confidential.   For Discharge needs, contact Care Management DC Support Team at [phone] opt. 2  Send all requests for admission clinical reviews, approved or denied determinations and any other requests to dedicated fax number below belonging to the campus where the patient is receiving treatment. List of dedicated fax numbers for the Facilities:  FACILITY NAME UR FAX NUMBER   ADMISSION DENIALS (Administrative/Medical Necessity) [phone]   DISCHARGE SUPPORT TEAM (NETWORK) [phone]   PARENT CHILD HEALTH (Maternity/NICU/Pediatrics) [phone]   [facility] [phone]   [facility] [phone]   [facility] [phone]   [facility] [phone]   [facility] [phone]   [facility] [phone]   [facility] [phone]   [facility] [phone]   [facility] [phone]    Critical access hospital [phone]   General acute hospital [phone]   [facility] [phone]

## 2025-03-18 NOTE — PLAN OF CARE
Problem: PHYSICAL THERAPY ADULT  Goal: Performs mobility at highest level of function for planned discharge setting.  See evaluation for individualized goals.  Description: Treatment/Interventions: Functional transfer training, LE strengthening/ROM, Therapeutic exercise, Endurance training, Bed mobility, Gait training          See flowsheet documentation for full assessment, interventions and recommendations.  Note: Prognosis: Good  Problem List: Decreased strength, Decreased endurance, Impaired balance, Decreased mobility (impulsive)      Assessment: Pt is a 69 y.o. female seen for PT evaluation s/p admission to [facility] on 3/17/2025 with Hypoxia.  Order placed for PT services.  Upon evaluation: Pt is presenting with impaired functional mobility due to decreased strength, decreased endurance, impaired balance, gait deviations, and fall risk requiring SUP assistance for bed mobility, transfers, and ambulation with RW. Pt's clinical presentation is currently evolving given the functional mobility deficits above, especially weakness, impaired balance, gait deviations, decreased activity tolerance, decreased functional mobility tolerance, and decreased safety awareness, and combined with medical complications of abnormal blood sugars, low SpO2 values, new onset O2 use, and need for input for mobility technique/safety.  Pt's PMHx and comorbidities that may affect physical performance and progress include: DM, bipolar disorder, ambulatory dysfunction. Personal factors affecting pt at time of IE include: inability to perform IADLs, inability to navigate community distances, and limited insight into impairments. Pt will benefit from continued skilled PT services to address deficits as defined above and to maximize level of functional mobility to facilitate return toward PLOF and improved QOL. From PT/mobility standpoint, recommendation at time of d/c would be Level III (Minimum Resource Intensity) in  order to reduce fall risk and maximize pt's functional independence and consistency with mobility.     Rehab Resource Intensity Level, PT: III (Minimum Resource Intensity)    See flowsheet documentation for full assessment.

## 2025-03-18 NOTE — PHYSICAL THERAPY NOTE
PHYSICAL THERAPY EVALUATION  NAME:  Ritika Kwong  DATE: 03/18/25    AGE:   69 y.o.  Mrn:   [id number]  ADMIT DX:  Foot pain [M79.673]  Tachycardia [R00.0]  Rash, skin [R21]  Hypoxia [R09.02]  Paresthesia of both lower extremities [R20.2]    Past Medical History:   Diagnosis Date    Asthma     Bipolar disorder (HCC)     Chronic UTI (urinary tract infection)     COPD (chronic obstructive pulmonary disease) (HCC)     Diabetes mellitus (HCC)     Disease of thyroid gland     Dyslipidemia 10/31/2018    Essential hypertension 10/31/2018    GERD (gastroesophageal reflux disease)     Heart attack (HCC)     Iron deficiency anemia, unspecified 7/29/2019    Obesity due to excess calories 10/31/2018    Recurrent falls 4/13/2019    Seizure ([facility])      Length Of Stay: 0  Performed at least 2 patient identifiers during session: Name and ID bracelet  PHYSICAL THERAPY EVALUATION :    03/18/25 0915   PT Last Visit   PT Visit Date 03/18/25   Note Type   Note type Evaluation   Pain Assessment   Pain Assessment Tool 0-10   Pain Score No Pain   Restrictions/Precautions   Weight Bearing Precautions Per Order No   Other Precautions Fall Risk;O2;Multiple lines;Bed Alarm;Chair Alarm;Impulsive   Home Living   Type of Home Apartment   Home Layout One level;Ramped entrance   Bathroom Shower/Tub Tub/shower unit   Bathroom Toilet Standard   Bathroom Equipment Grab bars in shower;Tub transfer bench   Bathroom Accessibility Accessible   Home Equipment Wheelchair-manual;Hospital bed;Other (Comment)  (4WW)   Prior Function   Level of [place] Independent with ADLs;Independent with functional mobility;Needs assistance with IADLS   Lives With Alone   Receives Help From Home health   IADLs Family/Friend/Other provides transportation   Falls in the last 6 months 0   Vocational On disability   Comments pt has home health aides 2 days a week for 4 hours   General   Family/Caregiver Present No   Cognition   Overall Cognitive Status WFL  "  Arousal/Participation Alert   Attention Attends with cues to redirect   Orientation Level Oriented X4   Following Commands Follows one step commands with increased time or repetition   Comments pt overall oriented however is significantly tangential in speech and requires cuing to redirect to task at hand   Subjective   Subjective \"Thats it?\"   RLE Assessment   RLE Assessment WFL  (4/5 grossly)   LLE Assessment   LLE Assessment WFL  (4/5 grossly)   Bed Mobility   Supine to Sit 5  Supervision   Additional items HOB elevated;Bedrails;Increased time required;Verbal cues   Sit to Supine   (pt OOB at end of session)   Transfers   Sit to Stand 5  Supervision   Additional items Increased time required;Verbal cues   Stand to Sit 5  Supervision   Additional items Increased time required;Verbal cues   Toilet transfer 5  Supervision   Additional items Increased time required;Verbal cues;Standard toilet   Additional Comments RW used   Ambulation/Elevation   Gait pattern Short stride;Foward flexed;Decreased foot clearance;Wide CHARIS  (impulsive)   Gait Assistance 5  Supervision   Additional items Verbal cues   Assistive Device Rolling walker   Distance 10' x 2   Balance   Static Sitting Good   Dynamic Sitting Fair +   Static Standing Fair   Dynamic Standing Fair -   Ambulatory Fair -  (with RW)   Endurance Deficit   Endurance Deficit Yes   Endurance Deficit Description pt's HR increasing significantly with minimal ambulation   Activity Tolerance   Activity Tolerance Patient limited by fatigue   Assessment   Prognosis Good   Problem List Decreased strength;Decreased endurance;Impaired balance;Decreased mobility  (impulsive)   Goals   Patient Goals to be able to do more   LTG Expiration Date 04/01/25   Plan   Treatment/Interventions Functional transfer training;LE strengthening/ROM;Therapeutic exercise;Endurance training;Bed mobility;Gait training   PT Frequency 3-5x/wk   Discharge Recommendation   Rehab Resource Intensity Level, " PT III (Minimum Resource Intensity)   AM-PAC Basic Mobility Inpatient   Turning in Flat Bed Without Bedrails 3   Lying on Back to Sitting on Edge of Flat Bed Without Bedrails 3   Moving Bed to Chair 3   Standing Up From Chair Using Arms 3   Walk in Room 3   Climb 3-5 Stairs With Railing 2   Basic Mobility Inpatient Raw Score 17   Basic Mobility Standardized Score 39.67   [facility] Highest Level Of Mobility   -HL Goal 5: Stand one or more mins   -HL Achieved 6: Walk 10 steps or more   End of Consult   Patient Position at End of Consult All needs within reach;Bed/Chair alarm activated;Bedside chair       (Please find full objective findings from PT assessment regarding body systems outlined above).     Assessment: Pt is a 69 y.o. female seen for PT evaluation s/p admission to [facility] on 3/17/2025 with Hypoxia.  Order placed for PT services.  Upon evaluation: Pt is presenting with impaired functional mobility due to decreased strength, decreased endurance, impaired balance, gait deviations, and fall risk requiring  SUP assistance for bed mobility, transfers, and ambulation with RW . Pt's clinical presentation is currently evolving given the functional mobility deficits above, especially weakness, impaired balance, gait deviations, decreased activity tolerance, decreased functional mobility tolerance, and decreased safety awareness, and combined with medical complications of abnormal blood sugars, low SpO2 values, new onset O2 use, and need for input for mobility technique/safety.  Pt's PMHx and comorbidities that may affect physical performance and progress include:  DM, bipolar disorder, ambulatory dysfunction . Personal factors affecting pt at time of IE include: inability to perform IADLs, inability to navigate community distances, and limited insight into impairments. Pt will benefit from continued skilled PT services to address deficits as defined above and to maximize level of  functional mobility to facilitate return toward PLOF and improved QOL. From PT/mobility standpoint, recommendation at time of d/c would be Level III (Minimum Resource Intensity) in order to reduce fall risk and maximize pt's functional independence and consistency with mobility.      The patient's AM-PAC Basic Mobility Inpatient Short Form Raw Score is 17. A Raw score of greater than 16 suggests the patient may benefit from discharge to home. Please also refer to the recommendation of the Physical Therapist for safe discharge planning.       Goals: Pt will participate in B LE strengthening exercises to facilitate improved functional activity tolerance. Pt will perform all functional transfers and bed mobility mod(I) with good safety awareness. Pt will ambulate 250' mod(I) with LRAD while maintaining good functional dynamic balance. Pt will ascend/descend a FFOS with HR and SUP to reflect the ability to safely navigate the home.     Lolis Albright, PT,DPT

## 2025-03-18 NOTE — CASE MANAGEMENT
Case Management Discharge Planning Note    Patient name Ritika Kwong  Location /413-01 MRN [id number]  : 1955 Date 3/18/2025       Current Admission Date: 3/17/2025  Current Admission Diagnosis:Hypoxia   Patient Active Problem List    Diagnosis Date Noted Date Diagnosed    Dyspnea 2025     Left foot pain 2025     Tachycardia 2025     Hypoxia 2025     Elevated hemidiaphragm 2025     Cutaneous candidiasis 10/03/2023     Gait instability 2023     Elevated MCV 2023     Skin rash 2023     Rash 2023     Type 2 diabetes mellitus with hyperglycemia, with long-term current use of insulin ([facility]) 2023     BMI 33.0-33.9,adult 2019     Iron deficiency anemia, unspecified 2019     Right lower quadrant abdominal pain 2019     Pressure injury of sacral region, stage 3 ([facility]) 2019     Pressure ulcer of left buttock, stage 3 ([facility]) 2019     Pressure ulcer of right buttock, stage 2 ([facility]) 2019     Generalized weakness 2019     Leukopenia 2019     Neutropenia ([facility]) 2019     Low TSH level 2019     Sinus arrhythmia 2019     Lactic acidosis 2019     Recurrent falls 2019     Acute hyponatremia 2019     Hypomagnesemia 2019     Hypotension 2019     Hypertriglyceridemia 2019     Vaginal candidiasis 2019     Urinary incontinence 2018     Obesity due to excess calories 10/31/2018     Hypercholesterolemia 10/31/2018     Ambulatory dysfunction 10/30/2018     NSTEMI (non-ST elevated myocardial infarction) ([facility]) 10/29/2018     Volume overload 10/29/2018     Skin breakdown 10/29/2018     Asthma 2016     Seizure disorder ([facility]) 2014     Dermatitis 2007     Bipolar disorder ([facility]) 2007     Acquired hypothyroidism 2007       LOS (days): 0  Geometric Mean LOS (GMLOS) (days): 2.4  Days to GMLOS:2.3     OBJECTIVE:  Risk of Unplanned Readmission  Score: 17.25         Current admission status: Inpatient   Preferred Pharmacy:   [id number] - [place], [place] - [address]  [address] [address] [address]  Phone: [phone] Fax: [phone]    [facility] [place] - [address]  [address] [facility] [address]  Phone: [phone] Fax: [phone]    Primary Care Provider: Valeria Burns    Primary Insurance: [facility]  Secondary Insurance: [employer] [facility]    DISCHARGE DETAILS:  Spoke with Ángel at [facility] who looked in their system and pt got a hospital bed in 2023 and not eligible under her insurance for a new gel overlay for a period of 5 years.

## 2025-03-18 NOTE — PLAN OF CARE
Problem: Prexisting or High Potential for Compromised Skin Integrity  Goal: Skin integrity is maintained or improved  Description: INTERVENTIONS:  - Identify patients at risk for skin breakdown  - Assess and monitor skin integrity  - Assess and monitor nutrition and hydration status  - Monitor labs   - Assess for incontinence   - Turn and reposition patient  - Assist with mobility/ambulation  - Relieve pressure over bony prominences  - Avoid friction and shearing  - Provide appropriate hygiene as needed including keeping skin clean and dry  - Evaluate need for skin moisturizer/barrier cream  - Collaborate with interdisciplinary team   - Patient/family teaching  - Consider wound care consult   Outcome: Progressing     Problem: PAIN - ADULT  Goal: Verbalizes/displays adequate comfort level or baseline comfort level  Description: Interventions:  - Encourage patient to monitor pain and request assistance  - Assess pain using appropriate pain scale  - Administer analgesics based on type and severity of pain and evaluate response  - Implement non-pharmacological measures as appropriate and evaluate response  - Consider cultural and social influences on pain and pain management  - Notify physician/advanced practitioner if interventions unsuccessful or patient reports new pain  Outcome: Progressing     Problem: INFECTION - ADULT  Goal: Absence or prevention of progression during hospitalization  Description: INTERVENTIONS:  - Assess and monitor for signs and symptoms of infection  - Monitor lab/diagnostic results  - Monitor all insertion sites, i.e. indwelling lines, tubes, and drains  - Monitor endotracheal if appropriate and nasal secretions for changes in amount and color  - [place] appropriate cooling/warming therapies per order  - Administer medications as ordered  - Instruct and encourage patient and family to use good hand hygiene technique  - Identify and instruct in appropriate isolation precautions for  identified infection/condition  Outcome: Progressing  Goal: Absence of fever/infection during neutropenic period  Description: INTERVENTIONS:  - Monitor WBC    Outcome: Progressing     Problem: SAFETY ADULT  Goal: Patient will remain free of falls  Description: INTERVENTIONS:  - Educate patient/family on patient safety including physical limitations  - Instruct patient to call for assistance with activity   - Consult OT/PT to assist with strengthening/mobility   - Keep Call bell within reach  - Keep bed low and locked with side rails adjusted as appropriate  - Keep care items and personal belongings within reach  - Initiate and maintain comfort rounds  - Make Fall Risk Sign visible to staff  - Offer Toileting every 2 Hours, in advance of need  - Initiate/Maintain bed/ chair alarm  - Obtain necessary fall risk management equipment:   - Apply yellow socks and bracelet for high fall risk patients  - Consider moving patient to room near nurses station  Outcome: Progressing  Goal: Maintain or return to baseline ADL function  Description: INTERVENTIONS:  -  Assess patient's ability to carry out ADLs; assess patient's baseline for ADL function and identify physical deficits which impact ability to perform ADLs (bathing, care of mouth/teeth, toileting, grooming, dressing, etc.)  - Assess/evaluate cause of self-care deficits   - Assess range of motion  - Assess patient's mobility; develop plan if impaired  - Assess patient's need for assistive devices and provide as appropriate  - Encourage maximum independence but intervene and supervise when necessary  - Involve family in performance of ADLs  - Assess for home care needs following discharge   - Consider OT consult to assist with ADL evaluation and planning for discharge  - Provide patient education as appropriate  Outcome: Progressing  Goal: Maintains/Returns to pre admission functional level  Description: INTERVENTIONS:  - Perform AM-PAC 6 Click Basic Mobility/ Daily  Activity assessment daily.  - Set and communicate daily mobility goal to care team and patient/family/caregiver.   - Collaborate with rehabilitation services on mobility goals if consulted  - Perform Range of Motion 3 times a day.  - Reposition patient every 2 hours.  - Dangle patient 3 times a day  - Stand patient 3 times a day  - Ambulate patient 3 times a day  - Out of bed to chair 3 times a day   - Out of bed for meals 3 times a day  - Out of bed for toileting  - Record patient progress and toleration of activity level   Outcome: Progressing     Problem: DISCHARGE PLANNING  Goal: Discharge to home or other facility with appropriate resources  Description: INTERVENTIONS:  - Identify barriers to discharge w/patient and caregiver  - Arrange for needed discharge resources and transportation as appropriate  - Identify discharge learning needs (meds, wound care, etc.)  - Arrange for interpretive services to assist at discharge as needed  - Refer to Case Management Department for coordinating discharge planning if the patient needs post-hospital services based on physician/advanced practitioner order or complex needs related to functional status, cognitive ability, or social support system  Outcome: Progressing     Problem: Knowledge Deficit  Goal: Patient/family/caregiver demonstrates understanding of disease process, treatment plan, medications, and discharge instructions  Description: Complete learning assessment and assess knowledge base.  Interventions:  - Provide teaching at level of understanding  - Provide teaching via preferred learning methods  Outcome: Progressing

## 2025-03-18 NOTE — CASE MANAGEMENT
Case Management Discharge Planning Note    Patient name Ritika Kwong  Location /413-01 MRN [id number]  : 1955 Date 3/18/2025       Current Admission Date: 3/17/2025  Current Admission Diagnosis:Hypoxia   Patient Active Problem List    Diagnosis Date Noted Date Diagnosed    Dyspnea 2025     Left foot pain 2025     Tachycardia 2025     Hypoxia 2025     Elevated hemidiaphragm 2025     Cutaneous candidiasis 10/03/2023     Gait instability 2023     Elevated MCV 2023     Skin rash 2023     Rash 2023     Type 2 diabetes mellitus with hyperglycemia, with long-term current use of insulin ([facility]) 2023     BMI 33.0-33.9,adult 2019     Iron deficiency anemia, unspecified 2019     Right lower quadrant abdominal pain 2019     Pressure injury of sacral region, stage 3 ([facility]) 2019     Pressure ulcer of left buttock, stage 3 ([facility]) 2019     Pressure ulcer of right buttock, stage 2 ([facility]) 2019     Generalized weakness 2019     Leukopenia 2019     Neutropenia ([facility]) 2019     Low TSH level 2019     Sinus arrhythmia 2019     Lactic acidosis 2019     Recurrent falls 2019     Acute hyponatremia 2019     Hypomagnesemia 2019     Hypotension 2019     Hypertriglyceridemia 2019     Vaginal candidiasis 2019     Urinary incontinence 2018     Obesity due to excess calories 10/31/2018     Hypercholesterolemia 10/31/2018     Ambulatory dysfunction 10/30/2018     NSTEMI (non-ST elevated myocardial infarction) ([facility]) 10/29/2018     Volume overload 10/29/2018     Skin breakdown 10/29/2018     Asthma 2016     Seizure disorder ([facility]) 2014     Dermatitis 2007     Bipolar disorder ([facility]) 2007     Acquired hypothyroidism 2007       LOS (days): 0  Geometric Mean LOS (GMLOS) (days): 2.4  Days to GMLOS:2.3     OBJECTIVE:  Risk of Unplanned Readmission  Score: 17.25         Current admission status: Inpatient   Preferred Pharmacy:   [id number] - [place], [place] - [address]  [address]  [place] [address]  Phone: [phone] Fax: [phone]    [facility], [place] - [address]  [address] [facility] [place] [address]  Phone: [phone] Fax: [phone]    Primary Care Provider: Valeria Burns    Primary Insurance: [facility]  Secondary Insurance: [employer] [facility]    DISCHARGE DETAILS:  CM followed up with pt re: STR on dc. Pt declining. States in the past 5 years she's been in 3 SNF's. Pt plans on returning home with hhc; requesting a new wc and a new gel over lay for her hospital bed. Pt states her bed is from [facility]. CM will put in order in [place] for DME and blanket referrals to hhc agencies in [facility].

## 2025-03-18 NOTE — PROGRESS NOTES
Progress Note - Pulmonology   Name: Ritika Kwong 69 y.o. female I MRN: [id number]  Unit/Bed#: 413-01 I Date of Admission: 3/17/2025   Date of Service: 3/18/2025 I Hospital Day: 0    Assessment & Plan  Acute respiratory failure with hypoxia (HCC)  Improving- weaned to room air in my presence without desaturations.   No oxygen requirement at baseline.  Maintain saturations greater than or equal to 88%.  Pulmonary toilet:  Increase activity as tolerated, out of bed as tolerated, cough and deep breathing exercises encourage, incentive spirometry q.1 hour  Will need home O2 eval prior to discharge.   Dyspnea  Patient had spirometry in 2010 that was normal. She does not have emphysema on CT imaging. She is a life long nonsmoker. It is very unlikely that she has COPD.   She may have asthma but it does not appear to be in exacerbation currently.   Discontinue systemic steroids.   May continue prn albuterol   CTA chest PE study unremarkable aside from mosaic attenuation of the bilateral lung (R>L) which could be due to air trapping vs V/Q mismatch in the setting of pulmonary hypertension   Follow up echocardiogram to assess further.   Recommend repeat PFTs as outpatient   Elevated hemidiaphragm  Noted   Will need outpatient Sniff test.     24 Hour Events : none reported   Subjective : Patient was seen and examined today. No overnight events reported. Patient states that her breathing feels better today. Denies cough. No wheeze. SOB at baseline.     Objective :  Temp:  [97.2 °F (36.2 °C)-97.5 °F (36.4 °C)] 97.2 °F (36.2 °C)  HR:  [] 112  BP: (125-137)/(76-82) 134/82  Resp:  [16-18] 18  SpO2:  [83 %-95 %] 83 %  O2 Device: Nasal cannula  Nasal Cannula O2 Flow Rate (L/min):  [1 L/min-2 L/min] 1 L/min    Physical Exam  Constitutional:       Appearance: Normal appearance. She is well-developed.   HENT:      Head: Normocephalic and atraumatic.      Nose: Nose normal.      Mouth/Throat:      Mouth: Mucous membranes are moist.    Eyes:      Extraocular Movements: Extraocular movements intact.   Cardiovascular:      Rate and Rhythm: Normal rate and regular rhythm.      Heart sounds: Normal heart sounds.   Pulmonary:      Effort: Pulmonary effort is normal. No respiratory distress.      Breath sounds: No wheezing, rhonchi or rales.      Comments: Diminished but otherwise CTA bilaterally  Musculoskeletal:         General: No swelling.   Skin:     General: Skin is warm and dry.   Neurological:      Mental Status: She is alert. Mental status is at baseline.   Psychiatric:         Mood and Affect: Mood normal.         Behavior: Behavior normal.           Lab Results: I have reviewed the following results:   .     03/18/25  0445   WBC 5.00   HGB 14.2   HCT 42.7      SODIUM 139   K 3.9   CL 99   CO2 28   BUN 15   CREATININE 0.59*   GLUC 132   MG 1.9   PHOS 4.9*   AST 17   ALT 18   ALB 4.0   TBILI 0.23   ALKPHOS 56     ABG: No new results in last 24 hours.    Imaging Results Review: I personally reviewed the following image studies in PACS and associated radiology reports: CTA chest PE study . My interpretation of the radiology images/reports is: No PE but there is mild bibasilar atelectasis and mosaic attenuation of bilateral lobes right greater than left.    Echo pending

## 2025-03-18 NOTE — PLAN OF CARE
Problem: PAIN - ADULT  Goal: Verbalizes/displays adequate comfort level or baseline comfort level  Description: Interventions:  - Encourage patient to monitor pain and request assistance  - Assess pain using appropriate pain scale  - Administer analgesics based on type and severity of pain and evaluate response  - Implement non-pharmacological measures as appropriate and evaluate response  - Consider cultural and social influences on pain and pain management  - Notify physician/advanced practitioner if interventions unsuccessful or patient reports new pain  Outcome: Progressing     Problem: SAFETY ADULT  Goal: Patient will remain free of falls  Description: INTERVENTIONS:  - Educate patient/family on patient safety including physical limitations  - Instruct patient to call for assistance with activity   - Consult OT/PT to assist with strengthening/mobility   - Keep Call bell within reach  - Keep bed low and locked with side rails adjusted as appropriate  - Keep care items and personal belongings within reach  - Initiate and maintain comfort rounds  - Make Fall Risk Sign visible to staff  - Offer Toileting every 2 Hours, in advance of need  - Initiate/Maintain bed/chair alarm  - Obtain necessary fall risk management equipment: alarms  - Apply yellow socks and bracelet for high fall risk patients  - Consider moving patient to room near nurses station  Outcome: Progressing  Goal: Maintain or return to baseline ADL function  Description: INTERVENTIONS:  -  Assess patient's ability to carry out ADLs; assess patient's baseline for ADL function and identify physical deficits which impact ability to perform ADLs (bathing, care of mouth/teeth, toileting, grooming, dressing, etc.)  - Assess/evaluate cause of self-care deficits   - Assess range of motion  - Assess patient's mobility; develop plan if impaired  - Assess patient's need for assistive devices and provide as appropriate  - Encourage maximum independence but  intervene and supervise when necessary  - Involve family in performance of ADLs  - Assess for home care needs following discharge   - Consider OT consult to assist with ADL evaluation and planning for discharge  - Provide patient education as appropriate  Outcome: Progressing

## 2025-03-18 NOTE — PROGRESS NOTES
Progress Note - Hospitalist   Name: Ritika Kwong 69 y.o. female I MRN: [id number]  Unit/Bed#: 413-01 I Date of Admission: 3/17/2025   Date of Service: 3/18/2025 I Hospital Day: 0    Assessment & Plan  Hypoxia  Found to have an oxygen saturation level of 83% on room air  Currently requiring 1 Lpm of continuous supplemental oxygen to maintain oxygen saturation levels at 90% and above  Incentive spirometry  Respiratory protocol  I appreciate Pulmonology's input.  CTA chest PE study negative for PE or other acute intrathoracic pathoogy   Tachycardia  Utilize telemetry monitoring  Check a transthoracic echocardiogram  CTA negative for PE  TSH wnl   Seizure disorder (HCC)  Continue Dilantin, Keppra, and Zonegran  Outpatient Neurology evaluation  Type 2 diabetes mellitus with hyperglycemia, with long-term current use of insulin (HCC)  Lab Results   Component Value Date    HGBA1C 6.2 (H) 03/17/2025     Continue Humalog 5 Units TID with meals  Check a new HgbA1c level  ISS with blood glucose monitoring ACHS  Hypoglycemia protocol  Outpatient Endocrinology evaluation  Hypomagnesemia  Received magnesium sulfate 2 grams IV x 1 dose on 03/17/2025  Follow the magnesium level    Results from last 7 days   Lab Units 03/18/25  0445 03/17/25  0227   MAGNESIUM mg/dL 1.9 1.8*       Acquired hypothyroidism  TSH wnl  Continue PO levothyroxine  Elevated hemidiaphragm  I appreciate Pulmonology's input.  For outpatient Sniff test    VTE Pharmacologic Prophylaxis:   lovenox    Mobility:   Basic Mobility Inpatient Raw Score: 17  JH-HLM Goal: 5: Stand one or more mins  JH-HLM Achieved: 6: Walk 10 steps or more  JH-HLM Goal achieved. Continue to encourage appropriate mobility.    Patient Centered Rounds: I performed bedside rounds with nursing staff today.   Discussions with Specialists or Other Care Team Provider: case management    Education and Discussions with Family / Patient: Patient declined call to .     Current Length of  Stay: 0 day(s)  Current Patient Status: Inpatient   Certification Statement: The patient will continue to require additional inpatient hospital stay due to hypoxia, tachycardia, pending echocardiogram  Discharge Plan: Anticipate discharge tomorrow to home.    Code Status: Level 3 - DNAR and DNI    Subjective   Patient reports her breathing does feel somewhat better today. Denies chest pain. No cough or wheeze.    Objective :  Temp:  [97.3 °F (36.3 °C)-97.5 °F (36.4 °C)] 97.3 °F (36.3 °C)  HR:  [] 100  BP: (125-137)/(76-80) 132/79  Resp:  [16-18] 18  SpO2:  [91 %-95 %] 91 %  O2 Device: Nasal cannula  Nasal Cannula O2 Flow Rate (L/min):  [1 L/min-2 L/min] 1 L/min    Body mass index is 35.56 kg/m².     Input and Output Summary (last 24 hours):     Intake/Output Summary (Last 24 hours) at 3/18/2025 1237  Last data filed at 3/18/2025 1151  Gross per 24 hour   Intake 590 ml   Output 250 ml   Net 340 ml       Physical Exam  Constitutional:       General: She is not in acute distress.  HENT:      Head: Normocephalic and atraumatic.   Cardiovascular:      Rate and Rhythm: Regular rhythm. Tachycardia present.   Pulmonary:      Effort: Pulmonary effort is normal. No respiratory distress.      Breath sounds: Normal breath sounds. No wheezing or rales.   Musculoskeletal:      Right lower leg: No edema.      Left lower leg: No edema.   Skin:     General: Skin is warm.   Neurological:      General: No focal deficit present.      Mental Status: She is alert.           Lines/Drains:        Telemetry:  Telemetry Orders (From admission, onward)               24 Hour Telemetry Monitoring  Continuous x 24 Hours ([place])        Expiring   Question:  Reason for 24 Hour Telemetry  Answer:  Arrhythmias requiring acute medical intervention / PPM or ICD malfunction                     Telemetry Reviewed: Sinus Tachycardia  Indication for Continued Telemetry Use: Arrthymias requiring medical therapy               Lab Results: I have  reviewed the following results:   Results from last 7 days   Lab Units 03/18/25  0445   WBC Thousand/uL 5.00   HEMOGLOBIN g/dL 14.2   HEMATOCRIT % 42.7   PLATELETS Thousands/uL 270   SEGS PCT % 65   LYMPHO PCT % 24   MONO PCT % 7   EOS PCT % 2     Results from last 7 days   Lab Units 03/18/25  0445   SODIUM mmol/L 139   POTASSIUM mmol/L 3.9   CHLORIDE mmol/L 99   CO2 mmol/L 28   BUN mg/dL 15   CREATININE mg/dL 0.59*   ANION GAP mmol/L 12   CALCIUM mg/dL 9.6   ALBUMIN g/dL 4.0   TOTAL BILIRUBIN mg/dL 0.23   ALK PHOS U/L 56   ALT U/L 18   AST U/L 17   GLUCOSE RANDOM mg/dL 132         Results from last 7 days   Lab Units 03/18/25  1115 03/18/25  0724 03/18/25  0239 03/17/25  2117 03/17/25  1610 03/17/25  1044 03/17/25  0740   POC GLUCOSE mg/dl 234* 113 173* 149* 156* 142* 103     Results from last 7 days   Lab Units 03/17/25  0625   HEMOGLOBIN A1C % 6.2*     Results from last 7 days   Lab Units 03/17/25  0227   PROCALCITONIN ng/ml <0.05       Recent Cultures (last 7 days):         Imaging Results Review: I reviewed radiology reports from this admission including: chest xray and CT chest.  Other Study Results Review: EKG was reviewed.     Last 24 Hours Medication List:     Current Facility-Administered Medications:     acetaminophen (TYLENOL) tablet 650 mg, Q6H PRN    albuterol (PROVENTIL HFA,VENTOLIN HFA) inhaler 2 puff, Q6H PRN    Artificial Tears Op Soln 1 drop, TID    aspirin (ECOTRIN LOW STRENGTH) EC tablet 81 mg, Daily    atorvastatin (LIPITOR) tablet 40 mg, Daily With Dinner    Diclofenac Sodium (VOLTAREN) 1 % topical gel 2 g, 4x Daily    docusate sodium (COLACE) capsule 100 mg, BID PRN    enoxaparin (LOVENOX) subcutaneous injection 40 mg, Q24H    ferrous sulfate tablet 325 mg, Daily With Breakfast    fluticasone (FLONASE) 50 mcg/act nasal spray 1 spray, BID    guaiFENesin (MUCINEX) 12 hr tablet 600 mg, Q12H ERI    insulin lispro (HumALOG/ADMELOG) 100 units/mL subcutaneous injection 1-5 Units, HS    insulin  lispro (HumALOG/ADMELOG) 100 units/mL subcutaneous injection 5 Units, TID With Meals    levETIRAcetam (KEPPRA) tablet 1,000 mg, BID    levothyroxine tablet 175 mcg, Early Morning    meloxicam (MOBIC) tablet 7.5 mg, Daily    methylPREDNISolone sodium succinate (Solu-MEDROL) injection 40 mg, Q12H ERI    metoprolol (LOPRESSOR) injection 5 mg, Q6H PRN    montelukast (SINGULAIR) tablet 10 mg, HS    multivitamin stress formula tablet 1 tablet, Daily    ondansetron (ZOFRAN) injection 4 mg, Q6H PRN    oxybutynin (DITROPAN) tablet 5 mg, TID    pantoprazole (PROTONIX) EC tablet 40 mg, Early Morning    [START ON 3/19/2025] phenytoin (DILANTIN) ER capsule 300 mg, Daily **AND** [START ON 3/19/2025] phenytoin (DILANTIN) ER capsule 200 mg, Daily With Lunch    QUEtiapine (SEROquel) tablet 400 mg, BID    valACYclovir (VALTREX) tablet 500 mg, BID    zonisamide (ZONEGRAN) capsule 200 mg, BID    Administrative Statements   Today, Patient Was Seen By: Angelica Cobb PA-C      **Please Note: This note may have been constructed using a voice recognition system.**

## 2025-03-18 NOTE — ASSESSMENT & PLAN NOTE
Improving- weaned to room air in my presence without desaturations.   No oxygen requirement at baseline.  Maintain saturations greater than or equal to 88%.  Pulmonary toilet:  Increase activity as tolerated, out of bed as tolerated, cough and deep breathing exercises encourage, incentive spirometry q.1 hour  Will need home O2 eval prior to discharge.

## 2025-03-18 NOTE — ASSESSMENT & PLAN NOTE
Patient had spirometry in 2010 that was normal. She does not have emphysema on CT imaging. She is a life long nonsmoker. It is very unlikely that she has COPD.   She may have asthma but it does not appear to be in exacerbation currently.   Discontinue systemic steroids.   May continue prn albuterol   CTA chest PE study unremarkable aside from mosaic attenuation of the bilateral lung (R>L) which could be due to air trapping vs V/Q mismatch in the setting of pulmonary hypertension   Follow up echocardiogram to assess further.   Recommend repeat PFTs as outpatient

## 2025-03-19 VITALS
RESPIRATION RATE: 17 BRPM | WEIGHT: 227.07 LBS | SYSTOLIC BLOOD PRESSURE: 156 MMHG | DIASTOLIC BLOOD PRESSURE: 89 MMHG | TEMPERATURE: 97.6 F | BODY MASS INDEX: 35.64 KG/M2 | HEART RATE: 105 BPM | HEIGHT: 67 IN | OXYGEN SATURATION: 94 %

## 2025-03-19 PROBLEM — J45.30 MILD PERSISTENT ASTHMA WITHOUT COMPLICATION: Status: ACTIVE | Noted: 2025-03-17

## 2025-03-19 LAB
ANION GAP SERPL CALCULATED.3IONS-SCNC: 11 MMOL/L (ref 4–13)
BACTERIA UR CULT: ABNORMAL
BASOPHILS # BLD AUTO: 0.05 THOUSANDS/ÂΜL (ref 0–0.1)
BASOPHILS NFR BLD AUTO: 1 % (ref 0–1)
BUN SERPL-MCNC: 15 MG/DL (ref 5–25)
CALCIUM SERPL-MCNC: 9.4 MG/DL (ref 8.4–10.2)
CHLORIDE SERPL-SCNC: 101 MMOL/L (ref 96–108)
CO2 SERPL-SCNC: 27 MMOL/L (ref 21–32)
CREAT SERPL-MCNC: 0.53 MG/DL (ref 0.6–1.3)
EOSINOPHIL # BLD AUTO: 0.26 THOUSAND/ÂΜL (ref 0–0.61)
EOSINOPHIL NFR BLD AUTO: 6 % (ref 0–6)
ERYTHROCYTE [DISTWIDTH] IN BLOOD BY AUTOMATED COUNT: 12.4 % (ref 11.6–15.1)
GFR SERPL CREATININE-BSD FRML MDRD: 98 ML/MIN/1.73SQ M
GLUCOSE SERPL-MCNC: 136 MG/DL (ref 65–140)
GLUCOSE SERPL-MCNC: 157 MG/DL (ref 65–140)
GLUCOSE SERPL-MCNC: 191 MG/DL (ref 65–140)
HCT VFR BLD AUTO: 42.8 % (ref 34.8–46.1)
HGB BLD-MCNC: 14 G/DL (ref 11.5–15.4)
IMM GRANULOCYTES # BLD AUTO: 0.03 THOUSAND/UL (ref 0–0.2)
IMM GRANULOCYTES NFR BLD AUTO: 1 % (ref 0–2)
LYMPHOCYTES # BLD AUTO: 1.54 THOUSANDS/ÂΜL (ref 0.6–4.47)
LYMPHOCYTES NFR BLD AUTO: 36 % (ref 14–44)
MCH RBC QN AUTO: 33.3 PG (ref 26.8–34.3)
MCHC RBC AUTO-ENTMCNC: 32.7 G/DL (ref 31.4–37.4)
MCV RBC AUTO: 102 FL (ref 82–98)
MONOCYTES # BLD AUTO: 0.44 THOUSAND/ÂΜL (ref 0.17–1.22)
MONOCYTES NFR BLD AUTO: 10 % (ref 4–12)
NEUTROPHILS # BLD AUTO: 1.97 THOUSANDS/ÂΜL (ref 1.85–7.62)
NEUTS SEG NFR BLD AUTO: 46 % (ref 43–75)
NRBC BLD AUTO-RTO: 0 /100 WBCS
PLATELET # BLD AUTO: 254 THOUSANDS/UL (ref 149–390)
PMV BLD AUTO: 9.1 FL (ref 8.9–12.7)
POTASSIUM SERPL-SCNC: 3.3 MMOL/L (ref 3.5–5.3)
RBC # BLD AUTO: 4.2 MILLION/UL (ref 3.81–5.12)
SODIUM SERPL-SCNC: 139 MMOL/L (ref 135–147)
WBC # BLD AUTO: 4.29 THOUSAND/UL (ref 4.31–10.16)

## 2025-03-19 PROCEDURE — 94761 N-INVAS EAR/PLS OXIMETRY MLT: CPT

## 2025-03-19 PROCEDURE — 94760 N-INVAS EAR/PLS OXIMETRY 1: CPT

## 2025-03-19 PROCEDURE — 97116 GAIT TRAINING THERAPY: CPT

## 2025-03-19 PROCEDURE — 97530 THERAPEUTIC ACTIVITIES: CPT

## 2025-03-19 PROCEDURE — 97110 THERAPEUTIC EXERCISES: CPT

## 2025-03-19 PROCEDURE — 80048 BASIC METABOLIC PNL TOTAL CA: CPT

## 2025-03-19 PROCEDURE — 82948 REAGENT STRIP/BLOOD GLUCOSE: CPT

## 2025-03-19 PROCEDURE — 99239 HOSP IP/OBS DSCHRG MGMT >30: CPT | Performed by: PHYSICIAN ASSISTANT

## 2025-03-19 PROCEDURE — 99232 SBSQ HOSP IP/OBS MODERATE 35: CPT

## 2025-03-19 PROCEDURE — 85025 COMPLETE CBC W/AUTO DIFF WBC: CPT

## 2025-03-19 PROCEDURE — 97535 SELF CARE MNGMENT TRAINING: CPT

## 2025-03-19 RX ORDER — FLUTICASONE FUROATE AND VILANTEROL 100; 25 UG/1; UG/1
1 POWDER RESPIRATORY (INHALATION) DAILY
Qty: 60 BLISTER | Refills: 0 | Status: SHIPPED | OUTPATIENT
Start: 2025-03-20

## 2025-03-19 RX ORDER — FLUTICASONE FUROATE AND VILANTEROL 100; 25 UG/1; UG/1
1 POWDER RESPIRATORY (INHALATION) DAILY
Status: DISCONTINUED | OUTPATIENT
Start: 2025-03-19 | End: 2025-03-19 | Stop reason: HOSPADM

## 2025-03-19 RX ORDER — ALBUTEROL SULFATE 90 UG/1
2 INHALANT RESPIRATORY (INHALATION) EVERY 6 HOURS PRN
Qty: 8.5 G | Refills: 0 | Status: SHIPPED | OUTPATIENT
Start: 2025-03-19

## 2025-03-19 RX ORDER — POTASSIUM CHLORIDE 1500 MG/1
40 TABLET, EXTENDED RELEASE ORAL ONCE
Status: COMPLETED | OUTPATIENT
Start: 2025-03-19 | End: 2025-03-19

## 2025-03-19 RX ORDER — METOPROLOL TARTRATE 25 MG/1
12.5 TABLET, FILM COATED ORAL EVERY 12 HOURS SCHEDULED
Qty: 30 TABLET | Refills: 0 | Status: SHIPPED | OUTPATIENT
Start: 2025-03-19

## 2025-03-19 RX ORDER — FUROSEMIDE 10 MG/ML
20 INJECTION INTRAMUSCULAR; INTRAVENOUS ONCE
Status: COMPLETED | OUTPATIENT
Start: 2025-03-19 | End: 2025-03-19

## 2025-03-19 RX ADMIN — ATORVASTATIN CALCIUM 40 MG: 40 TABLET, FILM COATED ORAL at 16:15

## 2025-03-19 RX ADMIN — FLUTICASONE FUROATE AND VILANTEROL TRIFENATATE 1 PUFF: 100; 25 POWDER RESPIRATORY (INHALATION) at 12:10

## 2025-03-19 RX ADMIN — QUETIAPINE FUMARATE 400 MG: 100 TABLET ORAL at 08:41

## 2025-03-19 RX ADMIN — LEVETIRACETAM 1000 MG: 500 TABLET, FILM COATED ORAL at 08:41

## 2025-03-19 RX ADMIN — VALACYCLOVIR 500 MG: 500 TABLET, FILM COATED ORAL at 08:42

## 2025-03-19 RX ADMIN — OXYBUTYNIN CHLORIDE 5 MG: 5 TABLET ORAL at 08:42

## 2025-03-19 RX ADMIN — INSULIN LISPRO 5 UNITS: 100 INJECTION, SOLUTION INTRAVENOUS; SUBCUTANEOUS at 12:11

## 2025-03-19 RX ADMIN — FLUTICASONE PROPIONATE 1 SPRAY: 50 SPRAY, METERED NASAL at 08:42

## 2025-03-19 RX ADMIN — ACETAMINOPHEN 650 MG: 325 TABLET, FILM COATED ORAL at 16:15

## 2025-03-19 RX ADMIN — Medication 12.5 MG: at 14:03

## 2025-03-19 RX ADMIN — PHENYTOIN SODIUM 300 MG: 100 CAPSULE, EXTENDED RELEASE ORAL at 08:41

## 2025-03-19 RX ADMIN — DICLOFENAC SODIUM 2 G: 10 GEL TOPICAL at 08:43

## 2025-03-19 RX ADMIN — OXYBUTYNIN CHLORIDE 5 MG: 5 TABLET ORAL at 12:11

## 2025-03-19 RX ADMIN — PHENYTOIN SODIUM 200 MG: 100 CAPSULE, EXTENDED RELEASE ORAL at 12:10

## 2025-03-19 RX ADMIN — DEXTRAN 70, GLYCERIN, HYPROMELLOSE 1 DROP: 1; 2; 3 SOLUTION/ DROPS OPHTHALMIC at 08:42

## 2025-03-19 RX ADMIN — FUROSEMIDE 20 MG: 10 INJECTION, SOLUTION INTRAMUSCULAR; INTRAVENOUS at 09:29

## 2025-03-19 RX ADMIN — LEVOTHYROXINE SODIUM 175 MCG: 175 TABLET ORAL at 05:16

## 2025-03-19 RX ADMIN — METOROPROLOL TARTRATE 5 MG: 5 INJECTION, SOLUTION INTRAVENOUS at 03:51

## 2025-03-19 RX ADMIN — ENOXAPARIN SODIUM 40 MG: 40 INJECTION SUBCUTANEOUS at 14:02

## 2025-03-19 RX ADMIN — FERROUS SULFATE TAB 325 MG (65 MG ELEMENTAL FE) 325 MG: 325 (65 FE) TAB at 08:41

## 2025-03-19 RX ADMIN — DICLOFENAC SODIUM 2 G: 10 GEL TOPICAL at 12:10

## 2025-03-19 RX ADMIN — PANTOPRAZOLE SODIUM 40 MG: 40 TABLET, DELAYED RELEASE ORAL at 05:17

## 2025-03-19 RX ADMIN — MELOXICAM 7.5 MG: 7.5 TABLET ORAL at 08:41

## 2025-03-19 RX ADMIN — GUAIFENESIN 600 MG: 600 TABLET ORAL at 08:41

## 2025-03-19 RX ADMIN — ZONISAMIDE 200 MG: 100 CAPSULE ORAL at 08:42

## 2025-03-19 RX ADMIN — ASPIRIN 81 MG: 81 TABLET, COATED ORAL at 08:41

## 2025-03-19 RX ADMIN — B-COMPLEX W/ C & FOLIC ACID TAB 1 TABLET: TAB at 08:41

## 2025-03-19 RX ADMIN — INSULIN LISPRO 5 UNITS: 100 INJECTION, SOLUTION INTRAVENOUS; SUBCUTANEOUS at 08:43

## 2025-03-19 RX ADMIN — POTASSIUM CHLORIDE 40 MEQ: 1500 TABLET, EXTENDED RELEASE ORAL at 14:03

## 2025-03-19 NOTE — PLAN OF CARE
Problem: PHYSICAL THERAPY ADULT  Goal: Performs mobility at highest level of function for planned discharge setting.  See evaluation for individualized goals.  Description: Treatment/Interventions: Functional transfer training, LE strengthening/ROM, Therapeutic exercise, Endurance training, Bed mobility, Gait training          See flowsheet documentation for full assessment, interventions and recommendations.  Outcome: Progressing  Note: Prognosis: Good  Problem List: Decreased strength, Decreased endurance, Impaired balance, Decreased mobility  Assessment: Pt seen this date for PT treatment session to increase level of mobility and functional activity tolerance. PT treatment session consisting of  therapeutic exercises, toilet transfers,  transfers and  gait training. Pt. Currently performing bed mobility, tx and ambulation at  SUP  x 1 level of function with use of RW . In comparison to previous session, Pt. With no change  activity tolerance due to elevated HR.  Pt is in need of continued activity in PT to improve strength balance endurance mobility transfers and ambulation with return to maximize LOF. From PT/mobility standpoint, recommendation at time of d/c would be Level III:  minimal resource intensity  in order to promote return to PLOF and independence.  The patient's AM-[facility] Basic Mobility Inpatient Short Form Raw Score is 18. A Raw score of greater than 16 suggests the patient may benefit from discharge to home. Please also refer to the recommendation of the Physical Therapist for safe discharge planning. Pt continues to be functioning below baseline level. PT will continue to see pt during current hospitalization in order to address the deficits listed above and provide interventions consistent w/ POC in effort to achieve goals.        Rehab Resource Intensity Level, PT: III (Minimum Resource Intensity)    See flowsheet documentation for full assessment.

## 2025-03-19 NOTE — UTILIZATION REVIEW
NOTIFICATION OF INPATIENT ADMISSION   AUTHORIZATION REQUEST   SERVICING FACILITY:   [place] [address]  Tax ID:  [id number]  NPI: [id number] ATTENDING PROVIDER:  Attending Name and NPI#: Arian Christensen Do [[id number]]  Address: [address] [place] [address]  Phone: [phone]     ADMISSION INFORMATION:  Place of Service: Inpatient [facility]  Place of Service Code: 21  Inpatient Admission Date/Time: 3/18/25 11:44 AM  Discharge Date/Time: No discharge date for patient encounter.  Admitting Diagnosis Code/Description:  Foot pain [M79.673]  Tachycardia [R00.0]  Rash, skin [R21]  Hypoxia [R09.02]  Paresthesia of both lower extremities [R20.2]     UTILIZATION REVIEW CONTACT:  Carlo Dean Utilization   Network Utilization Review Department  Phone: [phone]  Fax [phone]  Email: [email or web address]  Contact for approvals/pending authorizations, clinical reviews, and discharge.     PHYSICIAN ADVISORY SERVICES:  Medical Necessity Denial & [employer] Review  Phone: [phone]  Fax: [phone]  Email: [email or web address]     DISCHARGE SUPPORT TEAM:  For Patients Discharge Needs & Updates  Phone: [phone] opt. 2 Fax: [phone]  Email: [email or web address]

## 2025-03-19 NOTE — CASE MANAGEMENT
Case Management Discharge Planning Note    Patient name Ritika Dunn /[id number] MRN [id number]  : 1955 Date 3/19/2025       Current Admission Date: 3/17/2025  Current Admission Diagnosis:Acute respiratory failure with hypoxia ([facility])   Patient Active Problem List    Diagnosis Date Noted Date Diagnosed    Mild persistent asthma without complication 2025     Left foot pain 2025     Tachycardia 2025     Acute respiratory failure with hypoxia ([facility]) 2025     Elevated hemidiaphragm 2025     Cutaneous candidiasis 10/03/2023     Gait instability 2023     Elevated MCV 2023     Skin rash 2023     Rash 2023     Type 2 diabetes mellitus with hyperglycemia, with long-term current use of insulin ([facility]) 2023     BMI 33.0-33.9,adult 2019     Iron deficiency anemia, unspecified 2019     Right lower quadrant abdominal pain 2019     Pressure injury of sacral region, stage 3 ([facility]) 2019     Pressure ulcer of left buttock, stage 3 ([facility]) 2019     Pressure ulcer of right buttock, stage 2 ([facility]) 2019     Generalized weakness 2019     Leukopenia 2019     Neutropenia ([facility]) 2019     Low TSH level 2019     Sinus arrhythmia 2019     Lactic acidosis 2019     Recurrent falls 2019     Acute hyponatremia 2019     Hypomagnesemia 2019     Hypotension 2019     Hypertriglyceridemia 2019     Vaginal candidiasis 2019     Urinary incontinence 2018     Obesity due to excess calories 10/31/2018     Hypercholesterolemia 10/31/2018     Ambulatory dysfunction 10/30/2018     NSTEMI (non-ST elevated myocardial infarction) ([facility]) 10/29/2018     Volume overload 10/29/2018     Skin breakdown 10/29/2018     Asthma 2016     Seizure disorder ([facility]) 2014     Dermatitis 2007     Bipolar disorder ([facility]) 2007     Acquired hypothyroidism 2007       LOS  (days): 1  Geometric Mean LOS (GMLOS) (days): 2.4  Days to GMLOS:1.4     OBJECTIVE:  Risk of Unplanned Readmission Score: 17.68         Current admission status: Inpatient   Preferred Pharmacy:   [id number] [place], [place] - [address]  [address] [facility] [address]  Phone: [phone] Fax: [phone]    [place], [place] - [address]   [address] [facility] [address]  Phone: [phone] Fax: [phone]    Primary Care Provider: Valeria Burns    Primary Insurance: [facility] REP  Secondary Insurance: [employer] [facility]    DISCHARGE DETAILS:         Requested Home Health Care         Is the patient interested in HHC at discharge?: Yes  Home Health Discipline requested:: Nursing, Occupational Therapy, Physical Therapy  Home Health Agency Name:: [facility]idential  HHA External Referral Reason (only applicable if external HHA name selected): Services not provided in network or near patient location  Home Health Follow-Up Provider:: PCP  Home Health Services Needed:: Strengthening/Theraputic Exercises to Improve Function, Gait/ADL Training, Evaluate Functional Status and Safety  Homebound Criteria Met:: Uses an Assist Device (i.e. cane, walker, etc)  Supporting Clincal Findings:: Limited Endurance

## 2025-03-19 NOTE — CASE MANAGEMENT
Case Management Discharge Planning Note    Patient name Ritika Dunn /[id number] MRN [id number]  : 1955 Date 3/19/2025       Current Admission Date: 3/17/2025  Current Admission Diagnosis:Acute respiratory failure with hypoxia ([facility])   Patient Active Problem List    Diagnosis Date Noted Date Diagnosed    Dyspnea 2025     Left foot pain 2025     Tachycardia 2025     Acute respiratory failure with hypoxia ([facility]) 2025     Elevated hemidiaphragm 2025     Cutaneous candidiasis 10/03/2023     Gait instability 2023     Elevated MCV 2023     Skin rash 2023     Rash 2023     Type 2 diabetes mellitus with hyperglycemia, with long-term current use of insulin ([facility]) 2023     BMI 33.0-33.9,adult 2019     Iron deficiency anemia, unspecified 2019     Right lower quadrant abdominal pain 2019     Pressure injury of sacral region, stage 3 ([facility]) 2019     Pressure ulcer of left buttock, stage 3 ([facility]) 2019     Pressure ulcer of right buttock, stage 2 ([facility]) 2019     Generalized weakness 2019     Leukopenia 2019     Neutropenia ([facility]) 2019     Low TSH level 2019     Sinus arrhythmia 2019     Lactic acidosis 2019     Recurrent falls 2019     Acute hyponatremia 2019     Hypomagnesemia 2019     Hypotension 2019     Hypertriglyceridemia 2019     Vaginal candidiasis 2019     Urinary incontinence 2018     Obesity due to excess calories 10/31/2018     Hypercholesterolemia 10/31/2018     Ambulatory dysfunction 10/30/2018     NSTEMI (non-ST elevated myocardial infarction) ([facility]) 10/29/2018     Volume overload 10/29/2018     Skin breakdown 10/29/2018     Asthma 2016     Seizure disorder ([facility]) 2014     Dermatitis 2007     Bipolar disorder ([facility]) 2007     Acquired hypothyroidism 2007       LOS (days): 1  Geometric Mean LOS (GMLOS)  (days): 2.4  Days to GMLOS:1.5     OBJECTIVE:  Risk of Unplanned Readmission Score: 17.28         Current admission status: Inpatient   Preferred Pharmacy:   [id number] - [address] [address]  Phone: [phone] Fax: [phone]    [facility] [address] [address]  Phone: [phone] Fax: [phone]    Primary Care Provider: Valeria Burns    Primary Insurance: [employer] REP  [facility]: [facility]    DISCHARGE DETAILS:  WC was approved in [place] by [employer] and they will be reaching out to pt to make arrangements for delivery.

## 2025-03-19 NOTE — PROGRESS NOTES
Progress Note - Pulmonology   Name: Ritika Kwong 69 y.o. female I MRN: [id number]  Unit/Bed#: 413-01 I Date of Admission: 3/17/2025   Date of Service: 3/19/2025 I Hospital Day: 1    Assessment & Plan  Acute respiratory failure with hypoxia (HCC)  Seen on 1L NC   No oxygen requirement at baseline.  Maintain saturations greater than or equal to 88%.  Pulmonary toilet:  Increase activity as tolerated, out of bed as tolerated, cough and deep breathing exercises encourage, incentive spirometry q.1 hour  Will need home O2 eval prior to discharge.   Echo with low-normal EF but will trial Lasix 20 mg IV once today to assess response.    Will need outpatient PFTs   Mild persistent asthma without complication  Patient had spirometry in 2010 that was normal. She does not have emphysema on CT imaging. She is a life long nonsmoker. It is very unlikely that she has COPD.   She may have asthma but it does not appear to be in exacerbation currently.   Discontinue systemic steroids.   Add Breo 100/25 mcg 1 puff daily.   May continue prn albuterol   CTA chest PE study unremarkable aside from mosaic attenuation of the bilateral lung (R>L) which could be due to air trapping   PFTs as outpatient   Elevated hemidiaphragm  Noted   Will need outpatient Sniff test.     24 Hour Events : none reported  Subjective : Patient was seen and examined today. No overnight event reported. She states the tightness in her chest is resolved and her breathing is improved. Denies mucopurulent cough or hemoptysis. No fevers or chills.     Objective :  Temp:  [97.2 °F (36.2 °C)-97.7 °F (36.5 °C)] 97.4 °F (36.3 °C)  HR:  [] 133  BP: (110-144)/(67-93) 117/85  Resp:  [17-18] 17  SpO2:  [83 %-95 %] 93 %  O2 Device: Nasal cannula  Nasal Cannula O2 Flow Rate (L/min):  [1 L/min] 1 L/min    Physical Exam  Vitals reviewed.   Constitutional:       Appearance: Normal appearance. She is well-developed.   HENT:      Head: Normocephalic and atraumatic.      Nose:  Nose normal.      Mouth/Throat:      Mouth: Mucous membranes are moist.   Eyes:      Extraocular Movements: Extraocular movements intact.   Cardiovascular:      Rate and Rhythm: Normal rate and regular rhythm.      Heart sounds: Normal heart sounds.   Pulmonary:      Effort: Pulmonary effort is normal. No respiratory distress.      Breath sounds: No wheezing, rhonchi or rales.   Musculoskeletal:         General: No swelling.   Skin:     General: Skin is warm and dry.   Neurological:      Mental Status: She is alert. Mental status is at baseline.   Psychiatric:         Mood and Affect: Mood normal.         Behavior: Behavior normal.         Lab Results: I have reviewed the following results:   .     03/19/25  0539 03/19/25  0611   WBC  --  4.29*   HGB  --  14.0   HCT  --  42.8   PLT  --  254   SODIUM 139  --    K 3.3*  --      --    CO2 27  --    BUN 15  --    CREATININE 0.53*  --    GLUC 136  --      ABG: No new results in last 24 hours.

## 2025-03-19 NOTE — DISCHARGE SUMMARY
Discharge Summary - Hospitalist   Name: Ritika Kwong 69 y.o. female I MRN: [id number]  Unit/Bed#: 413-01 I Date of Admission: 3/17/2025   Date of Service: 3/19/2025 I Hospital Day: 1     Assessment & Plan  Acute respiratory failure with hypoxia (HCC)  Found to have an oxygen saturation level of 83% on room air  Currently requiring 1 Lpm of continuous supplemental oxygen to maintain oxygen saturation levels at 90% and above  Incentive spirometry  Respiratory protocol  I appreciate Pulmonology's input  CTA chest PE study negative for PE or other acute intrathoracic pathoogy   Home O2 evaluation performed on day of discharge. Patient recommended for 1L of O2 NC continuously. CM assisted with setting up for patient  Tachycardia  Patient reports this has been ongoing for 8 months   Utilize telemetry monitoring  Check a transthoracic echocardiogram - EF is 50%  CTA negative for PE  TSH wnl   Does not have any signs of a fib or a flutter on telemetry monitoring the last 36 hours  She used to take metoprolol but this was discontinued a year ago due to hypotension. She was on 50 mg BID at that time and never followed up with cardiology thereafter  Blood pressures have been stable here. Recommended patient to take 12.5 mg BID and follow up with cardiology in the office for likely holter monitor  Mild persistent asthma without complication  Patient with frequent hypoxic episodes while in the ER  SpO2  89% while at rest in the ER  Ambulatory pulse ox at 86%  Patient reports shortness of breath upon exertion  Not currently on home oxygen  Steroids discontinued by pulmonology  Inhaler regimen at home should be fluticasone/vilanterol and prn albuterol  Pulmonary rehab on discharge  Type 2 diabetes mellitus with hyperglycemia, with long-term current use of insulin ([facility])  Lab Results   Component Value Date    HGBA1C 6.2 (H) 03/17/2025     Continue Humalog 5 Units TID with meals  Check a new HgbA1c level  ISS with blood glucose  monitoring ACHS  Hypoglycemia protocol  Outpatient Endocrinology evaluation  Seizure disorder (HCC)  Continue Dilantin, Keppra, and Zonegran  Outpatient Neurology evaluation  Hypomagnesemia  Received magnesium sulfate 2 grams IV x 1 dose on 03/17/2025  Follow the magnesium level    Results from last 7 days   Lab Units 03/18/25  0445 03/17/25  0227   MAGNESIUM mg/dL 1.9 1.8*       Acquired hypothyroidism  TSH wnl  Continue PO levothyroxine  Elevated hemidiaphragm  I appreciate Pulmonology's input  They will continue to follow her in the office  For outpatient Sniff test  Left foot pain  Presented to the ER for evaluation of complaint of left foot pain  Patient denies recent injury  No evidence of swelling or deformity  Pain medication PRN  OT/PT eval      Medical Problems       Resolved Problems  Date Reviewed: 3/17/2025   None       Discharging Physician / Practitioner: Kyleigh Grajeda PA-C  PCP: Valeria Burns  Admission Date:   Admission Orders (From admission, onward)       Ordered        03/18/25 1143  INPATIENT ADMISSION  Once            03/17/25 0501  Place in Observation  Once                          Discharge Date: 03/19/25    Consultations During Hospital Stay:  pulmonology    Procedures Performed:   none    Significant Findings / Test Results:     CTA chest pe study   Final Result      No pulmonary embolism or acute intrathoracic pathology.      Resident: EBONY ESPINOZA I, the attending radiologist, have reviewed the images and agree with the final report above.      Workstation performed: [id number]         XR chest portable   Final Result      No acute consolidation or congestion      Stable chest radiograph      Workstation performed: [id number]             ECHO    Left Ventricle: Left ventricular cavity size is normal. Wall thickness is normal. The left ventricular ejection fraction is 50%. Systolic function is low normal. Wall motion is normal. Diastolic function is normal.    Right  Ventricle: Systolic function is normal.    Left Atrium: The atrium is moderately dilated.    Right Atrium: The atrium is mildly dilated.    Mitral Valve: There is mild regurgitation.    Tricuspid Valve: There is mild regurgitation.    Incidental Findings:   As above    Test Results Pending at Discharge (will require follow up):   none     Outpatient Tests Requested:  none    Complications:  none    Reason for Admission: hypoxia    Hospital Course:   Ritika Kwong is a 69 y.o. female patient who originally presented to the hospital on 3/17/2025 due to left foot pain as well as shortness of breath.  She was noted to be hypoxic with saturations of 80 requiring oxygen therapy to maintain saturations.  No findings to explain her foot pain which did resolve without any significant interventions.  She was initially placed on IV steroids for a possible asthma exacerbation but this was discontinued by pulmonology they performed a thorough workup without significant findings to explain her hypoxia. They will continue with work up outpatient. Patient maintained in sinus tachycardia throughout her stay in the hospital. Patient notes this has been an ongoing issue for the last 8 months, approximately two months after her metoprolol had been discontinued for hypotension. She never followed with cardiology after this. Recommended to restart metoprolol at a much lower dose than her prior. Highly recommended patient to follow in the cardiology office to continue management and work up as well. She was tested for oxygen therapy for home and requires 1L continuously.       Please see above list of diagnoses and related plan for additional information.     Condition at Discharge: good    Discharge Day Visit / Exam:   Subjective:  patient reports she wishes to go home and feels well enough to do so. She is OK with going home on oxygen therapy.   Vitals: Blood Pressure: 156/89 (03/19/25 1520)  Pulse: 105 (03/19/25 1520)  Temperature: 97.6  "°F (36.4 °C) (03/19/25 1520)  Temp Source: Oral (03/19/25 0706)  Respirations: 17 (03/19/25 1118)  Height: 5' 7\" (170.2 cm) (03/18/25 0725)  Weight - Scale: 103 kg (227 lb 1.2 oz) (03/18/25 0725)  SpO2: 94 % (03/19/25 1520)  Physical Exam  Vitals and nursing note reviewed.   Constitutional:       General: She is not in acute distress.  Cardiovascular:      Rate and Rhythm: Regular rhythm. Tachycardia present.      Pulses: Normal pulses.      Heart sounds: Normal heart sounds. No murmur heard.  Pulmonary:      Effort: Pulmonary effort is normal.      Breath sounds: Normal breath sounds.   Abdominal:      General: Bowel sounds are normal.      Palpations: Abdomen is soft.      Tenderness: There is no abdominal tenderness. There is no guarding.      Hernia: No hernia is present.   Musculoskeletal:      Right lower leg: No edema.      Left lower leg: No edema.   Neurological:      General: No focal deficit present.      Mental Status: She is alert and oriented to person, place, and time.   Psychiatric:         Mood and Affect: Mood normal.         Behavior: Behavior normal.          Discussion with Family: Patient declined call to .     Discharge instructions/Information to patient and family:   See after visit summary for information provided to patient and family.      Provisions for Follow-Up Care:  See after visit summary for information related to follow-up care and any pertinent home health orders.      Mobility at time of Discharge:   Basic Mobility Inpatient Raw Score: 17  JH-HLM Goal: 5: Stand one or more mins  JH-HLM Achieved: 7: Walk 25 feet or more  HLM Goal achieved. Continue to encourage appropriate mobility.     Disposition:   Home with VNA Services (Reminder: Complete face to face encounter)    Planned Readmission: none    Discharge Medications:  See after visit summary for reconciled discharge medications provided to patient and/or family.      Administrative Statements   Discharge " Statement:  I have spent a total time of 50 minutes in caring for this patient on the day of the visit/encounter. >30 minutes of time was spent on: Risks and benefits of tx options, Instructions for management, Patient and family education, Importance of tx compliance, Risk factor reductions, Impressions, Counseling / Coordination of care, Documenting in the medical record, Reviewing / ordering tests, medicine, procedures  , and Communicating with other healthcare professionals .    **Please Note: This note may have been constructed using a voice recognition system**

## 2025-03-19 NOTE — ASSESSMENT & PLAN NOTE
Patient reports this has been ongoing for 8 months   Utilize telemetry monitoring  Check a transthoracic echocardiogram - EF is 50%  CTA negative for PE  TSH wnl   Does not have any signs of a fib or a flutter on telemetry monitoring the last 36 hours  She used to take metoprolol but this was discontinued a year ago due to hypotension. She was on 50 mg BID at that time and never followed up with cardiology thereafter  Blood pressures have been stable here. Recommended patient to take 12.5 mg BID and follow up with cardiology in the office for likely holter monitor

## 2025-03-19 NOTE — PHYSICAL THERAPY NOTE
"                                                                                  PHYSICAL THERAPY NOTE          Patient Name: Ritika Kwong  Today's Date: 3/19/2025   03/19/25 0924   PT Last Visit   PT Visit Date 03/19/25   Note Type   Note Type Treatment   Pain Assessment   Pain Assessment Tool 0-10   Pain Score No Pain   Restrictions/Precautions   Weight Bearing Precautions Per Order No   Other Precautions Chair Alarm;Bed Alarm;Fall Risk;O2;Multiple lines;Impulsive   General   Chart Reviewed Yes   Family/Caregiver Present No   Cognition   Overall Cognitive Status WFL   Arousal/Participation Alert   Attention Attends with cues to redirect   Orientation Level Oriented X4   Memory Within functional limits   Following Commands Follows one step commands without difficulty   Subjective   Subjective \"It's the PTSD\"   Bed Mobility   Additional Comments OOB in chair start/end PT session   Transfers   Sit to Stand 5  Supervision   Additional items Armrests;Increased time required;Verbal cues   Stand to Sit 5  Supervision   Additional items Armrests;Increased time required;Verbal cues   Stand pivot 5  Supervision   Additional items Increased time required;Verbal cues   Toilet transfer 5  Supervision   Additional items Increased time required;Verbal cues;Standard toilet   Additional Comments RW used   Ambulation/Elevation   Gait pattern Short stride;Foward flexed;Decreased foot clearance;Wide CHARIS  (impulsive)   Gait Assistance 5  Supervision   Additional items Verbal cues   Assistive Device Rolling walker   Distance 15' x 1, 30' x 1   Balance   Static Sitting Good   Dynamic Sitting Fair +   Static Standing Fair   Dynamic Standing Fair   Ambulatory Fair -   Endurance Deficit   Endurance Deficit Yes   Endurance Deficit Description HR increases with ambulation 107-160   Activity Tolerance   Activity Tolerance Patient limited by fatigue;Treatment limited secondary to medical complications (Comment)  (elevated HR)   Exercises "   Hip Flexion Sitting;15 reps;AROM;Bilateral   Hip Abduction Sitting;15 reps;AROM;Bilateral   Hip Adduction Sitting;15 reps;AROM;Bilateral   Knee AROM Long Arc Quad Sitting;15 reps;AROM;Bilateral   Ankle Pumps Sitting;15 reps;AROM   Marching Sitting;15 reps;AROM;Bilateral   Assessment   Prognosis Good   Problem List Decreased strength;Decreased endurance;Impaired balance;Decreased mobility   Assessment Pt seen this date for PT treatment session to increase level of mobility and functional activity tolerance. PT treatment session consisting of  therapeutic exercises, toilet transfers,  transfers and  gait training. Pt. Currently performing bed mobility, tx and ambulation at  SUP  x 1 level of function with use of RW . In comparison to previous session, Pt. With no change  activity tolerance due to elevated HR.  Pt is in need of continued activity in PT to improve strength balance endurance mobility transfers and ambulation with return to maximize LOF. From PT/mobility standpoint, recommendation at time of d/c would be Level III:  minimal resource intensity  in order to promote return to PLOF and independence.  The patient's AM-PAC Basic Mobility Inpatient Short Form Raw Score is 18. A Raw score of greater than 16 suggests the patient may benefit from discharge to home. Please also refer to the recommendation of the Physical Therapist for safe discharge planning. Pt continues to be functioning below baseline level. PT will continue to see pt during current hospitalization in order to address the deficits listed above and provide interventions consistent w/ POC in effort to achieve goals.   Goals   LTG Expiration Date 04/01/25   PT Treatment Day 1   Plan   Treatment/Interventions Functional transfer training;LE strengthening/ROM;Therapeutic exercise;Endurance training;Bed mobility;Gait training   Progress Progressing toward goals   PT Frequency 3-5x/wk   Discharge Recommendation   Rehab Resource Intensity Level, PT III  (Minimum Resource Intensity)   AM-PAC Basic Mobility Inpatient   Turning in Flat Bed Without Bedrails 4   Lying on Back to Sitting on Edge of Flat Bed Without Bedrails 3   Moving Bed to Chair 3   Standing Up From Chair Using Arms 3   Walk in Room 3   Climb 3-5 Stairs With Railing 2   Basic Mobility Inpatient Raw Score 18   Basic Mobility Standardized Score 41.05   [facility] Highest Level Of Mobility   JH-HLM Goal 6: Walk 10 steps or more   JH-HLM Achieved 7: Walk 25 feet or more   Education   Education Provided Mobility training   Patient Demonstrates verbal understanding;Reinforcement needed   End of Consult   Patient Position at End of Consult Bedside chair;Bed/Chair alarm activated;All needs within reach   End of Consult Comments discussed POC with PT

## 2025-03-19 NOTE — OCCUPATIONAL THERAPY NOTE
Occupational Therapy Progress Note     Patient Name: Ritika Kwong  Today's Date: 3/19/2025  Problem List  Principal Problem:    Acute respiratory failure with hypoxia (HCC)  Active Problems:    Acquired hypothyroidism    Seizure disorder (HCC)    Hypomagnesemia    Type 2 diabetes mellitus with hyperglycemia, with long-term current use of insulin (HCC)    Mild persistent asthma without complication    Left foot pain    Tachycardia    Elevated hemidiaphragm              03/19/25 1050   OT Last Visit   OT Visit Date 03/19/25   Note Type   Note Type Treatment   Pain Assessment   Pain Assessment Tool 0-10   Pain Score No Pain   Restrictions/Precautions   Weight Bearing Precautions Per Order No   Other Precautions Chair Alarm;Bed Alarm;Fall Risk;O2;Multiple lines;Impulsive   ADL   Where Assessed   (bathroom)   Grooming Assistance 5  Supervision/Setup   Grooming Deficit Verbal cueing;Supervision/safety;Increased time to complete;Impulsive;Standing with assistive device   Grooming Comments standing at sink to complete hand hygiene; vc for safety with RW placement to reduce falls; HR increasing to 150's while standing   Toileting Assistance  4  Minimal Assistance   Toileting Deficit Steadying;Impulsive;Verbal cueing;Supervison/safety;Increased time to complete;Grab bar use   Bed Mobility   Rolling R 5  Supervision   Additional items Increased time required;Bedrails;Verbal cues   Sit to Supine 4  Minimal assistance   Additional items LE management;Verbal cues   Additional Comments seated in chair at start of session, supine in bed at end of session on 1LO2; SPo2>90%   Transfers   Sit to Stand 5  Supervision   Additional items Verbal cues;Impulsive  (RW)   Stand to Sit 5  Supervision   Additional items Verbal cues;Impulsive  (RW)   Stand pivot 5  Supervision   Additional items Verbal cues;Impulsive  (RW)   Additional Comments functional transfers with (S) and RW; mild instability   Functional Mobility   Functional Mobility 5  " Supervision   Additional Comments functional mobility to and from bathroom with (S) and RW; mild instability and impulsivity; HR increasing to 140's; SPO2>90%   Additional items Rolling walker   Therapeutic Excerise-Strength   UE Strength Yes   Right Upper Extremity- Strength   R Shoulder Flexion   R Elbow Elbow flexion;Elbow extension   Equipment Dowel   R Weight/Reps/Sets 2/15/2   Left Upper Extremity-Strength   L Shoulder Flexion   L Elbow Elbow flexion;Elbow extension   Equipment Dowel   L Weights/Reps/Sets 2/15/2   Subjective   Subjective \"oh and they just sit outside smoking with their oxygen and there's like three signs that say don't smoke\"   Cognition   Overall Cognitive Status WFL   Arousal/Participation Alert   Attention Attends with cues to redirect  (tangential)   Orientation Level Oriented X4   Memory Within functional limits   Following Commands Follows one step commands with increased time or repetition   Activity Tolerance   Activity Tolerance Patient limited by fatigue   Assessment   Assessment Patient participated in Skilled OT session this date with interventions consisting of ADL re training with the use of correct body mechnaics, safety awareness and fall prevention techniques, therapeutic exercise to: increase functional use of BUEs, increase BUE muscle strength , and increase standing tolerance time with unilateral UE support to complete sink level ADLs .  Patient agreeable to OT treatment session, upon arrival patient was found seated OOB to Recliner. Patient requiring frequent re direction, verbal cues for safety, verbal cues for pacing thru activity steps, and frequent rest periods. Patient continues to be functioning below baseline level, occupational performance remains limited secondary to factors listed above and increased risk for falls and injury. The patient's raw score on the -PAC Daily Activity Inpatient Short Form is 19. A raw score of greater than or equal to 19 suggests the " patient may benefit from discharge to home. Please refer to the recommendation of the Occupational Therapist for safe discharge planning. From OT standpoint, recommendation at time of d/c would be level II moderate resource intensity   Plan   Goal Expiration Date 03/31/25   OT Treatment Day 1   OT Frequency 3-5x/wk   Discharge Recommendation   Rehab Resource Intensity Level, OT II (Moderate Resource Intensity)   AM-PAC Daily Activity Inpatient   Lower Body Dressing 2   Bathing 3   Toileting 3   Upper Body Dressing 3   Grooming 4   Eating 4   Daily Activity Raw Score 19   Daily Activity Standardized Score (Calc for Raw Score >=11) 40.22   AM-PAC Applied Cognition Inpatient   Following a Speech/Presentation 3   Understanding Ordinary Conversation 3   Taking Medications 3   Remembering Where Things Are Placed or Put Away 4   Remembering List of 4-5 Errands 4   Taking Care of Complicated Tasks 3   Applied Cognition Raw Score 20   Applied Cognition Standardized Score 41.76

## 2025-03-19 NOTE — ASSESSMENT & PLAN NOTE
Seen on 1L NC   No oxygen requirement at baseline.  Maintain saturations greater than or equal to 88%.  Pulmonary toilet:  Increase activity as tolerated, out of bed as tolerated, cough and deep breathing exercises encourage, incentive spirometry q.1 hour  Will need home O2 eval prior to discharge.   Echo with low-normal EF but will trial Lasix 20 mg IV once today to assess response.    Will need outpatient PFTs

## 2025-03-19 NOTE — ASSESSMENT & PLAN NOTE
I appreciate Pulmonology's input  They will continue to follow her in the office  For outpatient Sniff test

## 2025-03-19 NOTE — PLAN OF CARE
Problem: Prexisting or High Potential for Compromised Skin Integrity  Goal: Skin integrity is maintained or improved  Description: INTERVENTIONS:  - Identify patients at risk for skin breakdown  - Assess and monitor skin integrity  - Assess and monitor nutrition and hydration status  - Monitor labs   - Assess for incontinence   - Turn and reposition patient  - Assist with mobility/ambulation  - Relieve pressure over bony prominences  - Avoid friction and shearing  - Provide appropriate hygiene as needed including keeping skin clean and dry  - Evaluate need for skin moisturizer/barrier cream  - Collaborate with interdisciplinary team   - Patient/family teaching  - Consider wound care consult   Outcome: Progressing     Problem: PAIN - ADULT  Goal: Verbalizes/displays adequate comfort level or baseline comfort level  Description: Interventions:  - Encourage patient to monitor pain and request assistance  - Assess pain using appropriate pain scale  - Administer analgesics based on type and severity of pain and evaluate response  - Implement non-pharmacological measures as appropriate and evaluate response  - Consider cultural and social influences on pain and pain management  - Notify physician/advanced practitioner if interventions unsuccessful or patient reports new pain  Outcome: Progressing     Problem: INFECTION - ADULT  Goal: Absence or prevention of progression during hospitalization  Description: INTERVENTIONS:  - Assess and monitor for signs and symptoms of infection  - Monitor lab/diagnostic results  - Monitor all insertion sites, i.e. indwelling lines, tubes, and drains  - Monitor endotracheal if appropriate and nasal secretions for changes in amount and color  - [place] appropriate cooling/warming therapies per order  - Administer medications as ordered  - Instruct and encourage patient and family to use good hand hygiene technique  - Identify and instruct in appropriate isolation precautions for  identified infection/condition  Outcome: Progressing  Goal: Absence of fever/infection during neutropenic period  Description: INTERVENTIONS:  - Monitor WBC    Outcome: Progressing     Problem: SAFETY ADULT  Goal: Patient will remain free of falls  Description: INTERVENTIONS:  - Educate patient/family on patient safety including physical limitations  - Instruct patient to call for assistance with activity   - Consult OT/PT to assist with strengthening/mobility   - Keep Call bell within reach  - Keep bed low and locked with side rails adjusted as appropriate  - Keep care items and personal belongings within reach  - Initiate and maintain comfort rounds  - Make Fall Risk Sign visible to staff  - Offer Toileting every 2 Hours, in advance of need  - Initiate/Maintain bed/chair alarm  - Obtain necessary fall risk management equipment: non skid footwear  - Apply yellow socks and bracelet for high fall risk patients  - Consider moving patient to room near nurses station  Outcome: Progressing  Goal: Maintain or return to baseline ADL function  Description: INTERVENTIONS:  -  Assess patient's ability to carry out ADLs; assess patient's baseline for ADL function and identify physical deficits which impact ability to perform ADLs (bathing, care of mouth/teeth, toileting, grooming, dressing, etc.)  - Assess/evaluate cause of self-care deficits   - Assess range of motion  - Assess patient's mobility; develop plan if impaired  - Assess patient's need for assistive devices and provide as appropriate  - Encourage maximum independence but intervene and supervise when necessary  - Involve family in performance of ADLs  - Assess for home care needs following discharge   - Consider OT consult to assist with ADL evaluation and planning for discharge  - Provide patient education as appropriate  Outcome: Progressing  Goal: Maintains/Returns to pre admission functional level  Description: INTERVENTIONS:  - Perform AM-PAC 6 Click Basic  Mobility/ Daily Activity assessment daily.  - Set and communicate daily mobility goal to care team and patient/family/caregiver.   - Collaborate with rehabilitation services on mobility goals if consulted  - Perform Range of Motion 3 times a day.  - Reposition patient every 2 hours.  - Dangle patient 2 times a day  - Stand patient 3 times a day  - Ambulate patient 3 times a day  - Out of bed to chair 3 times a day   - Out of bed for meals 3 times a day  - Out of bed for toileting  - Record patient progress and toleration of activity level   Outcome: Progressing     Problem: DISCHARGE PLANNING  Goal: Discharge to home or other facility with appropriate resources  Description: INTERVENTIONS:  - Identify barriers to discharge w/patient and caregiver  - Arrange for needed discharge resources and transportation as appropriate  - Identify discharge learning needs (meds, wound care, etc.)  - Arrange for interpretive services to assist at discharge as needed  - Refer to Case Management Department for coordinating discharge planning if the patient needs post-hospital services based on physician/advanced practitioner order or complex needs related to functional status, cognitive ability, or social support system  Outcome: Progressing     Problem: Knowledge Deficit  Goal: Patient/family/caregiver demonstrates understanding of disease process, treatment plan, medications, and discharge instructions  Description: Complete learning assessment and assess knowledge base.  Interventions:  - Provide teaching at level of understanding  - Provide teaching via preferred learning methods  Outcome: Progressing

## 2025-03-19 NOTE — PROGRESS NOTES
Patient:    MRN:  [id number]    Antonioin Request ID:  [id number]    Level of care reserved:  Home Health Agency    Partner Reserved:  [facility], [place] [address] [phone]    Clinical needs requested:    Geography searched:  [phone]    Start of Service:    Request sent:  2:09pm EDT on 3/18/2025 by Yenifer Hopper    Partner reserved:  12:37pm EDT on 3/19/2025 by Yenifer Hopper    Choice list shared:  12:37pm EDT on 3/19/2025 by Yenifer Hopper

## 2025-03-19 NOTE — PROGRESS NOTES
Patient:    MRN:  [id number]    Antonioin Request ID:  [id number]    Level of care reserved:  Home Health Agency    Partner Reserved:  [facility], [place] [address] [phone]    Clinical needs requested:    Geography searched:  [phone]    Start of Service:    Request sent:  2:09pm EDT on 3/18/2025 by Yenifer Hopper    Partner reserved:  12:37pm EDT on 3/19/2025 by Yenifer Hopper    Choice list shared:  12:37pm EDT on 3/19/2025 by Yenifer Hopper

## 2025-03-19 NOTE — CASE MANAGEMENT
Case Management Discharge Planning Note    Patient name Ritika Dunn /[id number] MRN [id number]  : 1955 Date 3/19/2025       Current Admission Date: 3/17/2025  Current Admission Diagnosis:Acute respiratory failure with hypoxia ([facility])   Patient Active Problem List    Diagnosis Date Noted Date Diagnosed    Mild persistent asthma without complication 2025     Left foot pain 2025     Tachycardia 2025     Acute respiratory failure with hypoxia ([facility]) 2025     Elevated hemidiaphragm 2025     Cutaneous candidiasis 10/03/2023     Gait instability 2023     Elevated MCV 2023     Skin rash 2023     Rash 2023     Type 2 diabetes mellitus with hyperglycemia, with long-term current use of insulin ([facility]) 2023     BMI 33.0-33.9,adult 2019     Iron deficiency anemia, unspecified 2019     Right lower quadrant abdominal pain 2019     Pressure injury of sacral region, stage 3 ([facility]) 2019     Pressure ulcer of left buttock, stage 3 ([facility]) 2019     Pressure ulcer of right buttock, stage 2 ([facility]) 2019     Generalized weakness 2019     Leukopenia 2019     Neutropenia ([facility]) 2019     Low TSH level 2019     Sinus arrhythmia 2019     Lactic acidosis 2019     Recurrent falls 2019     Acute hyponatremia 2019     Hypomagnesemia 2019     Hypotension 2019     Hypertriglyceridemia 2019     Vaginal candidiasis 2019     Urinary incontinence 2018     Obesity due to excess calories 10/31/2018     Hypercholesterolemia 10/31/2018     Ambulatory dysfunction 10/30/2018     NSTEMI (non-ST elevated myocardial infarction) ([facility]) 10/29/2018     Volume overload 10/29/2018     Skin breakdown 10/29/2018     Asthma 2016     Seizure disorder ([facility]) 2014     Dermatitis 2007     Bipolar disorder ([facility]) 2007     Acquired hypothyroidism 2007       LOS  (days): 1  Geometric Mean LOS (GMLOS) (days): 2.4  Days to GMLOS:1.3     OBJECTIVE:  Risk of Unplanned Readmission Score: 17.68         Current admission status: Inpatient   Preferred Pharmacy:   [id number] - [facility] [address] [address]  Phone: [phone] Fax: [phone]    [facility] [address] [address]  Phone: [phone] Fax: [phone]    Primary Care Provider: Valeria Burns    Primary Insurance: [facility]  Secondary Insurance: [email or web address] [facility]    DISCHARGE DETAILS:    Discharge planning discussed with:: patient  Freedom of Choice: Yes  Comments - Freedom of Choice: [facility]        DME Referral Provided  Referral made for DME?: Yes  DME referral completed for the following items:: Portable Oxygen tanks, Home Oxygen concentrator  DME Supplier Name:: [employer]

## 2025-03-19 NOTE — ASSESSMENT & PLAN NOTE
Found to have an oxygen saturation level of 83% on room air  Currently requiring 1 Lpm of continuous supplemental oxygen to maintain oxygen saturation levels at 90% and above  Incentive spirometry  Respiratory protocol  I appreciate Pulmonology's input  CTA chest PE study negative for PE or other acute intrathoracic pathoogy   Home O2 evaluation performed on day of discharge. Patient recommended for 1L of O2 NC continuously. CM assisted with setting up for patient

## 2025-03-19 NOTE — ASSESSMENT & PLAN NOTE
Patient had spirometry in 2010 that was normal. She does not have emphysema on CT imaging. She is a life long nonsmoker. It is very unlikely that she has COPD.   She may have asthma but it does not appear to be in exacerbation currently.   Discontinue systemic steroids.   Add Breo 100/25 mcg 1 puff daily.   May continue prn albuterol   CTA chest PE study unremarkable aside from mosaic attenuation of the bilateral lung (R>L) which could be due to air trapping   PFTs as outpatient

## 2025-03-19 NOTE — PLAN OF CARE
Problem: Prexisting or High Potential for Compromised Skin Integrity  Goal: Skin integrity is maintained or improved  Description: INTERVENTIONS:  - Identify patients at risk for skin breakdown  - Assess and monitor skin integrity  - Assess and monitor nutrition and hydration status  - Monitor labs   - Assess for incontinence   - Turn and reposition patient  - Assist with mobility/ambulation  - Relieve pressure over bony prominences  - Avoid friction and shearing  - Provide appropriate hygiene as needed including keeping skin clean and dry  - Evaluate need for skin moisturizer/barrier cream  - Collaborate with interdisciplinary team   - Patient/family teaching  - Consider wound care consult   Outcome: Progressing     Problem: PAIN - ADULT  Goal: Verbalizes/displays adequate comfort level or baseline comfort level  Description: Interventions:  - Encourage patient to monitor pain and request assistance  - Assess pain using appropriate pain scale  - Administer analgesics based on type and severity of pain and evaluate response  - Implement non-pharmacological measures as appropriate and evaluate response  - Consider cultural and social influences on pain and pain management  - Notify physician/advanced practitioner if interventions unsuccessful or patient reports new pain  Outcome: Progressing     Problem: INFECTION - ADULT  Goal: Absence or prevention of progression during hospitalization  Description: INTERVENTIONS:  - Assess and monitor for signs and symptoms of infection  - Monitor lab/diagnostic results  - Monitor all insertion sites, i.e. indwelling lines, tubes, and drains  - Monitor endotracheal if appropriate and nasal secretions for changes in amount and color  - [place] appropriate cooling/warming therapies per order  - Administer medications as ordered  - Instruct and encourage patient and family to use good hand hygiene technique  - Identify and instruct in appropriate isolation precautions for  identified infection/condition  Outcome: Progressing  Goal: Absence of fever/infection during neutropenic period  Description: INTERVENTIONS:  - Monitor WBC    Outcome: Progressing     Problem: SAFETY ADULT  Goal: Patient will remain free of falls  Description: INTERVENTIONS:  - Educate patient/family on patient safety including physical limitations  - Instruct patient to call for assistance with activity   - Consult OT/PT to assist with strengthening/mobility   - Keep Call bell within reach  - Keep bed low and locked with side rails adjusted as appropriate  - Keep care items and personal belongings within reach  - Initiate and maintain comfort rounds  - Make Fall Risk Sign visible to staff  - Offer Toileting every 2 Hours, in advance of need  - Initiate/Maintain bed/ chair alarm  - Obtain necessary fall risk management equipment:   - Apply yellow socks and bracelet for high fall risk patients  - Consider moving patient to room near nurses station  Outcome: Progressing  Goal: Maintain or return to baseline ADL function  Description: INTERVENTIONS:  -  Assess patient's ability to carry out ADLs; assess patient's baseline for ADL function and identify physical deficits which impact ability to perform ADLs (bathing, care of mouth/teeth, toileting, grooming, dressing, etc.)  - Assess/evaluate cause of self-care deficits   - Assess range of motion  - Assess patient's mobility; develop plan if impaired  - Assess patient's need for assistive devices and provide as appropriate  - Encourage maximum independence but intervene and supervise when necessary  - Involve family in performance of ADLs  - Assess for home care needs following discharge   - Consider OT consult to assist with ADL evaluation and planning for discharge  - Provide patient education as appropriate  Outcome: Progressing  Goal: Maintains/Returns to pre admission functional level  Description: INTERVENTIONS:  - Perform AM-PAC 6 Click Basic Mobility/ Daily  Activity assessment daily.  - Set and communicate daily mobility goal to care team and patient/family/caregiver.   - Collaborate with rehabilitation services on mobility goals if consulted  - Perform Range of Motion 3 times a day.  - Reposition patient every 2 hours.  - Dangle patient 3 times a day  - Stand patient 3 times a day  - Ambulate patient 3 times a day  - Out of bed to chair 3 times a day   - Out of bed for meals 3 times a day  - Out of bed for toileting  - Record patient progress and toleration of activity level   Outcome: Progressing     Problem: DISCHARGE PLANNING  Goal: Discharge to home or other facility with appropriate resources  Description: INTERVENTIONS:  - Identify barriers to discharge w/patient and caregiver  - Arrange for needed discharge resources and transportation as appropriate  - Identify discharge learning needs (meds, wound care, etc.)  - Arrange for interpretive services to assist at discharge as needed  - Refer to Case Management Department for coordinating discharge planning if the patient needs post-hospital services based on physician/advanced practitioner order or complex needs related to functional status, cognitive ability, or social support system  Outcome: Progressing     Problem: Knowledge Deficit  Goal: Patient/family/caregiver demonstrates understanding of disease process, treatment plan, medications, and discharge instructions  Description: Complete learning assessment and assess knowledge base.  Interventions:  - Provide teaching at level of understanding  - Provide teaching via preferred learning methods  Outcome: Progressing

## 2025-03-19 NOTE — ASSESSMENT & PLAN NOTE
Presented to the ER for evaluation of complaint of left foot pain  Patient denies recent injury  No evidence of swelling or deformity  Pain medication PRN  OT/PT eval

## 2025-03-19 NOTE — ASSESSMENT & PLAN NOTE
Patient with frequent hypoxic episodes while in the ER  SpO2  89% while at rest in the ER  Ambulatory pulse ox at 86%  Patient reports shortness of breath upon exertion  Not currently on home oxygen  Steroids discontinued by pulmonology  Inhaler regimen at home should be fluticasone/vilanterol and prn albuterol  Pulmonary rehab on discharge

## 2025-03-19 NOTE — CASE MANAGEMENT
Case Management Discharge Planning Note    Patient name Ritika Dunn /[id number] MRN [id number]  : 1955 Date 3/19/2025       Current Admission Date: 3/17/2025  Current Admission Diagnosis:Acute respiratory failure with hypoxia ([facility])   Patient Active Problem List    Diagnosis Date Noted Date Diagnosed    Mild persistent asthma without complication 2025     Left foot pain 2025     Tachycardia 2025     Acute respiratory failure with hypoxia ([facility]) 2025     Elevated hemidiaphragm 2025     Cutaneous candidiasis 10/03/2023     Gait instability 2023     Elevated MCV 2023     Skin rash 2023     Rash 2023     Type 2 diabetes mellitus with hyperglycemia, with long-term current use of insulin ([facility]) 2023     BMI 33.0-33.9,adult 2019     Iron deficiency anemia, unspecified 2019     Right lower quadrant abdominal pain 2019     Pressure injury of sacral region, stage 3 ([facility]) 2019     Pressure ulcer of left buttock, stage 3 ([facility]) 2019     Pressure ulcer of right buttock, stage 2 ([facility]) 2019     Generalized weakness 2019     Leukopenia 2019     Neutropenia ([facility]) 2019     Low TSH level 2019     Sinus arrhythmia 2019     Lactic acidosis 2019     Recurrent falls 2019     Acute hyponatremia 2019     Hypomagnesemia 2019     Hypotension 2019     Hypertriglyceridemia 2019     Vaginal candidiasis 2019     Urinary incontinence 2018     Obesity due to excess calories 10/31/2018     Hypercholesterolemia 10/31/2018     Ambulatory dysfunction 10/30/2018     NSTEMI (non-ST elevated myocardial infarction) ([facility]) 10/29/2018     Volume overload 10/29/2018     Skin breakdown 10/29/2018     Asthma 2016     Seizure disorder ([facility]) 2014     Dermatitis 2007     Bipolar disorder ([facility]) 2007     Acquired hypothyroidism 2007       LOS  (days): 1  Geometric Mean LOS (GMLOS) (days): 2.4  Days to GMLOS:1.3     OBJECTIVE:  Risk of Unplanned Readmission Score: 17.68         Current admission status: Inpatient   Preferred Pharmacy:   [id number] [address]  [address] [address]  Phone: [phone] Fax: [phone]    [facility] [address]  [address] [address]  Phone: [phone] Fax: [phone]    Primary Care Provider: Valeria Burns    Primary Insurance: [facility] [employer]  Secondary Insurance: [employer] On license of [facility]    DISCHARGE DETAILS:    Discharge planning discussed with:: patient           Discharge Destination Plan:: Home with Home Health Care(residential hhc)  Transport at Discharge : Wheelchair van     Discussed with Patient  there may be an out of pocket expense for transport.       Transported by (Company and Unit #): [place]  ETA of Transport (Date): 03/19/25  ETA of Transport (Time): 1700         Oxygen was approved in [place] by "[facility]" and pt was instructed. WC was also approved and "[facility]" will be reach out to pt to make arrangements for delivery.

## 2025-03-19 NOTE — RESPIRATORY THERAPY NOTE
Home Oxygen Qualifying Test     Patient name: Ritika Kwong        : 1955   Date of Test:  2025  Diagnosis:    Home Oxygen Test:    **Medicare Guidelines require item(s) 1-5 on all ambulatory patients or 1 and 2 on non-ambulatory patients.    1. Baseline SPO2 on Room Air at rest 91 %   If <= 88% on Room Air add O2 via NC to obtain SpO2 >=88%. If LPM needed, document LPM NA needed to reach =>88%    SPO2 during exertion on Room Air 87  %  During exertion monitor SPO2. If SPO2 increases >=89%, do not add supplemental oxygen    SPO2 on Oxygen at Rest 94 % at 1 LPM    SPO2 during exertion on Oxygen 90 % at 1 LPM    Test performed during exertion activity.      [x]  Supplemental Home Oxygen is indicated.    []  Client does not qualify for home oxygen.    Respiratory Additional Notes- Patient maintained an SpO2 of 91% at rest on room air. Patient was able to walk approximately 160 feet requiring 1L.    Lucia Miller, RT

## 2025-03-19 NOTE — PLAN OF CARE
Problem: OCCUPATIONAL THERAPY ADULT  Goal: Performs self-care activities at highest level of function for planned discharge setting.  See evaluation for individualized goals.  Description: Treatment Interventions: UE strengthening/ROM, Functional transfer training, ADL retraining, Endurance training, Patient/family training, Equipment evaluation/education, Activityengagement, Energy conservation          See flowsheet documentation for full assessment, interventions and recommendations.   Note: Limitation: Decreased ADL status, Decreased UE strength, Decreased Safe judgement during ADL, Decreased endurance, Decreased self-care trans, Decreased high-level ADLs     Assessment: Patient participated in Skilled OT session this date with interventions consisting of ADL re training with the use of correct body mechnaics, safety awareness and fall prevention techniques, therapeutic exercise to: increase functional use of BUEs, increase BUE muscle strength , and increase standing tolerance time with unilateral UE support to complete sink level ADLs .  Patient agreeable to OT treatment session, upon arrival patient was found seated OOB to Recliner. Patient requiring frequent re direction, verbal cues for safety, verbal cues for pacing thru activity steps, and frequent rest periods. Patient continues to be functioning below baseline level, occupational performance remains limited secondary to factors listed above and increased risk for falls and injury. The patient's raw score on the -PAC Daily Activity Inpatient Short Form is 19. A raw score of greater than or equal to 19 suggests the patient may benefit from discharge to home. Please refer to the recommendation of the Occupational Therapist for safe discharge planning. From OT standpoint, recommendation at time of d/c would be level II moderate resource intensity     Rehab Resource Intensity Level, OT: II (Moderate Resource Intensity)

## 2025-03-20 ENCOUNTER — TELEPHONE (OUTPATIENT)
Age: 70
End: 2025-03-20

## 2025-03-20 LAB
DME PARACHUTE DELIVERY DATE ACTUAL: NORMAL
DME PARACHUTE DELIVERY DATE EXPECTED: NORMAL
DME PARACHUTE DELIVERY DATE REQUESTED: NORMAL
DME PARACHUTE ITEM DESCRIPTION: NORMAL
DME PARACHUTE ORDER STATUS: NORMAL
DME PARACHUTE SUPPLIER NAME: NORMAL
DME PARACHUTE SUPPLIER PHONE: NORMAL

## 2025-03-20 NOTE — TELEPHONE ENCOUNTER
Pt calling back, she states she does not have a smartphone to be seen virtual and has no way to get to get to apt. She would like to know if she can do a phone call visit as she has to do it with her other providers.     She would also like for office to know she is having issues with her o2 machine and hopefully someone will be coming out to fix it today

## 2025-03-20 NOTE — TELEPHONE ENCOUNTER
Called and LM letting patient know a phone call with the provider will be okay. Also, advised patient to reach out to her medical supply company asap regarding her Oxygen Concentrator.

## 2025-03-20 NOTE — TELEPHONE ENCOUNTER
Calling to schedule a virtual visit, which would be a phone call, with pulmonology, after your recent hospital discharge. We are holding March 26th @ 1:00. If you cannot keep this appointment for the phone call, please call us at [phone]

## 2025-03-20 NOTE — TELEPHONE ENCOUNTER
We have received a referral for this patient for a COPD acute exacerbation from her hospital admission, where she was seen by Michael Meeks. Please assist in scheduling this patient for a COPD [facility] hospital follow up visit. Thank you

## 2025-03-20 NOTE — UTILIZATION REVIEW
NOTIFICATION OF ADMISSION DISCHARGE   This is a Notification of Discharge from [facility]. Please be advised that this patient has been discharge from our facility. Below you will find the admission and discharge date and time including the patient’s disposition.   UTILIZATION REVIEW CONTACT:  Carlo Dean  Utilization   Network Utilization Review Department  Phone: [phone] x carefully listen to the prompts. All voicemails are confidential.  Email: [email or web address]     ADMISSION INFORMATION  PRESENTATION DATE: 3/17/2025  1:57 AM  OBERVATION ADMISSION DATE: 03/17/2025 0501  INPATIENT ADMISSION DATE: 3/18/25 11:44 AM   DISCHARGE DATE: 3/19/2025  5:34 PM   DISPOSITION:Home/Self Care    Network Utilization Review Department  ATTENTION: Please call with any questions or concerns to [phone] and carefully listen to the prompts so that you are directed to the right person. All voicemails are confidential.   For Discharge needs, contact Care Management DC Support Team at [phone] opt. 2  Send all requests for admission clinical reviews, approved or denied determinations and any other requests to dedicated fax number below belonging to the campus where the patient is receiving treatment. List of dedicated fax numbers for the Facilities:  FACILITY NAME UR FAX NUMBER   ADMISSION DENIALS (Administrative/Medical Necessity) [phone]   DISCHARGE SUPPORT TEAM ([facility]) [phone]   PARENT CHILD HEALTH (Maternity/NICU/Pediatrics) [phone]   [facility] [phone]   [facility] [phone]   [facility] [phone]   [facility] [phone]   [facility] [phone]   [facility] [phone]   [facility] [phone]   [facility]  [phone]   [facility] [phone]   [facility] [phone]   [facility] [phone]   [facility] [phone]

## 2025-03-21 LAB

## 2025-03-24 NOTE — TELEPHONE ENCOUNTER
Pt canceled V V stating she does not have phone capability and no one to help her w/ the v v     She will call back to r/s IN PERSON visit.

## 2025-03-25 ENCOUNTER — TELEPHONE (OUTPATIENT)
Age: 70
End: 2025-03-25

## 2025-03-25 NOTE — TELEPHONE ENCOUNTER
Pt called regarding a phone call she received to schedule consult.  She said she will call back to make appointment, has some things coming up and she doesn't know the dates yet.

## 2025-03-28 LAB

## 2025-04-23 ENCOUNTER — OFFICE VISIT (OUTPATIENT)
Dept: URGENT CARE | Facility: CLINIC | Age: 70
End: 2025-04-23
Payer: COMMERCIAL

## 2025-04-23 VITALS
SYSTOLIC BLOOD PRESSURE: 122 MMHG | TEMPERATURE: 98.5 F | DIASTOLIC BLOOD PRESSURE: 66 MMHG | RESPIRATION RATE: 20 BRPM | HEART RATE: 81 BPM | OXYGEN SATURATION: 94 %

## 2025-04-23 DIAGNOSIS — T21.21XA: Primary | ICD-10-CM

## 2025-04-23 PROCEDURE — S9088 SERVICES PROVIDED IN URGENT: HCPCS

## 2025-04-23 PROCEDURE — 99213 OFFICE O/P EST LOW 20 MIN: CPT

## 2025-04-23 RX ORDER — GINSENG 100 MG
1 CAPSULE ORAL ONCE
Status: COMPLETED | OUTPATIENT
Start: 2025-04-23 | End: 2025-04-23

## 2025-04-23 RX ADMIN — Medication 1 LARGE APPLICATION: at 18:29

## 2025-04-23 NOTE — PROGRESS NOTES
[facility]        NAME: Ritika Kwong is a 69 y.o. female  : 1955    MRN: [id number]  DATE: 2025  TIME: 6:38 PM    Assessment and Plan   Second degree burn of breast, initial encounter [T21.21XA]  1. Second degree burn of breast, initial encounter  bacitracin topical ointment 1 large application        Patient is able to take care of herself and get around with minimal assistance.  She may benefit from home services in which she mentioned she already has.  [facility]  I discussed with patient that if she wishes to pursue being admitted to a nursing home she needs to follow-up with her primary care provider or contact  follow with the next steps but given her visit today there is no reason to admit her to the hospital or to admit her to a nursing home.    Patient's Tdap is up-to-date    There is a well-healing second-degree burn of the right breast measuring 2 cm with surrounding first-degree burn measuring 6 cm in length.  There is no erythema or warmth to be concern for cellulitis.  Patient does not have fever or chills.    Bacitracin and nonadherent applied in office.  Wound care and burn care reviewed with the patient.  Patient did verbalize understanding.  Patient does have home health who already evaluated the wound and may continue to monitor the wound.  I did review ER precautions with patient.    Patient Instructions       Follow up with PCP in 3-5 days.  Proceed to  ER if symptoms worsen.    Chief Complaint     Chief Complaint   Patient presents with    Burn     Started 3 days ago  Right breast burned on crock pot juice         History of Present Illness       Patient is a 69-year-old female who presents to the office today for second-degree burn to the right breast.  She reports that she sustained this injury on  when she was cooking in her crockpot and she bought a roast that was too big for her crockpot so she was trying to cut it and the  liquid was hot and splashed onto her right breast sustaining the burn.  She reports that she showed her home health nurse on Monday who advised that she leave it uncovered and wear a loose fit shirt.  She reports that she has been washing it with cool water and notes that half of the skin started to peel off so she started to place it back and she is concerned that she may be septic because she states that she  1 time from sepsis.  She also reports that she has been using bacitracin zinc cream on it.  She expresses pain in the area and itching.  She denies any fevers or chills.  She also notes that she wishes to be placed in a nursing home and when she spoke to her family doctor in regards to this he advised that she be admitted to the hospital and need to stay for 3 nights and discuss with them about being admitted to a nursing home.          Review of Systems   Review of Systems   Skin:  Positive for wound.   All other systems reviewed and are negative.        Current Medications       Current Outpatient Medications:     Acetaminophen (Tylenol) 325 MG CAPS, Take 650 mg by mouth Three times daily as needed, Disp: , Rfl:     albuterol (ProAir HFA) 90 mcg/act inhaler, Inhale 2 puffs every 6 (six) hours as needed for wheezing, Disp: 8.5 g, Rfl: 0    albuterol (VENTOLIN HFA) 90 mcg/act inhaler, Inhale 2 puffs every 6 (six) hours as needed for wheezing, Disp: 1 Inhaler, Rfl: 1    aspirin (ECOTRIN LOW STRENGTH) 81 mg EC tablet, Take 1 tablet (81 mg total) by mouth daily, Disp: 90 tablet, Rfl: 1    Blood Glucose Monitoring Suppl (BLOOD GLUCOSE MONITOR SYSTEM) w/Device KIT, Test blood sugars 3 times a day, Disp: 1 each, Rfl: 0    Diclofenac Sodium (VOLTAREN) 1 %, APPLY TO AFFECTED AREA EVERY 6 HOURS IF NEEDED FOR PAIN., Disp: , Rfl:     Docusate Sodium (DSS) 100 MG CAPS, Take 1 capsule by mouth 2 (two) times a day as needed, Disp: , Rfl:     ferrous sulfate (FeroSul) 325 (65 Fe) mg tablet, Take 1 tablet (325 mg total)  by mouth daily with breakfast, Disp: 60 tablet, Rfl: 0    fluticasone (FLONASE) 50 mcg/act nasal spray, SPRAY ONE (1) SPRAY INTO EACH NOSTRIL ONCE DAILY, Disp: 16 g, Rfl: 1    Fluticasone Furoate-Vilanterol 100-25 mcg/actuation inhaler, Inhale 1 puff daily Rinse mouth after use., Disp: 60 blister, Rfl: 0    glucose blood (ONE TOUCH ULTRA TEST) test strip, Test blood sugars 3 times a day., Disp: 100 each, Rfl: 0    insulin aspart (NovoLOG FlexPen) 100 UNIT/ML injection pen, inject 5 units under the skin in the morning and 5 units at noon and 5 units in the evening. inject before meals, Disp: , Rfl:     levETIRAcetam (KEPPRA) 750 mg tablet, Take 2 tablets (1,500 mg total) by mouth 2 (two) times a day for 15 days Pt states she takes 1500MG Bid, Disp: 60 tablet, Rfl: 0    levothyroxine 175 mcg tablet, Take 175 mcg by mouth in the morning, Disp: , Rfl:     magnesium chloride (MAG64) 64 MG TBEC EC tablet, Take 1 tablet (64 mg total) by mouth 2 (two) times a day, Disp: 60 tablet, Rfl: 3    meloxicam (MOBIC) 7.5 mg tablet, Take 1 tablet (7.5 mg total) by mouth 2 (two) times a day, Disp: 60 tablet, Rfl: 1    metFORMIN (GLUCOPHAGE) 1000 MG tablet, Take 1,000 mg by mouth 2 (two) times a day with meals, Disp: , Rfl:     metoprolol tartrate (LOPRESSOR) 25 mg tablet, Take 0.5 tablets (12.5 mg total) by mouth every 12 (twelve) hours, Disp: 30 tablet, Rfl: 0    montelukast (SINGULAIR) 10 mg tablet, Take 10 mg by mouth daily at bedtime, Disp: , Rfl:     multivitamin (THERAGRAN) TABS, Take 1 tablet by mouth daily, Disp: , Rfl:     omeprazole (PriLOSEC) 40 MG capsule, Take 40 mg by mouth 2 (two) times a day, Disp: , Rfl:     ONETOUCH DELICA LANCETS 33G MISC, Test blood sugars 3 times a day., Disp: 100 each, Rfl: 0    oxybutynin (DITROPAN) 5 mg tablet, Take 1 tablet (5 mg total) by mouth 3 (three) times a day, Disp: 90 tablet, Rfl: 3    phenytoin (DILANTIN) 100 mg ER capsule, Take by mouth 2 (two) times a day 3 tablets in morning and 2  at lunch daily, Disp: , Rfl:     polyethylene glycol (GLYCOLAX) 17 GM/SCOOP powder, Take 17 g by mouth daily as needed (constipation), Disp: , Rfl:     QUEtiapine (SEROquel) 400 MG tablet, Take 400 mg by mouth 2 (two) times a day With breakfast and at 8pm, Disp: , Rfl:     simvastatin (ZOCOR) 80 mg tablet, Take 0.5 tablets (40 mg total) by mouth daily at bedtime, Disp: , Rfl:     zonisamide (ZONEGRAN) 100 mg capsule, Take 200 mg by mouth 2 (two) times a day, Disp: , Rfl:     gabapentin (NEURONTIN) 800 mg tablet, Take 1 tablet (800 mg total) by mouth 4 (four) times a day for 3 days (Patient not taking: Reported on 4/23/2025), Disp: 12 tablet, Rfl: 0    guaiFENesin 400 mg, Take 400 mg by mouth 2 (two) times a day (Patient not taking: Reported on 4/23/2025), Disp: , Rfl:     Incontinence Supply Disposable (PADSORBER BED PAN LINERS) MISC, by Does not apply route as needed (incontinence) (Patient not taking: Reported on 4/23/2025), Disp: 50 each, Rfl: 3    Infant Care Products (CUTIES SENSITIVE WIPES) MISC, 120 Pieces by Does not apply route as needed (incontinence) (Patient not taking: Reported on 4/23/2025), Disp: 120 each, Rfl: 3    Misc. Devices (MATTRESS PAD) MISC, by Does not apply route daily (Patient not taking: Reported on 7/26/2024), Disp: 1 each, Rfl: 0    nystatin (MYCOSTATIN) ointment, Apply 1 Application topically 2 (two) times a day (Patient not taking: Reported on 4/23/2025), Disp: , Rfl:     polyvinyl alcohol (LIQUIFILM TEARS) 1.4 % ophthalmic solution, Administer 1 drop to both eyes 3 (three) times a day (Patient not taking: Reported on 4/23/2025), Disp: 15 mL, Rfl: 0    valACYclovir (VALTREX) 500 mg tablet, Take 1 tablet by mouth 2 (two) times a day (Patient not taking: Reported on 4/23/2025), Disp: , Rfl: 2  No current facility-administered medications for this visit.    Current Allergies     Allergies as of 04/23/2025 - Reviewed 04/23/2025   Allergen Reaction Noted    Keflex [cephalexin] Anaphylaxis  01/03/2011    Levaquin [levofloxacin] Anaphylaxis 11/08/2007    Penicillins Anaphylaxis 11/08/2007    Bactrim [sulfamethoxazole-trimethoprim] Swelling and GI Intolerance 07/01/2015    Ciprofloxacin Swelling 07/01/2015    Noroxin [norfloxacin]  08/24/2018            The following portions of the patient's history were reviewed and updated as appropriate: allergies, current medications, past family history, past medical history, past social history, past surgical history and problem list.     Past Medical History:   Diagnosis Date    Asthma     Bipolar disorder (HCC)     Chronic UTI (urinary tract infection)     COPD (chronic obstructive pulmonary disease) ([facility])     Diabetes mellitus ([facility])     Disease of thyroid gland     Dyslipidemia 10/31/2018    Essential hypertension 10/31/2018    GERD (gastroesophageal reflux disease)     Heart attack ([facility])     Iron deficiency anemia, unspecified 7/29/2019    Obesity due to excess calories 10/31/2018    Recurrent falls 4/13/2019    Seizure ([facility])        Past Surgical History:   Procedure Laterality Date    ANKLE FRACTURE SURGERY Right     TUBAL LIGATION      VEIN LIGATION AND STRIPPING         Family History   Problem Relation Age of Onset    Skeletal dysplasia Mother     Stroke Father          Medications have been verified.        Objective   /66   Pulse 81   Temp 98.5 °F (36.9 °C)   Resp 20   LMP  (LMP Unknown)   SpO2 94%        Physical Exam     Physical Exam  Vitals and nursing note reviewed.   Constitutional:       Appearance: She is obese.   Cardiovascular:      Rate and Rhythm: Normal rate.      Pulses: Normal pulses.   Pulmonary:      Effort: Pulmonary effort is normal.   Skin:     Findings: Burn present.   Psychiatric:         Speech: Speech is tangential.         Thought Content: Thought content is paranoid. Thought content does not include suicidal ideation. Thought content does not include homicidal or suicidal plan.

## 2025-04-23 NOTE — PATIENT INSTRUCTIONS
Second degree burn of right breast. Keep clean with mild soap and water. Apply bacitracin twice daily. Avoid rubbing of the area. Continue to have it checked by home health. Monitor for fever, chills, worsening redness/warmth of the area. Itching and mild pain are normal as the wound is healing.   Call PCP to discuss your wish to be placed in a nursing home.

## 2025-04-24 ENCOUNTER — TELEPHONE (OUTPATIENT)
Dept: URGENT CARE | Facility: CLINIC | Age: 70
End: 2025-04-24

## 2025-04-24 NOTE — TELEPHONE ENCOUNTER
Patient called asking for the tegaderm size that was used in the office. I provided her with the two sizes we have in the office. Recommended wash with soap/water, apply bacitracin, and change dressing twice per day. Patient has informed her PCP and home health nurse of the burn.

## 2025-05-09 ENCOUNTER — HOSPITAL ENCOUNTER (INPATIENT)
Facility: HOSPITAL | Age: 70
LOS: 2 days | Discharge: HOME HEALTH | End: 2025-05-11
Attending: EMERGENCY MEDICINE | Admitting: INTERNAL MEDICINE
Payer: COMMERCIAL

## 2025-05-09 DIAGNOSIS — Z76.0 MEDICATION REFILL: ICD-10-CM

## 2025-05-09 DIAGNOSIS — R30.0 DYSURIA: ICD-10-CM

## 2025-05-09 DIAGNOSIS — R35.0 URINARY FREQUENCY: ICD-10-CM

## 2025-05-09 DIAGNOSIS — S21.001D BREAST WOUND, RIGHT, SUBSEQUENT ENCOUNTER: ICD-10-CM

## 2025-05-09 DIAGNOSIS — N39.0 UTI (URINARY TRACT INFECTION): Primary | ICD-10-CM

## 2025-05-09 DIAGNOSIS — Z13.9 ENCOUNTER FOR SCREENING INVOLVING SOCIAL DETERMINANTS OF HEALTH (SDOH): ICD-10-CM

## 2025-05-09 LAB
ANION GAP SERPL CALCULATED.3IONS-SCNC: 9 MMOL/L (ref 4–13)
BACTERIA UR QL AUTO: ABNORMAL /HPF
BASOPHILS # BLD AUTO: 0.04 THOUSANDS/ÂΜL (ref 0–0.1)
BASOPHILS NFR BLD AUTO: 1 % (ref 0–1)
BILIRUB UR QL STRIP: NEGATIVE
BUN SERPL-MCNC: 15 MG/DL (ref 5–25)
CALCIUM SERPL-MCNC: 8.9 MG/DL (ref 8.4–10.2)
CHLORIDE SERPL-SCNC: 98 MMOL/L (ref 96–108)
CLARITY UR: ABNORMAL
CO2 SERPL-SCNC: 29 MMOL/L (ref 21–32)
COLOR UR: YELLOW
CREAT SERPL-MCNC: 0.63 MG/DL (ref 0.6–1.3)
EOSINOPHIL # BLD AUTO: 0.41 THOUSAND/ÂΜL (ref 0–0.61)
EOSINOPHIL NFR BLD AUTO: 5 % (ref 0–6)
ERYTHROCYTE [DISTWIDTH] IN BLOOD BY AUTOMATED COUNT: 13.2 % (ref 11.6–15.1)
GFR SERPL CREATININE-BSD FRML MDRD: 93 ML/MIN/1.73SQ M
GLUCOSE SERPL-MCNC: 101 MG/DL (ref 65–140)
GLUCOSE SERPL-MCNC: 102 MG/DL (ref 65–140)
GLUCOSE SERPL-MCNC: 97 MG/DL (ref 65–140)
GLUCOSE UR STRIP-MCNC: NEGATIVE MG/DL
HCT VFR BLD AUTO: 37.9 % (ref 34.8–46.1)
HGB BLD-MCNC: 12.5 G/DL (ref 11.5–15.4)
HGB UR QL STRIP.AUTO: ABNORMAL
IMM GRANULOCYTES # BLD AUTO: 0.05 THOUSAND/UL (ref 0–0.2)
IMM GRANULOCYTES NFR BLD AUTO: 1 % (ref 0–2)
KETONES UR STRIP-MCNC: NEGATIVE MG/DL
LACTATE SERPL-SCNC: 3.3 MMOL/L (ref 0.5–2)
LACTATE SERPL-SCNC: 3.7 MMOL/L (ref 0.5–2)
LACTATE SERPL-SCNC: 5.4 MMOL/L (ref 0.5–2)
LACTATE SERPL-SCNC: 5.9 MMOL/L (ref 0.5–2)
LEUKOCYTE ESTERASE UR QL STRIP: ABNORMAL
LYMPHOCYTES # BLD AUTO: 1.12 THOUSANDS/ÂΜL (ref 0.6–4.47)
LYMPHOCYTES NFR BLD AUTO: 13 % (ref 14–44)
MCH RBC QN AUTO: 33.7 PG (ref 26.8–34.3)
MCHC RBC AUTO-ENTMCNC: 33 G/DL (ref 31.4–37.4)
MCV RBC AUTO: 102 FL (ref 82–98)
MONOCYTES # BLD AUTO: 0.59 THOUSAND/ÂΜL (ref 0.17–1.22)
MONOCYTES NFR BLD AUTO: 7 % (ref 4–12)
NEUTROPHILS # BLD AUTO: 6.55 THOUSANDS/ÂΜL (ref 1.85–7.62)
NEUTS SEG NFR BLD AUTO: 73 % (ref 43–75)
NITRITE UR QL STRIP: POSITIVE
NON-SQ EPI CELLS URNS QL MICRO: ABNORMAL /HPF
NRBC BLD AUTO-RTO: 0 /100 WBCS
PH UR STRIP.AUTO: 6.5 [PH]
PLATELET # BLD AUTO: 228 THOUSANDS/UL (ref 149–390)
PMV BLD AUTO: 9.2 FL (ref 8.9–12.7)
POTASSIUM SERPL-SCNC: 4.3 MMOL/L (ref 3.5–5.3)
PROCALCITONIN SERPL-MCNC: <0.05 NG/ML
PROT UR STRIP-MCNC: ABNORMAL MG/DL
RBC # BLD AUTO: 3.71 MILLION/UL (ref 3.81–5.12)
RBC #/AREA URNS AUTO: ABNORMAL /HPF
SODIUM SERPL-SCNC: 136 MMOL/L (ref 135–147)
SP GR UR STRIP.AUTO: 1.02 (ref 1–1.03)
UROBILINOGEN UR STRIP-ACNC: <2 MG/DL
WBC # BLD AUTO: 8.76 THOUSAND/UL (ref 4.31–10.16)
WBC #/AREA URNS AUTO: ABNORMAL /HPF

## 2025-05-09 PROCEDURE — 84145 PROCALCITONIN (PCT): CPT | Performed by: EMERGENCY MEDICINE

## 2025-05-09 PROCEDURE — 87040 BLOOD CULTURE FOR BACTERIA: CPT | Performed by: EMERGENCY MEDICINE

## 2025-05-09 PROCEDURE — 87186 SC STD MICRODIL/AGAR DIL: CPT

## 2025-05-09 PROCEDURE — 36415 COLL VENOUS BLD VENIPUNCTURE: CPT | Performed by: EMERGENCY MEDICINE

## 2025-05-09 PROCEDURE — 85025 COMPLETE CBC W/AUTO DIFF WBC: CPT | Performed by: EMERGENCY MEDICINE

## 2025-05-09 PROCEDURE — 83605 ASSAY OF LACTIC ACID: CPT | Performed by: PHYSICIAN ASSISTANT

## 2025-05-09 PROCEDURE — 99285 EMERGENCY DEPT VISIT HI MDM: CPT | Performed by: EMERGENCY MEDICINE

## 2025-05-09 PROCEDURE — 82948 REAGENT STRIP/BLOOD GLUCOSE: CPT

## 2025-05-09 PROCEDURE — 83605 ASSAY OF LACTIC ACID: CPT | Performed by: EMERGENCY MEDICINE

## 2025-05-09 PROCEDURE — 99223 1ST HOSP IP/OBS HIGH 75: CPT | Performed by: INTERNAL MEDICINE

## 2025-05-09 PROCEDURE — 81001 URINALYSIS AUTO W/SCOPE: CPT

## 2025-05-09 PROCEDURE — 87077 CULTURE AEROBIC IDENTIFY: CPT

## 2025-05-09 PROCEDURE — 80048 BASIC METABOLIC PNL TOTAL CA: CPT | Performed by: EMERGENCY MEDICINE

## 2025-05-09 PROCEDURE — 96374 THER/PROPH/DIAG INJ IV PUSH: CPT

## 2025-05-09 PROCEDURE — 87086 URINE CULTURE/COLONY COUNT: CPT

## 2025-05-09 PROCEDURE — 99283 EMERGENCY DEPT VISIT LOW MDM: CPT

## 2025-05-09 RX ORDER — MELOXICAM 7.5 MG/1
7.5 TABLET ORAL 2 TIMES DAILY
Status: DISCONTINUED | OUTPATIENT
Start: 2025-05-09 | End: 2025-05-11 | Stop reason: HOSPADM

## 2025-05-09 RX ORDER — ASPIRIN 81 MG/1
81 TABLET ORAL DAILY
Status: DISCONTINUED | OUTPATIENT
Start: 2025-05-10 | End: 2025-05-11 | Stop reason: HOSPADM

## 2025-05-09 RX ORDER — INSULIN LISPRO 100 [IU]/ML
1-6 INJECTION, SOLUTION INTRAVENOUS; SUBCUTANEOUS
Status: DISCONTINUED | OUTPATIENT
Start: 2025-05-09 | End: 2025-05-11 | Stop reason: HOSPADM

## 2025-05-09 RX ORDER — PANTOPRAZOLE SODIUM 40 MG/1
40 TABLET, DELAYED RELEASE ORAL
Status: DISCONTINUED | OUTPATIENT
Start: 2025-05-09 | End: 2025-05-11 | Stop reason: HOSPADM

## 2025-05-09 RX ORDER — ACETAMINOPHEN 325 MG/1
650 TABLET ORAL EVERY 6 HOURS PRN
Status: DISCONTINUED | OUTPATIENT
Start: 2025-05-09 | End: 2025-05-11 | Stop reason: HOSPADM

## 2025-05-09 RX ORDER — FERROUS SULFATE 325(65) MG
325 TABLET ORAL
Status: DISCONTINUED | OUTPATIENT
Start: 2025-05-10 | End: 2025-05-11 | Stop reason: HOSPADM

## 2025-05-09 RX ORDER — INSULIN LISPRO 100 [IU]/ML
5 INJECTION, SOLUTION INTRAVENOUS; SUBCUTANEOUS
Status: DISCONTINUED | OUTPATIENT
Start: 2025-05-09 | End: 2025-05-11 | Stop reason: HOSPADM

## 2025-05-09 RX ORDER — FLUTICASONE FUROATE AND VILANTEROL 100; 25 UG/1; UG/1
1 POWDER RESPIRATORY (INHALATION) DAILY
Status: DISCONTINUED | OUTPATIENT
Start: 2025-05-10 | End: 2025-05-11 | Stop reason: HOSPADM

## 2025-05-09 RX ORDER — OXYBUTYNIN CHLORIDE 5 MG/1
5 TABLET ORAL 3 TIMES DAILY
Status: DISCONTINUED | OUTPATIENT
Start: 2025-05-09 | End: 2025-05-11 | Stop reason: HOSPADM

## 2025-05-09 RX ORDER — DOCUSATE SODIUM 100 MG/1
100 CAPSULE, LIQUID FILLED ORAL 2 TIMES DAILY PRN
Status: DISCONTINUED | OUTPATIENT
Start: 2025-05-09 | End: 2025-05-11 | Stop reason: HOSPADM

## 2025-05-09 RX ORDER — ONDANSETRON 2 MG/ML
4 INJECTION INTRAMUSCULAR; INTRAVENOUS EVERY 6 HOURS PRN
Status: DISCONTINUED | OUTPATIENT
Start: 2025-05-09 | End: 2025-05-11 | Stop reason: HOSPADM

## 2025-05-09 RX ORDER — LEVETIRACETAM 500 MG/1
1500 TABLET ORAL 2 TIMES DAILY
Status: DISCONTINUED | OUTPATIENT
Start: 2025-05-09 | End: 2025-05-11 | Stop reason: HOSPADM

## 2025-05-09 RX ORDER — MUPIROCIN 20 MG/G
OINTMENT TOPICAL 2 TIMES DAILY
Status: DISCONTINUED | OUTPATIENT
Start: 2025-05-09 | End: 2025-05-11 | Stop reason: HOSPADM

## 2025-05-09 RX ORDER — POLYETHYLENE GLYCOL 3350 17 G/17G
17 POWDER, FOR SOLUTION ORAL DAILY PRN
Status: DISCONTINUED | OUTPATIENT
Start: 2025-05-09 | End: 2025-05-11 | Stop reason: HOSPADM

## 2025-05-09 RX ORDER — NYSTATIN 100000 [USP'U]/G
POWDER TOPICAL 2 TIMES DAILY
Status: DISCONTINUED | OUTPATIENT
Start: 2025-05-09 | End: 2025-05-11 | Stop reason: HOSPADM

## 2025-05-09 RX ORDER — ENOXAPARIN SODIUM 100 MG/ML
40 INJECTION SUBCUTANEOUS DAILY
Status: DISCONTINUED | OUTPATIENT
Start: 2025-05-09 | End: 2025-05-11 | Stop reason: HOSPADM

## 2025-05-09 RX ORDER — PHENYTOIN SODIUM 100 MG/1
300 CAPSULE, EXTENDED RELEASE ORAL DAILY
Status: DISCONTINUED | OUTPATIENT
Start: 2025-05-10 | End: 2025-05-11 | Stop reason: HOSPADM

## 2025-05-09 RX ORDER — QUETIAPINE FUMARATE 100 MG/1
400 TABLET, FILM COATED ORAL 2 TIMES DAILY
Status: DISCONTINUED | OUTPATIENT
Start: 2025-05-09 | End: 2025-05-11 | Stop reason: HOSPADM

## 2025-05-09 RX ORDER — FLUTICASONE PROPIONATE 50 MCG
1 SPRAY, SUSPENSION (ML) NASAL DAILY
Status: DISCONTINUED | OUTPATIENT
Start: 2025-05-09 | End: 2025-05-11 | Stop reason: HOSPADM

## 2025-05-09 RX ORDER — PHENYTOIN SODIUM 100 MG/1
200 CAPSULE, EXTENDED RELEASE ORAL
Status: DISCONTINUED | OUTPATIENT
Start: 2025-05-09 | End: 2025-05-11 | Stop reason: HOSPADM

## 2025-05-09 RX ORDER — LEVOTHYROXINE SODIUM 175 UG/1
175 TABLET ORAL
Status: DISCONTINUED | OUTPATIENT
Start: 2025-05-10 | End: 2025-05-11 | Stop reason: HOSPADM

## 2025-05-09 RX ORDER — ALBUTEROL SULFATE 90 UG/1
2 INHALANT RESPIRATORY (INHALATION) EVERY 6 HOURS PRN
Status: DISCONTINUED | OUTPATIENT
Start: 2025-05-09 | End: 2025-05-11 | Stop reason: HOSPADM

## 2025-05-09 RX ORDER — PRAVASTATIN SODIUM 40 MG
40 TABLET ORAL
Status: DISCONTINUED | OUTPATIENT
Start: 2025-05-09 | End: 2025-05-11 | Stop reason: HOSPADM

## 2025-05-09 RX ORDER — ZONISAMIDE 100 MG/1
200 CAPSULE ORAL 2 TIMES DAILY
Status: DISCONTINUED | OUTPATIENT
Start: 2025-05-09 | End: 2025-05-11 | Stop reason: HOSPADM

## 2025-05-09 RX ORDER — MONTELUKAST SODIUM 10 MG/1
10 TABLET ORAL
Status: DISCONTINUED | OUTPATIENT
Start: 2025-05-09 | End: 2025-05-11 | Stop reason: HOSPADM

## 2025-05-09 RX ADMIN — QUETIAPINE FUMARATE 400 MG: 100 TABLET ORAL at 21:06

## 2025-05-09 RX ADMIN — ACETAMINOPHEN 650 MG: 325 TABLET ORAL at 18:16

## 2025-05-09 RX ADMIN — ERTAPENEM SODIUM 1000 MG: 1 INJECTION, POWDER, LYOPHILIZED, FOR SOLUTION INTRAMUSCULAR; INTRAVENOUS at 17:00

## 2025-05-09 RX ADMIN — MELOXICAM 7.5 MG: 7.5 TABLET ORAL at 18:16

## 2025-05-09 RX ADMIN — NYSTATIN: 100000 POWDER TOPICAL at 18:15

## 2025-05-09 RX ADMIN — ZONISAMIDE 200 MG: 100 CAPSULE ORAL at 18:20

## 2025-05-09 RX ADMIN — LEVETIRACETAM 1500 MG: 500 TABLET, FILM COATED ORAL at 18:17

## 2025-05-09 RX ADMIN — OXYBUTYNIN CHLORIDE 5 MG: 5 TABLET ORAL at 18:17

## 2025-05-09 RX ADMIN — PHENYTOIN SODIUM 200 MG: 100 CAPSULE, EXTENDED RELEASE ORAL at 18:18

## 2025-05-09 RX ADMIN — PANTOPRAZOLE SODIUM 40 MG: 40 TABLET, DELAYED RELEASE ORAL at 18:18

## 2025-05-09 RX ADMIN — ENOXAPARIN SODIUM 40 MG: 40 INJECTION SUBCUTANEOUS at 18:16

## 2025-05-09 RX ADMIN — PRAVASTATIN SODIUM 40 MG: 40 TABLET ORAL at 18:19

## 2025-05-09 RX ADMIN — FLUTICASONE PROPIONATE 1 SPRAY: 50 SPRAY, METERED NASAL at 18:15

## 2025-05-09 RX ADMIN — MAGNESIUM 64 MG (MAGNESIUM CHLORIDE) TABLET,DELAYED RELEASE 64 MG: at 18:18

## 2025-05-09 RX ADMIN — SODIUM CHLORIDE 500 ML: 0.9 INJECTION, SOLUTION INTRAVENOUS at 17:00

## 2025-05-09 RX ADMIN — MONTELUKAST 10 MG: 10 TABLET, FILM COATED ORAL at 21:06

## 2025-05-09 RX ADMIN — Medication 12.5 MG: at 21:06

## 2025-05-09 RX ADMIN — INSULIN LISPRO 5 UNITS: 100 INJECTION, SOLUTION INTRAVENOUS; SUBCUTANEOUS at 18:19

## 2025-05-09 RX ADMIN — MUPIROCIN: 20 OINTMENT TOPICAL at 18:17

## 2025-05-09 RX ADMIN — OXYBUTYNIN CHLORIDE 5 MG: 5 TABLET ORAL at 21:06

## 2025-05-09 RX ADMIN — SODIUM CHLORIDE, SODIUM LACTATE, POTASSIUM CHLORIDE, AND CALCIUM CHLORIDE 1000 ML: .6; .31; .03; .02 INJECTION, SOLUTION INTRAVENOUS at 13:40

## 2025-05-09 NOTE — ED ATTENDING ATTESTATION
5/9/2025  I, Vadim Aguilar DO, saw and evaluated the patient. I have discussed the patient with the resident/non-physician practitioner and agree with the resident's/non-physician practitioner's findings, Plan of Care, and MDM as documented in the resident's/non-physician practitioner's note, except where noted. All available labs and Radiology studies were reviewed.  I was present for key portions of any procedure(s) performed by the resident/non-physician practitioner and I was immediately available to provide assistance.       At this point I agree with the current assessment done in the Emergency Department.  I have conducted an independent evaluation of this patient a history and physical is as follows:    ED Course     Briefly, 69-year-old female past medical history includes seizure disorder, T2DM, hypothyroidism, CAD, pressure injury, asthma presenting to the ER for evaluation of UTI.  Patient reportedly had an abnormal outpatient urinalysis and urine culture with a history of MDRO and was advised by her provider to seek admission for IV antibiotics given multiple antibiotic allergies and resistance to multiple classes of antibiotics.    Review of Systems   Constitutional:  Negative for activity change and appetite change.   HENT:  Negative for congestion.    Respiratory:  Negative for cough and shortness of breath.    Cardiovascular:  Negative for chest pain.   Gastrointestinal:  Negative for abdominal pain, diarrhea, nausea and vomiting.   Genitourinary:  Positive for dysuria and frequency. Negative for flank pain.   Musculoskeletal:  Negative for back pain and neck pain.   Neurological:  Negative for syncope.     Physical Exam  Vitals and nursing note reviewed. Exam conducted with a chaperone present.   Constitutional:       General: She is not in acute distress.     Appearance: She is well-developed.   HENT:      Head: Normocephalic and atraumatic.   Eyes:      Conjunctiva/sclera: Conjunctivae  normal.   Cardiovascular:      Rate and Rhythm: Normal rate and regular rhythm.      Heart sounds: No murmur heard.  Pulmonary:      Effort: Pulmonary effort is normal. No respiratory distress.      Breath sounds: Normal breath sounds.   Abdominal:      Palpations: Abdomen is soft.      Tenderness: There is no abdominal tenderness.   Musculoskeletal:         General: No swelling.      Cervical back: Neck supple.   Skin:     General: Skin is warm and dry.      Capillary Refill: Capillary refill takes less than 2 seconds.   Neurological:      Mental Status: She is alert.   Psychiatric:         Mood and Affect: Mood normal.       Medical Decision Making  69-year-old female presenting with UTI symptoms including dysuria frequency urgency.  Differential diagnosis includes UTI, cystitis, pyelonephritis, kidney stone.  Will repeat urinalysis here which demonstrate evidence of UTI.  Patient has clinical symptoms and and is well as objective findings of pyuria.  We are unable to review the results of the recent outpatient urine culture.  We attempted to call the provider and left a message for call back but did not receive a callback message.  The case was discussed with hospitalist service who are agreeable to accept the patient for admission on IV antibiotics.  Patient also has a lactic acidosis but did not meet SIRS criteria.  She received some IV fluids.  She was agreeable for the plan for admission.    Problems Addressed:  Dysuria: acute illness or injury  Urinary frequency: acute illness or injury  UTI (urinary tract infection): acute illness or injury    Amount and/or Complexity of Data Reviewed  Labs: ordered.    Risk  Decision regarding hospitalization.            Critical Care Time  Procedures

## 2025-05-09 NOTE — ASSESSMENT & PLAN NOTE
At baseline moves very poorly and reports proggresively worsening leg and arm strength over the last several years  PT/OT ordered, suspect she may need rehab
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
## 2025-05-09 NOTE — ASSESSMENT & PLAN NOTE
Patient with history of MDR urinary tract infections  Also with multiple abx allergies  Having dysuria and frequency  UA concerning for UTI, pending culture  Utilize ertapenem
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
## 2025-05-09 NOTE — ED PROVIDER NOTES
Time reflects when diagnosis was documented in both MDM as applicable and the Disposition within this note       Time User Action Codes Description Comment    5/9/2025  1:47 PM Betsey Mustafa Add [N39.0] UTI (urinary tract infection)     5/9/2025  1:47 PM Betsey Mustafa Add [R35.0] Urinary frequency     5/9/2025  1:47 PM Betsey Mustafa Add [R30.0] Dysuria           ED Disposition       ED Disposition   Admit    Condition   Stable    Date/Time   Fri May 9, 2025  1:47 PM    Comment   Case was discussed with on-call hospitalist and the patient's admission status was agreed to be Admission Status: inpatient status to the service of Dr. Andrade .               Assessment & Plan       Medical Decision Making  Amount and/or Complexity of Data Reviewed  Labs: ordered.    Risk  Decision regarding hospitalization.      Patient is a 69-year-old female with a history of chronic UTI who presents to the office today with complaints of dysuria, urinary frequency, suprapubic pain since few days.  Patient reports that she had a urinalysis done by her PCP back on Monday which showed results concerning for UTI.  She states that her PCP advised her to come to the ED for IV antibiotics.  Patient reports that she is resistant to many oral antibiotics and allergic to some requiring IV antibiotics for UTI treatment.  Patient denies fevers, chills, chest pain, nausea/vomiting.  She reports suprapubic pain on and off for the last few days.  Denies blood in urine.    On review of her prior notes, it appears that patient has a history of chronic MDR UTIs.  Unable to obtain results of the UA done at another health facility from Monday.    Patient hemodynamically stable, tachycardic.  Will obtain complete sepsis workup, UA with reflex to culture.  Lactic acid 3.3.  UA impressive for UTI.  1 L bolus of IV fluids given ED. Attempted to call [facility] PCP office to determine what the patient was growing in her urine.  Received a  call back from her PCP office confirming that urine culture was positive for >100,000 Pseudomonas.  Reviewed sensitivities.    Notified the hospitalist on-call and patient will be admitted on inpatient status for IV antibiotics.       Medications   lactated ringers bolus 1,000 mL (1,000 mL Intravenous New Bag 5/9/25 1340)   ertapenem (INVanz) 1,000 mg in sodium chloride 0.9 % 50 mL IVPB (has no administration in time range)       ED Risk Strat Scores                    No data recorded        SBIRT 20yo+      Flowsheet Row Most Recent Value   Initial Alcohol Screen: US AUDIT-C     1. How often do you have a drink containing alcohol? 0 Filed at: 05/09/2025 1120   2. How many drinks containing alcohol do you have on a typical day you are drinking?  0 Filed at: 05/09/2025 1120   3b. FEMALE Any Age, or MALE 65+: How often do you have 4 or more drinks on one occassion? 0 Filed at: 05/09/2025 1120   Audit-C Score 0 Filed at: 05/09/2025 1120   ELIJAH: How many times in the past year have you...    Used an illegal drug or used a prescription medication for non-medical reasons? Never Filed at: 05/09/2025 1120                            History of Present Illness       Chief Complaint   Patient presents with    Possible UTI     States that she was told to come to the ER by her primary for IV antibiotic. Complains of pain and burning with urination. Increased urinary frequency        Past Medical History:   Diagnosis Date    Asthma     Bipolar disorder (HCC)     Chronic UTI (urinary tract infection)     COPD (chronic obstructive pulmonary disease) ([facility])     Diabetes mellitus ([facility])     Disease of thyroid gland     Dyslipidemia 10/31/2018    Essential hypertension 10/31/2018    GERD (gastroesophageal reflux disease)     Heart attack ([facility])     Iron deficiency anemia, unspecified 7/29/2019    Obesity due to excess calories 10/31/2018    Recurrent falls 4/13/2019    Seizure ([facility])       Past Surgical History:   Procedure Laterality  Date    ANKLE FRACTURE SURGERY Right     TUBAL LIGATION      VEIN LIGATION AND STRIPPING        Family History   Problem Relation Age of Onset    Skeletal dysplasia Mother     Stroke Father       Social History     Tobacco Use    Smoking status: Never    Smokeless tobacco: Never   Vaping Use    Vaping status: Never Used   Substance Use Topics    Alcohol use: Not Currently     Alcohol/week: 0.0 standard drinks of alcohol    Drug use: No      E-Cigarette/Vaping    E-Cigarette Use Never User       E-Cigarette/Vaping Substances    Nicotine No     THC No     CBD No     Flavoring No     Other No     Unknown No       I have reviewed and agree with the history as documented.     HPI    Review of Systems   Constitutional:  Negative for chills and fever.   HENT:  Negative for ear pain and sore throat.    Eyes:  Negative for pain and visual disturbance.   Respiratory:  Negative for cough and shortness of breath.    Cardiovascular:  Negative for chest pain and palpitations.   Gastrointestinal:  Negative for abdominal pain and vomiting.   Genitourinary:  Positive for dysuria, frequency and urgency. Negative for hematuria.        Suprapubic pain   Musculoskeletal:  Negative for arthralgias and back pain.   Skin:  Negative for color change and rash.   Neurological:  Negative for seizures and syncope.   All other systems reviewed and are negative.          Objective       ED Triage Vitals [05/09/25 1118]   Temperature Pulse Blood Pressure Respirations SpO2 Patient Position - Orthostatic VS   97.8 °F (36.6 °C) 101 103/62 20 94 % Sitting      Temp Source Heart Rate Source BP Location FiO2 (%) Pain Score    Temporal Monitor Right arm -- 5      Vitals      Date and Time Temp Pulse SpO2 Resp BP Pain Score FACES Pain Rating User   05/09/25 1200 -- 98 93 % 20 135/76 5 -- BW   05/09/25 1118 97.8 °F (36.6 °C) 101 94 % 20 103/62 5 -- [place]            Physical Exam  Vitals and nursing note reviewed.   Constitutional:       General: She is not in  acute distress.     Appearance: She is well-developed.   HENT:      Head: Normocephalic and atraumatic.   Eyes:      Conjunctiva/sclera: Conjunctivae normal.   Cardiovascular:      Rate and Rhythm: Regular rhythm. Tachycardia present.      Heart sounds: No murmur heard.  Pulmonary:      Effort: Pulmonary effort is normal. No respiratory distress.      Breath sounds: Normal breath sounds.   Abdominal:      Palpations: Abdomen is soft.      Tenderness: There is abdominal tenderness (Suprapubic).   Musculoskeletal:         General: No swelling.      Cervical back: Neck supple.   Skin:     General: Skin is warm and dry.      Capillary Refill: Capillary refill takes less than 2 seconds.   Neurological:      Mental Status: She is alert.   Psychiatric:         Mood and Affect: Mood normal.         Results Reviewed       Procedure Component Value Units Date/Time    Lactic acid, plasma (w/reflex if result > 2.0) [[id number]]  (Abnormal) Collected: 05/09/25 1215    Lab Status: Final result Specimen: Blood from Hand, Right Updated: 05/09/25 1242     LACTIC ACID 3.3 mmol/L     Narrative:      Result may be elevated if tourniquet was used during collection.    Lactic acid 2 Hours [[id number]]     Lab Status: No result Specimen: Blood     Procalcitonin [[id number]]  (Normal) Collected: 05/09/25 1155    Lab Status: Final result Specimen: Blood from Arm, Left Updated: 05/09/25 1240     Procalcitonin <0.05 ng/ml     Basic metabolic panel [[id number]] Collected: 05/09/25 1155    Lab Status: Final result Specimen: Blood from Arm, Left Updated: 05/09/25 1232     Sodium 136 mmol/L      Potassium 4.3 mmol/L      Chloride 98 mmol/L      CO2 29 mmol/L      ANION GAP 9 mmol/L      BUN 15 mg/dL      Creatinine 0.63 mg/dL      Glucose 101 mg/dL      Calcium 8.9 mg/dL      eGFR 93 ml/min/1.73sq m     Narrative:      National Kidney Disease Foundation guidelines for Chronic Kidney Disease (CKD):     Stage 1 with normal or high GFR (GFR > 90  mL/min/1.73 square meters)    Stage 2 Mild CKD (GFR = 60-89 mL/min/1.73 square meters)    Stage 3A Moderate CKD (GFR = 45-59 mL/min/1.73 square meters)    Stage 3B Moderate CKD (GFR = 30-44 mL/min/1.73 square meters)    Stage 4 Severe CKD (GFR = 15-29 mL/min/1.73 square meters)    Stage 5 End Stage CKD (GFR <15 mL/min/1.73 square meters)  Note: GFR calculation is accurate only with a steady state creatinine    Urine Microscopic [[id number]]  (Abnormal) Collected: 05/09/25 1155    Lab Status: Final result Specimen: Urine, Clean Catch Updated: 05/09/25 1224     RBC, UA 1-2 /hpf      WBC, UA 30-50 /hpf      Epithelial Cells None Seen /hpf      Bacteria, UA Occasional /hpf     Urine culture [[id number]] Collected: 05/09/25 1155    Lab Status: In process Specimen: Urine, Clean Catch Updated: 05/09/25 1224    Blood culture #2 [[id number]] Collected: 05/09/25 1215    Lab Status: In process Specimen: Blood from Hand, Right Updated: 05/09/25 1220    UA w Reflex to Microscopic w Reflex to Culture [[id number]]  (Abnormal) Collected: 05/09/25 1155    Lab Status: Final result Specimen: Urine, Clean Catch Updated: 05/09/25 1212     Color, UA Yellow     Clarity, UA Slightly Cloudy     Specific Gravity, UA 1.020     pH, UA 6.5     Leukocytes, UA Large     Nitrite, UA Positive     Protein, UA Trace mg/dl      Glucose, UA Negative mg/dl      Ketones, UA Negative mg/dl      Urobilinogen, UA <2.0 mg/dl      Bilirubin, UA Negative     Occult Blood, UA Trace    CBC and differential [[id number]]  (Abnormal) Collected: 05/09/25 1155    Lab Status: Final result Specimen: Blood from Arm, Left Updated: 05/09/25 1211     WBC 8.76 Thousand/uL      RBC 3.71 Million/uL      Hemoglobin 12.5 g/dL      Hematocrit 37.9 %       fL      MCH 33.7 pg      MCHC 33.0 g/dL      RDW 13.2 %      MPV 9.2 fL      Platelets 228 Thousands/uL      nRBC 0 /100 WBCs      Segmented % 73 %      Immature Grans % 1 %      Lymphocytes % 13 %      Monocytes % 7 %       Eosinophils Relative 5 %      Basophils Relative 1 %      Absolute Neutrophils 6.55 Thousands/µL      Absolute Immature Grans 0.05 Thousand/uL      Absolute Lymphocytes 1.12 Thousands/µL      Absolute Monocytes 0.59 Thousand/µL      Eosinophils Absolute 0.41 Thousand/µL      Basophils Absolute 0.04 Thousands/µL     Blood culture #1 [[id number]] Collected: 25 1155    Lab Status: In process Specimen: Blood from Arm, Left Updated: 25 1205            No orders to display       Procedures    ED Medication and Procedure Management   Prior to Admission Medications   Prescriptions Last Dose Informant Patient Reported? Taking?   Acetaminophen (Tylenol) 325 MG CAPS   Yes No   Sig: Take 650 mg by mouth Three times daily as needed   Blood Glucose Monitoring Suppl (BLOOD GLUCOSE MONITOR SYSTEM) w/Device KIT   No No   Sig: Test blood sugars 3 times a day   Diclofenac Sodium (VOLTAREN) 1 %   Yes No   Sig: APPLY TO AFFECTED AREA EVERY 6 HOURS IF NEEDED FOR PAIN.   Docusate Sodium (DSS) 100 MG CAPS   Yes No   Sig: Take 1 capsule by mouth 2 (two) times a day as needed   Fluticasone Furoate-Vilanterol 100-25 mcg/actuation inhaler   No No   Sig: Inhale 1 puff daily Rinse mouth after use.   Incontinence Supply Disposable (PADSORBER BED PAN LINERS) MISC   No No   Sig: by Does not apply route as needed (incontinence)   Patient not taking: Reported on 2025   Infant Care Products (CUTIES SENSITIVE WIPES) MISC   No No   Si Pieces by Does not apply route as needed (incontinence)   Patient not taking: Reported on 2025   [facility]. Devices (MATTRESS PAD) MISC   No No   Sig: by Does not apply route daily   Patient not taking: Reported on 2024   ONETOUCH DELICA LANCETS 33G MISC   No No   Sig: Test blood sugars 3 times a day.   QUEtiapine (SEROquel) 400 MG tablet   Yes No   Sig: Take 400 mg by mouth 2 (two) times a day With breakfast and at 8pm   albuterol (ProAir HFA) 90 mcg/act inhaler   No No   Sig: Inhale 2 puffs  every 6 (six) hours as needed for wheezing   albuterol (VENTOLIN HFA) 90 mcg/act inhaler   No No   Sig: Inhale 2 puffs every 6 (six) hours as needed for wheezing   aspirin (ECOTRIN LOW STRENGTH) 81 mg EC tablet   No No   Sig: Take 1 tablet (81 mg total) by mouth daily   ferrous sulfate (FeroSul) 325 (65 Fe) mg tablet   No No   Sig: Take 1 tablet (325 mg total) by mouth daily with breakfast   fluticasone (FLONASE) 50 mcg/act nasal spray   No No   Sig: SPRAY ONE (1) SPRAY INTO EACH NOSTRIL ONCE DAILY   gabapentin (NEURONTIN) 800 mg tablet   No No   Sig: Take 1 tablet (800 mg total) by mouth 4 (four) times a day for 3 days   Patient not taking: Reported on 4/23/2025   glucose blood (ONE TOUCH ULTRA TEST) test strip   No No   Sig: Test blood sugars 3 times a day.   guaiFENesin 400 mg   Yes No   Sig: Take 400 mg by mouth 2 (two) times a day   Patient not taking: Reported on 4/23/2025   insulin aspart (NovoLOG FlexPen) 100 UNIT/ML injection pen   Yes No   Sig: inject 5 units under the skin in the morning and 5 units at noon and 5 units in the evening. inject before meals   levETIRAcetam (KEPPRA) 750 mg tablet   No No   Sig: Take 2 tablets (1,500 mg total) by mouth 2 (two) times a day for 15 days Pt states she takes 1500MG Bid   levothyroxine 175 mcg tablet   Yes No   Sig: Take 175 mcg by mouth in the morning   magnesium chloride (MAG64) 64 MG TBEC EC tablet   No No   Sig: Take 1 tablet (64 mg total) by mouth 2 (two) times a day   meloxicam (MOBIC) 7.5 mg tablet   No No   Sig: Take 1 tablet (7.5 mg total) by mouth 2 (two) times a day   metFORMIN (GLUCOPHAGE) 1000 MG tablet   Yes No   Sig: Take 1,000 mg by mouth 2 (two) times a day with meals   metoprolol tartrate (LOPRESSOR) 25 mg tablet   No No   Sig: Take 0.5 tablets (12.5 mg total) by mouth every 12 (twelve) hours   montelukast (SINGULAIR) 10 mg tablet   Yes No   Sig: Take 10 mg by mouth daily at bedtime   multivitamin (THERAGRAN) TABS   Yes No   Sig: Take 1 tablet by  mouth daily   nystatin (MYCOSTATIN) ointment   Yes No   Sig: Apply 1 Application topically 2 (two) times a day   Patient not taking: Reported on 4/23/2025   omeprazole (PriLOSEC) 40 MG capsule   Yes No   Sig: Take 40 mg by mouth 2 (two) times a day   oxybutynin (DITROPAN) 5 mg tablet   No No   Sig: Take 1 tablet (5 mg total) by mouth 3 (three) times a day   phenytoin (DILANTIN) 100 mg ER capsule   Yes No   Sig: Take by mouth 2 (two) times a day 3 tablets in morning and 2 at lunch daily   polyethylene glycol (GLYCOLAX) 17 GM/SCOOP powder   No No   Sig: Take 17 g by mouth daily as needed (constipation)   polyvinyl alcohol (LIQUIFILM TEARS) 1.4 % ophthalmic solution   No No   Sig: Administer 1 drop to both eyes 3 (three) times a day   Patient not taking: Reported on 4/23/2025   simvastatin (ZOCOR) 80 mg tablet   No No   Sig: Take 0.5 tablets (40 mg total) by mouth daily at bedtime   valACYclovir (VALTREX) 500 mg tablet   Yes No   Sig: Take 1 tablet by mouth 2 (two) times a day   Patient not taking: Reported on 4/23/2025   zonisamide (ZONEGRAN) 100 mg capsule   Yes No   Sig: Take 200 mg by mouth 2 (two) times a day      Facility-Administered Medications: None     Patient's Medications   Discharge Prescriptions    No medications on file     No discharge procedures on file.  ED SEPSIS DOCUMENTATION   Time reflects when diagnosis was documented in both MDM as applicable and the Disposition within this note       Time User Action Codes Description Comment    5/9/2025  1:47 PM Betsey Mustafa [N39.0] UTI (urinary tract infection)     5/9/2025  1:47 PM Betsey Mustafa Add [R35.0] Urinary frequency     5/9/2025  1:47 PM Betsey Mustafa [R30.0] Dysuria                  Betsey Mustafa MD  05/09/25 1697

## 2025-05-09 NOTE — ASSESSMENT & PLAN NOTE
"Lab Results   Component Value Date    HGBA1C 6.2 (H) 03/17/2025       No results for input(s): \"POCGLU\" in the last 72 hours.    Blood Sugar Average: Last 72 hrs:    Maintained on metformin, hold  Utilize SSI   Fingersticks with meals  Declines a diabetic diet  "

## 2025-05-09 NOTE — ASSESSMENT & PLAN NOTE
Significant rash to sacral region, appears to be fungal in nature  Utilize nystatin powder to this area as well as to groin  She states on her left posterior leg she was told she had ringworm however it appears to be more like hyperpigmented skin  Wound care consult placed, may benefit from interdry

## 2025-05-10 LAB
ALBUMIN SERPL BCG-MCNC: 3.5 G/DL (ref 3.5–5)
ALP SERPL-CCNC: 72 U/L (ref 34–104)
ALT SERPL W P-5'-P-CCNC: 30 U/L (ref 7–52)
ANION GAP SERPL CALCULATED.3IONS-SCNC: 7 MMOL/L (ref 4–13)
AST SERPL W P-5'-P-CCNC: 21 U/L (ref 13–39)
BASOPHILS # BLD AUTO: 0.04 THOUSANDS/ÂΜL (ref 0–0.1)
BASOPHILS NFR BLD AUTO: 1 % (ref 0–1)
BILIRUB SERPL-MCNC: 0.23 MG/DL (ref 0.2–1)
BUN SERPL-MCNC: 14 MG/DL (ref 5–25)
CALCIUM SERPL-MCNC: 8.7 MG/DL (ref 8.4–10.2)
CHLORIDE SERPL-SCNC: 101 MMOL/L (ref 96–108)
CO2 SERPL-SCNC: 31 MMOL/L (ref 21–32)
CREAT SERPL-MCNC: 0.53 MG/DL (ref 0.6–1.3)
EOSINOPHIL # BLD AUTO: 0.4 THOUSAND/ÂΜL (ref 0–0.61)
EOSINOPHIL NFR BLD AUTO: 8 % (ref 0–6)
ERYTHROCYTE [DISTWIDTH] IN BLOOD BY AUTOMATED COUNT: 13 % (ref 11.6–15.1)
GFR SERPL CREATININE-BSD FRML MDRD: 98 ML/MIN/1.73SQ M
GLUCOSE SERPL-MCNC: 121 MG/DL (ref 65–140)
GLUCOSE SERPL-MCNC: 194 MG/DL (ref 65–140)
GLUCOSE SERPL-MCNC: 93 MG/DL (ref 65–140)
GLUCOSE SERPL-MCNC: 93 MG/DL (ref 65–140)
GLUCOSE SERPL-MCNC: 99 MG/DL (ref 65–140)
HCT VFR BLD AUTO: 37 % (ref 34.8–46.1)
HGB BLD-MCNC: 12.2 G/DL (ref 11.5–15.4)
IMM GRANULOCYTES # BLD AUTO: 0.04 THOUSAND/UL (ref 0–0.2)
IMM GRANULOCYTES NFR BLD AUTO: 1 % (ref 0–2)
LACTATE SERPL-SCNC: 0.5 MMOL/L (ref 0.5–2)
LYMPHOCYTES # BLD AUTO: 1.32 THOUSANDS/ÂΜL (ref 0.6–4.47)
LYMPHOCYTES NFR BLD AUTO: 25 % (ref 14–44)
MCH RBC QN AUTO: 33.8 PG (ref 26.8–34.3)
MCHC RBC AUTO-ENTMCNC: 33 G/DL (ref 31.4–37.4)
MCV RBC AUTO: 103 FL (ref 82–98)
MONOCYTES # BLD AUTO: 0.44 THOUSAND/ÂΜL (ref 0.17–1.22)
MONOCYTES NFR BLD AUTO: 8 % (ref 4–12)
NEUTROPHILS # BLD AUTO: 2.98 THOUSANDS/ÂΜL (ref 1.85–7.62)
NEUTS SEG NFR BLD AUTO: 57 % (ref 43–75)
NRBC BLD AUTO-RTO: 0 /100 WBCS
PLATELET # BLD AUTO: 229 THOUSANDS/UL (ref 149–390)
PMV BLD AUTO: 9 FL (ref 8.9–12.7)
POTASSIUM SERPL-SCNC: 4 MMOL/L (ref 3.5–5.3)
PROT SERPL-MCNC: 6 G/DL (ref 6.4–8.4)
RBC # BLD AUTO: 3.61 MILLION/UL (ref 3.81–5.12)
SODIUM SERPL-SCNC: 139 MMOL/L (ref 135–147)
WBC # BLD AUTO: 5.22 THOUSAND/UL (ref 4.31–10.16)

## 2025-05-10 PROCEDURE — 97166 OT EVAL MOD COMPLEX 45 MIN: CPT

## 2025-05-10 PROCEDURE — 83605 ASSAY OF LACTIC ACID: CPT | Performed by: PHYSICIAN ASSISTANT

## 2025-05-10 PROCEDURE — 99232 SBSQ HOSP IP/OBS MODERATE 35: CPT

## 2025-05-10 PROCEDURE — 85025 COMPLETE CBC W/AUTO DIFF WBC: CPT | Performed by: PHYSICIAN ASSISTANT

## 2025-05-10 PROCEDURE — 80053 COMPREHEN METABOLIC PANEL: CPT | Performed by: PHYSICIAN ASSISTANT

## 2025-05-10 PROCEDURE — 87081 CULTURE SCREEN ONLY: CPT | Performed by: INTERNAL MEDICINE

## 2025-05-10 PROCEDURE — 97162 PT EVAL MOD COMPLEX 30 MIN: CPT

## 2025-05-10 PROCEDURE — 82948 REAGENT STRIP/BLOOD GLUCOSE: CPT

## 2025-05-10 PROCEDURE — 87147 CULTURE TYPE IMMUNOLOGIC: CPT | Performed by: INTERNAL MEDICINE

## 2025-05-10 RX ORDER — GABAPENTIN 400 MG/1
800 CAPSULE ORAL 4 TIMES DAILY
Status: DISCONTINUED | OUTPATIENT
Start: 2025-05-10 | End: 2025-05-11 | Stop reason: HOSPADM

## 2025-05-10 RX ORDER — GUAIFENESIN 600 MG/1
600 TABLET, EXTENDED RELEASE ORAL EVERY 12 HOURS SCHEDULED
Status: DISCONTINUED | OUTPATIENT
Start: 2025-05-10 | End: 2025-05-11 | Stop reason: HOSPADM

## 2025-05-10 RX ADMIN — MONTELUKAST 10 MG: 10 TABLET, FILM COATED ORAL at 21:29

## 2025-05-10 RX ADMIN — GABAPENTIN 800 MG: 400 CAPSULE ORAL at 17:32

## 2025-05-10 RX ADMIN — PRAVASTATIN SODIUM 40 MG: 40 TABLET ORAL at 16:30

## 2025-05-10 RX ADMIN — GABAPENTIN 800 MG: 400 CAPSULE ORAL at 21:29

## 2025-05-10 RX ADMIN — LEVETIRACETAM 1500 MG: 500 TABLET, FILM COATED ORAL at 09:49

## 2025-05-10 RX ADMIN — OXYBUTYNIN CHLORIDE 5 MG: 5 TABLET ORAL at 21:29

## 2025-05-10 RX ADMIN — PANTOPRAZOLE SODIUM 40 MG: 40 TABLET, DELAYED RELEASE ORAL at 05:23

## 2025-05-10 RX ADMIN — MUPIROCIN: 20 OINTMENT TOPICAL at 09:54

## 2025-05-10 RX ADMIN — OXYBUTYNIN CHLORIDE 5 MG: 5 TABLET ORAL at 09:51

## 2025-05-10 RX ADMIN — ENOXAPARIN SODIUM 40 MG: 40 INJECTION SUBCUTANEOUS at 09:52

## 2025-05-10 RX ADMIN — LEVOTHYROXINE SODIUM 175 MCG: 175 TABLET ORAL at 05:23

## 2025-05-10 RX ADMIN — FERROUS SULFATE TAB 325 MG (65 MG ELEMENTAL FE) 325 MG: 325 (65 FE) TAB at 09:50

## 2025-05-10 RX ADMIN — Medication 12.5 MG: at 09:50

## 2025-05-10 RX ADMIN — QUETIAPINE FUMARATE 400 MG: 100 TABLET ORAL at 21:29

## 2025-05-10 RX ADMIN — MUPIROCIN: 20 OINTMENT TOPICAL at 17:33

## 2025-05-10 RX ADMIN — INSULIN LISPRO 5 UNITS: 100 INJECTION, SOLUTION INTRAVENOUS; SUBCUTANEOUS at 11:44

## 2025-05-10 RX ADMIN — INSULIN LISPRO 2 UNITS: 100 INJECTION, SOLUTION INTRAVENOUS; SUBCUTANEOUS at 11:44

## 2025-05-10 RX ADMIN — NYSTATIN: 100000 POWDER TOPICAL at 17:33

## 2025-05-10 RX ADMIN — MELOXICAM 7.5 MG: 7.5 TABLET ORAL at 09:50

## 2025-05-10 RX ADMIN — PHENYTOIN SODIUM 200 MG: 100 CAPSULE, EXTENDED RELEASE ORAL at 11:44

## 2025-05-10 RX ADMIN — FLUTICASONE FUROATE AND VILANTEROL TRIFENATATE 1 PUFF: 100; 25 POWDER RESPIRATORY (INHALATION) at 09:47

## 2025-05-10 RX ADMIN — ZONISAMIDE 200 MG: 100 CAPSULE ORAL at 09:48

## 2025-05-10 RX ADMIN — ASPIRIN 81 MG: 81 TABLET, COATED ORAL at 09:50

## 2025-05-10 RX ADMIN — B-COMPLEX W/ C & FOLIC ACID TAB 1 TABLET: TAB at 09:52

## 2025-05-10 RX ADMIN — MELOXICAM 7.5 MG: 7.5 TABLET ORAL at 17:32

## 2025-05-10 RX ADMIN — ACETAMINOPHEN 650 MG: 325 TABLET ORAL at 03:43

## 2025-05-10 RX ADMIN — OXYBUTYNIN CHLORIDE 5 MG: 5 TABLET ORAL at 16:29

## 2025-05-10 RX ADMIN — ZONISAMIDE 200 MG: 100 CAPSULE ORAL at 17:33

## 2025-05-10 RX ADMIN — GABAPENTIN 800 MG: 400 CAPSULE ORAL at 13:30

## 2025-05-10 RX ADMIN — PHENYTOIN SODIUM 300 MG: 100 CAPSULE, EXTENDED RELEASE ORAL at 09:50

## 2025-05-10 RX ADMIN — MAGNESIUM 64 MG (MAGNESIUM CHLORIDE) TABLET,DELAYED RELEASE 64 MG: at 09:47

## 2025-05-10 RX ADMIN — ACETAMINOPHEN 650 MG: 325 TABLET ORAL at 11:49

## 2025-05-10 RX ADMIN — INSULIN LISPRO 5 UNITS: 100 INJECTION, SOLUTION INTRAVENOUS; SUBCUTANEOUS at 09:53

## 2025-05-10 RX ADMIN — MAGNESIUM 64 MG (MAGNESIUM CHLORIDE) TABLET,DELAYED RELEASE 64 MG: at 17:33

## 2025-05-10 RX ADMIN — FLUTICASONE PROPIONATE 1 SPRAY: 50 SPRAY, METERED NASAL at 09:47

## 2025-05-10 RX ADMIN — NYSTATIN: 100000 POWDER TOPICAL at 09:54

## 2025-05-10 RX ADMIN — ERTAPENEM SODIUM 1000 MG: 1 INJECTION, POWDER, LYOPHILIZED, FOR SOLUTION INTRAMUSCULAR; INTRAVENOUS at 16:30

## 2025-05-10 RX ADMIN — Medication 12.5 MG: at 21:29

## 2025-05-10 RX ADMIN — INSULIN LISPRO 5 UNITS: 100 INJECTION, SOLUTION INTRAVENOUS; SUBCUTANEOUS at 16:30

## 2025-05-10 RX ADMIN — GUAIFENESIN 600 MG: 600 TABLET, EXTENDED RELEASE ORAL at 13:30

## 2025-05-10 RX ADMIN — LEVETIRACETAM 1500 MG: 500 TABLET, FILM COATED ORAL at 17:31

## 2025-05-10 RX ADMIN — PANTOPRAZOLE SODIUM 40 MG: 40 TABLET, DELAYED RELEASE ORAL at 16:30

## 2025-05-10 RX ADMIN — ALBUTEROL SULFATE 2 PUFF: 90 AEROSOL, METERED RESPIRATORY (INHALATION) at 11:45

## 2025-05-10 RX ADMIN — GUAIFENESIN 600 MG: 600 TABLET, EXTENDED RELEASE ORAL at 21:29

## 2025-05-10 RX ADMIN — QUETIAPINE FUMARATE 400 MG: 100 TABLET ORAL at 09:50

## 2025-05-10 NOTE — PLAN OF CARE
Problem: PAIN - ADULT  Goal: Verbalizes/displays adequate comfort level or baseline comfort level  Description: Interventions:- Encourage patient to monitor pain and request assistance- Assess pain using appropriate pain scale- Administer analgesics based on type and severity of pain and evaluate response- Implement non-pharmacological measures as appropriate and evaluate response- Consider cultural and social influences on pain and pain management- Notify physician/advanced practitioner if interventions unsuccessful or patient reports new pain  Outcome: Progressing     Problem: INFECTION - ADULT  Goal: Absence or prevention of progression during hospitalization  Description: INTERVENTIONS:- Assess and monitor for signs and symptoms of infection- Monitor lab/diagnostic results- Monitor all insertion sites, i.e. indwelling lines, tubes, and drains- Monitor endotracheal if appropriate and nasal secretions for changes in amount and color- [place] appropriate cooling/warming therapies per order- Administer medications as ordered- Instruct and encourage patient and family to use good hand hygiene technique- Identify and instruct in appropriate isolation precautions for identified infection/condition  Outcome: Progressing  Goal: Absence of fever/infection during neutropenic period  Description: INTERVENTIONS:- Monitor WBC  Outcome: Progressing     Problem: SAFETY ADULT  Goal: Patient will remain free of falls  Description: INTERVENTIONS:- Educate patient/family on patient safety including physical limitations- Instruct patient to call for assistance with activity - Consult OT/PT to assist with strengthening/mobility - Keep Call bell within reach- Keep bed low and locked with side rails adjusted as appropriate- Keep care items and personal belongings within reach- Initiate and maintain comfort rounds- Make Fall Risk Sign visible to staff- Offer Toileting every 2 Hours, in advance of need- Initiate/Maintain fall alarm-  Obtain necessary fall risk management equipment: non-skid footwear - Apply yellow socks and bracelet for high fall risk patients- Consider moving patient to room near nurses station  Outcome: Progressing  Goal: Maintain or return to baseline ADL function  Description: INTERVENTIONS:-  Assess patient's ability to carry out ADLs; assess patient's baseline for ADL function and identify physical deficits which impact ability to perform ADLs (bathing, care of mouth/teeth, toileting, grooming, dressing, etc.)- Assess/evaluate cause of self-care deficits - Assess range of motion- Assess patient's mobility; develop plan if impaired- Assess patient's need for assistive devices and provide as appropriate- Encourage maximum independence but intervene and supervise when necessary- Involve family in performance of ADLs- Assess for home care needs following discharge - Consider OT consult to assist with ADL evaluation and planning for discharge- Provide patient education as appropriate  Outcome: Progressing  Goal: Maintains/Returns to pre admission functional level  Description: INTERVENTIONS:- Perform AM-PAC 6 Click Basic Mobility/ Daily Activity assessment daily.- Set and communicate daily mobility goal to care team and patient/family/caregiver. - Collaborate with rehabilitation services on mobility goals if consulted- Perform Range of Motion 3 times a day.- Reposition patient every 2 hours.- Dangle patient 3 times a day- Stand patient 3 times a day- Ambulate patient 3 times a day- Out of bed to chair 3 times a day - Out of bed for meals 3 times a day- Out of bed for toileting- Record patient progress and toleration of activity level   Outcome: Progressing     Problem: DISCHARGE PLANNING  Goal: Discharge to home or other facility with appropriate resources  Description: INTERVENTIONS:- Identify barriers to discharge w/patient and caregiver- Arrange for needed discharge resources and transportation as appropriate- Identify  discharge learning needs (meds, wound care, etc.)- Arrange for interpretive services to assist at discharge as needed- Refer to Case Management Department for coordinating discharge planning if the patient needs post-hospital services based on physician/advanced practitioner order or complex needs related to functional status, cognitive ability, or social support system  Outcome: Progressing     Problem: Knowledge Deficit  Goal: Patient/family/caregiver demonstrates understanding of disease process, treatment plan, medications, and discharge instructions  Description: Complete learning assessment and assess knowledge base.Interventions:- Provide teaching at level of understanding- Provide teaching via preferred learning methods  Outcome: Progressing     Problem: Prexisting or High Potential for Compromised Skin Integrity  Goal: Skin integrity is maintained or improved  Description: INTERVENTIONS:- Identify patients at risk for skin breakdown- Assess and monitor skin integrity- Assess and monitor nutrition and hydration status- Monitor labs - Assess for incontinence - Turn and reposition patient- Assist with mobility/ambulation- Relieve pressure over bony prominences- Avoid friction and shearing- Provide appropriate hygiene as needed including keeping skin clean and dry- Evaluate need for skin moisturizer/barrier cream- Collaborate with interdisciplinary team - Patient/family teaching- Consider wound care consult   Outcome: Progressing

## 2025-05-10 NOTE — PHYSICAL THERAPY NOTE
Physical Therapy Evaluation    Patient Name: Ritika Kwong    Today's Date: 5/10/2025     Problem List  Principal Problem:    UTI (urinary tract infection)  Active Problems:    Acquired hypothyroidism    Seizure disorder (HCC)    Ambulatory dysfunction    Type 2 diabetes mellitus with hyperglycemia, with long-term current use of insulin ([facility])    Candidal intertrigo    Breast wound, right, subsequent encounter       Past Medical History  Past Medical History:   Diagnosis Date    Asthma     Bipolar disorder (HCC)     Chronic UTI (urinary tract infection)     COPD (chronic obstructive pulmonary disease) ([facility])     Diabetes mellitus ([facility])     Disease of thyroid gland     Dyslipidemia 10/31/2018    Essential hypertension 10/31/2018    GERD (gastroesophageal reflux disease)     Heart attack ([facility])     Iron deficiency anemia, unspecified 7/29/2019    Obesity due to excess calories 10/31/2018    Recurrent falls 4/13/2019    Seizure ([facility])         Past Surgical History  Past Surgical History:   Procedure Laterality Date    ANKLE FRACTURE SURGERY Right     TUBAL LIGATION      VEIN LIGATION AND STRIPPING           05/10/25 0900   PT Last Visit   PT Visit Date 05/10/25   Note Type   Note type Evaluation   Pain Assessment   Pain Assessment Tool 0-10   Pain Score 7   Pain Location/Orientation   (bladder pain)   Pain Onset/Description Onset: Ongoing   Patient's Stated Pain Goal No pain   Hospital Pain Intervention(s) Repositioned   Restrictions/Precautions   Weight Bearing Precautions Per Order No   Home Living   Type of Home Apartment   Home Layout One level;Ramped entrance   Bathroom Shower/Tub Tub/shower unit   Bathroom Toilet Standard   Bathroom Equipment Grab bars in shower;Tub transfer bench;Commode   Bathroom Accessibility Accessible   Home Equipment Walker;Hospital bed;Wheelchair-manual  (1.5 L of O2, rollator walker)   Prior Function   Level of [place] Independent  "with ADLs;Independent with functional mobility;Needs assistance with IADLS   Lives With Alone   Receives Help From Home health   IADLs Independent with medication management;Family/Friend/Other provides meals;Family/Friend/Other provides medication management   Falls in the last 6 months 0   Vocational On disability   Comments HHA, 2 days per week for a total of 7 hours   General   Family/Caregiver Present No   Cognition   Overall Cognitive Status WFL   Arousal/Participation Alert   Attention Attends with cues to redirect  (Pt very talkative and requires cues to redirect attention to task.)   Orientation Level Oriented X4   Memory Within functional limits   Following Commands Follows all commands and directions without difficulty   Subjective   Subjective \"I  have pain in my bladder and pain in my muscles.   RLE Assessment   RLE Assessment WFL   Strength RLE   RLE Overall Strength 4/5   LLE Assessment   LLE Assessment WFL   Strength LLE   LLE Overall Strength 4/5   Coordination   Movements are Fluid and Coordinated 1   Bed Mobility   Supine to Sit 6  Modified independent   Additional items Bedrails   Transfers   Sit to Stand 5  Supervision   Additional items Verbal cues   Stand to Sit 5  Supervision   Additional items Verbal cues   Additional Comments Occasional cues for safe hand placement with transfers.   Ambulation/Elevation   Gait pattern   (decreased step length)   Gait Assistance 5  Supervision   Assistive Device Rolling walker   Distance 150'   Ambulation/Elevation Additional Comments Pt gait trained on 1L of O2.  Uses 1.5 at home, but tank only accomodates 1 L and 2L.  O2 sats after gait was 95% on 1 L.  HR noted to intermittently increase from  bpm with activity.   Balance   Static Sitting Good   Dynamic Sitting Fair +   Static Standing Fair  (With RW)   Dynamic Standing Fair  (With RW)   Ambulatory Fair  (With RW)   Activity Tolerance   Activity Tolerance Patient tolerated treatment well "   Assessment   Prognosis Good   Problem List Decreased strength;Decreased endurance;Impaired balance;Decreased mobility;Decreased safety awareness   Assessment Pt is 69 y.o. female seen for PT evaluation on 5/10/25 s/p admit to [facility] on 5/9/25 w/ dx UTI requiring antibiotics. PT consulted to assess pt's functional mobility and d/c needs. Order placed for PT eval and tx.. Performed at least 2 patient identifiers during session: Name and wristband. Comorbidities affecting pt's physical performance at time of assessment include: GERD, chronic UTIs, seizures, bipolar, HTN, COPD, R ankle fx. LOF prior to admission was I with gait with rollator walker. Home health aide 2x a week for a total of 7 hours for assist with shopping laundry, cleaning. Personal factors affecting pt at time of IE include: pt lives alone with limited assist. Requires use of rollator for safe mobility. Please find objective findings from PT assessment regarding body systems outlined above with impairments and limitations including weakness, impaired balance, decreased endurance, pain, decreased activity tolerance and fall risk, as well as mobility assessment.  Pt agreeable to therapy.  Gait trained 150' with the RW with S.  Pt was S with transfers with cues for safe hand placement with transfers. Pt will benefit from continued PT to progress strengthening and I with gait and transfers in order to prepare pt for return to home. Pt's clinical presentation is currently unstable/unpredictable seen in pt's presentation of ongoing medical assessment. Given co-morbidities and presented presentation, moderate level eval was completed.  Pt to benefit from continued PT tx to address deficits as defined above and maximize level of functional independent mobility and consistency. From PT/mobility standpoint, recommendation at time of d/c would be no further PT services due to pt request.  The patient's AM-PAC Basic Mobility Inpatient Short Form Raw  Score is 20. A Raw score of greater than 16 suggests the patient may benefit from discharge to home. Please also refer to the recommendation of the Physical Therapist for safe discharge planning.   Barriers to Discharge Decreased caregiver support   Goals   Patient Goals To go home   LTG Expiration Date 05/24/25   Long Term Goal #1 Transfers - mod I from all surfaces.   Long Term Goal #2 Gait - 150' with RW vs with rollator walker with mod I.   Plan   Treatment/Interventions Functional transfer training;LE strengthening/ROM;Therapeutic exercise;Endurance training;Patient/family training;Equipment eval/education;Gait training   PT Frequency 3-5x/wk   Discharge Recommendation   Rehab Resource Intensity Level, PT No post-acute rehabilitation needs  (Pt reported that she would like home nursing, but does not feel that she needs home PT.)   AM-PAC Basic Mobility Inpatient   Turning in Flat Bed Without Bedrails 4   Lying on Back to Sitting on Edge of Flat Bed Without Bedrails 4   Moving Bed to Chair 3   Standing Up From Chair Using Arms 3   Walk in Room 3   Climb 3-5 Stairs With Railing 3   Basic Mobility Inpatient Raw Score 20   Basic Mobility Standardized Score 43.99   [facility] Highest Level Of Mobility   -HLM Goal 6: Walk 10 steps or more   -HLM Achieved 7: Walk 25 feet or more   End of Consult   Patient Position at End of Consult Bedside chair;Bed/Chair alarm activated;All needs within reach

## 2025-05-10 NOTE — ASSESSMENT & PLAN NOTE
Lab Results   Component Value Date    HGBA1C 6.2 (H) 03/17/2025       Recent Labs     05/09/25  2117 05/10/25  0333 05/10/25  0801 05/10/25  1128   POCGLU 97 93 121 194*       Blood Sugar Average: Last 72 hrs:  (P) 121.4  Maintained on metformin, hold  Utilize SSI   Fingersticks with meals  Declines a diabetic diet
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
## 2025-05-10 NOTE — ASSESSMENT & PLAN NOTE
Patient with history of MDR urinary tract infections  Also with multiple abx allergies  Having dysuria and frequency  UA concerning for UTI, >100,000 cfu oxidase positive GNR awaiting final C&S  Utilize ertapenem pending above
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
## 2025-05-10 NOTE — CASE MANAGEMENT
Case Management Assessment & Discharge Planning Note    Patient name Ritika Kwong  Location /417-01 MRN [id number]  : 1955 Date 5/10/2025       Current Admission Date: 2025  Current Admission Diagnosis:UTI (urinary tract infection)   Patient Active Problem List    Diagnosis Date Noted Date Diagnosed    Breast wound, right, subsequent encounter 2025     Mild persistent asthma without complication 2025     Left foot pain 2025     Tachycardia 2025     Acute respiratory failure with hypoxia ([facility]) 2025     Elevated hemidiaphragm 2025     Candidal intertrigo 10/03/2023     Gait instability 2023     Elevated MCV 2023     Skin rash 2023     Rash 2023     Type 2 diabetes mellitus with hyperglycemia, with long-term current use of insulin ([facility]) 2023     UTI (urinary tract infection) 2023     BMI 33.0-33.9,adult 2019     Iron deficiency anemia, unspecified 2019     Right lower quadrant abdominal pain 2019     Pressure injury of sacral region, stage 3 ([facility]) 2019     Pressure ulcer of left buttock, stage 3 ([facility]) 2019     Pressure ulcer of right buttock, stage 2 ([facility]) 2019     Generalized weakness 2019     Leukopenia 2019     Neutropenia ([facility]) 2019     Low TSH level 2019     Sinus arrhythmia 2019     Lactic acidosis 2019     Recurrent falls 2019     Acute hyponatremia 2019     Hypomagnesemia 2019     Hypotension 2019     Hypertriglyceridemia 2019     Vaginal candidiasis 2019     Urinary incontinence 2018     Obesity due to excess calories 10/31/2018     Hypercholesterolemia 10/31/2018     Ambulatory dysfunction 10/30/2018     NSTEMI (non-ST elevated myocardial infarction) ([facility]) 10/29/2018     Volume overload 10/29/2018     Skin breakdown 10/29/2018     Asthma 2016     Seizure disorder ([facility]) 2014     Dermatitis  11/21/2007     Bipolar disorder (HCC) 11/08/2007     Acquired hypothyroidism 11/08/2007       LOS (days): 1  Geometric Mean LOS (GMLOS) (days): 2.8  Days to GMLOS:1.7     OBJECTIVE:    Risk of Unplanned Readmission Score: 18.1         Current admission status: Inpatient  Referral Reason:  (discharge planning)    Preferred Pharmacy:   [id number] [place], [place] - [address]  [address]  [facility] [address]  Phone: [phone] Fax: [phone]    [facility], [place] - [address]  [address]  [facility] [address]  Phone: [phone] Fax: [phone]    Primary Care Provider: Valeria Burns    Primary Insurance: [facility] [employer]  Secondary Insurance: [facility]    ASSESSMENT:    CM met with patient at the bedside,baseline information  was obtained. CM discussed the role of CM in helping the patient develop a discharge plan and assist the patient in carry out their plan.    Patient resides at home alone 12 floor [facility].    Baseline using walker and has aides via [employer] 4 hrs 2 x week       Active Health Care Proxies    There are no active Health Care Proxies on file.         Patient Information  Admitted from:: Home  Mental Status: Alert  During Assessment patient was accompanied by: Not accompanied during assessment  Assessment information provided by:: Patient  Primary Caregiver: Other (Comment) [facility]  Caregiver's Name:: patient has no emergency contacts- county aides 4 hrs 2x week thru [employer]  Caregiver's Relationship to Patient::  (aides and self)  County [facility] Residence: [facility]  What city do you live in?: [facility]  Home entry access options. Select all that apply.: Elevator  Type of Current Residence: Apartment  Floor Level:  ([address] [place] [address])  Upon entering residence, is there a bedroom on the main floor (no further steps)?: Yes  Upon entering  residence, is there a bathroom on the main floor (no further steps)?: Yes  Living Arrangements: Lives Alone  Is patient a ?: No    Activities of Daily Living Prior to Admission  Functional Status: Independent  Completes ADLs independently?: No  Level of ADL dependence: Assistance  Ambulates independently?: Yes  Does patient use assisted devices?: Yes  Assisted Devices (DME) used: Walker  Does patient currently own DME?: Yes  What DME does the patient currently own?: Walker  Does patient have a history of Outpatient Therapy (PT/OT)?: No  Does the patient have a history of Short-Term Rehab?: No  Does patient have a history of HHC?: Yes  Does patient currently have HHC?: Yes ([facility])    Current Home Health Care  [facility]  Current Home Health Follow-Up Provider:: PCP    Patient Information Continued  Income Source: SSI/SSD  Does patient have prescription coverage?: Yes  Can the patient afford their medications and any related supplies (such as glucometers or test strips)?: Yes  Does patient have a history of substance abuse?: No  Does patient have a history of Mental Health Diagnosis?: Yes (bipolar)  Is patient receiving treatment for mental health?: Yes  Has patient received inpatient treatment related to mental health in the last 2 years?: No         Means of Transportation  Means of Transport to Appts:: Other (Comment) (aides)          DISCHARGE DETAILS:    Discharge planning discussed with:: patient  Freedom of Choice: Yes  Comments - Freedom of Choice: resume [facility] and county aides  4hrs 2x week  CM contacted family/caregiver?: No- see comments (declines)  Were Treatment Team discharge recommendations reviewed with patient/caregiver?: Yes  Did patient/caregiver verbalize understanding of patient care needs?: Yes    Requested Home Health Care         Is the patient interested in HHC at discharge?: Yes  Home Health Discipline requested::  Occupational Therapy, Physical Therapy, Nursing  Home Health Agency Name:: [facility]  HHA External Referral Reason (only applicable if external HHA name selected): Patient has established relationship with provider  Home Health Follow-Up Provider:: PCP  Home Health Services Needed:: Evaluate Functional Status and Safety, Gait/ADL Training, Strengthening/Theraputic Exercises to Improve Function (SN ASSESSMENT AND MEDICATION MANAGEMENT)  Homebound Criteria Met:: Uses an Assist Device (i.e. cane, walker, etc), Requires the Assistance of Another Person for Safe Ambulation or to Leave the Home  Supporting Clincal Findings:: Limited Endurance, Fatigues Easliy in Short Distances    DME Referral Provided  Referral made for DME?: No         Would you like to participate in our [facility] service program?  : No - Declined    Treatment Team Recommendation: Home with Home Health Care ([facility])  Discharge Destination Plan:: Home with Home Health Care ([facility] ACTIVE)  Transport at Discharge : Wheelchair van        [facility] updated in aidin of anticipated dc 24 hrs to home.     Transport secured for  5/11/25 Sunday at 1700 to home.  ETA of Transport (Date): 05/11/25  ETA of Transport (Time): 1700      Cm to follow for discharge needs.

## 2025-05-10 NOTE — PLAN OF CARE
Problem: PAIN - ADULT  Goal: Verbalizes/displays adequate comfort level or baseline comfort level  Description: Interventions:- Encourage patient to monitor pain and request assistance- Assess pain using appropriate pain scale- Administer analgesics based on type and severity of pain and evaluate response- Implement non-pharmacological measures as appropriate and evaluate response- Consider cultural and social influences on pain and pain management- Notify physician/advanced practitioner if interventions unsuccessful or patient reports new pain  Outcome: Progressing     Problem: INFECTION - ADULT  Goal: Absence or prevention of progression during hospitalization  Description: INTERVENTIONS:- Assess and monitor for signs and symptoms of infection- Monitor lab/diagnostic results- Monitor all insertion sites, i.e. indwelling lines, tubes, and drains- Monitor endotracheal if appropriate and nasal secretions for changes in amount and color- [place] appropriate cooling/warming therapies per order- Administer medications as ordered- Instruct and encourage patient and family to use good hand hygiene technique- Identify and instruct in appropriate isolation precautions for identified infection/condition  Outcome: Progressing  Goal: Absence of fever/infection during neutropenic period  Description: INTERVENTIONS:- Monitor WBC  Outcome: Progressing     Problem: SAFETY ADULT  Goal: Patient will remain free of falls  Description: INTERVENTIONS:- Educate patient/family on patient safety including physical limitations- Instruct patient to call for assistance with activity - Consult OT/PT to assist with strengthening/mobility - Keep Call bell within reach- Keep bed low and locked with side rails adjusted as appropriate- Keep care items and personal belongings within reach- Initiate and maintain comfort rounds- Make Fall Risk Sign visible to staff- Offer Toileting every 2 Hours, in advance of need- Initiate/Maintain bed alarm-  Obtain necessary fall risk management equipment: - Apply yellow socks and bracelet for high fall risk patients- Consider moving patient to room near nurses station  Outcome: Progressing  Goal: Maintain or return to baseline ADL function  Description: INTERVENTIONS:-  Assess patient's ability to carry out ADLs; assess patient's baseline for ADL function and identify physical deficits which impact ability to perform ADLs (bathing, care of mouth/teeth, toileting, grooming, dressing, etc.)- Assess/evaluate cause of self-care deficits - Assess range of motion- Assess patient's mobility; develop plan if impaired- Assess patient's need for assistive devices and provide as appropriate- Encourage maximum independence but intervene and supervise when necessary- Involve family in performance of ADLs- Assess for home care needs following discharge - Consider OT consult to assist with ADL evaluation and planning for discharge- Provide patient education as appropriate  Outcome: Progressing  Goal: Maintains/Returns to pre admission functional level  Description: INTERVENTIONS:- Perform AM-PAC 6 Click Basic Mobility/ Daily Activity assessment daily.- Set and communicate daily mobility goal to care team and patient/family/caregiver. - Collaborate with rehabilitation services on mobility goals if consulted- Perform Range of Motion 3 times a day.- Reposition patient every 2 hours.- Dangle patient 3 times a day- Stand patient 3 times a day- Ambulate patient 3 times a day- Out of bed to chair 3 times a day - Out of bed for meals 3 times a day- Out of bed for toileting- Record patient progress and toleration of activity level   Outcome: Progressing     Problem: DISCHARGE PLANNING  Goal: Discharge to home or other facility with appropriate resources  Description: INTERVENTIONS:- Identify barriers to discharge w/patient and caregiver- Arrange for needed discharge resources and transportation as appropriate- Identify discharge learning  needs (meds, wound care, etc.)- Arrange for interpretive services to assist at discharge as needed- Refer to Case Management Department for coordinating discharge planning if the patient needs post-hospital services based on physician/advanced practitioner order or complex needs related to functional status, cognitive ability, or social support system  Outcome: Progressing     Problem: Knowledge Deficit  Goal: Patient/family/caregiver demonstrates understanding of disease process, treatment plan, medications, and discharge instructions  Description: Complete learning assessment and assess knowledge base.Interventions:- Provide teaching at level of understanding- Provide teaching via preferred learning methods  Outcome: Progressing     Problem: Prexisting or High Potential for Compromised Skin Integrity  Goal: Skin integrity is maintained or improved  Description: INTERVENTIONS:- Identify patients at risk for skin breakdown- Assess and monitor skin integrity- Assess and monitor nutrition and hydration status- Monitor labs - Assess for incontinence - Turn and reposition patient- Assist with mobility/ambulation- Relieve pressure over bony prominences- Avoid friction and shearing- Provide appropriate hygiene as needed including keeping skin clean and dry- Evaluate need for skin moisturizer/barrier cream- Collaborate with interdisciplinary team - Patient/family teaching- Consider wound care consult   Outcome: Progressing

## 2025-05-10 NOTE — PLAN OF CARE
Problem: OCCUPATIONAL THERAPY ADULT  Goal: Performs self-care activities at highest level of function for planned discharge setting.  See evaluation for individualized goals.  Description: Treatment Interventions: ADL retraining, Functional transfer training, UE strengthening/ROM, Endurance training, Equipment evaluation/education, Activityengagement, Energy conservation          See flowsheet documentation for full assessment, interventions and recommendations.   Note: Limitation: Decreased ADL status, Decreased UE strength, Decreased endurance, Decreased self-care trans, Decreased high-level ADLs  Prognosis: Good  Assessment: Pt is a 69 y.o. female who was admitted to [facility] on 5/9/2025 w/ UTI (urinary tract infection). Pt has a past medical history of asthma, bipolar disorder, UTI, COPD, DM, GERD, HTN, seizures and obesity. Pt lives alone in a one floor apartment with a ramped entrance and elevator access. At baseline, pt was completing ADLs independently and functional mobility and transfers independently with use of 4WW. Currently, pt requires min A overall for ADLs and supervision for mobility and transfers. Pt presents with deficits in the following categories: limited home support, difficulty performing ADLS, and difficulty performing IADLS  activity tolerance, endurance, standing balance/tolerance, and UE strength. These deficits, along with pt's  pain limit the pt's ability to safely engage in areas of occupation such as: bathing/shower, toilet hygiene, functional mobility, community mobility, and household maintenance. Pt would benefit from continued skilled acute OT in order to maximize independence and safety in ADLs, mobility, and transfers. he patient's raw score on the -PAC Daily Activity Inpatient Short Form is 21. A raw score of greater than or equal to 19 suggests the patient may benefit from discharge to home. Discharge recommendation at this time is level III.     Rehab Resource Intensity  Level, OT: III (Minimum Resource Intensity)

## 2025-05-10 NOTE — PLAN OF CARE
Problem: PHYSICAL THERAPY ADULT  Goal: Performs mobility at highest level of function for planned discharge setting.  See evaluation for individualized goals.  Description: Treatment/Interventions: Functional transfer training, LE strengthening/ROM, Therapeutic exercise, Endurance training, Patient/family training, Equipment eval/education, Gait training          See flowsheet documentation for full assessment, interventions and recommendations.  Note: Prognosis: Good  Problem List: Decreased strength, Decreased endurance, Impaired balance, Decreased mobility, Decreased safety awareness  Assessment: Pt is 69 y.o. female seen for PT evaluation on 5/10/25 s/p admit to [facility] on 5/9/25 w/ dx UTI requiring antibiotics. PT consulted to assess pt's functional mobility and d/c needs. Order placed for PT eval and tx.. Performed at least 2 patient identifiers during session: Name and wristband. Comorbidities affecting pt's physical performance at time of assessment include: GERD, chronic UTIs, seizures, bipolar, HTN, COPD, R ankle fx. LOF prior to admission was I with gait with rollator walker. Home health aide 2x a week for a total of 7 hours for assist with shopping laundry, cleaning. Personal factors affecting pt at time of IE include: pt lives alone with limited assist. Requires use of rollator for safe mobility. Please find objective findings from PT assessment regarding body systems outlined above with impairments and limitations including weakness, impaired balance, decreased endurance, pain, decreased activity tolerance and fall risk, as well as mobility assessment.  Pt agreeable to therapy.  Gait trained 150' with the RW with S.  Pt was S with transfers with cues for safe hand placement with transfers. Pt will benefit from continued PT to progress strengthening and I with gait and transfers in order to prepare pt for return to home. Pt's clinical presentation is currently unstable/unpredictable seen in  pt's presentation of ongoing medical assessment. Given co-morbidities and presented presentation, moderate level eval was completed.  Pt to benefit from continued PT tx to address deficits as defined above and maximize level of functional independent mobility and consistency. From PT/mobility standpoint, recommendation at time of d/c would be no further PT services due to pt request.  The patient's AM-PAC Basic Mobility Inpatient Short Form Raw Score is 20. A Raw score of greater than 16 suggests the patient may benefit from discharge to home. Please also refer to the recommendation of the Physical Therapist for safe discharge planning.  Barriers to Discharge: Decreased caregiver support     Rehab Resource Intensity Level, PT: No post-acute rehabilitation needs (Pt reported that she would like home nursing, but does not feel that she needs home PT.)    See flowsheet documentation for full assessment.

## 2025-05-10 NOTE — ASSESSMENT & PLAN NOTE
At baseline moves very poorly and reports proggresively worsening leg and arm strength over the last several years  PT/OT ordered, OT- level III

## 2025-05-11 VITALS
DIASTOLIC BLOOD PRESSURE: 75 MMHG | HEIGHT: 67 IN | WEIGHT: 227.51 LBS | OXYGEN SATURATION: 96 % | HEART RATE: 87 BPM | TEMPERATURE: 98.2 F | RESPIRATION RATE: 17 BRPM | BODY MASS INDEX: 35.71 KG/M2 | SYSTOLIC BLOOD PRESSURE: 123 MMHG

## 2025-05-11 LAB
ANION GAP SERPL CALCULATED.3IONS-SCNC: 8 MMOL/L (ref 4–13)
BACTERIA UR CULT: ABNORMAL
BASOPHILS # BLD AUTO: 0.04 THOUSANDS/ÂΜL (ref 0–0.1)
BASOPHILS NFR BLD AUTO: 1 % (ref 0–1)
BUN SERPL-MCNC: 12 MG/DL (ref 5–25)
CALCIUM SERPL-MCNC: 9 MG/DL (ref 8.4–10.2)
CHLORIDE SERPL-SCNC: 102 MMOL/L (ref 96–108)
CO2 SERPL-SCNC: 28 MMOL/L (ref 21–32)
CREAT SERPL-MCNC: 0.54 MG/DL (ref 0.6–1.3)
EOSINOPHIL # BLD AUTO: 0.3 THOUSAND/ÂΜL (ref 0–0.61)
EOSINOPHIL NFR BLD AUTO: 8 % (ref 0–6)
ERYTHROCYTE [DISTWIDTH] IN BLOOD BY AUTOMATED COUNT: 13.1 % (ref 11.6–15.1)
GFR SERPL CREATININE-BSD FRML MDRD: 97 ML/MIN/1.73SQ M
GLUCOSE SERPL-MCNC: 107 MG/DL (ref 65–140)
GLUCOSE SERPL-MCNC: 112 MG/DL (ref 65–140)
GLUCOSE SERPL-MCNC: 116 MG/DL (ref 65–140)
GLUCOSE SERPL-MCNC: 203 MG/DL (ref 65–140)
HCT VFR BLD AUTO: 39.8 % (ref 34.8–46.1)
HGB BLD-MCNC: 13 G/DL (ref 11.5–15.4)
IMM GRANULOCYTES # BLD AUTO: 0.03 THOUSAND/UL (ref 0–0.2)
IMM GRANULOCYTES NFR BLD AUTO: 1 % (ref 0–2)
LYMPHOCYTES # BLD AUTO: 1.35 THOUSANDS/ÂΜL (ref 0.6–4.47)
LYMPHOCYTES NFR BLD AUTO: 36 % (ref 14–44)
MCH RBC QN AUTO: 33.1 PG (ref 26.8–34.3)
MCHC RBC AUTO-ENTMCNC: 32.7 G/DL (ref 31.4–37.4)
MCV RBC AUTO: 101 FL (ref 82–98)
MONOCYTES # BLD AUTO: 0.43 THOUSAND/ÂΜL (ref 0.17–1.22)
MONOCYTES NFR BLD AUTO: 12 % (ref 4–12)
MRSA NOSE QL CULT: ABNORMAL
MRSA NOSE QL CULT: ABNORMAL
NEUTROPHILS # BLD AUTO: 1.59 THOUSANDS/ÂΜL (ref 1.85–7.62)
NEUTS SEG NFR BLD AUTO: 42 % (ref 43–75)
NRBC BLD AUTO-RTO: 0 /100 WBCS
PLATELET # BLD AUTO: 229 THOUSANDS/UL (ref 149–390)
PMV BLD AUTO: 8.7 FL (ref 8.9–12.7)
POTASSIUM SERPL-SCNC: 3.9 MMOL/L (ref 3.5–5.3)
RBC # BLD AUTO: 3.93 MILLION/UL (ref 3.81–5.12)
SODIUM SERPL-SCNC: 138 MMOL/L (ref 135–147)
WBC # BLD AUTO: 3.74 THOUSAND/UL (ref 4.31–10.16)

## 2025-05-11 PROCEDURE — 80048 BASIC METABOLIC PNL TOTAL CA: CPT

## 2025-05-11 PROCEDURE — 82948 REAGENT STRIP/BLOOD GLUCOSE: CPT

## 2025-05-11 PROCEDURE — 99239 HOSP IP/OBS DSCHRG MGMT >30: CPT | Performed by: INTERNAL MEDICINE

## 2025-05-11 PROCEDURE — 85025 COMPLETE CBC W/AUTO DIFF WBC: CPT

## 2025-05-11 RX ORDER — MUPIROCIN 20 MG/G
OINTMENT TOPICAL 2 TIMES DAILY
Qty: 1 G | Refills: 0 | Status: SHIPPED | OUTPATIENT
Start: 2025-05-11

## 2025-05-11 RX ORDER — LEVETIRACETAM 1000 MG/1
1000 TABLET ORAL 2 TIMES DAILY
Start: 2025-05-11 | End: 2025-05-26

## 2025-05-11 RX ADMIN — PANTOPRAZOLE SODIUM 40 MG: 40 TABLET, DELAYED RELEASE ORAL at 16:06

## 2025-05-11 RX ADMIN — GABAPENTIN 800 MG: 400 CAPSULE ORAL at 08:23

## 2025-05-11 RX ADMIN — Medication 12.5 MG: at 08:26

## 2025-05-11 RX ADMIN — INSULIN LISPRO 5 UNITS: 100 INJECTION, SOLUTION INTRAVENOUS; SUBCUTANEOUS at 11:41

## 2025-05-11 RX ADMIN — MELOXICAM 7.5 MG: 7.5 TABLET ORAL at 08:24

## 2025-05-11 RX ADMIN — ACETAMINOPHEN 650 MG: 325 TABLET ORAL at 12:28

## 2025-05-11 RX ADMIN — PHENYTOIN SODIUM 300 MG: 100 CAPSULE, EXTENDED RELEASE ORAL at 08:23

## 2025-05-11 RX ADMIN — GUAIFENESIN 600 MG: 600 TABLET, EXTENDED RELEASE ORAL at 08:26

## 2025-05-11 RX ADMIN — INSULIN LISPRO 5 UNITS: 100 INJECTION, SOLUTION INTRAVENOUS; SUBCUTANEOUS at 08:33

## 2025-05-11 RX ADMIN — GABAPENTIN 800 MG: 400 CAPSULE ORAL at 11:40

## 2025-05-11 RX ADMIN — ZONISAMIDE 200 MG: 100 CAPSULE ORAL at 08:32

## 2025-05-11 RX ADMIN — PHENYTOIN SODIUM 200 MG: 100 CAPSULE, EXTENDED RELEASE ORAL at 11:41

## 2025-05-11 RX ADMIN — OXYBUTYNIN CHLORIDE 5 MG: 5 TABLET ORAL at 16:06

## 2025-05-11 RX ADMIN — MUPIROCIN: 20 OINTMENT TOPICAL at 08:33

## 2025-05-11 RX ADMIN — OXYBUTYNIN CHLORIDE 5 MG: 5 TABLET ORAL at 08:26

## 2025-05-11 RX ADMIN — ASPIRIN 81 MG: 81 TABLET, COATED ORAL at 08:26

## 2025-05-11 RX ADMIN — LEVOTHYROXINE SODIUM 175 MCG: 175 TABLET ORAL at 05:05

## 2025-05-11 RX ADMIN — PRAVASTATIN SODIUM 40 MG: 40 TABLET ORAL at 16:06

## 2025-05-11 RX ADMIN — FERROUS SULFATE TAB 325 MG (65 MG ELEMENTAL FE) 325 MG: 325 (65 FE) TAB at 08:24

## 2025-05-11 RX ADMIN — PANTOPRAZOLE SODIUM 40 MG: 40 TABLET, DELAYED RELEASE ORAL at 05:05

## 2025-05-11 RX ADMIN — LEVETIRACETAM 1500 MG: 500 TABLET, FILM COATED ORAL at 08:24

## 2025-05-11 RX ADMIN — MAGNESIUM 64 MG (MAGNESIUM CHLORIDE) TABLET,DELAYED RELEASE 64 MG: at 08:31

## 2025-05-11 RX ADMIN — NYSTATIN: 100000 POWDER TOPICAL at 08:33

## 2025-05-11 RX ADMIN — B-COMPLEX W/ C & FOLIC ACID TAB 1 TABLET: TAB at 08:23

## 2025-05-11 RX ADMIN — INSULIN LISPRO 2 UNITS: 100 INJECTION, SOLUTION INTRAVENOUS; SUBCUTANEOUS at 11:41

## 2025-05-11 RX ADMIN — FLUTICASONE PROPIONATE 1 SPRAY: 50 SPRAY, METERED NASAL at 08:31

## 2025-05-11 RX ADMIN — ENOXAPARIN SODIUM 40 MG: 40 INJECTION SUBCUTANEOUS at 08:23

## 2025-05-11 RX ADMIN — ERTAPENEM SODIUM 1000 MG: 1 INJECTION, POWDER, LYOPHILIZED, FOR SOLUTION INTRAMUSCULAR; INTRAVENOUS at 13:08

## 2025-05-11 RX ADMIN — FLUTICASONE FUROATE AND VILANTEROL TRIFENATATE 1 PUFF: 100; 25 POWDER RESPIRATORY (INHALATION) at 08:31

## 2025-05-11 RX ADMIN — QUETIAPINE FUMARATE 400 MG: 100 TABLET ORAL at 08:26

## 2025-05-11 NOTE — ASSESSMENT & PLAN NOTE
Significant rash to sacral region, appears to be fungal in nature  Utilize nystatin powder to this area as well as to groin  She states on her left posterior leg she was told she had ringworm however it appears to be more like hyperpigmented skin

## 2025-05-11 NOTE — CASE MANAGEMENT
Case Management Discharge Planning Note    Patient name Ritika Kwong  Location /417-01 MRN [id number]  : 1955 Date 2025       Current Admission Date: 2025  Current Admission Diagnosis:UTI (urinary tract infection)   Patient Active Problem List    Diagnosis Date Noted Date Diagnosed    Breast wound, right, subsequent encounter 2025     Mild persistent asthma without complication 2025     Left foot pain 2025     Tachycardia 2025     Acute respiratory failure with hypoxia ([facility]) 2025     Elevated hemidiaphragm 2025     Candidal intertrigo 10/03/2023     Gait instability 2023     Elevated MCV 2023     Skin rash 2023     Rash 2023     Type 2 diabetes mellitus with hyperglycemia, with long-term current use of insulin ([facility]) 2023     UTI (urinary tract infection) 2023     BMI 33.0-33.9,adult 2019     Iron deficiency anemia, unspecified 2019     Right lower quadrant abdominal pain 2019     Pressure injury of sacral region, stage 3 ([facility]) 2019     Pressure ulcer of left buttock, stage 3 ([facility]) 2019     Pressure ulcer of right buttock, stage 2 ([facility]) 2019     Generalized weakness 2019     Leukopenia 2019     Neutropenia ([facility]) 2019     Low TSH level 2019     Sinus arrhythmia 2019     Lactic acidosis 2019     Recurrent falls 2019     Acute hyponatremia 2019     Hypomagnesemia 2019     Hypotension 2019     Hypertriglyceridemia 2019     Vaginal candidiasis 2019     Urinary incontinence 2018     Obesity due to excess calories 10/31/2018     Hypercholesterolemia 10/31/2018     Ambulatory dysfunction 10/30/2018     NSTEMI (non-ST elevated myocardial infarction) ([facility]) 10/29/2018     Volume overload 10/29/2018     Skin breakdown 10/29/2018     Asthma 2016     Seizure disorder ([facility]) 2014     Dermatitis 2007      Bipolar disorder (HCC) 11/08/2007     Acquired hypothyroidism 11/08/2007       LOS (days): 2  Geometric Mean LOS (GMLOS) (days): 2.8  Days to GMLOS:1     OBJECTIVE:  Risk of Unplanned Readmission Score: 17.9         Current admission status: Inpatient   Preferred Pharmacy:   [id number] [address]  [address] [facility] [address]  Phone: [phone] Fax: [phone]    [facility]  [address] [facility] [address]  Phone: [phone] Fax: [phone]    Primary Care Provider: Valeria Burns    Primary Insurance: [facility] [employer]  Secondary Insurance: [facility]    DISCHARGE DETAILS:        Patient stable for discharge home today after antibiotic dose.  Patient to resume [facility]  services and her aides from [facility].   [facility] updated in aidin of discharge.Dc Info faxed to [facility] .     Follow up providers listed on AVS.    Patient has secured transport via W/C van for 1700 today to home.  Facesheet for transport  in patient folder on unit

## 2025-05-11 NOTE — UTILIZATION REVIEW
Initial Clinical Review    Admission: Date/Time/Statement:   Admission Orders (From admission, onward)       Ordered        05/09/25 1348  INPATIENT ADMISSION  Once                          Orders Placed This Encounter   Procedures    INPATIENT ADMISSION     Standing Status:   Standing     Number of Occurrences:   1     Level of Care:   Med Surg [16]     Estimated length of stay:   More than 2 Midnights     Certification:   I certify that inpatient services are medically necessary for this patient for a duration of greater than two midnights. See H&P and MD Progress Notes for additional information about the patient's course of treatment.     ED Arrival Information       Expected   -    Arrival   5/9/2025 11:13    Acuity   Urgent              Means of arrival   Ambulance    Escorted by   [facility]    Service   Hospitalist    Admission type   Emergency              Arrival complaint   Abnormal Labs             Chief Complaint   Patient presents with    Possible UTI     States that she was told to come to the ER by her primary for IV antibiotic. Complains of pain and burning with urination. Increased urinary frequency        Initial Presentation:   69 yof to ER from home for dysuria, urinary frequency, suprapubic pain. Patient reports that she had a urinalysis done by her PCP back on Monday which showed results concerning for UTI; directed to ER by PCP. Hx seizures, DM, obesity, UTI. Presents tachycardic with dysuria, frequency. Fungal rash to sacral region. Small area of hyperpigmentation to right posterior leg. Right breast wound with surrounding erythema. Admission labs: lactic acid 3.3, u/a:cloudy, +blood, prot, nitrite, leuk, WBC, bacteria.  Admitted to inpatient status for UTI. Plan: IVABT, cultures, wound care, accuchecks, therapies, seizure precautions.    Anticipated Length of Stay/Certification Statement:   Patient will be admitted on an inpatient basis with an anticipated  length of stay of greater than 2 midnights secondary to IV abx     Date: 5/10/25   Day 2:   UA concerning for UTI, >100,000 cfu oxidase positive GNR awaiting final C&S. Persistent dysuria & frequency. Pt with multiple abt allergies. Continue IV Ertapenem. Monitor output, VS, follow labs, follow up cultures.       ED Treatment-Medication Administration from 05/09/2025 1113 to 05/09/2025 1411         Date/Time Order Dose Route Action     05/09/2025 1340 lactated ringers bolus 1,000 mL 1,000 mL Intravenous New Bag            Scheduled Medications:  aspirin, 81 mg, Oral, Daily  enoxaparin, 40 mg, Subcutaneous, Daily  ertapenem, 1,000 mg, Intravenous, Q24H  ferrous sulfate, 325 mg, Oral, Daily With Breakfast  fluticasone, 1 spray, Each Nare, Daily  Fluticasone Furoate-Vilanterol, 1 puff, Inhalation, Daily  gabapentin, 800 mg, Oral, 4x Daily  guaiFENesin, 600 mg, Oral, Q12H ERI  insulin lispro, 1-6 Units, Subcutaneous, TID AC  insulin lispro, 5 Units, Subcutaneous, TID With Meals  levETIRAcetam, 1,500 mg, Oral, BID  levothyroxine, 175 mcg, Oral, Early Morning  magnesium chloride, 64 mg, Oral, BID  meloxicam, 7.5 mg, Oral, BID  metoprolol tartrate, 12.5 mg, Oral, Q12H ERI  montelukast, 10 mg, Oral, HS  multivitamin stress formula, 1 tablet, Oral, Daily  mupirocin, , Topical, BID  nystatin, , Topical, BID  oxybutynin, 5 mg, Oral, TID  pantoprazole, 40 mg, Oral, BID AC  phenytoin, 200 mg, Oral, Daily With Lunch  phenytoin, 300 mg, Oral, Daily  pravastatin, 40 mg, Oral, Daily With Dinner  QUEtiapine, 400 mg, Oral, BID  zonisamide, 200 mg, Oral, BID      PRN Meds:  acetaminophen, 650 mg, Oral, Q6H PRN  albuterol, 2 puff, Inhalation, Q6H PRN  Diclofenac Sodium, 2 g, Topical, Q6H PRN  docusate sodium, 100 mg, Oral, BID PRN  ondansetron, 4 mg, Intravenous, Q6H PRN  polyethylene glycol, 17 g, Oral, Daily PRN      ED Triage Vitals [05/09/25 1118]   Temperature Pulse Respirations Blood Pressure SpO2 Pain Score   97.8 °F (36.6 °C)  101 20 103/62 94 % 5     Weight (last 2 days)       Date/Time Weight    05/10/25 0343 103 (227.52)            Vital Signs (last 3 days)       Date/Time Temp Pulse Resp BP MAP (mmHg) SpO2 Calculated FIO2 (%) - Nasal Cannula Nasal Cannula O2 Flow Rate (L/min) O2 Device Patient Position - Orthostatic VS Pain    05/11/25 1228 -- -- -- -- -- -- -- -- -- -- 3    05/11/25 0824 -- -- -- -- -- -- -- -- -- -- 3    05/11/25 07:32:25 98.2 °F (36.8 °C) 87 17 123/75 91 94 % -- -- -- -- --    05/10/25 21:27:51 98.3 °F (36.8 °C) 79 16 132/79 97 92 % 28 2 L/min Nasal cannula Lying --    05/10/25 20:37:35 97.6 °F (36.4 °C) 88 17 117/67 84 91 % -- -- -- -- No Pain    05/10/25 1954 -- -- -- -- -- 91 % 28 2 L/min Nasal cannula -- --    05/10/25 1732 -- -- -- -- -- -- -- -- -- -- 4    05/10/25 15:12:49 97.8 °F (36.6 °C) 79 16 109/77 88 95 % -- -- -- -- --    05/10/25 1149 -- -- -- -- -- -- -- -- -- -- 3    05/10/25 0950 -- 90 -- -- -- -- -- -- -- -- Med Not Given for Pain - for MAR use only    05/10/25 0900 -- -- -- -- -- -- -- -- -- -- 7    05/10/25 0844 -- -- -- -- -- -- -- -- -- -- 4    05/10/25 07:59:09 97.1 °F (36.2 °C) 85 18 119/80 93 92 % -- -- -- -- --    05/10/25 0343 -- -- -- -- -- -- -- -- -- -- 5    05/09/25 2300 -- -- -- -- -- -- -- -- -- -- No Pain    05/09/25 21:03:12 97.5 °F (36.4 °C) 93 17 133/84 100 96 % 28 2 L/min Nasal cannula Lying --    05/09/25 2030 -- -- -- -- -- 95 % 28 2 L/min Nasal cannula -- --    05/09/25 1515 -- -- -- -- -- -- -- -- Nasal cannula -- No Pain    05/09/25 1214 -- -- -- -- -- -- -- -- Nasal cannula -- --    05/09/25 1200 -- 98 20 135/76 99 93 % 28 2 L/min None (Room air) Sitting 5    05/09/25 1118 97.8 °F (36.6 °C) 101 20 103/62 78 94 % 28 2 L/min Nasal cannula Sitting 5              Pertinent Labs/Diagnostic Test Results:   Radiology:  No orders to display     Cardiology:  No orders to display     GI:  No orders to display           Results from last 7 days   Lab Units 05/11/25  7960  "05/10/25  0405 05/09/25  1155   WBC Thousand/uL 3.74* 5.22 8.76   HEMOGLOBIN g/dL 13.0 12.2 12.5   HEMATOCRIT % 39.8 37.0 37.9   PLATELETS Thousands/uL 229 229 228   TOTAL NEUT ABS Thousands/µL 1.59* 2.98 6.55         Results from last 7 days   Lab Units 05/11/25  0409 05/10/25  0405 05/09/25  1155   SODIUM mmol/L 138 139 136   POTASSIUM mmol/L 3.9 4.0 4.3   CHLORIDE mmol/L 102 101 98   CO2 mmol/L 28 31 29   ANION GAP mmol/L 8 7 9   BUN mg/dL 12 14 15   CREATININE mg/dL 0.54* 0.53* 0.63   EGFR ml/min/1.73sq m 97 98 93   CALCIUM mg/dL 9.0 8.7 8.9     Results from last 7 days   Lab Units 05/10/25  0405   AST U/L 21   ALT U/L 30   ALK PHOS U/L 72   TOTAL PROTEIN g/dL 6.0*   ALBUMIN g/dL 3.5   TOTAL BILIRUBIN mg/dL 0.23     Results from last 7 days   Lab Units 05/11/25  1115 05/11/25  0733 05/10/25  1623 05/10/25  1128 05/10/25  0801 05/10/25  0333 05/09/25  2117 05/09/25  1641   POC GLUCOSE mg/dl 203* 112 99 194* 121 93 97 102     Results from last 7 days   Lab Units 05/11/25  0409 05/10/25  0405 05/09/25  1155   GLUCOSE RANDOM mg/dL 107 93 101             No results found for: \"BETA-HYDROXYBUTYRATE\"                                   Results from last 7 days   Lab Units 05/09/25  1155   PROCALCITONIN ng/ml <0.05     Results from last 7 days   Lab Units 05/10/25  0626 05/09/25  2152 05/09/25  1917 05/09/25  1512 05/09/25  1215   LACTIC ACID mmol/L 0.5 3.7* 5.4* 5.9* 3.3*                                                 Results from last 7 days   Lab Units 05/09/25  1155   CLARITY UA  Slightly Cloudy   COLOR UA  Yellow   SPEC GRAV UA  1.020   PH UA  6.5   GLUCOSE UA mg/dl Negative   KETONES UA mg/dl Negative   BLOOD UA  Trace*   PROTEIN UA mg/dl Trace*   NITRITE UA  Positive*   BILIRUBIN UA  Negative   UROBILINOGEN UA (BE) mg/dl <2.0   LEUKOCYTES UA  Large*   WBC UA /hpf 30-50*   RBC UA /hpf 1-2   BACTERIA UA /hpf Occasional   EPITHELIAL CELLS WET PREP /hpf None Seen                                 Results from last 7 " days   Lab Units 05/09/25  1215 05/09/25  1155   BLOOD CULTURE  No Growth at 24 hrs. No Growth at 24 hrs.   URINE CULTURE   --  >100,000 cfu/ml Pseudomonas aeruginosa*                   Past Medical History:   Diagnosis Date    Asthma     Bipolar disorder (HCC)     Chronic UTI (urinary tract infection)     COPD (chronic obstructive pulmonary disease) (HCC)     Diabetes mellitus (HCC)     Disease of thyroid gland     Dyslipidemia 10/31/2018    Essential hypertension 10/31/2018    GERD (gastroesophageal reflux disease)     Heart attack (HCC)     Iron deficiency anemia, unspecified 7/29/2019    Obesity due to excess calories 10/31/2018    Recurrent falls 4/13/2019    Seizure (HCC)      Present on Admission:   UTI (urinary tract infection)   Ambulatory dysfunction   Seizure disorder (HCC)   Acquired hypothyroidism   Candidal intertrigo      Admitting Diagnosis: Dysuria [R30.0]  Urinary frequency [R35.0]  UTI (urinary tract infection) [N39.0]  Abnormal laboratory test result [R89.9]  Age/Sex: 69 y.o. female    Network Utilization Review Department  ATTENTION: Please call with any questions or concerns to [phone] and carefully listen to the prompts so that you are directed to the right person. All voicemails are confidential.   For Discharge needs, contact Care Management DC Support Team at [phone] opt. 2  Send all requests for admission clinical reviews, approved or denied determinations and any other requests to dedicated fax number below belonging to the campus where the patient is receiving treatment. List of dedicated fax numbers for the Facilities:  FACILITY NAME UR FAX NUMBER   ADMISSION DENIALS (Administrative/Medical Necessity) [phone]   DISCHARGE SUPPORT TEAM (NETWORK) [phone]   PARENT CHILD HEALTH (Maternity/NICU/Pediatrics) [phone]   [facility] [phone]   [facility] [phone]   [facility]  [phone]   [facility] [phone]   [facility] [phone]   General acute hospital [phone]   [facility] [phone]   [facility] [phone]   [facility] [phone]   [facility] [phone]   [facility] [phone]   [facility] [phone]

## 2025-05-11 NOTE — PLAN OF CARE
Problem: PAIN - ADULT  Goal: Verbalizes/displays adequate comfort level or baseline comfort level  Description: Interventions:- Encourage patient to monitor pain and request assistance- Assess pain using appropriate pain scale- Administer analgesics based on type and severity of pain and evaluate response- Implement non-pharmacological measures as appropriate and evaluate response- Consider cultural and social influences on pain and pain management- Notify physician/advanced practitioner if interventions unsuccessful or patient reports new pain  Outcome: Progressing     Problem: INFECTION - ADULT  Goal: Absence or prevention of progression during hospitalization  Description: INTERVENTIONS:- Assess and monitor for signs and symptoms of infection- Monitor lab/diagnostic results- Monitor all insertion sites, i.e. indwelling lines, tubes, and drains- Monitor endotracheal if appropriate and nasal secretions for changes in amount and color- [place] appropriate cooling/warming therapies per order- Administer medications as ordered- Instruct and encourage patient and family to use good hand hygiene technique- Identify and instruct in appropriate isolation precautions for identified infection/condition  Outcome: Progressing  Goal: Absence of fever/infection during neutropenic period  Description: INTERVENTIONS:- Monitor WBC  Outcome: Progressing     Problem: SAFETY ADULT  Goal: Patient will remain free of falls  Description: INTERVENTIONS:- Educate patient/family on patient safety including physical limitations- Instruct patient to call for assistance with activity - Consult OT/PT to assist with strengthening/mobility - Keep Call bell within reach- Keep bed low and locked with side rails adjusted as appropriate- Keep care items and personal belongings within reach- Initiate and maintain comfort rounds- Make Fall Risk Sign visible to staff- Offer Toileting every 2 Hours, in advance of need- Initiate/Maintain bed alarm-  Obtain necessary fall risk management equipment: - Apply yellow socks and bracelet for high fall risk patients- Consider moving patient to room near nurses station  Outcome: Progressing  Goal: Maintain or return to baseline ADL function  Description: INTERVENTIONS:-  Assess patient's ability to carry out ADLs; assess patient's baseline for ADL function and identify physical deficits which impact ability to perform ADLs (bathing, care of mouth/teeth, toileting, grooming, dressing, etc.)- Assess/evaluate cause of self-care deficits - Assess range of motion- Assess patient's mobility; develop plan if impaired- Assess patient's need for assistive devices and provide as appropriate- Encourage maximum independence but intervene and supervise when necessary- Involve family in performance of ADLs- Assess for home care needs following discharge - Consider OT consult to assist with ADL evaluation and planning for discharge- Provide patient education as appropriate  Outcome: Progressing  Goal: Maintains/Returns to pre admission functional level  Description: INTERVENTIONS:- Perform AM-PAC 6 Click Basic Mobility/ Daily Activity assessment daily.- Set and communicate daily mobility goal to care team and patient/family/caregiver. - Collaborate with rehabilitation services on mobility goals if consulted- Perform Range of Motion 3 times a day.- Reposition patient every 2 hours.- Dangle patient 3 times a day- Stand patient 3 times a day- Ambulate patient 3 times a day- Out of bed to chair 3 times a day - Out of bed for meals 3 times a day- Out of bed for toileting- Record patient progress and toleration of activity level   Outcome: Progressing     Problem: DISCHARGE PLANNING  Goal: Discharge to home or other facility with appropriate resources  Description: INTERVENTIONS:- Identify barriers to discharge w/patient and caregiver- Arrange for needed discharge resources and transportation as appropriate- Identify discharge learning  needs (meds, wound care, etc.)- Arrange for interpretive services to assist at discharge as needed- Refer to Case Management Department for coordinating discharge planning if the patient needs post-hospital services based on physician/advanced practitioner order or complex needs related to functional status, cognitive ability, or social support system  Outcome: Progressing     Problem: Knowledge Deficit  Goal: Patient/family/caregiver demonstrates understanding of disease process, treatment plan, medications, and discharge instructions  Description: Complete learning assessment and assess knowledge base.Interventions:- Provide teaching at level of understanding- Provide teaching via preferred learning methods  Outcome: Progressing     Problem: Prexisting or High Potential for Compromised Skin Integrity  Goal: Skin integrity is maintained or improved  Description: INTERVENTIONS:- Identify patients at risk for skin breakdown- Assess and monitor skin integrity- Assess and monitor nutrition and hydration status- Monitor labs - Assess for incontinence - Turn and reposition patient- Assist with mobility/ambulation- Relieve pressure over bony prominences- Avoid friction and shearing- Provide appropriate hygiene as needed including keeping skin clean and dry- Evaluate need for skin moisturizer/barrier cream- Collaborate with interdisciplinary team - Patient/family teaching- Consider wound care consult   Outcome: Progressing
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
## 2025-05-11 NOTE — ASSESSMENT & PLAN NOTE
Patient with multiple drug allergies, treated with 3 days of IV ertapenem, symptoms have resolved, continue outpatient monitoring.  Patient with Pseudomonas UTI, most difficult issue is patient's multiple drug allergies.

## 2025-05-11 NOTE — PROGRESS NOTES
Patient:    MRN:  [id number]    Aidin Request ID:  [id number]    Level of care reserved:  Home Health Agency    Partner Reserved:  [facility], [place] [phone]    Clinical needs requested:  disease process education, medication management, skilled nursing, physical therapy, occupational therapy    Geography searched:  [phone]    Start of Service:    Request sent:  4:21pm EDT on 5/10/2025 by Lilliana Chavez    Partner reserved:  9:13am EDT on 5/11/2025 by Lilliana Chavez    Choice list shared:  9:13am EDT on 5/11/2025 by Lilliana Chavez
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
## 2025-05-11 NOTE — ASSESSMENT & PLAN NOTE
At baseline moves very poorly and reports proggresively worsening leg and arm strength over the last several years  Discharged with home care

## 2025-05-11 NOTE — ASSESSMENT & PLAN NOTE
Lab Results   Component Value Date    HGBA1C 6.2 (H) 03/17/2025       Recent Labs     05/10/25  1128 05/10/25  1623 05/11/25  0733 05/11/25  1115   POCGLU 194* 99 112 203*       Blood Sugar Average: Last 72 hrs:  (P) 127.625  Resume home oral meds, diabetic diet

## 2025-05-12 NOTE — UTILIZATION REVIEW
NOTIFICATION OF INPATIENT ADMISSION   AUTHORIZATION REQUEST   SERVICING FACILITY:   [place] [address]  Tax ID:  [id number]  NPI: [id number] ATTENDING PROVIDER:  Attending Name and NPI#: Epi Andrade Do [[id number]]  Address: [address] [place] [address]  Phone: [phone]     ADMISSION INFORMATION:  Place of Service: Inpatient [facility]  Place of Service Code: 21  Inpatient Admission Date/Time: 5/9/25  1:48 PM  Discharge Date/Time: 5/11/2025  5:01 PM  Admitting Diagnosis Code/Description:  Dysuria [R30.0]  Urinary frequency [R35.0]  UTI (urinary tract infection) [N39.0]  Abnormal laboratory test result [R89.9]     UTILIZATION REVIEW CONTACT:  Kelsey Weaver Utilization   Network Utilization Review Department  Phone: [phone]  Fax: [phone]  Email: [email or web address]  Contact for approvals/pending authorizations, clinical reviews, and discharge.  .     PHYSICIAN ADVISORY SERVICES:  Medical Necessity Denial & Vzyi-mu-Vogx Review  Phone: [phone]  Fax: [phone]  Email: [email or web address]     DISCHARGE SUPPORT TEAM:  For Patients Discharge Needs & Updates  Phone: [phone] opt. 2 Fax: [phone]  Email: [email or web address]

## 2025-05-14 LAB
BACTERIA BLD CULT: NORMAL
BACTERIA BLD CULT: NORMAL

## 2025-05-16 NOTE — CASE MANAGEMENT
Case Management Discharge Planning Note    Patient name Ritika Kwong  Location /417-01 MRN [id number]  : 1955 Date 2025       Current Admission Date: 2025  Current Admission Diagnosis:UTI (urinary tract infection)   Patient Active Problem List    Diagnosis Date Noted    Breast wound, right, subsequent encounter 2025    Mild persistent asthma without complication 2025    Left foot pain 2025    Tachycardia 2025    Acute respiratory failure with hypoxia ([facility]) 2025    Elevated hemidiaphragm 2025    Candidal intertrigo 10/03/2023    Gait instability 2023    Elevated MCV 2023    Skin rash 2023    Rash 2023    Type 2 diabetes mellitus with hyperglycemia, with long-term current use of insulin ([facility]) 2023    UTI (urinary tract infection) 2023    BMI 33.0-33.9,adult 2019    Iron deficiency anemia, unspecified 2019    Right lower quadrant abdominal pain 2019    Pressure injury of sacral region, stage 3 ([facility]) 2019    Pressure ulcer of left buttock, stage 3 ([facility]) 2019    Pressure ulcer of right buttock, stage 2 ([facility]) 2019    Generalized weakness 2019    Leukopenia 2019    Neutropenia ([facility]) 2019    Low TSH level 2019    Sinus arrhythmia 2019    Lactic acidosis 2019    Recurrent falls 2019    Acute hyponatremia 2019    Hypomagnesemia 2019    Hypotension 2019    Hypertriglyceridemia 2019    Vaginal candidiasis 2019    Urinary incontinence 2018    Obesity due to excess calories 10/31/2018    Hypercholesterolemia 10/31/2018    Ambulatory dysfunction 10/30/2018    NSTEMI (non-ST elevated myocardial infarction) ([facility]) 10/29/2018    Volume overload 10/29/2018    Skin breakdown 10/29/2018    Asthma 2016    Seizure disorder ([facility]) 2014    Dermatitis 2007    Bipolar disorder ([facility]) 2007    Acquired hypothyroidism  11/08/2007      LOS (days): 2  Geometric Mean LOS (GMLOS) (days): 2.8  Days to GMLOS:0.7     OBJECTIVE:  Risk of Unplanned Readmission Score: 18.18         Current admission status: Inpatient   Preferred Pharmacy:   [id number] - [address]  [address] [facility] [address]  Phone: [phone] Fax: [phone]    [facility] [address] [address]  Phone: [phone] Fax: [phone]    Primary Care Provider: Valeria Burns    Primary Insurance: [employer]  Secondary Insurance: [facility]    DISCHARGE DETAILS:     CM placed call to patient   Betsy Sargent at  [facility] for her county home assistance.   Betsy is  Aware patient is discharged to home.  Phone # [phone]    Message  left asking if patient home aides can be re established as patient was saying she qualified for 8hr  a week but was having difficulty finding a aide to assist her.  CM left her name and number to call back with any questions